# Patient Record
Sex: MALE | Race: WHITE | NOT HISPANIC OR LATINO | Employment: OTHER | URBAN - METROPOLITAN AREA
[De-identification: names, ages, dates, MRNs, and addresses within clinical notes are randomized per-mention and may not be internally consistent; named-entity substitution may affect disease eponyms.]

---

## 2017-02-10 ENCOUNTER — TELEPHONE (OUTPATIENT)
Dept: PHARMACY | Facility: PHYSICIAN GROUP | Age: 57
End: 2017-02-10

## 2017-02-10 DIAGNOSIS — E78.00 PURE HYPERCHOLESTEROLEMIA: ICD-10-CM

## 2017-02-10 DIAGNOSIS — I10 ESSENTIAL HYPERTENSION, BENIGN: ICD-10-CM

## 2017-02-10 NOTE — TELEPHONE ENCOUNTER
Pt left VM for MTM yesterday, however unclear what patient need was.     Attempted to reach patient to discuss, no answer.     Milvia Jeff, Pharm.D, [facility]  Medication Therapy Management Pharmacist  [phone]

## 2017-02-20 RX ORDER — SIMVASTATIN 20 MG
20 TABLET ORAL AT BEDTIME
Qty: 90 TABLET | Refills: 1 | Status: SHIPPED | OUTPATIENT
Start: 2017-02-20 | End: 2017-10-04

## 2017-02-20 RX ORDER — LISINOPRIL AND HYDROCHLOROTHIAZIDE 12.5; 2 MG/1; MG/1
1 TABLET ORAL DAILY
Qty: 90 TABLET | Refills: 1 | Status: SHIPPED | OUTPATIENT
Start: 2017-02-20 | End: 2017-10-04

## 2017-02-20 NOTE — TELEPHONE ENCOUNTER
The 10-year ASCVD risk score (Clevelandcelena GARCIA Jr., et al., 2013) is: 12%    Values used to calculate the score:      Age: 56 years      Sex: Male      Is Non- : No      Diabetic: No      Tobacco smoker: No      Systolic Blood Pressure: 162 mmHg      Is Prescribed Antihypertensives: Yes      HDL Cholesterol: 59 mg/dL      Total Cholesterol: 234 mg/dL  \  Pt was calling because he got a message from [facility] scheduling awhile back. Additionally, he was calling to get his simvastatin renewed because he was asked to increase his simvastatin by Dr. Nava back in November.     RX sent for refills on his lisinopril and simvastatin per CPA with Dr. Nava to sync up his medications.     Pt will be due in 3 months for fasting cholesterol recheck.     Milvia Jeff, Pharm.D, BCACP  Medication Therapy Management Pharmacist  [phone]

## 2017-02-21 NOTE — TELEPHONE ENCOUNTER
He can just do standing labs- they are in now so can set up lab only at his leisure.  Can you let him know or do you want me to?

## 2017-02-21 NOTE — TELEPHONE ENCOUNTER
LM for pt to set up lab only visit in 3 months- lab orders are in standing.    Milvia Jeff, Pharm.D, [facility]  Medication Therapy Management Pharmacist  [phone]

## 2017-04-06 ENCOUNTER — TELEPHONE (OUTPATIENT)
Dept: PHARMACY | Facility: PHYSICIAN GROUP | Age: 57
End: 2017-04-06

## 2017-04-06 NOTE — TELEPHONE ENCOUNTER
Left message for pt to call back and schedule follow up MTM visit. Pt needs clinic visit for BP recheck and has standing labs for cholesterol check to be done early May.     Milvia Jeff, Pharm.D, [facility]  Medication Therapy Management Pharmacist  [phone]

## 2017-04-12 DIAGNOSIS — J42 CHRONIC BRONCHITIS, UNSPECIFIED CHRONIC BRONCHITIS TYPE (H): ICD-10-CM

## 2017-04-12 NOTE — TELEPHONE ENCOUNTER
Patient will be coming in around May for recheck. Needing a refill of his Incruse- rx sent for 90 days to mail order per CPA with Dr. Nava.     Milvia Jeff, Pharm.D, [facility]  Medication Therapy Management Pharmacist  [phone]

## 2017-06-02 NOTE — TELEPHONE ENCOUNTER
Can you please call the patient to schedule fasting labs, PCP and MTM visit?     Labs are in standing.    Thank you,  Milvia Jeff, Pharm.D, [facility]  Medication Therapy Management Pharmacist  [phone]

## 2017-07-31 DIAGNOSIS — I10 ESSENTIAL HYPERTENSION, BENIGN: ICD-10-CM

## 2017-07-31 DIAGNOSIS — E78.00 PURE HYPERCHOLESTEROLEMIA: ICD-10-CM

## 2017-07-31 RX ORDER — LISINOPRIL AND HYDROCHLOROTHIAZIDE 12.5; 2 MG/1; MG/1
TABLET ORAL
Qty: 90 TABLET | OUTPATIENT
Start: 2017-07-31

## 2017-07-31 RX ORDER — SIMVASTATIN 20 MG
TABLET ORAL
Qty: 90 TABLET | OUTPATIENT
Start: 2017-07-31

## 2017-10-04 DIAGNOSIS — E78.00 PURE HYPERCHOLESTEROLEMIA: ICD-10-CM

## 2017-10-04 DIAGNOSIS — I10 ESSENTIAL HYPERTENSION, BENIGN: ICD-10-CM

## 2017-10-04 RX ORDER — SIMVASTATIN 20 MG
20 TABLET ORAL AT BEDTIME
Qty: 30 TABLET | Refills: 0 | COMMUNITY
Start: 2017-10-04 | End: 2017-10-30

## 2017-10-04 RX ORDER — LISINOPRIL AND HYDROCHLOROTHIAZIDE 12.5; 2 MG/1; MG/1
1 TABLET ORAL DAILY
Qty: 30 TABLET | Refills: 0 | COMMUNITY
Start: 2017-10-04 | End: 2017-10-30

## 2017-10-05 NOTE — TELEPHONE ENCOUNTER
Patient has appointment on 10/30/17 and needed a 30 day supply to get the patient through until his appointment, requested by Milvia Jeff.    Signed Prescriptions:                        Disp   Refills    simvastatin (ZOCOR) 20 MG tablet           30 tab*0        Sig: Take 1 tablet (20 mg) by mouth At Bedtime  Authorizing Provider: RODRIGO RUSSELL  Ordering User: HOLLEY ANDERSON    lisinopril-hydrochlorothiazide (PRINZIDE/Z*30 tab*0        Sig: Take 1 tablet by mouth daily  Authorizing Provider: RODRIGO RUSSELL  Ordering User: HOLLEY ANDERSON    30 day supply called to pharmacy    Holley Anderson CMA

## 2017-10-30 ENCOUNTER — OFFICE VISIT (OUTPATIENT)
Dept: FAMILY MEDICINE | Facility: CLINIC | Age: 57
End: 2017-10-30

## 2017-10-30 VITALS
HEIGHT: 69 IN | OXYGEN SATURATION: 98 % | TEMPERATURE: 97.9 F | BODY MASS INDEX: 32.32 KG/M2 | WEIGHT: 218.2 LBS | SYSTOLIC BLOOD PRESSURE: 144 MMHG | HEART RATE: 88 BPM | DIASTOLIC BLOOD PRESSURE: 80 MMHG

## 2017-10-30 DIAGNOSIS — I10 ESSENTIAL HYPERTENSION, BENIGN: ICD-10-CM

## 2017-10-30 DIAGNOSIS — I73.9 CLAUDICATION OF LEFT LOWER EXTREMITY (H): ICD-10-CM

## 2017-10-30 DIAGNOSIS — E78.00 PURE HYPERCHOLESTEROLEMIA: ICD-10-CM

## 2017-10-30 DIAGNOSIS — Z00.00 ROUTINE GENERAL MEDICAL EXAMINATION AT A HEALTH CARE FACILITY: Primary | ICD-10-CM

## 2017-10-30 DIAGNOSIS — R20.9 DISTURBANCE OF SKIN SENSATION: ICD-10-CM

## 2017-10-30 DIAGNOSIS — J42 CHRONIC BRONCHITIS, UNSPECIFIED CHRONIC BRONCHITIS TYPE (H): ICD-10-CM

## 2017-10-30 DIAGNOSIS — Z23 NEED FOR VACCINATION: ICD-10-CM

## 2017-10-30 PROCEDURE — 80061 LIPID PANEL: CPT | Mod: 90 | Performed by: FAMILY MEDICINE

## 2017-10-30 PROCEDURE — 99396 PREV VISIT EST AGE 40-64: CPT | Mod: 25 | Performed by: FAMILY MEDICINE

## 2017-10-30 PROCEDURE — 80053 COMPREHEN METABOLIC PANEL: CPT | Mod: 90 | Performed by: FAMILY MEDICINE

## 2017-10-30 PROCEDURE — 90471 IMMUNIZATION ADMIN: CPT | Performed by: FAMILY MEDICINE

## 2017-10-30 PROCEDURE — 36415 COLL VENOUS BLD VENIPUNCTURE: CPT | Performed by: FAMILY MEDICINE

## 2017-10-30 PROCEDURE — 90686 IIV4 VACC NO PRSV 0.5 ML IM: CPT | Performed by: FAMILY MEDICINE

## 2017-10-30 RX ORDER — SIMVASTATIN 20 MG
20 TABLET ORAL AT BEDTIME
Qty: 90 TABLET | Refills: 1 | Status: SHIPPED | OUTPATIENT
Start: 2017-10-30 | End: 2018-04-01

## 2017-10-30 RX ORDER — LISINOPRIL AND HYDROCHLOROTHIAZIDE 12.5; 2 MG/1; MG/1
1 TABLET ORAL DAILY
Qty: 90 TABLET | Refills: 1 | Status: SHIPPED | OUTPATIENT
Start: 2017-10-30 | End: 2018-01-08

## 2017-10-30 NOTE — MR AVS SNAPSHOT
"              After Visit Summary   10/30/2017    Rashaun Molina    MRN: [id number]           Patient Information     Date Of Birth          1960        Visit Information        Provider Department      10/30/2017 10:30 AM Yemi Nava MD [facility]        Today's Diagnoses     Routine general medical examination at a health care facility    -  1    Pure hypercholesterolemia        Essential hypertension, benign        Chronic bronchitis, unspecified chronic bronchitis type (H)        Need for vaccination        Disturbance of skin sensation        Claudication of left lower extremity (H)           Follow-ups after your visit        Follow-up notes from your care team     Return in about 6 months (around 4/30/2018).      Who to contact     If you have questions or need follow up information about today's clinic visit or your schedule please contact [facility] directly at [phone].  Normal or non-critical lab and imaging results will be communicated to you by [employer], letter or phone within 4 business days after the clinic has received the results. If you do not hear from us within 7 days, please contact the clinic through [employer] or phone. If you have a critical or abnormal lab result, we will notify you by phone as soon as possible.  Submit refill requests through [facility] or call your pharmacy and they will forward the refill request to us. Please allow 3 business days for your refill to be completed.          Additional Information About Your Visit        MyChart Information     [facility] lets you send messages to your doctor, view your test results, renew your prescriptions, schedule appointments and more. To sign up, go to [email or web address] . Click on \"Log in\" on the left side of the screen, which will take you to the Welcome page. Then click on \"Sign up Now\" on the right side of the page.     You will be asked to enter the access code " "listed below, as well as some personal information. Please follow the directions to create your username and password.     Your access code is: [email or web address]  Expires: 2018 11:34 AM     Your access code will  in 90 days. If you need help or a new code, please call your [place] clinic or [phone].        Care EveryWhere ID     This is your Care EveryWhere ID. This could be used by other organizations to access your [place] medical records  [id number]        Your Vitals Were     Pulse Temperature Height Pulse Oximetry BMI (Body Mass Index)       88 97.9  F (36.6  C) (Oral) 1.753 m (5' 9\") 98% 32.22 kg/m2        Blood Pressure from Last 3 Encounters:   10/30/17 144/80   16 162/85   16 166/84    Weight from Last 3 Encounters:   10/30/17 99 kg (218 lb 3.2 oz)   16 99.3 kg (219 lb)   16 96.6 kg (213 lb)              We Performed the Following     COMPREHENSIVE METABOLIC PANEL ([facility]) XCMP     EXTREMITY ARTERIES MULTI LEVEL, BILATERAL     HC FLU VAC PRESRV FREE QUAD SPLIT VIR 3+YRS IM     Lipid Profile ([facility])     VACCINE ADMINISTRATION, INITIAL     VENOUS COLLECTION          Where to get your medicines      These medications were sent to [facility] - [address], [facility] [address]     Phone:  [phone]     lisinopril-hydrochlorothiazide 20-12.5 MG per tablet    simvastatin 20 MG tablet    umeclidinium 62.5 MCG/INH oral inhaler          Primary Care Provider Office Phone # Fax #    Yemi Nava [phone] [address] [address]        Equal Access to Services     DEBRA PERDUE : Francesca Diaz, waaxda luqadaha, qaybta kaalmada marina ba. So [facility] [phone].    ATENCIÓN: Si habla español, tiene a mobley disposición servicios gratuitos de asistencia lingüística. Llame al [phone].    We comply " with applicable federal civil rights laws and [place] laws. We do not discriminate on the basis of race, color, national origin, age, disability, sex, sexual orientation, or gender identity.            Thank you!     Thank you for choosing [facility]  for your care. Our goal is always to provide you with excellent care. Hearing back from our patients is one way we can continue to improve our services. Please take a few minutes to complete the written survey that you may receive in the mail after your visit with us. Thank you!             Your Updated Medication List - Protect others around you: Learn how to safely use, store and throw away your medicines at www.disposemymeds.org.          This list is accurate as of: 10/30/17 11:34 AM.  Always use your most recent med list.                   Brand Name Dispense Instructions for use Diagnosis    albuterol 108 (90 BASE) MCG/ACT Inhaler    PROAIR HFA/PROVENTIL HFA/VENTOLIN HFA    1 Inhaler    Inhale 2 puffs into the lungs every 6 hours as needed for shortness of breath / dyspnea or wheezing    Simple chronic bronchitis (H)       ASPIRIN EC PO      Take 81 mg by mouth daily.        lisinopril-hydrochlorothiazide 20-12.5 MG per tablet    PRINZIDE/ZESTORETIC    90 tablet    Take 1 tablet by mouth daily    Essential hypertension, benign       simvastatin 20 MG tablet    ZOCOR    90 tablet    Take 1 tablet (20 mg) by mouth At Bedtime    Pure hypercholesterolemia       umeclidinium 62.5 MCG/INH oral inhaler    INCRUSE ELLIPTA    3 Inhaler    Inhale 1 puff into the lungs daily    Chronic bronchitis, unspecified chronic bronchitis type (H)

## 2017-10-30 NOTE — LETTER
October 31, 2017      Rashaun Molina  [address] [facility] [address]      Rashaun THALIA Molina     Your recent sodium level was quite low.  This may be due to your medication. Please stop your lisinopril/HCTZ and return to see me in a couple of weeks as we need to see if that might be the cause or do we need to search elsewhere. Please call me if questions about this.    The results of your recent Blood Sugar, Kidney Tests, Lipid profile and Liver Panel were within normal limits. The results are now released on My Chart. Please contact me if you have further questions or concerns.    Sincerely,    AMIE Nava M.D.   Resulted Orders   Lipid Profile ([place])   Result Value Ref Range    Cholesterol 179 <200 mg/dL    HDL Cholesterol 45 >40 mg/dL    Triglycerides 133 <150 mg/dL    LDL Cholesterol Calculated 109 (H) mg/dL (calc)      Comment:      Reference range: <100     Desirable range <100 mg/dL for patients with CHD or  diabetes and <70 mg/dL for diabetic patients with  known heart disease.     LDL-C is now calculated using the Fabian-Titus   calculation, which is a validated novel method providing   better accuracy than the Friedewald equation in the   estimation of LDL-C.   Fabian SS et al. MALINDA. 2013;310(19): 3820-2702   (http://education.Handpressions/faq/ILY487)      Cholesterol/HDL Ratio 4.0 <5.0 (calc)    Non HDL Cholesterol 134 (H) <130 mg/dL (calc)      Comment:      For patients with diabetes plus 1 major ASCVD risk   factor, treating to a non-HDL-C goal of <100 mg/dL   (LDL-C of <70 mg/dL) is considered a therapeutic   option.     COMPREHENSIVE METABOLIC PANEL ([facility]) XCMP   Result Value Ref Range    Glucose 99 65 - 99 mg/dL      Comment:                    Fasting reference interval         Urea Nitrogen 11 7 - 25 mg/dL    Creatinine 0.87 0.70 - 1.33 mg/dL      Comment:      For patients >49 years of age, the reference limit  for Creatinine is approximately 13% higher for  people  identified as -American.         GFR Estimate 95 > OR = 60 mL/min/1.73m2    EGFR African American 110 > OR = 60 mL/min/1.73m2    BUN/Creatinine Ratio NOT APPLICABLE 6 - 22 (calc)    Sodium 128 (L) 135 - 146 mmol/L    Potassium 4.4 3.5 - 5.3 mmol/L    Chloride 89 (L) 98 - 110 mmol/L    Carbon Dioxide 27 20 - 31 mmol/L    Calcium 9.5 8.6 - 10.3 mg/dL    Protein Total 7.9 6.1 - 8.1 g/dL    Albumin 4.1 3.6 - 5.1 g/dL    Globulin Calculated 3.8 (H) 1.9 - 3.7 g/dL (calc)    A/G Ratio 1.1 1.0 - 2.5 (calc)    Bilirubin Total 0.6 0.2 - 1.2 mg/dL    Alkaline Phosphatase 87 40 - 115 U/L    AST 27 10 - 35 U/L    ALT 31 9 - 46 U/L       If you have any questions or concerns, please call the clinic at the number listed above.       Sincerely,        Yemi Nava MD

## 2017-10-30 NOTE — NURSING NOTE
Rashaun is here for CPX and possible sinus inf and numbness in feet    Patient is here for a full physical exam.    Pre-Visit Screening :  Immunizations : up to date flu shot today  Colon Screening : is due and to be scheduled by patient for later completion  Mammogram: NA  Asthma Action Test/Plan : NA  PHQ9/GAD7 :  None  Fall Risk Assessment NA    Vitals:  Pulse - regular  My Chart - declines    CLASSIFICATION OF OVERWEIGHT AND OBESITY BY BMI                         Obesity Class           BMI(kg/m2)  Underweight                                    < 18.5  Normal                                         18.5-24.9  Overweight                                     25.0-29.9  OBESITY                     I                  30.0-34.9                              II                 35.0-39.9  EXTREME OBESITY             III                >40                             Patient's  BMI Body mass index is 32.22 kg/(m^2).  http://hin.nhlbi.nih.gov/menuplanner/menu.cgi  Questioned patient about current smoking habits.  Pt. quit smoking some time ago.    ETOH screening:  Questions:  1-How often do you have a drink containing alcohol?                             12 times per week(s) about a case of beer a week  2-How many drinks containing alcohol do you have on a typical day when you are         Drinking?                              3   3- How often do you have 5 or more drinks on one occasion?                              Never    Have you ever:  None of the patient's responses to the CAGE screening were positive / Negative CAGE score

## 2017-10-30 NOTE — PROGRESS NOTES
SUBJECTIVE:   CC: Rashaun Molina is an 57 year old male who presents for preventative health visit.     Healthy Habits:    Do you get at least three servings of calcium containing foods daily (dairy, green leafy vegetables, etc.)? yes    Amount of exercise or daily activities, outside of work: 0 day(s) per week    Problems taking medications regularly No    Medication side effects: No    Have you had an eye exam in the past two years? yes    Do you see a dentist twice per year? yes    Do you have sleep apnea, excessive snoring or daytime drowsiness?possible issues- wants to check next year          Pt relates 2 months numbness on and off in feet, pain can occur as well- seems worse after walking, better if stops    Today's PHQ-2 Score: PHQ-2 ( 1999 Pfizer) 10/30/2017 11/7/2016   Q1: Little interest or pleasure in doing things 0 0   Q2: Feeling down, depressed or hopeless 0 1   PHQ-2 Score 0 1         Abuse: Current or Past(Physical, Sexual or Emotional)- No  Do you feel safe in your environment - Yes  Social History   Substance Use Topics     Smoking status: Former Smoker     Packs/day: 1.50     Years: 40.00     Quit date: 4/19/2013     Smokeless tobacco: Never Used     Alcohol use 8.4 oz/week     14 Cans of beer per week      Comment: 2 beers daily     2-5 gomez lite per day    Last PSA:   Abbott PSA   Date Value Ref Range Status   11/07/2016 0.6 < OR = 4.0 ng/mL Final     Comment:        This test was performed using the Siemens  chemiluminescent method. Values obtained from  different assay methods cannot be used  interchangeably. PSA levels, regardless of  value, should not be interpreted as absolute  evidence of the presence or absence of disease.            Reviewed orders with patient. Reviewed health maintenance and updated orders accordingly - Yes  BP Readings from Last 3 Encounters:   10/30/17 144/80   11/17/16 162/85   11/07/16 166/84    Wt Readings from Last 3 Encounters:   10/30/17 99 kg (218 lb 3.2  oz)   16 99.3 kg (219 lb)   16 96.6 kg (213 lb)                  Patient Active Problem List   Diagnosis     Essential hypertension, benign     Pure hypercholesterolemia     History of colonic polyps     BCC (basal cell carcinoma)     ACP (advance care planning)     Health Care Home     Colovesical fistula     Vasovagal syncopes     Avascular necrosis of bone of right hip (H)     S/P total hip arthroplasty     Obesity due to excess calories, unspecified obesity severity     Chronic bronchitis, unspecified chronic bronchitis type (H)     Past Surgical History:   Procedure Laterality Date     ARTHROPLASTY HIP Right 10/7/2015    Procedure: ARTHROPLASTY HIP;  Surgeon: Neil Davis MD;  Location: [facility] OR     COLONOSCOPY      negative, michael     COMBINED CYSTOSCOPY, INSERT CATHETER URETER  2013    Procedure: COMBINED CYSTOSCOPY, INSERT CATHETER URETER;;  Surgeon: Kashif Parks MD;  Location:  OR     HC COLONOSCOPY THRU STOMA W BIOPSY/CAUTERY TUMOR/POLYP/LESION      michael, tubular adenoma, next colonoscopy      HC COLONOSCOPY THRU STOMA, DIAGNOSTIC  01    negative, ligate two internal hemmorhoids  Dr rodriguez-repeat      HC LARYNGOSCOPY DIRECT W VOCAL CORD INJECTION      vocal cord polyps     LAPAROSCOPIC ASSISTED COLECTOMY  2013    Procedure: LAPAROSCOPIC ASSISTED COLECTOMY;  LOW ANTERIOR RESECTION ;  Surgeon: Tramaine Zuniga MD;  Location:  OR       Social History   Substance Use Topics     Smoking status: Former Smoker     Packs/day: 1.50     Years: 40.00     Quit date: 2013     Smokeless tobacco: Never Used     Alcohol use 8.4 oz/week     14 Cans of beer per week      Comment: 2 beers daily     Family History   Problem Relation Age of Onset     Hypertension Mother      Hypertension Father       MI     Hypertension Brother      Hypertension Brother      Hypertension Sister      HEART DISEASE Father      MI  at age 55     Lipids Brother      Lipids  Brother          Current Outpatient Prescriptions   Medication Sig Dispense Refill     simvastatin (ZOCOR) 20 MG tablet Take 1 tablet (20 mg) by mouth At Bedtime 90 tablet 1     lisinopril-hydrochlorothiazide (PRINZIDE/ZESTORETIC) 20-12.5 MG per tablet Take 1 tablet by mouth daily 90 tablet 1     umeclidinium (INCRUSE ELLIPTA) 62.5 MCG/INH oral inhaler Inhale 1 puff into the lungs daily 3 Inhaler 1     albuterol (PROAIR HFA/PROVENTIL HFA/VENTOLIN HFA) 108 (90 BASE) MCG/ACT Inhaler Inhale 2 puffs into the lungs every 6 hours as needed for shortness of breath / dyspnea or wheezing 1 Inhaler 1     ASPIRIN EC PO Take 81 mg by mouth daily.       [DISCONTINUED] simvastatin (ZOCOR) 20 MG tablet Take 1 tablet (20 mg) by mouth At Bedtime 30 tablet 0     [DISCONTINUED] lisinopril-hydrochlorothiazide (PRINZIDE/ZESTORETIC) 20-12.5 MG per tablet Take 1 tablet by mouth daily 30 tablet 0     Allergies   Allergen Reactions     Spiriva Handihaler Blisters     Recent Labs   Lab Test  11/07/16   1007  07/06/16   0824  10/08/15   0558  09/25/15   1140  09/01/15   1028   07/23/14   1021   04/17/13   0015   LDL  130*  123   --    --   118   < >  94   < >   --    HDL  59  46   --    --   66   < >  56   < >   --    TRIG  224*  117   --    --   231*   < >  339*   < >   --    ALT  38  22   --   66*  80*   < >  40   < >   --    CR  0.97  1.10  1.07  0.81  0.96   < >  1.13   < >  0.99   GFRESTIMATED  86  74  71  99  88   < >  73   < >  Not Calculated   GFRESTBLACK   --    --   86   --    --    --    --    --   Not Calculated   POTASSIUM  4.9  4.3  3.7  4.7  4.8   < >  4.5   < >  3.7  Charge credited  Duplicate request CORRECTED ON 04/17 AT 0716: PREVIOUSLY REPORTED AS 3.7   TSH   --    --    --    --    --    --   1.69   --    --     < > = values in this interval not displayed.              Reviewed and updated as needed this visit by clinical staffTobacco  Allergies  Problems         Reviewed and updated as needed this visit by  Provider        Past Medical History:   Diagnosis Date     Arthritis      Chronic airway obstruction, not elsewhere classified 3/8/2004     Cough syncope 11/21/2012     Essential hypertension, benign      Fistula     colon/bladder     Fracture of right proximal fibula 7/30/2014     Orthostatic hypotension 11/24/2014     Other chronic pain     right hip pain     Personal history of colonic polyps 6/28/2005     Pure hypercholesterolemia      Tobacco use disorder 3/8/2004     Vasovagal syncopes 10/25/2014     Viral warts, unspecified     genital      Past Surgical History:   Procedure Laterality Date     ARTHROPLASTY HIP Right 10/7/2015    Procedure: ARTHROPLASTY HIP;  Surgeon: Neil Davis MD;  Location: [facility] OR     COLONOSCOPY  9/05    negative, michael     COMBINED CYSTOSCOPY, INSERT CATHETER URETER  4/11/2013    Procedure: COMBINED CYSTOSCOPY, INSERT CATHETER URETER;;  Surgeon: Kashif Parks MD;  Location: [facility] OR     HC COLONOSCOPY THRU STOMA W BIOPSY/CAUTERY TUMOR/POLYP/LESION  1998    michael, tubular adenoma, next colonoscopy 2001     HC COLONOSCOPY THRU STOMA, DIAGNOSTIC  04/16/01    negative, ligate two internal hemmorhoids  Dr rodriguez-repeat 2008     HC LARYNGOSCOPY DIRECT W VOCAL CORD INJECTION      vocal cord polyps     LAPAROSCOPIC ASSISTED COLECTOMY  4/11/2013    Procedure: LAPAROSCOPIC ASSISTED COLECTOMY;  LOW ANTERIOR RESECTION ;  Surgeon: Tramaine Zuniga MD;  Location: [facility] OR       ROS:  C: NEGATIVE for fever, chills, change in weight  I: NEGATIVE for worrisome rashes, moles or lesions  E: NEGATIVE for vision changes or irritation  ENT: NEGATIVE for ear, mouth and throat problems  R: NEGATIVE for significant cough or SOB  CV: NEGATIVE for chest pain, palpitations or peripheral edema  GI: NEGATIVE for nausea, abdominal pain, heartburn, or change in bowel habits   male: negative for dysuria, hematuria, decreased urinary stream, erectile dysfunction, urethral discharge  M: NEGATIVE for  "significant arthralgias or myalgia  E: NEGATIVE for temperature intolerance, skin/hair changes  H: NEGATIVE for bleeding problems  P: NEGATIVE for changes in mood or affect    OBJECTIVE:   /80 (BP Location: Right arm, Patient Position: Chair, Cuff Size: Adult Large)  Pulse 88  Temp 97.9  F (36.6  C) (Oral)  Ht 1.753 m (5' 9\")  Wt 99 kg (218 lb 3.2 oz)  SpO2 98%  BMI 32.22 kg/m2  EXAM:  GENERAL: healthy, alert and no distress  EYES: Eyes grossly normal to inspection, PERRL and conjunctivae and sclerae normal  HENT: ear canals and TM's normal, nose and mouth without ulcers or lesions  NECK: no adenopathy, no asymmetry, masses, or scars and thyroid normal to palpation  RESP: lungs clear to auscultation - no rales, rhonchi or wheezes  CV: regular rate and rhythm, normal S1 S2, no S3 or S4, no murmur, click or rub, no peripheral edema and peripheral pulses strong  ABDOMEN: soft, nontender, no hepatosplenomegaly, no masses and bowel sounds normal   (male): normal male genitalia without lesions or urethral discharge, no hernia  RECTAL (male): deferred  MS: no gross musculoskeletal defects noted, no edema  SKIN: no suspicious lesions or rashes  NEURO: Normal strength and tone, mentation intact and speech normal  PSYCH: mentation appears normal, affect normal/bright    ASSESSMENT/PLAN:   (Z00.00) Routine general medical examination at a health care facility  (primary encounter diagnosis)  Comment: discussed preventitive healthcare   Plan: Lipid Profile ([facility]), COMPREHENSIVE METABOLIC         PANEL ([facility]) XCMP, VENOUS COLLECTION        Continue to work on healthy diet and exercise, discussed healthy habits     (E78.00) Pure hypercholesterolemia  Comment: control uncertain  Plan: simvastatin (ZOCOR) 20 MG tablet, Lipid Profile        ([facility]), COMPREHENSIVE METABOLIC PANEL ([facility])         XCMP, VENOUS COLLECTION        continue current medications at current doses     (I10) Essential hypertension, " "benign  Comment: borderline today but improved  Plan: lisinopril-hydrochlorothiazide         (PRINZIDE/ZESTORETIC) 20-12.5 MG per tablet,         COMPREHENSIVE METABOLIC PANEL ([facility]) XCMP,         VENOUS COLLECTION        continue current medications at current doses, Check blood pressure readings outside of the clinic several times per week, write down values, and follow up if elevated within the next several weeks. Blood pressure can be checked at the firestation, drugstore,  or any valid site.      (J42) Chronic bronchitis, unspecified chronic bronchitis type (H)  Comment: has not been using incruse  Plan: umeclidinium (INCRUSE ELLIPTA) 62.5 MCG/INH         oral inhaler        Recommend he start meds    (Z23) Need for vaccination  Comment:   Plan: HC FLU VAC PRESRV FREE QUAD SPLIT VIR 3+YRS IM,        VACCINE ADMINISTRATION, INITIAL            (R20.9) Disturbance of skin sensation  Comment: ARI reveals moderate claudication-this may be cause of symptoms -recommend vascular eval-pt prefers to wait until after Jan 1 due cost concerns  Plan: EXTREMITY ARTERIES MULTI LEVEL, BILATERAL        See vascular in 2018, patient given instructions to go to emergency department immediately if worsening of symptoms and verbalizes this understanding       COUNSELING:  Reviewed preventive health counseling, as reflected in patient instructions       Regular exercise       Healthy diet/nutrition       Vision screening       Hearing screening       Immunizations    Vaccinated for: Influenza           Colon cancer screening       Prostate cancer screening             reports that he quit smoking about 4 years ago. He has a 60.00 pack-year smoking history. He has never used smokeless tobacco.      Estimated body mass index is 32.22 kg/(m^2) as calculated from the following:    Height as of this encounter: 1.753 m (5' 9\").    Weight as of this encounter: 99 kg (218 lb 3.2 oz).   Weight management plan: Discussed healthy diet and " exercise guidelines and patient will follow up in 6 months in clinic to re-evaluate.    Counseling Resources:  ATP IV Guidelines  Pooled Cohorts Equation Calculator  FRAX Risk Assessment  ICSI Preventive Guidelines  Dietary Guidelines for Americans, 2010  USDA's MyPlate  ASA Prophylaxis  Lung CA Screening    Yemi Nava MD  [facility]

## 2017-10-31 LAB
ALBUMIN SERPL-MCNC: 4.1 G/DL (ref 3.6–5.1)
ALBUMIN/GLOB SERPL: 1.1 (CALC) (ref 1–2.5)
ALP SERPL-CCNC: 87 U/L (ref 40–115)
ALT SERPL-CCNC: 31 U/L (ref 9–46)
AST SERPL-CCNC: 27 U/L (ref 10–35)
BILIRUB SERPL-MCNC: 0.6 MG/DL (ref 0.2–1.2)
BUN SERPL-MCNC: 11 MG/DL (ref 7–25)
BUN/CREATININE RATIO: ABNORMAL (CALC) (ref 6–22)
CALCIUM SERPL-MCNC: 9.5 MG/DL (ref 8.6–10.3)
CHLORIDE SERPLBLD-SCNC: 89 MMOL/L (ref 98–110)
CHOLEST SERPL-MCNC: 179 MG/DL
CHOLEST/HDLC SERPL: 4 (CALC)
CO2 SERPL-SCNC: 27 MMOL/L (ref 20–31)
CREAT SERPL-MCNC: 0.87 MG/DL (ref 0.7–1.33)
EGFR AFRICAN AMERICAN - QUEST: 110 ML/MIN/1.73M2
GFR SERPL CREATININE-BSD FRML MDRD: 95 ML/MIN/1.73M2
GLOBULIN, CALCULATED - QUEST: 3.8 G/DL (CALC) (ref 1.9–3.7)
GLUCOSE - QUEST: 99 MG/DL (ref 65–99)
HDLC SERPL-MCNC: 45 MG/DL
LDLC SERPL CALC-MCNC: 109 MG/DL (CALC)
NONHDLC SERPL-MCNC: 134 MG/DL (CALC)
POTASSIUM SERPL-SCNC: 4.4 MMOL/L (ref 3.5–5.3)
PROT SERPL-MCNC: 7.9 G/DL (ref 6.1–8.1)
SODIUM SERPL-SCNC: 128 MMOL/L (ref 135–146)
TRIGL SERPL-MCNC: 133 MG/DL

## 2017-11-13 ENCOUNTER — OFFICE VISIT (OUTPATIENT)
Dept: FAMILY MEDICINE | Facility: CLINIC | Age: 57
End: 2017-11-13

## 2017-11-13 VITALS
TEMPERATURE: 98.2 F | HEIGHT: 69 IN | BODY MASS INDEX: 32.47 KG/M2 | SYSTOLIC BLOOD PRESSURE: 164 MMHG | OXYGEN SATURATION: 96 % | WEIGHT: 219.2 LBS | HEART RATE: 81 BPM | DIASTOLIC BLOOD PRESSURE: 92 MMHG

## 2017-11-13 DIAGNOSIS — E87.1 LOW SODIUM LEVELS: Primary | ICD-10-CM

## 2017-11-13 DIAGNOSIS — I10 ESSENTIAL HYPERTENSION, BENIGN: ICD-10-CM

## 2017-11-13 PROCEDURE — 80048 BASIC METABOLIC PNL TOTAL CA: CPT | Mod: 90 | Performed by: FAMILY MEDICINE

## 2017-11-13 PROCEDURE — 36415 COLL VENOUS BLD VENIPUNCTURE: CPT | Performed by: FAMILY MEDICINE

## 2017-11-13 PROCEDURE — 99213 OFFICE O/P EST LOW 20 MIN: CPT | Performed by: FAMILY MEDICINE

## 2017-11-13 NOTE — NURSING NOTE
Rashaun VÁSQUEZ Khangadrian is here today for a recheck of his sodium levels.    Pre-Visit Screening :  Immunizations : up to date  Colon Screening : is up to date  Asthma Action Test/Plan : Up to date  PHQ9/GAD7 :  NA    Pulse - regular  My Chart - declines    CLASSIFICATION OF OVERWEIGHT AND OBESITY BY BMI                         Obesity Class           BMI(kg/m2)  Underweight                                    < 18.5  Normal                                         18.5-24.9  Overweight                                     25.0-29.9  OBESITY                     I                  30.0-34.9                              II                 35.0-39.9  EXTREME OBESITY             III                >40                             Patient's  BMI Body mass index is 32.37 kg/(m^2).  http://hin.nhlbi.nih.gov/menuplanner/menu.cgi  Questioned patient about current smoking habits.  Pt. quit smoking some time ago.    Suzie Petit, [facility]

## 2017-11-13 NOTE — MR AVS SNAPSHOT
"              After Visit Summary   11/13/2017    Rashaun Molina    MRN: [id number]           Patient Information     Date Of Birth          1960        Visit Information        Provider Department      11/13/2017 11:00 AM Yemi Nava MD [facility]        Today's Diagnoses     Low sodium levels    -  1    Essential hypertension, benign           Follow-ups after your visit        Who to contact     If you have questions or need follow up information about today's clinic visit or your schedule please contact [facility] directly at [phone].  Normal or non-critical lab and imaging results will be communicated to you by MyChart, letter or phone within 4 business days after the clinic has received the results. If you do not hear from us within 7 days, please contact the clinic through MyChart or phone. If you have a critical or abnormal lab result, we will notify you by phone as soon as possible.  Submit refill requests through [employer] or call your pharmacy and they will forward the refill request to us. Please allow 3 business days for your refill to be completed.          Additional Information About Your Visit        Care EveryWhere ID     This is your Care EveryWhere ID. This could be used by other organizations to access your [place] medical records  [id number]        Your Vitals Were     Pulse Temperature Height Pulse Oximetry BMI (Body Mass Index)       81 98.2  F (36.8  C) (Oral) 1.753 m (5' 9\") 96% 32.37 kg/m2        Blood Pressure from Last 3 Encounters:   11/13/17 (!) 164/92   10/30/17 144/80   11/17/16 162/85    Weight from Last 3 Encounters:   11/13/17 99.4 kg (219 lb 3.2 oz)   10/30/17 99 kg (218 lb 3.2 oz)   11/07/16 99.3 kg (219 lb)              We Performed the Following     BASIC METABOLIC PANEL ([facility])     VENOUS COLLECTION        Primary Care Provider Office Phone # Fax #    Yemi Nava [phone]    "    [address] [facility] [address]  [facility] [address]        Equal Access to Services     PORFIRIODEBRA IRON : Hadii aad ku hadmaryo Soomaali, waaxda luqadaha, qaybta kaalmada rasheednatalieabdifatah, marina salas marceloshay segoviacanabelardo araujo . So [facility] [phone].    ATENCIÓN: Si habla español, tiene a mobley disposición servicios gratuitos de asistencia lingüística. Llame al [phone].    We comply with applicable federal civil rights laws and [place] laws. We do not discriminate on the basis of race, color, national origin, age, disability, sex, sexual orientation, or gender identity.            Thank you!     Thank you for choosing [facility]  for your care. Our goal is always to provide you with excellent care. Hearing back from our patients is one way we can continue to improve our services. Please take a few minutes to complete the written survey that you may receive in the mail after your visit with us. Thank you!             Your Updated Medication List - Protect others around you: Learn how to safely use, store and throw away your medicines at www.disposemymeds.org.          This list is accurate as of: 11/13/17 11:20 AM.  Always use your most recent med list.                   Brand Name Dispense Instructions for use Diagnosis    albuterol 108 (90 BASE) MCG/ACT Inhaler    PROAIR HFA/PROVENTIL HFA/VENTOLIN HFA    1 Inhaler    Inhale 2 puffs into the lungs every 6 hours as needed for shortness of breath / dyspnea or wheezing    Simple chronic bronchitis (H)       ASPIRIN EC PO      Take 81 mg by mouth daily.        lisinopril-hydrochlorothiazide 20-12.5 MG per tablet    PRINZIDE/ZESTORETIC    90 tablet    Take 1 tablet by mouth daily    Essential hypertension, benign       simvastatin 20 MG tablet    ZOCOR    90 tablet    Take 1 tablet (20 mg) by mouth At Bedtime    Pure hypercholesterolemia       umeclidinium 62.5 MCG/INH oral inhaler    INCRUSE ELLIPTA    3 Inhaler    Inhale 1 puff into the lungs daily     Chronic bronchitis, unspecified chronic bronchitis type (H)

## 2017-11-13 NOTE — PROGRESS NOTES
"Subjective:   Rashaun Molina is a 57 year old male with hypertension-here for f/u after sodium noted to be 128-pt stopped Prinzide at my request 10 days ago- here for f/u.  Current Outpatient Prescriptions   Medication Sig Dispense Refill     simvastatin (ZOCOR) 20 MG tablet Take 1 tablet (20 mg) by mouth At Bedtime 90 tablet 1     lisinopril-hydrochlorothiazide (PRINZIDE/ZESTORETIC) 20-12.5 MG per tablet NOT TAKING       umeclidinium (INCRUSE ELLIPTA) 62.5 MCG/INH oral inhaler Inhale 1 puff into the lungs daily 3 Inhaler 1     albuterol (PROAIR HFA/PROVENTIL HFA/VENTOLIN HFA) 108 (90 BASE) MCG/ACT Inhaler Inhale 2 puffs into the lungs every 6 hours as needed for shortness of breath / dyspnea or wheezing 1 Inhaler 1     ASPIRIN EC PO Take 81 mg by mouth daily.        Hypertension ROS: no TIA's, no chest pain on exertion, no dyspnea on exertion, no swelling of ankles.   New concerns: off meds due to low sodium.     Objective:   BP (!) 164/92 (BP Location: Right arm, Patient Position: Chair, Cuff Size: Adult Large)  Pulse 81  Temp 98.2  F (36.8  C) (Oral)  Ht 1.753 m (5' 9\")  Wt 99.4 kg (219 lb 3.2 oz)  SpO2 96%  BMI 32.37 kg/m2   Appearance healthy, alert and cooperative.  General exam S1, S2 normal, no gallop, no murmur, chest clear, no JVD, no HSM, no edema.   Lab review: see above.     Assessment:    Hypertension poorly controlled off meds- intentionally stopped due to low sodium-suspect thiazide driven hyponatremia    Plan:   . Recheck BMP today, if sodium normalized will keep off thiazide diuretic and use lisinopril alone for now  "

## 2017-11-14 LAB
BUN SERPL-MCNC: 7 MG/DL (ref 7–25)
BUN/CREATININE RATIO: ABNORMAL (CALC) (ref 6–22)
CALCIUM SERPL-MCNC: 9 MG/DL (ref 8.6–10.3)
CHLORIDE SERPLBLD-SCNC: 97 MMOL/L (ref 98–110)
CO2 SERPL-SCNC: 27 MMOL/L (ref 20–31)
CREAT SERPL-MCNC: 0.86 MG/DL (ref 0.7–1.33)
EGFR AFRICAN AMERICAN - QUEST: 111 ML/MIN/1.73M2
GFR SERPL CREATININE-BSD FRML MDRD: 96 ML/MIN/1.73M2
GLUCOSE - QUEST: 92 MG/DL (ref 65–99)
POTASSIUM SERPL-SCNC: 4.2 MMOL/L (ref 3.5–5.3)
SODIUM SERPL-SCNC: 136 MMOL/L (ref 135–146)

## 2017-11-15 ENCOUNTER — TELEPHONE (OUTPATIENT)
Dept: FAMILY MEDICINE | Facility: CLINIC | Age: 57
End: 2017-11-15

## 2017-11-15 DIAGNOSIS — I10 ESSENTIAL HYPERTENSION, BENIGN: Primary | ICD-10-CM

## 2017-11-15 RX ORDER — LISINOPRIL 20 MG/1
20 TABLET ORAL DAILY
Qty: 30 TABLET | Refills: 1 | Status: SHIPPED | OUTPATIENT
Start: 2017-11-15 | End: 2018-01-15 | Stop reason: DRUGHIGH

## 2017-11-15 NOTE — TELEPHONE ENCOUNTER
D/w pt- normal electrolytes and renal function off lis/hctz-will no longer use hctz in this pt-called in lisinopril-he will check BP at home for next few weeks and let me know how it goes-likely will need another med but will see

## 2018-01-08 ENCOUNTER — TELEPHONE (OUTPATIENT)
Dept: OTHER | Facility: CLINIC | Age: 58
End: 2018-01-08

## 2018-01-08 ENCOUNTER — OFFICE VISIT (OUTPATIENT)
Dept: FAMILY MEDICINE | Facility: CLINIC | Age: 58
End: 2018-01-08

## 2018-01-08 VITALS
TEMPERATURE: 97.9 F | WEIGHT: 214.8 LBS | DIASTOLIC BLOOD PRESSURE: 80 MMHG | BODY MASS INDEX: 31.81 KG/M2 | SYSTOLIC BLOOD PRESSURE: 148 MMHG | HEIGHT: 69 IN | HEART RATE: 80 BPM | OXYGEN SATURATION: 96 %

## 2018-01-08 DIAGNOSIS — I73.9 CLAUDICATION OF LEFT LOWER EXTREMITY (H): ICD-10-CM

## 2018-01-08 DIAGNOSIS — I10 ESSENTIAL HYPERTENSION, BENIGN: Primary | ICD-10-CM

## 2018-01-08 DIAGNOSIS — Z71.89 ACP (ADVANCE CARE PLANNING): ICD-10-CM

## 2018-01-08 DIAGNOSIS — I73.9 PAD (PERIPHERAL ARTERY DISEASE) (H): Primary | ICD-10-CM

## 2018-01-08 PROCEDURE — 99213 OFFICE O/P EST LOW 20 MIN: CPT | Performed by: FAMILY MEDICINE

## 2018-01-08 RX ORDER — LISINOPRIL 40 MG/1
40 TABLET ORAL DAILY
Qty: 90 TABLET | Refills: 0 | Status: SHIPPED | OUTPATIENT
Start: 2018-01-08 | End: 2018-04-18

## 2018-01-08 RX ORDER — LISINOPRIL 20 MG/1
20 TABLET ORAL DAILY
Status: CANCELLED | OUTPATIENT
Start: 2018-01-08

## 2018-01-08 NOTE — MR AVS SNAPSHOT
After Visit Summary   1/8/2018    Rashaun Molina    MRN: [id number]           Patient Information     Date Of Birth          1960        Visit Information        Provider Department      1/8/2018 11:00 AM Yemi Nava MD [facility]        Today's Diagnoses     Essential hypertension, benign    -  1    ACP (advance care planning)        Claudication of left lower extremity (H)           Follow-ups after your visit        Additional Services     VASCULAR SURGERY REFERRAL       Your provider has referred you to: [facility]    Please be aware that coverage of these services is subject to the terms and limitations of your health insurance plan.  Call member services at your health plan with any benefit or coverage questions.      Please bring the following with you to your appointment:    (1) Any X-Rays, CTs or MRIs which have been performed.  Contact the facility where they were done to arrange for  prior to your scheduled appointment.    (2) List of current medications   (3) This referral request   (4) Any documents/labs given to you for this referral                  Follow-up notes from your care team     Return in about 3 months (around 4/8/2018).      Who to contact     If you have questions or need follow up information about today's clinic visit or your schedule please contact [facility] directly at [phone].  Normal or non-critical lab and imaging results will be communicated to you by MyChart, letter or phone within 4 business days after the clinic has received the results. If you do not hear from us within 7 days, please contact the clinic through MyChart or phone. If you have a critical or abnormal lab result, we will notify you by phone as soon as possible.  Submit refill requests through [employer] or call your pharmacy and they will forward the refill request to us. Please allow 3 business days for your  "refill to be completed.          Additional Information About Your Visit        Care EveryWhere ID     This is your Care EveryWhere ID. This could be used by other organizations to access your [place] medical records  [id number]        Your Vitals Were     Pulse Temperature Height Pulse Oximetry BMI (Body Mass Index)       80 97.9  F (36.6  C) (Oral) 1.753 m (5' 9\") 96% 31.72 kg/m2        Blood Pressure from Last 3 Encounters:   01/08/18 148/80   11/13/17 (!) 164/92   10/30/17 144/80    Weight from Last 3 Encounters:   01/08/18 97.4 kg (214 lb 12.8 oz)   11/13/17 99.4 kg (219 lb 3.2 oz)   10/30/17 99 kg (218 lb 3.2 oz)              We Performed the Following     VASCULAR SURGERY REFERRAL          Today's Medication Changes          These changes are accurate as of: 1/8/18 11:01 AM.  If you have any questions, ask your nurse or doctor.               These medicines have changed or have updated prescriptions.        Dose/Directions    * lisinopril 20 MG tablet   Commonly known as:  PRINIVIL/ZESTRIL   This may have changed:  Another medication with the same name was added. Make sure you understand how and when to take each.   Used for:  Essential hypertension, benign   Changed by:  Yemi Nava MD        Dose:  20 mg   Take 1 tablet (20 mg) by mouth daily   Quantity:  30 tablet   Refills:  1       * lisinopril 40 MG tablet   Commonly known as:  PRINIVIL/ZESTRIL   This may have changed:  You were already taking a medication with the same name, and this prescription was added. Make sure you understand how and when to take each.   Used for:  Essential hypertension, benign   Changed by:  Yemi Nava MD        Dose:  40 mg   Take 1 tablet (40 mg) by mouth daily   Quantity:  90 tablet   Refills:  0       * Notice:  This list has 2 medication(s) that are the same as other medications prescribed for you. Read the directions carefully, and ask your doctor or other care provider to review them with " you.         Where to get your medicines      These medications were sent to [facility] - [address]  [address] [facility] [address], [facility] [address]     Phone:  [phone]     lisinopril 40 MG tablet                Primary Care Provider Office Phone # Fax #    Yemi Ashok Nava [phone] [address] [facility] [address]  [facility] [address]        Equal Access to Services     [facility] Center[facility] : Hadii aad ku hadasho Soomaali, waaxda luqadaha, qaybta kaalmada adeegyada, waxay idiin hayaan adeeg kharash la'aan . So [facility] [phone].    ATENCIÓN: Si habla español, tiene a mobley disposición servicios gratuitos de asistencia lingüística. [facility] [phone].    We comply with applicable federal civil rights laws and [place] laws. We do not discriminate on the basis of race, color, national origin, age, disability, sex, sexual orientation, or gender identity.            Thank you!     Thank you for choosing [facility]  for your care. Our goal is always to provide you with excellent care. Hearing back from our patients is one way we can continue to improve our services. Please take a few minutes to complete the written survey that you may receive in the mail after your visit with us. Thank you!             Your Updated Medication List - Protect others around you: Learn how to safely use, store and throw away your medicines at www.disposemymeds.org.          This list is accurate as of: 1/8/18 11:01 AM.  Always use your most recent med list.                   Brand Name Dispense Instructions for use Diagnosis    albuterol 108 (90 BASE) MCG/ACT Inhaler    PROAIR HFA/PROVENTIL HFA/VENTOLIN HFA    1 Inhaler    Inhale 2 puffs into the lungs every 6 hours as needed for shortness of breath / dyspnea or wheezing    Simple chronic bronchitis (H)       ASPIRIN EC PO      Take 81 mg by mouth daily.        * lisinopril 20 MG tablet     PRINIVIL/ZESTRIL    30 tablet    Take 1 tablet (20 mg) by mouth daily    Essential hypertension, benign       * lisinopril 40 MG tablet    PRINIVIL/ZESTRIL    90 tablet    Take 1 tablet (40 mg) by mouth daily    Essential hypertension, benign       simvastatin 20 MG tablet    ZOCOR    90 tablet    Take 1 tablet (20 mg) by mouth At Bedtime    Pure hypercholesterolemia       umeclidinium 62.5 MCG/INH oral inhaler    INCRUSE ELLIPTA    3 Inhaler    Inhale 1 puff into the lungs daily    Chronic bronchitis, unspecified chronic bronchitis type (H)       * Notice:  This list has 2 medication(s) that are the same as other medications prescribed for you. Read the directions carefully, and ask your doctor or other care provider to review them with you.

## 2018-01-08 NOTE — NURSING NOTE
"Rashaun Molina is here today for a recheck of his high blood pressure.    Pre-visit Planning:    Immunizations -up to date  Colonoscopy -is due and to be scheduled by patient for later completion (Insurance won't allow it to be done until sometime after June)  Asthma test --is up to date  PHQ9 -NA  CHICHO 7 -NA      Vitals:    BP Cuff right  Arm with large Cuff  PULSE regular  214 lbs 12.8 oz and 5' 9\"  CLASSIFICATION OF OVERWEIGHT AND OBESITY BY BMI                        Obesity Class           BMI(kg/m2)  Underweight                                    < 18.5  Normal                                         18.5-24.9  Overweight                                     25.0-29.9  OBESITY                     I                  30.0-34.9                             II                 35.0-39.9  EXTREME OBESITY             III                >40      Patient's  BMI Body mass index is 31.72 kg/(m^2).  Http://hin.nhlbi.nih.gov/menuplanner/menu.cgi    My Chart: - declines     Tobacco Use: Questioned patient about current smoking habits.  Pt. quit smoking some time ago.    The patient has verbalized that it is ok to leave a detailed voice message on the patient's cell phone with results/recommendations from this visit.     Verified Phone Number:    Telephone Information:   Mobile [phone]       Suzie Petit [facility]      "

## 2018-01-08 NOTE — PROGRESS NOTES
"Subjective:   Rashaun Molina is a 57 year old male with hypertension-was taken off HCTZ 11/17 after electrolyte abnormalities-resolved off that.  Current Outpatient Prescriptions   Medication Sig Dispense Refill     lisinopril (PRINIVIL/ZESTRIL) 40 MG tablet Take 1 tablet (40 mg) by mouth daily 90 tablet 0     lisinopril (PRINIVIL/ZESTRIL) 20 MG tablet Take 1 tablet (20 mg) by mouth daily 30 tablet 1     simvastatin (ZOCOR) 20 MG tablet Take 1 tablet (20 mg) by mouth At Bedtime 90 tablet 1     umeclidinium (INCRUSE ELLIPTA) 62.5 MCG/INH oral inhaler Inhale 1 puff into the lungs daily 3 Inhaler 1     ASPIRIN EC PO Take 81 mg by mouth daily.       albuterol (PROAIR HFA/PROVENTIL HFA/VENTOLIN HFA) 108 (90 BASE) MCG/ACT Inhaler Inhale 2 puffs into the lungs every 6 hours as needed for shortness of breath / dyspnea or wheezing 1 Inhaler 1      Hypertension ROS: taking medications as instructed, no medication side effects noted, home BP monitoring in range of 140-160's systolic over 80's diastolic, no TIA's, no chest pain on exertion, no dyspnea on exertion, no swelling of ankles.   New concerns: none.     Objective:   /80 (BP Location: Right arm, Patient Position: Chair, Cuff Size: Adult Large)  Pulse 80  Temp 97.9  F (36.6  C) (Oral)  Ht 1.753 m (5' 9\")  Wt 97.4 kg (214 lb 12.8 oz)  SpO2 96%  BMI 31.72 kg/m2   Appearance healthy, alert and cooperative.  General exam BP noted to be mildly elevated today in office, S1, S2 normal, no gallop, no murmur, chest clear, no JVD, no HSM, no edema.   Lab review: labs are reviewed, up to date and normal.     Assessment:    Hypertension suboptimal control-will increase lisinopril to 40 mg,    Plan: Check blood pressure readings outside of the clinic several times per week, write down values, and follow up if elevated within the next several weeks. Blood pressure can be checked at the firestation, drugstore,  or any valid site.   orders and follow up as documented in " EpicCare, reviewed diet, exercise and weight control.     Recheck 3 mo

## 2018-01-08 NOTE — TELEPHONE ENCOUNTER
Pt referred to [facility] by  for claudication.    Pt reports numbness from ankles down into feet, states that he was a smoker for years.  Pt denies open sores and rest pain.  Pt admits to some calf cramping with walking.    Pt needs to be scheduled for ARI with exercise and consult with Vascular Surgery or IR.  Will route to scheduling to coordinate an appointment next available.    Zoila Delong RN BSN

## 2018-01-15 ENCOUNTER — OFFICE VISIT (OUTPATIENT)
Dept: OTHER | Facility: CLINIC | Age: 58
End: 2018-01-15
Attending: SURGERY
Payer: COMMERCIAL

## 2018-01-15 ENCOUNTER — HOSPITAL ENCOUNTER (OUTPATIENT)
Dept: ULTRASOUND IMAGING | Facility: CLINIC | Age: 58
Discharge: HOME | End: 2018-01-15
Attending: SURGERY | Admitting: SURGERY
Payer: COMMERCIAL

## 2018-01-15 VITALS
HEIGHT: 70 IN | DIASTOLIC BLOOD PRESSURE: 73 MMHG | SYSTOLIC BLOOD PRESSURE: 140 MMHG | BODY MASS INDEX: 30.78 KG/M2 | WEIGHT: 215 LBS | HEART RATE: 92 BPM

## 2018-01-15 DIAGNOSIS — I70.213 INTERMITTENT CLAUDICATION OF BOTH LOWER EXTREMITIES DUE TO ATHEROSCLEROSIS (H): Primary | ICD-10-CM

## 2018-01-15 DIAGNOSIS — I73.9 PAD (PERIPHERAL ARTERY DISEASE) (H): ICD-10-CM

## 2018-01-15 PROCEDURE — 93924 LWR XTR VASC STDY BILAT: CPT

## 2018-01-15 PROCEDURE — G0463 HOSPITAL OUTPT CLINIC VISIT: HCPCS

## 2018-01-15 PROCEDURE — 99244 OFF/OP CNSLTJ NEW/EST MOD 40: CPT | Mod: ZP | Performed by: SURGERY

## 2018-01-15 RX ORDER — CILOSTAZOL 100 MG/1
100 TABLET ORAL 2 TIMES DAILY
Qty: 180 TABLET | Refills: 3 | Status: SHIPPED | OUTPATIENT
Start: 2018-01-15 | End: 2018-04-05

## 2018-01-15 NOTE — MR AVS SNAPSHOT
"              After Visit Summary   1/15/2018    Rashaun Molina    MRN: [id number]           Patient Information     Date Of Birth          1960        Visit Information        Provider Department      1/15/2018 10:15 AM Soren Selby MD [facility]      Today's Diagnoses     Intermittent claudication of both lower extremities due to atherosclerosis (H)    -  1       Follow-ups after your visit        Your next 10 appointments already scheduled     Feb 26, 2018  8:30 AM CST   Return Visit with Soren Selby MD   [facility] ([facility])    [address] . [address]  [facility] [address] [phone]              Who to contact     If you have questions or need follow up information about today's clinic visit or your schedule please contact [facility] directly at [phone].  Normal or non-critical lab and imaging results will be communicated to you by MyChart, letter or phone within 4 business days after the clinic has received the results. If you do not hear from us within 7 days, please contact the clinic through MyChart or phone. If you have a critical or abnormal lab result, we will notify you by phone as soon as possible.  Submit refill requests through [employer] or call your pharmacy and they will forward the refill request to us. Please allow 3 business days for your refill to be completed.          Additional Information About Your Visit        Care EveryWhere ID     This is your Care EveryWhere ID. This could be used by other organizations to access your [place] medical records  [id number]        Your Vitals Were     Pulse Height BMI (Body Mass Index)             92 5' 10\" (1.778 m) 30.85 kg/m2          Blood Pressure from Last 3 Encounters:   01/15/18 140/73   01/08/18 148/80   11/13/17 (!) 164/92    Weight from Last 3 " Encounters:   01/15/18 215 lb (97.5 kg)   01/08/18 214 lb 12.8 oz (97.4 kg)   11/13/17 219 lb 3.2 oz (99.4 kg)              Today, you had the following     No orders found for display         Today's Medication Changes          These changes are accurate as of: 1/15/18 12:13 PM.  If you have any questions, ask your nurse or doctor.               Start taking these medicines.        Dose/Directions    cilostazol 100 MG tablet   Commonly known as:  PLETAL   Used for:  Intermittent claudication of both lower extremities due to atherosclerosis (H)   Started by:  Soren Selby MD        Dose:  100 mg   Take 1 tablet (100 mg) by mouth 2 times daily   Quantity:  180 tablet   Refills:  3         These medicines have changed or have updated prescriptions.        Dose/Directions    lisinopril 40 MG tablet   Commonly known as:  PRINIVIL/ZESTRIL   This may have changed:  Another medication with the same name was removed. Continue taking this medication, and follow the directions you see here.   Used for:  Essential hypertension, benign   Changed by:  Yemi Nava MD        Dose:  40 mg   Take 1 tablet (40 mg) by mouth daily   Quantity:  90 tablet   Refills:  0            Where to get your medicines      These medications were sent to [email or web address] - [address], [facility] [address]     Phone:  [phone]     cilostazol 100 MG tablet                Primary Care Provider Office Phone # Fax #    Yemi Nava [phone] [address]  [place] [address]        Equal Access to Services     [facility] AH: Hadii aad ku hadasho Soomaali, waaxda luqadaha, qaybta kaalmada adeegyada, marina salas haymaurilio araujo . So [facility] [phone].    ATENCIÓN: Si habla español, tiene a mobley disposición servicios gratuitos de asistencia lingüística. Llame al [phone].    We comply with applicable  federal civil rights laws and [place] laws. We do not discriminate on the basis of race, color, national origin, age, disability, sex, sexual orientation, or gender identity.            Thank you!     Thank you for choosing [facility] [place]  for your care. Our goal is always to provide you with excellent care. Hearing back from our patients is one way we can continue to improve our services. Please take a few minutes to complete the written survey that you may receive in the mail after your visit with us. Thank you!             Your Updated Medication List - Protect others around you: Learn how to safely use, store and throw away your medicines at www.disposemymeds.org.          This list is accurate as of: 1/15/18 12:13 PM.  Always use your most recent med list.                   Brand Name Dispense Instructions for use Diagnosis    albuterol 108 (90 BASE) MCG/ACT Inhaler    PROAIR HFA/PROVENTIL HFA/VENTOLIN HFA    1 Inhaler    Inhale 2 puffs into the lungs every 6 hours as needed for shortness of breath / dyspnea or wheezing    Simple chronic bronchitis (H)       ASPIRIN EC PO      Take 81 mg by mouth daily.        cilostazol 100 MG tablet    PLETAL    180 tablet    Take 1 tablet (100 mg) by mouth 2 times daily    Intermittent claudication of both lower extremities due to atherosclerosis (H)       lisinopril 40 MG tablet    PRINIVIL/ZESTRIL    90 tablet    Take 1 tablet (40 mg) by mouth daily    Essential hypertension, benign       simvastatin 20 MG tablet    ZOCOR    90 tablet    Take 1 tablet (20 mg) by mouth At Bedtime    Pure hypercholesterolemia       umeclidinium 62.5 MCG/INH oral inhaler    INCRUSE ELLIPTA    3 Inhaler    Inhale 1 puff into the lungs daily    Chronic bronchitis, unspecified chronic bronchitis type (H)

## 2018-01-15 NOTE — NURSING NOTE
"Chief Complaint   Patient presents with     Consult     ARI w/ exercise (9:00 [facility]; 10:15 K[facility]) History of bilateral lower extremity claudication; coordinate with consult       Initial /81 (BP Location: Right arm, Patient Position: Chair, Cuff Size: Adult Large)  Pulse 92  Ht 5' 10\" (1.778 m)  Wt 215 lb (97.5 kg)  BMI 30.85 kg/m2 Estimated body mass index is 30.85 kg/(m^2) as calculated from the following:    Height as of this encounter: 5' 10\" (1.778 m).    Weight as of this encounter: 215 lb (97.5 kg).  Medication Reconciliation: complete     Jeannie Martin MA   "

## 2018-01-15 NOTE — LETTER
[facility] [place] [address]  [address]  [place] [address]  Phone: [phone]  Fax: [phone]      DATE OF SERVICE:  01/15/2018.        REFERRING PHYSICIAN: Yemi Nava MD       PRESENTING COMPLAINT:  Lower extremity pain and foot numbness.       HISTORY OF PRESENT ILLNESS:  Mr. Molina is a 57-year-old gentleman who has been referred to us for lower extremity discomfort.  This has been going on for about 2 years, but has recently been getting worse.  It is lifestyle limiting.  He can walk about 3 minutes, after which his lower extremities start cramping and he feels constant numbness in his feet.  He does not have any open wounds and he does not have any ischemic rest pain.       PAST MEDICAL HISTORY:   1.  Hypercholesterolemia.   2.  Hypertension.   3.  Chronic obstructive pulmonary disease.   4.  History of avascular necrosis of his right hip.       PAST SURGICAL HISTORY:   1.  Right hip replacement.   2.  Partial colectomy for colovesical fistula.       SOCIAL HISTORY:  Quit smoking about 5 years ago.  He occasionally consumes alcohol.       REVIEW OF SYSTEMS:  As noted in History of Presenting Illness.       FAMILY HISTORY:  The patient's father is  and  of a myocardial infarction at age of 55.  His mother is alive and has hypertension.  There is hypertension and hyperlipidemia, among his first-degree relatives.       PHYSICAL EXAMINATION:   GENERAL:  The patient appears comfortable.  He is in no acute distress.   VITAL SIGNS:  Reviewed.   HEENT:  Head is atraumatic and normocephalic, mucosa are pink.   EYES:  Extraocular motions are intact.  Sclerae are anicteric.   MENTAL:  Alert and oriented.  Judgment and insight are good.   LYMPHATIC:  No supraclavicular or cervical adenopathy noted.   CARDIOVASCULAR:  Regular rate and rhythm.  S1 plus S2+0.   RESPIRATORY:  Clear to auscultation bilaterally, no accessory muscles are being used.   ABDOMEN:  Soft,  obese, nontender, no hepatosplenomegaly noted, vertical midline scar and left lower quadrant ostomy scars are noted.   VASCULAR:  No carotid bruits.  3+ bilateral radial pulses.  Left femoral pulses 1+.   EXTREMITIES:  Right femoral pulse is 2+, right posterior tibial and dorsalis pedis are biphasic. Left dorsalis pedis and posterior tibial are also biphasic.   IMAGING DATA:  The patient underwent exercise and he experienced bilateral calf tightness at 1.5 minutes and stopped at 3 minutes. Right ankle-brachial index 1 minute after exercise was 0.30 and on the left side it was 0.21.  Resting right ankle-brachial index is 0.73 and left was 0.20.         DIAGNOSIS:  Intermittent arterial claudication.         PLAN:  I explained the pathophysiology of disease to him in detail.  He is already on aspirin and a statin.  We will start him on cilostazol.  I will see him back in 6 weeks for evaluation.  I assured him that with claudication risk of limb loss is quite low.  He has verbalized understanding.  I will be seeing him back in 6 weeks in followup.           ERNESTO JONES MD

## 2018-01-16 NOTE — PROGRESS NOTES
DATE OF SERVICE:  01/15/2018.       REFERRING PHYSICIAN: Yemi Nava MD      PRESENTING COMPLAINT:  Lower extremity pain and foot numbness.      HISTORY OF PRESENT ILLNESS:  Mr. Molina is a 57-year-old gentleman who has been referred to us for lower extremity discomfort.  This has been going on for about 2 years, but has recently been getting worse.  It is lifestyle limiting.  He can walk about 3 minutes, after which his lower extremities start cramping and he feels constant numbness in his feet.  He does not have any open wounds and he does not have any ischemic rest pain.      PAST MEDICAL HISTORY:   1.  Hypercholesterolemia.   2.  Hypertension.   3.  Chronic obstructive pulmonary disease.   4.  History of avascular necrosis of his right hip.      PAST SURGICAL HISTORY:   1.  Right hip replacement.   2.  Partial colectomy for colovesical fistula.      SOCIAL HISTORY:  Quit smoking about 5 years ago.  He occasionally consumes alcohol.      REVIEW OF SYSTEMS:  As noted in History of Presenting Illness.      FAMILY HISTORY:  The patient's father is  and  of a myocardial infarction at age of 55.  His mother is alive and has hypertension.  There is hypertension and hyperlipidemia, among his first-degree relatives.      PHYSICAL EXAMINATION:   GENERAL:  The patient appears comfortable.  He is in no acute distress.   VITAL SIGNS:  Reviewed.   HEENT:  Head is atraumatic and normocephalic, mucosa are pink.   EYES:  Extraocular motions are intact.  Sclerae are anicteric.   MENTAL:  Alert and oriented.  Judgment and insight are good.   LYMPHATIC:  No supraclavicular or cervical adenopathy noted.   CARDIOVASCULAR:  Regular rate and rhythm.  S1 plus S2+0.   RESPIRATORY:  Clear to auscultation bilaterally, no accessory muscles are being used.   ABDOMEN:  Soft, obese, nontender, no hepatosplenomegaly noted, vertical midline scar and left lower quadrant ostomy scars are noted.   VASCULAR:  No carotid  bruits.  3+ bilateral radial pulses.  Left femoral pulses 1+.   EXTREMITIES:  Right femoral pulse is 2+, right posterior tibial and dorsalis pedis are biphasic. Left dorsalis pedis and posterior tibial are also biphasic.   IMAGING DATA:  The patient underwent exercise and he experienced bilateral calf tightness at 1.5 minutes and stopped at 3 minutes. Right ankle-brachial index 1 minute after exercise was 0.30 and on the left side it was 0.21.  Resting right ankle-brachial index is 0.73 and left was  0.20.         DIAGNOSIS:  Intermittent arterial claudication.         PLAN:  I explained the pathophysiology of disease to him in detail.  He is already on aspirin and a statin.  We will start him on cilostazol.  I will see him back in 6 weeks for evaluation.  I assured him that with claudication risk of limb loss is quite low.  He has verbalized understanding.  I will be seeing him back in 6 weeks in followup.         ERNESTO JONES MD             D: 01/15/2018 12:12   T: 2018 06:36   MT: CAROLYN      Name:     GERALDO VALDEZ   MRN:      [id number]        Account:      [id number]   :      1960           Service Date: 01/15/2018      Document: [id number]

## 2018-01-29 ENCOUNTER — TELEPHONE (OUTPATIENT)
Dept: OTHER | Facility: CLINIC | Age: 58
End: 2018-01-29

## 2018-01-29 DIAGNOSIS — I73.9 PAD (PERIPHERAL ARTERY DISEASE) (H): Primary | ICD-10-CM

## 2018-01-29 NOTE — TELEPHONE ENCOUNTER
Pt reports that last Friday, 1/26/18 it felt like his heart was going to jump out of his chest, so he stopped taking the cilostazol as he had read that cilostazol can cause rapid heart beat.  Pt has not had episode since stopping and is wondering if  would have other recommendation.    Will discuss with  and call pt back.    Zoila Delong, JASMINN, RN

## 2018-01-29 NOTE — TELEPHONE ENCOUNTER
Per , pt should not restart cilostazol and there is no medication to replace it.  Could place referral to supervised walking program if pt wishes.    Pt would like referral to [facility] rehab, referral placed.    Zoila Delong, JASMINN, RN

## 2018-02-01 ENCOUNTER — TRANSFERRED RECORDS (OUTPATIENT)
Dept: FAMILY MEDICINE | Facility: CLINIC | Age: 58
End: 2018-02-01

## 2018-02-19 ENCOUNTER — CARE COORDINATION (OUTPATIENT)
Dept: CARE COORDINATION | Facility: CLINIC | Age: 58
End: 2018-02-19

## 2018-02-19 NOTE — PROGRESS NOTES
Clinic Care Coordination Contact  OUTREACH    Referral Information:  Referral Source: Pro-Active Outreach  Reason for Contact: Introduction to self and care coordination         Universal Utilization:   ED Visits in last year: 0  Hospital visits in last year: 0  Last PCP appointment: 01/08/18  Missed Appointments: 0     Multiple Providers or Specialists: GI, cardiology    Clinical Concerns:  PAST MEDICAL HISTORY:   1.  Hypercholesterolemia.   2.  Hypertension.   3.  Chronic obstructive pulmonary disease.   4.  History of avascular necrosis of his right hip.       PAST SURGICAL HISTORY:   1.  Right hip replacement.   2.  Partial colectomy for colovesical fistula.     New DIAGNOSIS:  Intermittent arterial claudication. - follow up with vascular provider next week   Current Medical Concerns:  Clinical Pathway: Clinic Care Coordination COPD Assessment         Patient denies COPD diagnosis - states that he will be following up with cardiology and does not feel COPD is accurate   Are you having any increased shortness of breath? No  When you cough, are you coughing up anything? No          Psychosocial:  Current living arrangement:: I live in a private home  Financial/Insurance: [employer]      Resources and Interventions:      Advanced Care Plans/Directives on file:: No     Patient/Caregiver understanding: Patient verbalized understanding, engaged in AIDET communication behavior during encounter.  Frequency of Care Coordination: 3 weeks   Upcoming appointment: 02/26/18     Plan:   Patient will follow up with vascular provider and will follow up with cardiology if recommended - will discuss with provider at visit and follow recommendations for care   Colonoscopy scheduled for 4-1-18  RN CC will outreach in 1-2 weeks post visit to assess care coordination needs, will be available sooner if needed. Contact information given.   Eusebia Ma RN  Clinic Care Coordinator  Mobile: [phone]  [email or web address]

## 2018-04-01 DIAGNOSIS — E78.00 PURE HYPERCHOLESTEROLEMIA: ICD-10-CM

## 2018-04-02 RX ORDER — SIMVASTATIN 20 MG
TABLET ORAL
Qty: 90 TABLET | Refills: 0 | Status: SHIPPED | OUTPATIENT
Start: 2018-04-02 | End: 2018-04-30

## 2018-04-02 NOTE — TELEPHONE ENCOUNTER
Pending Prescriptions:                       Disp   Refills    simvastatin (ZOCOR) 20 MG tablet [Pharmac*90 tab*             Sig: TAKE 1 TABLET BY MOUTH AT  BEDTIME    Please review:  [facility]    Pt last refill was 10- due in April 2018 fasting  Pt last ov was 1-8-2018 per notes rtc in 3 months  Do you want to refill for 90 days?  Please fax deny or send to FD for a FASTING OV  [place]  [phone] (home) [phone] (work)

## 2018-04-05 ENCOUNTER — OFFICE VISIT (OUTPATIENT)
Dept: SURGERY | Facility: CLINIC | Age: 58
End: 2018-04-05
Payer: COMMERCIAL

## 2018-04-05 VITALS
HEART RATE: 87 BPM | DIASTOLIC BLOOD PRESSURE: 72 MMHG | BODY MASS INDEX: 30.78 KG/M2 | OXYGEN SATURATION: 93 % | HEIGHT: 70 IN | WEIGHT: 215 LBS | RESPIRATION RATE: 16 BRPM | SYSTOLIC BLOOD PRESSURE: 138 MMHG

## 2018-04-05 DIAGNOSIS — I73.9 PERIPHERAL VASCULAR DISEASE WITH CLAUDICATION (H): Primary | ICD-10-CM

## 2018-04-05 PROCEDURE — 99203 OFFICE O/P NEW LOW 30 MIN: CPT | Performed by: SURGERY

## 2018-04-05 ASSESSMENT — ENCOUNTER SYMPTOMS: CLAUDICATION: 1

## 2018-04-05 NOTE — PROGRESS NOTES
HPI      ROS (Review of Systems):     Respiratory: Positive for dyspnea.    Cardiovascular: Positive for leg pain, hypertension and hyperlipidemia.          Physical Exam

## 2018-04-05 NOTE — NURSING NOTE
"Chief Complaint   Patient presents with     Consult     PAD       Initial /72 (BP Location: Right arm, Cuff Size: Adult Large)  Pulse 87  Resp 16  Ht 5' 10\" (1.778 m)  Wt 215 lb (97.5 kg)  SpO2 93%  BMI 30.85 kg/m2 Estimated body mass index is 30.85 kg/(m^2) as calculated from the following:    Height as of this encounter: 5' 10\" (1.778 m).    Weight as of this encounter: 215 lb (97.5 kg).  Medication Reconciliation: complete     Flower Caban  CMA      "

## 2018-04-05 NOTE — MR AVS SNAPSHOT
"              After Visit Summary   4/5/2018    Rashaun Molina    MRN: [id number]           Patient Information     Date Of Birth          1960        Visit Information        Provider Department      4/5/2018 3:30 PM Rd Lee MD [facility]      Today's Diagnoses     Peripheral vascular disease with claudication (H)    -  1       Follow-ups after your visit        Follow-up notes from your care team     Return in about 1 week (around 4/12/2018) for CTA of the abdomen, pelvis, and bilateral iliofemoral runoff.  Bilateral lower extremity vein mappin.      Your next 10 appointments already scheduled     Apr 09, 2018   Procedure with Tramaine Zuniga MD   [facility] ([facility])    [address]  [place] [address] [phone]           [facility] is located at [address] [place]              Who to contact     If you have questions or need follow up information about today's clinic visit or your schedule please contact [facility] directly at [phone].  Normal or non-critical lab and imaging results will be communicated to you by Mobi-Motohart, letter or phone within 4 business days after the clinic has received the results. If you do not hear from us within 7 days, please contact the clinic through Mobi-Motohart or phone. If you have a critical or abnormal lab result, we will notify you by phone as soon as possible.  Submit refill requests through ReachForce or call your pharmacy and they will forward the refill request to us. Please allow 3 business days for your refill to be completed.          Additional Information About Your Visit        Mobi-Motohart Information     ReachForce lets you send messages to your doctor, view your test results, renew your prescriptions, schedule appointments and more. To sign up, go to [email or web address] . Click on \"Log in\" on the left " "side of the screen, which will take you to the Welcome page. Then click on \"Sign up Now\" on the right side of the page.     You will be asked to enter the access code listed below, as well as some personal information. Please follow the directions to create your username and password.     Your access code is: [email or web address]  Expires: 2018  3:44 PM     Your access code will  in 90 days. If you need help or a new code, please call your [facility] clinic or [phone].        Care EveryWhere ID     This is your Care EveryWhere ID. This could be used by other organizations to access your [facility] medical records  [id number]        Your Vitals Were     Pulse Respirations Height Pulse Oximetry BMI (Body Mass Index)       87 16 5' 10\" (1.778 m) 93% 30.85 kg/m2        Blood Pressure from Last 3 Encounters:   18 138/72   01/15/18 140/73   18 148/80    Weight from Last 3 Encounters:   18 215 lb (97.5 kg)   01/15/18 215 lb (97.5 kg)   18 214 lb 12.8 oz (97.4 kg)              Today, you had the following     No orders found for display       Primary Care Provider Office Phone # Fax #    Yemi Nava [phone]       [address] [address]        Equal Access to Services     [facility] AH: Hadii belen zapatao Socanelo, waaxda luqadaha, qaybta kaalmada adeegyada, marina hampton. So [facility] [phone].    ATENCIÓN: Si habla español, tiene a mobley disposición servicios gratuitos de asistencia lingüística. Llame al [phone].    We comply with applicable federal civil rights laws and [place] laws. We do not discriminate on the basis of race, color, national origin, age, disability, sex, sexual orientation, or gender identity.            Thank you!     Thank you for choosing [facility]  for your care. Our goal is always to provide you with excellent care. Hearing back from our patients is one way " we can continue to improve our services. Please take a few minutes to complete the written survey that you may receive in the mail after your visit with us. Thank you!             Your Updated Medication List - Protect others around you: Learn how to safely use, store and throw away your medicines at www.disposemymeds.org.          This list is accurate as of 4/5/18 11:59 PM.  Always use your most recent med list.                   Brand Name Dispense Instructions for use Diagnosis    albuterol 108 (90 BASE) MCG/ACT Inhaler    PROAIR HFA/PROVENTIL HFA/VENTOLIN HFA    1 Inhaler    Inhale 2 puffs into the lungs every 6 hours as needed for shortness of breath / dyspnea or wheezing    Simple chronic bronchitis (H)       ASPIRIN EC PO      Take 81 mg by mouth daily.        lisinopril 40 MG tablet    PRINIVIL/ZESTRIL    90 tablet    Take 1 tablet (40 mg) by mouth daily    Essential hypertension, benign       simvastatin 20 MG tablet    ZOCOR    90 tablet    TAKE 1 TABLET BY MOUTH AT  BEDTIME    Pure hypercholesterolemia       umeclidinium 62.5 MCG/INH oral inhaler    INCRUSE ELLIPTA    3 Inhaler    Inhale 1 puff into the lungs daily    Chronic bronchitis, unspecified chronic bronchitis type (H)

## 2018-04-06 DIAGNOSIS — I73.9 PAD (PERIPHERAL ARTERY DISEASE) (H): Primary | ICD-10-CM

## 2018-04-06 ASSESSMENT — ENCOUNTER SYMPTOMS
GASTROINTESTINAL NEGATIVE: 1
RESPIRATORY NEGATIVE: 1
PSYCHIATRIC NEGATIVE: 1
EYES NEGATIVE: 1
CONSTITUTIONAL NEGATIVE: 1
TINGLING: 1
CLAUDICATION: 1

## 2018-04-06 NOTE — PROGRESS NOTES
HPI :  Rashaun Molina is a 58-year-old gentleman with hypertension, hyperlipidemia, and prior tobacco abuse who presents at this time for a second opinion regarding his bilateral lower extremity claudication symptoms.  He reports numbness in both feet and bilateral calf cramping after ambulating a few blocks.  Both legs are equally affected.  His symptoms have been present for 3 or 4 years.  He has no prior history of vascular intervention.  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  His symptoms do interfere with his job and they are lifestyle limiting.  He quit smoking about 5 years ago.    His past surgical history is most notable for a partial colectomy in 2013 for a colovesical fistula related to diverticulitis.  He is status post right hip replacement in 2015.  At a prior evaluation 6 weeks ago he was started on Pletal.  He was intolerant of this medication as it causes racing of his heart.    Review of Systems   Constitutional: Negative.    HENT: Negative.    Eyes: Negative.    Respiratory: Negative.    Cardiovascular: Positive for claudication.   Gastrointestinal: Negative.    Genitourinary: Negative.    Musculoskeletal: Positive for joint pain.   Skin: Negative.    Neurological: Positive for tingling.   Endo/Heme/Allergies: Negative.    Psychiatric/Behavioral: Negative.          Physical Exam   Nursing note and vitals reviewed.  Constitutional:   Obese male in no acute distress.   Eyes: EOM are normal. Pupils are equal, round, and reactive to light. No scleral icterus.   Neck: Normal range of motion. No JVD present.   Cardiovascular:   Pulses:       Radial pulses are 2+ on the right side, and 2+ on the left side.        Femoral pulses are 2+ on the right side, and 1+ on the left side.       Popliteal pulses are 1+ on the right side, and 0 on the left side.        Dorsalis pedis pulses are 2+ on the right side, and 1+ on the left side.   Normal-appearing lower extremity veins   Abdominal: There is no  tenderness.   Lower midline surgical scar.  No abdominal wall hernias.   Musculoskeletal: Normal range of motion. He exhibits no edema.   Neurological: He is alert and oriented to person, place, and time.   Skin: Skin is warm and dry.   Psychiatric/Behavioral: mood and affect normal, normal behavior, normal thought content and normal judgment     Imaging:    ULTRASOUND ARI DOPPLER WITH EXERCISE BILATERAL January 15, 2018 9:36  AM      HISTORY: Bilateral lower extremity claudication. PAD (peripheral  artery disease) (H).     COMPARISON: None.     FINDINGS:  Right ARI: 0.73.  Left ARI: 0.60.  Right waveforms are triphasic. The left femoral waveform is triphasic.  Remainder of the waveforms on the left are biphasic.     Exercise: Patient exercised on a treadmill for 3 minutes at 1.5 miles  per hour at a 10% incline. Patient complained of bilateral calf  tightness at 1 minute and 30 seconds. Patient stops at 3 minutes due  to bilateral calf pain.     Right exercise ARI: 0.3.  Left exercise ARI: 0.21.         IMPRESSION: Bilateral ABIs show moderate arterial insufficiency which  becomes severe with exercise.     SOWMYA BELTRAN, DO    ASSESSMENT:  Moderate bilateral lower extremity arterial insufficiency at rest which becomes severe with exercise.  Interestingly, he does have palpable dorsalis pedis pulses bilaterally.  Given the symmetric nature of the post exercise decrease I strongly suspect a significant component of aortoiliac disease.    PLAN:  I had a nice discussion with Rashaun reviewing all the above.  I will begin with a CTA of the abdomen, pelvis, and bilateral iliofemoral runoff to better plan for future angiography / possible aortoiliac stenting.  I will also map the bilateral lower extremity veins.  Ultimate treatment recommendations will be pending the outcome of these studies.    Total face-to-face time was 30 minutes, greater than 50% spent providing counseling and education.    Sanket Lee M.D.

## 2018-04-09 ENCOUNTER — HOSPITAL ENCOUNTER (OUTPATIENT)
Facility: CLINIC | Age: 58
Discharge: HOME | End: 2018-04-09
Attending: COLON & RECTAL SURGERY | Admitting: COLON & RECTAL SURGERY
Payer: COMMERCIAL

## 2018-04-09 ENCOUNTER — SURGERY (OUTPATIENT)
Age: 58
End: 2018-04-09

## 2018-04-09 ENCOUNTER — TELEPHONE (OUTPATIENT)
Dept: OTHER | Facility: CLINIC | Age: 58
End: 2018-04-09

## 2018-04-09 VITALS
BODY MASS INDEX: 30.78 KG/M2 | DIASTOLIC BLOOD PRESSURE: 87 MMHG | SYSTOLIC BLOOD PRESSURE: 147 MMHG | WEIGHT: 215 LBS | OXYGEN SATURATION: 93 % | HEIGHT: 70 IN | RESPIRATION RATE: 12 BRPM

## 2018-04-09 LAB — COLONOSCOPY: NORMAL

## 2018-04-09 PROCEDURE — 45385 COLONOSCOPY W/LESION REMOVAL: CPT | Mod: PT | Performed by: COLON & RECTAL SURGERY

## 2018-04-09 PROCEDURE — 88305 TISSUE EXAM BY PATHOLOGIST: CPT | Performed by: COLON & RECTAL SURGERY

## 2018-04-09 PROCEDURE — 88305 TISSUE EXAM BY PATHOLOGIST: CPT | Mod: 26 | Performed by: COLON & RECTAL SURGERY

## 2018-04-09 PROCEDURE — G0500 MOD SEDAT ENDO SERVICE >5YRS: HCPCS | Performed by: COLON & RECTAL SURGERY

## 2018-04-09 PROCEDURE — 45380 COLONOSCOPY AND BIOPSY: CPT | Performed by: COLON & RECTAL SURGERY

## 2018-04-09 PROCEDURE — 25000128 H RX IP 250 OP 636: Performed by: COLON & RECTAL SURGERY

## 2018-04-09 RX ORDER — ONDANSETRON 2 MG/ML
4 INJECTION INTRAMUSCULAR; INTRAVENOUS EVERY 6 HOURS PRN
Status: DISCONTINUED | OUTPATIENT
Start: 2018-04-09 | End: 2018-04-09 | Stop reason: HOSPADM

## 2018-04-09 RX ORDER — FENTANYL CITRATE 50 UG/ML
INJECTION, SOLUTION INTRAMUSCULAR; INTRAVENOUS PRN
Status: DISCONTINUED | OUTPATIENT
Start: 2018-04-09 | End: 2018-04-09 | Stop reason: HOSPADM

## 2018-04-09 RX ORDER — PROCHLORPERAZINE MALEATE 10 MG
10 TABLET ORAL EVERY 6 HOURS PRN
Status: DISCONTINUED | OUTPATIENT
Start: 2018-04-09 | End: 2018-04-09 | Stop reason: HOSPADM

## 2018-04-09 RX ORDER — NALOXONE HYDROCHLORIDE 0.4 MG/ML
.1-.4 INJECTION, SOLUTION INTRAMUSCULAR; INTRAVENOUS; SUBCUTANEOUS
Status: DISCONTINUED | OUTPATIENT
Start: 2018-04-09 | End: 2018-04-09 | Stop reason: HOSPADM

## 2018-04-09 RX ORDER — FLUMAZENIL 0.1 MG/ML
0.2 INJECTION, SOLUTION INTRAVENOUS
Status: DISCONTINUED | OUTPATIENT
Start: 2018-04-09 | End: 2018-04-09 | Stop reason: HOSPADM

## 2018-04-09 RX ORDER — ONDANSETRON 4 MG/1
4 TABLET, ORALLY DISINTEGRATING ORAL EVERY 6 HOURS PRN
Status: DISCONTINUED | OUTPATIENT
Start: 2018-04-09 | End: 2018-04-09 | Stop reason: HOSPADM

## 2018-04-09 RX ORDER — DIPHENHYDRAMINE HYDROCHLORIDE 50 MG/ML
25 INJECTION INTRAMUSCULAR; INTRAVENOUS EVERY 4 HOURS PRN
Status: DISCONTINUED | OUTPATIENT
Start: 2018-04-09 | End: 2018-04-09 | Stop reason: HOSPADM

## 2018-04-09 RX ORDER — DIPHENHYDRAMINE HCL 25 MG
25 CAPSULE ORAL EVERY 4 HOURS PRN
Status: DISCONTINUED | OUTPATIENT
Start: 2018-04-09 | End: 2018-04-09 | Stop reason: HOSPADM

## 2018-04-09 RX ORDER — ONDANSETRON 2 MG/ML
4 INJECTION INTRAMUSCULAR; INTRAVENOUS
Status: DISCONTINUED | OUTPATIENT
Start: 2018-04-09 | End: 2018-04-09 | Stop reason: HOSPADM

## 2018-04-09 RX ORDER — LIDOCAINE 40 MG/G
CREAM TOPICAL
Status: DISCONTINUED | OUTPATIENT
Start: 2018-04-09 | End: 2018-04-09 | Stop reason: HOSPADM

## 2018-04-09 RX ADMIN — MIDAZOLAM 2 MG: 1 INJECTION INTRAMUSCULAR; INTRAVENOUS at 10:45

## 2018-04-09 RX ADMIN — FENTANYL CITRATE 100 MCG: 50 INJECTION, SOLUTION INTRAMUSCULAR; INTRAVENOUS at 10:47

## 2018-04-09 NOTE — TELEPHONE ENCOUNTER
Spoke with Rashaun about scheduling vein mapping, CTA and office visit Dr. Lee requested.  Rashaun is in his car and would like to call us back to schedule. Margo Negrete,

## 2018-04-09 NOTE — H&P
Pre-Endoscopy History and Physical     Rsahaun Molina MRN# [id number]   YOB: 1960 Age: 58 year old     Date of Procedure: 4/9/2018  Primary care provider: Yemi Nava  Type of Endoscopy: colonoscopy  Reason for Procedure: screening  Type of Anesthesia Anticipated: Moderate Sedation    HPI:    Rashaun is a 58 year old male who will be undergoing the above procedure.      A history and physical has been performed. The patient's medications and allergies have been reviewed. The risks and benefits of the procedure including the risk of bleeding, perforation, and missed lesions as well as the sedation options and risks were discussed with the patient.  All questions were answered and informed consent was obtained.      Allergies   Allergen Reactions     Spiriva Handihaler Blisters     Hctz [Hydrochlorothiazide] Other (See Comments)     Low sodium        Current Facility-Administered Medications   Medication     lidocaine 1 % 1 mL     lidocaine (LMX4) cream     sodium chloride (PF) 0.9% PF flush 3 mL     sodium chloride (PF) 0.9% PF flush 3 mL     ondansetron (ZOFRAN) injection 4 mg       Prescriptions Prior to Admission   Medication Sig Dispense Refill Last Dose     simvastatin (ZOCOR) 20 MG tablet TAKE 1 TABLET BY MOUTH AT  BEDTIME 90 tablet 0 4/8/2018     lisinopril (PRINIVIL/ZESTRIL) 40 MG tablet Take 1 tablet (40 mg) by mouth daily 90 tablet 0 4/8/2018     ASPIRIN EC PO Take 81 mg by mouth daily.   Past Week     umeclidinium (INCRUSE ELLIPTA) 62.5 MCG/INH oral inhaler Inhale 1 puff into the lungs daily (Patient not taking: Reported on 4/5/2018) 3 Inhaler 1 Unknown     albuterol (PROAIR HFA/PROVENTIL HFA/VENTOLIN HFA) 108 (90 BASE) MCG/ACT Inhaler Inhale 2 puffs into the lungs every 6 hours as needed for shortness of breath / dyspnea or wheezing 1 Inhaler 1 Unknown       Patient Active Problem List   Diagnosis     Essential hypertension, benign     Pure hypercholesterolemia     History of colonic  polyps     BCC (basal cell carcinoma)     ACP (advance care planning)     Health Care Home     Colovesical fistula     Vasovagal syncopes     Avascular necrosis of bone of right hip (H)     S/P total hip arthroplasty     Obesity due to excess calories, unspecified obesity severity     Chronic bronchitis, unspecified chronic bronchitis type (H)     Claudication of left lower extremity (H)        Past Medical History:   Diagnosis Date     Arthritis      Chronic airway obstruction, not elsewhere classified 3/8/2004    pt says that he does not have COPD     Cough syncope 11/21/2012     Essential hypertension, benign      Fistula     colon/bladder     Fracture of right proximal fibula 7/30/2014     Orthostatic hypotension 11/24/2014     Other chronic pain     right hip pain     Personal history of colonic polyps 6/28/2005     Pure hypercholesterolemia      Tobacco use disorder 3/8/2004     Vasovagal syncopes 10/25/2014     Viral warts, unspecified     genital        Past Surgical History:   Procedure Laterality Date     ARTHROPLASTY HIP Right 10/7/2015    Procedure: ARTHROPLASTY HIP;  Surgeon: Neil Davis MD;  Location:  OR     COLONOSCOPY  9/05, 4/10/13    Per Hospital encounter dated 4/18/13     COMBINED CYSTOSCOPY, INSERT CATHETER URETER  4/11/2013    Procedure: COMBINED CYSTOSCOPY, INSERT CATHETER URETER;;  Surgeon: Kashif Parks MD;  Location:  OR      COLONOSCOPY THRU STOMA W BIOPSY/CAUTERY TUMOR/POLYP/LESION  1998    michael, tubular adenoma, next colonoscopy 2001      COLONOSCOPY THRU STOMA, DIAGNOSTIC  04/16/01    negative, ligate two internal hemmorhoids  Dr rodriguez-repeat 2008      LARYNGOSCOPY DIRECT W VOCAL CORD INJECTION      vocal cord polyps     LAPAROSCOPIC ASSISTED COLECTOMY  4/11/2013    Procedure: LAPAROSCOPIC ASSISTED COLECTOMY;  LOW ANTERIOR RESECTION ;  Surgeon: Tramaine Zuniga MD;  Location:  OR       Social History   Substance Use Topics     Smoking status: Former Smoker  "    Packs/day: 1.50     Years: 40.00     Quit date: 2013     Smokeless tobacco: Never Used     Alcohol use 8.4 oz/week     14 Cans of beer per week      Comment: 2 beers daily       Family History   Problem Relation Age of Onset     Hypertension Mother      Hypertension Father       MI     HEART DISEASE Father      MI  at age 55     Coronary Artery Disease Father      Hypertension Brother      Hyperlipidemia Brother      Hypertension Brother      Hypertension Sister      Lipids Brother      Lipids Brother          PHYSICAL EXAM:   BP (!) 189/99  Resp 18  Ht 1.778 m (5' 10\")  Wt 97.5 kg (215 lb)  SpO2 98%  BMI 30.85 kg/m2 Estimated body mass index is 30.85 kg/(m^2) as calculated from the following:    Height as of this encounter: 1.778 m (5' 10\").    Weight as of this encounter: 97.5 kg (215 lb).   Mental status - alert and oriented  RESP: lungs clear  CV: RRR  AIRWAY EXAM: Mallampatti Class II (visualization of the soft palate, fauces, and uvula)    IMPRESSION   ASA Class 2 - Mild systemic disease      Signed Electronically by: Tramaine Zuniga  2018    Colorectal Surgery  [phone] (office)  [phone] (pager)  [email or web address]          "

## 2018-04-10 ENCOUNTER — PATIENT OUTREACH (OUTPATIENT)
Dept: CARE COORDINATION | Facility: CLINIC | Age: 58
End: 2018-04-10

## 2018-04-10 LAB — COPATH REPORT: NORMAL

## 2018-04-10 NOTE — PROGRESS NOTES
Clinic Care Coordination Contact    OUTREACH    Referral Information: Post admission follow up     Chief Complaint   Patient presents with     Clinic Care Coordination - Post Hospital     Colorectal procedure         Universal Utilization:   Utilization    Last refreshed: 4/9/2018 10:16 PM:  No Show Count (past year) 0       Last refreshed: 4/9/2018 10:16 PM:  ED visits 0       Last refreshed: 4/9/2018 10:16 PM:  Hospital admissions 0          Current as of: 4/9/2018 10:16 PM             Clinical Concerns:  Current Medical Concerns:   RN CC outreach for status update post colonoscopy with polyp removal.     Respiratory: Positive for dyspnea.    Cardiovascular: Positive for leg pain, hypertension and hyperlipidemia.     Patient notes that he will have lower extremity venous mapping done next week and follow up with Dr Lee at [facility] to review findings.       Health Maintenance Reviewed: Up to date   Patient notes that he is aware he is due for a check with PCP to ROS and medication refill and will schedule visit within 1-2 weeks and will update provider at visit.     No previsit concerns at this time      Patient/Caregiver understanding: Patient verbalized understanding, engaged in AIDET communication behavior during encounter.       Future Appointments              In 6 days RSCCUS1 [facility], [facility]    In 6 days RSCCCT1 [facility], [facility]    In 1 week Rd Lee MD [facility]           Plan:   Patient will attend schedule testing and visits with specialty providers  Patient will schedule and attend PCP visit within one month  RN CC will outreach in 1-2 weeks post visit for status update and assess care coordination needs, will be available sooner if needed. Contact information given.     Eusebia Ma RN  Clinic Care Coordinator  Mobile: [phone]  [email or web address]

## 2018-04-16 ENCOUNTER — HOSPITAL ENCOUNTER (OUTPATIENT)
Dept: ULTRASOUND IMAGING | Facility: CLINIC | Age: 58
End: 2018-04-16
Attending: SURGERY
Payer: COMMERCIAL

## 2018-04-16 ENCOUNTER — HOSPITAL ENCOUNTER (OUTPATIENT)
Dept: CT IMAGING | Facility: CLINIC | Age: 58
Discharge: HOME | End: 2018-04-16
Attending: SURGERY | Admitting: SURGERY
Payer: COMMERCIAL

## 2018-04-16 DIAGNOSIS — I73.9 PAD (PERIPHERAL ARTERY DISEASE) (H): ICD-10-CM

## 2018-04-16 PROCEDURE — 75635 CT ANGIO ABDOMINAL ARTERIES: CPT

## 2018-04-16 PROCEDURE — 25000128 H RX IP 250 OP 636: Performed by: RADIOLOGY

## 2018-04-16 PROCEDURE — 93970 EXTREMITY STUDY: CPT

## 2018-04-16 RX ORDER — IOPAMIDOL 755 MG/ML
500 INJECTION, SOLUTION INTRAVASCULAR ONCE
Status: COMPLETED | OUTPATIENT
Start: 2018-04-16 | End: 2018-04-16

## 2018-04-16 RX ADMIN — SODIUM CHLORIDE 80 ML: 9 INJECTION, SOLUTION INTRAVENOUS at 13:53

## 2018-04-16 RX ADMIN — IOPAMIDOL 100 ML: 755 INJECTION, SOLUTION INTRAVENOUS at 13:53

## 2018-04-17 ENCOUNTER — OFFICE VISIT (OUTPATIENT)
Dept: OTHER | Facility: CLINIC | Age: 58
End: 2018-04-17
Attending: SURGERY
Payer: COMMERCIAL

## 2018-04-17 VITALS — SYSTOLIC BLOOD PRESSURE: 163 MMHG | DIASTOLIC BLOOD PRESSURE: 84 MMHG | HEART RATE: 90 BPM

## 2018-04-17 DIAGNOSIS — I73.9 PERIPHERAL VASCULAR DISEASE WITH CLAUDICATION (H): Primary | ICD-10-CM

## 2018-04-17 PROCEDURE — 99213 OFFICE O/P EST LOW 20 MIN: CPT | Mod: ZP | Performed by: SURGERY

## 2018-04-17 PROCEDURE — G0463 HOSPITAL OUTPT CLINIC VISIT: HCPCS

## 2018-04-17 NOTE — NURSING NOTE
"Chief Complaint   Patient presents with     RECHECK     History of PAD with claudication, evaluate for bypass options; follow up to 4/5/18 appointment with Dr. Lee.       Initial /84 (BP Location: Left arm, Patient Position: Chair, Cuff Size: Adult Large)  Pulse 90 Estimated body mass index is 30.85 kg/(m^2) as calculated from the following:    Height as of 4/9/18: 5' 10\" (1.778 m).    Weight as of 4/9/18: 215 lb (97.5 kg).  Medication Reconciliation: bibi Arthur CMA on 4/17/2018 at 2:10 PM    "

## 2018-04-17 NOTE — PROGRESS NOTES
Rashaun Molina returns today to discuss the CTA of his abdomen, pelvis, and bilateral iliofemoral runoff.  Please see my prior consult from 4/5/18.  There has been no change in his history or his physical exam.    Imaging:  Study Result   CTA ANGIOGRAM ABDOMINAL AORTA BILATERAL ILIOFEMORAL RUNOFF   4/16/2018  2:04 PM      HISTORY: Peripheral artery disease) (H) with claudication.     TECHNIQUE: CTA abdomen and pelvis with runoff to the toes with 100mL  Isovue-370 IV. Radiation dose for this scan was reduced using  automated exposure control, adjustment of the mA and/or kV according  to patient size, or iterative reconstruction technique.      COMPARISON: ARI 1/15/2018, CT abdomen and pelvis 3/29/2013.      FINDINGS:   Lung bases: Lung bases are clear. No pleural effusion or pericardial  effusion.     Abdomen: Evaluation of the solid organ parenchyma is limited secondary  to contrast bolus timing. Left renal cysts are again noted measuring  up to 2 cm, previously 1.0 cm. The spleen, adrenal glands, right  kidney, liver and gallbladder show no focal normality. No intrahepatic  or extrahepatic biliary dilatation. No intraperitoneal free air or  free fluid.     Small and large bowel are normal in caliber without evidence of  obstruction. Surgical changes are seen at the sigmoid colon. Mild  sigmoid diverticulosis without evidence of diverticulitis. Bladder is  normal. No abdominal or pelvic lymphadenopathy.     Bones: Postoperative changes of right hip arthroplasty. No suspicious  bony lesions.     Abdominal vasculature: The abdominal aorta is patent without abdominal  aortic aneurysm. Celiac axis, superior mesenteric artery, single right  renal artery, two left renal arteries and inferior mesenteric arteries  are all patent.     Pelvic inflow: Atherosclerotic disease is seen throughout the iliac  vessels. Mild atherosclerotic disease in the left common iliac artery.  Right common iliac, bilateral external iliac and right  internal iliac  artery is patent. Stenosis at the origin of the left internal iliac  artery.     Left lower extremity: Atherosclerotic disease is seen throughout the  common femoral artery causing mild stenosis. Multifocal moderate to  severe areas of stenosis seen throughout the superficial femoral  artery. Popliteal artery is patent. Peroneal artery is patent.  Multifocal stenosis throughout the anterior tibial artery. Posterior  tibial artery is patent.     Right lower extremity: Bulky atherosclerotic disease is seen  throughout the right common femoral artery causing moderate stenosis.  Profunda femoral is patent. Multifocal moderate stenosis throughout  the superficial femoral artery with focal area of severe stenosis in  the distal superficial femoral artery (series 5, image 23). Popliteal  artery is patent. Three-vessel runoff.         IMPRESSION:  1. Patent abdominal aorta and visceral vessels without evidence of  atherosclerotic disease.  2. Mild stenosis of the left common iliac and severe stenosis of the  left internal iliac artery. The right common iliac, internal iliac and  bilateral external iliac arteries are patent.  3. Left lower extremity: Mild stenosis of the left common femoral  artery. Multifocal moderate to severe stenosis throughout the  superficial femoral artery. Posterior tibial and peroneal arteries are  patent. Multifocal stenosis throughout the anterior tibial artery.  4. Right lower extremity: Bulky atherosclerotic disease throughout the  right common femoral artery causing moderate stenosis. Multifocal  moderate stenosis throughout the superficial femoral artery. Focal  severe stenosis of the distal right superficial femoral artery.  Three-vessel runoff.  5. Diverticulosis without evidence of diverticulitis.  6. Surgical changes seen at the sigmoid colon.            ASSESSMENT:  1.  Bilateral lower extremity peripheral arterial disease with claudication primarily secondary to  infrainguinal disease.    RECOMMENDATION:  I thoroughly reviewed all the above with Rashaun.  He understands that his symptoms are lifestyle limiting but not immediately limb threatening.  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  We discussed how the extent of his infrainguinal disease may be best handled by open revascularization.  He may still be a candidate for limited bilateral superficial femoral artery intervention although this would not be as durable as open bypass.  He plans to consider his options.  He may contact us in the future to schedule lower extremity angiography with possible intervention.  Otherwise I would plan to see him in one year for repeat ARI with exercise.    Total face-to-face time was 15 minutes, greater than 50% spent providing counseling and education.    Sanket Lee M.D.

## 2018-04-17 NOTE — MR AVS SNAPSHOT
"              After Visit Summary   4/17/2018    Rashaun Molina    MRN: [id number]           Patient Information     Date Of Birth          1960        Visit Information        Provider Department      4/17/2018 2:30 PM Rd Lee MD [facility] at  Vascular Center      Today's Diagnoses     Peripheral vascular disease with claudication (H)    -  1      Care Instructions    -Patient to follow up with Primary Care provider regarding elevated blood pressure.            Follow-ups after your visit        Follow-up notes from your care team     Return in about 1 week (around 4/24/2018) for Patient may call to consider lower extremity angiography.      Who to contact     If you have questions or need follow up information about today's clinic visit or your schedule please contact [facility] directly at [phone].  Normal or non-critical lab and imaging results will be communicated to you by Infochimpshart, letter or phone within 4 business days after the clinic has received the results. If you do not hear from us within 7 days, please contact the clinic through MyChart or phone. If you have a critical or abnormal lab result, we will notify you by phone as soon as possible.  Submit refill requests through Arkadium or call your pharmacy and they will forward the refill request to us. Please allow 3 business days for your refill to be completed.          Additional Information About Your Visit        MyChart Information     Ark[employer] lets you send messages to your doctor, view your test results, renew your prescriptions, schedule appointments and more. To sign up, go to [email or web address] . Click on \"Log in\" on the left side of the screen, which will take you to the Welcome page. Then click on \"Sign up Now\" on the right side of the page.     You will be asked to enter the access code listed below, as well as some personal information. Please " follow the directions to create your username and password.     Your access code is: [email or web address]  Expires: 2018  3:44 PM     Your access code will  in 90 days. If you need help or a new code, please call your [facility] clinic or [phone].        Care EveryWhere ID     This is your Care EveryWhere ID. This could be used by other organizations to access your [facility] medical records  [id number]        Your Vitals Were     Pulse                   90            Blood Pressure from Last 3 Encounters:   18 163/84   18 147/87   18 138/72    Weight from Last 3 Encounters:   18 215 lb (97.5 kg)   18 215 lb (97.5 kg)   01/15/18 215 lb (97.5 kg)              Today, you had the following     No orders found for display         Today's Medication Changes          These changes are accurate as of 18  6:00 PM.  If you have any questions, ask your nurse or doctor.               Stop taking these medicines if you haven't already. Please contact your care team if you have questions.     albuterol 108 (90 Base) MCG/ACT Inhaler   Commonly known as:  PROAIR HFA/PROVENTIL HFA/VENTOLIN HFA   Stopped by:  Rd Lee MD           umeclidinium 62.5 MCG/INH oral inhaler   Commonly known as:  INCRUSE ELLIPTA   Stopped by:  Rd Lee MD                    Primary Care Provider Office Phone # Fax #    Yemi Nava [phone] [address] [address]        Equal Access to Services     [facility]: Hadii aad ku hadasho Soomaali, waaxda luqadaha, qaybta kaalmada mejia, marina hampton. So [facility] [phone].    ATENCIÓN: Si habla español, tiene a mobley disposición servicios gratuitos de asistencia lingüística. Llame al [phone].    We comply with applicable federal civil rights laws and [place] laws. We do not discriminate on the basis of race, color, national origin, age, disability,  sex, sexual orientation, or gender identity.            Thank you!     Thank you for choosing [facility] [place]  for your care. Our goal is always to provide you with excellent care. Hearing back from our patients is one way we can continue to improve our services. Please take a few minutes to complete the written survey that you may receive in the mail after your visit with us. Thank you!             Your Updated Medication List - Protect others around you: Learn how to safely use, store and throw away your medicines at www.disposemymeds.org.          This list is accurate as of 4/17/18  6:00 PM.  Always use your most recent med list.                   Brand Name Dispense Instructions for use Diagnosis    ASPIRIN EC PO      Take 81 mg by mouth daily.        lisinopril 40 MG tablet    PRINIVIL/ZESTRIL    90 tablet    Take 1 tablet (40 mg) by mouth daily    Essential hypertension, benign       simvastatin 20 MG tablet    ZOCOR    90 tablet    TAKE 1 TABLET BY MOUTH AT  BEDTIME    Pure hypercholesterolemia

## 2018-04-18 ENCOUNTER — TELEPHONE (OUTPATIENT)
Dept: OTHER | Facility: CLINIC | Age: 58
End: 2018-04-18

## 2018-04-18 DIAGNOSIS — I10 ESSENTIAL HYPERTENSION, BENIGN: ICD-10-CM

## 2018-04-18 RX ORDER — LISINOPRIL 40 MG/1
40 TABLET ORAL DAILY
Qty: 30 TABLET | Refills: 0 | COMMUNITY
Start: 2018-04-18 | End: 2018-04-30

## 2018-04-18 NOTE — TELEPHONE ENCOUNTER
Patient called in and left a message asking for a 30 day supply of his lisinopril. He made an appointment to come in but will need the 30 day supply to cover him until he comes in. One month supply was called into [facility] pharmacy in [place].

## 2018-04-18 NOTE — TELEPHONE ENCOUNTER
"Pt LOV 4-17-18 with Dr. Lee who notes:  \"We discussed how the extent of his infrainguinal disease may be best handled by open revascularization.  He may still be a candidate for limited bilateral superficial femoral artery intervention although this would not be as durable as open bypass.  He plans to consider his options.  He may contact us in the future to schedule lower extremity angiography with possible intervention.  Otherwise I would plan to see him in one year for repeat ARI with exercise.\"    Pt reports he has questions what f/u entails. I began to educate on angiogram procedure, pt notes he just had that and would like to have the surgery.     Pt called noting he would like to go ahead with surgery, he would like to talk to Dr. Lee to discuss further first.     I discussed with Dr. Lee who will put the orders in.      Roz Pugh, JASMINN, RN  "

## 2018-04-19 NOTE — TELEPHONE ENCOUNTER
Rashaun called to schedule the procedure today, he wants the angiogram, not the bypass.  Routing to [facility] RN for FYI that we need angio orders from Dr Lee. Milka Westbrook -  at [facility]

## 2018-04-23 ENCOUNTER — TELEPHONE (OUTPATIENT)
Dept: OTHER | Facility: CLINIC | Age: 58
End: 2018-04-23

## 2018-04-23 NOTE — TELEPHONE ENCOUNTER
Type of surgery: BILATERAL LOWER EXTREMITY ANGIOGRAM WITH POSSIBLE INTERVENTION  Location of surgery: [facility]  Date and time of surgery: 5/14/18 8:00 AM  Surgeon: DR ARELY KELLEY  Pre-Op Appt Date: UNKNOWN  Post-Op Appt Date: UNKNOWN   Packet sent out: 4/23/18  Pre-cert/Authorization completed: SENT FOR P.A. SUBMISSION  Date: [id number] Milka Westbrook -  at [facility]

## 2018-04-30 ENCOUNTER — OFFICE VISIT (OUTPATIENT)
Dept: FAMILY MEDICINE | Facility: CLINIC | Age: 58
End: 2018-04-30

## 2018-04-30 VITALS
BODY MASS INDEX: 30.24 KG/M2 | WEIGHT: 211.2 LBS | DIASTOLIC BLOOD PRESSURE: 80 MMHG | SYSTOLIC BLOOD PRESSURE: 134 MMHG | HEIGHT: 70 IN | HEART RATE: 85 BPM | TEMPERATURE: 98 F | OXYGEN SATURATION: 96 %

## 2018-04-30 DIAGNOSIS — E78.00 PURE HYPERCHOLESTEROLEMIA: ICD-10-CM

## 2018-04-30 DIAGNOSIS — I10 ESSENTIAL HYPERTENSION, BENIGN: Primary | ICD-10-CM

## 2018-04-30 DIAGNOSIS — I73.9 PVD (PERIPHERAL VASCULAR DISEASE) (H): ICD-10-CM

## 2018-04-30 LAB
% GRANULOCYTES: 62.4 %
HCT VFR BLD AUTO: 43.2 % (ref 40–53)
HEMOGLOBIN: 14 G/DL (ref 13.3–17.7)
LYMPHOCYTES NFR BLD AUTO: 24.9 %
MCH RBC QN AUTO: 32.7 PG (ref 26–33)
MCHC RBC AUTO-ENTMCNC: 32.4 G/DL (ref 31–36)
MCV RBC AUTO: 101 FL (ref 78–100)
MONOCYTES NFR BLD AUTO: 12.7 %
PLATELET COUNT - QUEST: 202 10^9/L (ref 150–375)
RBC # BLD AUTO: 4.28 10*12/L (ref 4.4–5.9)
WBC # BLD AUTO: 5.4 10*9/L (ref 4–11)

## 2018-04-30 PROCEDURE — 36415 COLL VENOUS BLD VENIPUNCTURE: CPT | Performed by: FAMILY MEDICINE

## 2018-04-30 PROCEDURE — 80061 LIPID PANEL: CPT | Mod: 90 | Performed by: FAMILY MEDICINE

## 2018-04-30 PROCEDURE — 99214 OFFICE O/P EST MOD 30 MIN: CPT | Performed by: FAMILY MEDICINE

## 2018-04-30 PROCEDURE — 85025 COMPLETE CBC W/AUTO DIFF WBC: CPT | Performed by: FAMILY MEDICINE

## 2018-04-30 PROCEDURE — 80053 COMPREHEN METABOLIC PANEL: CPT | Mod: 90 | Performed by: FAMILY MEDICINE

## 2018-04-30 RX ORDER — SIMVASTATIN 20 MG
20 TABLET ORAL AT BEDTIME
Qty: 90 TABLET | Refills: 1 | Status: ON HOLD | OUTPATIENT
Start: 2018-04-30 | End: 2018-05-14

## 2018-04-30 RX ORDER — LISINOPRIL 40 MG/1
40 TABLET ORAL DAILY
Qty: 90 TABLET | Refills: 1 | Status: SHIPPED | OUTPATIENT
Start: 2018-04-30 | End: 2018-10-16

## 2018-04-30 NOTE — NURSING NOTE
Rashaun is here for a fasting med check    Pre-Visit Screening :  Immunizations : up to date  Colon Screening : is up to date  Asthma Action Test/Plan : na  PHQ9/GAD7 :  Na    Pulse - regular  My Chart - declines    CLASSIFICATION OF OVERWEIGHT AND OBESITY BY BMI                         Obesity Class           BMI(kg/m2)  Underweight                                    < 18.5  Normal                                         18.5-24.9  Overweight                                     25.0-29.9  OBESITY                     I                  30.0-34.9                              II                 35.0-39.9  EXTREME OBESITY             III                >40                             Patient's  BMI Body mass index is 22.15 kg/(m^2).  http://hin.nhlbi.nih.gov/menuplanner/menu.cgi  Questioned patient about current smoking habits.  Pt. has never smoked.  The patient has verbalized that it is ok to leave a detailed voice message on the patient's home voicemail with results/recommendations from this visit.       Verified [phone] phone number:

## 2018-04-30 NOTE — LETTER
May 1, 2018      Rashaun Molina  [address] [facility] [address]        Rashaun THALIA Molina     Your triglyceride cholesterol number was elevated which is often a sign that your metabolism is not working as efficiently as it could.  Lowering carbohydrates and sugars in your diet and exercising regularly are the best steps to help combat this.    Your sodium level was a little low so we will want to just keep an eye on that at future visits.      The results of your recent CBC panel, Blood Sugar, Kidney Tests and Liver Panel were within normal limits. The results are now released on My Chart. Please contact me if you have further questions or concerns.    Sincerely,    AMIE Nava M.D.     Resulted Orders   Lipid Profile (QUEST)   Result Value Ref Range    Cholesterol 204 (H) <200 mg/dL    HDL Cholesterol 55 >40 mg/dL    Triglycerides 278 (H) <150 mg/dL    LDL Cholesterol Calculated 109 (H) mg/dL (calc)      Comment:      Reference range: <100     Desirable range <100 mg/dL for primary prevention;    <70 mg/dL for patients with CHD or diabetic patients   with > or = 2 CHD risk factors.     LDL-C is now calculated using the Fabian-Titus   calculation, which is a validated novel method providing   better accuracy than the Friedewald equation in the   estimation of LDL-C.   Fabian SS et al. MALINDA. 2013;310(19): 9430-0440   (http://education.[employer].[place]/faq/TLF519)      Cholesterol/HDL Ratio 3.7 <5.0 (calc)    Non HDL Cholesterol 149 (H) <130 mg/dL (calc)      Comment:      For patients with diabetes plus 1 major ASCVD risk   factor, treating to a non-HDL-C goal of <100 mg/dL   (LDL-C of <70 mg/dL) is considered a therapeutic   option.     COMPREHENSIVE METABOLIC PANEL (QUEST) XCMP   Result Value Ref Range    Glucose 84 65 - 99 mg/dL      Comment:                    Fasting reference interval         Urea Nitrogen 10 7 - 25 mg/dL    Creatinine 0.80 0.70 - 1.33 mg/dL      Comment:      For patients >49  years of age, the reference limit  for Creatinine is approximately 13% higher for people  identified as -American.         GFR Estimate 98 > OR = 60 mL/min/1.73m2    EGFR African American 113 > OR = 60 mL/min/1.73m2    BUN/Creatinine Ratio NOT APPLICABLE 6 - 22 (calc)    Sodium 132 (L) 135 - 146 mmol/L    Potassium 5.1 3.5 - 5.3 mmol/L    Chloride 93 (L) 98 - 110 mmol/L    Carbon Dioxide 28 20 - 31 mmol/L    Calcium 9.1 8.6 - 10.3 mg/dL    Protein Total 8.2 (H) 6.1 - 8.1 g/dL    Albumin 4.2 3.6 - 5.1 g/dL    Globulin Calculated 4.0 (H) 1.9 - 3.7 g/dL (calc)    A/G Ratio 1.1 1.0 - 2.5 (calc)    Bilirubin Total 0.4 0.2 - 1.2 mg/dL    Alkaline Phosphatase 90 40 - 115 U/L    AST 30 10 - 35 U/L    ALT 34 9 - 46 U/L   CL AFF HEMOGRAM/PLATE/DIFF (BFP)   Result Value Ref Range    WBC 5.4 4.0 - 11 10*9/L    RBC Count 4.28 (A) 4.4 - 5.9 10*12/L    Hemoglobin 14.0 13.3 - 17.7 g/dL    Hematocrit 43.2 40.0 - 53.0 %    .0 (A) 78 - 100 fL    MCH 32.7 26 - 33 pg    MCHC 32.4 31 - 36 g/dL    Platelet Count 202 150 - 375 10^9/L    % Granulocytes 62.4 %    % Lymphocytes 24.9 %    % Monocytes 12.7 %       If you have any questions or concerns, please call the clinic at the number listed above.       Sincerely,        Yemi Nava MD

## 2018-04-30 NOTE — PROGRESS NOTES
SUBJECTIVE:                                                    Rashaun Molina is a 58 year old male who presents to clinic today for the following health issues:      Hyperlipidemia Follow-Up      Rate your low fat/cholesterol diet?: good    Taking statin?  Yes, no muscle aches from statin    Other lipid medications/supplements?:  none    Hypertension Follow-up      Outpatient blood pressures are not being checked.    Low Salt Diet: not monitoring salt    Vascular Disease Follow-up:  Peripheral Vascular Disease (PVD)      Chest pain or pressure, left side neck or arm pain: No    Shortness of breath/increased sweats/nausea with exertion: No    Pain in calves walking 1-2 blocks: Yes     Worsened or new symptoms since last visit: No    Nitroglycerin use: no    Daily aspirin use: Yes      Amount of exercise or physical activity: 2-3 days/week for an average of 15-30 minutes    Problems taking medications regularly: No    Medication side effects: none    Diet: regular (no restrictions)            Problem list and histories reviewed & adjusted, as indicated.  Additional history: as documented    Patient Active Problem List   Diagnosis     Essential hypertension, benign     Pure hypercholesterolemia     History of colonic polyps     BCC (basal cell carcinoma)     ACP (advance care planning)     Health Care Home     Colovesical fistula     S/P total hip arthroplasty     Obesity due to excess calories, unspecified obesity severity     Chronic bronchitis, unspecified chronic bronchitis type (H)     PVD (peripheral vascular disease) (H)     Past Surgical History:   Procedure Laterality Date     ARTHROPLASTY HIP Right 10/7/2015    Procedure: ARTHROPLASTY HIP;  Surgeon: Neil Davis MD;  Location: [facility] OR     COLONOSCOPY  9/05, 4/10/13    Per Hospital encounter dated 4/18/13     COLONOSCOPY N/A 4/9/2018    Procedure: COMBINED COLONOSCOPY, SINGLE OR MULTIPLE BIOPSY/POLYPECTOMY BY BIOPSY;;  Surgeon: Tramaine Zuniga MD;   Location: [facility] GI     COMBINED CYSTOSCOPY, INSERT CATHETER URETER  2013    Procedure: COMBINED CYSTOSCOPY, INSERT CATHETER URETER;;  Surgeon: Kashif Parks MD;  Location: [facility] OR     HC COLONOSCOPY THRU STOMA W BIOPSY/CAUTERY TUMOR/POLYP/LESION      michael, tubular adenoma, next colonoscopy      HC COLONOSCOPY THRU STOMA, DIAGNOSTIC  01    negative, ligate two internal hemmorhoids  Dr rodriguez-repeat      HC LARYNGOSCOPY DIRECT W VOCAL CORD INJECTION      vocal cord polyps     LAPAROSCOPIC ASSISTED COLECTOMY  2013    Procedure: LAPAROSCOPIC ASSISTED COLECTOMY;  LOW ANTERIOR RESECTION ;  Surgeon: Tramaine Zuniga MD;  Location:  OR       Social History   Substance Use Topics     Smoking status: Former Smoker     Packs/day: 1.50     Years: 40.00     Quit date: 2013     Smokeless tobacco: Never Used     Alcohol use 8.4 oz/week     14 Cans of beer per week      Comment: 2 beers daily     Family History   Problem Relation Age of Onset     Hypertension Mother      Hypertension Father       MI     HEART DISEASE Father      MI  at age 55     Coronary Artery Disease Father      Hypertension Brother      Hyperlipidemia Brother      Hypertension Brother      Hypertension Sister      Lipids Brother      Lipids Brother          Current Outpatient Prescriptions   Medication Sig Dispense Refill     ASPIRIN EC PO Take 81 mg by mouth daily.       lisinopril (PRINIVIL/ZESTRIL) 40 MG tablet Take 1 tablet (40 mg) by mouth daily 90 tablet 1     simvastatin (ZOCOR) 20 MG tablet Take 1 tablet (20 mg) by mouth At Bedtime 90 tablet 1     [DISCONTINUED] lisinopril (PRINIVIL/ZESTRIL) 40 MG tablet Take 1 tablet (40 mg) by mouth daily 30 tablet 0     [DISCONTINUED] simvastatin (ZOCOR) 20 MG tablet TAKE 1 TABLET BY MOUTH AT  BEDTIME 90 tablet 0     Allergies   Allergen Reactions     Spiriva Handihaler Blisters     Hctz [Hydrochlorothiazide] Other (See Comments)     Low sodium     Recent Labs   Lab Test  " 11/13/17   1152  10/30/17   1124  11/07/16   1007  07/06/16   0824  10/08/15   0558   07/23/14   1021   04/17/13   0015   LDL   --   109*  130*  123   --    < >  94   < >   --    HDL   --   45  59  46   --    < >  56   < >   --    TRIG   --   133  224*  117   --    < >  339*   < >   --    ALT   --   31  38  22   --    < >  40   < >   --    CR  0.86  0.87  0.97  1.10  1.07   < >  1.13   < >  0.99   GFRESTIMATED  96  95  86  74  71   < >  73   < >  Not Calculated   GFRESTBLACK   --    --    --    --   86   --    --    --   Not Calculated   POTASSIUM  4.2  4.4  4.9  4.3  3.7   < >  4.5   < >  3.7  Charge credited  Duplicate request CORRECTED ON 04/17 AT 0716: PREVIOUSLY REPORTED AS 3.7   TSH   --    --    --    --    --    --   1.69   --    --     < > = values in this interval not displayed.      BP Readings from Last 3 Encounters:   04/30/18 134/80   04/17/18 163/84   04/09/18 147/87    Wt Readings from Last 3 Encounters:   04/30/18 95.8 kg (211 lb 3.2 oz)   04/09/18 97.5 kg (215 lb)   04/05/18 97.5 kg (215 lb)                    ROS:  Constitutional, HEENT, cardiovascular, pulmonary, gi and gu systems are negative, except as otherwise noted.    OBJECTIVE:     /80 (BP Location: Right arm, Patient Position: Chair, Cuff Size: Adult Large)  Pulse 85  Temp 98  F (36.7  C) (Oral)  Ht 1.778 m (5' 10\")  Wt 95.8 kg (211 lb 3.2 oz)  SpO2 96%  BMI 30.3 kg/m2  Body mass index is 30.3 kg/(m^2).   GENERAL: healthy, alert and no distress  EYES: Eyes grossly normal to inspection, PERRL and conjunctivae and sclerae normal  HENT: ear canals and TM's normal, nose and mouth without ulcers or lesions  NECK: no adenopathy, no asymmetry, masses, or scars and thyroid normal to palpation  RESP: lungs clear to auscultation - no rales, rhonchi or wheezes  CV: regular rate and rhythm, normal S1 S2, no S3 or S4, no murmur, click or rub, no peripheral edema , pulses OK at rest  ABDOMEN: soft, nontender, no hepatosplenomegaly, no " "masses and bowel sounds normal  MS: no gross musculoskeletal defects noted, no edema  SKIN: no suspicious lesions or rashes  NEURO: Normal strength and tone, mentation intact and speech normal  PSYCH: mentation appears normal, affect normal/bright        ASSESSMENT:       PLAN:   (I10) Essential hypertension, benign  (primary encounter diagnosis)  Comment: well controlled  Plan: lisinopril (PRINIVIL/ZESTRIL) 40 MG tablet,         COMPREHENSIVE METABOLIC PANEL (QUEST) XCMP,         VENOUS COLLECTION, CL AFF HEMOGRAM/PLATE/DIFF         (BFP)        continue current medications at current doses     (E78.00) Pure hypercholesterolemia  Comment: control uncertain  Plan: simvastatin (ZOCOR) 20 MG tablet, Lipid Profile        (QUEST), COMPREHENSIVE METABOLIC PANEL (QUEST)         XCMP, VENOUS COLLECTION, CL AFF         HEMOGRAM/PLATE/DIFF (BFP)        continue current medications at current doses pending labs    (I73.9) PVD (peripheral vascular disease) (H)  Comment: pt scheduled for angiogram with stenting in a few weeks, symptoms bad when ambulating  Plan: COMPREHENSIVE METABOLIC PANEL (QUEST) XCMP,         VENOUS COLLECTION, CL AFF HEMOGRAM/PLATE/DIFF         (BFP)        Continue ASA, procedure as planned      BMI:   Estimated body mass index is 30.3 kg/(m^2) as calculated from the following:    Height as of this encounter: 1.778 m (5' 10\").    Weight as of this encounter: 95.8 kg (211 lb 3.2 oz).   Weight management plan: Discussed healthy diet and exercise guidelines and patient will follow up in 6 months in clinic to re-evaluate.      FUTURE APPOINTMENTS:       - Follow-up visit in 6 mo  Work on weight loss  Regular exercise    Yemi Nava MD  [facility]      "

## 2018-04-30 NOTE — MR AVS SNAPSHOT
After Visit Summary   4/30/2018    Rashaun Molina    MRN: [id number]           Patient Information     Date Of Birth          1960        Visit Information        Provider Department      4/30/2018 9:15 AM Yemi Nava MD [facility]        Today's Diagnoses     Essential hypertension, benign    -  1    Pure hypercholesterolemia        PVD (peripheral vascular disease) (H)           Follow-ups after your visit        Follow-up notes from your care team     Return in about 6 months (around 10/30/2018).      Your next 10 appointments already scheduled     May 09, 2018  3:00 PM CDT   Pre-Op physical with Yemi Nava MD   [facility] ([facility])    [address]  [address]  [facility] [address]   [phone]            May 14, 2018  8:00 AM CDT   (Arrive by 6:30 AM)   IR LOWER EXTREMITY ANGIOGRAM BILATERAL with [id number]   [facility] Interventional Radiology ([facility])    [address] [place] [address]   [phone]           1. Your doctor will need to do a history and physical within 30 days before this procedure. 2. Your doctor will determine whether you need a 12 lead EKG, as well as which medications should not be taken the morning of the exam. 3. Laboratory tests are to be obtained by your doctor prior to the exam (creatinine, Hgb/Hct, platelet count, and INR) 4. If you have allergies to x-ray contrast or iodine, contact your doctor or a Radiology nurse prior to the exam day for instructions. 5. Someone will need to drive you to and from the hospital. 6. Bring a list of all drugs you are taking; include supplements and over-the-counter medications. 7. Wear comfortable clothes and leave your valuables at home. 8. If you are or may be pregnant, contact your doctor or a Radiology nurse prior to the day of the exam. 9.  If you have diabetes, check with your  doctor or a Radiology nurse to see if your insulin needs to be adjusted for the exam. 10. If you are taking Coumadin (to thin you blood) please contact your doctor or a Radiology nurse at least 5 days before the exam for special instructions. 11. You should not have received barium (x-ray contrast) within 48 hours of this exam. 12. The day before your exam you may eat your regular diet and are encouraged to drink at least 2 quarts of clear liquids. Drink no alcoholic beverages for 24 hours prior to the exam. 13. If you have a colostomy you will need to irrigate it with tap water at 8PM the evening before and again at 6AM the morning of the exam. 14. Do not smoke for 24 hours prior to the procedure. 15. Do not eat any solid food or milk products for 6 hours prior to the exam. You may drink clear liquids until 2 hours prior to the exam. Clear liquids include the following: water, Jell-O, clear broth, apple juice or any non-carbonated drink that you can see through (no pop!) 16. The morning of the exam you may brush your teeth and take medications as directed with a sip of water. 17. Tell the Radiology nurse if you have any allergies. 18. You will be asked to empty your bladder before the exam begins. 19. Following the exam you will need to remain on complete bedrest for 4-6 hours. The nurse will monitor your vital signs, puncture site, and the pulses and temperature of the arm or leg that was punctured. 20. When discharged, you cannot drive until morning, and an adult must be with you until then. You should stay in the [facility] area overnight.            May 14, 2018  8:00 AM CDT   [facility] IR [address] with [address] Lee MD   [facility] ([facility])    [facility] ([facility])   [phone]              Who to contact     If you have questions or need follow up information about today's clinic visit or your schedule  "please contact [facility] directly at [phone].  Normal or non-critical lab and imaging results will be communicated to you by MyChart, letter or phone within 4 business days after the clinic has received the results. If you do not hear from us within 7 days, please contact the clinic through MyChart or phone. If you have a critical or abnormal lab result, we will notify you by phone as soon as possible.  Submit refill requests through [employer] or call your pharmacy and they will forward the refill request to us. Please allow 3 business days for your refill to be completed.          Additional Information About Your Visit        [employer] Information     [employer] lets you send messages to your doctor, view your test results, renew your prescriptions, schedule appointments and more. To sign up, go to [email or web address] . Click on \"Log in\" on the left side of the screen, which will take you to the Welcome page. Then click on \"Sign up Now\" on the right side of the page.     You will be asked to enter the access code listed below, as well as some personal information. Please follow the directions to create your username and password.     Your access code is: [email or web address]  Expires: 2018  3:44 PM     Your access code will  in 90 days. If you need help or a new code, please call your [place] clinic or [phone].        Care EveryWhere ID     This is your Care EveryWhere ID. This could be used by other organizations to access your [place] medical records  [id number]        Your Vitals Were     Pulse Temperature Height Pulse Oximetry BMI (Body Mass Index)       85 98  F (36.7  C) (Oral) 1.778 m (5' 10\") 96% 30.3 kg/m2        Blood Pressure from Last 3 Encounters:   18 134/80   18 163/84   18 147/87    Weight from Last 3 Encounters:   18 95.8 kg (211 lb 3.2 oz)   18 97.5 kg (215 lb)   18 97.5 kg (215 lb)              We Performed the Following  "    CL AFF HEMOGRAM/PLATE/DIFF ([facility])     COMPREHENSIVE METABOLIC PANEL ([facility]) XCMP     Lipid Profile ([facility])     VENOUS COLLECTION          Today's Medication Changes          These changes are accurate as of 4/30/18  9:30 AM.  If you have any questions, ask your nurse or doctor.               These medicines have changed or have updated prescriptions.        Dose/Directions    simvastatin 20 MG tablet   Commonly known as:  ZOCOR   This may have changed:  See the new instructions.   Used for:  Pure hypercholesterolemia        Dose:  20 mg   Take 1 tablet (20 mg) by mouth At Bedtime   Quantity:  90 tablet   Refills:  1            Where to get your medicines      These medications were sent to [facility] - [address], [facility] [address]     Phone:  [phone]     lisinopril 40 MG tablet    simvastatin 20 MG tablet                Primary Care Provider Office Phone # Fax #    Yemi Nava [phone] [address] [address]        Equal Access to Services     DEBRA PERDUE AH: Hadii aad ku hadasho Soomaali, waaxda luqadaha, qaybta kaalmada adeegyada, waxay idiin hayaan adeeg juliettearaabelardo araujo . So [facility] [phone].    ATENCIÓN: Si habla español, tiene a mobley disposición servicios gratuitos de asistencia lingüística. [facility] [phone].    We comply with applicable federal civil rights laws and [place] laws. We do not discriminate on the basis of race, color, national origin, age, disability, sex, sexual orientation, or gender identity.            Thank you!     Thank you for choosing [facility]  for your care. Our goal is always to provide you with excellent care. Hearing back from our patients is one way we can continue to improve our services. Please take a few minutes to complete the written survey that you may receive in the mail after your visit with us. Thank  you!             Your Updated Medication List - Protect others around you: Learn how to safely use, store and throw away your medicines at www.disposemymeds.org.          This list is accurate as of 4/30/18  9:30 AM.  Always use your most recent med list.                   Brand Name Dispense Instructions for use Diagnosis    ASPIRIN EC PO      Take 81 mg by mouth daily.        lisinopril 40 MG tablet    PRINIVIL/ZESTRIL    90 tablet    Take 1 tablet (40 mg) by mouth daily    Essential hypertension, benign       simvastatin 20 MG tablet    ZOCOR    90 tablet    Take 1 tablet (20 mg) by mouth At Bedtime    Pure hypercholesterolemia

## 2018-05-01 LAB
ALBUMIN SERPL-MCNC: 4.2 G/DL (ref 3.6–5.1)
ALBUMIN/GLOB SERPL: 1.1 (CALC) (ref 1–2.5)
ALP SERPL-CCNC: 90 U/L (ref 40–115)
ALT SERPL-CCNC: 34 U/L (ref 9–46)
AST SERPL-CCNC: 30 U/L (ref 10–35)
BILIRUB SERPL-MCNC: 0.4 MG/DL (ref 0.2–1.2)
BUN SERPL-MCNC: 10 MG/DL (ref 7–25)
BUN/CREATININE RATIO: ABNORMAL (CALC) (ref 6–22)
CALCIUM SERPL-MCNC: 9.1 MG/DL (ref 8.6–10.3)
CHLORIDE SERPLBLD-SCNC: 93 MMOL/L (ref 98–110)
CHOLEST SERPL-MCNC: 204 MG/DL
CHOLEST/HDLC SERPL: 3.7 (CALC)
CO2 SERPL-SCNC: 28 MMOL/L (ref 20–31)
CREAT SERPL-MCNC: 0.8 MG/DL (ref 0.7–1.33)
EGFR AFRICAN AMERICAN - QUEST: 113 ML/MIN/1.73M2
GFR SERPL CREATININE-BSD FRML MDRD: 98 ML/MIN/1.73M2
GLOBULIN, CALCULATED - QUEST: 4 G/DL (CALC) (ref 1.9–3.7)
GLUCOSE - QUEST: 84 MG/DL (ref 65–99)
HDLC SERPL-MCNC: 55 MG/DL
LDLC SERPL CALC-MCNC: 109 MG/DL (CALC)
NONHDLC SERPL-MCNC: 149 MG/DL (CALC)
POTASSIUM SERPL-SCNC: 5.1 MMOL/L (ref 3.5–5.3)
PROT SERPL-MCNC: 8.2 G/DL (ref 6.1–8.1)
SODIUM SERPL-SCNC: 132 MMOL/L (ref 135–146)
TRIGL SERPL-MCNC: 278 MG/DL

## 2018-05-09 ENCOUNTER — OFFICE VISIT (OUTPATIENT)
Dept: FAMILY MEDICINE | Facility: CLINIC | Age: 58
End: 2018-05-09

## 2018-05-09 VITALS
HEIGHT: 69 IN | BODY MASS INDEX: 31.25 KG/M2 | SYSTOLIC BLOOD PRESSURE: 138 MMHG | DIASTOLIC BLOOD PRESSURE: 70 MMHG | WEIGHT: 211 LBS | HEART RATE: 80 BPM | OXYGEN SATURATION: 93 % | TEMPERATURE: 98.9 F

## 2018-05-09 DIAGNOSIS — Z01.818 PRE-OP EXAM: ICD-10-CM

## 2018-05-09 DIAGNOSIS — I73.9 PAD (PERIPHERAL ARTERY DISEASE) (H): Primary | ICD-10-CM

## 2018-05-09 PROCEDURE — 93000 ELECTROCARDIOGRAM COMPLETE: CPT | Performed by: FAMILY MEDICINE

## 2018-05-09 PROCEDURE — 99214 OFFICE O/P EST MOD 30 MIN: CPT | Performed by: FAMILY MEDICINE

## 2018-05-09 NOTE — MR AVS SNAPSHOT
After Visit Summary   5/9/2018    Rashaun Molina    MRN: [id number]           Patient Information     Date Of Birth          1960        Visit Information        Provider Department      5/9/2018 3:00 PM Yemi Nava MD [facility]        Today's Diagnoses     PAD (peripheral artery disease) (H)    -  1    Pre-op exam           Follow-ups after your visit        Your next 10 appointments already scheduled     May 14, 2018  8:00 AM CDT   (Arrive by 6:30 AM)   IR LOWER EXTREMITY ANGIOGRAM BILATERAL with [id number]   [facility] Interventional Radiology ([facility])    [address] [facility] [address] [phone]           1. Your doctor will need to do a history and physical within 30 days before this procedure. 2. Your doctor will determine whether you need a 12 lead EKG, as well as which medications should not be taken the morning of the exam. 3. Laboratory tests are to be obtained by your doctor prior to the exam (creatinine, Hgb/Hct, platelet count, and INR) 4. If you have allergies to x-ray contrast or iodine, contact your doctor or a Radiology nurse prior to the exam day for instructions. 5. Someone will need to drive you to and from the hospital. 6. Bring a list of all drugs you are taking; include supplements and over-the-counter medications. 7. Wear comfortable clothes and leave your valuables at home. 8. If you are or may be pregnant, contact your doctor or a Radiology nurse prior to the day of the exam. 9.  If you have diabetes, check with your doctor or a Radiology nurse to see if your insulin needs to be adjusted for the exam. 10. If you are taking Coumadin (to thin you blood) please contact your doctor or a Radiology nurse at least 5 days before the exam for special instructions. 11. You should not have received barium (x-ray contrast) within 48 hours of this exam. 12. The day before your exam you may eat your  regular diet and are encouraged to drink at least 2 quarts of clear liquids. Drink no alcoholic beverages for 24 hours prior to the exam. 13. If you have a colostomy you will need to irrigate it with tap water at 8PM the evening before and again at 6AM the morning of the exam. 14. Do not smoke for 24 hours prior to the procedure. 15. Do not eat any solid food or milk products for 6 hours prior to the exam. You may drink clear liquids until 2 hours prior to the exam. Clear liquids include the following: water, Jell-O, clear broth, apple juice or any non-carbonated drink that you can see through (no pop!) 16. The morning of the exam you may brush your teeth and take medications as directed with a sip of water. 17. Tell the Radiology nurse if you have any allergies. 18. You will be asked to empty your bladder before the exam begins. 19. Following the exam you will need to remain on complete bedrest for 4-6 hours. The nurse will monitor your vital signs, puncture site, and the pulses and temperature of the arm or leg that was punctured. 20. When discharged, you cannot drive until morning, and an adult must be with you until then. You should stay in the [facility] area overnight.            May 14, 2018  8:00 AM CDT   [facility] [place] IR Suite with [address] Lee MD   [facility] ([facility])    [facility] ([facility])   [phone]              Who to contact     If you have questions or need follow up information about today's clinic visit or your schedule please contact [facility] directly at [phone].  Normal or non-critical lab and imaging results will be communicated to you by MyChart, letter or phone within 4 business days after the clinic has received the results. If you do not hear from us within 7 days, please contact the clinic through MyChart or phone. If you have a critical or abnormal  "lab result, we will notify you by phone as soon as possible.  Submit refill requests through [employer] or call your pharmacy and they will forward the refill request to us. Please allow 3 business days for your refill to be completed.          Additional Information About Your Visit        MyChart Information     [employer] lets you send messages to your doctor, view your test results, renew your prescriptions, schedule appointments and more. To sign up, go to [email or web address] . Click on \"Log in\" on the left side of the screen, which will take you to the Welcome page. Then click on \"Sign up Now\" on the right side of the page.     You will be asked to enter the access code listed below, as well as some personal information. Please follow the directions to create your username and password.     Your access code is: [email or web address]  Expires: 2018  3:44 PM     Your access code will  in 90 days. If you need help or a new code, please call your [place] clinic or [phone].        Care EveryWhere ID     This is your Care EveryWhere ID. This could be used by other organizations to access your [place] medical records  [id number]        Your Vitals Were     Pulse Temperature Height Pulse Oximetry BMI (Body Mass Index)       80 98.9  F (37.2  C) (Oral) 1.74 m (5' 8.5\") 93% 31.62 kg/m2        Blood Pressure from Last 3 Encounters:   18 138/70   18 134/80   18 163/84    Weight from Last 3 Encounters:   18 95.7 kg (211 lb)   18 95.8 kg (211 lb 3.2 oz)   18 97.5 kg (215 lb)              We Performed the Following     EKG 12-lead complete w/read - Clinics        Primary Care Provider Office Phone # Fax #    Yemi Nava [phone] [address] [address]        Equal Access to Services     [facility] AH: Hadii belen Diaz, kendall pemberton, qaybta marina diaz. " So [facility] [phone].    ATENCIÓN: Si habla tasha, tiene a mobley disposición servicios gratuitos de asistencia lingüística. Virginie al [phone].    We comply with applicable federal civil rights laws and [place] laws. We do not discriminate on the basis of race, color, national origin, age, disability, sex, sexual orientation, or gender identity.            Thank you!     Thank you for choosing [facility]  for your care. Our goal is always to provide you with excellent care. Hearing back from our patients is one way we can continue to improve our services. Please take a few minutes to complete the written survey that you may receive in the mail after your visit with us. Thank you!             Your Updated Medication List - Protect others around you: Learn how to safely use, store and throw away your medicines at www.disposemymeds.org.          This list is accurate as of 5/9/18  3:21 PM.  Always use your most recent med list.                   Brand Name Dispense Instructions for use Diagnosis    ASPIRIN EC PO      Take 81 mg by mouth daily.        lisinopril 40 MG tablet    PRINIVIL/ZESTRIL    90 tablet    Take 1 tablet (40 mg) by mouth daily    Essential hypertension, benign       simvastatin 20 MG tablet    ZOCOR    90 tablet    Take 1 tablet (20 mg) by mouth At Bedtime    Pure hypercholesterolemia

## 2018-05-09 NOTE — PROGRESS NOTES
[facility]  [address]  [address]  [facility] [address]  [phone]  Dept: [phone]    PRE-OP EVALUATION:  Today's date: 2018    Rashaun Molina (: 1960) presents for pre-operative evaluation assessment as requested by Dr. Lee.  He requires evaluation and anesthesia risk assessment prior to undergoing surgery/procedure for treatment of Bilateral leg stents .    Proposed Surgery/ Procedure: Bilateral leg stents  Date of Surgery/ Procedure: 18  Time of Surgery/ Procedure:   Hospital/Surgical Facility: [facility]  Primary Physician: Yemi Nava  Type of Anesthesia Anticipated: to be determined    Patient has a Health Care Directive or Living Will:  NO    1. NO - Do you have a history of heart attack, stroke, stent, bypass or surgery on an artery in the head, neck, heart or legs?  2. NO - Do you ever have any pain or discomfort in your chest?  3. NO - Do you have a history of  Heart Failure?  4. NO - Are you troubled by shortness of breath when: walking on the level, up a slight hill or at night?  5. NO - Do you currently have a cold, bronchitis or other respiratory infection?  6. NO - Do you have a cough, shortness of breath or wheezing?  7. YES - DO YOU SOMETIMES GET PAINS IN THE CALVES OF YOUR LEGS WHEN YOU WALK? PAD  8. NO - Do you or anyone in your family have previous history of blood clots?  9. NO - Do you or does anyone in your family have a serious bleeding problem such as prolonged bleeding following surgeries or cuts?  10. NO - Have you ever had problems with anemia or been told to take iron pills?  11. NO - Have you had any abnormal blood loss such as black, tarry or bloody stools, or abnormal vaginal bleeding?  12. NO - Have you ever had a blood transfusion?  13. NO - Have you or any of your relatives ever had problems with anesthesia?  14. NO - Do you have sleep apnea, excessive snoring or daytime drowsiness?  15. NO - Do  you have any prosthetic heart valves?  16. YES - Do you have prosthetic joints?right hip  17. NO - Is there any chance that you may be pregnant?      HPI:     HPI related to upcoming procedure: stenosis bilateral legs      See problem list for active medical problems.  Problems all longstanding and stable, except as noted/documented.  See ROS for pertinent symptoms related to these conditions.                                                                                                                                                          .    MEDICAL HISTORY:     Patient Active Problem List    Diagnosis Date Noted     PVD (peripheral vascular disease) (H) 04/30/2018     Priority: Medium     Chronic bronchitis, unspecified chronic bronchitis type (H) 10/30/2017     Priority: Medium     Obesity due to excess calories, unspecified obesity severity 07/06/2016     Priority: Medium     S/P total hip arthroplasty 10/07/2015     Priority: Medium     Colovesical fistula 04/08/2013     Priority: Medium     ACP (advance care planning) 07/27/2011     Priority: Medium     Advance Care Planning 7/6/2016: ACP Review of Chart / Resources Provided:  Reviewed chart for advance care plan.  Rashaun Molina has no plan or code status on file. Discussed available resources and provided with information. Confirmed code status reflects current choices pending further ACP discussions.  Confirmed/documented legally designated decision makers.  Added by Masha Burks      Advance Care Planning 1/8/2018: ACP Review of Chart / Resources Provided:  Reviewed chart for advance care plan.  Rashaun Molina has been provided information and resources to begin or update their advance care plan.  Added by Suzie Petit                     BCC (basal cell carcinoma) 04/13/2011     Priority: Medium     Mohs 2011       History of colonic polyps 06/28/2005     Priority: Medium     Problem list name updated by automated process. Provider to review        Pure hypercholesterolemia      Priority: Medium     Essential hypertension, benign      Priority: Medium     Health Care Home 03/18/2013     Priority: Low     State Tier Level:  Tier 2  Status:  na  Care Coordinator:      See Letters for [facility] Care Plan            Past Medical History:   Diagnosis Date     Arthritis      Chronic airway obstruction, not elsewhere classified 3/8/2004    pt says that he does not have COPD     Cough syncope 11/21/2012     Essential hypertension, benign      Fistula     colon/bladder     Fracture of right proximal fibula 7/30/2014     Orthostatic hypotension 11/24/2014     Other chronic pain     right hip pain     Personal history of colonic polyps 6/28/2005     Pure hypercholesterolemia      Tobacco use disorder 3/8/2004     Vasovagal syncopes 10/25/2014     Viral warts, unspecified     genital     Past Surgical History:   Procedure Laterality Date     ARTHROPLASTY HIP Right 10/7/2015    Procedure: ARTHROPLASTY HIP;  Surgeon: Neil Davis MD;  Location:  OR     COLONOSCOPY  9/05, 4/10/13    Per Hospital encounter dated 4/18/13     COLONOSCOPY N/A 4/9/2018    Procedure: COMBINED COLONOSCOPY, SINGLE OR MULTIPLE BIOPSY/POLYPECTOMY BY BIOPSY;;  Surgeon: Tramaine Zuniga MD;  Location:  GI     COMBINED CYSTOSCOPY, INSERT CATHETER URETER  4/11/2013    Procedure: COMBINED CYSTOSCOPY, INSERT CATHETER URETER;;  Surgeon: Kashif Parks MD;  Location:  OR      COLONOSCOPY THRU STOMA W BIOPSY/CAUTERY TUMOR/POLYP/LESION  1998    michael, tubular adenoma, next colonoscopy 2001      COLONOSCOPY THRU STOMA, DIAGNOSTIC  04/16/01    negative, ligate two internal hemmorhoids  Dr rodriguez-repeat 2008      LARYNGOSCOPY DIRECT W VOCAL CORD INJECTION      vocal cord polyps     LAPAROSCOPIC ASSISTED COLECTOMY  4/11/2013    Procedure: LAPAROSCOPIC ASSISTED COLECTOMY;  LOW ANTERIOR RESECTION ;  Surgeon: Tramaine Zuniga MD;  Location:  OR     Current Outpatient Prescriptions   Medication  Sig Dispense Refill     ASPIRIN EC PO Take 81 mg by mouth daily.       lisinopril (PRINIVIL/ZESTRIL) 40 MG tablet Take 1 tablet (40 mg) by mouth daily 90 tablet 1     simvastatin (ZOCOR) 20 MG tablet Take 1 tablet (20 mg) by mouth At Bedtime 90 tablet 1     OTC products: Aspirin    Allergies   Allergen Reactions     Spiriva Handihaler Blisters     Hctz [Hydrochlorothiazide] Other (See Comments)     Low sodium      Latex Allergy: NO    Social History   Substance Use Topics     Smoking status: Former Smoker     Packs/day: 1.50     Years: 40.00     Quit date: 4/19/2013     Smokeless tobacco: Never Used     Alcohol use 8.4 oz/week     14 Cans of beer per week      Comment: 2 beers daily     History   Drug Use No       REVIEW OF SYSTEMS:   CONSTITUTIONAL: NEGATIVE for fever, chills, change in weight  ENT/MOUTH: NEGATIVE for ear, mouth and throat problems  RESP: NEGATIVE for significant cough or SOB  CV: NEGATIVE for chest pain, palpitations or peripheral edema  PSYCHIATRIC: NEGATIVE for changes in mood or affect    EXAM:   There were no vitals taken for this visit.  GENERAL APPEARANCE: healthy, alert and no distress  HENT: ear canals and TM's normal and nose and mouth without ulcers or lesions  RESP: lungs clear to auscultation - no rales, rhonchi or wheezes  CV: regular rate and rhythm, normal S1 S2, no S3 or S4 and no murmur, click or rub   ABDOMEN: soft, nontender, no HSM or masses and bowel sounds normal  NEURO: Normal strength and tone, sensory exam grossly normal, mentation intact and speech normal    DIAGNOSTICS:   EKG: appears normal, NSR, normal axis, normal intervals, no acute ST/T changes c/w ischemia, no LVH by voltage criteria, unchanged from previous tracings    Recent Labs   Lab Test  04/30/18   1005  04/30/18   0935  11/13/17   1152   07/06/16   0808   04/11/13   1210   HGB   --   14.0   --    --   14.4   < >  15.0   PLT   --   202   --    --   254   < >  338   INR   --    --    --    --    --    --    1.06   NA  132*   --   136   < >   --    < >  132*   POTASSIUM  5.1   --   4.2   < >   --    < >  3.7   CR  0.80   --   0.86   < >   --    < >   --     < > = values in this interval not displayed.        IMPRESSION:   Reason for surgery/procedure: PAD    The proposed surgical procedure is considered INTERMEDIATE risk.    REVISED CARDIAC RISK INDEX  The patient has the following serious cardiovascular risks for perioperative complications such as (MI, PE, VFib and 3  AV Block):  No serious cardiac risks  INTERPRETATION: 0 risks: Class I (very low risk - 0.4% complication rate)    The patient has the following additional risks for perioperative complications:  No identified additional risks      ICD-10-CM    1. PAD (peripheral artery disease) (H) I73.9 EKG 12-lead complete w/read - Clinics   2. Pre-op exam Z01.818 EKG 12-lead complete w/read - Clinics       RECOMMENDATIONS:       Cardiovascular Risk  Performs 4 METs exercise without symptoms (Climb a flight of stairs) .       --Patient is to take all scheduled medications on the day of surgery EXCEPT for modifications listed below.    Anticoagulant or Antiplatelet Medication Use  ASPIRIN: Bleeding risk is low for this procedure and aspirin 81 mg daily should be continued in the perioperative period        APPROVAL GIVEN to proceed with proposed procedure, without further diagnostic evaluation       Signed Electronically by: Yemi Nava MD    Copy of this evaluation report is provided to requesting physician.    [facility] Preop Guidelines    Revised Cardiac Risk Index

## 2018-05-14 ENCOUNTER — HOSPITAL ENCOUNTER (OUTPATIENT)
Facility: CLINIC | Age: 58
Discharge: HOME | End: 2018-05-14
Attending: SURGERY | Admitting: SURGERY
Payer: COMMERCIAL

## 2018-05-14 ENCOUNTER — OFFICE VISIT (OUTPATIENT)
Dept: SURGERY | Facility: PHYSICIAN GROUP | Age: 58
End: 2018-05-14
Payer: COMMERCIAL

## 2018-05-14 ENCOUNTER — APPOINTMENT (OUTPATIENT)
Dept: INTERVENTIONAL RADIOLOGY/VASCULAR | Facility: CLINIC | Age: 58
End: 2018-05-14
Attending: SURGERY
Payer: COMMERCIAL

## 2018-05-14 VITALS
BODY MASS INDEX: 31.24 KG/M2 | OXYGEN SATURATION: 94 % | SYSTOLIC BLOOD PRESSURE: 168 MMHG | TEMPERATURE: 97.6 F | RESPIRATION RATE: 16 BRPM | DIASTOLIC BLOOD PRESSURE: 91 MMHG | HEIGHT: 69 IN | HEART RATE: 79 BPM | WEIGHT: 210.9 LBS

## 2018-05-14 DIAGNOSIS — I70.213 ATHEROSCLEROSIS OF NATIVE ARTERY OF BOTH LOWER EXTREMITIES WITH INTERMITTENT CLAUDICATION (H): ICD-10-CM

## 2018-05-14 DIAGNOSIS — E78.5 HYPERLIPIDEMIA LDL GOAL <70: Primary | ICD-10-CM

## 2018-05-14 LAB
APTT PPP: 31 SEC (ref 22–37)
HBA1C MFR BLD: 5.6 % (ref 0–5.6)
INR PPP: 1.07 (ref 0.86–1.14)

## 2018-05-14 PROCEDURE — 75774 ARTERY X-RAY EACH VESSEL: CPT

## 2018-05-14 PROCEDURE — 27210894 ZZH CATH CR6

## 2018-05-14 PROCEDURE — 99152 MOD SED SAME PHYS/QHP 5/>YRS: CPT | Performed by: SURGERY

## 2018-05-14 PROCEDURE — 27210742 ZZH CATH CR1

## 2018-05-14 PROCEDURE — 25000128 H RX IP 250 OP 636: Performed by: SURGERY

## 2018-05-14 PROCEDURE — 83036 HEMOGLOBIN GLYCOSYLATED A1C: CPT | Performed by: SURGERY

## 2018-05-14 PROCEDURE — 36246 INS CATH ABD/L-EXT ART 2ND: CPT | Performed by: SURGERY

## 2018-05-14 PROCEDURE — 99244 OFF/OP CNSLTJ NEW/EST MOD 40: CPT | Performed by: INTERNAL MEDICINE

## 2018-05-14 PROCEDURE — 75716 ARTERY X-RAYS ARMS/LEGS: CPT | Mod: 26 | Performed by: SURGERY

## 2018-05-14 PROCEDURE — 85610 PROTHROMBIN TIME: CPT | Performed by: SURGERY

## 2018-05-14 PROCEDURE — C1769 GUIDE WIRE: HCPCS

## 2018-05-14 PROCEDURE — 25000128 H RX IP 250 OP 636

## 2018-05-14 PROCEDURE — 27210906 ZZH KIT CR8

## 2018-05-14 PROCEDURE — 25000125 ZZHC RX 250: Performed by: SURGERY

## 2018-05-14 PROCEDURE — 36415 COLL VENOUS BLD VENIPUNCTURE: CPT | Performed by: SURGERY

## 2018-05-14 PROCEDURE — 85730 THROMBOPLASTIN TIME PARTIAL: CPT | Performed by: SURGERY

## 2018-05-14 PROCEDURE — 99153 MOD SED SAME PHYS/QHP EA: CPT | Performed by: SURGERY

## 2018-05-14 PROCEDURE — 40000853 ZZH STATISTIC ANGIOGRAM, STENT, VERTEBRO PLASTY

## 2018-05-14 PROCEDURE — 27210886 ZZH ACCESSORY CR5

## 2018-05-14 RX ORDER — LIDOCAINE 40 MG/G
CREAM TOPICAL
Status: DISCONTINUED | OUTPATIENT
Start: 2018-05-14 | End: 2018-05-14 | Stop reason: HOSPADM

## 2018-05-14 RX ORDER — HEPARIN SODIUM 1000 [USP'U]/ML
INJECTION, SOLUTION INTRAVENOUS; SUBCUTANEOUS
Status: DISCONTINUED
Start: 2018-05-14 | End: 2018-05-14 | Stop reason: WASHOUT

## 2018-05-14 RX ORDER — LIDOCAINE HYDROCHLORIDE 10 MG/ML
1-30 INJECTION, SOLUTION EPIDURAL; INFILTRATION; INTRACAUDAL; PERINEURAL
Status: COMPLETED | OUTPATIENT
Start: 2018-05-14 | End: 2018-05-14

## 2018-05-14 RX ORDER — NALOXONE HYDROCHLORIDE 0.4 MG/ML
.1-.4 INJECTION, SOLUTION INTRAMUSCULAR; INTRAVENOUS; SUBCUTANEOUS
Status: DISCONTINUED | OUTPATIENT
Start: 2018-05-14 | End: 2018-05-14 | Stop reason: HOSPADM

## 2018-05-14 RX ORDER — IODIXANOL 320 MG/ML
50 INJECTION, SOLUTION INTRAVASCULAR ONCE
Status: COMPLETED | OUTPATIENT
Start: 2018-05-14 | End: 2018-05-14

## 2018-05-14 RX ORDER — ASPIRIN 81 MG/1
81 TABLET ORAL DAILY
Status: DISCONTINUED | OUTPATIENT
Start: 2018-05-15 | End: 2018-05-14 | Stop reason: HOSPADM

## 2018-05-14 RX ORDER — FLUMAZENIL 0.1 MG/ML
0.2 INJECTION, SOLUTION INTRAVENOUS
Status: DISCONTINUED | OUTPATIENT
Start: 2018-05-14 | End: 2018-05-14 | Stop reason: HOSPADM

## 2018-05-14 RX ORDER — FENTANYL CITRATE 50 UG/ML
25-50 INJECTION, SOLUTION INTRAMUSCULAR; INTRAVENOUS EVERY 5 MIN PRN
Status: DISCONTINUED | OUTPATIENT
Start: 2018-05-14 | End: 2018-05-14 | Stop reason: HOSPADM

## 2018-05-14 RX ORDER — ROSUVASTATIN CALCIUM 40 MG/1
40 TABLET, COATED ORAL AT BEDTIME
Qty: 90 TABLET | Refills: 3 | Status: SHIPPED | OUTPATIENT
Start: 2018-05-14 | End: 2019-06-03

## 2018-05-14 RX ORDER — SODIUM CHLORIDE 9 MG/ML
INJECTION, SOLUTION INTRAVENOUS CONTINUOUS
Status: DISCONTINUED | OUTPATIENT
Start: 2018-05-14 | End: 2018-05-14 | Stop reason: HOSPADM

## 2018-05-14 RX ORDER — HEPARIN SODIUM 1000 [USP'U]/ML
500-6000 INJECTION, SOLUTION INTRAVENOUS; SUBCUTANEOUS
Status: DISCONTINUED | OUTPATIENT
Start: 2018-05-14 | End: 2018-05-14 | Stop reason: HOSPADM

## 2018-05-14 RX ORDER — LIDOCAINE HYDROCHLORIDE 10 MG/ML
INJECTION, SOLUTION INFILTRATION; PERINEURAL
Status: DISCONTINUED
Start: 2018-05-14 | End: 2018-05-14 | Stop reason: HOSPADM

## 2018-05-14 RX ORDER — FENTANYL CITRATE 50 UG/ML
INJECTION, SOLUTION INTRAMUSCULAR; INTRAVENOUS
Status: DISCONTINUED
Start: 2018-05-14 | End: 2018-05-14 | Stop reason: HOSPADM

## 2018-05-14 RX ORDER — ACETAMINOPHEN 500 MG
500 TABLET ORAL EVERY 6 HOURS PRN
Status: DISCONTINUED | OUTPATIENT
Start: 2018-05-14 | End: 2018-05-14 | Stop reason: HOSPADM

## 2018-05-14 RX ADMIN — FENTANYL CITRATE 25 MCG: 50 INJECTION, SOLUTION INTRAMUSCULAR; INTRAVENOUS at 08:37

## 2018-05-14 RX ADMIN — MIDAZOLAM HYDROCHLORIDE 0.5 MG: 1 INJECTION, SOLUTION INTRAMUSCULAR; INTRAVENOUS at 08:25

## 2018-05-14 RX ADMIN — MIDAZOLAM HYDROCHLORIDE 0.5 MG: 1 INJECTION, SOLUTION INTRAMUSCULAR; INTRAVENOUS at 08:37

## 2018-05-14 RX ADMIN — SODIUM CHLORIDE: 9 INJECTION, SOLUTION INTRAVENOUS at 07:22

## 2018-05-14 RX ADMIN — FENTANYL CITRATE 50 MCG: 50 INJECTION, SOLUTION INTRAMUSCULAR; INTRAVENOUS at 08:19

## 2018-05-14 RX ADMIN — CEFAZOLIN SODIUM 2 G: 2 SOLUTION INTRAVENOUS at 08:37

## 2018-05-14 RX ADMIN — MIDAZOLAM HYDROCHLORIDE 1 MG: 1 INJECTION, SOLUTION INTRAMUSCULAR; INTRAVENOUS at 08:18

## 2018-05-14 RX ADMIN — LIDOCAINE HYDROCHLORIDE 10 ML: 10 INJECTION, SOLUTION INFILTRATION; PERINEURAL at 08:47

## 2018-05-14 RX ADMIN — IODIXANOL 40 ML: 320 INJECTION, SOLUTION INTRAVASCULAR at 09:20

## 2018-05-14 RX ADMIN — FENTANYL CITRATE 25 MCG: 50 INJECTION, SOLUTION INTRAMUSCULAR; INTRAVENOUS at 08:25

## 2018-05-14 RX ADMIN — HEPARIN SODIUM 10000 UNITS: 10000 INJECTION, SOLUTION INTRAVENOUS; SUBCUTANEOUS at 08:11

## 2018-05-14 NOTE — PROCEDURES
Pre op:  Claudication  Post op:  Same  Proc:  Pelvic Agram with bilat LE runoff  Jesus  No complications  See dictation

## 2018-05-14 NOTE — IP AVS SNAPSHOT
MRN:[id number]                      After Visit Summary   5/14/2018    Rashaun Molina    MRN: [id number]           Visit Information        Department      5/14/2018  6:23 AM [facility]          Review of your medicines      START taking        Dose / Directions    rosuvastatin 40 MG tablet   Commonly known as:  CRESTOR   Used for:  Hyperlipidemia LDL goal <70        Dose:  40 mg   Take 1 tablet (40 mg) by mouth At Bedtime   Quantity:  90 tablet   Refills:  3         CONTINUE these medicines which have NOT CHANGED        Dose / Directions    ASPIRIN EC PO        Dose:  81 mg   Take 81 mg by mouth daily.   Refills:  0       lisinopril 40 MG tablet   Commonly known as:  PRINIVIL/ZESTRIL   Used for:  Essential hypertension, benign        Dose:  40 mg   Take 1 tablet (40 mg) by mouth daily   Quantity:  90 tablet   Refills:  1         STOP taking     simvastatin 20 MG tablet   Commonly known as:  ZOCOR                Where to get your medicines      These medications were sent to [facility] - [address], [place] [address]     Phone:  [phone]     rosuvastatin 40 MG tablet               Prescriptions were sent or printed at these locations (1 Prescription)                   [facility] - [address], [address], [place] [address]    Telephone:  [phone]   Fax:  [phone]   Hours:                  E-Prescribed (1 of 1)         rosuvastatin (CRESTOR) 40 MG tablet                 Protect others around you: Learn how to safely use, store and throw away your medicines at www.disposemymeds.org.         Follow-ups after your visit         Care Instructions        After Care Instructions     Discharge Instructions       Stop Simvastatin and instead start Crestor 40 mg every evening for your cholesterol. You should then have a lipid panel repeated in 3  "months through Dr. Nava.            Discharge Instructions       Call [phone] to see  at the [facility] in September.                  Further instructions from your care team       Given \"Understanding Artery Disease\" book.  Peripheral Angiogram Discharge Instructions - Femoral   After you go home:    Have an adult stay with you until tomorrow.    Drink extra fluids for 2 days.    You may resume your normal diet.    No smoking       For 24 hours - due to the sedation you received:    Relax and take it easy.    Do NOT make any important or legal decisions.    Do NOT drive or operate machines at home or at work.    Do NOT drink alcohol.    Care of Groin Puncture Site:    For the first 24 hrs - check the puncture site every 1-2 hours while awake.    For 2 days, when you cough, sneeze, laugh or move your bowels, hold your hand over the puncture site and press firmly.    Remove the bandaid after 24 hours. If there is minor oozing, apply another bandaid and remove it after 12 hours.    It is normal to have a small bruise or pea size lump at the site.    You may shower tomorrow.  Do NOT take a bath, or use a hot tub or pool for at least 3 days. Do NOT scrub the site. Do not use lotion or powder near the puncture site.     Activity:          For 2 days:    No stooping or squatting    Do NOT do any heavy activity such as exercise, lifting, or straining.     No housework, yard work or any activity that make you sweat    Do NOT lift more than 10 pounds    Bleeding:    If you start bleeding from the site in your groin, lie down flat and press firmly on/above the site for 10 minutes.     Once bleeding stops, lay flat for 2 hours.     Call the [facility] as soon as you can.     Call 911 right away if you have heavy bleeding or bleeding that does not stop.    Medicines:    If you are taking antiplatelet medications such as Plavix, do not stop taking it until you talk to your provider.   " "    Take your medications, including blood thinners, unless your provider tells you not to.  If you take Coumadin (Warfarin), have your INR checked by your provider in  3-5 days. Call your clinic to schedule this.    If you have stopped any medicines, check with your provider about when to restart them.      Follow Up Appointments:    Follow up with [facility] as directed.    Call the clinic if:    You have increased pain or a large or growing hard lump around the site.    The site is red, swollen, hot or tender.    Blood or fluid is draining from the site.    You have chills or a fever greater than 101 F (38 C).    Your leg feels numb, cool or changes color.    You have hives, a rash or unusual itching.    New pain in the back or belly that you cannot control with Tylenol.    Any questions or concerns.  If you have questions or your original symptoms do not improve, call:     [facility] @ [phone]           Additional Information About Your Visit        [employer] Information     [employer] lets you send messages to your doctor, view your test results, renew your prescriptions, schedule appointments and more. To sign up, go to [email or web address] . Click on \"Log in\" on the left side of the screen, which will take you to the Welcome page. Then click on \"Sign up Now\" on the right side of the page.     You will be asked to enter the access code listed below, as well as some personal information. Please follow the directions to create your username and password.     Your access code is: [email or web address]  Expires: 2018  3:44 PM     Your access code will  in 90 days. If you need help or a new code, please call your [facility] clinic or [phone].        Care EveryWhere ID     This is your Care EveryWhere ID. This could be used by other organizations to access your [facility] medical records  [id number]        Your Vitals Were     Blood Pressure Pulse Temperature Respirations Height Weight " "   154/85 79 97.6  F (36.4  C) (Oral) 16 1.753 m (5' 9\") 95.7 kg (210 lb 14.4 oz)    Pulse Oximetry BMI (Body Mass Index)                94% 31.14 kg/m2           Primary Care Provider Office Phone # Fax #    Yemi Ashok Nava [phone]      Equal Access to Services     DEBRA PERDUE : Hadii aad ku hadasho Soomaali, waaxda luqadaha, qaybta kaalmada adeegyada, waxay idiin hayaan adeeg kharash la'aan ah. So [facility] [phone].    ATENCIÓN: Si habchastity cordova, tiene a mobley disposición servicios gratuitos de asistencia lingüística. Llame al [phone].    We comply with applicable federal civil rights laws and [place] laws. We do not discriminate on the basis of race, color, national origin, age, disability, sex, sexual orientation, or gender identity.            Thank you!     Thank you for choosing [facility] for your care. Our goal is always to provide you with excellent care. Hearing back from our patients is one way we can continue to improve our services. Please take a few minutes to complete the written survey that you may receive in the mail after you visit with us. Thank you!             Medication List: This is a list of all your medications and when to take them. Check marks below indicate your daily home schedule. Keep this list as a reference.      Medications           Morning Afternoon Evening Bedtime As Needed    ASPIRIN EC PO   Take 81 mg by mouth daily.                                lisinopril 40 MG tablet   Commonly known as:  PRINIVIL/ZESTRIL   Take 1 tablet (40 mg) by mouth daily                                rosuvastatin 40 MG tablet   Commonly known as:  CRESTOR   Take 1 tablet (40 mg) by mouth At Bedtime                                  "

## 2018-05-14 NOTE — IP AVS SNAPSHOT
[place] [address]    [place] [address]    Phone:  [phone]                                       After Visit Summary   5/14/2018    Rashaun Molina    MRN: [id number]           After Visit Summary Signature Page     I have received my discharge instructions, and my questions have been answered. I have discussed any challenges I see with this plan with the nurse or doctor.    ..........................................................................................................................................  Patient/Patient Representative Signature      ..........................................................................................................................................  Patient Representative Print Name and Relationship to Patient    ..................................................               ................................................  Date                                            Time    ..........................................................................................................................................  Reviewed by Signature/Title    ...................................................              ..............................................  Date                                                            Time

## 2018-05-14 NOTE — PROGRESS NOTES
Heart tones normal.  Explained pre angiogram and answered questions.  Resting in bed with call light in reach.

## 2018-05-14 NOTE — PROGRESS NOTES
On bedrest til 1340.  Up to BR and did well.  See flow sheet.  [facility] Discharge Summary    Discharge Criteria:   Discharge Criteria met per MD orders: Yes.   Vital signs stable.     Pt demonstrates ability to ambulate safely: Yes.  (See discharge questionnaire for additional information)    Discharge instructions & education:   Discharge instructions reviewed with patient. Patient verbalizes  understanding.     Medications:   Patient will be discharging on new medications- No. Patient verbalizes reason for use, start date, and side effects Yes.    Items returned to patient:   Home and hospital acquired medications returned to patient NA   Listed belongings gathered and returned to patient: Yes    Patient to discharged to home via w/c with brother.    Traci Drew

## 2018-05-14 NOTE — PROGRESS NOTES
Interventional Radiology Intra-procedural Nursing Note    Patient Name: Rashaun Molina  Medical Record Number: [id number]  Today's Date: May 14, 2018    Start Time: 0820  End of procedure time: 0915  Procedure: Bilateral Lower Extremity Angiogram  Report given to: [facility]  Time pt departs:  0940      Other Notes: Patient in IR2 for bilateral lower extremity angiogram with Dr. Lee. VSS on arrival. 5F sheath in R femoral artery at 0840. No stenting or intervention done. Sheath out at 0918. Site WNL, dressing C/D/I. 125 mcg fentanyl and 2.5 mg midazolam given for sedation    Nessa High RN

## 2018-05-14 NOTE — DISCHARGE INSTRUCTIONS
"Given \"Understanding Artery Disease\" book.  Peripheral Angiogram Discharge Instructions - Femoral   After you go home:    Have an adult stay with you until tomorrow.    Drink extra fluids for 2 days.    You may resume your normal diet.    No smoking       For 24 hours - due to the sedation you received:    Relax and take it easy.    Do NOT make any important or legal decisions.    Do NOT drive or operate machines at home or at work.    Do NOT drink alcohol.    Care of Groin Puncture Site:    For the first 24 hrs - check the puncture site every 1-2 hours while awake.    For 2 days, when you cough, sneeze, laugh or move your bowels, hold your hand over the puncture site and press firmly.    Remove the bandaid after 24 hours. If there is minor oozing, apply another bandaid and remove it after 12 hours.    It is normal to have a small bruise or pea size lump at the site.    You may shower tomorrow.  Do NOT take a bath, or use a hot tub or pool for at least 3 days. Do NOT scrub the site. Do not use lotion or powder near the puncture site.     Activity:          For 2 days:    No stooping or squatting    Do NOT do any heavy activity such as exercise, lifting, or straining.     No housework, yard work or any activity that make you sweat    Do NOT lift more than 10 pounds    Bleeding:    If you start bleeding from the site in your groin, lie down flat and press firmly on/above the site for 10 minutes.     Once bleeding stops, lay flat for 2 hours.     Call the [facility] as soon as you can.     Call 911 right away if you have heavy bleeding or bleeding that does not stop.    Medicines:    If you are taking antiplatelet medications such as Plavix, do not stop taking it until you talk to your provider.       Take your medications, including blood thinners, unless your provider tells you not to.  If you take Coumadin (Warfarin), have your INR checked by your provider in  3-5 days. Call your clinic to schedule " this.    If you have stopped any medicines, check with your provider about when to restart them.      Follow Up Appointments:    Follow up with [facility] as directed.    Call the clinic if:    You have increased pain or a large or growing hard lump around the site.    The site is red, swollen, hot or tender.    Blood or fluid is draining from the site.    You have chills or a fever greater than 101 F (38 C).    Your leg feels numb, cool or changes color.    You have hives, a rash or unusual itching.    New pain in the back or belly that you cannot control with Tylenol.    Any questions or concerns.  If you have questions or your original symptoms do not improve, call:     [facility] @ [phone]

## 2018-05-14 NOTE — CONSULTS
[facility]    Vascular Medicine Consultation     Date of Admission:  5/14/2018  Date of Consult (When I saw the patient): 05/14/18    Assessment & Plan   1. Peripheral arterial disease with progressive lifestyle-limiting bilateral lower extremity claudication and noted severe stenosis of the left internal iliac artery, moderate to severe multifocal stenosis throughout left SFA and anterior tibial artery, and severe stenosis of the distal right SFA with 3 vessel run-off on the right on CTA 4/16/18    Post-angiogram cares per Dr. Lee. He will ultimately require surgical intervention. He has been on aspirin 81 mg daily as an outpatient, which should be continued.     2. Hyperlipidemia    His lipid panel this admission was significant for an LDL of 109, HDL 55, triglycerides 278, and total cholesterol 204 while on Simvastatin 20 mg daily. Given his severe and progressive peripheral arterial disease, it is recommended that he treat his lipids slightly more aggressively to an LDL of less than 70 with a moderate to high dose of a maximally efficacious statin. Therefore, his Simvastatin will be changed to Crestor 40 mg daily. He should then have a lipid panel repeated in 3 months through his primary care provider, Dr. Nava. He should see Dr. Escalante in four months to go over his interval clinical course.      Reason for Consult   Reason for consult: Asked by Dr. Lee to evaluate and help manage vascular risk factors in this 58 year old male former smoker with hyperlipidemia and hypertension who underwent a diagnostic angiogram today for lifestyle-limiting bilateral lower extremity claudication.     Primary Care Physician   Yemi Nava      History of Present Illness   Rashaun Molina is a 58 year old male former smoker with a history of hyperlipidemia and hypertension who has been troubled by progressive bilateral lower extremity claudication. He denies any rest pain or non-healing wounds. He was  seen by Dr. Selby of Vascular Surgery in 1/2018 and initiated on Pletal at that time. ABIs at that time were 0.73 on the right, dropping to 0.3 after exercise and 0.60 on the left, dropping to 0.21 after exercise.  However, his symptoms progressed and he was seen recently by Dr. Lee of Vascular Surgery. A CTA was obtained, showing severe stenosis of the left internal iliac artery, moderate to severe multifocal stenosis throughout left SFA and anterior tibial artery. Also noted was severe stenosis of the distal right SFA with 3 vessel run-off on the right. An angiogram was recommended, for which he presented today.     Past Medical History   Past Medical History:   Diagnosis Date     Arthritis      Chronic airway obstruction, not elsewhere classified 3/8/2004    pt says that he does not have COPD     Cough syncope 11/21/2012     Essential hypertension, benign      Fistula     colon/bladder     Fracture of right proximal fibula 7/30/2014     Orthostatic hypotension 11/24/2014     Other chronic pain     right hip pain     Personal history of colonic polyps 6/28/2005     Pure hypercholesterolemia      Tobacco use disorder 3/8/2004     Vasovagal syncopes 10/25/2014     Viral warts, unspecified     genital       Past Surgical History   Past Surgical History:   Procedure Laterality Date     ARTHROPLASTY HIP Right 10/7/2015    Procedure: ARTHROPLASTY HIP;  Surgeon: Neil Davis MD;  Location: [facility] OR     COLONOSCOPY  9/05, 4/10/13    Per Hospital encounter dated 4/18/13     COLONOSCOPY N/A 4/9/2018    Procedure: COMBINED COLONOSCOPY, SINGLE OR MULTIPLE BIOPSY/POLYPECTOMY BY BIOPSY;;  Surgeon: Tramaine Zuniga MD;  Location:  GI     COMBINED CYSTOSCOPY, INSERT CATHETER URETER  4/11/2013    Procedure: COMBINED CYSTOSCOPY, INSERT CATHETER URETER;;  Surgeon: Kashif Parks MD;  Location:  OR      COLONOSCOPY THRU STOMA W BIOPSY/CAUTERY TUMOR/POLYP/LESION  1998    nemer, tubular adenoma, next colonoscopy       [facility] COLONOSCOPY THRU STOMA, DIAGNOSTIC  01    negative, ligate two internal hemmorhoids  Dr rodriguez-repeat      [facility] LARYNGOSCOPY DIRECT W VOCAL CORD INJECTION      vocal cord polyps     LAPAROSCOPIC ASSISTED COLECTOMY  2013    Procedure: LAPAROSCOPIC ASSISTED COLECTOMY;  LOW ANTERIOR RESECTION ;  Surgeon: Tramaine Zuniga MD;  Location:  OR       Prior to Admission Medications   Prior to Admission Medications   Prescriptions Last Dose Informant Patient Reported? Taking?   ASPIRIN EC PO 2018 at Unknown time Self Yes Yes   Sig: Take 81 mg by mouth daily.   lisinopril (PRINIVIL/ZESTRIL) 40 MG tablet 2018 at Unknown time  No Yes   Sig: Take 1 tablet (40 mg) by mouth daily   simvastatin (ZOCOR) 20 MG tablet 2018 at Unknown time  No Yes   Sig: Take 1 tablet (20 mg) by mouth At Bedtime      Facility-Administered Medications: None     Allergies   Allergies   Allergen Reactions     Spiriva Handihaler Blisters     Hctz [Hydrochlorothiazide] Other (See Comments)     Low sodium       Social History   Rashaun Molina  reports that he quit smoking about 5 years ago. He has a 60.00 pack-year smoking history. He has never used smokeless tobacco. He reports that he drinks about 8.4 oz of alcohol per week  He reports that he does not use illicit drugs. 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     Family History   Family History   Problem Relation Age of Onset     Hypertension Mother      Hypertension Father       MI     HEART DISEASE Father      MI  at age 55     Coronary Artery Disease Father      Hypertension Brother      Hyperlipidemia Brother      Hypertension Brother      Hypertension Sister      Lipids Brother      Lipids Brother        Review of Systems   The 10 point Review of Systems is negative other than noted in the HPI or here.     Physical Exam   Temp: 97.6  F (36.4  C) Temp src: Oral BP: 160/80 Pulse: 79 Heart Rate: 78 Resp: 16 SpO2: 92 % O2 Device: None (Room air) Oxygen Delivery:  2 LPM  Vital Signs with Ranges  Temp:  [97.6  F (36.4  C)] 97.6  F (36.4  C)  Pulse:  [79] 79  Heart Rate:  [71-82] 78  Resp:  [12-25] 16  BP: (148-187)/(80-96) 160/80  SpO2:  [91 %-98 %] 92 %  210 lbs 14.4 oz    Constitutional: awake, alert, cooperative, no apparent distress, and appears stated age  Eyes: Lids and lashes normal, pupils equal, round and reactive to light, extra ocular muscles intact, sclera clear, conjunctiva normal  ENT: normocepalic, without obvious abnormality, oropharynx pink and moist  Hematologic / Lymphatic: no lymphadenopathy  Respiratory: No increased work of breathing, good air exchange, clear to auscultation bilaterally, no crackles or wheezing  Cardiovascular: regular rate and rhythm, normal S1 and S2 and no murmur noted  GI: Normal bowel sounds, soft, non-distended, non-tender  Skin: no redness, warmth, or swelling, no rashes and groin site c/d/i.   Musculoskeletal: There is no redness, warmth, or swelling of the joints.  Full range of motion noted.  Motor strength is 5 out of 5 all extremities bilaterally.  Tone is normal.  Neurologic: Awake, alert, oriented to name, place and time.  Cranial nerves II-XII are grossly intact.  Motor is 5 out of 5 bilaterally.    Neuropsychiatric:  Normal affect, memory, insight.  Pulses: Palpable DP pulses bilaterally. No carotid bruits appreciated.     Data   Most Recent 3 CBC's:  Recent Labs   Lab Test  04/30/18   0935  07/06/16   0808  10/09/15   0700   09/01/15   1026   WBC  5.4  10.4   --    --   8.4   HGB  14.0  14.4  9.4*   < >  14.6   MCV  101.0*  103.0*   --    --   101.5*   PLT  202  254   --    --   200    < > = values in this interval not displayed.     Most Recent 3 BMP's:  Recent Labs   Lab Test  04/30/18   1005  11/13/17   1152  10/30/17   1124   10/08/15   0558   04/17/13   0015   04/16/13   0739   NA  132*  136  128*   < >  126*   < >  142   --   146*   POTASSIUM  5.1  4.2  4.4   < >  3.7   < >  3.7  Charge credited  Duplicate request  CORRECTED ON 04/17 AT 0716: PREVIOUSLY REPORTED AS 3.7   < >  3.4   CHLORIDE  93*  97*  89*   < >  89*   < >  100   --   90*   CO2  28  27  27   < >  29   < >  31   --   41*   BUN  10  NOT APPLICABLE  7  NOT APPLICABLE  11  NOT APPLICABLE   < >  13   < >  14   --   15   CR  0.80  0.86  0.87   < >  1.07   < >  0.99   --   1.15   ANIONGAP   --    --    --    --   8   --   11   --   14   FRANNY  9.1  9.0  9.5   < >  8.5   < >  8.9   --   9.5   GLC  84  92  99   < >  118*   < >  91   --   134*    < > = values in this interval not displayed.     Most Recent 3 INR's:  Recent Labs   Lab Test  05/14/18 0713 04/11/13   1210   INR  1.07  1.06     Most Recent Cholesterol Panel:  Recent Labs   Lab Test  04/30/18   1005   CHOL  204*   LDL  109*   HDL  55   TRIG  278*     Most Recent Hemoglobin A1c:  Recent Labs   Lab Test  05/14/18   0713   A1C  5.6

## 2018-05-15 ENCOUNTER — PATIENT OUTREACH (OUTPATIENT)
Dept: CARE COORDINATION | Facility: CLINIC | Age: 58
End: 2018-05-15

## 2018-05-15 NOTE — PROGRESS NOTES
Clinic Care Coordination Contact    Clinic Care Coordination Contact  OUTREACH    Referral Information:  Referral Source: IP Report    Primary Diagnosis: Cardiovascular - other    Chief Complaint   Patient presents with     Clinic Care Coordination - Post Hospital        [place] Utilization:   Clinic Utilization  Difficulty keeping appointments:: No  Utilization    Last refreshed: 5/15/2018 12:19 PM:  No Show Count (past year) 0       Last refreshed: 5/15/2018 12:19 PM:  ED visits 0       Last refreshed: 5/15/2018 12:19 PM:  Hospital admissions 0          Current as of: 5/15/2018 12:19 PM             Clinical Concerns:  Current Medical Concerns:    Hyperlipidemia :  Notes from visit with Dr Lee:      His lipid panel this admission was significant for an LDL of 109, HDL 55, triglycerides 278, and total cholesterol 204 while on Simvastatin 20 mg daily. Simvastatin will be changed to Crestor 40 mg daily. Order:  lipid panel repeated in 3 months through his primary care provider    RN CC reviewed recommendations and discussed lifestyle changes with patient who was agreeable and notes that he is interested in making changes.      Medication Management:  Patient has understanding of regimen and is adherent:  Yes          Goals:   Goals        General    Healthy Eating (pt-stated)     Notes - Note edited  5/15/2018  1:18 PM by Eusebia Ma, RN    Goal Statement: I will decrease my cholesterol values by eating healthier      Measure of Success: Decrease to <70  Supportive Steps to Achieve: Patient is implementing positive lifestyle changes including increase fresh veggies, decrease fatty foods  Barriers: previous habits and lifestyle choices  Strengths: Patient is motivated   Date to Achieve By: review monthly, goal date 90 day follow up visit with PCP           Motivational Interviewing  Target Behavior: diet/weight loss and disease management/lifestyle changes decrease cholesterol    Stage of Change: ACTION  (Actively working towards change)    MI Intervention: Expressed Empathy/Understanding, Supported Autonomy, Collaboration, Evocation and Open-ended questions     Change Talk Expressed by the Patient: Reasons to change Need to change    Provider Response to Change Talk: E - Evoked more info from patient about behavior change and A - Affirmed patient's thoughts, decisions, or attempts at behavior change      Patient/Caregiver understanding: Patient verbalized understanding, engaged in AIDET communication behavior during encounter.      Outreach Frequency: monthly      Plan: Patient will make changes to diet daily with health options.  Patient will attend scheduled  evaluation and visit with specialist and follow up with PCP as recommended.     RN CC will outreach within one month for status update, will be available sooner if needed. Contact information given.     Eusebia Ma RN  Clinic Care Coordinator  Mobile: [phone]  [email or web address]

## 2018-05-15 NOTE — LETTER
[facility]  Complex Care Plan  About Me  Patient Name:  Rashaun Molina    YOB: 1960  Age:     58 year old   Yumiko MRN:   [id number] Telephone Information:    Home Phone [phone]   Mobile [phone]       Address:    [address] [facility] [address] Email address:  [email or web address]      Emergency Contact(s)  Name Relationship Lgl Grd Work Phone Home Phone Mobile Phone   1. SUSAN MOLINA Brother   [phone]    2. NSC                Primary language:  English     needed? No   [place] [employer]:  [phone] op. 1  Other communication barriers:    Preferred Method of Communication:  Mail  Current living arrangement:    Mobility Status/ Medical Equipment:      Health Maintenance  Health Maintenance Reviewed: Up to date    My Access Plan  Medical Emergency 911   Primary Clinic Line   - [phone]   24 Hour Appointment Line [phone] or  [phone] ([phone]) (toll-free)   24 Hour Nurse Line [phone] (toll-free)   Preferred Urgent Care     Preferred Hospital     Preferred Pharmacy [id number] [place], [place] - [address]     Behavioral Health Crisis Line The National Suicide Prevention Lifeline at 1-654.582.6789 or 911     My Care Team Members    Patient Care Team       Relationship Specialty Notifications Start End    Yemi Nava MD PCP - General Family Practice  12/5/16     Phone: [phone] Fax: [phone] [address] [address]    Eusebia Ma, RN Lead Care Coordinator  Admissions 2/19/18     Phone: [phone] Fax: [phone]                My Care Plans  Self Management and Treatment Plan  Goals and (Comments)  Goals        General    Healthy Eating (pt-stated)     Notes - Note edited  5/15/2018  1:18 PM by Eusebia Ma, RN    Goal Statement: I will decrease my cholesterol values by eating healthier      Measure of Success: Decrease to <70  Supportive  Steps to Achieve: Patient is implementing positive lifestyle changes including increase fresh veggies, decrease fatty foods  Barriers: previous habits and lifestyle choices  Strengths: Patient is motivated   Date to Achieve By: review monthly, goal date 90 day follow up visit with PCP              Action Plans on File:                       Advance Care Plans/Directives Type:        My Medical and Care Information  Problem List   Patient Active Problem List   Diagnosis     Essential hypertension, benign     Pure hypercholesterolemia     History of colonic polyps     BCC (basal cell carcinoma)     ACP (advance care planning)     Health Care Home     Colovesical fistula     S/P total hip arthroplasty     Obesity due to excess calories, unspecified obesity severity     Chronic bronchitis, unspecified chronic bronchitis type (H)     PVD (peripheral vascular disease) (H)      Current Medications and Allergies:  See printed Medication Report.    Care Coordination Start Date: 5/15/2018   Frequency of Care Coordination: monthly   Form Last Updated: 05/15/2018

## 2018-05-15 NOTE — LETTER
[facility]  [facility] [address]  [place]  [address]      May 15, 2018    Rashaun Molina  [address] [facility] [address]      Dear Rashaun,    I am a clinic care coordinator who works with Yemi Nava MD. I wanted to thank you for spending the time to talk with me.  I wanted to introduce myself and provide you with my contact information so that you can call me with questions or concerns about your health care. Below is a description of clinic care coordination and how I can further assist you.     The clinic care coordinator is a registered nurse and/or  who understand the health care system. The goal of clinic care coordination is to help you manage your health and improve access to the [facility] system in the most efficient manner. The registered nurse can assist you in meeting your health care goals by providing education, coordinating services, and strengthening the communication among your providers. The  can assist you with financial, behavioral, psychosocial, chemical dependency, counseling, and/or psychiatric resources.    Please feel free to contact me at [phone], with any questions or concerns. We at [facility] are focused on providing you with the highest-quality healthcare experience possible and that all starts with you.     Sincerely,     Eusebia Ma    Enclosed: I have enclosed a copy of the Complex Care Plan. This has helpful information and goals that we have talked about. Please keep this in an easy to access place to use as needed. and I have enclosed helpful educational material. Please review and call me with any questions.

## 2018-05-15 NOTE — PATIENT INSTRUCTIONS
Lifestyle Changes to Control Cholesterol  You can control your cholesterol through diet, exercise, weight management, quitting smoking, stress management, and taking your medicines right. These things can also lower your risk for cardiovascular disease.    Eating healthy  Your healthcare provider will give you information on diet changes you may need to make. Your provider may recommend that you see a registered dietitian for help with diet changes. Changes may include:    Cutting back on the amount of fat and cholesterol in your meals    Eating less salt (sodium). This is especially important if you have high blood pressure.    Eating more fresh vegetables and fruits    Eating lean proteins such as fish, poultry, beans, and peas    Eating less red meat and processed meats    Using low-fat dairy products    Using vegetable and nut oils in limited amounts    Limiting how many sweets and processed foods like chips, cookies, and baked goods that you eat     Limiting how many sugar-sweetened beverages you drink    Limiting how often you eat out  Getting exercise  Regular exercise is a good way to help your body control cholesterol. Regular exercise can help in many ways. It can:    Raise your good cholesterol    Help lower your bad cholesterol    Let blood flow better through your body    Give more oxygen to your muscles and tissues    Help you manage your weight    Help your heart pump better    Lower your blood pressure  Your healthcare provider may recommend that you get more physical activity if you haven't been active. Your provider may recommend that you get moderate to vigorous physical activity for at least 40 minutes each day. You should do this for at least 3 to 4 days each week. A few examples of moderate to vigorous activity are:    Walking at a brisk pace. This is about 3 to 4 miles per hour.    Jogging or running    Swimming or water aerobics    Hiking    Dancing    Martial arts    Tennis    Riding a  bicycle or stationary bike    Dancing  Managing your weight  If you are overweight or obese, your healthcare provider will work with you to help you lose weight and lower your BMI (body mass index). Making diet changes and getting more physical activity can help. Changing your diet will help you lose weight more easily than adding exercise.  Quitting smoking  Smoking and other tobacco use can raise cholesterol and make it harder to control. Quitting is tough. But millions of people have given up tobacco for good. You can quit, too! Think about some of the reasons below to quit smoking. Do any of them make you think twice about your smoking habit?  Stop smoking because it:    Keeps your cholesterol high, even if you make all the other changes you re supposed to    Damages your body. It especially harms your heart, lungs, skin, and blood vessels.    Makes you more likely to have a heart attack (acute myocardial infarction), stroke, or cancer    Stains your teeth    Makes your skin, clothes, and breath smell bad    Costs a lot of money  Controlling stress   Learn ways to control stress. This will help you deal with stress in your home and work life. Controlling stress can greatly lower your risk of getting cardiovascular disease.  Making the most of medicines  Healthy eating and exercise are a good start to keeping your cholesterol down. But you may need some extra help from medicine. If your doctor prescribes medicine, be sure to take it exactly as directed. Remember:    Tell your healthcare provider about all other medicines you take. This includes vitamins and herbs.    Tell your healthcare provider if you have any side effects after starting to take a medicine. Examples of side effects to watch for include muscle aches, weakness, blurred vision, rust-colored urine, yellowing of eyes or skin (jaundice), and headache.    Don t skip a dose or stop taking your medicine because you feel better or because  your cholesterol numbers go down. Never stop taking your medicine unless your healthcare provider has told you it s OK.    Ask your healthcare provider if you have any questions about your medicines.  High risk groups  Some people may need to take medicines called statins to control their cholesterol. This is in addition to eating a healthy diet and getting regular exercise.  Statins can help you stay healthy. They can also help prevent a heart attack or stroke. You may need to take a statin if you are in one of these groups:    Adults who have had a heart attack or stroke. Or adults who have had peripheral vascular disease, a ministroke (transient ischemic attack), or stable or unstable angina. This group also includes people who have had a procedure to restore blood flow through a blocked artery. These procedures include percutaneous coronary intervention, angioplasty, stent, and open-heart bypass surgery.    Adults who have diabetes. Or adults who are at higher risk of having a heart attack or stroke and have an LDL cholesterol level of 70 to 189 mg/dL    Adults who are 21 years old or older and have an LDL cholesterol level of 190 mg/dL or higher.  If you are in a high-risk group, talk with your healthcare provider about your treatment goals. Make sure you understand why these goals are important, based on your own health history and your family history of heart disease or high cholesterol.  Make a plan to have regular cholesterol checks. You may need to fast before getting this test. Also ask your provider about any side effects your medicines may cause. Let your provider know about any side effects you have. You may need to take more than one medicine to reach the cholesterol goals that you and your provider decide on.  Date Last Reviewed: 10/1/2016    7331-8965 [employer]. [address] [place] [address], [place] [address]. All rights reserved. This information is not intended as a substitute for  professional medical care. Always follow your healthcare professional's instructions.        Low-Salt Choices  Eating salt (sodium) can make your body retain too much water. Excess water makes your heart work harder. Canned, packaged, and frozen foods are easy to prepare. But they are often high in sodium. Here are some ideas for low-salt foods you can easily make yourself.    For breakfast    Fruit or 100% fruit juice    Whole-wheat bread or an English muffin. Look for sodium content on Nutrition Facts labels.    Low-fat milk or yogurt    Unsalted eggs    Shredded wheat    Corn tortillas    Unsalted steamed rice    Regular (not instant) hot cereal, made without salt  Stay away from:    Sausage, davidson, and ham    Flour tortillas    Packaged muffins, pancakes, and biscuits    Instant hot cereals    Cottage cheese  For lunch and dinner    Fresh fish, chicken, turkey, or meat baked, broiled, or roasted without salt    Dry beans, cooked without salt    Tofu, stir-fried without salt    Unsalted fresh fruit and vegetables, or frozen or canned fruit and vegetables with no added salt  Stay away from:    Lunch or deli meat that is cured or smoked    Cheese    Tomato juice and ketchup    Canned vegetables, soups, and fish not labeled as no-salt-added or reduced sodium    Packaged gravies and sauces    Olives, pickles, and relish    Bottled salad dressings  For snacks and desserts    Yogurt    Unsalted, air-popped popcorn    Unsalted nuts or seeds  Stay away from:    Pies and cakes    Packaged dessert mixes    Pizza    Canned and packaged puddings    Pretzels, chips, crackers, and nuts unless the label says unsalted  Date Last Reviewed: 6/1/2017 2000-2017 [employer]. [address] [place] [address], [place] [address]. All rights reserved. This information is not intended as a substitute for professional medical care. Always follow your healthcare professional's instructions.        Understanding Dietary Fat     Olive  oil is a common source of unsaturated fat.     There are different kinds of fats in the foods you eat. Fats can be saturated or unsaturated. Planning meals that are low in saturated fat helps reduce the level of cholesterol in your blood. Too much cholesterol in your blood can lead to blocked arteries. To prevent heart problems, keep your cholesterol levels in healthy ranges. A healthy goal is to have less than 30% of your daily calories come from fat. Instead of fats, eat more fruits, grains, and vegetables. When you do use fat, choose unsaturated fats.  Limit saturated fats  Saturated fats are fats that come from animals and certain plants (such as coconut and palm). Eating saturated fat can raise your blood cholesterol level and increase your artery problems. Your goal is to eat less saturated fat. Below are some examples of foods that contain lots of saturated fat:    Fatty cuts of meat (lamb, ham, beef)    Cookies and cakes    Cream, ice cream, sour cream, cheese, butter    Desserts with butter and cream    Sauces with butter and cream    Salad dressings with saturated fats    Foods that contain palm or coconut oil  Limit trans fat  Like saturated fat, trans fat is linked to heart disease. Trans fat is found in unsaturated fats that have been modified to be solid at room temperature. Margarine, which is often made from vegetable oil, is a good example. Trans fat is often found in cookies, pastry, and other products. Check food labels for trans fat. Also look on the ingredients list for hydrogenated or partially hydrogenated oils.  Choose unsaturated fats  Unsaturated fats are usually liquid at room temperature. They are better choices than saturated fats. In fact, in moderate amounts unsaturated fat can be good for your heart. There are two types of unsaturated fats:    Polyunsaturated fats are found in corn oil, safflower oil, sunflower oil, and other vegetable oils.    Monounsaturated fats are found in  avocados, olive oil, canola oil, and peanut oil. Some margarines and spreads are now made with these oils, too. Of all fats, monounsaturated fats are the least harmful to your heart.  Date Last Reviewed: 6/1/2017 2000-2017 [employer]. [address] [place] [address], [place] [address]. All rights reserved. This information is not intended as a substitute for professional medical care. Always follow your healthcare professional's instructions.

## 2018-05-24 ENCOUNTER — OFFICE VISIT (OUTPATIENT)
Dept: SURGERY | Facility: CLINIC | Age: 58
End: 2018-05-24
Payer: COMMERCIAL

## 2018-05-24 VITALS
WEIGHT: 210 LBS | DIASTOLIC BLOOD PRESSURE: 88 MMHG | HEIGHT: 69 IN | SYSTOLIC BLOOD PRESSURE: 150 MMHG | RESPIRATION RATE: 16 BRPM | OXYGEN SATURATION: 95 % | BODY MASS INDEX: 31.1 KG/M2 | HEART RATE: 91 BPM

## 2018-05-24 DIAGNOSIS — I73.9 PERIPHERAL VASCULAR DISEASE WITH CLAUDICATION (H): Primary | ICD-10-CM

## 2018-05-24 PROCEDURE — 99213 OFFICE O/P EST LOW 20 MIN: CPT | Performed by: SURGERY

## 2018-05-24 NOTE — MR AVS SNAPSHOT
"              After Visit Summary   5/24/2018    Rashaun Molina    MRN: [id number]           Patient Information     Date Of Birth          1960        Visit Information        Provider Department      5/24/2018 3:15 PM Rd Lee MD [facility]      Today's Diagnoses     Peripheral vascular disease with claudication (H)    -  1       Follow-ups after your visit        Follow-up notes from your care team     Return in about 1 week (around 5/31/2018) for Possibly schedule right leg arteriogram with percutaneous intervention.      Who to contact     If you have questions or need follow up information about today's clinic visit or your schedule please contact [facility] directly at [phone].  Normal or non-critical lab and imaging results will be communicated to you by [employer], letter or phone within 4 business days after the clinic has received the results. If you do not hear from us within 7 days, please contact the clinic through [employer] or phone. If you have a critical or abnormal lab result, we will notify you by phone as soon as possible.  Submit refill requests through [employer] or call your pharmacy and they will forward the refill request to us. Please allow 3 business days for your refill to be completed.          Additional Information About Your Visit        [employer] Information     [employer] lets you send messages to your doctor, view your test results, renew your prescriptions, schedule appointments and more. To sign up, go to [email or web address] . Click on \"Log in\" on the left side of the screen, which will take you to the Welcome page. Then click on \"Sign up Now\" on the right side of the page.     You will be asked to enter the access code listed below, as well as some personal information. Please follow the directions to create your username and password.     Your access code is: [email or web address]  Expires: " "2018  3:44 PM     Your access code will  in 90 days. If you need help or a new code, please call your [facility] or [phone].        Care EveryWhere ID     This is your Care EveryWhere ID. This could be used by other organizations to access your [facility] medical records  [id number]        Your Vitals Were     Pulse Respirations Height Pulse Oximetry BMI (Body Mass Index)       91 16 5' 9\" (1.753 m) 95% 31.01 kg/m2        Blood Pressure from Last 3 Encounters:   18 150/88   18 (!) 168/91   18 138/70    Weight from Last 3 Encounters:   18 210 lb (95.3 kg)   18 210 lb 14.4 oz (95.7 kg)   18 211 lb (95.7 kg)              Today, you had the following     No orders found for display       Primary Care Provider Office Phone # Fax #    Yemi Nava [phone]       [address] [facility] [address]  [facility] [address]        Goals        General    Healthy Eating (pt-stated)     Notes - Note edited  5/15/2018  1:18 PM by Eusebia Ma, RN    Goal Statement: I will decrease my cholesterol values by eating healthier      Measure of Success: Decrease to <70  Supportive Steps to Achieve: Patient is implementing positive lifestyle changes including increase fresh veggies, decrease fatty foods  Barriers: previous habits and lifestyle choices  Strengths: Patient is motivated   Date to Achieve By: review monthly, goal date 90 day follow up visit with PCP         Equal Access to Services     SAURABH PERDUE AH: Hadradha zapatao Socanelo, waaxda luqadaha, qaybta kaalmada marina ba [facility]. So [facility] [phone].    ATENCIÓN: Si habla español, tiene a mobley disposición servicios gratuitos de asistencia lingüística. Llame al [phone].    We comply with applicable federal civil rights laws and [place] laws. We do not discriminate on the basis of race, color, national origin, age, disability, sex, sexual orientation, " or gender identity.            Thank you!     Thank you for choosing [facility]  for your care. Our goal is always to provide you with excellent care. Hearing back from our patients is one way we can continue to improve our services. Please take a few minutes to complete the written survey that you may receive in the mail after your visit with us. Thank you!             Your Updated Medication List - Protect others around you: Learn how to safely use, store and throw away your medicines at www.disposemymeds.org.          This list is accurate as of 5/24/18  3:46 PM.  Always use your most recent med list.                   Brand Name Dispense Instructions for use Diagnosis    ASPIRIN EC PO      Take 81 mg by mouth daily.        lisinopril 40 MG tablet    PRINIVIL/ZESTRIL    90 tablet    Take 1 tablet (40 mg) by mouth daily    Essential hypertension, benign       rosuvastatin 40 MG tablet    CRESTOR    90 tablet    Take 1 tablet (40 mg) by mouth At Bedtime    Hyperlipidemia LDL goal <70

## 2018-05-24 NOTE — LETTER
[facility] [address]  [place] [address]  Phone: [phone]  Fax: [phone]      May 24, 2018    Rashaun Molina, : 1960      Rashaun Molina is a 58-year-old gentleman with hypertension, hyperlipidemia, and prior tobacco abuse who presented to me recently with bilateral lower extremity short distance claudication. He underwent an abdominal aortogram with bilateral lower extremity runoff on 18.  He presents today to discuss those results.  Please see my prior consult from 18.     I did not perform a repeat examination today.     I reviewed the images of the above-noted angiogram with Rashaun.  He had no significant inflow disease.  In the left leg there was diffuse severe disease involving the mid and distal left superficial femoral artery.  He had a good quality left popliteal artery with three-vessel runoff to the foot.  In the right leg he had moderate disease of the distal right SFA with a good quality popliteal and three-vessel runoff to the foot.     I explained to Rashaun that I felt the length of the left SFA disease with the eccentric calcification made it poorly suited to percutaneous intervention.  He has a very good quality left greater saphenous vein.  I felt his left leg would be better treated by a femoropopliteal bypass.  In the right leg his disease is more focal.  This would be amenable to percutaneous intervention.  It would make more sense to intervene on the right leg first prior to considering a left leg bypass.  Rashaun verbalizes full understanding to the above.  He remains employed as a  for [employer].  He plans to consider his options and his work schedule.  He may contact us at some time in the near future to schedule a repeat right leg angiogram with probable percutaneous intervention.       Sincerely,     Sanket Lee M.D.           [facility] [address]  [facility]  [address]  Phone: [phone]  Fax: [phone]

## 2018-06-26 ENCOUNTER — TELEPHONE (OUTPATIENT)
Dept: OTHER | Facility: CLINIC | Age: 58
End: 2018-06-26

## 2018-06-26 NOTE — TELEPHONE ENCOUNTER
Type of surgery: RIGHT LEG ANGIOGRAM WITH PROBABLE INTERVENTION  Location of surgery: [facility]  Date and time of surgery: 8/6/18 AT 8AM  Surgeon: DR ARELY KELLEY  Pre-Op Appt Date: UNKNOWN  Post-Op Appt Date: UNKNOWN   Packet sent out: MAILED TO PT 6/26/18  Pre-cert/Authorization completed:  SENT FOR PA SUBMISSION  Date: 6/26/18 Milka Westbrook -  at [facility]

## 2018-06-28 ENCOUNTER — PATIENT OUTREACH (OUTPATIENT)
Dept: CARE COORDINATION | Facility: CLINIC | Age: 58
End: 2018-06-28

## 2018-06-28 NOTE — PROGRESS NOTES
Clinic Care Coordination Contact    Clinic Care Coordination Contact  OUTREACH    Referral Information:  Referral Source: IP Report         Chief Complaint   Patient presents with     Clinic Care Coordination - Follow-up     RN CC outreach for status update. Patient state that he attended visit with surgical provider.   Patient is scheduled for Rt leg angiogram and will review interventions to determine option for treatment.      Patient is scheduled with PCP for pre-op with PCP.      Goals:   Goals        General    Healthy Eating (pt-stated)     Notes - Note edited  5/15/2018  1:18 PM by Eusebia Ma RN    Goal Statement: I will decrease my cholesterol values by eating healthier      Measure of Success: Decrease to <70  Supportive Steps to Achieve: Patient is implementing positive lifestyle changes including increase fresh veggies, decrease fatty foods  Barriers: previous habits and lifestyle choices  Strengths: Patient is motivated   Date to Achieve By: review monthly, goal date 90 day follow up visit with PCP               Patient/Caregiver understanding: Patient verbalized understanding, engaged in AIDET communication behavior during encounter.         Future Appointments              In 1 month Traci Moseley MD [facility]    In 1 month Rd Lee MD [facility] Sched[facility], [facility]    In 1 month [facility] [place] [address] [facility]          Plan:   Patient will attend scheduled  evaluation and visit with specialist and follow up with PCP as recommended.   RN CC will outreach within 1-2 weeks post procedure to assess goals and care coordination needs.     Eusebia Ma RN  Clinic Care Coordinator  Mobile: [phone]  [email or web address]

## 2018-07-30 ENCOUNTER — OFFICE VISIT (OUTPATIENT)
Dept: FAMILY MEDICINE | Facility: CLINIC | Age: 58
End: 2018-07-30

## 2018-07-30 VITALS
HEIGHT: 69 IN | BODY MASS INDEX: 31.58 KG/M2 | TEMPERATURE: 98.4 F | OXYGEN SATURATION: 93 % | DIASTOLIC BLOOD PRESSURE: 78 MMHG | SYSTOLIC BLOOD PRESSURE: 150 MMHG | WEIGHT: 213.2 LBS | HEART RATE: 84 BPM | RESPIRATION RATE: 16 BRPM

## 2018-07-30 DIAGNOSIS — Z01.818 PRE-OP EXAM: ICD-10-CM

## 2018-07-30 DIAGNOSIS — I70.223: Primary | ICD-10-CM

## 2018-07-30 LAB — HEMOGLOBIN: 14.3 G/DL (ref 13.3–17.7)

## 2018-07-30 PROCEDURE — 36415 COLL VENOUS BLD VENIPUNCTURE: CPT | Performed by: FAMILY MEDICINE

## 2018-07-30 PROCEDURE — 99214 OFFICE O/P EST MOD 30 MIN: CPT | Performed by: FAMILY MEDICINE

## 2018-07-30 PROCEDURE — 85018 HEMOGLOBIN: CPT | Performed by: FAMILY MEDICINE

## 2018-07-30 NOTE — MR AVS SNAPSHOT
After Visit Summary   7/30/2018    Rashaun Molina    MRN: [id number]           Patient Information     Date Of Birth          1960        Visit Information        Provider Department      7/30/2018 8:00 AM Traci Moseley MD [facility]        Today's Diagnoses     Atherosclerosis of native artery of both legs with rest pain (H)    -  1    Pre-op exam          Care Instructions    Take copy to surgery site          Follow-ups after your visit        Follow-up notes from your care team     Return if symptoms worsen or fail to improve.      Your next 10 appointments already scheduled     Aug 06, 2018  8:00 AM CDT   (Arrive by 6:30 AM)   IR LOWER EXTREMITY ANGIOGRAM RIGHT with SHIR2   [facility] Interventional Radiology ([facility])    [address] [place] [id number] [phone]           1. Your doctor will need to do a history and physical within 30 days before this procedure. 2. Your doctor will determine whether you need a 12 lead EKG, as well as which medications should not be taken the morning of the exam. 3. Laboratory tests are to be obtained by your doctor prior to the exam (creatinine, Hgb/Hct, platelet count, and INR) 4. If you have allergies to x-ray contrast or iodine, contact your doctor or a Radiology nurse prior to the exam day for instructions. 5. Someone will need to drive you to and from the hospital. 6. Bring a list of all drugs you are taking; include supplements and over-the-counter medications. 7. Wear comfortable clothes and leave your valuables at home. 8. If you are or may be pregnant, contact your doctor or a Radiology nurse prior to the day of the exam. 9.  If you have diabetes, check with your doctor or a Radiology nurse to see if your insulin needs to be adjusted for the exam. 10. If you are taking Coumadin (to thin you blood) please contact your doctor or a Radiology nurse at least 5 days before the exam for  special instructions. 11. You should not have received barium (x-ray contrast) within 48 hours of this exam. 12. The day before your exam you may eat your regular diet and are encouraged to drink at least 2 quarts of clear liquids. Drink no alcoholic beverages for 24 hours prior to the exam. 13. If you have a colostomy you will need to irrigate it with tap water at 8PM the evening before and again at 6AM the morning of the exam. 14. Do not smoke for 24 hours prior to the procedure. 15. Do not eat any solid food or milk products for 6 hours prior to the exam. You may drink clear liquids until 2 hours prior to the exam. Clear liquids include the following: water, Jell-O, clear broth, apple juice or any non-carbonated drink that you can see through (no pop!) 16. The morning of the exam you may brush your teeth and take medications as directed with a sip of water. 17. Tell the Radiology nurse if you have any allergies. 18. You will be asked to empty your bladder before the exam begins. 19. Following the exam you will need to remain on complete bedrest for 4-6 hours. The nurse will monitor your vital signs, puncture site, and the pulses and temperature of the arm or leg that was punctured. 20. When discharged, you cannot drive until morning, and an adult must be with you until then. You should stay in the [facility] area overnight.            Aug 06, 2018  8:00 AM CDT   [facility] IR Suite with Rd Lee MD   [facility] Surgery Scheduling ([facility])    [facility] ([facility])   [phone]              Who to contact     If you have questions or need follow up information about today's clinic visit or your schedule please contact [facility] directly at [phone].  Normal or non-critical lab and imaging results will be communicated to you by MyChart, letter or phone within 4 business days after the clinic  "has received the results. If you do not hear from us within 7 days, please contact the clinic through [employer] or phone. If you have a critical or abnormal lab result, we will notify you by phone as soon as possible.  Submit refill requests through [employer] or call your pharmacy and they will forward the refill request to us. Please allow 3 business days for your refill to be completed.          Additional Information About Your Visit        Care EveryWhere ID     This is your Care EveryWhere ID. This could be used by other organizations to access your [facility] medical records  [id number]        Your Vitals Were     Pulse Temperature Respirations Height Pulse Oximetry BMI (Body Mass Index)    84 98.4  F (36.9  C) (Oral) 16 1.74 m (5' 8.5\") 93% 31.95 kg/m2       Blood Pressure from Last 3 Encounters:   07/30/18 150/78   05/24/18 150/88   05/14/18 (!) 168/91    Weight from Last 3 Encounters:   07/30/18 96.7 kg (213 lb 3.2 oz)   05/24/18 95.3 kg (210 lb)   05/14/18 95.7 kg (210 lb 14.4 oz)              We Performed the Following     CL AFF HEMOGLOBIN (BFP)     VENOUS COLLECTION        Primary Care Provider Office Phone # Fax #    Yemi Nava [phone] [address] [address]        Goals        General    Healthy Eating (pt-stated)     Notes - Note edited  5/15/2018  1:18 PM by Eusebia Ma, RN    Goal Statement: I will decrease my cholesterol values by eating healthier      Measure of Success: Decrease to <70  Supportive Steps to Achieve: Patient is implementing positive lifestyle changes including increase fresh veggies, decrease fatty foods  Barriers: previous habits and lifestyle choices  Strengths: Patient is motivated   Date to Achieve By: review monthly, goal date 90 day follow up visit with PCP         Equal Access to Services     SAURABH PERDUE AH: Francesca Diaz, waaxda luqadaha, qaybta kaalmada adeольга, marina greco " sharmila araujo ah. So [facility] [place] [phone].    ATENCIÓN: Si habla tasha, tiene a mobley disposición servicios gratuitos de asistencia lingüística. Virginie al [phone].    We comply with applicable federal civil rights laws and [place] laws. We do not discriminate on the basis of race, color, national origin, age, disability, sex, sexual orientation, or gender identity.            Thank you!     Thank you for choosing [facility]  for your care. Our goal is always to provide you with excellent care. Hearing back from our patients is one way we can continue to improve our services. Please take a few minutes to complete the written survey that you may receive in the mail after your visit with us. Thank you!             Your Updated Medication List - Protect others around you: Learn how to safely use, store and throw away your medicines at www.disposemymeds.org.          This list is accurate as of 7/30/18  8:30 AM.  Always use your most recent med list.                   Brand Name Dispense Instructions for use Diagnosis    ASPIRIN EC PO      Take 81 mg by mouth daily.        lisinopril 40 MG tablet    PRINIVIL/ZESTRIL    90 tablet    Take 1 tablet (40 mg) by mouth daily    Essential hypertension, benign       rosuvastatin 40 MG tablet    CRESTOR    90 tablet    Take 1 tablet (40 mg) by mouth At Bedtime    Hyperlipidemia LDL goal <70

## 2018-07-30 NOTE — PROGRESS NOTES
[facility]  [address]  [address]  [facility] [address]  [phone]  Dept: [phone]    PRE-OP EVALUATION:  Today's date: 2018    Rashaun Molina (: 1960) presents for pre-operative evaluation assessment as requested by Dr. Lee.  He requires evaluation and anesthesia risk assessment prior to undergoing surgery/procedure for treatment of R angioplasty/angiogram .    Proposed Surgery/ Procedure: R thigh angioplasty/angiogram  Date of Surgery/ Procedure: 18  Time of Surgery/ Procedure: 8am  Hospital/Surgical Facility: [facility]  Primary Physician: Yemi Nava  Type of Anesthesia Anticipated: to be determined    Patient has a Health Care Directive or Living Will:  NO    1. NO - Do you have a history of heart attack, stroke, stent, bypass or surgery on an artery in the head, neck, heart or legs?  2. NO - Do you ever have any pain or discomfort in your chest?  3. NO - Do you have a history of  Heart Failure?  4. NO - Are you troubled by shortness of breath when: walking on the level, up a slight hill or at night?  5. NO - Do you currently have a cold, bronchitis or other respiratory infection?  6. NO - Do you have a cough, shortness of breath or wheezing?  7. NO - Do you sometimes get pains in the calves of your legs when you walk?  8. NO - Do you or anyone in your family have previous history of blood clots?  9. NO - Do you or does anyone in your family have a serious bleeding problem such as prolonged bleeding following surgeries or cuts?  10. NO - Have you ever had problems with anemia or been told to take iron pills?  11. NO - Have you had any abnormal blood loss such as black, tarry or bloody stools, or abnormal vaginal bleeding?  12. NO - Have you ever had a blood transfusion?  13. NO - Have you or any of your relatives ever had problems with anesthesia?  14. NO - Do you have sleep apnea, excessive snoring or daytime drowsiness?  15.  NO - Do you have any prosthetic heart valves?  16. YES - DO YOU HAVE PROSTHETIC JOINTS? Right hip replacement  17. NO - Is there any chance that you may be pregnant?      HPI:     HPI related to upcoming procedure: right leg angioplasty      See problem list for active medical problems.  Problems all longstanding and stable, except as noted/documented.  See ROS for pertinent symptoms related to these conditions.                                                                                                                                                          .    MEDICAL HISTORY:     Patient Active Problem List    Diagnosis Date Noted     PVD (peripheral vascular disease) (H) 04/30/2018     Priority: Medium     Chronic bronchitis, unspecified chronic bronchitis type (H) 10/30/2017     Priority: Medium     Obesity due to excess calories, unspecified obesity severity 07/06/2016     Priority: Medium     S/P total hip arthroplasty 10/07/2015     Priority: Medium     Colovesical fistula 04/08/2013     Priority: Medium     ACP (advance care planning) 07/27/2011     Priority: Medium     Advance Care Planning 7/6/2016: ACP Review of Chart / Resources Provided:  Reviewed chart for advance care plan.  Rashaun Molina has no plan or code status on file. Discussed available resources and provided with information. Confirmed code status reflects current choices pending further ACP discussions.  Confirmed/documented legally designated decision makers.  Added by Masha Burks      Advance Care Planning 1/8/2018: ACP Review of Chart / Resources Provided:  Reviewed chart for advance care plan.  Rashaun Molina has been provided information and resources to begin or update their advance care plan.  Added by Suzie Petit                     BCC (basal cell carcinoma) 04/13/2011     Priority: Medium     Mohs 2011       History of colonic polyps 06/28/2005     Priority: Medium     Problem list name updated by automated process.  Provider to review       Pure hypercholesterolemia      Priority: Medium     Essential hypertension, benign      Priority: Medium     Health Care Home 03/18/2013     Priority: Low     State Tier Level:  Tier 2  Status:  na  Care Coordinator:      See Letters for [facility] Care Plan            Past Medical History:   Diagnosis Date     Arthritis      Chronic airway obstruction, not elsewhere classified 3/8/2004    pt says that he does not have COPD     Cough syncope 11/21/2012     Essential hypertension, benign      Fistula     colon/bladder     Fracture of right proximal fibula 7/30/2014     Orthostatic hypotension 11/24/2014     Other chronic pain     right hip pain     Personal history of colonic polyps 6/28/2005     Pure hypercholesterolemia      Tobacco use disorder 3/8/2004     Vasovagal syncopes 10/25/2014     Viral warts, unspecified     genital     Past Surgical History:   Procedure Laterality Date     ARTHROPLASTY HIP Right 10/7/2015    Procedure: ARTHROPLASTY HIP;  Surgeon: Neil Davis MD;  Location:  OR     COLONOSCOPY  9/05, 4/10/13    Per Hospital encounter dated 4/18/13     COLONOSCOPY N/A 4/9/2018    Procedure: COMBINED COLONOSCOPY, SINGLE OR MULTIPLE BIOPSY/POLYPECTOMY BY BIOPSY;;  Surgeon: Tramaine Zuniga MD;  Location: [facility]     COMBINED CYSTOSCOPY, INSERT CATHETER URETER  4/11/2013    Procedure: COMBINED CYSTOSCOPY, INSERT CATHETER URETER;;  Surgeon: Kashif Parks MD;  Location:  OR      COLONOSCOPY THRU STOMA W BIOPSY/CAUTERY TUMOR/POLYP/LESION  1998    michael, tubular adenoma, next colonoscopy 2001      COLONOSCOPY THRU STOMA, DIAGNOSTIC  04/16/01    negative, ligate two internal hemmorhoids  Dr rodriguez-repeat 2008      LARYNGOSCOPY DIRECT W VOCAL CORD INJECTION      vocal cord polyps     LAPAROSCOPIC ASSISTED COLECTOMY  4/11/2013    Procedure: LAPAROSCOPIC ASSISTED COLECTOMY;  LOW ANTERIOR RESECTION ;  Surgeon: Tramaine Zuniga MD;  Location:  OR     Current Outpatient  "Prescriptions   Medication Sig Dispense Refill     ASPIRIN EC PO Take 81 mg by mouth daily.       lisinopril (PRINIVIL/ZESTRIL) 40 MG tablet Take 1 tablet (40 mg) by mouth daily 90 tablet 1     rosuvastatin (CRESTOR) 40 MG tablet Take 1 tablet (40 mg) by mouth At Bedtime 90 tablet 3     OTC products: None, except as noted above    Allergies   Allergen Reactions     Spiriva Handihaler Blisters     Hctz [Hydrochlorothiazide] Other (See Comments)     Low sodium      Latex Allergy: NO    Social History   Substance Use Topics     Smoking status: Former Smoker     Packs/day: 1.50     Years: 40.00     Quit date: 4/19/2013     Smokeless tobacco: Never Used     Alcohol use 8.4 oz/week     14 Cans of beer per week      Comment: 2 beers daily     History   Drug Use No     Comment: in 1970's used marijauna and cocaine       REVIEW OF SYSTEMS:   CONSTITUTIONAL: NEGATIVE for fever, chills, change in weight  ENT/MOUTH: NEGATIVE for ear, mouth and throat problems  RESP: NEGATIVE for significant cough or SOB  CV: NEGATIVE for chest pain, palpitations or peripheral edema    EXAM:   /78 (BP Location: Right arm, Patient Position: Sitting, Cuff Size: Adult Large)  Pulse 84  Temp 98.4  F (36.9  C) (Oral)  Ht 1.74 m (5' 8.5\")  Wt 96.7 kg (213 lb 3.2 oz)  SpO2 93%  BMI 31.95 kg/m2  GENERAL APPEARANCE: healthy, alert and no distress  HENT: ear canals and TM's normal and nose and mouth without ulcers or lesions  RESP: lungs clear to auscultation - no rales, rhonchi or wheezes  CV: regular rate and rhythm, normal S1 S2, no S3 or S4 and no murmur, click or rub   ABDOMEN: soft, nontender, no HSM or masses and bowel sounds normal  NEURO: Normal strength and tone, sensory exam grossly normal, mentation intact and speech normal    DIAGNOSTICS:   HGB:14.3 gm/dl    Recent Labs   Lab Test  05/14/18   0713  04/30/18   1005  04/30/18   0935  11/13/17   1152   07/06/16   0808   04/11/13   1210   HGB   --    --   14.0   --    --   14.4   < >  " 15.0   PLT   --    --   202   --    --   254   < >  338   INR  1.07   --    --    --    --    --    --   1.06   NA   --   132*   --   136   < >   --    < >  132*   POTASSIUM   --   5.1   --   4.2   < >   --    < >  3.7   CR   --   0.80   --   0.86   < >   --    < >   --    A1C  5.6   --    --    --    --    --    --    --     < > = values in this interval not displayed.        IMPRESSION:   Diagnosis/reason for consult: (I70.223) Atherosclerosis of native artery of both legs with rest pain (H)  (primary encounter diagnosis)    (Z01.818) Pre-op exam        The proposed surgical procedure is considered INTERMEDIATE risk.    REVISED CARDIAC RISK INDEX  The patient has the following serious cardiovascular risks for perioperative complications such as (MI, PE, VFib and 3  AV Block):  No serious cardiac risks  INTERPRETATION: 1 risks: Class II (low risk - 0.9% complication rate)    The patient has the following additional risks for perioperative complications:  No identified additional risks        RECOMMENDATIONS:     --Consult hospital rounder / IM to assist post-op medical management    --Patient is to take all scheduled medications on the day of surgery EXCEPT for modifications listed below.    APPROVAL GIVEN to proceed with proposed procedure, without further diagnostic evaluation       Signed Electronically by: Traci Moseley MD    Copy of this evaluation report is provided to requesting physician.

## 2018-08-06 ENCOUNTER — HOSPITAL ENCOUNTER (OUTPATIENT)
Facility: CLINIC | Age: 58
Discharge: HOME | End: 2018-08-06
Attending: SURGERY | Admitting: SURGERY
Payer: COMMERCIAL

## 2018-08-06 ENCOUNTER — OFFICE VISIT (OUTPATIENT)
Dept: SURGERY | Facility: PHYSICIAN GROUP | Age: 58
End: 2018-08-06
Payer: COMMERCIAL

## 2018-08-06 ENCOUNTER — APPOINTMENT (OUTPATIENT)
Dept: INTERVENTIONAL RADIOLOGY/VASCULAR | Facility: CLINIC | Age: 58
End: 2018-08-06
Attending: SURGERY
Payer: COMMERCIAL

## 2018-08-06 VITALS
WEIGHT: 214 LBS | HEART RATE: 84 BPM | DIASTOLIC BLOOD PRESSURE: 84 MMHG | RESPIRATION RATE: 18 BRPM | BODY MASS INDEX: 31.7 KG/M2 | SYSTOLIC BLOOD PRESSURE: 157 MMHG | TEMPERATURE: 98.5 F | OXYGEN SATURATION: 95 % | HEIGHT: 69 IN

## 2018-08-06 DIAGNOSIS — I70.211 ATHEROSCLEROSIS OF NATIVE ARTERY OF RIGHT LOWER EXTREMITY WITH INTERMITTENT CLAUDICATION (H): ICD-10-CM

## 2018-08-06 LAB
APTT PPP: 31 SEC (ref 22–37)
CHOLEST SERPL-MCNC: 152 MG/DL
CREAT SERPL-MCNC: 0.69 MG/DL (ref 0.66–1.25)
ERYTHROCYTE [DISTWIDTH] IN BLOOD BY AUTOMATED COUNT: 13.5 % (ref 10–15)
GFR SERPL CREATININE-BSD FRML MDRD: >90 ML/MIN/1.7M2
HBA1C MFR BLD: 5.4 % (ref 0–5.6)
HCT VFR BLD AUTO: 39 % (ref 40–53)
HDLC SERPL-MCNC: 63 MG/DL
HGB BLD-MCNC: 13.2 G/DL (ref 13.3–17.7)
INR PPP: 1.04 (ref 0.86–1.14)
KCT BLD-ACNC: 196 SEC (ref 75–150)
LDLC SERPL CALC-MCNC: 62 MG/DL
MCH RBC QN AUTO: 32.6 PG (ref 26.5–33)
MCHC RBC AUTO-ENTMCNC: 33.8 G/DL (ref 31.5–36.5)
MCV RBC AUTO: 96 FL (ref 78–100)
NONHDLC SERPL-MCNC: 89 MG/DL
PLATELET # BLD AUTO: 187 10E9/L (ref 150–450)
RBC # BLD AUTO: 4.05 10E12/L (ref 4.4–5.9)
TRIGL SERPL-MCNC: 136 MG/DL
WBC # BLD AUTO: 6 10E9/L (ref 4–11)

## 2018-08-06 PROCEDURE — 27210906 ZZH KIT CR8

## 2018-08-06 PROCEDURE — 25000128 H RX IP 250 OP 636

## 2018-08-06 PROCEDURE — 25000125 ZZHC RX 250

## 2018-08-06 PROCEDURE — 27210894 ZZH CATH CR6

## 2018-08-06 PROCEDURE — 40000853 ZZH STATISTIC ANGIOGRAM, STENT, VERTEBRO PLASTY

## 2018-08-06 PROCEDURE — C1769 GUIDE WIRE: HCPCS

## 2018-08-06 PROCEDURE — 36415 COLL VENOUS BLD VENIPUNCTURE: CPT | Performed by: SURGERY

## 2018-08-06 PROCEDURE — 85027 COMPLETE CBC AUTOMATED: CPT | Performed by: SURGERY

## 2018-08-06 PROCEDURE — 76937 US GUIDE VASCULAR ACCESS: CPT | Mod: 26 | Performed by: SURGERY

## 2018-08-06 PROCEDURE — 25000128 H RX IP 250 OP 636: Performed by: SURGERY

## 2018-08-06 PROCEDURE — 99152 MOD SED SAME PHYS/QHP 5/>YRS: CPT | Performed by: SURGERY

## 2018-08-06 PROCEDURE — 37224 ZZHC REVASCULARIZE FEM/POP ARTERY, ANGIOPLASTY: CPT | Mod: RT | Performed by: SURGERY

## 2018-08-06 PROCEDURE — 85610 PROTHROMBIN TIME: CPT | Performed by: SURGERY

## 2018-08-06 PROCEDURE — 27210808 ZZH SHEATH CR7

## 2018-08-06 PROCEDURE — 85347 COAGULATION TIME ACTIVATED: CPT

## 2018-08-06 PROCEDURE — 27210886 ZZH ACCESSORY CR5

## 2018-08-06 PROCEDURE — 83036 HEMOGLOBIN GLYCOSYLATED A1C: CPT | Performed by: SURGERY

## 2018-08-06 PROCEDURE — 25000132 ZZH RX MED GY IP 250 OP 250 PS 637: Performed by: SURGERY

## 2018-08-06 PROCEDURE — 75710 ARTERY X-RAYS ARM/LEG: CPT | Mod: 26 | Performed by: SURGERY

## 2018-08-06 PROCEDURE — 85730 THROMBOPLASTIN TIME PARTIAL: CPT | Performed by: SURGERY

## 2018-08-06 PROCEDURE — 75710 ARTERY X-RAYS ARM/LEG: CPT | Mod: RT

## 2018-08-06 PROCEDURE — 99153 MOD SED SAME PHYS/QHP EA: CPT | Performed by: SURGERY

## 2018-08-06 PROCEDURE — 27210804 ZZH SHEATH CR3

## 2018-08-06 PROCEDURE — 80061 LIPID PANEL: CPT | Performed by: SURGERY

## 2018-08-06 PROCEDURE — 27210742 ZZH CATH CR1

## 2018-08-06 PROCEDURE — 82565 ASSAY OF CREATININE: CPT | Performed by: SURGERY

## 2018-08-06 PROCEDURE — 27210845 ZZH DEVICE INFLATION CR5

## 2018-08-06 RX ORDER — LIDOCAINE 40 MG/G
CREAM TOPICAL
Status: DISCONTINUED | OUTPATIENT
Start: 2018-08-06 | End: 2018-08-06 | Stop reason: HOSPADM

## 2018-08-06 RX ORDER — ACETAMINOPHEN 500 MG
500 TABLET ORAL EVERY 6 HOURS PRN
Status: DISCONTINUED | OUTPATIENT
Start: 2018-08-06 | End: 2018-08-06 | Stop reason: HOSPADM

## 2018-08-06 RX ORDER — FLUMAZENIL 0.1 MG/ML
0.2 INJECTION, SOLUTION INTRAVENOUS
Status: DISCONTINUED | OUTPATIENT
Start: 2018-08-06 | End: 2018-08-06 | Stop reason: HOSPADM

## 2018-08-06 RX ORDER — HEPARIN SODIUM 1000 [USP'U]/ML
INJECTION, SOLUTION INTRAVENOUS; SUBCUTANEOUS
Status: COMPLETED
Start: 2018-08-06 | End: 2018-08-06

## 2018-08-06 RX ORDER — LIDOCAINE HYDROCHLORIDE 10 MG/ML
INJECTION, SOLUTION INFILTRATION; PERINEURAL
Status: DISCONTINUED
Start: 2018-08-06 | End: 2018-08-06 | Stop reason: HOSPADM

## 2018-08-06 RX ORDER — NALOXONE HYDROCHLORIDE 0.4 MG/ML
.1-.4 INJECTION, SOLUTION INTRAMUSCULAR; INTRAVENOUS; SUBCUTANEOUS
Status: DISCONTINUED | OUTPATIENT
Start: 2018-08-06 | End: 2018-08-06 | Stop reason: HOSPADM

## 2018-08-06 RX ORDER — FENTANYL CITRATE 50 UG/ML
INJECTION, SOLUTION INTRAMUSCULAR; INTRAVENOUS
Status: COMPLETED
Start: 2018-08-06 | End: 2018-08-06

## 2018-08-06 RX ORDER — IODIXANOL 320 MG/ML
50 INJECTION, SOLUTION INTRAVASCULAR ONCE
Status: COMPLETED | OUTPATIENT
Start: 2018-08-06 | End: 2018-08-06

## 2018-08-06 RX ORDER — HEPARIN SODIUM 1000 [USP'U]/ML
5000 INJECTION, SOLUTION INTRAVENOUS; SUBCUTANEOUS ONCE
Status: COMPLETED | OUTPATIENT
Start: 2018-08-06 | End: 2018-08-06

## 2018-08-06 RX ORDER — SODIUM CHLORIDE 9 MG/ML
INJECTION, SOLUTION INTRAVENOUS CONTINUOUS
Status: DISCONTINUED | OUTPATIENT
Start: 2018-08-06 | End: 2018-08-06 | Stop reason: HOSPADM

## 2018-08-06 RX ORDER — LIDOCAINE HYDROCHLORIDE 10 MG/ML
1-30 INJECTION, SOLUTION EPIDURAL; INFILTRATION; INTRACAUDAL; PERINEURAL
Status: COMPLETED | OUTPATIENT
Start: 2018-08-06 | End: 2018-08-06

## 2018-08-06 RX ORDER — FENTANYL CITRATE 50 UG/ML
INJECTION, SOLUTION INTRAMUSCULAR; INTRAVENOUS
Status: DISCONTINUED
Start: 2018-08-06 | End: 2018-08-06 | Stop reason: HOSPADM

## 2018-08-06 RX ORDER — FENTANYL CITRATE 50 UG/ML
25-50 INJECTION, SOLUTION INTRAMUSCULAR; INTRAVENOUS EVERY 5 MIN PRN
Status: DISCONTINUED | OUTPATIENT
Start: 2018-08-06 | End: 2018-08-06 | Stop reason: HOSPADM

## 2018-08-06 RX ORDER — CLOPIDOGREL BISULFATE 75 MG/1
75 TABLET ORAL DAILY
Status: DISCONTINUED | OUTPATIENT
Start: 2018-08-06 | End: 2018-08-06 | Stop reason: HOSPADM

## 2018-08-06 RX ORDER — CLOPIDOGREL BISULFATE 75 MG/1
75 TABLET ORAL DAILY
Qty: 90 TABLET | Refills: 1 | Status: ON HOLD | OUTPATIENT
Start: 2018-08-06 | End: 2019-01-02

## 2018-08-06 RX ADMIN — MIDAZOLAM HYDROCHLORIDE 1 MG: 1 INJECTION, SOLUTION INTRAMUSCULAR; INTRAVENOUS at 08:19

## 2018-08-06 RX ADMIN — LIDOCAINE HYDROCHLORIDE 17 ML: 10 INJECTION, SOLUTION INFILTRATION; PERINEURAL at 08:33

## 2018-08-06 RX ADMIN — LIDOCAINE HYDROCHLORIDE 17 ML: 10 INJECTION, SOLUTION EPIDURAL; INFILTRATION; INTRACAUDAL; PERINEURAL at 08:33

## 2018-08-06 RX ADMIN — IODIXANOL 36 ML: 320 INJECTION, SOLUTION INTRAVASCULAR at 09:44

## 2018-08-06 RX ADMIN — MIDAZOLAM HYDROCHLORIDE 0.5 MG: 1 INJECTION, SOLUTION INTRAMUSCULAR; INTRAVENOUS at 09:28

## 2018-08-06 RX ADMIN — CEFAZOLIN SODIUM 2 G: 2 SOLUTION INTRAVENOUS at 08:13

## 2018-08-06 RX ADMIN — MIDAZOLAM HYDROCHLORIDE 0.5 MG: 1 INJECTION, SOLUTION INTRAMUSCULAR; INTRAVENOUS at 08:41

## 2018-08-06 RX ADMIN — HEPARIN SODIUM 5000 UNITS: 1000 INJECTION, SOLUTION INTRAVENOUS; SUBCUTANEOUS at 09:03

## 2018-08-06 RX ADMIN — MIDAZOLAM HYDROCHLORIDE 0.5 MG: 1 INJECTION, SOLUTION INTRAMUSCULAR; INTRAVENOUS at 09:05

## 2018-08-06 RX ADMIN — FENTANYL CITRATE 25 MCG: 50 INJECTION INTRAMUSCULAR; INTRAVENOUS at 09:28

## 2018-08-06 RX ADMIN — SODIUM CHLORIDE: 9 INJECTION, SOLUTION INTRAVENOUS at 07:02

## 2018-08-06 RX ADMIN — FENTANYL CITRATE 50 MCG: 50 INJECTION INTRAMUSCULAR; INTRAVENOUS at 08:20

## 2018-08-06 RX ADMIN — FENTANYL CITRATE 25 MCG: 50 INJECTION INTRAMUSCULAR; INTRAVENOUS at 08:42

## 2018-08-06 RX ADMIN — FENTANYL CITRATE 25 MCG: 50 INJECTION INTRAMUSCULAR; INTRAVENOUS at 09:05

## 2018-08-06 RX ADMIN — CLOPIDOGREL BISULFATE 75 MG: 75 TABLET, FILM COATED ORAL at 12:21

## 2018-08-06 RX ADMIN — HEPARIN SODIUM 10000 UNITS: 10000 INJECTION, SOLUTION INTRAVENOUS; SUBCUTANEOUS at 08:26

## 2018-08-06 NOTE — IP AVS SNAPSHOT
[facility] [place] [address]    [place] [address]    Phone:  [phone]                                       After Visit Summary   8/6/2018    Rashaun Molina    MRN: [id number]           After Visit Summary Signature Page     I have received my discharge instructions, and my questions have been answered. I have discussed any challenges I see with this plan with the nurse or doctor.    ..........................................................................................................................................  Patient/Patient Representative Signature      ..........................................................................................................................................  Patient Representative Print Name and Relationship to Patient    ..................................................               ................................................  Date                                            Time    ..........................................................................................................................................  Reviewed by Signature/Title    ...................................................              ..............................................  Date                                                            Time

## 2018-08-06 NOTE — PROGRESS NOTES
1020: Pt returned from IR. Gauze drsg CDI to Left* groin puncture site. No oozing or hematoma noted. Area soft & flat. Pt denies pain. Pt instructed on activity restrictions while on bedrest. Verbal understanding received from pt.    1130: Pt taking diet & flds well. No complaints.    1210: Discharge teaching & instructions given to pt- verbal understanding received. All questions & concerns addressed.

## 2018-08-06 NOTE — IR NOTE
Interventional Radiology Intra-procedural Nursing Note    Patient Name: Rashaun Molina  Medical Record Number: [id number]  Today's Date: August 6, 2018    Start Time: 0823  End of procedure time: 0940  Procedure: right lower extremity angiogram and angioplasty  Report given to: care suites rn  Time pt departs:  1020    Other Notes: pt tolerated procedure well. Left femoral sheath removed at 0953. Manual pressure applied x20 minutes. Site c/d/i with gauze and tegaderm. +csm to right leg    Jackie Garcia RN

## 2018-08-06 NOTE — PROGRESS NOTES
Care Suites Discharge Summary    Discharge Criteria:   Discharge Criteria met per MD orders: Yes.   Vital signs stable.     Pt demonstrates ability to ambulate safely: Yes.  (See discharge questionnaire for additional information)    Discharge instructions & education:   Discharge instructions reviewed with patient. Patient verbalizes understanding.   Additional patient education provided:  Lower extremity angiogram    Medications:   Patient will be discharging on new medications- Yes, plavix. Patient verbalizes reason for use, start date, and side effects Yes.    Items returned to patient:   Home and hospital acquired medications returned to patient Yes   Listed belongings gathered and returned to patient: Yes    Patient discharged to home with Davide Kebede

## 2018-08-06 NOTE — PROCEDURES
Pre op:  Right leg claudication  Post op:  Same  Proc:  Selective right leg Agram; PTA of right SFA with 6mm DCB  Jesus / Roopa  EBL 15 ml  No complications  See dictation.

## 2018-08-06 NOTE — DISCHARGE INSTRUCTIONS
Peripheral Angiogram Discharge Instructions - Femoral     After you go home:      Have an adult stay with you until tomorrow.    Drink extra fluids for 2 days.    You may resume your normal diet.    No smoking       For 24 hours - due to the sedation you received:    Relax and take it easy.    Do NOT make any important or legal decisions.    Do NOT drive or operate machines at home or at work.    Do NOT drink alcohol.    Care of Groin Puncture Site:      For the first 24 hrs - check the puncture site every 1-2 hours while awake.    For 2 days, when you cough, sneeze, laugh or move your bowels, hold your hand over the puncture site and press firmly.    Remove the bandaid after 24 hours. If there is minor oozing, apply another bandaid and remove it after 12 hours.    It is normal to have a small bruise or pea size lump at the site.    You may shower tomorrow.  Do NOT take a bath, or use a hot tub or pool for at least 3 days. Do NOT scrub the site. Do not use lotion or powder near the puncture site.     Activity:            For 2 days:    No stooping or squatting    Do NOT do any heavy activity such as exercise, lifting, or straining.     No housework, yard work or any activity that make you sweat    Do NOT lift more than 10 pounds    Bleeding:      If you start bleeding from the site in your groin, lie down flat and press firmly on/above the site for 10 minutes.     Once bleeding stops, lay flat for 2 hours.     [facility] as soon as you can.       Call 911 right away if you have heavy bleeding or bleeding that does not stop.      Medicines:      If you are on Metformin (Glucophage) and your GFR (kidney function level) is >30, you may continue taking your Metformin.    If you are on Metformin (Glucophage) and your GFR (kidney function level) is <30, do not restart the Metformin for 48 hours after your procedure. Check with your primary care giver before restarting the Metformin to see if you need  to have blood drawn to recheck your kidney function (GFR).    If you are taking an antiplatelet medication such as Plavix, do not stop taking it until you talk to your provider.       Take your medications, including blood thinners, unless your provider tells you not to.  If you take Coumadin (Warfarin), have your INR checked by your provider in  3-5 days. Call your clinic to schedule this.    If you have stopped any medicines, check with your provider about when to restart them.        Follow Up Appointments:      Follow [facility] with [facility] as directed.    Call the clinic if:      You have increased pain or a large or growing hard lump around the site.    The site is red, swollen, hot or tender.    Blood or fluid is draining from the site.    You have chills or a fever greater than 101 F (38 C).    Your leg feels numb, cool or changes color.    You have hives, a rash or unusual itching.    New pain in the back or belly that you cannot control with Tylenol.    Any questions or concerns.    Other Instructions:      If you received a stent - carry your stent card with you at all times.      If you have questions or your original symptoms do not improve, call:         [facility] @ [phone]

## 2018-08-06 NOTE — IP AVS SNAPSHOT
MRN:[id number]                      After Visit Summary   8/6/2018    Rashaun Molina    MRN: [id number]           Visit Information        Department      8/6/2018  6:26 AM [facility]          Review of your medicines      UNREVIEWED medicines. Ask your doctor about these medicines        Dose / Directions    rosuvastatin 40 MG tablet   Commonly known as:  CRESTOR   Used for:  Hyperlipidemia LDL goal <70        Dose:  40 mg   Take 1 tablet (40 mg) by mouth At Bedtime   Quantity:  90 tablet   Refills:  3         START taking        Dose / Directions    clopidogrel 75 MG tablet   Commonly known as:  PLAVIX   Used for:  Atherosclerosis of native artery of right lower extremity with intermittent claudication (H)        Dose:  75 mg   Take 1 tablet (75 mg) by mouth daily Start taking medication the day after the procedure   Quantity:  90 tablet   Refills:  1         CONTINUE these medicines which have NOT CHANGED        Dose / Directions    ASPIRIN EC PO        Dose:  81 mg   Take 81 mg by mouth daily.   Refills:  0       lisinopril 40 MG tablet   Commonly known as:  PRINIVIL/ZESTRIL   Used for:  Essential hypertension, benign        Dose:  40 mg   Take 1 tablet (40 mg) by mouth daily   Quantity:  90 tablet   Refills:  1            Where to get your medicines      Some of these will need a paper prescription and others can be bought over the counter. Ask your nurse if you have questions.     Bring a paper prescription for each of these medications     clopidogrel 75 MG tablet               Prescriptions were sent or printed at these locations (1 Prescription)                   Other Prescriptions                Printed at Department/Unit printer (1 of 1)         clopidogrel (PLAVIX) 75 MG tablet                 Protect others around you: Learn how to safely use, store and throw away your medicines at www.disposemymeds.org.         Follow-ups after your visit         Care Instructions         Further instructions from your care team       Peripheral Angiogram Discharge Instructions - Femoral     After you go home:      Have an adult stay with you until tomorrow.    Drink extra fluids for 2 days.    You may resume your normal diet.    No smoking       For 24 hours - due to the sedation you received:    Relax and take it easy.    Do NOT make any important or legal decisions.    Do NOT drive or operate machines at home or at work.    Do NOT drink alcohol.    Care of Groin Puncture Site:      For the first 24 hrs - check the puncture site every 1-2 hours while awake.    For 2 days, when you cough, sneeze, laugh or move your bowels, hold your hand over the puncture site and press firmly.    Remove the bandaid after 24 hours. If there is minor oozing, apply another bandaid and remove it after 12 hours.    It is normal to have a small bruise or pea size lump at the site.    You may shower tomorrow.  Do NOT take a bath, or use a hot tub or pool for at least 3 days. Do NOT scrub the site. Do not use lotion or powder near the puncture site.     Activity:            For 2 days:    No stooping or squatting    Do NOT do any heavy activity such as exercise, lifting, or straining.     No housework, yard work or any activity that make you sweat    Do NOT lift more than 10 pounds    Bleeding:      If you start bleeding from the site in your groin, lie down flat and press firmly on/above the site for 10 minutes.     Once bleeding stops, lay flat for 2 hours.     Call the [facility] as soon as you can.       Call 911 right away if you have heavy bleeding or bleeding that does not stop.      Medicines:      If you are on Metformin (Glucophage) and your GFR (kidney function level) is >30, you may continue taking your Metformin.    If you are on Metformin (Glucophage) and your GFR (kidney function level) is <30, do not restart the Metformin for 48 hours after your procedure. Check with your primary care giver  "before restarting the Metformin to see if you need to have blood drawn to recheck your kidney function (GFR).    If you are taking an antiplatelet medication such as Plavix, do not stop taking it until you talk to your provider.       Take your medications, including blood thinners, unless your provider tells you not to.  If you take Coumadin (Warfarin), have your INR checked by your provider in  3-5 days. Call your clinic to schedule this.    If you have stopped any medicines, check with your provider about when to restart them.        Follow Up Appointments:      Follow up with [facility] as directed.    Call the clinic if:      You have increased pain or a large or growing hard lump around the site.    The site is red, swollen, hot or tender.    Blood or fluid is draining from the site.    You have chills or a fever greater than 101 F (38 C).    Your leg feels numb, cool or changes color.    You have hives, a rash or unusual itching.    New pain in the back or belly that you cannot control with Tylenol.    Any questions or concerns.    Other Instructions:      If you received a stent - carry your stent card with you at all times.      If you have questions or your original symptoms do not improve, call:         [facility] @ [phone]         Additional Information About Your Visit        Care EveryWhere ID     This is your Care EveryWhere ID. This could be used by other organizations to access your [facility] medical records  [id number]        Your Vitals Were     Blood Pressure Pulse Temperature Respirations Height Weight    147/81 84 98.5  F (36.9  C) (Oral) 16 1.753 m (5' 9\") 97.1 kg (214 lb)    Pulse Oximetry BMI (Body Mass Index)                94% 31.6 kg/m2           Primary Care Provider Office Phone # Fax #    Yemi Nava [phone]      Equal Access to Services     SAURABH PERDUE AH: Francesca Diaz, kendall pemberton, anna maldonado " marina babrooklyn haywood'aan ah. So [facility] [phone].    ATENCIÓN: Si habla tasha, tiene a mobley disposición servicios gratuitos de asistencia lingüística. Virginie al [phone].    We comply with applicable federal civil rights laws and [place] laws. We do not discriminate on the basis of race, color, national origin, age, disability, sex, sexual orientation, or gender identity.            Thank you!     Thank you for choosing [facility] for your care. Our goal is always to provide you with excellent care. Hearing back from our patients is one way we can continue to improve our services. Please take a few minutes to complete the written survey that you may receive in the mail after you visit with us. Thank you!             Medication List: This is a list of all your medications and when to take them. Check marks below indicate your daily home schedule. Keep this list as a reference.      Medications           Morning Afternoon Evening Bedtime As Needed    ASPIRIN EC PO   Take 81 mg by mouth daily.                                clopidogrel 75 MG tablet   Commonly known as:  PLAVIX   Take 1 tablet (75 mg) by mouth daily Start taking medication the day after the procedure                                lisinopril 40 MG tablet   Commonly known as:  PRINIVIL/ZESTRIL   Take 1 tablet (40 mg) by mouth daily                                rosuvastatin 40 MG tablet   Commonly known as:  CRESTOR   Take 1 tablet (40 mg) by mouth At Bedtime

## 2018-08-06 NOTE — CONSULTS
Vascular Medicine Chart Check    Patient presented for a right lower extremity angiogram for right leg claudication and his right SFA was angioplastied. He dose not smoke, is not diabetic, and his lipids are well controlled on Rosuvastatin. No formal Vascular Medicine consult will be undertaken today. Please refer to prior full Vascular Medicine consult on 5/14/18 by Dr. Escalante for additional information. Call if we can be of any further assistance this admission or in the future ([phone]). Thanks.     Zoe Enrique PA-C

## 2018-08-16 ENCOUNTER — TELEPHONE (OUTPATIENT)
Dept: OTHER | Facility: CLINIC | Age: 58
End: 2018-08-16

## 2018-08-16 DIAGNOSIS — I73.9 PAD (PERIPHERAL ARTERY DISEASE) (H): Primary | ICD-10-CM

## 2018-08-16 NOTE — TELEPHONE ENCOUNTER
Pt is s/p angioplasty of mid and distal right SFA on 8/6/18 with Dr. Lee.  Pt needs to be scheduled for 2 week OV with right lower extremity arterial US and ARI with exercise.  Orders entered.  Will route to scheduling to coordinate imaging and OV with Dr. Lee, due at next available.  Arianna Julio, JASMINN, RN

## 2018-08-16 NOTE — TELEPHONE ENCOUNTER
Spoke with patient he did not have anything to write with so he will call us back on Monday.    Jeannie Martin MA

## 2018-08-22 ENCOUNTER — DOCUMENTATION ONLY (OUTPATIENT)
Dept: OTHER | Facility: CLINIC | Age: 58
End: 2018-08-22

## 2018-08-22 NOTE — PROGRESS NOTES
Received Medical records [facility] from [facility].  [facility] Margo Negrete,

## 2018-09-04 NOTE — TELEPHONE ENCOUNTER
Spoke with patient he did not have anything to write it down. He will call us back tomorrow.     Jeannie Martin MA

## 2018-09-07 ENCOUNTER — PATIENT OUTREACH (OUTPATIENT)
Dept: CARE COORDINATION | Facility: CLINIC | Age: 58
End: 2018-09-07

## 2018-09-07 NOTE — PROGRESS NOTES
Clinic Care Coordination Contact    Situation: Patient chart reviewed by care coordinator.    Background: RN CC chart review note patient recent R thigh angioplasty/angiogram. Followed by vascular care coordination for lead on plan of care.     No further outreaches will be made at this time unless a new referral is made or a change in the pt's status occurs. Patient was provided with this writer's contact information and encouraged to call with any questions or concerns.      Eusebia Ma RN  Clinic Care Coordinator  Mobile: [phone]  [email or web address]

## 2018-10-16 DIAGNOSIS — I10 ESSENTIAL HYPERTENSION, BENIGN: ICD-10-CM

## 2018-10-16 RX ORDER — LISINOPRIL 40 MG/1
TABLET ORAL
Qty: 30 TABLET | Refills: 0 | COMMUNITY
Start: 2018-10-16 | End: 2018-10-22

## 2018-10-16 NOTE — TELEPHONE ENCOUNTER
Received incoming refill request for   Pending Prescriptions:                       Disp   Refills    lisinopril (PRINIVIL/ZESTRIL) 40 MG table*90 tab*             Sig: TAKE 1 TABLET BY MOUTH  DAILY    Patient was last seen in April of 2018 and was told to return in 6 months for follow up. A 30 day supply was called into the [facility] mail delivery. The pharmacist stated that he would inform the patient he is due for a visit when sending the 30 day supply to him.

## 2018-10-22 ENCOUNTER — OFFICE VISIT (OUTPATIENT)
Dept: FAMILY MEDICINE | Facility: CLINIC | Age: 58
End: 2018-10-22

## 2018-10-22 VITALS
HEIGHT: 69 IN | DIASTOLIC BLOOD PRESSURE: 80 MMHG | TEMPERATURE: 98.2 F | SYSTOLIC BLOOD PRESSURE: 136 MMHG | WEIGHT: 216.6 LBS | HEART RATE: 78 BPM | BODY MASS INDEX: 32.08 KG/M2 | OXYGEN SATURATION: 94 %

## 2018-10-22 DIAGNOSIS — Z12.5 SCREENING FOR MALIGNANT NEOPLASM OF PROSTATE: ICD-10-CM

## 2018-10-22 DIAGNOSIS — Z00.00 ROUTINE GENERAL MEDICAL EXAMINATION AT A HEALTH CARE FACILITY: Primary | ICD-10-CM

## 2018-10-22 DIAGNOSIS — E78.00 PURE HYPERCHOLESTEROLEMIA: ICD-10-CM

## 2018-10-22 DIAGNOSIS — J42 CHRONIC BRONCHITIS, UNSPECIFIED CHRONIC BRONCHITIS TYPE (H): ICD-10-CM

## 2018-10-22 DIAGNOSIS — Z23 NEED FOR VACCINATION: ICD-10-CM

## 2018-10-22 DIAGNOSIS — I10 ESSENTIAL HYPERTENSION, BENIGN: ICD-10-CM

## 2018-10-22 DIAGNOSIS — R21 RASH AND NONSPECIFIC SKIN ERUPTION: ICD-10-CM

## 2018-10-22 DIAGNOSIS — I73.9 PVD (PERIPHERAL VASCULAR DISEASE) (H): ICD-10-CM

## 2018-10-22 PROCEDURE — 80053 COMPREHEN METABOLIC PANEL: CPT | Mod: 90 | Performed by: FAMILY MEDICINE

## 2018-10-22 PROCEDURE — 84153 ASSAY OF PSA TOTAL: CPT | Mod: 90 | Performed by: FAMILY MEDICINE

## 2018-10-22 PROCEDURE — 90471 IMMUNIZATION ADMIN: CPT | Performed by: FAMILY MEDICINE

## 2018-10-22 PROCEDURE — 90686 IIV4 VACC NO PRSV 0.5 ML IM: CPT | Performed by: FAMILY MEDICINE

## 2018-10-22 PROCEDURE — 99396 PREV VISIT EST AGE 40-64: CPT | Mod: 25 | Performed by: FAMILY MEDICINE

## 2018-10-22 PROCEDURE — 36415 COLL VENOUS BLD VENIPUNCTURE: CPT | Performed by: FAMILY MEDICINE

## 2018-10-22 RX ORDER — LISINOPRIL 40 MG/1
40 TABLET ORAL DAILY
Qty: 90 TABLET | Refills: 1 | Status: SHIPPED | OUTPATIENT
Start: 2018-10-22 | End: 2019-06-03

## 2018-10-22 NOTE — NURSING NOTE
Rashaun is here for CPX fasting    Patient is here for a full physical exam.    Pre-Visit Screening :  Immunizations : flu shot today  Colon Screening : is up to date  Mammogram: NA  Asthma Action Test/Plan : NA  PHQ9 :  None  GAD7 :  None  Patient's  BMI Body mass index is 32.22 kg/(m^2).  Questioned patient about current smoking habits.  Pt. quit smoking some time ago.  OK to leave a detailed voice message regarding today's visit Yes, phone # [phone]      ETOH screening:  Questions:  1-How often do you have a drink containing alcohol?                             7 times per week(s)  2-How many drinks containing alcohol do you have on a typical day when you are         Drinking?                              2   3- How often do you have 5 or more drinks on one occasion?                               Some days

## 2018-10-22 NOTE — LETTER
October 24, 2018      Rashaun Molina  [address] [facility] [address]      Rashaun Molina     The results of your recent Blood Sugar, Kidney Tests and PSA were within normal limits. The liver tests are slightly elevated but nothing worrisome-likely just due to your statin medication.  This is expected and safe.  We will keep an eye on this.  The results are now released on My Chart. Please contact me if you have further questions or concerns.        Resulted Orders   COMPREHENSIVE METABOLIC PANEL (QUEST) XCMP   Result Value Ref Range    Glucose 90 65 - 99 mg/dL      Comment:                    Fasting reference interval         Urea Nitrogen 12 7 - 25 mg/dL    Creatinine 0.86 0.70 - 1.33 mg/dL      Comment:      For patients >49 years of age, the reference limit  for Creatinine is approximately 13% higher for people  identified as -American.         GFR Estimate 95 > OR = 60 mL/min/1.73m2    EGFR African American 110 > OR = 60 mL/min/1.73m2    BUN/Creatinine Ratio NOT APPLICABLE 6 - 22 (calc)    Sodium 135 135 - 146 mmol/L    Potassium 4.7 3.5 - 5.3 mmol/L    Chloride 96 (L) 98 - 110 mmol/L    Carbon Dioxide 27 20 - 32 mmol/L    Calcium 9.0 8.6 - 10.3 mg/dL    Protein Total 8.3 (H) 6.1 - 8.1 g/dL    Albumin 4.0 3.6 - 5.1 g/dL    Globulin Calculated 4.3 (H) 1.9 - 3.7 g/dL (calc)    A/G Ratio 0.9 (L) 1.0 - 2.5 (calc)    Bilirubin Total 0.4 0.2 - 1.2 mg/dL    Alkaline Phosphatase 77 40 - 115 U/L    AST 53 (H) 10 - 35 U/L    ALT 51 (H) 9 - 46 U/L   HCL PSA, SCREENING (QUEST)   Result Value Ref Range    Abbott PSA 1.2 < OR = 4.0 ng/mL      Comment:      The total PSA value from this assay system is   standardized against the WHO standard. The test   result will be approximately 20% lower when compared   to the equimolar-standardized total PSA (Selene   [place]). Comparison of serial PSA results should be   interpreted with this fact in mind.     This test was performed using the Siemens   chemiluminescent  method. Values obtained from   different assay methods cannot be used  interchangeably. PSA levels, regardless of  value, should not be interpreted as absolute  evidence of the presence or absence of disease.         If you have any questions or concerns, please call the clinic at the number listed above.       Sincerely,        Yemi Nava MD

## 2018-10-22 NOTE — MR AVS SNAPSHOT
"              After Visit Summary   10/22/2018    Rashaun Molina    MRN: [id number]           Patient Information     Date Of Birth          1960        Visit Information        Provider Department      10/22/2018 8:30 AM Yemi Nava MD [facility]        Today's Diagnoses     Routine general medical examination at a health care facility    -  1    Essential hypertension, benign        Pure hypercholesterolemia        PVD (peripheral vascular disease) (H)        Chronic bronchitis, unspecified chronic bronchitis type (H)        Screening for malignant neoplasm of prostate        Need for vaccination        Rash and nonspecific skin eruption           Follow-ups after your visit        Follow-up notes from your care team     Return in about 6 months (around 4/22/2019).      Who to contact     If you have questions or need follow up information about today's clinic visit or your schedule please contact [facility] directly at [phone].  Normal or non-critical lab and imaging results will be communicated to you by MyChart, letter or phone within 4 business days after the clinic has received the results. If you do not hear from us within 7 days, please contact the clinic through MyChart or phone. If you have a critical or abnormal lab result, we will notify you by phone as soon as possible.  Submit refill requests through [employer] or call your pharmacy and they will forward the refill request to us. Please allow 3 business days for your refill to be completed.          Additional Information About Your Visit        Care EveryWhere ID     This is your Care EveryWhere ID. This could be used by other organizations to access your [facility] medical records  [id number]        Your Vitals Were     Pulse Temperature Height Pulse Oximetry BMI (Body Mass Index)       78 98.2  F (36.8  C) (Oral) 1.746 m (5' 8.75\") 94% 32.22 kg/m2        Blood Pressure from Last 3 " Encounters:   10/22/18 136/80   08/06/18 157/84   07/30/18 150/78    Weight from Last 3 Encounters:   10/22/18 98.2 kg (216 lb 9.6 oz)   08/06/18 97.1 kg (214 lb)   07/30/18 96.7 kg (213 lb 3.2 oz)              We Performed the Following     COMPREHENSIVE METABOLIC PANEL (QUEST) XCMP     HC FLU VAC PRESRV FREE QUAD SPLIT VIR 3+YRS IM     HCL PSA, SCREENING (QUEST)     VACCINE ADMINISTRATION, INITIAL     VENOUS COLLECTION          Today's Medication Changes          These changes are accurate as of 10/22/18 11:00 AM.  If you have any questions, ask your nurse or doctor.               These medicines have changed or have updated prescriptions.        Dose/Directions    lisinopril 40 MG tablet   Commonly known as:  PRINIVIL/ZESTRIL   This may have changed:  See the new instructions.   Used for:  Essential hypertension, benign   Changed by:  Yemi Nava MD        Dose:  40 mg   Take 1 tablet (40 mg) by mouth daily   Quantity:  90 tablet   Refills:  1            Where to get your medicines      These medications were sent to [facility] - [address], [facility] [address]     Phone:  [phone]     lisinopril 40 MG tablet                Primary Care Provider Office Phone # Fax #    Yemi Nava [phone] [address] [place] [address]        Equal Access to Services     [facility] AH: Hadii aad ku hadasho Soomaali, waaxda luqadaha, qaybta kaalmada adeegyada, marina araujo . So [facility] [phone].    ATENCIÓN: Si habla español, tiene a mobley disposición servicios gratuitos de asistencia lingüística. Llame al [phone].    We comply with applicable federal civil rights laws and [place] laws. We do not discriminate on the basis of race, color, national origin, age, disability, sex, sexual orientation, or gender identity.            Thank you!     Thank you for  choosing [facility]  for your care. Our goal is always to provide you with excellent care. Hearing back from our patients is one way we can continue to improve our services. Please take a few minutes to complete the written survey that you may receive in the mail after your visit with us. Thank you!             Your Updated Medication List - Protect others around you: Learn how to safely use, store and throw away your medicines at www.disposemymeds.org.          This list is accurate as of 10/22/18 11:00 AM.  Always use your most recent med list.                   Brand Name Dispense Instructions for use Diagnosis    ASPIRIN EC PO      Take 81 mg by mouth daily.        clopidogrel 75 MG tablet    PLAVIX    90 tablet    Take 1 tablet (75 mg) by mouth daily Start taking medication the day after the procedure    Atherosclerosis of native artery of right lower extremity with intermittent claudication (H)       lisinopril 40 MG tablet    PRINIVIL/ZESTRIL    90 tablet    Take 1 tablet (40 mg) by mouth daily    Essential hypertension, benign       rosuvastatin 40 MG tablet    CRESTOR    90 tablet    Take 1 tablet (40 mg) by mouth At Bedtime    Hyperlipidemia LDL goal <70

## 2018-10-22 NOTE — PROGRESS NOTES
SUBJECTIVE:   CC: Rashaun Molina is an 58 year old male who presents for preventative health visit.     Healthy Habits:    Do you get at least three servings of calcium containing foods daily (dairy, green leafy vegetables, etc.)? yes    Amount of exercise or daily activities, outside of work: 1 day(s) per week    Problems taking medications regularly No    Medication side effects: No    Have you had an eye exam in the past two years? yes    Do you see a dentist twice per year? yes    Do you have sleep apnea, excessive snoring or daytime drowsiness?no       Pt will be seeing cardiology regarding vascular disease as may be in areas other than legs    Today's PHQ-2 Score:   PHQ-2 ( 1999 [employer]) 10/22/2018 10/30/2017   Q1: Little interest or pleasure in doing things 0 0   Q2: Feeling down, depressed or hopeless 0 0   PHQ-2 Score 0 0       Abuse: Current or Past(Physical, Sexual or Emotional)- No  Do you feel safe in your environment - Yes    Social History   Substance Use Topics     Smoking status: Former Smoker     Packs/day: 1.50     Years: 40.00     Quit date: 4/19/2013     Smokeless tobacco: Never Used     Alcohol use 8.4 oz/week     14 Cans of beer per week      Comment: 2 beers daily      If you drink alcohol do you typically have >3 drinks per day or >7 drinks per week? No                      Last PSA:   [facility]ott PSA   Date Value Ref Range Status   11/07/2016 0.6 < OR = 4.0 ng/mL Final     Comment:        This test was performed using the Siemens  chemiluminescent method. Values obtained from  different assay methods cannot be used  interchangeably. PSA levels, regardless of  value, should not be interpreted as absolute  evidence of the presence or absence of disease.            Reviewed orders with patient. Reviewed health maintenance and updated orders accordingly - Yes  BP Readings from Last 3 Encounters:   10/22/18 136/80   08/06/18 157/84   07/30/18 150/78    Wt Readings from Last 3 Encounters:   10/22/18 98.2  kg (216 lb 9.6 oz)   18 97.1 kg (214 lb)   18 96.7 kg (213 lb 3.2 oz)                  Patient Active Problem List   Diagnosis     Essential hypertension, benign     Pure hypercholesterolemia     History of colonic polyps     BCC (basal cell carcinoma)     Health Care Home     Colovesical fistula     S/P total hip arthroplasty     Obesity due to excess calories, unspecified obesity severity     Chronic bronchitis, unspecified chronic bronchitis type (H)     PVD (peripheral vascular disease) (H)     Past Surgical History:   Procedure Laterality Date     ARTHROPLASTY HIP Right 10/7/2015    Procedure: ARTHROPLASTY HIP;  Surgeon: Neil Davis MD;  Location: [facility] OR     COLONOSCOPY  , 4/10/13    Per Hospital encounter dated 13     COLONOSCOPY N/A 2018    Procedure: COMBINED COLONOSCOPY, SINGLE OR MULTIPLE BIOPSY/POLYPECTOMY BY BIOPSY;;  Surgeon: Tramaine Zuniga MD;  Location:  GI     COMBINED CYSTOSCOPY, INSERT CATHETER URETER  2013    Procedure: COMBINED CYSTOSCOPY, INSERT CATHETER URETER;;  Surgeon: Kashif Parks MD;  Location:  OR     HC COLONOSCOPY THRU STOMA W BIOPSY/CAUTERY TUMOR/POLYP/LESION      michael, tubular adenoma, next colonoscopy      HC COLONOSCOPY THRU STOMA, DIAGNOSTIC  01    negative, ligate two internal hemmorhoids  Dr rodriguez-repeat      HC LARYNGOSCOPY DIRECT W VOCAL CORD INJECTION      vocal cord polyps     LAPAROSCOPIC ASSISTED COLECTOMY  2013    Procedure: LAPAROSCOPIC ASSISTED COLECTOMY;  LOW ANTERIOR RESECTION ;  Surgeon: Tramaine Zuniga MD;  Location:  OR       Social History   Substance Use Topics     Smoking status: Former Smoker     Packs/day: 1.50     Years: 40.00     Quit date: 2013     Smokeless tobacco: Never Used     Alcohol use 8.4 oz/week     14 Cans of beer per week      Comment: 2 beers daily     Family History   Problem Relation Age of Onset     Hypertension Mother      Hypertension Father       MI      HEART DISEASE Father      MI  at age 55     Coronary Artery Disease Father      Hypertension Brother      Hyperlipidemia Brother      Hypertension Brother      Hypertension Sister      Lipids Brother      Lipids Brother          Current Outpatient Prescriptions   Medication Sig Dispense Refill     ASPIRIN EC PO Take 81 mg by mouth daily.       clopidogrel (PLAVIX) 75 MG tablet Take 1 tablet (75 mg) by mouth daily Start taking medication the day after the procedure 90 tablet 1     lisinopril (PRINIVIL/ZESTRIL) 40 MG tablet Take 1 tablet (40 mg) by mouth daily 90 tablet 1     rosuvastatin (CRESTOR) 40 MG tablet Take 1 tablet (40 mg) by mouth At Bedtime 90 tablet 3     [DISCONTINUED] lisinopril (PRINIVIL/ZESTRIL) 40 MG tablet TAKE 1 TABLET BY MOUTH  DAILY 30 tablet 0     Allergies   Allergen Reactions     Spiriva Handihaler Blisters     Hctz [Hydrochlorothiazide] Other (See Comments)     Low sodium     Recent Labs   Lab Test  18   0718  18   0713  18   1005  17   1152  10/30/17   1124  16   1007   10/08/15   0558   14   1021   A1C  5.4  5.6   --    --    --    --    --    --    --    --    LDL  62   --   109*   --   109*  130*   < >   --    < >  94   HDL  63   --   55   --   45  59   < >   --    < >  56   TRIG  136   --   278*   --   133  224*   < >   --    < >  339*   ALT   --    --   34   --   31  38   < >   --    < >  40   CR  0.69   --   0.80  0.86  0.87  0.97   < >  1.07   < >  1.13   GFRESTIMATED  >90   --   98  96  95  86   < >  71   < >  73   GFRESTBLACK  >90   --    --    --    --    --    --   86   --    --    POTASSIUM   --    --   5.1  4.2  4.4  4.9   < >  3.7   < >  4.5   TSH   --    --    --    --    --    --    --    --    --   1.69    < > = values in this interval not displayed.        Reviewed and updated as needed this visit by clinical staff  Tobacco  Allergies  Problems         Reviewed and updated as needed this visit by Provider        Past Medical  History:   Diagnosis Date     Arthritis      Chronic airway obstruction, not elsewhere classified 3/8/2004    pt says that he does not have COPD     Cough syncope 11/21/2012     Essential hypertension, benign      Fistula     colon/bladder     Fracture of right proximal fibula 7/30/2014     Orthostatic hypotension 11/24/2014     Other chronic pain     right hip pain     Personal history of colonic polyps 6/28/2005     Pure hypercholesterolemia      Tobacco use disorder 3/8/2004     Vasovagal syncopes 10/25/2014     Viral warts, unspecified     genital      Past Surgical History:   Procedure Laterality Date     ARTHROPLASTY HIP Right 10/7/2015    Procedure: ARTHROPLASTY HIP;  Surgeon: Neil Davis MD;  Location: [facility] OR     COLONOSCOPY  9/05, 4/10/13    Per Hospital encounter dated 4/18/13     COLONOSCOPY N/A 4/9/2018    Procedure: COMBINED COLONOSCOPY, SINGLE OR MULTIPLE BIOPSY/POLYPECTOMY BY BIOPSY;;  Surgeon: Tramaine Zuniga MD;  Location:  GI     COMBINED CYSTOSCOPY, INSERT CATHETER URETER  4/11/2013    Procedure: COMBINED CYSTOSCOPY, INSERT CATHETER URETER;;  Surgeon: Kashif Parks MD;  Location:  OR     HC COLONOSCOPY THRU STOMA W BIOPSY/CAUTERY TUMOR/POLYP/LESION  1998    michael, tubular adenoma, next colonoscopy 2001     HC COLONOSCOPY THRU STOMA, DIAGNOSTIC  04/16/01    negative, ligate two internal hemmorhoids  Dr rodriguez-repeat 2008     HC LARYNGOSCOPY DIRECT W VOCAL CORD INJECTION      vocal cord polyps     LAPAROSCOPIC ASSISTED COLECTOMY  4/11/2013    Procedure: LAPAROSCOPIC ASSISTED COLECTOMY;  LOW ANTERIOR RESECTION ;  Surgeon: Tramaine Zuniga MD;  Location: [facility] OR       ROS:  CONSTITUTIONAL: NEGATIVE for fever, chills, change in weight  INTEGUMENTARY/SKIN: NEGATIVE for worrisome rashes, moles or lesions  EYES: NEGATIVE for vision changes or irritation  ENT: NEGATIVE for ear, mouth and throat problems  RESP: NEGATIVE for significant cough or SOB  CV: NEGATIVE for chest pain,  "palpitations or peripheral edema  GI: NEGATIVE for nausea, abdominal pain, heartburn, or change in bowel habits   male: negative for dysuria, hematuria, decreased urinary stream, erectile dysfunction, urethral discharge  MUSCULOSKELETAL: NEGATIVE for significant arthralgias or myalgia  NEURO: NEGATIVE for weakness, dizziness or paresthesias  ENDOCRINE: NEGATIVE for temperature intolerance, skin/hair changes  HEME/ALLERGY/IMMUNE: NEGATIVE for bleeding problems  PSYCHIATRIC: NEGATIVE for changes in mood or affect    OBJECTIVE:   /80 (BP Location: Right arm, Patient Position: Sitting, Cuff Size: Adult Large)  Pulse 78  Temp 98.2  F (36.8  C) (Oral)  Ht 1.746 m (5' 8.75\")  Wt 98.2 kg (216 lb 9.6 oz)  SpO2 94%  BMI 32.22 kg/m2  EXAM:  GENERAL: healthy, alert and no distress  EYES: Eyes grossly normal to inspection, PERRL and conjunctivae and sclerae normal  HENT: ear canals and TM's normal, nose and mouth without ulcers or lesions  NECK: no adenopathy, no asymmetry, masses, or scars and thyroid normal to palpation  RESP: lungs clear to auscultation - no rales, rhonchi or wheezes  CV: regular rate and rhythm, normal S1 S2, no S3 or S4, no murmur, click or rub, no peripheral edema and peripheral pulses strong  ABDOMEN: soft, nontender, no hepatosplenomegaly, no masses and bowel sounds normal   (male): normal male genitalia without lesions or urethral discharge, no hernia  RECTAL (male): deferred  MS: no gross musculoskeletal defects noted, no edema  SKIN: patch of erythema on oval shape right axilla-suspect dermatitis from deodorant  NEURO: Normal strength and tone, mentation intact and speech normal  PSYCH: mentation appears normal, affect normal/bright  LYMPH: no cervical, supraclavicular, axillary, or inguinal adenopathy        ASSESSMENT/PLAN:     (Z00.00) Routine general medical examination at a health care facility  (primary encounter diagnosis)  Comment: discussed preventitive healthcare   Plan: " "Continue to work on healthy diet and exercise, discussed healthy habits     (I10) Essential hypertension, benign  Comment: well controlled  Plan: lisinopril (PRINIVIL/ZESTRIL) 40 MG tablet,         COMPREHENSIVE METABOLIC PANEL ([facility]) XCMP,         VENOUS COLLECTION        continue current medications at current doses     (E78.00) Pure hypercholesterolemia  Comment: controlled based on August labs  Plan: COMPREHENSIVE METABOLIC PANEL ([facility]) XCMP,         VENOUS COLLECTION        continue current medications at current doses     (I73.9) PVD (peripheral vascular disease) (H)  Comment: still some pain in feet despite angiplasty-may be more neuropathy-pt planning to see cardiology  Plan:     (J42) Chronic bronchitis, unspecified chronic bronchitis type (H)  Comment: stable but I feel his breathing could be better-he is not taking INcruse as he feels the benefit did not outweight cost-has only had issues when he carries a load up steps-does occasionally use rescue inhaler  Plan: consider recheck with MTM if symptoms worsen    (Z12.5) Screening for malignant neoplasm of prostate  Comment:   Plan: HCL PSA, SCREENING ([facility])            (Z23) Need for vaccination  Comment:   Plan: HC FLU VAC PRESRV FREE QUAD SPLIT VIR 3+YRS IM,        VACCINE ADMINISTRATION, INITIAL            (R21) Rash and nonspecific skin eruption  Comment: likely deodorant contact dermatitis  Plan: trial hydrocortisone, if not better call for stronger steroid     COUNSELING:  Reviewed preventive health counseling, as reflected in patient instructions       Regular exercise       Healthy diet/nutrition       Vision screening       Immunizations    Vaccinated for: Influenza        Consider Shingrix             Colon cancer screening       Prostate cancer screening    BP Readings from Last 1 Encounters:   10/22/18 136/80     Estimated body mass index is 32.22 kg/(m^2) as calculated from the following:    Height as of this encounter: 1.746 m (5' 8.75\").    " Weight as of this encounter: 98.2 kg (216 lb 9.6 oz).      Weight management plan: Discussed healthy diet and exercise guidelines and patient will follow up in 6 months in clinic to re-evaluate.     reports that he quit smoking about 5 years ago. He has a 60.00 pack-year smoking history. He has never used smokeless tobacco.      Counseling Resources:  ATP IV Guidelines  Pooled Cohorts Equation Calculator  FRAX Risk Assessment  ICSI Preventive Guidelines  Dietary Guidelines for Americans, 2010  USDA's MyPlate  ASA Prophylaxis  Lung CA Screening    Yemi Nava MD  [facility]

## 2018-10-23 LAB
ABBOTT PSA - QUEST: 1.2 NG/ML
ALBUMIN SERPL-MCNC: 4 G/DL (ref 3.6–5.1)
ALBUMIN/GLOB SERPL: 0.9 (CALC) (ref 1–2.5)
ALP SERPL-CCNC: 77 U/L (ref 40–115)
ALT SERPL-CCNC: 51 U/L (ref 9–46)
AST SERPL-CCNC: 53 U/L (ref 10–35)
BILIRUB SERPL-MCNC: 0.4 MG/DL (ref 0.2–1.2)
BUN SERPL-MCNC: 12 MG/DL (ref 7–25)
BUN/CREATININE RATIO: ABNORMAL (CALC) (ref 6–22)
CALCIUM SERPL-MCNC: 9 MG/DL (ref 8.6–10.3)
CHLORIDE SERPLBLD-SCNC: 96 MMOL/L (ref 98–110)
CO2 SERPL-SCNC: 27 MMOL/L (ref 20–32)
CREAT SERPL-MCNC: 0.86 MG/DL (ref 0.7–1.33)
EGFR AFRICAN AMERICAN - QUEST: 110 ML/MIN/1.73M2
GFR SERPL CREATININE-BSD FRML MDRD: 95 ML/MIN/1.73M2
GLOBULIN, CALCULATED - QUEST: 4.3 G/DL (CALC) (ref 1.9–3.7)
GLUCOSE - QUEST: 90 MG/DL (ref 65–99)
POTASSIUM SERPL-SCNC: 4.7 MMOL/L (ref 3.5–5.3)
PROT SERPL-MCNC: 8.3 G/DL (ref 6.1–8.1)
SODIUM SERPL-SCNC: 135 MMOL/L (ref 135–146)

## 2018-12-27 ENCOUNTER — HOSPITAL ENCOUNTER (EMERGENCY)
Facility: CLINIC | Age: 58
Discharge: HOME | End: 2018-12-27
Attending: EMERGENCY MEDICINE | Admitting: EMERGENCY MEDICINE
Payer: COMMERCIAL

## 2018-12-27 ENCOUNTER — APPOINTMENT (OUTPATIENT)
Dept: GENERAL RADIOLOGY | Facility: CLINIC | Age: 58
End: 2018-12-27
Attending: EMERGENCY MEDICINE
Payer: COMMERCIAL

## 2018-12-27 ENCOUNTER — HOSPITAL ENCOUNTER (EMERGENCY)
Dept: CARDIOLOGY | Facility: CLINIC | Age: 58
End: 2018-12-27
Attending: EMERGENCY MEDICINE
Payer: COMMERCIAL

## 2018-12-27 VITALS
HEART RATE: 91 BPM | TEMPERATURE: 98.1 F | WEIGHT: 210 LBS | DIASTOLIC BLOOD PRESSURE: 94 MMHG | HEIGHT: 69 IN | OXYGEN SATURATION: 95 % | RESPIRATION RATE: 21 BRPM | SYSTOLIC BLOOD PRESSURE: 156 MMHG | BODY MASS INDEX: 31.1 KG/M2

## 2018-12-27 DIAGNOSIS — R00.2 PALPITATIONS: ICD-10-CM

## 2018-12-27 DIAGNOSIS — I73.9 PVD (PERIPHERAL VASCULAR DISEASE) (H): ICD-10-CM

## 2018-12-27 LAB
ALBUMIN SERPL-MCNC: 3.4 G/DL (ref 3.4–5)
ALP SERPL-CCNC: 79 U/L (ref 40–150)
ALT SERPL W P-5'-P-CCNC: 57 U/L (ref 0–70)
ANION GAP SERPL CALCULATED.3IONS-SCNC: 10 MMOL/L (ref 3–14)
AST SERPL W P-5'-P-CCNC: 50 U/L (ref 0–45)
BASOPHILS # BLD AUTO: 0 10E9/L (ref 0–0.2)
BASOPHILS NFR BLD AUTO: 0.4 %
BILIRUB SERPL-MCNC: 0.4 MG/DL (ref 0.2–1.3)
BUN SERPL-MCNC: 9 MG/DL (ref 7–30)
CALCIUM SERPL-MCNC: 8.6 MG/DL (ref 8.5–10.1)
CHLORIDE SERPL-SCNC: 97 MMOL/L (ref 94–109)
CO2 SERPL-SCNC: 27 MMOL/L (ref 20–32)
CREAT SERPL-MCNC: 0.68 MG/DL (ref 0.66–1.25)
DIFFERENTIAL METHOD BLD: ABNORMAL
EOSINOPHIL # BLD AUTO: 0.2 10E9/L (ref 0–0.7)
EOSINOPHIL NFR BLD AUTO: 2.9 %
ERYTHROCYTE [DISTWIDTH] IN BLOOD BY AUTOMATED COUNT: 13.8 % (ref 10–15)
GFR SERPL CREATININE-BSD FRML MDRD: >90 ML/MIN/{1.73_M2}
GLUCOSE SERPL-MCNC: 94 MG/DL (ref 70–99)
HCT VFR BLD AUTO: 41.5 % (ref 40–53)
HGB BLD-MCNC: 14.2 G/DL (ref 13.3–17.7)
IMM GRANULOCYTES # BLD: 0 10E9/L (ref 0–0.4)
IMM GRANULOCYTES NFR BLD: 0.3 %
INTERPRETATION ECG - MUSE: NORMAL
LYMPHOCYTES # BLD AUTO: 1.1 10E9/L (ref 0.8–5.3)
LYMPHOCYTES NFR BLD AUTO: 15.9 %
MCH RBC QN AUTO: 33.7 PG (ref 26.5–33)
MCHC RBC AUTO-ENTMCNC: 34.2 G/DL (ref 31.5–36.5)
MCV RBC AUTO: 99 FL (ref 78–100)
MONOCYTES # BLD AUTO: 0.6 10E9/L (ref 0–1.3)
MONOCYTES NFR BLD AUTO: 8.4 %
NEUTROPHILS # BLD AUTO: 5.1 10E9/L (ref 1.6–8.3)
NEUTROPHILS NFR BLD AUTO: 72.1 %
NRBC # BLD AUTO: 0 10*3/UL
NRBC BLD AUTO-RTO: 0 /100
PLATELET # BLD AUTO: 185 10E9/L (ref 150–450)
POTASSIUM SERPL-SCNC: 4.4 MMOL/L (ref 3.4–5.3)
PROT SERPL-MCNC: 8.5 G/DL (ref 6.8–8.8)
RBC # BLD AUTO: 4.21 10E12/L (ref 4.4–5.9)
SODIUM SERPL-SCNC: 134 MMOL/L (ref 133–144)
TROPONIN I SERPL-MCNC: <0.015 UG/L (ref 0–0.04)
TSH SERPL DL<=0.005 MIU/L-ACNC: 1.63 MU/L (ref 0.4–4)
WBC # BLD AUTO: 7 10E9/L (ref 4–11)

## 2018-12-27 PROCEDURE — 71046 X-RAY EXAM CHEST 2 VIEWS: CPT

## 2018-12-27 PROCEDURE — 84443 ASSAY THYROID STIM HORMONE: CPT | Performed by: EMERGENCY MEDICINE

## 2018-12-27 PROCEDURE — 93005 ELECTROCARDIOGRAM TRACING: CPT

## 2018-12-27 PROCEDURE — 93270 REMOTE 30 DAY ECG REV/REPORT: CPT

## 2018-12-27 PROCEDURE — 99285 EMERGENCY DEPT VISIT HI MDM: CPT | Mod: 25

## 2018-12-27 PROCEDURE — 80053 COMPREHEN METABOLIC PANEL: CPT | Performed by: EMERGENCY MEDICINE

## 2018-12-27 PROCEDURE — 85025 COMPLETE CBC W/AUTO DIFF WBC: CPT | Performed by: EMERGENCY MEDICINE

## 2018-12-27 PROCEDURE — 84484 ASSAY OF TROPONIN QUANT: CPT | Performed by: EMERGENCY MEDICINE

## 2018-12-27 PROCEDURE — 93272 ECG/REVIEW INTERPRET ONLY: CPT | Performed by: INTERNAL MEDICINE

## 2018-12-27 ASSESSMENT — MIFFLIN-ST. JEOR: SCORE: 1757.93

## 2018-12-27 ASSESSMENT — ENCOUNTER SYMPTOMS
SHORTNESS OF BREATH: 1
COUGH: 1
PALPITATIONS: 1

## 2018-12-27 NOTE — ED AVS SNAPSHOT
Emergency Department  [address] [place] [address]  Phone:  [phone]  Fax:  [phone]                                    Rashaun Molina   MRN: [id number]    Department:   Emergency Department   Date of Visit:  12/27/2018           After Visit Summary Signature Page    I have received my discharge instructions, and my questions have been answered. I have discussed any challenges I see with this plan with the nurse or doctor.    ..........................................................................................................................................  Patient/Patient Representative Signature      ..........................................................................................................................................  Patient Representative Print Name and Relationship to Patient    ..................................................               ................................................  Date                                   Time    ..........................................................................................................................................  Reviewed by Signature/Title    ...................................................              ..............................................  Date                                               Time          22EPIC Rev 08/18

## 2018-12-27 NOTE — LETTER
February 5, 20 19      Rashaun THALIA Jesse  [address] [facility] [address]        Dear ,    We are writing to inform you of your test results.    Your event monitor looked essentially normal.      If you have any questions or concerns, please call the clinic at the number listed above.

## 2018-12-27 NOTE — ED PROVIDER NOTES
History     Chief Complaint:  Palpitations    The history is provided by the patient.      Rashanu Molina is a 58 year old male with a history of PVD, hypercholesteremia, HTN, chronic pain, and obesity who presents to the emergency department for evaluation of palpitations. Starting this morning while driving to work around 0530, the patient began experiencing palpitations and felt his heart was beating out of his chest. He did not have pain or pressure with this but had discomfort. This lasted for about a half an hour. 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 He was worried about these palpitations because his father  from heart disease in his 50s and his brother has a defilibrator placed. This prompted him to seek evaluation here in the emergency department for evaluation.    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 He has a chronic slight cough. He has a history of smoking and drinks daily, about 4 beers. To note, he has had a former angioplasty in his right leg and notes he still needs one in his left leg. No increase in leg swelling.     Cardiac/PE/DVT Risk Factors:  The patient has a history of hypertension, hyperlipidemia, and formerly smoking. He reports a family history of MI, heart disease, hyperlipidemia, and HTN. The patient denies any personal or familial history of PE, DVT, or clotting disorder. The patient denies recent travel, surgery, or other immobilizations.         Allergies:  Spiriva  Hydrochlorothiazide     Medications:    Lisinopril  Aspirin  Plavix  Crestor    Past Medical History:    PVD  Chronic bronchitis  Chronic cough  Obesity  Colovesical fistula  Colonic polyps  HTN  Hypercholesteremia  Chronic pain  Vasovagal  "syncope    Past Surgical History:    Right hip replacement  Colonoscopy  Colectomy  Laryngoscopy direct with vocal cord injection    Family History:    HTN  MI: Father  Hear disease: Father  Hyperlipidemia    Social History:  Former smoker: quit date 4/19/2013  Positive for alcohol use: 4 beers per day  Negative for drug use.  Occupation: [employer]  Marital Status:  Single      Review of Systems   Respiratory: Positive for cough and shortness of breath.    Cardiovascular: Positive for palpitations. Negative for chest pain and leg swelling.   All other systems reviewed and are negative.    Physical Exam     Patient Vitals for the past 24 hrs:   BP Temp Temp src Pulse Heart Rate Resp SpO2 Height Weight   12/27/18 0925 -- -- -- -- 92 14 95 % -- --   12/27/18 0900 (!) 153/97 -- -- 80 81 17 95 % -- --   12/27/18 0830 (!) 145/95 -- -- 85 84 17 94 % -- --   12/27/18 0807 -- -- -- -- 90 17 95 % -- --   12/27/18 0724 -- -- -- -- 96 -- 96 % -- --   12/27/18 0708 -- -- -- -- 96 21 97 % -- --   12/27/18 0705 (!) 164/96 98.1  F (36.7  C) Oral 89 89 13 96 % 1.753 m (5' 9\") 95.3 kg (210 lb)       Physical Exam  Constitutional: White male sitting. No respiratory distress  HENT: No signs of trauma.   Eyes: EOM are normal. Pupils are equal, round, and reactive to light.   Neck: Normal range of motion. No JVD present. No cervical adenopathy.  Cardiovascular: Regular rhythm.  Exam reveals no gallop and no friction rub.    No murmur heard.  Pulmonary/Chest: No wheezes or rales. Expiratory rhonchi.    Abdominal: Soft. No tenderness. No rebound or guarding. Midline incision. 1+ femoral pulses bilaterally.  Musculoskeletal: No edema. No tenderness.   Lymphadenopathy: No lymphadenopathy.   Neurological: Alert and oriented to person, place, and time. Normal strength. Coordination normal.   Skin: Skin is warm and dry. No rash noted. No erythema.   Emergency Department Course   ECG:  Indication: Palpitations  Time: 0710  Vent. Rate 89 " bpm. NV interval 156. QRS duration 86. QT/QTc 350/425. P-R-T axis 69 61 63. Normal sinus rhythm. Normal ECG. Read time: 0720.    Imaging:  Radiographic findings were communicated with the patient who voiced understanding of the findings.    XR Chest 2 views:   No evidence of active cardiopulmonary disease. As per radiology.     Laboratory:  0812 Troponin: <0.015  TSH: 1.63  CBC: WBC: 7.0, HGB: 14.2, PLT: 185  CMP: AST 50 o/w WNL (Creatinine: 0.68)    Emergency Department Course:  0727 Nursing notes and vitals reviewed.  I performed an exam of the patient as documented above.     Blood drawn. This was sent to the lab for further testing, results above.    EKG obtained in the ED, see results above.     The patient was sent for a chest xray while in the emergency department, findings above.     0910 I rechecked the patient and discussed the results of his workup thus far.     Findings and plan explained to the Patient. Patient discharged home with instructions regarding supportive care, medications, and reasons to return. The importance of close follow-up was reviewed.     I personally reviewed the laboratory results with the Patient and answered all related questions prior to discharge.   Impression & Plan    Medical Decision Making:  Rashaun Molina is a 58 year old male who presents to the ED after noticing palpitations this morning while on the way to work. He felt his heart beating out of his chest. When he repeats this, it is somewhat irregular and rapid when he taps his hand. However, on monitor with EMS and here, he was in a sinus rhythm. Patient does have risk factors including smoking, hypertension, high cholesterol, and strong family history. He notes also he is more short of breath with simple activities of exertion lately and his has known peripheral vascular disease in his legs. On exam, he has some rhonchi. He has tried an inhaler before and states this does not help. His chest xray and blood work are  unremarkable. I have scheduled him for a stress echo test with dopamine and he may not be able to finish the exercise portion due to his peripheral vascular disease and also for event monitoring. Patient should make a follow up with his PMD after the stress test, or if he has recurrent symptoms, recheck in the ED.    Diagnosis:    ICD-10-CM    1. Palpitations R00.2    2.      Dyspnea of exertion    Disposition:  discharged to home    Scribe Disclosure:  I, Jazz Ordoñez, am serving as a scribe on 12/27/2018 at 7:41 AM to personally document services performed by Ti Adam MD based on my observations and the provider's statements to me.     Jazz Ordoñez  12/27/2018    EMERGENCY DEPARTMENT       Ti Adam MD  12/27/18 0273

## 2018-12-27 NOTE — ED NOTES
Bed: ED07  Expected date: 12/27/18  Expected time: 6:58 AM  Means of arrival: Ambulance  Comments:  Luz [address] 58M palpitations

## 2018-12-31 ENCOUNTER — OFFICE VISIT (OUTPATIENT)
Dept: FAMILY MEDICINE | Facility: CLINIC | Age: 58
End: 2018-12-31

## 2018-12-31 VITALS
OXYGEN SATURATION: 93 % | TEMPERATURE: 98.4 F | HEART RATE: 85 BPM | SYSTOLIC BLOOD PRESSURE: 140 MMHG | WEIGHT: 219.6 LBS | BODY MASS INDEX: 32.53 KG/M2 | DIASTOLIC BLOOD PRESSURE: 72 MMHG | HEIGHT: 69 IN

## 2018-12-31 DIAGNOSIS — R00.2 PALPITATIONS: Primary | ICD-10-CM

## 2018-12-31 DIAGNOSIS — I10 ESSENTIAL HYPERTENSION, BENIGN: ICD-10-CM

## 2018-12-31 DIAGNOSIS — E78.00 PURE HYPERCHOLESTEROLEMIA: ICD-10-CM

## 2018-12-31 DIAGNOSIS — R06.09 DOE (DYSPNEA ON EXERTION): ICD-10-CM

## 2018-12-31 DIAGNOSIS — I73.9 PVD (PERIPHERAL VASCULAR DISEASE) (H): ICD-10-CM

## 2018-12-31 PROCEDURE — 99214 OFFICE O/P EST MOD 30 MIN: CPT | Performed by: FAMILY MEDICINE

## 2018-12-31 ASSESSMENT — MIFFLIN-ST. JEOR: SCORE: 1801.48

## 2018-12-31 NOTE — Clinical Note
PT was in ED 12/27-told he would be scheduled for nuclear stress echo at [facility]-has not heard and needs this week as he was taken out of workCan you see what status is?thanks

## 2018-12-31 NOTE — PROGRESS NOTES
SUBJECTIVE:   Rashaun Molina is a 58 year old male who presents to clinic today for the following health issues:      ED/UC Followup:    Facility:  [facility]  Date of visit: 12/27/2018  Reason for visit: heart palpitations, MALDONADO  Current Status: still not feeling well-no worsening but feels fatigue and some dyspnea on exertion   Pt has multiple cardiac risk factors but had normal troponins    He was told he would hear about scheduling nuclear stress but has not-is out of work until next week per ED pending this test             Problem list and histories reviewed & adjusted, as indicated.  Additional history: as documented    Patient Active Problem List   Diagnosis     Essential hypertension, benign     Pure hypercholesterolemia     History of colonic polyps     BCC (basal cell carcinoma)     Health Care Home     Colovesical fistula     S/P total hip arthroplasty     Obesity due to excess calories, unspecified obesity severity     Chronic bronchitis, unspecified chronic bronchitis type (H)     PVD (peripheral vascular disease) (H)     Palpitations     Past Surgical History:   Procedure Laterality Date     ARTHROPLASTY HIP Right 10/7/2015    Procedure: ARTHROPLASTY HIP;  Surgeon: Neil Davis MD;  Location: [facility] OR     COLONOSCOPY  9/05, 4/10/13    Per Hospital encounter dated 4/18/13     COLONOSCOPY N/A 4/9/2018    Procedure: COMBINED COLONOSCOPY, SINGLE OR MULTIPLE BIOPSY/POLYPECTOMY BY BIOPSY;;  Surgeon: Tramaine Zuniga MD;  Location:  GI     COMBINED CYSTOSCOPY, INSERT CATHETER URETER  4/11/2013    Procedure: COMBINED CYSTOSCOPY, INSERT CATHETER URETER;;  Surgeon: Kashif Parks MD;  Location:  OR     HC COLONOSCOPY THRU STOMA W BIOPSY/CAUTERY TUMOR/POLYP/LESION  1998    michael, tubular adenoma, next colonoscopy 2001      COLONOSCOPY THRU STOMA, DIAGNOSTIC  04/16/01    negative, ligate two internal hemmorhoids  Dr rodriguez-repeat 2008      LARYNGOSCOPY DIRECT W VOCAL CORD INJECTION       vocal cord polyps     LAPAROSCOPIC ASSISTED COLECTOMY  2013    Procedure: LAPAROSCOPIC ASSISTED COLECTOMY;  LOW ANTERIOR RESECTION ;  Surgeon: Tramaine Zuniga MD;  Location:  OR       Social History     Tobacco Use     Smoking status: Former Smoker     Packs/day: 1.50     Years: 40.00     Pack years: 60.00     Last attempt to quit: 2013     Years since quittin.7     Smokeless tobacco: Never Used   Substance Use Topics     Alcohol use: Yes     Alcohol/week: 8.4 oz     Types: 14 Cans of beer per week     Comment: 2 beers daily     Family History   Problem Relation Age of Onset     Hypertension Mother      Hypertension Father          MI     Heart Disease Father         MI  at age 55     Coronary Artery Disease Father      Hypertension Brother      Hyperlipidemia Brother      Hypertension Brother      Hypertension Sister      Lipids Brother      Lipids Brother          Current Outpatient Medications   Medication Sig Dispense Refill     ASPIRIN EC PO Take 81 mg by mouth daily.       clopidogrel (PLAVIX) 75 MG tablet Take 1 tablet (75 mg) by mouth daily Start taking medication the day after the procedure 90 tablet 1     lisinopril (PRINIVIL/ZESTRIL) 40 MG tablet Take 1 tablet (40 mg) by mouth daily 90 tablet 1     rosuvastatin (CRESTOR) 40 MG tablet Take 1 tablet (40 mg) by mouth At Bedtime 90 tablet 3     Allergies   Allergen Reactions     Spiriva Handihaler Blisters     Hctz [Hydrochlorothiazide] Other (See Comments)     Low sodium     Recent Labs   Lab Test 18  0812 10/22/18  0847 18  0718 18  0713 18  1005  10/30/17  1124  14  1021   A1C  --   --  5.4 5.6  --   --   --   --   --    LDL  --   --  62  --  109*  --  109*   < > 94   HDL  --   --  63  --  55  --  45   < > 56   TRIG  --   --  136  --  278*  --  133   < > 339*   ALT 57 51*  --   --  34  --  31   < > 40   CR 0.68 0.86 0.69  --  0.80   < > 0.87   < > 1.13   GFRESTIMATED >90 95 >90  --  98   < > 95   < >  "73   GFRESTBLACK >90  --  >90  --   --   --   --    < >  --    POTASSIUM 4.4 4.7  --   --  5.1   < > 4.4   < > 4.5   TSH 1.63  --   --   --   --   --   --   --  1.69    < > = values in this interval not displayed.      BP Readings from Last 3 Encounters:   12/31/18 140/72   12/27/18 (!) 156/94   10/22/18 136/80    Wt Readings from Last 3 Encounters:   12/31/18 99.6 kg (219 lb 9.6 oz)   12/27/18 95.3 kg (210 lb)   10/22/18 98.2 kg (216 lb 9.6 oz)                    Reviewed and updated as needed this visit by clinical staff  Allergies  Meds  Problems       Reviewed and updated as needed this visit by Provider         ROS:  Constitutional, HEENT, cardiovascular, pulmonary, gi and gu systems are negative, except as otherwise noted.    OBJECTIVE:     /72 (BP Location: Left arm, Patient Position: Sitting, Cuff Size: Adult Large)   Pulse 85   Temp 98.4  F (36.9  C) (Oral)   Ht 1.753 m (5' 9\")   Wt 99.6 kg (219 lb 9.6 oz)   SpO2 93%   BMI 32.43 kg/m    Body mass index is 32.43 kg/m .   GENERAL: healthy, alert and no distress  EYES: Eyes grossly normal to inspection, PERRL and conjunctivae and sclerae normal  HENT: ear canals and TM's normal, nose and mouth without ulcers or lesions  NECK: no adenopathy, no asymmetry, masses, or scars and thyroid normal to palpation  RESP: lungs clear to auscultation - no rales, rhonchi or wheezes  CV: regular rate and rhythm, normal S1 S2, no S3 or S4, no murmur, click or rub, no peripheral edema and peripheral pulses strong  ABDOMEN: soft, nontender, no hepatosplenomegaly, no masses and bowel sounds normal  MS: no gross musculoskeletal defects noted, no edema  PSYCH: mentation appears normal, affect normal/bright    Diagnostic Test Results:  none     ASSESSMENT:       PLAN:   (R00.2) Palpitations  (primary encounter diagnosis)  Comment: occasional symptoms now but w/u in ED negative-no worsening symptoms   Plan: stress echo    (R06.09) MALDONADO (dyspnea on exertion)  Comment: " "stable but still symptoms -needs stress echo nuclear  Plan: nuclear stress-need to see if scheduled    (I73.9) PVD (peripheral vascular disease) (H)  Comment:   Plan:     (I10) Essential hypertension, benign  Comment: borderline today  Plan: continue current medications at current doses     (E78.00) Pure hypercholesterolemia  Comment: stable  Plan: continue current medications at current doses     BMI:   Estimated body mass index is 32.43 kg/m  as calculated from the following:    Height as of this encounter: 1.753 m (5' 9\").    Weight as of this encounter: 99.6 kg (219 lb 9.6 oz).   Weight management plan: Discussed healthy diet and exercise guidelines      FUTURE APPOINTMENTS:       - nuclear stress  Work on weight loss  Regular exercise    Yemi Nava MD  [facility]    "

## 2019-01-02 ENCOUNTER — HOSPITAL ENCOUNTER (OUTPATIENT)
Facility: CLINIC | Age: 59
Discharge: HOME | End: 2019-01-02
Attending: INTERNAL MEDICINE | Admitting: INTERNAL MEDICINE
Payer: COMMERCIAL

## 2019-01-02 ENCOUNTER — HOSPITAL ENCOUNTER (OUTPATIENT)
Dept: CARDIOLOGY | Facility: CLINIC | Age: 59
End: 2019-01-02
Attending: EMERGENCY MEDICINE | Admitting: INTERNAL MEDICINE
Payer: COMMERCIAL

## 2019-01-02 VITALS
WEIGHT: 219.6 LBS | HEART RATE: 101 BPM | BODY MASS INDEX: 32.43 KG/M2 | SYSTOLIC BLOOD PRESSURE: 149 MMHG | DIASTOLIC BLOOD PRESSURE: 64 MMHG | OXYGEN SATURATION: 90 %

## 2019-01-02 DIAGNOSIS — R00.2 PALPITATIONS: ICD-10-CM

## 2019-01-02 DIAGNOSIS — I73.9 PVD (PERIPHERAL VASCULAR DISEASE) (H): ICD-10-CM

## 2019-01-02 PROCEDURE — 25000128 H RX IP 250 OP 636: Performed by: INTERNAL MEDICINE

## 2019-01-02 PROCEDURE — 93321 DOPPLER ECHO F-UP/LMTD STD: CPT | Mod: 26 | Performed by: INTERNAL MEDICINE

## 2019-01-02 PROCEDURE — 93016 CV STRESS TEST SUPVJ ONLY: CPT | Performed by: INTERNAL MEDICINE

## 2019-01-02 PROCEDURE — 93325 DOPPLER ECHO COLOR FLOW MAPG: CPT | Mod: 26 | Performed by: INTERNAL MEDICINE

## 2019-01-02 PROCEDURE — 40000855 ZZH STATISTIC ECHO STRESS OR NM NPI

## 2019-01-02 PROCEDURE — 25500064 ZZH RX 255 OP 636: Performed by: EMERGENCY MEDICINE

## 2019-01-02 PROCEDURE — 93350 STRESS TTE ONLY: CPT | Mod: 26 | Performed by: INTERNAL MEDICINE

## 2019-01-02 PROCEDURE — 40000264 ECHO STRESS ECHOCARDIOGRAM

## 2019-01-02 PROCEDURE — 93018 CV STRESS TEST I&R ONLY: CPT | Performed by: INTERNAL MEDICINE

## 2019-01-02 RX ORDER — DOBUTAMINE HYDROCHLORIDE 200 MG/100ML
10-50 INJECTION INTRAVENOUS CONTINUOUS
Status: DISCONTINUED | OUTPATIENT
Start: 2019-01-02 | End: 2019-01-02 | Stop reason: HOSPADM

## 2019-01-02 RX ORDER — SODIUM CHLORIDE 9 MG/ML
INJECTION, SOLUTION INTRAVENOUS CONTINUOUS
Status: DISCONTINUED | OUTPATIENT
Start: 2019-01-02 | End: 2019-01-02 | Stop reason: HOSPADM

## 2019-01-02 RX ORDER — METOPROLOL TARTRATE 1 MG/ML
1-30 INJECTION, SOLUTION INTRAVENOUS
Status: DISCONTINUED | OUTPATIENT
Start: 2019-01-02 | End: 2019-01-02 | Stop reason: HOSPADM

## 2019-01-02 RX ORDER — ATROPINE SULFATE 0.1 MG/ML
.2-2 INJECTION INTRAVENOUS
Status: DISCONTINUED | OUTPATIENT
Start: 2019-01-02 | End: 2019-01-02 | Stop reason: HOSPADM

## 2019-01-02 RX ADMIN — HUMAN ALBUMIN MICROSPHERES AND PERFLUTREN 8 ML: 10; .22 INJECTION, SOLUTION INTRAVENOUS at 14:30

## 2019-01-02 RX ADMIN — SODIUM CHLORIDE: 9 INJECTION, SOLUTION INTRAVENOUS at 13:49

## 2019-01-02 RX ADMIN — DOBUTAMINE HYDROCHLORIDE 10 MCG/KG/MIN: 200 INJECTION INTRAVENOUS at 13:46

## 2019-01-02 NOTE — PROGRESS NOTES
Tolerated dobutamine stress test well.  Target heart reached with 30mcg Dobutamine.  Vitals signs stable.  Patient denies pain.

## 2019-01-04 ENCOUNTER — TELEPHONE (OUTPATIENT)
Dept: FAMILY MEDICINE | Facility: CLINIC | Age: 59
End: 2019-01-04

## 2019-01-04 NOTE — TELEPHONE ENCOUNTER
Rashaun has not gone back to work yet.  He is seeing you on Monday and if you give the ok, he would like to go back to work on Tuesday.  Forms should wait until his appt Monday.    Patient's phone #[phone]

## 2019-01-07 ENCOUNTER — OFFICE VISIT (OUTPATIENT)
Dept: FAMILY MEDICINE | Facility: CLINIC | Age: 59
End: 2019-01-07

## 2019-01-07 VITALS
HEIGHT: 69 IN | DIASTOLIC BLOOD PRESSURE: 80 MMHG | HEART RATE: 82 BPM | BODY MASS INDEX: 32.32 KG/M2 | WEIGHT: 218.2 LBS | SYSTOLIC BLOOD PRESSURE: 150 MMHG | TEMPERATURE: 98.3 F | OXYGEN SATURATION: 96 %

## 2019-01-07 DIAGNOSIS — E78.00 PURE HYPERCHOLESTEROLEMIA: ICD-10-CM

## 2019-01-07 DIAGNOSIS — J42 CHRONIC BRONCHITIS, UNSPECIFIED CHRONIC BRONCHITIS TYPE (H): ICD-10-CM

## 2019-01-07 DIAGNOSIS — I10 ESSENTIAL HYPERTENSION, BENIGN: ICD-10-CM

## 2019-01-07 DIAGNOSIS — R00.2 PALPITATIONS: Primary | ICD-10-CM

## 2019-01-07 PROCEDURE — 99214 OFFICE O/P EST MOD 30 MIN: CPT | Performed by: FAMILY MEDICINE

## 2019-01-07 RX ORDER — METOPROLOL SUCCINATE 25 MG/1
25 TABLET, EXTENDED RELEASE ORAL DAILY
Qty: 30 TABLET | Refills: 1 | Status: SHIPPED | OUTPATIENT
Start: 2019-01-07 | End: 2019-04-29

## 2019-01-07 ASSESSMENT — MIFFLIN-ST. JEOR: SCORE: 1791.16

## 2019-01-07 NOTE — NURSING NOTE
Rashaun is here for stress test consult and elevated BP and STD    Pre-visit Screening:  Immunizations:  up to date  Colonoscopy:  is up to date  Mammogram: NA  Asthma Action Test/Plan:  NA  PHQ9:  NA  GAD7:  NA  Questioned patient about current smoking habits Pt. quit smoking some time ago.  Ok to leave detailed message on voice mail for today's visit only Yes, phone # [phone]

## 2019-01-07 NOTE — PROGRESS NOTES
SUBJECTIVE:                                                    Rashaun Molina is a 58 year old male who presents to clinic today for the following health issues:    Palpitations- had normal dobutamine stress, ziopatch still on for a few more weeks-still some shortness of breath but has restarted Incruse for COPD-needs work forms completed    He will be seeing cardiology again to discuss ziopatch in a month      Hyperlipidemia Follow-Up      Rate your low fat/cholesterol diet?: good    Taking statin?  Yes, no muscle aches from statin    Other lipid medications/supplements?:  none    Hypertension Follow-up      Outpatient blood pressures are not being checked.    Low Salt Diet: no added salt    COPD Follow-Up    Symptoms are currently: stable    Current fatigue or dyspnea with ambulation: none    Shortness of breath: stable    Increased or change in Cough/Sputum: No    Fever(s): No        Any ER/UC or hospital admissions since your last visit? No     History   Smoking Status     Former Smoker     Packs/day: 1.50     Years: 40.00     Quit date: 4/19/2013   Smokeless Tobacco     Never Used     No results found for: FEV1, [id number]    Amount of exercise or physical activity: 1 day/week for an average of less than 15 minutes    Problems taking medications regularly: No    Medication side effects: none    Diet: regular (no restrictions)            Problem list and histories reviewed & adjusted, as indicated.  Additional history: as documented    Patient Active Problem List   Diagnosis     Essential hypertension, benign     Pure hypercholesterolemia     History of colonic polyps     BCC (basal cell carcinoma)     Health Care Home     Colovesical fistula     S/P total hip arthroplasty     Obesity due to excess calories, unspecified obesity severity     Chronic bronchitis, unspecified chronic bronchitis type (H)     PVD (peripheral vascular disease) (H)     Palpitations     Past Surgical History:   Procedure Laterality Date      ARTHROPLASTY HIP Right 10/7/2015    Procedure: ARTHROPLASTY HIP;  Surgeon: Neil Davis MD;  Location: [facility] OR     COLONOSCOPY  , 4/10/13    Per Hospital encounter dated 13     COLONOSCOPY N/A 2018    Procedure: COMBINED COLONOSCOPY, SINGLE OR MULTIPLE BIOPSY/POLYPECTOMY BY BIOPSY;;  Surgeon: Tramaine Zuniga MD;  Location:  GI     COMBINED CYSTOSCOPY, INSERT CATHETER URETER  2013    Procedure: COMBINED CYSTOSCOPY, INSERT CATHETER URETER;;  Surgeon: Kashif Parks MD;  Location: [facility] OR     HC COLONOSCOPY THRU STOMA W BIOPSY/CAUTERY TUMOR/POLYP/LESION      michael, tubular adenoma, next colonoscopy      HC COLONOSCOPY THRU STOMA, DIAGNOSTIC  01    negative, ligate two internal hemmorhoids  Dr rodriguez-repeat      HC LARYNGOSCOPY DIRECT W VOCAL CORD INJECTION      vocal cord polyps     LAPAROSCOPIC ASSISTED COLECTOMY  2013    Procedure: LAPAROSCOPIC ASSISTED COLECTOMY;  LOW ANTERIOR RESECTION ;  Surgeon: Tramaine Zuniga MD;  Location:  OR       Social History     Tobacco Use     Smoking status: Former Smoker     Packs/day: 1.50     Years: 40.00     Pack years: 60.00     Last attempt to quit: 2013     Years since quittin.7     Smokeless tobacco: Never Used   Substance Use Topics     Alcohol use: Yes     Alcohol/week: 8.4 oz     Types: 14 Cans of beer per week     Comment: 2 beers daily     Family History   Problem Relation Age of Onset     Hypertension Mother      Hypertension Father          MI     Heart Disease Father         MI  at age 55     Coronary Artery Disease Father      Hypertension Brother      Hyperlipidemia Brother      Hypertension Brother      Hypertension Sister      Lipids Brother      Lipids Brother          Current Outpatient Medications   Medication Sig Dispense Refill     ASPIRIN EC PO Take 81 mg by mouth daily.       lisinopril (PRINIVIL/ZESTRIL) 40 MG tablet Take 1 tablet (40 mg) by mouth daily 90 tablet 1     metoprolol  "succinate ER (TOPROL-XL) 25 MG 24 hr tablet Take 1 tablet (25 mg) by mouth daily 30 tablet 1     rosuvastatin (CRESTOR) 40 MG tablet Take 1 tablet (40 mg) by mouth At Bedtime 90 tablet 3     umeclidinium (INCRUSE ELLIPTA) 62.5 MCG/INH inhaler Inhale 1 puff into the lungs daily       Allergies   Allergen Reactions     Spiriva Handihaler Blisters     Hctz [Hydrochlorothiazide] Other (See Comments)     Low sodium     Recent Labs   Lab Test 12/27/18  0812 10/22/18  0847 08/06/18  0718 05/14/18  0713 04/30/18  1005  10/30/17  1124  07/23/14  1021   A1C  --   --  5.4 5.6  --   --   --   --   --    LDL  --   --  62  --  109*  --  109*   < > 94   HDL  --   --  63  --  55  --  45   < > 56   TRIG  --   --  136  --  278*  --  133   < > 339*   ALT 57 51*  --   --  34  --  31   < > 40   CR 0.68 0.86 0.69  --  0.80   < > 0.87   < > 1.13   GFRESTIMATED >90 95 >90  --  98   < > 95   < > 73   GFRESTBLACK >90  --  >90  --   --   --   --    < >  --    POTASSIUM 4.4 4.7  --   --  5.1   < > 4.4   < > 4.5   TSH 1.63  --   --   --   --   --   --   --  1.69    < > = values in this interval not displayed.      BP Readings from Last 3 Encounters:   01/07/19 150/80   01/02/19 149/64   12/31/18 140/72    Wt Readings from Last 3 Encounters:   01/07/19 99 kg (218 lb 3.2 oz)   01/02/19 99.6 kg (219 lb 9.6 oz)   12/31/18 99.6 kg (219 lb 9.6 oz)                    ROS:  Constitutional, HEENT, cardiovascular, pulmonary, gi and gu systems are negative, except as otherwise noted.    OBJECTIVE:     /80 (BP Location: Right arm, Patient Position: Sitting, Cuff Size: Adult Large)   Pulse 82   Temp 98.3  F (36.8  C) (Oral)   Ht 1.746 m (5' 8.75\")   Wt 99 kg (218 lb 3.2 oz)   SpO2 96%   BMI 32.46 kg/m    Body mass index is 32.46 kg/m .   GENERAL: healthy, alert and no distress  EYES: Eyes grossly normal to inspection, PERRL and conjunctivae and sclerae normal  HENT: ear canals and TM's normal, nose and mouth without ulcers or lesions  NECK: no " adenopathy, no asymmetry, masses, or scars and thyroid normal to palpation  RESP: lungs clear to auscultation - no rales, rhonchi or wheezes  CV: regular rate and rhythm, normal S1 S2, no S3 or S4, no murmur, click or rub, no peripheral edema and peripheral pulses strong  ABDOMEN: soft, nontender, no hepatosplenomegaly, no masses and bowel sounds normal  MS: no gross musculoskeletal defects noted, no edema  SKIN: no suspicious lesions or rashes  NEURO: Normal strength and tone, mentation intact and speech normal  PSYCH: mentation appears normal, affect normal/bright    Diagnostic Test Results:  none     ASSESSMENT:       PLAN:   (R00.2) Palpitations  (primary encounter diagnosis)  Comment: pt still with symptoms -does not feel he is ready for work until ziopatch done-both because of symptoms and also patch itself as this would fall off with his type of work  Plan: metoprolol succinate ER (TOPROL-XL) 25 MG 24 hr        tablet        Forms completed for RTW 1/22/19 without restrictions    (J42) Chronic bronchitis, unspecified chronic bronchitis type (H)  Comment: pt shortness of breath may well be COPD related-he just restarted INcruse which I support  Plan: continue current medications at current doses     (I10) Essential hypertension, benign  Comment: suboptimal control-did not tolerate HCYZ in past due to electrolyte issues  Plan: metoprolol succinate ER (TOPROL-XL) 25 MG 24 hr        tablet        Will add low dose toprol to lisinopril-suspect this will help BP as well as symptomatic palpitations    I reviewed the risks, benefits, and possible side effects of the medication.  The patient had an opportunity to ask any questions regarding the treatment plan. The patient was encouraged to call my office if any problems.  Check blood pressure readings outside of the clinic several times per week, write down values, and follow up if elevated within the next several weeks. Blood pressure can be checked at the  "firestation, drugstore,  or any valid site.     (E78.00) Pure hypercholesterolemia  Comment: stable  Plan: continue current medications at current doses     BMI:   Estimated body mass index is 32.46 kg/m  as calculated from the following:    Height as of this encounter: 1.746 m (5' 8.75\").    Weight as of this encounter: 99 kg (218 lb 3.2 oz).   Weight management plan: Discussed healthy diet and exercise guidelines      FUTURE APPOINTMENTS:       - Follow-up visit in -[address] [place] [address] for BP check, cardiology 2/19 as planned  Work on weight loss  Regular exercise    Yemi Nava MD  [facility]      "

## 2019-02-13 ENCOUNTER — OFFICE VISIT (OUTPATIENT)
Dept: FAMILY MEDICINE | Facility: CLINIC | Age: 59
End: 2019-02-13

## 2019-02-13 VITALS
HEART RATE: 77 BPM | BODY MASS INDEX: 32.49 KG/M2 | OXYGEN SATURATION: 98 % | TEMPERATURE: 98 F | SYSTOLIC BLOOD PRESSURE: 150 MMHG | WEIGHT: 218.4 LBS | DIASTOLIC BLOOD PRESSURE: 84 MMHG

## 2019-02-13 DIAGNOSIS — I10 ESSENTIAL HYPERTENSION, BENIGN: Primary | ICD-10-CM

## 2019-02-13 PROCEDURE — 99213 OFFICE O/P EST LOW 20 MIN: CPT | Performed by: FAMILY MEDICINE

## 2019-02-13 RX ORDER — METOPROLOL SUCCINATE 50 MG/1
50 TABLET, EXTENDED RELEASE ORAL DAILY
Qty: 30 TABLET | Refills: 2 | Status: SHIPPED | OUTPATIENT
Start: 2019-02-13 | End: 2019-06-03

## 2019-02-13 NOTE — NURSING NOTE
Rashaun is here for BP recheck    Pre-visit Screening:  Immunizations:  up to date  Colonoscopy:  is up to date  Mammogram: is up to date  Asthma Action Test/Plan:  NA  PHQ9:  NA  GAD7:  NA  Questioned patient about current smoking habits Pt. quit smoking some time ago.  Ok to leave detailed message on voice mail for today's visit only Yes, phone # [phone]

## 2019-02-13 NOTE — PROGRESS NOTES
SUBJECTIVE:                                                    Rashaun Molina is a 58 year old male who presents to clinic today for the following health issues:      Hypertension Follow-up-toprol added last visit, did not tolerate HCTZ      Outpatient blood pressures are being checked at home.  Results are 140-160/8-.pilses 70s.    Low Salt Diet: no added salt      Amount of exercise or physical activity: work    Problems taking medications regularly: No    Medication side effects: none    Diet: regular (no restrictions)        Cardiology eval again soon    Problem list and histories reviewed & adjusted, as indicated.  Additional history: as documented    Patient Active Problem List   Diagnosis     Essential hypertension, benign     Pure hypercholesterolemia     History of colonic polyps     BCC (basal cell carcinoma)     Health Care Home     Colovesical fistula     S/P total hip arthroplasty     Obesity due to excess calories, unspecified obesity severity     Chronic bronchitis, unspecified chronic bronchitis type (H)     PVD (peripheral vascular disease) (H)     Palpitations     Past Surgical History:   Procedure Laterality Date     ARTHROPLASTY HIP Right 10/7/2015    Procedure: ARTHROPLASTY HIP;  Surgeon: Neil Davis MD;  Location: [facility] OR     COLONOSCOPY  9/05, 4/10/13    Per Hospital encounter dated 4/18/13     COLONOSCOPY N/A 4/9/2018    Procedure: COMBINED COLONOSCOPY, SINGLE OR MULTIPLE BIOPSY/POLYPECTOMY BY BIOPSY;;  Surgeon: Tramaine Zuniga MD;  Location:  GI     COMBINED CYSTOSCOPY, INSERT CATHETER URETER  4/11/2013    Procedure: COMBINED CYSTOSCOPY, INSERT CATHETER URETER;;  Surgeon: Kashif Parks MD;  Location:  OR      COLONOSCOPY THRU STOMA W BIOPSY/CAUTERY TUMOR/POLYP/LESION  1998    michael, tubular adenoma, next colonoscopy 2001      COLONOSCOPY THRU STOMA, DIAGNOSTIC  04/16/01    negative, ligate two internal hemmorhoids  Dr rodriguez-repeat 2008      LARYNGOSCOPY DIRECT W  VOCAL CORD INJECTION      vocal cord polyps     LAPAROSCOPIC ASSISTED COLECTOMY  2013    Procedure: LAPAROSCOPIC ASSISTED COLECTOMY;  LOW ANTERIOR RESECTION ;  Surgeon: Tramaine Zuniga MD;  Location:  OR       Social History     Tobacco Use     Smoking status: Former Smoker     Packs/day: 1.50     Years: 40.00     Pack years: 60.00     Last attempt to quit: 2013     Years since quittin.8     Smokeless tobacco: Never Used   Substance Use Topics     Alcohol use: Yes     Alcohol/week: 8.4 oz     Types: 14 Cans of beer per week     Comment: 2 beers daily     Family History   Problem Relation Age of Onset     Hypertension Mother      Hypertension Father          MI     Heart Disease Father         MI  at age 55     Coronary Artery Disease Father      Hypertension Brother      Hyperlipidemia Brother      Hypertension Brother      Hypertension Sister      Lipids Brother      Lipids Brother          Current Outpatient Medications   Medication Sig Dispense Refill     ASPIRIN EC PO Take 81 mg by mouth daily.       lisinopril (PRINIVIL/ZESTRIL) 40 MG tablet Take 1 tablet (40 mg) by mouth daily 90 tablet 1     metoprolol succinate ER (TOPROL-XL) 25 MG 24 hr tablet Take 1 tablet (25 mg) by mouth daily 30 tablet 1     metoprolol succinate ER (TOPROL-XL) 50 MG 24 hr tablet Take 1 tablet (50 mg) by mouth daily 30 tablet 2     rosuvastatin (CRESTOR) 40 MG tablet Take 1 tablet (40 mg) by mouth At Bedtime 90 tablet 3     umeclidinium (INCRUSE ELLIPTA) 62.5 MCG/INH inhaler Inhale 1 puff into the lungs daily       Allergies   Allergen Reactions     Spiriva Handihaler Blisters     Hctz [Hydrochlorothiazide] Other (See Comments)     Low sodium     Recent Labs   Lab Test 18  0812 10/22/18  0847 18  0718 18  0713 18  1005  10/30/17  1124  14  1021   A1C  --   --  5.4 5.6  --   --   --   --   --    LDL  --   --  62  --  109*  --  109*   < > 94   HDL  --   --  63  --  55  --  45   < > 56    TRIG  --   --  136  --  278*  --  133   < > 339*   ALT 57 51*  --   --  34  --  31   < > 40   CR 0.68 0.86 0.69  --  0.80   < > 0.87   < > 1.13   GFRESTIMATED >90 95 >90  --  98   < > 95   < > 73   GFRESTBLACK >90  --  >90  --   --   --   --    < >  --    POTASSIUM 4.4 4.7  --   --  5.1   < > 4.4   < > 4.5   TSH 1.63  --   --   --   --   --   --   --  1.69    < > = values in this interval not displayed.      BP Readings from Last 3 Encounters:   02/13/19 150/84   01/07/19 150/80   01/02/19 149/64    Wt Readings from Last 3 Encounters:   02/13/19 99.1 kg (218 lb 6.4 oz)   01/07/19 99 kg (218 lb 3.2 oz)   01/02/19 99.6 kg (219 lb 9.6 oz)                    ROS:  Constitutional, HEENT, cardiovascular, pulmonary, gi and gu systems are negative, except as otherwise noted.    OBJECTIVE:     /84 (BP Location: Left arm, Patient Position: Sitting, Cuff Size: Adult Large)   Pulse 77   Temp 98  F (36.7  C) (Oral)   Wt 99.1 kg (218 lb 6.4 oz)   SpO2 98%   BMI 32.49 kg/m    Body mass index is 32.49 kg/m .   GENERAL: healthy, alert and no distress  NECK: no adenopathy, no asymmetry, masses, or scars and thyroid normal to palpation  RESP: lungs clear to auscultation - no rales, rhonchi or wheezes  CV: regular rate and rhythm, normal S1 S2, no S3 or S4, no murmur, click or rub, no peripheral edema and peripheral pulses strong  ABDOMEN: soft, nontender, no hepatosplenomegaly, no masses and bowel sounds normal  MS: no gross musculoskeletal defects noted, no edema    Diagnostic Test Results:  none     ASSESSMENT:       PLAN:   (I10) Essential hypertension, benign  (primary encounter diagnosis)  Comment: suboptimal control-tolerating toprol without lightheadedness  Plan: metoprolol succinate ER (TOPROL-XL) 50 MG 24 hr        tablet        Increase toprol to 50 mg , continue current medications at current doses     Check blood pressure readings outside of the clinic several times per week, write down values, and follow up  if elevated within the next several weeks. Blood pressure can be checked at the firestation, drugstore,  or any valid site.               Yemi Nava MD  [facility]

## 2019-04-29 ENCOUNTER — OFFICE VISIT (OUTPATIENT)
Dept: CARDIOLOGY | Facility: CLINIC | Age: 59
End: 2019-04-29
Payer: COMMERCIAL

## 2019-04-29 VITALS
HEIGHT: 69 IN | SYSTOLIC BLOOD PRESSURE: 132 MMHG | HEART RATE: 64 BPM | BODY MASS INDEX: 32.61 KG/M2 | WEIGHT: 220.2 LBS | DIASTOLIC BLOOD PRESSURE: 78 MMHG

## 2019-04-29 DIAGNOSIS — R00.2 PALPITATIONS: ICD-10-CM

## 2019-04-29 DIAGNOSIS — I73.9 PVD (PERIPHERAL VASCULAR DISEASE) (H): Primary | ICD-10-CM

## 2019-04-29 DIAGNOSIS — I10 ESSENTIAL HYPERTENSION, BENIGN: ICD-10-CM

## 2019-04-29 PROCEDURE — 93000 ELECTROCARDIOGRAM COMPLETE: CPT | Performed by: INTERNAL MEDICINE

## 2019-04-29 PROCEDURE — 99203 OFFICE O/P NEW LOW 30 MIN: CPT | Performed by: INTERNAL MEDICINE

## 2019-04-29 ASSESSMENT — MIFFLIN-ST. JEOR: SCORE: 1795.23

## 2019-04-29 NOTE — LETTER
4/29/2019      Yemi Nava MD  [address] [facility] [address]  [facility] [address]      RE: Rashaun Molina       Dear Colleague,    I had the pleasure of seeing Rashaun Molina in the [facility].    Service Date: 04/29/2019      HISTORY OF PRESENT ILLNESS:  It is a pleasure for me to see Mr. Molina.  He is here for cardiac evaluation.  He has concerns about his cardiac health.  His family history is positive.  Father had myocardial infarction at the age of 54.  Brother has a defibrillator as his ejection fraction was 25%.      Mr. Molina has no exertional chest discomfort, but he has been out of breath for the past few years.  He works as a  for [employer].  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  He has no PND, orthopnea or ankle swelling.  He started smoking at the age of 12.  He smoked 2 packs of cigarettes a day.  He gave up about 6 years ago.  He has used inhalers in the past.  There is no history of heavy alcohol use.      Physical examination is notable for soft expiratory wheezing audible all areas.  He had an event monitor done at the end of last year which did not show any significant arrhythmias.  He had a dobutamine echocardiogram done in 01/2019; there were no EKG changes and the test showed no evidence of inducible ischemia.  Baseline EKG is normal.      This is a gentleman with exertional dyspnea and expiratory few wheezing.  Stress testing showed no inducible ischemia, no significant arrhythmias were detected.  I think it is highly likely that his shortness of breath on exertion is due to COPD.  He is not using inhalers.  I recommended he follow up with either his primary physician or pulmonary physician for further pulmonary evaluation and treatment.      I am certainly happy to hear that he has given up smoking for the past 6 years.  His blood pressure appears to be under decent control.  He also has dyslipidemia and he is on  40 mg of rosuvastatin.  I should mention that this is a gentleman with known peripheral arterial disease.  This is expertly looked after by Dr. Lee.  I would recommend an LDL level of 70 or below, if feasible.      I do not think he needs further cardiac workup at this time.  We will be happy to see him again in the future if requested.         STALIN DIANA MD, [facility]             D: 2019   T: 2019   MT:       Name:     GERALDO VALDEZ   MRN:      [id number]        Account:      [id number]   :      1960           Service Date: 2019      Document: [id number]         Outpatient Encounter Medications as of 2019   Medication Sig Dispense Refill     ASPIRIN EC PO Take 81 mg by mouth daily.       lisinopril (PRINIVIL/ZESTRIL) 40 MG tablet Take 1 tablet (40 mg) by mouth daily 90 tablet 1     metoprolol succinate ER (TOPROL-XL) 50 MG 24 hr tablet Take 1 tablet (50 mg) by mouth daily 30 tablet 2     rosuvastatin (CRESTOR) 40 MG tablet Take 1 tablet (40 mg) by mouth At Bedtime 90 tablet 3     umeclidinium (INCRUSE ELLIPTA) 62.5 MCG/INH inhaler Inhale 1 puff into the lungs daily       [DISCONTINUED] metoprolol succinate ER (TOPROL-XL) 25 MG 24 hr tablet Take 1 tablet (25 mg) by mouth daily 30 tablet 1     No facility-administered encounter medications on file as of 2019.        Again, thank you for allowing me to participate in the care of your patient.      Sincerely,    DR STALIN DIANA MD     [facility]

## 2019-04-29 NOTE — PROGRESS NOTES
Service Date: 04/29/2019      HISTORY OF PRESENT ILLNESS:  It is a pleasure for me to see Mr. Molina.  He is here for cardiac evaluation.  He has concerns about his cardiac health.  His family history is positive.  Father had myocardial infarction at the age of 54.  Brother has a defibrillator as his ejection fraction was 25%.      Mr. Molina has no exertional chest discomfort, but he has been out of breath for the past few years.  He works as a  for [employer].  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  He has no PND, orthopnea or ankle swelling.  He started smoking at the age of 12.  He smoked 2 packs of cigarettes a day.  He gave up about 6 years ago.  He has used inhalers in the past.  There is no history of heavy alcohol use.      Physical examination is notable for soft expiratory wheezing audible all areas.  He had an event monitor done at the end of last year which did not show any significant arrhythmias.  He had a dobutamine echocardiogram done in 01/2019; there were no EKG changes and the test showed no evidence of inducible ischemia.  Baseline EKG is normal.      This is a gentleman with exertional dyspnea and expiratory few wheezing.  Stress testing showed no inducible ischemia, no significant arrhythmias were detected.  I think it is highly likely that his shortness of breath on exertion is due to COPD.  He is not using inhalers.  I recommended he follow up with either his primary physician or pulmonary physician for further pulmonary evaluation and treatment.      I am certainly happy to hear that he has given up smoking for the past 6 years.  His blood pressure appears to be under decent control.  He also has dyslipidemia and he is on 40 mg of rosuvastatin.  I should mention that this is a gentleman with known peripheral arterial disease.  This is expertly looked after by Dr. Lee.  I would recommend an LDL level of 70 or below, if feasible.      I do not think he  needs further cardiac workup at this time.  We will be happy to see him again in the future if requested.         STALIN DIANA MD, FACC             D: 2019   T: 2019   MT: [id number]      Name:     GERALDO VALDEZ   MRN:      [id number]        Account:      [id number]   :      1960           Service Date: 2019      Document: [id number]

## 2019-04-29 NOTE — LETTER
4/29/2019    Yemi Nava MD  [address]  [facility] [address]    RE: Rashaun Molina       Dear Colleague,    I had the pleasure of seeing Rashaun Molina in the [facility].    HPI and Plan:       Orders Placed This Encounter   Procedures     EKG 12-lead complete w/read - Clinics (performed today)       No orders of the defined types were placed in this encounter.      Encounter Diagnoses   Name Primary?     PVD (peripheral vascular disease) (H) Yes     Palpitations      Essential hypertension, benign        CURRENT MEDICATIONS:  Current Outpatient Medications   Medication Sig Dispense Refill     ASPIRIN EC PO Take 81 mg by mouth daily.       lisinopril (PRINIVIL/ZESTRIL) 40 MG tablet Take 1 tablet (40 mg) by mouth daily 90 tablet 1     metoprolol succinate ER (TOPROL-XL) 50 MG 24 hr tablet Take 1 tablet (50 mg) by mouth daily 30 tablet 2     rosuvastatin (CRESTOR) 40 MG tablet Take 1 tablet (40 mg) by mouth At Bedtime 90 tablet 3     umeclidinium (INCRUSE ELLIPTA) 62.5 MCG/INH inhaler Inhale 1 puff into the lungs daily         ALLERGIES     Allergies   Allergen Reactions     Spiriva Handihaler Blisters     Hctz [Hydrochlorothiazide] Other (See Comments)     Low sodium       PAST MEDICAL HISTORY:  Past Medical History:   Diagnosis Date     Arthritis      Chronic airway obstruction, not elsewhere classified 3/8/2004    pt says that he does not have COPD     Cough syncope 11/21/2012     Essential hypertension, benign      Fistula     colon/bladder     Fracture of right proximal fibula 7/30/2014     Orthostatic hypotension 11/24/2014     Other chronic pain     right hip pain     PAD (peripheral artery disease) (H) 08/2018    PTA right SFA Dr. Lee     Palpitations 12/2018 01/2019 Event monitor- SR/ST     Personal history of colonic polyps 6/28/2005     Pure hypercholesterolemia      Tobacco use disorder 3/8/2004     Vasovagal syncopes 10/25/2014     Viral warts,  unspecified     genital       PAST SURGICAL HISTORY:  Past Surgical History:   Procedure Laterality Date     ARTHROPLASTY HIP Right 10/7/2015    Procedure: ARTHROPLASTY HIP;  Surgeon: Neil Davis MD;  Location: [facility] OR     COLONOSCOPY  , 4/10/13    Per Hospital encounter dated 13     COLONOSCOPY N/A 2018    Procedure: COMBINED COLONOSCOPY, SINGLE OR MULTIPLE BIOPSY/POLYPECTOMY BY BIOPSY;;  Surgeon: Tramaine Zuniga MD;  Location:  GI     COMBINED CYSTOSCOPY, INSERT CATHETER URETER  2013    Procedure: COMBINED CYSTOSCOPY, INSERT CATHETER URETER;;  Surgeon: Kashif Parks MD;  Location:  OR     HC COLONOSCOPY THRU STOMA W BIOPSY/CAUTERY TUMOR/POLYP/LESION      michael, tubular adenoma, next colonoscopy      HC COLONOSCOPY THRU STOMA, DIAGNOSTIC  01    negative, ligate two internal hemmorhoids  Dr rodriguez-repeat      HC LARYNGOSCOPY DIRECT W VOCAL CORD INJECTION      vocal cord polyps     LAPAROSCOPIC ASSISTED COLECTOMY  2013    Procedure: LAPAROSCOPIC ASSISTED COLECTOMY;  LOW ANTERIOR RESECTION ;  Surgeon: Tramaine Zuniga MD;  Location:  OR       FAMILY HISTORY:  Family History   Problem Relation Age of Onset     Hypertension Mother      Hypertension Father          MI     Heart Disease Father         MI  at age 55     Coronary Artery Disease Father      Hyperlipidemia Brother      Hypertension Brother      Heart Surgery Brother         defibrillator     Hypertension Sister        SOCIAL HISTORY:  Social History     Socioeconomic History     Marital status: Single     Spouse name: None     Number of children: 0     Years of education: None     Highest education level: None   Occupational History     Employer: [employer]   Social Needs     Financial resource strain: None     Food insecurity:     Worry: None     Inability: None     Transportation needs:     Medical: None     Non-medical: None   Tobacco Use     Smoking status:  Former Smoker     Packs/day: 1.50     Years: 40.00     Pack years: 60.00     Last attempt to quit: 2013     Years since quittin.0     Smokeless tobacco: Never Used   Substance and Sexual Activity     Alcohol use: Yes     Alcohol/week: 8.4 oz     Types: 14 Cans of beer per week     Comment: 2 beers daily     Drug use: No     Comment: in 1970's used marijauna and cocaine     Sexual activity: Yes     Partners: Female     Birth control/protection: Surgical   Lifestyle     Physical activity:     Days per week: None     Minutes per session: None     Stress: None   Relationships     Social connections:     Talks on phone: None     Gets together: None     Attends Buddhist service: None     Active member of club or organization: None     Attends meetings of clubs or organizations: None     Relationship status: None     Intimate partner violence:     Fear of current or ex partner: None     Emotionally abused: None     Physically abused: None     Forced sexual activity: None   Other Topics Concern      Service No     Blood Transfusions No     Caffeine Concern No     Occupational Exposure No     Hobby Hazards No     Sleep Concern No     Stress Concern No     Weight Concern No     Special Diet No     Back Care No     Exercise No     Bike Helmet No     Seat Belt Yes     Self-Exams No     Parent/sibling w/ CABG, MI or angioplasty before 65F 55M? Not Asked   Social History Narrative     None       Review of Systems:  Skin:  Positive for bruising   Eyes:  Positive for glasses  ENT:  Positive for hearing loss  Respiratory:  Positive for dyspnea on exertion;wheezing  Cardiovascular:    Positive for;palpitations;lightheadedness;edema  Gastroenterology: Negative    Genitourinary:  Negative    Musculoskeletal:  Positive for joint pain;nocturnal cramping  Neurologic:  Positive for numbness or tingling of feet  Psychiatric:  Positive for sleep disturbances;anxiety  Heme/Lymph/Imm:  Positive for allergies  Endocrine:   "Negative      Physical Exam:  Vitals: /78 (BP Location: Right arm, Patient Position: Chair, Cuff Size: Adult Large)   Pulse 64   Ht 1.746 m (5' 8.75\")   Wt 99.9 kg (220 lb 3.2 oz)   BMI 32.75 kg/m       Constitutional:  cooperative;well developed        Skin:  warm and dry to the touch, no apparent skin lesions or masses noted          Head:  normocephalic, no masses or lesions        Eyes:  pupils equal and round, conjunctivae and lids unremarkable, sclera white, no xanthalasma, EOMS intact, no nystagmus        Lymph:No Cervical lymphadenopathy present     ENT:  no pallor or cyanosis, dentition good        Neck:  carotid pulses are full and equal bilaterally, JVP normal, no carotid bruit        Respiratory:  normal symmetry;no use of accessory muscles expiratory wheezes       Cardiac: regular rhythm, normal S1/S2, no S3 or S4, apical impulse not displaced, no murmurs, gallops or rubs                pulses full and equal, no bruits auscultated                                        GI:  abdomen soft, non-tender, BS normoactive, no mass, no HSM, no bruits        Extremities and Muscular Skeletal:  no deformities, clubbing, cyanosis, erythema observed              Neurological:  no gross motor deficits        Psych:  Alert and Oriented x 3        Recent Lab Results:  LIPID RESULTS:  Lab Results   Component Value Date    CHOL 152 08/06/2018    HDL 63 08/06/2018    LDL 62 08/06/2018    TRIG 136 08/06/2018    CHOLHDLRATIO 3.7 04/30/2018       LIVER ENZYME RESULTS:  Lab Results   Component Value Date    AST 50 (H) 12/27/2018    ALT 57 12/27/2018       CBC RESULTS:  Lab Results   Component Value Date    WBC 7.0 12/27/2018    RBC 4.21 (L) 12/27/2018    HGB 14.2 12/27/2018    HCT 41.5 12/27/2018    MCV 99 12/27/2018    MCH 33.7 (H) 12/27/2018    MCHC 34.2 12/27/2018    RDW 13.8 12/27/2018     12/27/2018       BMP RESULTS:  Lab Results   Component Value Date     12/27/2018    POTASSIUM 4.4 12/27/2018    " CHLORIDE 97 12/27/2018    CO2 27 12/27/2018    ANIONGAP 10 12/27/2018    GLC 94 12/27/2018    BUN 9 12/27/2018    CR 0.68 12/27/2018    GFRESTIMATED >90 12/27/2018    GFRESTBLACK >90 12/27/2018    FRANNY 8.6 12/27/2018        A1C RESULTS:  Lab Results   Component Value Date    A1C 5.4 08/06/2018       INR RESULTS:  Lab Results   Component Value Date    INR 1.04 08/06/2018    INR 1.07 05/14/2018           CC  Yemi Nava MD  [address]  [place] [address]                  Thank you for allowing me to participate in the care of your patient.      Sincerely,     DR STALIN DIANA MD     [facility]    cc:   Yemi Nava MD  [address] [place] [address]

## 2019-04-30 DIAGNOSIS — I10 ESSENTIAL HYPERTENSION, BENIGN: ICD-10-CM

## 2019-04-30 RX ORDER — LISINOPRIL 40 MG/1
TABLET ORAL
Qty: 30 TABLET | Refills: 0 | COMMUNITY
Start: 2019-04-30

## 2019-05-08 ENCOUNTER — OFFICE VISIT (OUTPATIENT)
Dept: FAMILY MEDICINE | Facility: CLINIC | Age: 59
End: 2019-05-08

## 2019-05-08 VITALS
HEART RATE: 68 BPM | BODY MASS INDEX: 33.41 KG/M2 | SYSTOLIC BLOOD PRESSURE: 142 MMHG | WEIGHT: 224.6 LBS | OXYGEN SATURATION: 93 % | TEMPERATURE: 98.2 F | DIASTOLIC BLOOD PRESSURE: 78 MMHG

## 2019-05-08 DIAGNOSIS — L02.214 ABSCESS OF GROIN, RIGHT: Primary | ICD-10-CM

## 2019-05-08 PROCEDURE — 99213 OFFICE O/P EST LOW 20 MIN: CPT | Performed by: FAMILY MEDICINE

## 2019-05-08 RX ORDER — CEPHALEXIN 500 MG/1
500 CAPSULE ORAL 4 TIMES DAILY
Qty: 40 CAPSULE | Refills: 0 | Status: SHIPPED | OUTPATIENT
Start: 2019-05-08 | End: 2019-06-03

## 2019-05-08 NOTE — PROGRESS NOTES
SUBJECTIVE: Rashaun Molina is a 59 year old male who presents for right groin sore -has had for 2 months-not healing completely-describes some bleeding on shorts on and off but has not seen pus.  He has not seen large swelling or pain. No fevers     Pt has had rectal abscess in past per his report    Patient Active Problem List   Diagnosis     Essential hypertension, benign     Pure hypercholesterolemia     History of colonic polyps     BCC (basal cell carcinoma)     Health Care Home     Colovesical fistula     S/P total hip arthroplasty     Obesity due to excess calories, unspecified obesity severity     Chronic bronchitis, unspecified chronic bronchitis type (H)     PVD (peripheral vascular disease) (H)     Palpitations     Past Medical History:   Diagnosis Date     Arthritis      Chronic airway obstruction, not elsewhere classified 3/8/2004    pt says that he does not have COPD     Cough syncope 2012     Essential hypertension, benign      Fistula     colon/bladder     Fracture of right proximal fibula 2014     Orthostatic hypotension 2014     Other chronic pain     right hip pain     PAD (peripheral artery disease) (H) 2018    PTA right SFA Dr. Lee     Palpitations 2018 Event monitor- SR/ST     Personal history of colonic polyps 2005     Pure hypercholesterolemia      Tobacco use disorder 3/8/2004     Vasovagal syncopes 10/25/2014     Viral warts, unspecified     genital     Family History   Problem Relation Age of Onset     Hypertension Mother      Hypertension Father          MI     Heart Disease Father         MI  at age 55     Coronary Artery Disease Father      Hyperlipidemia Brother      Hypertension Brother      Heart Surgery Brother         defibrillator     Hypertension Sister      Social History     Socioeconomic History     Marital status: Single     Spouse name: Not on file     Number of children: 0     Years of education: Not on file     Highest  education level: Not on file   Occupational History     Employer: [employer]   Social Needs     Financial resource strain: Not on file     Food insecurity:     Worry: Not on file     Inability: Not on file     Transportation needs:     Medical: Not on file     Non-medical: Not on file   Tobacco Use     Smoking status: Former Smoker     Packs/day: 1.50     Years: 40.00     Pack years: 60.00     Last attempt to quit: 2013     Years since quittin.0     Smokeless tobacco: Never Used   Substance and Sexual Activity     Alcohol use: Yes     Alcohol/week: 8.4 oz     Types: 14 Cans of beer per week     Comment: 2 beers daily     Drug use: No     Comment: in 's used marijauna and cocaine     Sexual activity: Yes     Partners: Female     Birth control/protection: Surgical   Lifestyle     Physical activity:     Days per week: Not on file     Minutes per session: Not on file     Stress: Not on file   Relationships     Social connections:     Talks on phone: Not on file     Gets together: Not on file     Attends Temple service: Not on file     Active member of club or organization: Not on file     Attends meetings of clubs or organizations: Not on file     Relationship status: Not on file     Intimate partner violence:     Fear of current or ex partner: Not on file     Emotionally abused: Not on file     Physically abused: Not on file     Forced sexual activity: Not on file   Other Topics Concern      Service No     Blood Transfusions No     Caffeine Concern No     Occupational Exposure No     Hobby Hazards No     Sleep Concern No     Stress Concern No     Weight Concern No     Special Diet No     Back Care No     Exercise No     Bike Helmet No     Seat Belt Yes     Self-Exams No     Parent/sibling w/ CABG, MI or angioplasty before 65F 55M? Not Asked   Social History Narrative     Not on file     Past Surgical History:   Procedure Laterality Date     ARTHROPLASTY HIP Right  10/7/2015    Procedure: ARTHROPLASTY HIP;  Surgeon: Neil Davis MD;  Location: [facility] OR     COLONOSCOPY  9/05, 4/10/13    Per Hospital encounter dated 4/18/13     COLONOSCOPY N/A 4/9/2018    Procedure: COMBINED COLONOSCOPY, SINGLE OR MULTIPLE BIOPSY/POLYPECTOMY BY BIOPSY;;  Surgeon: Tramaine Zuniga MD;  Location:  GI     COMBINED CYSTOSCOPY, INSERT CATHETER URETER  4/11/2013    Procedure: COMBINED CYSTOSCOPY, INSERT CATHETER URETER;;  Surgeon: Kashif Parks MD;  Location:  OR     HC COLONOSCOPY THRU STOMA W BIOPSY/CAUTERY TUMOR/POLYP/LESION  1998    michael, tubular adenoma, next colonoscopy 2001     HC COLONOSCOPY THRU STOMA, DIAGNOSTIC  04/16/01    negative, ligate two internal hemmorhoids  Dr rodriguez-repeat 2008     HC LARYNGOSCOPY DIRECT W VOCAL CORD INJECTION      vocal cord polyps     LAPAROSCOPIC ASSISTED COLECTOMY  4/11/2013    Procedure: LAPAROSCOPIC ASSISTED COLECTOMY;  LOW ANTERIOR RESECTION ;  Surgeon: Tramaine Zuniga MD;  Location:  OR       Current Outpatient Medications on File Prior to Visit:  ASPIRIN EC PO Take 81 mg by mouth daily.   lisinopril (PRINIVIL/ZESTRIL) 40 MG tablet Take 1 tablet (40 mg) by mouth daily   metoprolol succinate ER (TOPROL-XL) 50 MG 24 hr tablet Take 1 tablet (50 mg) by mouth daily   rosuvastatin (CRESTOR) 40 MG tablet Take 1 tablet (40 mg) by mouth At Bedtime   umeclidinium (INCRUSE ELLIPTA) 62.5 MCG/INH inhaler Inhale 1 puff into the lungs daily     No current facility-administered medications on file prior to visit.      Allergies: Spiriva handihaler and Hctz [hydrochlorothiazide]    Immunization History   Administered Date(s) Administered     Influenza Vaccine IM 3yrs+ 4 Valent IIV4 12/26/2013, 11/07/2016, 10/30/2017, 10/22/2018     Pneumococcal 23 valent 07/27/2011     TD (ADULT, 7+) 07/10/2000     TDAP Vaccine (Boostrix) 03/24/2010        OBJECTIVE: /78 (BP Location: Left arm, Patient Position: Sitting, Cuff Size: Adult Large)   Pulse 68   Temp  98.2  F (36.8  C) (Oral)   Wt 101.9 kg (224 lb 9.6 oz)   SpO2 93%   BMI 33.41 kg/m     Skin: right inguinal fold reveals a 2x2 cm boil/abscess which is draining serosanguinous fluid, no purulent matter seen, minimally tender, no fluctuance or firm nodularity deep, no surrounding erythema    ASSESSMENT: /PLAN:   (L02.214) Abscess of groin, right  (primary encounter diagnosis)  Comment: likely partially healed abscess-discussed steps to assist healing  Plan: cephALEXin (KEFLEX) 500 MG capsule        Abx, soaks in tub once or more per day, call if not resolving

## 2019-05-08 NOTE — NURSING NOTE
Rashaun is here for  Sore on his right hip not healing    Pre-visit Screening:  Immunizations:  up to date  Colonoscopy:  is up to date  Mammogram: NA  Asthma Action Test/Plan:  NA  PHQ9:  NA  GAD7:  NA  Questioned patient about current smoking habits Pt. quit smoking some time ago.  Ok to leave detailed message on voice mail for today's visit only Yes, phone # [phone]

## 2019-06-03 ENCOUNTER — TELEPHONE (OUTPATIENT)
Dept: FAMILY MEDICINE | Facility: CLINIC | Age: 59
End: 2019-06-03

## 2019-06-03 DIAGNOSIS — E78.5 HYPERLIPIDEMIA LDL GOAL <70: ICD-10-CM

## 2019-06-03 DIAGNOSIS — I10 ESSENTIAL HYPERTENSION, BENIGN: ICD-10-CM

## 2019-06-03 RX ORDER — ROSUVASTATIN CALCIUM 40 MG/1
40 TABLET, COATED ORAL AT BEDTIME
Qty: 30 TABLET | Refills: 0 | COMMUNITY
Start: 2019-06-03 | End: 2019-06-10

## 2019-06-03 RX ORDER — METOPROLOL SUCCINATE 50 MG/1
50 TABLET, EXTENDED RELEASE ORAL DAILY
Qty: 30 TABLET | Refills: 0 | COMMUNITY
Start: 2019-06-03 | End: 2019-06-10

## 2019-06-03 RX ORDER — LISINOPRIL 40 MG/1
40 TABLET ORAL DAILY
Qty: 30 TABLET | Refills: 0 | COMMUNITY
Start: 2019-06-03 | End: 2019-06-10

## 2019-06-03 NOTE — TELEPHONE ENCOUNTER
Rashaun called wanting a one month supply of the following meds. He has an appt on 6/10/19. I am calling in a one month supply for him to [facility] pharmacy.        Signed Prescriptions:                        Disp   Refills    lisinopril (PRINIVIL/ZESTRIL) 40 MG tablet 30 tab*0        Sig: Take 1 tablet (40 mg) by mouth daily  Authorizing Provider: RODRIGO RUSSELL  Ordering User: BERNICE GARNER    metoprolol succinate ER (TOPROL-XL) 50 MG *30 tab*0        Sig: Take 1 tablet (50 mg) by mouth daily  Authorizing Provider: RODRIGO RUSSELL  Ordering User: BERNICE GARNER    rosuvastatin (CRESTOR) 40 MG tablet        30 tab*0        Sig: Take 1 tablet (40 mg) by mouth At Bedtime  Authorizing Provider: RODRIGO RUSSELL  Ordering User: BERNICE GARNER

## 2019-06-10 ENCOUNTER — OFFICE VISIT (OUTPATIENT)
Dept: FAMILY MEDICINE | Facility: CLINIC | Age: 59
End: 2019-06-10

## 2019-06-10 VITALS
HEIGHT: 69 IN | SYSTOLIC BLOOD PRESSURE: 138 MMHG | DIASTOLIC BLOOD PRESSURE: 82 MMHG | WEIGHT: 220.4 LBS | TEMPERATURE: 98.2 F | BODY MASS INDEX: 32.64 KG/M2 | OXYGEN SATURATION: 96 % | HEART RATE: 77 BPM

## 2019-06-10 DIAGNOSIS — I10 ESSENTIAL HYPERTENSION, BENIGN: ICD-10-CM

## 2019-06-10 DIAGNOSIS — L02.214 ABSCESS OF GROIN, RIGHT: Primary | ICD-10-CM

## 2019-06-10 DIAGNOSIS — E78.5 HYPERLIPIDEMIA LDL GOAL <70: ICD-10-CM

## 2019-06-10 DIAGNOSIS — I73.9 PVD (PERIPHERAL VASCULAR DISEASE) (H): ICD-10-CM

## 2019-06-10 LAB
ALBUMIN SERPL-MCNC: 4.2 G/DL (ref 3.6–5.1)
ALP SERPL-CCNC: 78 U/L (ref 40–115)
ALT 1742-6: 24 U/L (ref 9–46)
AST 1920-8: 21 U/L (ref 10–35)
BILIRUB SERPL-MCNC: 0.5 MG/DL (ref 0.2–1.2)
BUN SERPL-MCNC: 10 MG/DL (ref 7–25)
CALCIUM SERPL-MCNC: 10 MG/DL (ref 8.6–10.3)
CHLORIDE SERPLBLD-SCNC: 97.6 MMOL/L (ref 98–110)
CHOLEST SERPL-MCNC: 155 MG/DL (ref 0–199)
CHOLEST/HDLC SERPL: 3 {RATIO} (ref 0–5)
CO2 SERPL-SCNC: 30 MMOL/L (ref 20–32)
CREAT SERPL-MCNC: 1.04 MG/DL (ref 0.7–1.18)
GLUCOSE SERPL-MCNC: 100 MG/DL (ref 60–99)
HDLC SERPL-MCNC: 50 MG/DL (ref 40–150)
LDLC SERPL CALC-MCNC: 80 MG/DL (ref 0–99)
POTASSIUM SERPL-SCNC: 4.77 MMOL/L (ref 3.5–5.3)
PROT SERPL-MCNC: 7.9 G/DL (ref 6.1–8.1)
SODIUM SERPL-SCNC: 135 MMOL/L (ref 135–146)
TRIGL SERPL-MCNC: 125 MG/DL (ref 0–149)

## 2019-06-10 PROCEDURE — 99214 OFFICE O/P EST MOD 30 MIN: CPT | Performed by: FAMILY MEDICINE

## 2019-06-10 PROCEDURE — 80053 COMPREHEN METABOLIC PANEL: CPT | Performed by: FAMILY MEDICINE

## 2019-06-10 PROCEDURE — 36415 COLL VENOUS BLD VENIPUNCTURE: CPT | Performed by: FAMILY MEDICINE

## 2019-06-10 PROCEDURE — 80061 LIPID PANEL: CPT | Performed by: FAMILY MEDICINE

## 2019-06-10 RX ORDER — LISINOPRIL 40 MG/1
40 TABLET ORAL DAILY
Qty: 90 TABLET | Refills: 1 | Status: SHIPPED | OUTPATIENT
Start: 2019-06-10 | End: 2019-11-11

## 2019-06-10 RX ORDER — ROSUVASTATIN CALCIUM 40 MG/1
40 TABLET, COATED ORAL AT BEDTIME
Qty: 90 TABLET | Refills: 1 | Status: SHIPPED | OUTPATIENT
Start: 2019-06-10 | End: 2019-11-11

## 2019-06-10 RX ORDER — METOPROLOL SUCCINATE 50 MG/1
50 TABLET, EXTENDED RELEASE ORAL DAILY
Qty: 90 TABLET | Refills: 1 | Status: SHIPPED | OUTPATIENT
Start: 2019-06-10 | End: 2019-11-11

## 2019-06-10 ASSESSMENT — MIFFLIN-ST. JEOR: SCORE: 1796.14

## 2019-06-10 NOTE — LETTER
June 12, 2019      Rashaun Molina  [address] [facility] [id number]        Rashaun THALIA Molina     The results of your recent Blood Sugar, Kidney Tests, Lipid profile and Liver Panel were within normal limits. The results are now released on My Chart. Please contact me if you have further questions or concerns.        Resulted Orders   Lipid Panel (BFP)   Result Value Ref Range    Cholesterol 155 0 - 199 mg/dL    Triglycerides 125 0 - 149 mg/dL    HDL Cholesterol 50 40 - 150 mg/dL    LDL Cholesterol Direct 80 0 - 99 mg/dL    Cholesterol/HDL Ratio 3 0 - 5   Comprehensive Metobolic Panel (BFP)   Result Value Ref Range    Carbon Dioxide 30.0 20 - 32 mmol/L    Creatinine 1.04 0.70 - 1.18 mg/dL    Glucose 100 (A) 60 - 99 mg/dL    Sodium 135.0 135 - 146 mmol/L    Potassium 4.77 3.5 - 5.3 mmol/L    Chloride 97.6 (A) 98 - 110 mmol/L    Protein Total 7.9 6.1 - 8.1 g/dL    Albumin 4.2 3.6 - 5.1 g/dL    Alkaline Phosphatase 78 40 - 115 U/L    ALT 24 9 - 46 U/L    AST 21 10 - 35 U/L    Bilirubin Total 0.5 0.2 - 1.2 mg/dL    Urea Nitrogen 10 7 - 25 mg/dL    Calcium 10.0 8.6 - 10.3 mg/dL       If you have any questions or concerns, please call the clinic at the number listed above.       Sincerely,        Yemi Nava MD

## 2019-06-10 NOTE — PROGRESS NOTES
Subjective     Rashaun Molina is a 59 year old male who presents to clinic today for the following health issues:    HPI   Hyperlipidemia Follow-Up      Are you having any of the following symptoms? (Select all that apply)  No complaints of shortness of breath, chest pain or pressure.  No increased sweating or nausea with activity.  No left-sided neck or arm pain.  No complaints of pain in calves when walking 1-2 blocks.-still some exertional dyspnea-saw cardiology and ? If COPD    Are you regularly taking any medication or supplement to lower your cholesterol?   Yes- crestor    Are you having muscle aches or other side effects that you think could be caused by your cholesterol lowering medication?  No      Hypertension Follow-up      Do you check your blood pressure regularly outside of the clinic? Yes     Are you following a low salt diet? No    Are your blood pressures ever more than 140 on the top number (systolic) OR more   than 90 on the bottom number (diastolic), for example 140/90? No    Amount of exercise or physical activity: work related    Problems taking medications regularly: No    Medication side effects: none    Diet: regular (no restrictions)      Right groin abscess still present despite abx a month or so ago, seems to fluctuate, no fevers, occasional drainage    Patient Active Problem List   Diagnosis     Essential hypertension, benign     Pure hypercholesterolemia     History of colonic polyps     BCC (basal cell carcinoma)     Health Care Home     Colovesical fistula     S/P total hip arthroplasty     Obesity due to excess calories, unspecified obesity severity     Chronic bronchitis, unspecified chronic bronchitis type (H)     PVD (peripheral vascular disease) (H)     Palpitations     Past Surgical History:   Procedure Laterality Date     ARTHROPLASTY HIP Right 10/7/2015    Procedure: ARTHROPLASTY HIP;  Surgeon: Neil Davis MD;  Location: [facility] OR     COLONOSCOPY  9/05, 4/10/13    Per  Hospital encounter dated 13     COLONOSCOPY N/A 2018    Procedure: COMBINED COLONOSCOPY, SINGLE OR MULTIPLE BIOPSY/POLYPECTOMY BY BIOPSY;;  Surgeon: Tramaine Zuniga MD;  Location:  GI     COMBINED CYSTOSCOPY, INSERT CATHETER URETER  2013    Procedure: COMBINED CYSTOSCOPY, INSERT CATHETER URETER;;  Surgeon: Kashif Parks MD;  Location:  OR     HC COLONOSCOPY THRU STOMA W BIOPSY/CAUTERY TUMOR/POLYP/LESION      michael, tubular adenoma, next colonoscopy      HC COLONOSCOPY THRU STOMA, DIAGNOSTIC  01    negative, ligate two internal hemmorhoids  Dr rodriguez-repeat      HC LARYNGOSCOPY DIRECT W VOCAL CORD INJECTION      vocal cord polyps     LAPAROSCOPIC ASSISTED COLECTOMY  2013    Procedure: LAPAROSCOPIC ASSISTED COLECTOMY;  LOW ANTERIOR RESECTION ;  Surgeon: Tramaine Zuniga MD;  Location:  OR       Social History     Tobacco Use     Smoking status: Former Smoker     Packs/day: 1.50     Years: 40.00     Pack years: 60.00     Last attempt to quit: 2013     Years since quittin.1     Smokeless tobacco: Never Used   Substance Use Topics     Alcohol use: Yes     Alcohol/week: 8.4 oz     Types: 14 Cans of beer per week     Comment: 2 beers daily     Family History   Problem Relation Age of Onset     Hypertension Mother      Hypertension Father          MI     Heart Disease Father         MI  at age 55     Coronary Artery Disease Father      Hyperlipidemia Brother      Hypertension Brother      Heart Surgery Brother         defibrillator     Hypertension Sister          Current Outpatient Medications   Medication Sig Dispense Refill     ASPIRIN EC PO Take 81 mg by mouth daily.       lisinopril (PRINIVIL/ZESTRIL) 40 MG tablet Take 1 tablet (40 mg) by mouth daily 30 tablet 0     metoprolol succinate ER (TOPROL-XL) 50 MG 24 hr tablet Take 1 tablet (50 mg) by mouth daily 30 tablet 0     rosuvastatin (CRESTOR) 40 MG tablet Take 1 tablet (40 mg) by mouth At Bedtime 30  tablet 0     umeclidinium (INCRUSE ELLIPTA) 62.5 MCG/INH inhaler Inhale 1 puff into the lungs daily       Allergies   Allergen Reactions     Spiriva Handihaler Blisters     Hctz [Hydrochlorothiazide] Other (See Comments)     Low sodium     Recent Labs   Lab Test 12/27/18  0812 10/22/18  0847 08/06/18  0718 05/14/18  0713 04/30/18  1005  10/30/17  1124  07/23/14  1021   A1C  --   --  5.4 5.6  --   --   --   --   --    LDL  --   --  62  --  109*  --  109*   < > 94   HDL  --   --  63  --  55  --  45   < > 56   TRIG  --   --  136  --  278*  --  133   < > 339*   ALT 57 51*  --   --  34  --  31   < > 40   CR 0.68 0.86 0.69  --  0.80   < > 0.87   < > 1.13   GFRESTIMATED >90 95 >90  --  98   < > 95   < > 73   GFRESTBLACK >90  --  >90  --   --   --   --    < >  --    POTASSIUM 4.4 4.7  --   --  5.1   < > 4.4   < > 4.5   TSH 1.63  --   --   --   --   --   --   --  1.69    < > = values in this interval not displayed.      BP Readings from Last 3 Encounters:   06/10/19 138/82   05/08/19 142/78   04/29/19 132/78    Wt Readings from Last 3 Encounters:   06/10/19 100 kg (220 lb 6.4 oz)   05/08/19 101.9 kg (224 lb 9.6 oz)   04/29/19 99.9 kg (220 lb 3.2 oz)                    Peripheral Vascular Disease Follow-up      Are you having any of the following symptoms? (Select all that apply) No complaints of shortness of breath, chest pain or pressure.  No increased sweating or nausea with activity.  No left-sided neck or arm pain.  No complaints of pain in calves when walking 1-2 blocks.    How often do you take nitroglycerin? Never    Do you take an aspirin every day? Yes    Reviewed and updated as needed this visit by Provider         Review of Systems   ROS COMP: Constitutional, HEENT, cardiovascular, pulmonary, gi and gu systems are negative, except as otherwise noted.      Objective    /82 (BP Location: Left arm, Patient Position: Sitting, Cuff Size: Adult Large)   Pulse 77   Temp 98.2  F (36.8  C) (Oral)   Ht 1.746 m (5'  "8.75\")   Wt 100 kg (220 lb 6.4 oz)   SpO2 96%   BMI 32.78 kg/m    Body mass index is 32.78 kg/m .  Physical Exam   GENERAL: healthy, alert and no distress  EYES: Eyes grossly normal to inspection, PERRL and conjunctivae and sclerae normal  HENT: ear canals and TM's normal, nose and mouth without ulcers or lesions  NECK: no adenopathy, no asymmetry, masses, or scars and thyroid normal to palpation  RESP: lungs clear to auscultation - no rales, rhonchi or wheezes  CV: regular rate and rhythm, normal S1 S2, no S3 or S4, no murmur, click or rub, no peripheral edema and peripheral pulses strong  ABDOMEN: soft, nontender, no hepatosplenomegaly, no masses and bowel sounds normal   (male): normal male genitalia without lesions or urethral discharge, no hernia  MS: no gross musculoskeletal defects noted, no edema  SKIN: right groin with evidence of draining boil/abscess, nontendr today, nonfluctuant, no surrounding erythema  PSYCH: mentation appears normal, affect normal/bright            Assessment & Plan   Assessment  Anosmia-offered ent eval if bothers him      Plan  (L02.214) Abscess of groin, right  (primary encounter diagnosis)  Comment: persistent despite abx and soaks, may need deeper excision and clean out  Plan: GENERAL SURG ADULT REFERRAL            (I10) Essential hypertension, benign  Comment: well controlled  Plan: lisinopril (PRINIVIL/ZESTRIL) 40 MG tablet,         metoprolol succinate ER (TOPROL-XL) 50 MG 24 hr        tablet, Comprehensive Metobolic Panel (BFP),         VENOUS COLLECTION        continue current medications at current doses     (E78.5) Hyperlipidemia LDL goal <70  Comment: control uncertain  Plan: rosuvastatin (CRESTOR) 40 MG tablet, Lipid         Panel (BFP), Comprehensive Metobolic Panel         (BFP), VENOUS COLLECTION        continue current medications at current doses     (I73.9) PVD (peripheral vascular disease) (H)  Comment: stable, not smoking for 6 years now  Plan: continue " "current medications at current doses     BMI:   Estimated body mass index is 32.78 kg/m  as calculated from the following:    Height as of this encounter: 1.746 m (5' 8.75\").    Weight as of this encounter: 100 kg (220 lb 6.4 oz).   Weight management plan: Discussed healthy diet and exercise guidelines        FUTURE APPOINTMENTS:       - Follow-up visit in 6 mo  Work on weight loss  Regular exercise    Return in about 6 months (around 12/10/2019).    Yemi Nava MD  [facility]            "

## 2019-06-10 NOTE — NURSING NOTE
Rashaun is here today for a fasting med recheck.    Pre-visit Screening:  Immunizations:  up to date  Colonoscopy:  is up to date  Mammogram: NA  Asthma Action Test/Plan:  PRABHA  PHQ9:  NA  GAD7:  NA  Questioned patient about current smoking habits Pt. quit smoking some time ago.  Ok to leave detailed message on voice mail for today's visit only Yes, phone # [phone]

## 2019-07-22 ENCOUNTER — OFFICE VISIT (OUTPATIENT)
Dept: FAMILY MEDICINE | Facility: CLINIC | Age: 59
End: 2019-07-22

## 2019-07-22 VITALS
HEART RATE: 71 BPM | DIASTOLIC BLOOD PRESSURE: 60 MMHG | OXYGEN SATURATION: 94 % | WEIGHT: 228.2 LBS | BODY MASS INDEX: 33.94 KG/M2 | SYSTOLIC BLOOD PRESSURE: 140 MMHG | TEMPERATURE: 98.8 F

## 2019-07-22 DIAGNOSIS — F41.1 ANXIETY RELATED TO CHANGE IN ROLE: Primary | ICD-10-CM

## 2019-07-22 PROCEDURE — 99213 OFFICE O/P EST LOW 20 MIN: CPT | Performed by: FAMILY MEDICINE

## 2019-07-22 ASSESSMENT — ANXIETY QUESTIONNAIRES
2. NOT BEING ABLE TO STOP OR CONTROL WORRYING: SEVERAL DAYS
5. BEING SO RESTLESS THAT IT IS HARD TO SIT STILL: SEVERAL DAYS
1. FEELING NERVOUS, ANXIOUS, OR ON EDGE: SEVERAL DAYS
7. FEELING AFRAID AS IF SOMETHING AWFUL MIGHT HAPPEN: NOT AT ALL
IF YOU CHECKED OFF ANY PROBLEMS ON THIS QUESTIONNAIRE, HOW DIFFICULT HAVE THESE PROBLEMS MADE IT FOR YOU TO DO YOUR WORK, TAKE CARE OF THINGS AT HOME, OR GET ALONG WITH OTHER PEOPLE: SOMEWHAT DIFFICULT
GAD7 TOTAL SCORE: 6
6. BECOMING EASILY ANNOYED OR IRRITABLE: SEVERAL DAYS
3. WORRYING TOO MUCH ABOUT DIFFERENT THINGS: SEVERAL DAYS

## 2019-07-22 ASSESSMENT — PATIENT HEALTH QUESTIONNAIRE - PHQ9
5. POOR APPETITE OR OVEREATING: SEVERAL DAYS
SUM OF ALL RESPONSES TO PHQ QUESTIONS 1-9: 5

## 2019-07-22 NOTE — PROGRESS NOTES
Subjective     Rashaun Molina is a 59 year old male who presents to clinic today for the following health issues:    HPI   Abnormal Mood Symptoms  Onset: last several months    Description:   Depression: no  Anxiety: YES- relates to work stress, can't sleep thinking about staffing issues, shortages etc and then has to miss work-pt is supervisor for baggage handling/ground transport at [employer] he has asked to be demoted to general worker rather than supervisor but they have not been able to find anyone willing to take his job.  He has been advised to seek FMLA to protect himself for days missed    Accompanying Signs & Symptoms:  Still participating in activities that you used to enjoy: yes  Fatigue: YES- when no sleep  Irritability: no  Difficulty concentrating: YES- whe  No sleep  Changes in appetite: no  Problems with sleep: YES  Heart racing/beating fast : no  Thoughts of hurting yourself or others: none    History:   Recent stress: YES- work as above  Prior depression hospitalization: None  Family history of depression: no  Family history of anxiety: no    Precipitating factors:   Alcohol/drug use: no    Alleviating factors:  Sleep, off work    Therapies Tried and outcome: None      Patient Active Problem List   Diagnosis     Essential hypertension, benign     Pure hypercholesterolemia     History of colonic polyps     BCC (basal cell carcinoma)     Health Care Home     Colovesical fistula     S/P total hip arthroplasty     Obesity due to excess calories, unspecified obesity severity     Chronic bronchitis, unspecified chronic bronchitis type (H)     PVD (peripheral vascular disease) (H)     Palpitations     Past Surgical History:   Procedure Laterality Date     ARTHROPLASTY HIP Right 10/7/2015    Procedure: ARTHROPLASTY HIP;  Surgeon: Neil Davis MD;  Location: [facility] OR     COLONOSCOPY  9/05, 4/10/13    Per Hospital encounter dated 4/18/13     COLONOSCOPY N/A 4/9/2018    Procedure: COMBINED COLONOSCOPY,  SINGLE OR MULTIPLE BIOPSY/POLYPECTOMY BY BIOPSY;;  Surgeon: Tramaine Zuniga MD;  Location:  GI     COMBINED CYSTOSCOPY, INSERT CATHETER URETER  2013    Procedure: COMBINED CYSTOSCOPY, INSERT CATHETER URETER;;  Surgeon: Kashif Parks MD;  Location:  OR     HC COLONOSCOPY THRU STOMA W BIOPSY/CAUTERY TUMOR/POLYP/LESION      michael, tubular adenoma, next colonoscopy      HC COLONOSCOPY THRU STOMA, DIAGNOSTIC  01    negative, ligate two internal hemmorhoids  Dr rodriguez-repeat      HC LARYNGOSCOPY DIRECT W VOCAL CORD INJECTION      vocal cord polyps     LAPAROSCOPIC ASSISTED COLECTOMY  2013    Procedure: LAPAROSCOPIC ASSISTED COLECTOMY;  LOW ANTERIOR RESECTION ;  Surgeon: Tramaine Zuniga MD;  Location:  OR       Social History     Tobacco Use     Smoking status: Former Smoker     Packs/day: 1.50     Years: 40.00     Pack years: 60.00     Last attempt to quit: 2013     Years since quittin.2     Smokeless tobacco: Never Used   Substance Use Topics     Alcohol use: Yes     Alcohol/week: 8.4 oz     Types: 14 Cans of beer per week     Comment: 2 beers daily     Family History   Problem Relation Age of Onset     Hypertension Mother      Hypertension Father          MI     Heart Disease Father         MI  at age 55     Coronary Artery Disease Father      Hyperlipidemia Brother      Hypertension Brother      Heart Surgery Brother         defibrillator     Hypertension Sister          Current Outpatient Medications   Medication Sig Dispense Refill     ASPIRIN EC PO Take 81 mg by mouth daily.       lisinopril (PRINIVIL/ZESTRIL) 40 MG tablet Take 1 tablet (40 mg) by mouth daily 90 tablet 1     metoprolol succinate ER (TOPROL-XL) 50 MG 24 hr tablet Take 1 tablet (50 mg) by mouth daily 90 tablet 1     rosuvastatin (CRESTOR) 40 MG tablet Take 1 tablet (40 mg) by mouth At Bedtime 90 tablet 1     umeclidinium (INCRUSE ELLIPTA) 62.5 MCG/INH inhaler Inhale 1 puff into the lungs  daily       Allergies   Allergen Reactions     Spiriva Handihaler Blisters     Hctz [Hydrochlorothiazide] Other (See Comments)     Low sodium     Recent Labs   Lab Test 06/10/19 12/27/18  0812 10/22/18  0847 08/06/18  0718 05/14/18  0713 04/30/18  1005  07/23/14  1021   A1C  --   --   --  5.4 5.6  --   --   --    LDL 80  --   --  62  --  109*   < > 94   HDL 50  --   --  63  --  55   < > 56   TRIG 125  --   --  136  --  278*   < > 339*   ALT  --  57 51*  --   --  34   < > 40   CR 1.04 0.68 0.86 0.69  --  0.80   < > 1.13   GFRESTIMATED  --  >90 95 >90  --  98   < > 73   GFRESTBLACK  --  >90  --  >90  --   --    < >  --    POTASSIUM 4.77 4.4 4.7  --   --  5.1   < > 4.5   TSH  --  1.63  --   --   --   --   --  1.69    < > = values in this interval not displayed.      BP Readings from Last 3 Encounters:   07/22/19 140/60   06/10/19 138/82   05/08/19 142/78    Wt Readings from Last 3 Encounters:   07/22/19 103.5 kg (228 lb 3.2 oz)   06/10/19 100 kg (220 lb 6.4 oz)   05/08/19 101.9 kg (224 lb 9.6 oz)                      Reviewed and updated as needed this visit by Provider         Review of Systems   ROS COMP: Constitutional, HEENT, cardiovascular, pulmonary, gi and gu systems are negative, except as otherwise noted.      Objective    /60 (BP Location: Right arm, Patient Position: Sitting, Cuff Size: Adult Large)   Pulse 71   Temp 98.8  F (37.1  C) (Oral)   Wt 103.5 kg (228 lb 3.2 oz)   SpO2 94%   BMI 33.94 kg/m    Body mass index is 33.94 kg/m .  Physical Exam   GENERAL: healthy, alert and no distress  RESP: lungs clear to auscultation - no rales, rhonchi or wheezes  CV: regular rate and rhythm, normal S1 S2, no S3 or S4, no murmur, click or rub, no peripheral edema and peripheral pulses strong  PSYCH: mentation appears normal, affect normal/bright    Diagnostic Test Results:  Labs reviewed in Epic        Assessment & Plan   Assessment      Plan  (F41.1) Anxiety related to change in role  (primary encounter  "diagnosis)  Comment: pt FMLA completed, he expects this to be short lived until new postion approved, if persists he will return for possible medications or therapy  Plan:     BMI:   Estimated body mass index is 33.94 kg/m  as calculated from the following:    Height as of 6/10/19: 1.746 m (5' 8.75\").    Weight as of this encounter: 103.5 kg (228 lb 3.2 oz).               No follow-ups on file.    Yemi Nava MD  [facility]            "

## 2019-07-22 NOTE — NURSING NOTE
Rashaun is here for [facility] paperwork for anxiety and insomnia and missing work    Pre-visit Screening:  Immunizations:  up to date  Colonoscopy:  is up to date  Mammogram: NA  Asthma Action Test/Plan:  NA  PHQ9:  Done today  GAD7:  Done today  Questioned patient about current smoking habits Pt. quit smoking some time ago.  Ok to leave detailed message on voice mail for today's visit only Yes, phone # [phone]

## 2019-07-23 ASSESSMENT — ANXIETY QUESTIONNAIRES: GAD7 TOTAL SCORE: 6

## 2019-08-05 ENCOUNTER — TELEPHONE (OUTPATIENT)
Dept: FAMILY MEDICINE | Facility: CLINIC | Age: 59
End: 2019-08-05

## 2019-08-05 NOTE — TELEPHONE ENCOUNTER
Pt dropped off FMLA that needs clarification, informed pt that [employer] is out until the 12th, forms state due by 8/10.  Routing to Diane for assistance in poss another provider completing forms.     Please call pt on home if any issues.     Please Fax to number on front when complete.

## 2019-08-08 NOTE — TELEPHONE ENCOUNTER
Received a call from Billie at [facility] regarding Rashaun's [facility] paperwork.  She stated there were some questions not answered (Part E question 2) was left blank.  Part B was not answered thoroughly and needs clarification.  I have left a message to speak with her as [facility] is out until Monday and the paperwork is due on the 10th and no other provider is able to fill this out.  Awaiting Billie's call to see if I can answer the questions necessary according to the chart notes.    Billie's phone #[phone]

## 2019-11-11 ENCOUNTER — OFFICE VISIT (OUTPATIENT)
Dept: FAMILY MEDICINE | Facility: CLINIC | Age: 59
End: 2019-11-11

## 2019-11-11 VITALS
TEMPERATURE: 98 F | DIASTOLIC BLOOD PRESSURE: 90 MMHG | OXYGEN SATURATION: 100 % | WEIGHT: 227.2 LBS | BODY MASS INDEX: 33.65 KG/M2 | HEART RATE: 56 BPM | SYSTOLIC BLOOD PRESSURE: 150 MMHG | HEIGHT: 69 IN

## 2019-11-11 DIAGNOSIS — I73.9 PVD (PERIPHERAL VASCULAR DISEASE) (H): ICD-10-CM

## 2019-11-11 DIAGNOSIS — Z00.00 ROUTINE GENERAL MEDICAL EXAMINATION AT A HEALTH CARE FACILITY: Primary | ICD-10-CM

## 2019-11-11 DIAGNOSIS — R25.1 TREMOR: ICD-10-CM

## 2019-11-11 DIAGNOSIS — I10 ESSENTIAL HYPERTENSION, BENIGN: ICD-10-CM

## 2019-11-11 DIAGNOSIS — F41.1 ANXIETY RELATED TO CHANGE IN ROLE: ICD-10-CM

## 2019-11-11 DIAGNOSIS — E78.5 HYPERLIPIDEMIA LDL GOAL <70: ICD-10-CM

## 2019-11-11 DIAGNOSIS — F10.10 ALCOHOL ABUSE: ICD-10-CM

## 2019-11-11 DIAGNOSIS — L02.224 BOIL OF GROIN: ICD-10-CM

## 2019-11-11 DIAGNOSIS — Z23 NEED FOR VACCINATION: ICD-10-CM

## 2019-11-11 DIAGNOSIS — J42 CHRONIC BRONCHITIS, UNSPECIFIED CHRONIC BRONCHITIS TYPE (H): ICD-10-CM

## 2019-11-11 LAB
ALBUMIN SERPL-MCNC: 4.4 G/DL (ref 3.6–5.1)
ALBUMIN/GLOB SERPL: 1.1 {RATIO} (ref 1–2.5)
ALP SERPL-CCNC: 78 U/L (ref 33–130)
ALT 1742-6: 10 U/L (ref 5–30)
AST 1920-8: 11 U/L (ref 7–31)
BILIRUB SERPL-MCNC: 0.7 MG/DL (ref 0.2–1.2)
BUN SERPL-MCNC: 10 MG/DL (ref 7–25)
BUN/CREATININE RATIO: 10.1 (ref 6–22)
CALCIUM SERPL-MCNC: 10.3 MG/DL (ref 8.6–10.3)
CHLORIDE SERPLBLD-SCNC: 99.2 MMOL/L (ref 98–110)
CHOLEST SERPL-MCNC: 130 MG/DL (ref 0–199)
CHOLEST/HDLC SERPL: 3 {RATIO} (ref 0–5)
CO2 SERPL-SCNC: 30.2 MMOL/L (ref 20–32)
CREAT SERPL-MCNC: 0.99 MG/DL (ref 0.7–1.18)
GLOBULIN, CALCULATED - QUEST: 4 (ref 1.9–3.7)
GLUCOSE SERPL-MCNC: 109 MG/DL (ref 60–99)
HDLC SERPL-MCNC: 43 MG/DL (ref 40–150)
LDLC SERPL CALC-MCNC: 53 MG/DL (ref 0–130)
POTASSIUM SERPL-SCNC: 5.2 MMOL/L (ref 3.5–5.3)
PROT SERPL-MCNC: 8.4 G/DL (ref 6.1–8.1)
SODIUM SERPL-SCNC: 140.1 MMOL/L (ref 135–146)
TRIGL SERPL-MCNC: 171 MG/DL (ref 0–149)

## 2019-11-11 PROCEDURE — 90686 IIV4 VACC NO PRSV 0.5 ML IM: CPT | Performed by: FAMILY MEDICINE

## 2019-11-11 PROCEDURE — 36415 COLL VENOUS BLD VENIPUNCTURE: CPT | Performed by: FAMILY MEDICINE

## 2019-11-11 PROCEDURE — 90471 IMMUNIZATION ADMIN: CPT | Performed by: FAMILY MEDICINE

## 2019-11-11 PROCEDURE — 80053 COMPREHEN METABOLIC PANEL: CPT | Performed by: FAMILY MEDICINE

## 2019-11-11 PROCEDURE — 80061 LIPID PANEL: CPT | Performed by: FAMILY MEDICINE

## 2019-11-11 PROCEDURE — 99396 PREV VISIT EST AGE 40-64: CPT | Mod: 25 | Performed by: FAMILY MEDICINE

## 2019-11-11 RX ORDER — LISINOPRIL 40 MG/1
40 TABLET ORAL DAILY
Qty: 90 TABLET | Refills: 1 | Status: SHIPPED | OUTPATIENT
Start: 2019-11-11 | End: 2020-07-30

## 2019-11-11 RX ORDER — ROSUVASTATIN CALCIUM 40 MG/1
40 TABLET, COATED ORAL AT BEDTIME
Qty: 90 TABLET | Refills: 1 | Status: SHIPPED | OUTPATIENT
Start: 2019-11-11 | End: 2020-07-30

## 2019-11-11 RX ORDER — METOPROLOL SUCCINATE 50 MG/1
50 TABLET, EXTENDED RELEASE ORAL DAILY
Qty: 90 TABLET | Refills: 1 | Status: SHIPPED | OUTPATIENT
Start: 2019-11-11 | End: 2020-07-30

## 2019-11-11 RX ORDER — CEPHALEXIN 500 MG/1
500 CAPSULE ORAL 4 TIMES DAILY
Qty: 28 CAPSULE | Refills: 0 | Status: SHIPPED | OUTPATIENT
Start: 2019-11-11 | End: 2020-02-18

## 2019-11-11 ASSESSMENT — MIFFLIN-ST. JEOR: SCORE: 1830.95

## 2019-11-11 NOTE — LETTER
November 11, 2019      Rashaun Molina  [address] [facility] [address]        Rashaun THALIA Molina     The results of your recent Kidney Tests, Lipid profile and Liver Panel were within normal limits.    The blood sugar is still running high so make sure you are working to decrease sugars and carbohydrates in your diet and exercising regularly.       The results are now released on My Chart. Please contact me if you have further questions or concerns.    Sincerely,    AMIE aNva M.D.     Resulted Orders   Lipid Panel (BFP)   Result Value Ref Range    Cholesterol 130 0 - 199 mg/dL    Triglycerides 171 (A) 0 - 149 mg/dL    HDL Cholesterol 43 40 - 150 mg/dL    LDL Cholesterol Direct 53 0 - 130 mg/dL    Cholesterol/HDL Ratio 3 0 - 5   Comprehensive Metobolic Panel (BFP)   Result Value Ref Range    Carbon Dioxide 30.2 20 - 32 mmol/L    Creatinine 0.99 0.70 - 1.18 mg/dL    Glucose 109 (A) 60 - 99 mg/dL    Sodium 140.1 135 - 146 mmol/L    Potassium 5.20 3.5 - 5.3 mmol/L    Chloride 99.2 98 - 110 mmol/L    Protein Total 8.4 (A) 6.1 - 8.1 g/dL    Albumin 4.4 3.6 - 5.1 g/dL    Alkaline Phosphatase 78 33 - 130 U/L    ALT 10 5 - 30 U/L    AST 11 7 - 31 U/L    Bilirubin Total 0.7 0.2 - 1.2 mg/dL    Urea Nitrogen 10 7 - 25 mg/dL    Calcium 10.3 8.6 - 10.3 mg/dL    BUN/Creatinine Ratio 10.1 6 - 22    Globulin Calculated 4.0 (A) 1.9 - 3.7    A/G Ratio 1.1 1 - 2.5       If you have any questions or concerns, please call the clinic at the number listed above.       Sincerely,        Yemi Nava MD

## 2019-11-11 NOTE — NURSING NOTE
Rashaun is here for CP fasting    Pre-visit Screening:  Immunizations:  Flu shot today  Colonoscopy:  is up to date  Mammogram: NA  Asthma Action Test/Plan:  NA  PHQ9:  None  GAD7:  None  Questioned patient about current smoking habits Pt. quit smoking some time ago.  Ok to leave detailed message on voice mail for today's visit only Yes, phone # [phone]

## 2019-11-11 NOTE — PROGRESS NOTES
3  SUBJECTIVE:   CC: Rashaun Molina is an 59 year old male who presents for preventive health visit.     Healthy Habits:    Do you get at least three servings of calcium containing foods daily (dairy, green leafy vegetables, etc.)? yes    Amount of exercise or daily activities, outside of work: 1 day(s) per week    Problems taking medications regularly No    Medication side effects: No    Have you had an eye exam in the past two years? no    Do you see a dentist twice per year? yes    Do you have sleep apnea, excessive snoring or daytime drowsiness?no      Pt has increasing pain in lower legs whenever he walks-has known PAD- sees DR Lee    Pt also noting intermittent left arm tremor for a month-not intentional, no pain    Pt also noted lump in groin- gets bigger and smaller    Today's PHQ-2 Score:   PHQ-2 (  [employer]) 2019   Q1: Little interest or pleasure in doing things 0 0   Q2: Feeling down, depressed or hopeless 0 0   PHQ-2 Score 0 0       Abuse: Current or Past(Physical, Sexual or Emotional)- No  Do you feel safe in your environment? Yes        Social History     Tobacco Use     Smoking status: Former Smoker     Packs/day: 1.50     Years: 40.00     Pack years: 60.00     Last attempt to quit: 2013     Years since quittin.5     Smokeless tobacco: Never Used   Substance Use Topics     Alcohol use: Yes     Alcohol/week: 14.0 standard drinks     Types: 14 Cans of beer per week     Comment: 2 beers daily     If you drink alcohol do you typically have >3 drinks per day or >7 drinks per week? 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                      Last PSA:   [facility] PSA   Date Value Ref Range Status   10/22/2018 1.2 < OR = 4.0 ng/mL Final     Comment:     The total PSA value from this assay system is   standardized against the WHO standard. The test   result will be approximately 20% lower when compared   to the equimolar-standardized  total PSA (Selene   Be). Comparison of serial PSA results should be   interpreted with this fact in mind.     This test was performed using the Siemens   chemiluminescent method. Values obtained from   different assay methods cannot be used  interchangeably. PSA levels, regardless of  value, should not be interpreted as absolute  evidence of the presence or absence of disease.         Reviewed orders with patient. Reviewed health maintenance and updated orders accordingly - Yes  BP Readings from Last 3 Encounters:   11/11/19 (!) 150/90   07/22/19 140/60   06/10/19 138/82    Wt Readings from Last 3 Encounters:   11/11/19 103.1 kg (227 lb 3.2 oz)   07/22/19 103.5 kg (228 lb 3.2 oz)   06/10/19 100 kg (220 lb 6.4 oz)                  Patient Active Problem List   Diagnosis     Essential hypertension, benign     Pure hypercholesterolemia     History of colonic polyps     BCC (basal cell carcinoma)     Health Care Home     Colovesical fistula     S/P total hip arthroplasty     Obesity due to excess calories, unspecified obesity severity     Chronic bronchitis, unspecified chronic bronchitis type (H)     PVD (peripheral vascular disease) (H)     Palpitations     Past Surgical History:   Procedure Laterality Date     ARTHROPLASTY HIP Right 10/7/2015    Procedure: ARTHROPLASTY HIP;  Surgeon: Neil Davis MD;  Location: [facility] OR     COLONOSCOPY  9/05, 4/10/13    Per Hospital encounter dated 4/18/13     COLONOSCOPY N/A 4/9/2018    Procedure: COMBINED COLONOSCOPY, SINGLE OR MULTIPLE BIOPSY/POLYPECTOMY BY BIOPSY;;  Surgeon: Tramaine Zuniga MD;  Location:  GI     COMBINED CYSTOSCOPY, INSERT CATHETER URETER  4/11/2013    Procedure: COMBINED CYSTOSCOPY, INSERT CATHETER URETER;;  Surgeon: Kashif Parks MD;  Location:  OR     HC COLONOSCOPY THRU STOMA W BIOPSY/CAUTERY TUMOR/POLYP/LESION  1998    michael, tubular adenoma, next colonoscopy 2001     HC COLONOSCOPY THRU STOMA, DIAGNOSTIC  04/16/01    negative,  ligate two internal hemmorhoids  Dr rodriguez-repeat      HC LARYNGOSCOPY DIRECT W VOCAL CORD INJECTION      vocal cord polyps     LAPAROSCOPIC ASSISTED COLECTOMY  2013    Procedure: LAPAROSCOPIC ASSISTED COLECTOMY;  LOW ANTERIOR RESECTION ;  Surgeon: Tramaine Zuniga MD;  Location:  OR       Social History     Tobacco Use     Smoking status: Former Smoker     Packs/day: 1.50     Years: 40.00     Pack years: 60.00     Last attempt to quit: 2013     Years since quittin.5     Smokeless tobacco: Never Used   Substance Use Topics     Alcohol use: Yes     Alcohol/week: 14.0 standard drinks     Types: 14 Cans of beer per week     Comment: 2 beers daily     Family History   Problem Relation Age of Onset     Hypertension Mother      Hypertension Father          MI     Heart Disease Father         MI  at age 55     Coronary Artery Disease Father      Hyperlipidemia Brother      Hypertension Brother      Heart Surgery Brother         defibrillator     Hypertension Sister      Prostate Cancer No family hx of          Current Outpatient Medications   Medication Sig Dispense Refill     ASPIRIN EC PO Take 81 mg by mouth daily.       lisinopril (PRINIVIL/ZESTRIL) 40 MG tablet Take 1 tablet (40 mg) by mouth daily 90 tablet 1     metoprolol succinate ER (TOPROL-XL) 50 MG 24 hr tablet Take 1 tablet (50 mg) by mouth daily 90 tablet 1     rosuvastatin (CRESTOR) 40 MG tablet Take 1 tablet (40 mg) by mouth At Bedtime 90 tablet 1     umeclidinium (INCRUSE ELLIPTA) 62.5 MCG/INH inhaler Inhale 1 puff into the lungs daily 30 each 0     Allergies   Allergen Reactions     Spiriva Handihaler Blisters     Hctz [Hydrochlorothiazide] Other (See Comments)     Low sodium     Recent Labs   Lab Test 06/10/19 12/27/18  0812 10/22/18  0847 18  0718 18  0713 18  1005  14  1021   A1C  --   --   --  5.4 5.6  --   --   --    LDL 80  --   --  62  --  109*   < > 94   HDL 50  --   --  63  --  55   < > 56   TRIG  125  --   --  136  --  278*   < > 339*   ALT  --  57 51*  --   --  34   < > 40   CR 1.04 0.68 0.86 0.69  --  0.80   < > 1.13   GFRESTIMATED  --  >90 95 >90  --  98   < > 73   GFRESTBLACK  --  >90  --  >90  --   --    < >  --    POTASSIUM 4.77 4.4 4.7  --   --  5.1   < > 4.5   TSH  --  1.63  --   --   --   --   --  1.69    < > = values in this interval not displayed.        Reviewed and updated as needed this visit by clinical staff  Tobacco  Allergies  Meds  Problems         Reviewed and updated as needed this visit by Provider        Past Medical History:   Diagnosis Date     Arthritis      Chronic airway obstruction, not elsewhere classified 3/8/2004    pt says that he does not have COPD     Cough syncope 11/21/2012     Essential hypertension, benign      Fistula     colon/bladder     Fracture of right proximal fibula 7/30/2014     Orthostatic hypotension 11/24/2014     Other chronic pain     right hip pain     PAD (peripheral artery disease) (H) 08/2018    PTA right [facility]FA Dr. Lee     Palpitations 12/2018 01/2019 Event monitor- SR/ST     Personal history of colonic polyps 6/28/2005     Pure hypercholesterolemia      Tobacco use disorder 3/8/2004     Vasovagal syncopes 10/25/2014     Viral warts, unspecified     genital      Past Surgical History:   Procedure Laterality Date     ARTHROPLASTY HIP Right 10/7/2015    Procedure: ARTHROPLASTY HIP;  Surgeon: Neil Davis MD;  Location: [facility] OR     COLONOSCOPY  9/05, 4/10/13    Per Hospital encounter dated 4/18/13     COLONOSCOPY N/A 4/9/2018    Procedure: COMBINED COLONOSCOPY, SINGLE OR MULTIPLE BIOPSY/POLYPECTOMY BY BIOPSY;;  Surgeon: Tramaine Zuniga MD;  Location:  GI     COMBINED CYSTOSCOPY, INSERT CATHETER URETER  4/11/2013    Procedure: COMBINED CYSTOSCOPY, INSERT CATHETER URETER;;  Surgeon: Kashif Parks MD;  Location:  OR      COLONOSCOPY THRU STOMA W BIOPSY/CAUTERY TUMOR/POLYP/LESION  1998    nemer, tubular adenoma, next colonoscopy  "2001      COLONOSCOPY THRU STOMA, DIAGNOSTIC  04/16/01    negative, ligate two internal hemmorhoids  Dr rodriguez-repeat 2008      LARYNGOSCOPY DIRECT W VOCAL CORD INJECTION      vocal cord polyps     LAPAROSCOPIC ASSISTED COLECTOMY  4/11/2013    Procedure: LAPAROSCOPIC ASSISTED COLECTOMY;  LOW ANTERIOR RESECTION ;  Surgeon: Tramaine Zuniga MD;  Location:  OR       ROS:  CONSTITUTIONAL: NEGATIVE for fever, chills, change in weight  INTEGUMENTARY/SKIN: NEGATIVE for worrisome rashes, moles or lesions  EYES: NEGATIVE for vision changes or irritation  ENT: NEGATIVE for ear, mouth and throat problems  RESP: NEGATIVE for significant cough or SOB  CV: NEGATIVE for chest pain, palpitations or peripheral edema  GI: NEGATIVE for nausea, abdominal pain, heartburn, or change in bowel habits   male: negative for dysuria, hematuria, decreased urinary stream, erectile dysfunction, urethral discharge  MUSCULOSKELETAL: NEGATIVE for significant arthralgias or myalgia  NEURO: NEGATIVE for weakness, dizziness or paresthesias  ENDOCRINE: NEGATIVE for temperature intolerance, skin/hair changes  PSYCHIATRIC: NEGATIVE for changes in mood or affect    OBJECTIVE:   BP (!) 150/90 (BP Location: Right arm, Patient Position: Sitting, Cuff Size: Adult Large)   Pulse 56   Temp 98  F (36.7  C) (Oral)   Ht 1.753 m (5' 9\")   Wt 103.1 kg (227 lb 3.2 oz)   SpO2 100%   BMI 33.55 kg/m    EXAM:  GENERAL: healthy, alert and no distress  EYES: Eyes grossly normal to inspection, PERRL and conjunctivae and sclerae normal  HENT: ear canals and TM's normal, nose and mouth without ulcers or lesions  NECK: no adenopathy, no asymmetry, masses, or scars and thyroid normal to palpation  RESP: lungs clear to auscultation - no rales, rhonchi or wheezes  CV: regular rate and rhythm, normal S1 S2, no S3 or S4, no murmur, click or rub, no peripheral edema and peripheral pulses strong  ABDOMEN: soft, nontender, no hepatosplenomegaly, no masses and bowel sounds " normal   (male): normal male genitalia without lesions or urethral discharge, no hernia  RECTAL (male): deferred  MS: no gross musculoskeletal defects noted, no edema  SKIN: right groin boil noted, may run deeper but not fluctuance, tender  NEURO: Normal strength and tone, mentation intact and speech normal  NEURO: Normal strength and tone, sensory exam grossly normal, mentation intact and notble head tremor, also slight left arm, not intentional  PSYCH: mentation appears normal, affect normal/bright    Diagnostic Test Results:  Labs reviewed in Epic    ASSESSMENT/PLAN:   (Z00.00) Routine general medical examination at a health care facility  (primary encounter diagnosis)  Comment: discussed preventitive healthcare   Plan: Continue to work on healthy diet and exercise, discussed healthy habits     (Z23) Need for vaccination  Comment:   Plan: HC FLU VAC PRESRV FREE QUAD SPLIT VIR > 6         MONTHS IM            (I10) Essential hypertension, benign  Comment: suboptimal control today- ha not been checking at home-discussed adding amlodipine vs checking more data, did not tolerate HCTZ  Plan: lisinopril (PRINIVIL/ZESTRIL) 40 MG tablet,         metoprolol succinate ER (TOPROL-XL) 50 MG 24 hr        tablet, Comprehensive Metobolic Panel (BFP),         VENOUS COLLECTION        He elects to check at home before making changes, Check blood pressure readings outside of the clinic several times per week, write down values, and follow up if elevated within the next several weeks. Blood pressure can be checked at the firestation, drugstore,  or any valid site.     (E78.5) Hyperlipidemia LDL goal <70  Comment: control uncertain  Plan: rosuvastatin (CRESTOR) 40 MG tablet, Lipid         Panel (BFP), Comprehensive Metobolic Panel         (BFP), VENOUS COLLECTION        continue current medications at current doses     (J42) Chronic bronchitis, unspecified chronic bronchitis type (H)  Comment: stable symptomatically   Plan:  "umeclidinium (INCRUSE ELLIPTA) 62.5 MCG/INH         inhaler        continue current medications at current doses     (F41.1) Anxiety related to change in role  Comment: improved as new job role not managing others  Plan:     (R25.1) Tremor  Comment: new issue in arm- discussed ETOH use as possible contributor but needs w/u  Plan: NEUROLOGY ADULT REFERRAL            (I73.9) PVD (peripheral vascular disease) (H)  Comment: Patient is having persistent symptoms despite previous therapies.   Plan: he will be seeing Dr Conrad again soon    (F10.10) Alcohol abuse  Comment: needs to decrease-discussed ETOH effects on BP and tremors  Plan: decrease use    (L02.224) Boil of groin  Comment: boil, discussed need for soaks to help it drain- may need I and D in future if not-elect to add abx as pt hygiene may be an issue as well  Plan: cephALEXin (KEFLEX) 500 MG capsule        I reviewed the risks, benefits, and possible side effects of the medication.  The patient had an opportunity to ask any questions regarding the treatment plan. The patient was encouraged to call my office if any problems.      COUNSELING:  Reviewed preventive health counseling, as reflected in patient instructions       Regular exercise       Healthy diet/nutrition       Vision screening       Immunizations    Vaccinated for: Influenza             Colon cancer screening       Prostate cancer screening    Estimated body mass index is 33.55 kg/m  as calculated from the following:    Height as of this encounter: 1.753 m (5' 9\").    Weight as of this encounter: 103.1 kg (227 lb 3.2 oz).    Weight management plan: Discussed healthy diet and exercise guidelines     reports that he quit smoking about 6 years ago. He has a 60.00 pack-year smoking history. He has never used smokeless tobacco.      Counseling Resources:  ATP IV Guidelines  Pooled Cohorts Equation Calculator  FRAX Risk Assessment  ICSI Preventive Guidelines  Dietary Guidelines for Americans, " 2010  USDA's MyPlate  ASA Prophylaxis  Lung CA Screening    Yemi Nava MD  [facility]

## 2019-11-25 DIAGNOSIS — D68.52 HETEROZYGOUS FOR PROTHROMBIN G20210A MUTATION (H): Primary | ICD-10-CM

## 2019-11-25 DIAGNOSIS — G62.9 PERIPHERAL NERVE DISORDER: ICD-10-CM

## 2019-11-25 DIAGNOSIS — R25.1 MUSCLE TREMOR: ICD-10-CM

## 2019-11-25 LAB
% GRANULOCYTES: 67.3 %
HBA1C MFR BLD: 5.3 % (ref 4–7)
HCT VFR BLD AUTO: 40.4 % (ref 40–53)
HEMOGLOBIN: 13.4 G/DL (ref 13.3–17.7)
LYMPHOCYTES NFR BLD AUTO: 20.1 %
MCH RBC QN AUTO: 34.4 PG (ref 26–33)
MCHC RBC AUTO-ENTMCNC: 32.9 G/DL (ref 31–36)
MCV RBC AUTO: 104.4 FL (ref 78–100)
MONOCYTES NFR BLD AUTO: 12.6 %
PLATELET COUNT - QUEST: 229 10^9/L (ref 150–375)
RBC # BLD AUTO: 3.88 10*12/L (ref 4.4–5.9)
WBC # BLD AUTO: 9 10*9/L (ref 4–11)

## 2019-11-25 PROCEDURE — 85025 COMPLETE CBC W/AUTO DIFF WBC: CPT | Performed by: FAMILY MEDICINE

## 2019-11-25 PROCEDURE — 36415 COLL VENOUS BLD VENIPUNCTURE: CPT | Performed by: FAMILY MEDICINE

## 2019-11-25 PROCEDURE — 83036 HEMOGLOBIN GLYCOSYLATED A1C: CPT | Performed by: FAMILY MEDICINE

## 2019-11-27 LAB
ALBUMIN SERPL-MCNC: 4.1 G/DL (ref 3.8–4.8)
ALPHA-1-GLOBULIN - QUEST: 0.4 G/DL (ref 0.2–0.3)
ALPHA-2-GLOBULIN - QUEST: 0.8 G/DL (ref 0.5–0.9)
BETA 1 GLOBULIN: 0.6 G/DL (ref 0.4–0.6)
BETA2 GLOB SERPL ELPH-MCNC: 0.5 G/DL (ref 0.2–0.5)
GAMMA GLOBULIN - QUEST: 1.9 G/DL (ref 0.8–1.7)
INTERPRETATION: ABNORMAL
PROT SERPL-MCNC: 8.3 G/DL (ref 6.1–8.1)
RPR SCREEN - QUEST: NORMAL
TSH SERPL-ACNC: 1.84 MIU/L (ref 0.4–4.5)
VIT B12 SERPL-MCNC: 919 PG/ML (ref 200–1100)

## 2019-12-22 NOTE — TELEPHONE ENCOUNTER
Spoke to Billie and she is going to give the paperwork an extension d/t [employer] being out of the office. Please fill out form in your box.  Let Rashaun know the paperwork was given an extension.    Please update Rashaun when done -    no chills/no diaphoresis/no vomiting/no syncope/no fever/no dizziness/no chest pain/no nausea/no back pain/no congestion/no shortness of breath

## 2019-12-23 ENCOUNTER — TRANSFERRED RECORDS (OUTPATIENT)
Dept: FAMILY MEDICINE | Facility: CLINIC | Age: 59
End: 2019-12-23

## 2020-02-18 ENCOUNTER — OFFICE VISIT (OUTPATIENT)
Dept: FAMILY MEDICINE | Facility: CLINIC | Age: 60
End: 2020-02-18

## 2020-02-18 VITALS
BODY MASS INDEX: 32.67 KG/M2 | HEART RATE: 72 BPM | WEIGHT: 221.2 LBS | SYSTOLIC BLOOD PRESSURE: 146 MMHG | DIASTOLIC BLOOD PRESSURE: 80 MMHG | TEMPERATURE: 98.9 F | RESPIRATION RATE: 20 BRPM

## 2020-02-18 DIAGNOSIS — G25.9 MOVEMENT DISORDER: Primary | ICD-10-CM

## 2020-02-18 PROCEDURE — 99213 OFFICE O/P EST LOW 20 MIN: CPT | Performed by: FAMILY MEDICINE

## 2020-02-18 NOTE — PROGRESS NOTES
SUBJECTIVE:  Rashaun Molina, a 59 year old male scheduled an appointment to discuss the following issues:  Movement disorder  PT was recently evaluated by neurology for tremor and some movement issue- was told he may well have early Parkinson's Disease but cannot be certain.  The provider suggested either eval at a movement disorder clinic or get a CELINA ? Test.  Pt has had a flare in his anxiety driven insomnia due to this potential diagnosis.  He is here to request FMLA once again for his sleep issues.  In the past he was granted FMLA due to work stress leading to insomnia, was siutational until there was a change at work, - now it is more related to potential diagnosis      Medical, social, surgical, and family histories reviewed.    ROS:  CONSTITUTIONAL: NEGATIVE for fever, chills  EYES: NEGATIVE for vision changes   RESP: NEGATIVE for significant cough or SOB  CV: NEGATIVE for chest pain, palpitations   GI: NEGATIVE for nausea, abdominal pain, heartburn, or change in bowel habits  : NEGATIVE for frequency, dysuria, or hematuria  MUSCULOSKELETAL: NEGATIVE for significant arthralgias or myalgia  NEURO: NEGATIVE for weakness, dizziness or paresthesias or headache    OBJECTIVE:  BP (!) 146/80 (BP Location: Left arm, Patient Position: Chair, Cuff Size: Adult Large)   Pulse 72   Temp 98.9  F (37.2  C) (Oral)   Resp 20   Wt 100.3 kg (221 lb 3.2 oz)   BMI 32.67 kg/m    EXAM:  GENERAL APPEARANCE: healthy, alert and no distress  RESP: lungs clear to auscultation - no rales, rhonchi or wheezes  CV: regular rates and rhythm, normal S1 S2, no S3 or S4 and no murmur, click or rub -  PSYCH: mentation appears normal and worried    ASSESSMENT/PLAN:  (G25.9) Movement disorder  (primary encounter diagnosis)  Comment: pt with possible Parkinson's Diagnosis- this has been causing him a lot of stress and leading to very poor sleep.  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      Plan: he will plan to get another opinion at [facility].  I will OK FMLA as long as he is working toward resolution    We did discuss possible anxiety treatment if this proves to be more than just situtional    fmla completed and gaxed

## 2020-02-18 NOTE — NURSING NOTE
Questioned patient about current smoking habits.  Pt. quit smoking some time ago.  PULSE regular  My Chart:   CLASSIFICATION OF OVERWEIGHT AND OBESITY BY BMI                        Obesity Class           BMI(kg/m2)  Underweight                                    < 18.5  Normal                                         18.5-24.9  Overweight                                     25.0-29.9  OBESITY                     I                  30.0-34.9                             II                 35.0-39.9  EXTREME OBESITY             III                >40                            Patient's  BMI Body mass index is 32.67 kg/m .  http://hin.nhlbi.nih.gov/menuplanner/menu.cgi  Pre-visit planning  Immunizations - up to date  Colonoscopy - is up to date  Mammogram -   Asthma -   PHQ9 -    CHICHO-7 -

## 2020-02-20 ENCOUNTER — TELEPHONE (OUTPATIENT)
Dept: FAMILY MEDICINE | Facility: CLINIC | Age: 60
End: 2020-02-20

## 2020-02-20 NOTE — TELEPHONE ENCOUNTER
Billie from [employer] called and left a message stating that she has some questions about the [facility] paperwork that was just recently done and sent to them. She had the following questions pertaining the to the patient:  1. On part B of the form, she needs to know if his condition is chronic, permanent etc.  2.On part E 2A, they need to know if the patient needs time away from work for his appointments  3. They need to know how many episodes the patient has per month or year and how long these usually last for    She did give the ok to leave a detailed message answering her questions and stated that she can be reached at [phone] for any questions or concerns that we may have.    I called her back and left a detailed message informing her that per the form from Dr. Nava, we are unsure about what the patients actual diagnosis is and that we need to have him see a specialty provider to help figure out exactly what a diagnosis is for him. Per Dr. Nava in the form he stated that the patient has 2 episodes per month that last about a day so this was reiterated in the message. I told her that it is hard to answer some of the questions that she has because there are still many things that are unclear at this time about the patients condition and that is why he needs to be seen by a specialty provider to further discuss more and get an exact diagnosis. I told her that I am not sure if the patient would need a day off for his appointments or if he is still able to come to an appointment and be able to go back to work after the appointment is done. She was told to call back to further discuss what more needs to be done after these questions were answered.

## 2020-03-02 NOTE — TELEPHONE ENCOUNTER
Rashaun Molina called the clinic support line with the following:    States that his claim was denied. He states that they have not heard from us and the forms were incomplete.     Checked the boxes on part B and E 2A yes, as Dr Nava had time off for these.     Informed Pt that I faxed them back completed

## 2020-03-04 ENCOUNTER — TELEPHONE (OUTPATIENT)
Dept: FAMILY MEDICINE | Facility: CLINIC | Age: 60
End: 2020-03-04

## 2020-03-04 NOTE — TELEPHONE ENCOUNTER
"Billie called back and she took a verbal over the phone to check the \"chronic\" box on the form because that was the difference between this form and the last one. She did not receive the form that was faxed by april the other day stating that it was chronic so I did give her the verbal ok.She stated that this completes everything she needs and she will be able to get this processed.     "

## 2020-06-21 DIAGNOSIS — I10 ESSENTIAL HYPERTENSION, BENIGN: ICD-10-CM

## 2020-06-22 RX ORDER — LISINOPRIL 40 MG/1
TABLET ORAL
Qty: 90 TABLET | Refills: 1 | COMMUNITY
Start: 2020-06-22

## 2020-06-22 RX ORDER — METOPROLOL SUCCINATE 50 MG/1
TABLET, EXTENDED RELEASE ORAL
Qty: 90 TABLET | Refills: 1 | COMMUNITY
Start: 2020-06-22

## 2020-06-22 NOTE — TELEPHONE ENCOUNTER
Rashaun Molina is requesting a refill of:    Refused Prescriptions:                       Disp   Refills    metoprolol succinate ER (TOPROL-XL) 50 MG *90 tab*1        Sig: TAKE 1 TABLET BY MOUTH  DAILY  Refused By: JUAN PABLO DURAN  Reason for Refusal: Patient needs appointment    lisinopril (ZESTRIL) 40 MG tablet [Pharmac*90 tab*1        Sig: TAKE 1 TABLET BY MOUTH  DAILY  Refused By: JUAN PABLO DURAN  Reason for Refusal: Patient needs appointment

## 2020-07-30 ENCOUNTER — OFFICE VISIT (OUTPATIENT)
Dept: FAMILY MEDICINE | Facility: CLINIC | Age: 60
End: 2020-07-30

## 2020-07-30 ENCOUNTER — TELEPHONE (OUTPATIENT)
Dept: FAMILY MEDICINE | Facility: CLINIC | Age: 60
End: 2020-07-30

## 2020-07-30 VITALS
WEIGHT: 215.8 LBS | BODY MASS INDEX: 31.96 KG/M2 | SYSTOLIC BLOOD PRESSURE: 138 MMHG | HEART RATE: 63 BPM | TEMPERATURE: 97.9 F | DIASTOLIC BLOOD PRESSURE: 80 MMHG | HEIGHT: 69 IN | OXYGEN SATURATION: 97 %

## 2020-07-30 DIAGNOSIS — I10 ESSENTIAL HYPERTENSION, BENIGN: Primary | ICD-10-CM

## 2020-07-30 DIAGNOSIS — E78.5 HYPERLIPIDEMIA LDL GOAL <70: ICD-10-CM

## 2020-07-30 DIAGNOSIS — G20.A1 PARKINSON DISEASE (H): ICD-10-CM

## 2020-07-30 DIAGNOSIS — J42 CHRONIC BRONCHITIS, UNSPECIFIED CHRONIC BRONCHITIS TYPE (H): ICD-10-CM

## 2020-07-30 DIAGNOSIS — E87.1 HYPONATREMIA: Primary | ICD-10-CM

## 2020-07-30 DIAGNOSIS — E78.00 PURE HYPERCHOLESTEROLEMIA: ICD-10-CM

## 2020-07-30 LAB
ALBUMIN SERPL-MCNC: 4.2 G/DL (ref 3.6–5.1)
ALBUMIN/GLOB SERPL: 1.2 {RATIO} (ref 1–2.5)
ALP SERPL-CCNC: 67 U/L (ref 33–130)
ALT 1742-6: 6 U/L (ref 0–32)
AST 1920-8: 32 U/L (ref 0–35)
BILIRUB SERPL-MCNC: 0.9 MG/DL (ref 0.2–1.2)
BUN SERPL-MCNC: 10 MG/DL (ref 7–25)
BUN/CREATININE RATIO: 11 (ref 6–22)
CALCIUM SERPL-MCNC: 9 MG/DL (ref 8.6–10.3)
CHLORIDE SERPLBLD-SCNC: 85.5 MMOL/L (ref 98–110)
CHOLEST SERPL-MCNC: 140 MG/DL (ref 0–199)
CHOLEST/HDLC SERPL: 2 {RATIO} (ref 0–5)
CO2 SERPL-SCNC: 31.7 MMOL/L (ref 20–32)
CREAT SERPL-MCNC: 0.91 MG/DL (ref 0.7–1.18)
GLOBULIN, CALCULATED - QUEST: 3.5 (ref 1.9–3.7)
GLUCOSE SERPL-MCNC: 100 MG/DL (ref 60–99)
HDLC SERPL-MCNC: 57 MG/DL (ref 40–150)
LDLC SERPL CALC-MCNC: 65 MG/DL (ref 0–130)
POTASSIUM SERPL-SCNC: 5.42 MMOL/L (ref 3.5–5.3)
PROT SERPL-MCNC: 7.7 G/DL (ref 6.1–8.1)
SODIUM SERPL-SCNC: 125.5 MMOL/L (ref 135–146)
TRIGL SERPL-MCNC: 92 MG/DL (ref 0–149)

## 2020-07-30 PROCEDURE — 80053 COMPREHEN METABOLIC PANEL: CPT | Performed by: FAMILY MEDICINE

## 2020-07-30 PROCEDURE — 99214 OFFICE O/P EST MOD 30 MIN: CPT | Mod: 25 | Performed by: FAMILY MEDICINE

## 2020-07-30 PROCEDURE — 90714 TD VACC NO PRESV 7 YRS+ IM: CPT | Performed by: FAMILY MEDICINE

## 2020-07-30 PROCEDURE — 36415 COLL VENOUS BLD VENIPUNCTURE: CPT | Performed by: FAMILY MEDICINE

## 2020-07-30 PROCEDURE — 90471 IMMUNIZATION ADMIN: CPT | Performed by: FAMILY MEDICINE

## 2020-07-30 PROCEDURE — 80061 LIPID PANEL: CPT | Performed by: FAMILY MEDICINE

## 2020-07-30 RX ORDER — CARBIDOPA AND LEVODOPA 25; 100 MG/1; MG/1
2 TABLET ORAL 3 TIMES DAILY
Status: ON HOLD | COMMUNITY
Start: 2020-07-28 | End: 2022-06-11

## 2020-07-30 RX ORDER — ROSUVASTATIN CALCIUM 40 MG/1
40 TABLET, COATED ORAL AT BEDTIME
Qty: 90 TABLET | Refills: 1 | Status: SHIPPED | OUTPATIENT
Start: 2020-07-30 | End: 2020-12-21

## 2020-07-30 RX ORDER — METOPROLOL SUCCINATE 50 MG/1
50 TABLET, EXTENDED RELEASE ORAL DAILY
Qty: 90 TABLET | Refills: 1 | Status: SHIPPED | OUTPATIENT
Start: 2020-07-30 | End: 2020-12-21

## 2020-07-30 RX ORDER — LISINOPRIL 40 MG/1
40 TABLET ORAL DAILY
Qty: 90 TABLET | Refills: 1 | Status: SHIPPED | OUTPATIENT
Start: 2020-07-30 | End: 2020-12-21

## 2020-07-30 ASSESSMENT — MIFFLIN-ST. JEOR: SCORE: 1774.24

## 2020-07-30 NOTE — TELEPHONE ENCOUNTER
D/w pt, labs show electrolytes off significantly, no new meds, has been drinking 8 beers per day    Recommend he cut way back on alcohol, recheck BMP standing within a week    He agrees ot set up lab only    patient given instructions to go to emergency department immediately if worsening of symptoms and verbalizes this understanding

## 2020-07-30 NOTE — PROGRESS NOTES
Subjective     Rashaun Molina is a 60 year old male who presents to clinic today for the following health issues:    HPI       Hyperlipidemia Follow-Up      Are you regularly taking any medication or supplement to lower your cholesterol?   Yes- crestor     Are you having muscle aches or other side effects that you think could be caused by your cholesterol lowering medication?  No    Hypertension Follow-up      Do you check your blood pressure regularly outside of the clinic? No     Are you following a low salt diet? No    Are your blood pressures ever more than 140 on the top number (systolic) OR more   than 90 on the bottom number (diastolic), for example 140/90? No    COPD Follow-Up    Overall, how are your COPD symptoms since your last clinic visit?  Better    How much fatigue or shortness of breath do you have when you are walking?  Same as usual    How much shortness of breath do you have when you are resting?  Less than usual    How often do you cough? Rarely    Have you noticed any change in your sputum/phlegm?  No    Have you experienced a recent fever? No    Please describe how far you can walk without stopping to rest:  2-5 blocks    How many flights of stairs are you able to walk up without stopping?  2    Have you had any Emergency Room Visits, Urgent Care Visits, or Hospital Admissions because of your COPD since your last office visit?  No    History   Smoking Status     Former Smoker     Packs/day: 1.50     Years: 40.00     Quit date: 4/19/2013   Smokeless Tobacco     Never Used     No results found for: FEV1, [id number]      How many servings of fruits and vegetables do you eat daily?  2-3    On average, how many sweetened beverages do you drink each day (Examples: soda, juice, sweet tea, etc.  Do NOT count diet or artificially sweetened beverages)?   1    How many days per week do you exercise enough to make your heart beat faster? 3 or less    How many minutes a day do you exercise enough to make your  heart beat faster? 10 - 19    How many days per week do you miss taking your medication? 0    PT was formally diagnosed with Dr Luis Brink    Patient Active Problem List   Diagnosis     Essential hypertension, benign     Pure hypercholesterolemia     History of colonic polyps     BCC (basal cell carcinoma)     Health Care Home     Colovesical fistula     S/P total hip arthroplasty     Obesity due to excess calories, unspecified obesity severity     Chronic bronchitis, unspecified chronic bronchitis type (H)     PVD (peripheral vascular disease) (H)     Palpitations     Parkinson disease (H)     Past Surgical History:   Procedure Laterality Date     ARTHROPLASTY HIP Right 10/7/2015    Procedure: ARTHROPLASTY HIP;  Surgeon: Neil Davis MD;  Location: [facility] OR     COLONOSCOPY  , 4/10/13    Per Hospital encounter dated 13     COLONOSCOPY N/A 2018    Procedure: COMBINED COLONOSCOPY, SINGLE OR MULTIPLE BIOPSY/POLYPECTOMY BY BIOPSY;;  Surgeon: Tramaine Zuniga MD;  Location:  GI     COMBINED CYSTOSCOPY, INSERT CATHETER URETER  2013    Procedure: COMBINED CYSTOSCOPY, INSERT CATHETER URETER;;  Surgeon: Kashif Parks MD;  Location:  OR     HC COLONOSCOPY THRU STOMA W BIOPSY/CAUTERY TUMOR/POLYP/LESION      michael, tubular adenoma, next colonoscopy      HC COLONOSCOPY THRU STOMA, DIAGNOSTIC  01    negative, ligate two internal hemmorhoids  Dr rodriguez-repeat      HC LARYNGOSCOPY DIRECT W VOCAL CORD INJECTION      vocal cord polyps     LAPAROSCOPIC ASSISTED COLECTOMY  2013    Procedure: LAPAROSCOPIC ASSISTED COLECTOMY;  LOW ANTERIOR RESECTION ;  Surgeon: Tramaine Zuniga MD;  Location:  OR       Social History     Tobacco Use     Smoking status: Former Smoker     Packs/day: 1.50     Years: 40.00     Pack years: 60.00     Last attempt to quit: 2013     Years since quittin.2     Smokeless tobacco: Never Used   Substance Use Topics     Alcohol use:  Yes     Alcohol/week: 14.0 standard drinks     Types: 14 Cans of beer per week     Comment: 2 beers daily     Family History   Problem Relation Age of Onset     Hypertension Mother      Hypertension Father          MI     Heart Disease Father         MI  at age 55     Coronary Artery Disease Father      Hyperlipidemia Brother      Hypertension Brother      Heart Surgery Brother         defibrillator     Hypertension Sister      Prostate Cancer No family hx of          Current Outpatient Medications   Medication Sig Dispense Refill     ASPIRIN EC PO Take 81 mg by mouth daily.       carbidopa-levodopa (SINEMET)  MG tablet Take 1.5 tablets by mouth 3 times daily       lisinopril (PRINIVIL/ZESTRIL) 40 MG tablet Take 1 tablet (40 mg) by mouth daily 90 tablet 1     metoprolol succinate ER (TOPROL-XL) 50 MG 24 hr tablet Take 1 tablet (50 mg) by mouth daily 90 tablet 1     rosuvastatin (CRESTOR) 40 MG tablet Take 1 tablet (40 mg) by mouth At Bedtime 90 tablet 1     umeclidinium (INCRUSE ELLIPTA) 62.5 MCG/INH inhaler Inhale 1 puff into the lungs daily 30 each 0     Allergies   Allergen Reactions     Spiriva Handihaler Blisters     Hctz [Hydrochlorothiazide] Other (See Comments)     Low sodium     Recent Labs   Lab Test 19  1013 19  0920 11/11/19 06/10/19 12/27/18  0812 10/22/18  0847 18  0718 18  0713 18  1005   A1C  --  5.3  --   --   --   --  5.4 5.6  --    LDL  --   --  53 80  --   --  62  --  109*   HDL  --   --  43 50  --   --  63  --  55   TRIG  --   --  171* 125  --   --  136  --  278*   ALT  --   --   --   --  57 51*  --   --  34   CR  --   --  0.99 1.04 0.68 0.86 0.69  --  0.80   GFRESTIMATED  --   --   --   --  >90 95 >90  --  98   GFRESTBLACK  --   --   --   --  >90  --  >90  --   --    POTASSIUM  --   --  5.20 4.77 4.4 4.7  --   --  5.1   TSH 1.84  --   --   --  1.63  --   --   --   --       BP Readings from Last 3 Encounters:   20 138/80   20 (!) 146/80  "  11/11/19 (!) 150/90    Wt Readings from Last 3 Encounters:   07/30/20 97.9 kg (215 lb 12.8 oz)   02/18/20 100.3 kg (221 lb 3.2 oz)   11/11/19 103.1 kg (227 lb 3.2 oz)                    Reviewed and updated as needed this visit by Provider         Review of Systems   Constitutional, HEENT, cardiovascular, pulmonary, gi and gu systems are negative, except as otherwise noted.      Objective    /80 (BP Location: Left arm, Patient Position: Sitting, Cuff Size: Adult Large)   Pulse 63   Temp 97.9  F (36.6  C) (Oral)   Ht 1.753 m (5' 9\")   Wt 97.9 kg (215 lb 12.8 oz)   SpO2 97%   BMI 31.87 kg/m    Body mass index is 31.87 kg/m .  Physical Exam   GENERAL: healthy, alert and no distress  EYES: Eyes grossly normal to inspection, PERRL and conjunctivae and sclerae normal  HENT: ear canals and TM's normal, nose and mouth without ulcers or lesions  NECK: no adenopathy, no asymmetry, masses, or scars and thyroid normal to palpation  RESP: lungs clear to auscultation - no rales, rhonchi or wheezes  CV: regular rate and rhythm, normal S1 S2, no S3 or S4, no murmur, click or rub, no peripheral edema and peripheral pulses strong  ABDOMEN: soft, nontender, no hepatosplenomegaly, no masses and bowel sounds normal  MS: no gross musculoskeletal defects noted, no edema  SKIN: no suspicious lesions or rashes  NEURO: Normal strength and tone, mentation intact , notable left arm tremor  PSYCH: mentation appears normal, affect normal/bright    Diagnostic Test Results:  Labs reviewed in Epic        Assessment & Plan   Assessment      Plan  (I10) Essential hypertension, benign  (primary encounter diagnosis)  Comment: well controlled  Plan: continue current medications at current doses     (E78.00) Pure hypercholesterolemia  Comment: control uncertain  Plan: continue current medications at current doses     (J42) Chronic bronchitis, unspecified chronic bronchitis type (H)  Comment: stable symptomatically , has not used inhaler in " "some months  Plan: f/u if not improving or worsening     (G20) Parkinson disease (H)  Comment: new diagnosis through [facility], meds not helping tremor much  Plan: continue current medications at current doses for now, seeing [facility] again soon    BMI:   Estimated body mass index is 31.87 kg/m  as calculated from the following:    Height as of this encounter: 1.753 m (5' 9\").    Weight as of this encounter: 97.9 kg (215 lb 12.8 oz).   Weight management plan: Discussed healthy diet and exercise guidelines          FUTURE APPOINTMENTS:       - Follow-up visit in 6 mo  Work on weight loss  Regular exercise    No follow-ups on file.    Yemi Nava MD  [facility]    '  "

## 2020-08-06 DIAGNOSIS — E87.1 HYPONATREMIA: ICD-10-CM

## 2020-08-06 LAB
BUN SERPL-MCNC: 8 MG/DL (ref 7–25)
BUN/CREATININE RATIO: 8.9 (ref 6–22)
CALCIUM SERPL-MCNC: 9.7 MG/DL (ref 8.6–10.3)
CHLORIDE SERPLBLD-SCNC: 88.4 MMOL/L (ref 98–110)
CO2 SERPL-SCNC: 34.3 MMOL/L (ref 20–32)
CREAT SERPL-MCNC: 0.9 MG/DL (ref 0.7–1.18)
GLUCOSE SERPL-MCNC: 96 MG/DL (ref 60–99)
POTASSIUM SERPL-SCNC: 5.35 MMOL/L (ref 3.5–5.3)
SODIUM SERPL-SCNC: 128.9 MMOL/L (ref 135–146)

## 2020-08-06 PROCEDURE — 36415 COLL VENOUS BLD VENIPUNCTURE: CPT | Performed by: FAMILY MEDICINE

## 2020-08-06 PROCEDURE — 80048 BASIC METABOLIC PNL TOTAL CA: CPT | Performed by: FAMILY MEDICINE

## 2020-08-06 NOTE — LETTER
August 7, 2020      Rashaun Molina  [address] [facility] [address]        Dear ,    We are writing to inform you of your test results.    The sodium and elctrolytes are improved.  Please limit alcohol somewhat.     Resulted Orders   Basic Metabolic Panel (BFP)   Result Value Ref Range    Carbon Dioxide 34.3 (A) 20 - 32 mmol/L    Creatinine 0.90 0.70 - 1.18 mg/dL    Glucose 96 60 - 99 mg/dL    Sodium 128.9 (A) 135 - 146 mmol/L    Potassium 5.35 (A) 3.5 - 5.3 mmol/L    Chloride 88.4 (A) 98 - 110 mmol/L    Urea Nitrogen 8 7 - 25 mg/dL    Calcium 9.7 8.6 - 10.3 mg/dL    BUN/Creatinine Ratio 8.9 6 - 22       If you have any questions or concerns, please call the clinic at the number listed above.       Sincerely,        Yemi Nava MD

## 2020-12-21 ENCOUNTER — OFFICE VISIT (OUTPATIENT)
Dept: FAMILY MEDICINE | Facility: CLINIC | Age: 60
End: 2020-12-21

## 2020-12-21 ENCOUNTER — TELEPHONE (OUTPATIENT)
Dept: FAMILY MEDICINE | Facility: CLINIC | Age: 60
End: 2020-12-21

## 2020-12-21 VITALS
OXYGEN SATURATION: 96 % | WEIGHT: 221.4 LBS | BODY MASS INDEX: 32.79 KG/M2 | HEIGHT: 69 IN | TEMPERATURE: 98.3 F | HEART RATE: 70 BPM | SYSTOLIC BLOOD PRESSURE: 140 MMHG | DIASTOLIC BLOOD PRESSURE: 80 MMHG

## 2020-12-21 DIAGNOSIS — Z00.00 ROUTINE GENERAL MEDICAL EXAMINATION AT A HEALTH CARE FACILITY: Primary | ICD-10-CM

## 2020-12-21 DIAGNOSIS — R22.9 LUMP OF SKIN: ICD-10-CM

## 2020-12-21 DIAGNOSIS — I10 ESSENTIAL HYPERTENSION, BENIGN: ICD-10-CM

## 2020-12-21 DIAGNOSIS — G20.A1 PARKINSON DISEASE (H): ICD-10-CM

## 2020-12-21 DIAGNOSIS — E78.5 HYPERLIPIDEMIA LDL GOAL <70: ICD-10-CM

## 2020-12-21 DIAGNOSIS — Z12.5 SPECIAL SCREENING FOR MALIGNANT NEOPLASM OF PROSTATE: ICD-10-CM

## 2020-12-21 DIAGNOSIS — J42 CHRONIC BRONCHITIS, UNSPECIFIED CHRONIC BRONCHITIS TYPE (H): ICD-10-CM

## 2020-12-21 LAB
ALBUMIN SERPL-MCNC: 4.2 G/DL (ref 3.6–5.1)
ALBUMIN/GLOB SERPL: 1.2 {RATIO} (ref 1–2.5)
ALP SERPL-CCNC: 71 U/L (ref 33–130)
ALT 1742-6: 6 U/L (ref 0–32)
AST 1920-8: 33 U/L (ref 0–35)
BILIRUB SERPL-MCNC: 0.7 MG/DL (ref 0.2–1.2)
BUN SERPL-MCNC: 8 MG/DL (ref 7–25)
BUN/CREATININE RATIO: 7.5 (ref 6–22)
CALCIUM SERPL-MCNC: 10 MG/DL (ref 8.6–10.3)
CHLORIDE SERPLBLD-SCNC: 90.1 MMOL/L (ref 98–110)
CHOLEST SERPL-MCNC: 113 MG/DL (ref 0–199)
CHOLEST/HDLC SERPL: 2 {RATIO} (ref 0–5)
CO2 SERPL-SCNC: 31.3 MMOL/L (ref 20–32)
CREAT SERPL-MCNC: 1.06 MG/DL (ref 0.7–1.18)
GLOBULIN, CALCULATED - QUEST: 3.6 (ref 1.9–3.7)
GLUCOSE SERPL-MCNC: 106 MG/DL (ref 60–99)
HDLC SERPL-MCNC: 53 MG/DL (ref 40–150)
LDLC SERPL CALC-MCNC: 43 MG/DL (ref 0–130)
POTASSIUM SERPL-SCNC: 5.05 MMOL/L (ref 3.5–5.3)
PROT SERPL-MCNC: 7.8 G/DL (ref 6.1–8.1)
SODIUM SERPL-SCNC: 130.6 MMOL/L (ref 135–146)
TRIGL SERPL-MCNC: 83 MG/DL (ref 0–149)

## 2020-12-21 PROCEDURE — 99396 PREV VISIT EST AGE 40-64: CPT | Mod: 25 | Performed by: FAMILY MEDICINE

## 2020-12-21 PROCEDURE — 90686 IIV4 VACC NO PRSV 0.5 ML IM: CPT | Performed by: FAMILY MEDICINE

## 2020-12-21 PROCEDURE — 90471 IMMUNIZATION ADMIN: CPT | Performed by: FAMILY MEDICINE

## 2020-12-21 PROCEDURE — 80061 LIPID PANEL: CPT | Performed by: FAMILY MEDICINE

## 2020-12-21 PROCEDURE — 36415 COLL VENOUS BLD VENIPUNCTURE: CPT | Performed by: FAMILY MEDICINE

## 2020-12-21 PROCEDURE — 80053 COMPREHEN METABOLIC PANEL: CPT | Performed by: FAMILY MEDICINE

## 2020-12-21 RX ORDER — ROSUVASTATIN CALCIUM 40 MG/1
40 TABLET, COATED ORAL AT BEDTIME
Qty: 90 TABLET | Refills: 1 | Status: SHIPPED | OUTPATIENT
Start: 2020-12-21 | End: 2021-09-14

## 2020-12-21 RX ORDER — LISINOPRIL 40 MG/1
40 TABLET ORAL DAILY
Qty: 90 TABLET | Refills: 1 | Status: SHIPPED | OUTPATIENT
Start: 2020-12-21 | End: 2021-09-14

## 2020-12-21 RX ORDER — METOPROLOL SUCCINATE 50 MG/1
50 TABLET, EXTENDED RELEASE ORAL DAILY
Qty: 90 TABLET | Refills: 1 | Status: SHIPPED | OUTPATIENT
Start: 2020-12-21 | End: 2021-09-14

## 2020-12-21 SDOH — ECONOMIC STABILITY: INCOME INSECURITY: HOW HARD IS IT FOR YOU TO PAY FOR THE VERY BASICS LIKE FOOD, HOUSING, MEDICAL CARE, AND HEATING?: NOT ASKED

## 2020-12-21 SDOH — ECONOMIC STABILITY: FOOD INSECURITY: WITHIN THE PAST 12 MONTHS, THE FOOD YOU BOUGHT JUST DIDN'T LAST AND YOU DIDN'T HAVE MONEY TO GET MORE.: NOT ASKED

## 2020-12-21 SDOH — ECONOMIC STABILITY: TRANSPORTATION INSECURITY
IN THE PAST 12 MONTHS, HAS LACK OF TRANSPORTATION KEPT YOU FROM MEETINGS, WORK, OR FROM GETTING THINGS NEEDED FOR DAILY LIVING?: NOT ASKED

## 2020-12-21 SDOH — ECONOMIC STABILITY: FOOD INSECURITY: WITHIN THE PAST 12 MONTHS, YOU WORRIED THAT YOUR FOOD WOULD RUN OUT BEFORE YOU GOT MONEY TO BUY MORE.: NOT ASKED

## 2020-12-21 SDOH — ECONOMIC STABILITY: TRANSPORTATION INSECURITY
IN THE PAST 12 MONTHS, HAS THE LACK OF TRANSPORTATION KEPT YOU FROM MEDICAL APPOINTMENTS OR FROM GETTING MEDICATIONS?: NOT ASKED

## 2020-12-21 ASSESSMENT — MIFFLIN-ST. JEOR: SCORE: 1791.7

## 2020-12-21 NOTE — NURSING NOTE
Rashaun is here for a fasting CPX.    Pre-Visit Screening:  Immunizations:flu shot  Colonoscopy:UTD  Mammogram:NA  Asthma Action Test/Plan:NA  PHQ9:NA  GAD7:NA  Questioned patient about current smoking habits Pt.former smoker  OK to leave a detailed message on voice mail for today's visit yes, phone # [phone]

## 2020-12-21 NOTE — PROGRESS NOTES
3  SUBJECTIVE:   CC: Rashaun Molina is an 60 year old male who presents for preventive health visit.     Healthy Habits:    Do you get at least three servings of calcium containing foods daily (dairy, green leafy vegetables, etc.)? yes    Amount of exercise or daily activities, outside of work: 1 day(s) per week    Problems taking medications regularly No    Medication side effects: No    Have you had an eye exam in the past two years? no    Do you see a dentist twice per year? yes    Do you have sleep apnea, excessive snoring or daytime drowsiness?no          Today's PHQ-2 Score:   PHQ-2 (  [employer]) 2020   Q1: Little interest or pleasure in doing things 0 0   Q2: Feeling down, depressed or hopeless 0 0   PHQ-2 Score 0 0       Abuse: Current or Past(Physical, Sexual or Emotional)- No  Do you feel safe in your environment? Yes        Social History     Tobacco Use     Smoking status: Former Smoker     Packs/day: 1.50     Years: 40.00     Pack years: 60.00     Quit date: 2013     Years since quittin.6     Smokeless tobacco: Never Used   Substance Use Topics     Alcohol use: Yes     Alcohol/week: 14.0 standard drinks     Types: 14 Cans of beer per week     Comment: 2 beers daily     If you drink alcohol do you typically have >3 drinks per day or >7 drinks per week? Yes, 2-6 drinks per day                      Last PSA:   [facility]bbott PSA   Date Value Ref Range Status   10/22/2018 1.2 < OR = 4.0 ng/mL Final     Comment:     The total PSA value from this assay system is   standardized against the WHO standard. The test   result will be approximately 20% lower when compared   to the equimolar-standardized total PSA (Selene   Be). Comparison of serial PSA results should be   interpreted with this fact in mind.     This test was performed using the Siemens   chemiluminescent method. Values obtained from   different assay methods cannot be used  interchangeably. PSA levels, regardless of  value,  should not be interpreted as absolute  evidence of the presence or absence of disease.         Reviewed orders with patient. Reviewed health maintenance and updated orders accordingly - Yes  BP Readings from Last 3 Encounters:   12/21/20 (!) 140/80   07/30/20 138/80   02/18/20 (!) 146/80    Wt Readings from Last 3 Encounters:   12/21/20 100.4 kg (221 lb 6.4 oz)   07/30/20 97.9 kg (215 lb 12.8 oz)   02/18/20 100.3 kg (221 lb 3.2 oz)                  Patient Active Problem List   Diagnosis     Essential hypertension, benign     Pure hypercholesterolemia     History of colonic polyps     BCC (basal cell carcinoma)     Health Care Home     Colovesical fistula     S/P total hip arthroplasty     Obesity due to excess calories, unspecified obesity severity     Chronic bronchitis, unspecified chronic bronchitis type (H)     PVD (peripheral vascular disease) (H)     Palpitations     Parkinson disease (H)     Past Surgical History:   Procedure Laterality Date     ARTHROPLASTY HIP Right 10/7/2015    Procedure: ARTHROPLASTY HIP;  Surgeon: Neil Davis MD;  Location: [facility] OR     COLONOSCOPY  9/05, 4/10/13    Per Hospital encounter dated 4/18/13     COLONOSCOPY N/A 4/9/2018    Procedure: COMBINED COLONOSCOPY, SINGLE OR MULTIPLE BIOPSY/POLYPECTOMY BY BIOPSY;;  Surgeon: Tramaine Zuniga MD;  Location:  GI     COMBINED CYSTOSCOPY, INSERT CATHETER URETER  4/11/2013    Procedure: COMBINED CYSTOSCOPY, INSERT CATHETER URETER;;  Surgeon: Kashif Parks MD;  Location:  OR     HC COLONOSCOPY THRU STOMA W BIOPSY/CAUTERY TUMOR/POLYP/LESION  1998    michael, tubular adenoma, next colonoscopy 2001      COLONOSCOPY THRU STOMA, DIAGNOSTIC  04/16/01    negative, ligate two internal hemmorhoids  Dr rodriguez-repeat 2008     HC LARYNGOSCOPY DIRECT W VOCAL CORD INJECTION      vocal cord polyps     LAPAROSCOPIC ASSISTED COLECTOMY  4/11/2013    Procedure: LAPAROSCOPIC ASSISTED COLECTOMY;  LOW ANTERIOR RESECTION ;  Surgeon: Tramaine Zuniga  MD PATRICK;  Location:  OR       Social History     Tobacco Use     Smoking status: Former Smoker     Packs/day: 1.50     Years: 40.00     Pack years: 60.00     Quit date: 2013     Years since quittin.6     Smokeless tobacco: Never Used   Substance Use Topics     Alcohol use: Yes     Alcohol/week: 14.0 standard drinks     Types: 14 Cans of beer per week     Comment: 2 beers daily     Family History   Problem Relation Age of Onset     Hypertension Mother      Hypertension Father          MI     Heart Disease Father         MI  at age 55     Coronary Artery Disease Father      Hyperlipidemia Brother      Hypertension Brother      Heart Surgery Brother         defibrillator     Hypertension Sister      Prostate Cancer No family hx of          Current Outpatient Medications   Medication Sig Dispense Refill     ASPIRIN EC PO Take 81 mg by mouth daily.       carbidopa-levodopa (SINEMET)  MG tablet Take 1.5 tablets by mouth 3 times daily       lisinopril (ZESTRIL) 40 MG tablet Take 1 tablet (40 mg) by mouth daily 90 tablet 1     metoprolol succinate ER (TOPROL-XL) 50 MG 24 hr tablet Take 1 tablet (50 mg) by mouth daily 90 tablet 1     rosuvastatin (CRESTOR) 40 MG tablet Take 1 tablet (40 mg) by mouth At Bedtime 90 tablet 1     umeclidinium (INCRUSE ELLIPTA) 62.5 MCG/INH inhaler Inhale 1 puff into the lungs daily 30 each 0     Allergies   Allergen Reactions     Spiriva Handihaler Blisters     Hctz [Hydrochlorothiazide] Other (See Comments)     Low sodium     Recent Labs   Lab Test 20  1121 20  1013 19  0920 11/11/19 06/10/19 12/27/18  0812 10/22/18  0847 18  0718 18  0713 18  1005   A1C  --   --   --  5.3  --   --   --   --  5.4 5.6  --    LDL  --  65  --   --  53 80  --   --  62  --  109*   HDL  --  57  --   --  43 50  --   --  63  --  55   TRIG  --  92  --   --  171* 125  --   --  136  --  278*   ALT  --   --   --   --   --   --  57 51*  --   --  34   CR  0.90 0.91  --   --  0.99 1.04 0.68 0.86 0.69  --  0.80   GFRESTIMATED  --   --   --   --   --   --  >90 95 >90  --  98   GFRESTBLACK  --   --   --   --   --   --  >90  --  >90  --   --    POTASSIUM 5.35* 5.42*  --   --  5.20 4.77 4.4 4.7  --   --  5.1   TSH  --   --  1.84  --   --   --  1.63  --   --   --   --         Reviewed and updated as needed this visit by clinical staff  Tobacco  Allergies  Meds  Problems  Med Hx  Surg Hx  Fam Hx          Reviewed and updated as needed this visit by Provider                Past Medical History:   Diagnosis Date     Arthritis      Chronic airway obstruction, not elsewhere classified 3/8/2004    pt says that he does not have COPD     Cough syncope 11/21/2012     Essential hypertension, benign      Fistula     colon/bladder     Fracture of right proximal fibula 7/30/2014     Orthostatic hypotension 11/24/2014     Other chronic pain     right hip pain     PAD (peripheral artery disease) (H) 08/2018    PTA right [facility]FA Dr. Lee     Palpitations 12/2018 01/2019 Event monitor- SR/ST     Personal history of colonic polyps 6/28/2005     Pure hypercholesterolemia      Tobacco use disorder 3/8/2004     Vasovagal syncopes 10/25/2014     Viral warts, unspecified     genital      Past Surgical History:   Procedure Laterality Date     ARTHROPLASTY HIP Right 10/7/2015    Procedure: ARTHROPLASTY HIP;  Surgeon: Neil Davis MD;  Location: [facility] OR     COLONOSCOPY  9/05, 4/10/13    Per Hospital encounter dated 4/18/13     COLONOSCOPY N/A 4/9/2018    Procedure: COMBINED COLONOSCOPY, SINGLE OR MULTIPLE BIOPSY/POLYPECTOMY BY BIOPSY;;  Surgeon: Tramaine Zuniga MD;  Location:  GI     COMBINED CYSTOSCOPY, INSERT CATHETER URETER  4/11/2013    Procedure: COMBINED CYSTOSCOPY, INSERT CATHETER URETER;;  Surgeon: Kashif Parks MD;  Location:  OR      COLONOSCOPY THRU STOMA W BIOPSY/CAUTERY TUMOR/POLYP/LESION  1998    michael, tubular adenoma, next colonoscopy 2001       "COLONOSCOPY THRU STOMA, DIAGNOSTIC  04/16/01    negative, ligate two internal hemmorhoids  Dr rodriguez-repeat 2008     HC LARYNGOSCOPY DIRECT W VOCAL CORD INJECTION      vocal cord polyps     LAPAROSCOPIC ASSISTED COLECTOMY  4/11/2013    Procedure: LAPAROSCOPIC ASSISTED COLECTOMY;  LOW ANTERIOR RESECTION ;  Surgeon: Tramaine Zuniga MD;  Location:  OR       ROS:  CONSTITUTIONAL: NEGATIVE for fever, chills, change in weight  INTEGUMENTARY/SKIN: NEGATIVE for worrisome rashes, moles or lesions  EYES: NEGATIVE for vision changes or irritation  ENT: NEGATIVE for ear, mouth and throat problems  RESP: NEGATIVE for significant cough or SOB  CV: NEGATIVE for chest pain, palpitations or peripheral edema  GI: NEGATIVE for nausea, abdominal pain, heartburn, or change in bowel habits   male: negative for dysuria, hematuria, decreased urinary stream, erectile dysfunction, urethral discharge  MUSCULOSKELETAL: NEGATIVE for significant arthralgias or myalgia  NEURO: NEGATIVE for weakness, dizziness or paresthesias  ENDOCRINE: NEGATIVE for temperature intolerance, skin/hair changes  HEME/ALLERGY/IMMUNE: NEGATIVE for bleeding problems  PSYCHIATRIC: NEGATIVE for changes in mood or affect    OBJECTIVE:   BP (!) 140/80 (BP Location: Left arm, Patient Position: Sitting, Cuff Size: Adult Large)   Pulse 70   Temp 98.3  F (36.8  C) (Oral)   Ht 1.74 m (5' 8.5\")   Wt 100.4 kg (221 lb 6.4 oz)   SpO2 96%   BMI 33.17 kg/m    EXAM:  GENERAL: healthy, alert and no distress  EYES: Eyes grossly normal to inspection, PERRL and conjunctivae and sclerae normal  HENT: ear canals and TM's normal, nose and mouth without ulcers or lesions  NECK: no adenopathy, no asymmetry, masses, or scars and thyroid normal to palpation  RESP: lungs clear to auscultation - no rales, rhonchi or wheezes  CV: regular rate and rhythm, normal S1 S2, no S3 or S4, no murmur, click or rub, no peripheral edema and peripheral pulses strong  ABDOMEN: soft, nontender, no " hepatosplenomegaly, no masses and bowel sounds normal   (male): normal male genitalia without lesions or urethral discharge, no hernia  RECTAL (male): deferred  MS: no gross musculoskeletal defects noted, no edema  SKIN: no suspicious lesions or rashes. Does have 3 cm nodule left forehead  NEURO: Normal strength and tone, mentation intact and speech normal  PSYCH: mentation appears normal, affect normal/bright  LYMPH: no cervical, supraclavicular, axillary, or inguinal adenopathy    Diagnostic Test Results:  Labs reviewed in Epic    ASSESSMENT/PLAN:   (Z00.00) Routine general medical examination at a health care facility  (primary encounter diagnosis)  Comment: discussed preventitive healthcare   Plan: FLU VAC PRESRV FREE QUAD SPLIT VIR 3+YRS IM        Continue to work on healthy diet and exercise, discussed healthy habits     (I10) Essential hypertension, benign  Comment: controlled  Plan: lisinopril (ZESTRIL) 40 MG tablet, metoprolol         succinate ER (TOPROL-XL) 50 MG 24 hr tablet,         Comprehensive Metobolic Panel (BFP), VENOUS         COLLECTION        continue current medications at current doses     (E78.5) Hyperlipidemia LDL goal <70  Comment: control uncertain  Plan: rosuvastatin (CRESTOR) 40 MG tablet, Lipid         Panel (BFP), Comprehensive Metobolic Panel         (BFP), VENOUS COLLECTION        continue current medications at current doses     (J42) Chronic bronchitis, unspecified chronic bronchitis type (H)  Comment: not using inhaler-recommend he start again   Plan: umeclidinium (INCRUSE ELLIPTA) 62.5 MCG/INH         inhaler            (G20) Parkinson disease (H)  Comment: stable, meds were increased by neurology  Plan: continue current medications at current doses     (R22.9) Lump of skin  Comment: pt has 3 cm subcutaneous nodule left forehead-wants removed  Plan: GENERAL SURG ADULT REFERRAL            (Z12.5) Special screening for malignant neoplasm of prostate  Comment:   Plan: VENOUS  "COLLECTION, PSA Total ([facility])                COUNSELING:  Reviewed preventive health counseling, as reflected in patient instructions       Regular exercise       Healthy diet/nutrition       Vision screening       Immunizations    Vaccinated for: Influenza             Colon cancer screening       Prostate cancer screening    Estimated body mass index is 33.17 kg/m  as calculated from the following:    Height as of this encounter: 1.74 m (5' 8.5\").    Weight as of this encounter: 100.4 kg (221 lb 6.4 oz).    Weight management plan: Discussed healthy diet and exercise guidelines    He reports that he quit smoking about 7 years ago. He has a 60.00 pack-year smoking history. He has never used smokeless tobacco.      Counseling Resources:  ATP IV Guidelines  Pooled Cohorts Equation Calculator  FRAX Risk Assessment  ICSI Preventive Guidelines  Dietary Guidelines for Americans, 2010  USDA's MyPlate  ASA Prophylaxis  Lung CA Screening    Yemi Nava MD  [place] [facility]  "

## 2020-12-21 NOTE — LETTER
December 22, 2020      Rashaun Molina  [address] [facility] [address]        Dear ,    We are writing to inform you of your test results.    Your test results fall within the expected range(s) or remain unchanged from previous results.  Please continue with current treatment plan. The blood sugar is running a tad high so make sure you are working to decrease sugars and carbohydrates in your diet and exercising regularly.          Resulted Orders   Lipid Panel (BFP)   Result Value Ref Range    Cholesterol 113 0 - 199 mg/dL    Triglycerides 83 0 - 149 mg/dL    HDL Cholesterol 53 40 - 150 mg/dL    LDL Cholesterol Direct 43 0 - 130 mg/dL    Cholesterol/HDL Ratio 2 0 - 5   Comprehensive Metobolic Panel (BFP)   Result Value Ref Range    Carbon Dioxide 31.3 20 - 32 mmol/L    Creatinine 1.06 0.70 - 1.18 mg/dL    Glucose 106 (A) 60 - 99 mg/dL    Sodium 130.6 (A) 135 - 146 mmol/L    Potassium 5.05 3.5 - 5.3 mmol/L    Chloride 90.1 (A) 98 - 110 mmol/L    Protein Total 7.8 6.1 - 8.1 g/dL    Albumin 4.2 3.6 - 5.1 g/dL    Alkaline Phosphatase 71 33 - 130 U/L    ALT 6 0 - 32 U/L    AST 33 0 - 35 U/L    Bilirubin Total 0.7 0.2 - 1.2 mg/dL    Urea Nitrogen 8 7 - 25 mg/dL    Calcium 10.0 8.6 - 10.3 mg/dL    BUN/Creatinine Ratio 7.5 6 - 22    Globulin Calculated 3.6 1.9 - 3.7    A/G Ratio 1.2 1 - 2.5   PSA Total ([facility])   Result Value Ref Range    Abbott PSA 0.7 < OR = 4.0 ng/mL      Comment:      The total PSA value from this assay system is   standardized against the WHO standard. The test   result will be approximately 20% lower when compared   to the equimolar-standardized total PSA (Selene   Be). Comparison of serial PSA results should be   interpreted with this fact in mind.     This test was performed using the Siemens   chemiluminescent method. Values obtained from   different assay methods cannot be used  interchangeably. PSA levels, regardless of  value, should not be interpreted as absolute  evidence of  the presence or absence of disease.         If you have any questions or concerns, please call the clinic at the number listed above.       Sincerely,      Yemi Nava MD

## 2020-12-21 NOTE — TELEPHONE ENCOUNTER
You sent a referral to [facility] for a lump on the forehead. [facility] does not remove lumps on face. I gave the patient a number for [facility].     Thanks,  Patricia   
Surgical wound infection

## 2020-12-22 LAB — ABBOTT PSA - QUEST: 0.7 NG/ML

## 2021-02-04 ENCOUNTER — OFFICE VISIT (OUTPATIENT)
Dept: FAMILY MEDICINE | Facility: CLINIC | Age: 61
End: 2021-02-04

## 2021-02-04 VITALS
TEMPERATURE: 97.4 F | OXYGEN SATURATION: 95 % | BODY MASS INDEX: 33.18 KG/M2 | HEIGHT: 69 IN | HEART RATE: 75 BPM | SYSTOLIC BLOOD PRESSURE: 144 MMHG | DIASTOLIC BLOOD PRESSURE: 82 MMHG | WEIGHT: 224 LBS

## 2021-02-04 DIAGNOSIS — S01.81XA LACERATION OF FOREHEAD, INITIAL ENCOUNTER: ICD-10-CM

## 2021-02-04 DIAGNOSIS — S09.90XA TRAUMATIC INJURY OF HEAD, INITIAL ENCOUNTER: ICD-10-CM
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
## 2021-02-04 PROCEDURE — 12013 RPR F/E/E/N/L/M 2.6-5.0 CM: CPT | Performed by: FAMILY MEDICINE

## 2021-02-04 ASSESSMENT — MIFFLIN-ST. JEOR: SCORE: 1803.5

## 2021-02-04 NOTE — PROGRESS NOTES
"SUBJECTIVE:   60 year old male sustained laceration of forehead 2 hours ago. Nature of injury: slipped at restaurant and fell striking head on tile-no loss of consciousness , felt fine immediately afterward, no vision, hearing, or mentation changes.  He denies changes in strength and sensation.      He is noting swelling around eye.     Pt does have Parkinsons and is followed by neurology-states this causes his balance to be problematic    Tetanus vaccination status reviewed: last tetanus booster within 10 years, tetanus re-vaccination not indicated.     OBJECTIVE: BP (!) 144/82 (BP Location: Right arm, Patient Position: Sitting, Cuff Size: Adult Large)   Pulse 75   Temp 97.4  F (36.3  C) (Oral)   Ht 1.74 m (5' 8.5\")   Wt 101.6 kg (224 lb)   SpO2 95%   BMI 33.56 kg/m       Gen- alert, NAD, cheerful, mentation , judgement and insight intact.  Well groomed.    HEENT-pupils equal, round and reactive to light, extraocular movements intact, no conjunctival injection. TM's clear with normal appearing canals. Oropharynx in moist without mucosal lesion.  Maxillary and frontal sinus' non tender.     Pt does have periorbital mild swelling, no point tenderness, no fluctuance  S1 and S2 normal, no murmurs, clicks, gallops or rubs. Regular rate and rhythm. Chest is clear; no wheezes or rales. No edema or JVD.   Neuro-normal mentation, strength, sensation-CN 2-12 intact    Patient appears well, vitals are normal. Laceration 3 cm noted.  Description: clean wound edges, no foreign bodies. Neurovascular and tendon structures are intact.    ASSESSMENT:   Laceration as described.    PLAN:   Anesthesia with 1% Lidocaine with Epinephrine. Wound cleansed, debrided of visible foreign material and necrotic tissue, and sutured. 4 6-0 ethilon placed. Antibiotic ointment and dressing applied.  Wound care instructions provided.  Observe for any signs of infection or other problems.  Return for suture removal in 5 days.    (W19.XXXA) " Fall, initial encounter  (primary encounter diagnosis)  Comment: likely due to Parkinsons and balance issues  Plan: consider cane when sees neurology next    (S09.90XA) Traumatic injury of head, initial encounter  Comment: no signs concussion, intracranial pathology at this time  Plan: pt informed to go immediately to ED if emesis, vision, mentation , or motor changes    (S01.81XA) Laceration of forehead, initial encounter  Comment: Plan:

## 2021-02-04 NOTE — NURSING NOTE
Rashaun is here today for a laceration to his forehead after a fall earlier today.     Pre-visit Screening:  Immunizations:  up to date  Colonoscopy:  is up to date  Mammogram: NA  Asthma Action Test/Plan:  NA  PHQ9:  NA  GAD7:  NA  Questioned patient about current smoking habits Pt. quit smoking some time ago.  Ok to leave detailed message on voice mail for today's visit only Yes, phone # [phone]

## 2021-02-09 ENCOUNTER — OFFICE VISIT (OUTPATIENT)
Dept: FAMILY MEDICINE | Facility: CLINIC | Age: 61
End: 2021-02-09

## 2021-02-09 VITALS
BODY MASS INDEX: 33.18 KG/M2 | HEIGHT: 69 IN | WEIGHT: 224 LBS | SYSTOLIC BLOOD PRESSURE: 142 MMHG | TEMPERATURE: 97.8 F | DIASTOLIC BLOOD PRESSURE: 76 MMHG | HEART RATE: 68 BPM

## 2021-02-09 DIAGNOSIS — S01.81XA LACERATION OF FOREHEAD, INITIAL ENCOUNTER: ICD-10-CM

## 2021-02-09 DIAGNOSIS — I10 ESSENTIAL HYPERTENSION, BENIGN: Primary | ICD-10-CM
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
## 2021-02-09 PROCEDURE — 99024 POSTOP FOLLOW-UP VISIT: CPT | Performed by: PHYSICIAN ASSISTANT

## 2021-02-09 ASSESSMENT — MIFFLIN-ST. JEOR: SCORE: 1803.5

## 2021-02-09 NOTE — PROGRESS NOTES
"Suture removal  Chief Complaint   Patient presents with     Suture Removal     suture removal on right eyebrow       Subjective:  Rashaun Molina who presents for suture removal.  4 sitches were placed 5 days ago following a fall.  There have not been any problems with the suture site. No fever, sweats, chills. No worsening symptoms since 2/4/2021 appt. No evidence of concussion.     Objective:  BP (!) 142/76 (BP Location: Left arm, Patient Position: Sitting, Cuff Size: Adult Large)   Pulse 68   Temp 97.8  F (36.6  C) (Oral)   Ht 1.74 m (5' 8.5\")   Wt 101.6 kg (224 lb)   BMI 33.56 kg/m    There are 4 sutures in the face.  The site appears well healed.  The stitches were removed without difficulty.  Steri-strips were applied at this time.    Assessment:  Suture Removal    Plan  1. Keep wound clean and dry for the next 24-48 hours  May return to work as long as wound is kept clean and dry  Discussed the probability of scarring  2. RTC for any problems/concerns otherwise on a PRN basis    Emma Nance PA-C    "

## 2021-02-09 NOTE — NURSING NOTE
Rashaun is here for suture removal on right eyebrow.        Pre-visit Screening:  Immunizations:  up to date  Colonoscopy:  is up to date  Mammogram: NA  Asthma Action Test/Plan:  NA  PHQ9:  NA  GAD7:  NA  Questioned patient about current smoking habits Pt. quit smoking some time ago.  Ok to leave detailed message on voice mail for today's visit only Yes, phone # [phone]

## 2021-07-14 DIAGNOSIS — I10 ESSENTIAL HYPERTENSION, BENIGN: ICD-10-CM

## 2021-07-14 RX ORDER — LISINOPRIL 40 MG/1
TABLET ORAL
Qty: 90 TABLET | Refills: 3 | COMMUNITY
Start: 2021-07-14

## 2021-07-14 NOTE — TELEPHONE ENCOUNTER
Pt was here 12/21/2020, needs 6 month med check    Refused Prescriptions:                       Disp   Refills    lisinopril (ZESTRIL) 40 MG tablet [Pharmac*90 tab*3        Sig: TAKE 1 TABLET BY MOUTH  DAILY  Refused By: PERICO SKY  Reason for Refusal: Patient needs appointment

## 2021-07-20 DIAGNOSIS — I10 ESSENTIAL HYPERTENSION, BENIGN: ICD-10-CM

## 2021-07-20 RX ORDER — METOPROLOL SUCCINATE 50 MG/1
TABLET, EXTENDED RELEASE ORAL
Qty: 90 TABLET | Refills: 3 | COMMUNITY
Start: 2021-07-20

## 2021-07-20 NOTE — TELEPHONE ENCOUNTER
Refused Prescriptions:                       Disp   Refills    metoprolol succinate ER (TOPROL-XL) 50 MG *90 tab*3        Sig: TAKE 1 TABLET BY MOUTH  DAILY  Refused By: PERICO SKY  Reason for Refusal: Patient needs appointment

## 2021-09-14 DIAGNOSIS — I10 ESSENTIAL HYPERTENSION, BENIGN: ICD-10-CM

## 2021-09-14 DIAGNOSIS — E78.5 HYPERLIPIDEMIA LDL GOAL <70: ICD-10-CM

## 2021-09-14 RX ORDER — ROSUVASTATIN CALCIUM 40 MG/1
40 TABLET, COATED ORAL AT BEDTIME
Qty: 60 TABLET | Refills: 0 | Status: SHIPPED | OUTPATIENT
Start: 2021-09-14 | End: 2021-11-03

## 2021-09-14 RX ORDER — METOPROLOL SUCCINATE 50 MG/1
50 TABLET, EXTENDED RELEASE ORAL DAILY
Qty: 60 TABLET | Refills: 0 | Status: SHIPPED | OUTPATIENT
Start: 2021-09-14 | End: 2021-11-03

## 2021-09-14 RX ORDER — LISINOPRIL 40 MG/1
40 TABLET ORAL DAILY
Qty: 60 TABLET | Refills: 0 | Status: SHIPPED | OUTPATIENT
Start: 2021-09-14 | End: 2021-11-03

## 2021-09-14 NOTE — TELEPHONE ENCOUNTER
Rashaun scheduled an appt for Nov 4th. He needs a 2 month extension of his meds.  Routing to Dr. Nava to approve, thanks.        Pending Prescriptions:                       Disp   Refills    lisinopril (ZESTRIL) 40 MG tablet         60 tab*0            Sig: Take 1 tablet (40 mg) by mouth daily    metoprolol succinate ER (TOPROL-XL) 50 MG*60 tab*0            Sig: Take 1 tablet (50 mg) by mouth daily    rosuvastatin (CRESTOR) 40 MG tablet       60 tab*0            Sig: Take 1 tablet (40 mg) by mouth At Bedtime

## 2021-11-03 PROBLEM — M16.9 HIP ARTHROSIS: Status: ACTIVE | Noted: 2021-11-03

## 2021-11-03 PROBLEM — K63.5 COLONIC POLYP: Status: ACTIVE | Noted: 2021-11-03

## 2021-11-04 ENCOUNTER — OFFICE VISIT (OUTPATIENT)
Dept: FAMILY MEDICINE | Facility: CLINIC | Age: 61
End: 2021-11-04

## 2021-11-04 VITALS
BODY MASS INDEX: 31.7 KG/M2 | SYSTOLIC BLOOD PRESSURE: 128 MMHG | OXYGEN SATURATION: 93 % | DIASTOLIC BLOOD PRESSURE: 80 MMHG | WEIGHT: 214 LBS | RESPIRATION RATE: 22 BRPM | TEMPERATURE: 98.3 F | HEIGHT: 69 IN | HEART RATE: 64 BPM

## 2021-11-04 DIAGNOSIS — I10 ESSENTIAL HYPERTENSION, BENIGN: ICD-10-CM

## 2021-11-04 DIAGNOSIS — J42 CHRONIC BRONCHITIS, UNSPECIFIED CHRONIC BRONCHITIS TYPE (H): ICD-10-CM

## 2021-11-04 DIAGNOSIS — G60.9 IDIOPATHIC PERIPHERAL NEUROPATHY: ICD-10-CM

## 2021-11-04 DIAGNOSIS — G20.A1 PARKINSON DISEASE (H): ICD-10-CM

## 2021-11-04 DIAGNOSIS — I73.9 PVD (PERIPHERAL VASCULAR DISEASE) (H): ICD-10-CM

## 2021-11-04 DIAGNOSIS — E78.5 HYPERLIPIDEMIA LDL GOAL <70: ICD-10-CM

## 2021-11-04 DIAGNOSIS — Z00.00 ROUTINE GENERAL MEDICAL EXAMINATION AT A HEALTH CARE FACILITY: Primary | ICD-10-CM

## 2021-11-04 LAB
ALBUMIN SERPL-MCNC: 4.3 G/DL (ref 3.6–5.1)
ALBUMIN/GLOB SERPL: 1.1 {RATIO} (ref 1–2.5)
ALP SERPL-CCNC: 55 U/L (ref 33–130)
ALT 1742-6: 6 U/L (ref 0–32)
AST 1920-8: 21 U/L (ref 0–35)
BILIRUB SERPL-MCNC: 0.8 MG/DL (ref 0.2–1.2)
BUN SERPL-MCNC: 12 MG/DL (ref 7–25)
BUN/CREATININE RATIO: 10.7 (ref 6–22)
CALCIUM SERPL-MCNC: 9.9 MG/DL (ref 8.6–10.3)
CHLORIDE SERPLBLD-SCNC: 90.8 MMOL/L (ref 98–110)
CHOLEST SERPL-MCNC: 103 MG/DL (ref 0–199)
CHOLEST/HDLC SERPL: 3 {RATIO} (ref 0–5)
CO2 SERPL-SCNC: 27.1 MMOL/L (ref 20–32)
CREAT SERPL-MCNC: 1.12 MG/DL (ref 0.6–1.3)
GLOBULIN, CALCULATED - QUEST: 4 (ref 1.9–3.7)
GLUCOSE SERPL-MCNC: 108 MG/DL (ref 60–99)
HDLC SERPL-MCNC: 38 MG/DL (ref 40–150)
LDLC SERPL CALC-MCNC: 45 MG/DL (ref 0–130)
POTASSIUM SERPL-SCNC: 4.82 MMOL/L (ref 3.5–5.3)
PROT SERPL-MCNC: 8.3 G/DL (ref 6.1–8.1)
SODIUM SERPL-SCNC: 127.7 MMOL/L (ref 135–146)
TRIGL SERPL-MCNC: 101 MG/DL (ref 0–149)

## 2021-11-04 PROCEDURE — 80053 COMPREHEN METABOLIC PANEL: CPT | Performed by: FAMILY MEDICINE

## 2021-11-04 PROCEDURE — 90686 IIV4 VACC NO PRSV 0.5 ML IM: CPT | Performed by: FAMILY MEDICINE

## 2021-11-04 PROCEDURE — 36415 COLL VENOUS BLD VENIPUNCTURE: CPT | Performed by: FAMILY MEDICINE

## 2021-11-04 PROCEDURE — 99396 PREV VISIT EST AGE 40-64: CPT | Mod: 25 | Performed by: FAMILY MEDICINE

## 2021-11-04 PROCEDURE — 80061 LIPID PANEL: CPT | Performed by: FAMILY MEDICINE

## 2021-11-04 PROCEDURE — 90471 IMMUNIZATION ADMIN: CPT | Performed by: FAMILY MEDICINE

## 2021-11-04 RX ORDER — ROSUVASTATIN CALCIUM 40 MG/1
40 TABLET, COATED ORAL AT BEDTIME
Qty: 90 TABLET | Refills: 1 | Status: SHIPPED | OUTPATIENT
Start: 2021-11-04 | End: 2022-05-19

## 2021-11-04 RX ORDER — METOPROLOL SUCCINATE 50 MG/1
50 TABLET, EXTENDED RELEASE ORAL DAILY
Qty: 90 TABLET | Refills: 1 | Status: SHIPPED | OUTPATIENT
Start: 2021-11-04 | End: 2022-05-19

## 2021-11-04 RX ORDER — LISINOPRIL 40 MG/1
40 TABLET ORAL DAILY
Qty: 90 TABLET | Refills: 1 | Status: SHIPPED | OUTPATIENT
Start: 2021-11-04 | End: 2022-05-19

## 2021-11-04 ASSESSMENT — MIFFLIN-ST. JEOR: SCORE: 1753.14

## 2021-11-04 NOTE — LETTER
November 4, 2021      Rashaun Molina  [address] [facility] [address]        Dear ,    We are writing to inform you of your test results.    Test results indicate you may require additional follow up, see comment below.    Your sodium is very low again.  Last time this happened you were drinking a lot of beer.  Please cut back and lets have you return for recheck in a month.    Resulted Orders   Lipid Panel (BFP)   Result Value Ref Range    Cholesterol 103 0 - 199 mg/dL    Triglycerides 101 0 - 149 mg/dL    HDL Cholesterol 38 (A) 40 - 150 mg/dL    LDL Cholesterol Direct 45 0 - 130 mg/dL    Cholesterol/HDL Ratio 3 0 - 5   Comprehensive Metobolic Panel (BFP)   Result Value Ref Range    Carbon Dioxide 27.1 20 - 32 mmol/L    Creatinine 1.12 0.60 - 1.30 mg/dL    Glucose 108 (A) 60 - 99 mg/dL    Sodium 127.7 (A) 135 - 146 mmol/L    Potassium 4.82 3.5 - 5.3 mmol/L    Chloride 90.8 (A) 98 - 110 mmol/L    Protein Total 8.3 (A) 6.1 - 8.1 g/dL    Albumin 4.3 3.6 - 5.1 g/dL    Alkaline Phosphatase 55 33 - 130 U/L    ALT 6 0 - 32 U/L    AST 21 0 - 35 U/L    Bilirubin Total 0.8 0.2 - 1.2 mg/dL    Urea Nitrogen 12 7 - 25 mg/dL    Calcium 9.9 8.6 - 10.3 mg/dL    BUN/Creatinine Ratio 10.7 6 - 22    Globulin Calculated 4.0 (A) 1.9 - 3.7    A/G Ratio 1.1 1 - 2.5       If you have any questions or concerns, please call the clinic at the number listed above.       Sincerely,      Yemi Nava MD

## 2021-11-04 NOTE — PROGRESS NOTES
3  SUBJECTIVE:   CC: Rashaun Molina is an 61 year old male who presents for preventive health visit.       Patient has been advised of split billing requirements and indicates understanding: Yes  Healthy Habits:    Do you get at least three servings of calcium containing foods daily (dairy, green leafy vegetables, etc.)? yes    Amount of exercise or daily activities, outside of work: minimal day(s) per week    Problems taking medications regularly No    Medication side effects: No    Have you had an eye exam in the past two years? no    Do you see a dentist twice per year? yes    Do you have sleep apnea, excessive snoring or daytime drowsiness?no    Pt bothered by PVD and neuropathy- surgery suggested for PVD but he is not sure he wants to go that route    Pt seeing neurology for PD        Today's PHQ-2 Score:   PHQ-2 (  [employer]) 2020   Q1: Little interest or pleasure in doing things 0 0   Q2: Feeling down, depressed or hopeless 0 0   PHQ-2 Score 0 0       Abuse: Current or Past(Physical, Sexual or Emotional)- No  Do you feel safe in your environment? Yes        Social History     Tobacco Use     Smoking status: Former Smoker     Packs/day: 1.50     Years: 40.00     Pack years: 60.00     Quit date: 2013     Years since quittin.5     Smokeless tobacco: Never Used   Substance Use Topics     Alcohol use: Yes     Alcohol/week: 14.0 standard drinks     Types: 14 Cans of beer per week     Comment: 2 beers daily     If you drink alcohol do you typically have >3 drinks per day or >7 drinks per week? No                      Last PSA:   Abbott PSA   Date Value Ref Range Status   2020 0.7 < OR = 4.0 ng/mL Final     Comment:     The total PSA value from this assay system is   standardized against the WHO standard. The test   result will be approximately 20% lower when compared   to the equimolar-standardized total PSA (Selene   Be). Comparison of serial PSA results should be   interpreted  with this fact in mind.     This test was performed using the Siemens   chemiluminescent method. Values obtained from   different assay methods cannot be used  interchangeably. PSA levels, regardless of  value, should not be interpreted as absolute  evidence of the presence or absence of disease.         Reviewed orders with patient. Reviewed health maintenance and updated orders accordingly - Yes  BP Readings from Last 3 Encounters:   11/04/21 128/80   02/09/21 (!) 142/76   02/04/21 (!) 144/82    Wt Readings from Last 3 Encounters:   11/04/21 97.1 kg (214 lb)   02/09/21 101.6 kg (224 lb)   02/04/21 101.6 kg (224 lb)                  Patient Active Problem List   Diagnosis     Essential hypertension, benign     Pure hypercholesterolemia     History of colonic polyps     BCC (basal cell carcinoma)     Health Care Home     Colovesical fistula     S/P total hip arthroplasty     Obesity due to excess calories, unspecified obesity severity     Chronic bronchitis, unspecified chronic bronchitis type (H)     PVD (peripheral vascular disease) (H)     Palpitations     Parkinson disease (H)     Hip arthrosis     Colonic polyp     Past Surgical History:   Procedure Laterality Date     ARTHROPLASTY HIP Right 10/7/2015    Procedure: ARTHROPLASTY HIP;  Surgeon: Neil Davis MD;  Location: [facility] OR     COLONOSCOPY  9/05, 4/10/13    Per Hospital encounter dated 4/18/13     COLONOSCOPY N/A 4/9/2018    Procedure: COMBINED COLONOSCOPY, SINGLE OR MULTIPLE BIOPSY/POLYPECTOMY BY BIOPSY;;  Surgeon: Tramaine Zuniga MD;  Location:  GI     COMBINED CYSTOSCOPY, INSERT CATHETER URETER  4/11/2013    Procedure: COMBINED CYSTOSCOPY, INSERT CATHETER URETER;;  Surgeon: Kashif Parks MD;  Location:  OR      LARYNGOSCOPY DIRECT W VOCAL CORD INJECTION      vocal cord polyps     LAPAROSCOPIC ASSISTED COLECTOMY  4/11/2013    Procedure: LAPAROSCOPIC ASSISTED COLECTOMY;  LOW ANTERIOR RESECTION ;  Surgeon: Tramaine Zuniga MD;   Location:  OR     [facility] COLONOSCOPY THRU STOMA W BIOPSY/CAUTERY TUMOR/POLYP/LESION      michael, tubular adenoma, next colonoscopy      [facility] COLONOSCOPY THRU STOMA, DIAGNOSTIC  01    negative, ligate two internal hemmorhoids  Dr rodriguez-repeat        Social History     Tobacco Use     Smoking status: Former Smoker     Packs/day: 1.50     Years: 40.00     Pack years: 60.00     Quit date: 2013     Years since quittin.5     Smokeless tobacco: Never Used   Substance Use Topics     Alcohol use: Yes     Alcohol/week: 14.0 standard drinks     Types: 14 Cans of beer per week     Comment: 2 beers daily     Family History   Problem Relation Age of Onset     Hypertension Mother      Hypertension Father          MI     Heart Disease Father         MI  at age 55     Coronary Artery Disease Father      Hyperlipidemia Brother      Hypertension Brother      Heart Surgery Brother         defibrillator     Hypertension Sister      Prostate Cancer No family hx of          Current Outpatient Medications   Medication Sig Dispense Refill     ASPIRIN EC PO Take 81 mg by mouth daily.       carbidopa-levodopa (SINEMET)  MG tablet Take 1.5 tablets by mouth 3 times daily       lisinopril (ZESTRIL) 40 MG tablet Take 1 tablet (40 mg) by mouth daily 90 tablet 1     metoprolol succinate ER (TOPROL-XL) 50 MG 24 hr tablet Take 1 tablet (50 mg) by mouth daily 90 tablet 1     rosuvastatin (CRESTOR) 40 MG tablet Take 1 tablet (40 mg) by mouth At Bedtime 90 tablet 1     umeclidinium (INCRUSE ELLIPTA) 62.5 MCG/INH inhaler Inhale 1 puff into the lungs daily 30 each 1     Allergies   Allergen Reactions     Spiriva Handihaler Blisters     Hctz [Hydrochlorothiazide] Other (See Comments)     Low sodium     Recent Labs   Lab Test 20  0000 20  1121 20  0000 20  0000 19  1013 19  0920 19  0000 19  0000 06/10/19  0000 18  0812 10/22/18  0847 10/22/18  0847 18  0718  08/06/18  0718 05/14/18  0713 04/30/18  1005 04/30/18  1005   0000   A1C  --   --   --   --   --  5.3  --   --   --   --   --   --   --  5.4 5.6  --   --   --    LDL 43  --   --  65  --   --   --  53   < >  --   --   --   --  62  --    < > 109*  --    HDL 53  --   --  57  --   --   --  43   < >  --   --   --   --  63  --    < > 55  --    TRIG 83  --   --  92  --   --   --  171*   < >  --   --   --   --  136  --    < > 278*  --    ALT  --   --   --   --   --   --   --   --   --  57  --  51*  --   --   --   --  34  --    CR 1.06 0.90   < > 0.91  --   --    < > 0.99   < > 0.68   < > 0.86   < > 0.69  --    < > 0.80  --    GFRESTIMATED  --   --   --   --   --   --   --   --   --  >90  --  95   < > >90  --    < > 98  --    GFRESTBLACK  --   --   --   --   --   --   --   --   --  >90  --   --   --  >90  --   --   --    < >   POTASSIUM 5.05 5.35*   < > 5.42*  --   --    < > 5.20   < > 4.4   < > 4.7  --   --   --    < > 5.1  --    TSH  --   --   --   --  1.84  --   --   --   --  1.63  --   --   --   --   --   --   --    < >    < > = values in this interval not displayed.        Reviewed and updated as needed this visit by clinical staff  Tobacco  Allergies  Meds              Reviewed and updated as needed this visit by Provider                Past Medical History:   Diagnosis Date     Arthritis      Chronic airway obstruction, not elsewhere classified 3/8/2004    pt says that he does not have COPD     Cough syncope 11/21/2012     Essential hypertension, benign      Fistula     colon/bladder     Fracture of right proximal fibula 7/30/2014     Orthostatic hypotension 11/24/2014     Other chronic pain     right hip pain     PAD (peripheral artery disease) (H) 08/2018    PTA right [facility]FA Dr. Lee     Palpitations 12/2018 01/2019 Event monitor- SR/ST     Personal history of colonic polyps 6/28/2005     Pure hypercholesterolemia      Tobacco use disorder 3/8/2004     Vasovagal syncopes 10/25/2014     Viral warts, unspecified   "   genital      Past Surgical History:   Procedure Laterality Date     ARTHROPLASTY HIP Right 10/7/2015    Procedure: ARTHROPLASTY HIP;  Surgeon: Neil Davis MD;  Location: [facility] OR     COLONOSCOPY  9/05, 4/10/13    Per Hospital encounter dated 4/18/13     COLONOSCOPY N/A 4/9/2018    Procedure: COMBINED COLONOSCOPY, SINGLE OR MULTIPLE BIOPSY/POLYPECTOMY BY BIOPSY;;  Surgeon: Tramaine Zuniga MD;  Location:  GI     COMBINED CYSTOSCOPY, INSERT CATHETER URETER  4/11/2013    Procedure: COMBINED CYSTOSCOPY, INSERT CATHETER URETER;;  Surgeon: Kashif Parks MD;  Location: [facility] OR     [facility] LARYNGOSCOPY DIRECT W VOCAL CORD INJECTION      vocal cord polyps     LAPAROSCOPIC ASSISTED COLECTOMY  4/11/2013    Procedure: LAPAROSCOPIC ASSISTED COLECTOMY;  LOW ANTERIOR RESECTION ;  Surgeon: Tramaine Zuniga MD;  Location:  OR     [facility] COLONOSCOPY THRU STOMA W BIOPSY/CAUTERY TUMOR/POLYP/LESION  1998    michael, tubular adenoma, next colonoscopy 2001     [facility] COLONOSCOPY THRU STOMA, DIAGNOSTIC  04/16/01    negative, ligate two internal hemmorhoids  Dr rodriguez-repeat 2008       ROS:  CONSTITUTIONAL: NEGATIVE for fever, chills, change in weight  INTEGUMENTARY/SKIN: NEGATIVE for worrisome rashes, moles or lesions  EYES: NEGATIVE for vision changes or irritation  ENT: NEGATIVE for ear, mouth and throat problems  RESP: NEGATIVE for significant cough or SOB  CV: NEGATIVE for chest pain, palpitations or peripheral edema  GI: NEGATIVE for nausea, abdominal pain, heartburn, or change in bowel habits   male: negative for dysuria, hematuria, decreased urinary stream, erectile dysfunction, urethral discharge  MUSCULOSKELETAL: NEGATIVE for significant arthralgias or myalgia  PSYCHIATRIC: NEGATIVE for changes in mood or affect    OBJECTIVE:   /80 (BP Location: Left arm, Patient Position: Sitting, Cuff Size: Adult Regular)   Pulse 64   Temp 98.3  F (36.8  C) (Oral)   Resp 22   Ht 1.74 m (5' 8.5\")   Wt 97.1 kg (214 lb)   SpO2 " 93%   BMI 32.07 kg/m    EXAM:  GENERAL: healthy, alert and no distress  EYES: Eyes grossly normal to inspection, PERRL and conjunctivae and sclerae normal  HENT: ear canals and TM's normal, nose and mouth without ulcers or lesions  NECK: no adenopathy, no asymmetry, masses, or scars and thyroid normal to palpation  RESP: lungs clear to auscultation - no rales, rhonchi or wheezes  CV: regular rate and rhythm, normal S1 S2, no S3 or S4, no murmur, click or rub, no peripheral edema and peripheral pulses strong  ABDOMEN: soft, nontender, no hepatosplenomegaly, no masses and bowel sounds normal   (male): normal male genitalia without lesions or urethral discharge, no hernia  RECTAL (male): deferred  MS: no gross musculoskeletal defects noted, no edema  SKIN: no suspicious lesions or rashes  NEURO: Normal strength and tone, mentation intact and speech normal  PSYCH: mentation appears normal, affect normal/bright    Diagnostic Test Results:  Labs reviewed in Epic    ASSESSMENT/PLAN:   (Z00.00) Routine general medical examination at a health care facility  (primary encounter diagnosis)  Comment: discussed preventitive healthcare   Plan: FLU VAC PRESRV FREE QUAD SPLIT VIR 3+YRS IM        Continue to work on healthy diet and exercise, discussed healthy habits     (E78.5) Hyperlipidemia LDL goal <70  Comment: control uncertain  Plan: rosuvastatin (CRESTOR) 40 MG tablet, Lipid         Panel (BFP), Comprehensive Metobolic Panel         (BFP), VENOUS COLLECTION        continue current medications at current doses pending labs    (I10) Essential hypertension, benign  Comment: well controlled  Plan: metoprolol succinate ER (TOPROL-XL) 50 MG 24 hr        tablet, lisinopril (ZESTRIL) 40 MG tablet,         Comprehensive Metobolic Panel (BFP), VENOUS         COLLECTION        continue current medications at current doses     (G20) Parkinson disease (H)  Comment: stable, tremors gone with meds now  Plan: continue current medications  "at current doses     (I73.9) PVD (peripheral vascular disease) (H)  Comment: Patient is having persistent symptoms despite previous therapies.   Plan: consider intervention as recommended, stay away from smoking    (G60.9) Idiopathic peripheral neuropathy  Comment: stable, pt has not tried meds  Plan: recommend he consider meds through neurology    (J42) Chronic bronchitis, unspecified chronic bronchitis type (H)  Comment: stable  Plan: continue current medications at current doses     Patient has been advised of split billing requirements and indicates understanding: Yes  COUNSELING:  Reviewed preventive health counseling, as reflected in patient instructions       Regular exercise       Healthy diet/nutrition       Vision screening       Immunizations    Vaccinated for: Influenza             Colon cancer screening       Prostate cancer screening    Estimated body mass index is 32.07 kg/m  as calculated from the following:    Height as of this encounter: 1.74 m (5' 8.5\").    Weight as of this encounter: 97.1 kg (214 lb).    Weight management plan: Discussed healthy diet and exercise guidelines    He reports that he quit smoking about 8 years ago. He has a 60.00 pack-year smoking history. He has never used smokeless tobacco.      Counseling Resources:  ATP IV Guidelines  Pooled Cohorts Equation Calculator  FRAX Risk Assessment  ICSI Preventive Guidelines  Dietary Guidelines for Americans, 2010  USDA's MyPlate  ASA Prophylaxis  Lung CA Screening    Yemi Nava MD  [facility]  "

## 2022-01-01 ENCOUNTER — TRANSFERRED RECORDS (OUTPATIENT)
Dept: FAMILY MEDICINE | Facility: CLINIC | Age: 62
End: 2022-01-01

## 2022-05-19 DIAGNOSIS — E78.5 HYPERLIPIDEMIA LDL GOAL <70: ICD-10-CM

## 2022-05-19 DIAGNOSIS — I10 ESSENTIAL HYPERTENSION, BENIGN: ICD-10-CM

## 2022-05-19 NOTE — TELEPHONE ENCOUNTER
Rashaun Molina is requesting a refill of:    Pending Prescriptions:                       Disp   Refills    rosuvastatin (CRESTOR) 40 MG tablet [Phar*90 tab*             Sig: Take 1 tablet (40 mg) by mouth At Bedtime    lisinopril (ZESTRIL) 40 MG tablet [Pharma*90 tab*             Sig: Take 1 tablet (40 mg) by mouth daily.    metoprolol succinate ER (TOPROL XL) 50 MG*90 tab*             Sig: Take 1 tablet (50 mg) by mouth daily    Pt had low sodium at last OV on 11/4/21, never came back for lab recheck    Please advise

## 2022-05-23 RX ORDER — ROSUVASTATIN CALCIUM 40 MG/1
40 TABLET, COATED ORAL
Qty: 30 TABLET | Refills: 0 | Status: SHIPPED | OUTPATIENT
Start: 2022-05-23 | End: 2022-10-11

## 2022-05-23 RX ORDER — LISINOPRIL 40 MG/1
40 TABLET ORAL
Qty: 30 TABLET | Refills: 0 | Status: SHIPPED | OUTPATIENT
Start: 2022-05-23 | End: 2022-10-03

## 2022-05-23 RX ORDER — METOPROLOL SUCCINATE 50 MG/1
50 TABLET, EXTENDED RELEASE ORAL
Qty: 30 TABLET | Refills: 0 | Status: SHIPPED | OUTPATIENT
Start: 2022-05-23 | End: 2022-10-03

## 2022-05-23 NOTE — TELEPHONE ENCOUNTER
Rashaun Molina is requesting a refill of:    Pending Prescriptions:                       Disp   Refills    rosuvastatin (CRESTOR) 40 MG tablet [Phar*30 tab*0            Sig: Take 1 tablet (40 mg) by mouth At Bedtime    lisinopril (ZESTRIL) 40 MG tablet [Pharma*30 tab*0            Sig: Take 1 tablet (40 mg) by mouth daily.    metoprolol succinate ER (TOPROL XL) 50 MG*30 tab*0            Sig: Take 1 tablet (50 mg) by mouth daily    Needs Ov for refills

## 2022-05-28 ENCOUNTER — HOSPITAL ENCOUNTER (EMERGENCY)
Facility: CLINIC | Age: 62
Discharge: HOME | End: 2022-05-28
Attending: PHYSICIAN ASSISTANT | Admitting: PHYSICIAN ASSISTANT
Payer: COMMERCIAL

## 2022-05-28 ENCOUNTER — APPOINTMENT (OUTPATIENT)
Dept: GENERAL RADIOLOGY | Facility: CLINIC | Age: 62
End: 2022-05-28
Attending: EMERGENCY MEDICINE
Payer: COMMERCIAL

## 2022-05-28 VITALS
RESPIRATION RATE: 18 BRPM | SYSTOLIC BLOOD PRESSURE: 144 MMHG | TEMPERATURE: 99 F | DIASTOLIC BLOOD PRESSURE: 66 MMHG | OXYGEN SATURATION: 92 % | HEART RATE: 74 BPM | BODY MASS INDEX: 31.5 KG/M2 | WEIGHT: 220 LBS | HEIGHT: 70 IN

## 2022-05-28 DIAGNOSIS — R42 DIZZINESS: ICD-10-CM

## 2022-05-28 DIAGNOSIS — S93.402A SPRAIN OF LEFT ANKLE, UNSPECIFIED LIGAMENT, INITIAL ENCOUNTER: ICD-10-CM

## 2022-05-28 PROCEDURE — 250N000013 HC RX MED GY IP 250 OP 250 PS 637: Performed by: EMERGENCY MEDICINE

## 2022-05-28 PROCEDURE — 73610 X-RAY EXAM OF ANKLE: CPT | Mod: LT

## 2022-05-28 PROCEDURE — 99284 EMERGENCY DEPT VISIT MOD MDM: CPT

## 2022-05-28 RX ORDER — ACETAMINOPHEN 500 MG
1000 TABLET ORAL ONCE
Status: COMPLETED | OUTPATIENT
Start: 2022-05-28 | End: 2022-05-28

## 2022-05-28 RX ORDER — IBUPROFEN 600 MG/1
600 TABLET, FILM COATED ORAL ONCE
Status: COMPLETED | OUTPATIENT
Start: 2022-05-28 | End: 2022-05-28

## 2022-05-28 RX ADMIN — ACETAMINOPHEN 1000 MG: 500 TABLET, FILM COATED ORAL at 18:20

## 2022-05-28 RX ADMIN — IBUPROFEN 600 MG: 600 TABLET ORAL at 18:20

## 2022-05-28 ASSESSMENT — ENCOUNTER SYMPTOMS
FEVER: 0
WHEEZING: 1
NAUSEA: 0
DIZZINESS: 1
NECK PAIN: 0
HEADACHES: 0
NUMBNESS: 1
BACK PAIN: 0
ABDOMINAL PAIN: 0
LIGHT-HEADEDNESS: 1
VOMITING: 0

## 2022-05-28 NOTE — ED TRIAGE NOTES
Pt. Presents to ED with concern for L ankle fracture after tripping and falling earlier and feeling a crack in his ankle. L ankle appears edematous and bruised. Pt. Reports hx of parkinson's and some clogged arteries in his legs. Pt. Denies hitting his head or neck, denies blood thinning medications.      Triage Assessment     Row Name 05/28/22 9189       Triage Assessment (Adult)    Airway WDL WDL       Respiratory WDL    Respiratory WDL WDL       Skin Circulation/Temperature WDL    Skin Circulation/Temperature WDL WDL       Cardiac WDL    Cardiac WDL WDL       Peripheral/Neurovascular WDL    Peripheral Neurovascular WDL WDL       Cognitive/Neuro/Behavioral WDL    Cognitive/Neuro/Behavioral WDL WDL

## 2022-05-29 NOTE — ED PROVIDER NOTES
History     Chief Complaint:  Ankle Pain       HPI   Rashaun Molina is a 62 year old male with a medical history of  HTN, hypercholesterolemia, Obesity, palpitation, parkinson disease, PVD, COPD who presents to the ED today for concerns regarding his left ankle.  He reports today he got up too fast felt lightheaded and fell and twisted his left ankle.  Now it is bruised and swollen.  He denies losing consciousness or hitting his head.  He is not on any blood thinners.  He reports that these dizzy lightheaded episodes are normal for him and he has had a lot of work-up for this in the past with his primary doctor.  He is not interested in any any further testing for the lightheadedness and dizziness that he had today prior to the fall.  He does report that he has chronic wheezing from COPD and does admit that he is noncompliant with his albuterol that he has at home.  He does have an inhaler but does not utilize nebs.  No palpitations, chest pain or abdominal pain nausea vomiting or loss of consciousness.  No neck pain or pain anywhere else of the body other than the left ankle.  He denies any vision changes or weakness.  He does have chronic bilateral neuropathy and always has numbness in his distal extremities but this has not gotten worse.  No new numbness.  Patient is very resistant to give me more information.  He is accompanied by his friend today.  He does live alone.    ROS:  Review of Systems   Constitutional: Negative for fever.   Respiratory: Positive for wheezing (his normal due to COPD).    Cardiovascular: Negative for chest pain.   Gastrointestinal: Negative for abdominal pain, nausea and vomiting.   Musculoskeletal: Negative for back pain and neck pain.        Left ankle pain+  Left ankle swelling+   Neurological: Positive for dizziness, light-headedness and numbness (Chronic neuropathy, no new numbness). Negative for headaches.       Allergies:  Spiriva Handihaler  Hctz [Hydrochlorothiazide]      Medications:    ASPIRIN EC PO  carbidopa-levodopa (SINEMET)  MG tablet  lisinopril (ZESTRIL) 40 MG tablet  metoprolol succinate ER (TOPROL XL) 50 MG 24 hr tablet  rosuvastatin (CRESTOR) 40 MG tablet  umeclidinium (INCRUSE ELLIPTA) 62.5 MCG/INH inhaler        Past Medical History:    Past Medical History:   Diagnosis Date     Arthritis      Chronic airway obstruction, not elsewhere classified 3/8/2004     Cough syncope 11/21/2012     Essential hypertension, benign      Fistula      Fracture of right proximal fibula 7/30/2014     Orthostatic hypotension 11/24/2014     Other chronic pain      PAD (peripheral artery disease) (H) 08/2018     Palpitations 12/2018     Personal history of colonic polyps 6/28/2005     Pure hypercholesterolemia      Tobacco use disorder 3/8/2004     Vasovagal syncopes 10/25/2014     Viral warts, unspecified      Patient Active Problem List   Diagnosis     Essential hypertension, benign     Pure hypercholesterolemia     History of colonic polyps     BCC (basal cell carcinoma)     Health Care Home     Colovesical fistula     S/P total hip arthroplasty     Obesity due to excess calories, unspecified obesity severity     Chronic bronchitis, unspecified chronic bronchitis type (H)     PVD (peripheral vascular disease) (H)     Palpitations     Parkinson disease (H)     Hip arthrosis     Colonic polyp        Past Surgical History:    Past Surgical History:   Procedure Laterality Date     ARTHROPLASTY HIP Right 10/7/2015    Procedure: ARTHROPLASTY HIP;  Surgeon: Neil Davis MD;  Location: [facility] OR     COLONOSCOPY  9/05, 4/10/13    Per Hospital encounter dated 4/18/13     COLONOSCOPY N/A 4/9/2018    Procedure: COMBINED COLONOSCOPY, SINGLE OR MULTIPLE BIOPSY/POLYPECTOMY BY BIOPSY;;  Surgeon: Tramaine Zuniga MD;  Location:  GI     COMBINED CYSTOSCOPY, INSERT CATHETER URETER  4/11/2013    Procedure: COMBINED CYSTOSCOPY, INSERT CATHETER URETER;;  Surgeon: Kashif Parks MD;   "Location: [facility] OR     [facility] LARYNGOSCOPY DIRECT W VOCAL CORD INJECTION      vocal cord polyps     LAPAROSCOPIC ASSISTED COLECTOMY  4/11/2013    Procedure: LAPAROSCOPIC ASSISTED COLECTOMY;  LOW ANTERIOR RESECTION ;  Surgeon: Tramaine Zuniga MD;  [facility]     [facility]UT[facility]UMOR[facility]POLYP/LESION  1998    michael, tubular adenoma, next colonoscopy 2001     [facility] COLONOSCOPY THRU STOMA, DIAGNOSTIC  04/16/01    negative, ligate two internal hemmorhoids  Dr rodriguez-repeat 2008        Family History:    family history includes Coronary Artery Disease in his father; Heart Disease in his father; Heart Surgery in his brother; Hyperlipidemia in his brother; Hypertension in his brother, father, mother, and sister.    Social History:   reports that he quit smoking about 9 years ago. He has a 60.00 pack-year smoking history. He has never used smokeless tobacco. He reports current alcohol use of about 14.0 standard drinks of alcohol per week. He reports that he does not use drugs.  PCP: Yemi Nava     Physical Exam     Patient Vitals for the past 24 hrs:   BP Temp Temp src Pulse Resp SpO2 Height Weight   05/28/22 1813 (!) 144/66 99  F (37.2  C) Temporal 74 18 92 % 1.778 m (5' 10\") 99.8 kg (220 lb)        Physical Exam  General: Vitals reviewed  Head: No martinez sign or raccoon eyes.  Eyes: Nonicteric, noninjected, normal range of motion, PERRLA  Nose: Not congested, no rhinorrhea  Oropharynx: No cyanosis of the lips  Neck: No midline tenderness.  Full ROM without pain.  Heart: Regular rate and rhythm, no murmurs, rubs, gallops.  Lungs: Bilateral breath sounds.  Noted bilateral wheezing on expiration without respiratory retractions.  No crackles or rhonchi.  Skin: Swelling and bruising of the left medial malleolus.  Neuro: Normal mentation.  GCS 15.  Full range of motion of bilateral hips, knees, right ankle with full strength.  Left ankle range of motion is limited due to pain.  5-5 bilateral " handgrip strength.  No tongue deviation.  No slurred speech.  Symmetrical facial features.  Normal eye range of motion.  No tongue deviation.  Musculoskeletal:   Left lower leg: No swelling or edema of the left lower leg no pain with palpation of the calf.  Left ankle: Medial malleoli are swelling with ecchymosis.  Point tender to this area.  Limited ROM due to pain.  Left foot: No swelling or bruising of the left foot.  Nontender to the fifth metatarsal or navicular bone.  Right lower leg: No swelling or edema of the right lower leg.  No calf tenderness.  Vascular: Cap refill less than 2 seconds of the distal extremities.  PT and DP pulses palpable bilaterally 2+.  Lower extremities are warm and well-perfused.  Upper extremities are warm and well-perfused.       Emergency Department Course   ECG:  Declined by patient    Imaging:  XR Ankle Left G/E 3 Views   Final Result   IMPRESSION:    1.  Normal joint spacing and alignment.   2.  No fracture.   3.  Generalized mild soft tissue swelling about the ankle.   4.  Atherosclerotic calcification.         Report per radiology    Laboratory:  Labs Ordered and Resulted from Time of ED Arrival to Time of ED Departure - No data to display     Procedures       Emergency Department Course:             Reviewed:  I reviewed nursing notes, vitals and past medical history    Assessments/Consults:          Interventions:  Medications   acetaminophen (TYLENOL) tablet 1,000 mg (1,000 mg Oral Given 5/28/22 1820)   ibuprofen (ADVIL/MOTRIN) tablet 600 mg (600 mg Oral Given 5/28/22 1820)        Disposition:  The patient was discharged to home.     Impression & Plan    CMS Diagnoses: None    Medical Decision Making:    Rashaun is a 62-year-old male that presents emergency department after dizzy spell that resulted in fall and twisting of the left ankle.  Regarding the patient's left ankle the x-rays are negative and without fracture or dislocation.  Suspect he has an ankle sprain.  I  discussed frankly with the patient that I am concerned about his dizziness episode and lieu of his multiple medical problems.  Patient is very adamant that he does not want additional work-up done for these symptoms as he is already had this work-up in the past.  Patient notes that he is always wheezy and does not feel like this is worsened and he does not want further work-up of this either.  He does have an albuterol inhaler which I encouraged to use of at home.  Patient is not hypotensive is not hypoxic.  I suspect 92% is probably his baseline and would improve if he utilizes his albuterol inhaler at home.  Again he did not want any further treatment other than addressing his concern regarding his left ankle.  He understands that by not doing further work-up or investigation of my concerns for him that this meant may result in delayed diagnosis or death or further injury.  He understands this is against my medical advice discharge without any further investigation.  Patient will discharge home as I feel he has full capacity to make his own medical decisions.    My impression of today's diagnosis is:     ICD-10-CM    1. Dizziness  R42    2. Sprain of left ankle, unspecified ligament, initial encounter  S93.402A        Discharge Medications:  New Prescriptions    No medications on file        5/28/2022   Bob Berrios, Bob Coto PA-C  05/28/22 2026

## 2022-05-31 ENCOUNTER — OFFICE VISIT (OUTPATIENT)
Dept: FAMILY MEDICINE | Facility: CLINIC | Age: 62
End: 2022-05-31

## 2022-05-31 VITALS
WEIGHT: 220 LBS | HEART RATE: 75 BPM | DIASTOLIC BLOOD PRESSURE: 72 MMHG | HEIGHT: 70 IN | OXYGEN SATURATION: 96 % | BODY MASS INDEX: 31.5 KG/M2 | TEMPERATURE: 97.7 F | SYSTOLIC BLOOD PRESSURE: 130 MMHG

## 2022-05-31 DIAGNOSIS — S93.492D SPRAIN OF OTHER LIGAMENT OF LEFT ANKLE, SUBSEQUENT ENCOUNTER: Primary | ICD-10-CM

## 2022-05-31 PROCEDURE — 99213 OFFICE O/P EST LOW 20 MIN: CPT | Performed by: FAMILY MEDICINE

## 2022-05-31 RX ORDER — METHOCARBAMOL 500 MG/1
500 TABLET, FILM COATED ORAL 3 TIMES DAILY PRN
Qty: 20 TABLET | Refills: 0 | Status: ON HOLD | OUTPATIENT
Start: 2022-05-31 | End: 2022-06-11

## 2022-05-31 NOTE — PROGRESS NOTES
"  Assessment & Plan   Problem List Items Addressed This Visit    None     Visit Diagnoses     Sprain of other ligament of left ankle, subsequent encounter    -  Primary    Relevant Medications    methocarbamol (ROBAXIN) 500 MG tablet    Other Relevant Orders    Orthopedic  Referral         1. Sprain of other ligament of left ankle, subsequent encounter  Sprain. He asked for medication for pain, continue otc medications, try robaxin, call if this is not helping and I can give something stronger. Also referred him for followup with ortho.  - Orthopedic  Referral; Future  - methocarbamol (ROBAXIN) 500 MG tablet; Take 1 tablet (500 mg) by mouth 3 times daily as needed for muscle spasms  Dispense: 20 tablet; Refill: 0    956}     BMI:   Estimated body mass index is 31.57 kg/m  as calculated from the following:    Height as of this encounter: 1.778 m (5' 10\").    Weight as of this encounter: 99.8 kg (220 lb).       FUTURE APPOINTMENTS:       - Follow-up visit with ortho    No follow-ups on file.    Erlinda Mosley MD  [facility]    Gloria Rodney is a 62 year old who presents for the following health issues     HPI     ED/UC Followup:    Facility:  [facility], [place]   Date of visit: 05/28/22  Reason for visit: left ankle sprain  Current Status: Left ankle pain, pain is now radiating up the side of his calf, very bruised and swollen         here for flup from ER Saturday. Has an aircast.  Just walking and rolled his ankle and went down. [place] a loud pop, thought he broke his leg. They xrayed it and told him this was a sprain but it hurts up higher. It's pain on the outside of upper fibula, can't walk. Is using a cane.  Is doing ok on the cane.  He has a lot of healthy issues: neuropathy, parkinsons, copd. Is stable.  He was wheezing and hadn't used his inhaler. Is at baseline.    Review of Systems   Constitutional, HEENT, cardiovascular, pulmonary, gi and gu systems are negative, except as " "otherwise noted.      Objective    /72 (BP Location: Left arm, Patient Position: Sitting, Cuff Size: Adult Large)   Pulse 75   Temp 97.7  F (36.5  C) (Temporal)   Ht 1.778 m (5' 10\")   Wt 99.8 kg (220 lb)   SpO2 96%   BMI 31.57 kg/m    Body mass index is 31.57 kg/m .  Physical Exam   GENERAL: healthy, alert and no distress  MS: no gross musculoskeletal defects noted, no edema  NEURO: Normal strength and tone, mentation intact and speech normal  PSYCH: mentation appears normal, affect normal/bright  Left lower leg with some swelling to the knee, he came in with a cane and wearing a splint. Non tender to palpation but more swelling on ankle medial and lateral. Swelling of the foot. Some ecchymosis                  "

## 2022-05-31 NOTE — NURSING NOTE
Chief Complaint   Patient presents with     ER F/U     [facility] ER on 05/28/22 after rolling his left ankle, xrays done, showed sprain, pain is now radiating up side of calf     Pre-visit Screening:  Immunizations:  not up to date - shingrix at pharmacy  Colonoscopy:  is up to date  Mammogram: NA  Asthma Action Test/Plan:  NA  PHQ9:  NA  GAD7:  NA  Questioned patient about current smoking habits Pt. quit smoking some time ago.  Ok to leave detailed message on voice mail for today's visit only Yes, phone # [phone]

## 2022-06-08 ENCOUNTER — HOSPITAL ENCOUNTER (EMERGENCY)
Facility: CLINIC | Age: 62
Discharge: STILL ADMITTED OR ADMINISTRATIVE | DRG: 493 | End: 2022-06-08
Payer: COMMERCIAL

## 2022-06-08 ENCOUNTER — APPOINTMENT (OUTPATIENT)
Dept: CT IMAGING | Facility: CLINIC | Age: 62
DRG: 493 | End: 2022-06-08
Attending: PHYSICIAN ASSISTANT
Payer: COMMERCIAL

## 2022-06-08 ENCOUNTER — HOSPITAL ENCOUNTER (INPATIENT)
Facility: CLINIC | Age: 62
LOS: 6 days | Discharge: NURSING, REHAB OR LONG-TERM CARE | DRG: 493 | End: 2022-06-14
Attending: ORTHOPAEDIC SURGERY | Admitting: INTERNAL MEDICINE
Payer: COMMERCIAL

## 2022-06-08 VITALS
SYSTOLIC BLOOD PRESSURE: 148 MMHG | OXYGEN SATURATION: 97 % | DIASTOLIC BLOOD PRESSURE: 80 MMHG | RESPIRATION RATE: 18 BRPM | HEART RATE: 71 BPM | TEMPERATURE: 98.8 F

## 2022-06-08 DIAGNOSIS — S82.892A CLOSED FRACTURE OF LEFT ANKLE, INITIAL ENCOUNTER: ICD-10-CM

## 2022-06-08 DIAGNOSIS — Z98.890 STATUS POST OPEN REDUCTION WITH INTERNAL FIXATION (ORIF) OF FRACTURE OF ANKLE: Primary | ICD-10-CM

## 2022-06-08 DIAGNOSIS — Z78.9 ALCOHOL USE: ICD-10-CM

## 2022-06-08 DIAGNOSIS — I10 ESSENTIAL HYPERTENSION, BENIGN: ICD-10-CM

## 2022-06-08 DIAGNOSIS — Z87.81 STATUS POST OPEN REDUCTION WITH INTERNAL FIXATION (ORIF) OF FRACTURE OF ANKLE: Primary | ICD-10-CM

## 2022-06-08 DIAGNOSIS — S93.492D SPRAIN OF OTHER LIGAMENT OF LEFT ANKLE, SUBSEQUENT ENCOUNTER: ICD-10-CM

## 2022-06-08 LAB
ABO/RH(D): NORMAL
ALBUMIN SERPL-MCNC: 3.6 G/DL (ref 3.4–5)
ALBUMIN UR-MCNC: NEGATIVE MG/DL
ALP SERPL-CCNC: 83 U/L (ref 40–150)
ALT SERPL W P-5'-P-CCNC: 10 U/L (ref 0–70)
ANION GAP SERPL CALCULATED.3IONS-SCNC: 5 MMOL/L (ref 3–14)
ANTIBODY SCREEN: NEGATIVE
APPEARANCE UR: CLEAR
AST SERPL W P-5'-P-CCNC: 30 U/L (ref 0–45)
BASOPHILS # BLD AUTO: 0 10E3/UL (ref 0–0.2)
BASOPHILS NFR BLD AUTO: 1 %
BILIRUB SERPL-MCNC: 1 MG/DL (ref 0.2–1.3)
BILIRUB UR QL STRIP: NEGATIVE
BUN SERPL-MCNC: 12 MG/DL (ref 7–30)
CALCIUM SERPL-MCNC: 9.2 MG/DL (ref 8.5–10.1)
CHLORIDE BLD-SCNC: 85 MMOL/L (ref 94–109)
CO2 SERPL-SCNC: 30 MMOL/L (ref 20–32)
COLOR UR AUTO: NORMAL
CREAT SERPL-MCNC: 0.75 MG/DL (ref 0.66–1.25)
EOSINOPHIL # BLD AUTO: 0.1 10E3/UL (ref 0–0.7)
EOSINOPHIL NFR BLD AUTO: 1 %
ERYTHROCYTE [DISTWIDTH] IN BLOOD BY AUTOMATED COUNT: 14.7 % (ref 10–15)
GFR SERPL CREATININE-BSD FRML MDRD: >90 ML/MIN/1.73M2
GLUCOSE BLD-MCNC: 103 MG/DL (ref 70–99)
GLUCOSE UR STRIP-MCNC: NEGATIVE MG/DL
HCT VFR BLD AUTO: 32.9 % (ref 40–53)
HGB BLD-MCNC: 11.1 G/DL (ref 13.3–17.7)
HGB UR QL STRIP: NEGATIVE
IMM GRANULOCYTES # BLD: 0.1 10E3/UL
IMM GRANULOCYTES NFR BLD: 1 %
INR PPP: 1.11 (ref 0.85–1.15)
KETONES UR STRIP-MCNC: NEGATIVE MG/DL
LEUKOCYTE ESTERASE UR QL STRIP: NEGATIVE
LYMPHOCYTES # BLD AUTO: 0.9 10E3/UL (ref 0.8–5.3)
LYMPHOCYTES NFR BLD AUTO: 13 %
MAGNESIUM SERPL-MCNC: 1.9 MG/DL (ref 1.6–2.3)
MCH RBC QN AUTO: 32.6 PG (ref 26.5–33)
MCHC RBC AUTO-ENTMCNC: 33.7 G/DL (ref 31.5–36.5)
MCV RBC AUTO: 97 FL (ref 78–100)
MONOCYTES # BLD AUTO: 0.8 10E3/UL (ref 0–1.3)
MONOCYTES NFR BLD AUTO: 12 %
NEUTROPHILS # BLD AUTO: 5.1 10E3/UL (ref 1.6–8.3)
NEUTROPHILS NFR BLD AUTO: 72 %
NITRATE UR QL: NEGATIVE
NRBC # BLD AUTO: 0 10E3/UL
NRBC BLD AUTO-RTO: 0 /100
OSMOLALITY SERPL: 252 MMOL/KG (ref 280–301)
OSMOLALITY UR: 360 MMOL/KG (ref 100–1200)
PH UR STRIP: 6.5 [PH] (ref 5–7)
PHOSPHATE SERPL-MCNC: 3.4 MG/DL (ref 2.5–4.5)
PLATELET # BLD AUTO: 122 10E3/UL (ref 150–450)
POTASSIUM BLD-SCNC: 4.6 MMOL/L (ref 3.4–5.3)
PROT SERPL-MCNC: 8.2 G/DL (ref 6.8–8.8)
RBC # BLD AUTO: 3.41 10E6/UL (ref 4.4–5.9)
SODIUM SERPL-SCNC: 120 MMOL/L (ref 133–144)
SODIUM SERPL-SCNC: 122 MMOL/L (ref 133–144)
SODIUM UR-SCNC: 36 MMOL/L
SP GR UR STRIP: 1.01 (ref 1–1.03)
SPECIMEN EXPIRATION DATE: NORMAL
TSH SERPL DL<=0.005 MIU/L-ACNC: 1.74 MU/L (ref 0.4–4)
UROBILINOGEN UR STRIP-MCNC: NORMAL MG/DL
WBC # BLD AUTO: 6.9 10E3/UL (ref 4–11)

## 2022-06-08 PROCEDURE — 83935 ASSAY OF URINE OSMOLALITY: CPT | Performed by: PHYSICIAN ASSISTANT

## 2022-06-08 PROCEDURE — 250N000011 HC RX IP 250 OP 636: Performed by: PHYSICIAN ASSISTANT

## 2022-06-08 PROCEDURE — 86850 RBC ANTIBODY SCREEN: CPT | Performed by: PHYSICIAN ASSISTANT

## 2022-06-08 PROCEDURE — 85610 PROTHROMBIN TIME: CPT | Performed by: PHYSICIAN ASSISTANT

## 2022-06-08 PROCEDURE — 84443 ASSAY THYROID STIM HORMONE: CPT | Performed by: PHYSICIAN ASSISTANT

## 2022-06-08 PROCEDURE — 80053 COMPREHEN METABOLIC PANEL: CPT | Performed by: PHYSICIAN ASSISTANT

## 2022-06-08 PROCEDURE — 85025 COMPLETE CBC W/AUTO DIFF WBC: CPT | Performed by: PHYSICIAN ASSISTANT

## 2022-06-08 PROCEDURE — 93010 ELECTROCARDIOGRAM REPORT: CPT | Performed by: INTERNAL MEDICINE

## 2022-06-08 PROCEDURE — 84295 ASSAY OF SERUM SODIUM: CPT | Performed by: INTERNAL MEDICINE

## 2022-06-08 PROCEDURE — 83930 ASSAY OF BLOOD OSMOLALITY: CPT | Performed by: PHYSICIAN ASSISTANT

## 2022-06-08 PROCEDURE — 84300 ASSAY OF URINE SODIUM: CPT | Performed by: PHYSICIAN ASSISTANT

## 2022-06-08 PROCEDURE — 36415 COLL VENOUS BLD VENIPUNCTURE: CPT | Performed by: PHYSICIAN ASSISTANT

## 2022-06-08 PROCEDURE — 120N000001 HC R&B MED SURG/OB

## 2022-06-08 PROCEDURE — 99222 1ST HOSP IP/OBS MODERATE 55: CPT | Performed by: INTERNAL MEDICINE

## 2022-06-08 PROCEDURE — 82306 VITAMIN D 25 HYDROXY: CPT | Performed by: PHYSICIAN ASSISTANT

## 2022-06-08 PROCEDURE — 250N000013 HC RX MED GY IP 250 OP 250 PS 637: Performed by: PHYSICIAN ASSISTANT

## 2022-06-08 PROCEDURE — 73700 CT LOWER EXTREMITY W/O DYE: CPT | Mod: LT

## 2022-06-08 PROCEDURE — 84100 ASSAY OF PHOSPHORUS: CPT | Performed by: PHYSICIAN ASSISTANT

## 2022-06-08 PROCEDURE — 36415 COLL VENOUS BLD VENIPUNCTURE: CPT | Performed by: INTERNAL MEDICINE

## 2022-06-08 PROCEDURE — 81003 URINALYSIS AUTO W/O SCOPE: CPT | Performed by: PHYSICIAN ASSISTANT

## 2022-06-08 PROCEDURE — 83735 ASSAY OF MAGNESIUM: CPT | Performed by: PHYSICIAN ASSISTANT

## 2022-06-08 PROCEDURE — U0005 INFEC AGEN DETEC AMPLI PROBE: HCPCS | Performed by: PHYSICIAN ASSISTANT

## 2022-06-08 RX ORDER — LIDOCAINE 40 MG/G
CREAM TOPICAL
Status: DISCONTINUED | OUTPATIENT
Start: 2022-06-08 | End: 2022-06-09

## 2022-06-08 RX ORDER — NALOXONE HYDROCHLORIDE 0.4 MG/ML
0.2 INJECTION, SOLUTION INTRAMUSCULAR; INTRAVENOUS; SUBCUTANEOUS
Status: DISCONTINUED | OUTPATIENT
Start: 2022-06-08 | End: 2022-06-14 | Stop reason: HOSPADM

## 2022-06-08 RX ORDER — OXYCODONE HYDROCHLORIDE 5 MG/1
5-10 TABLET ORAL EVERY 4 HOURS PRN
Status: DISCONTINUED | OUTPATIENT
Start: 2022-06-08 | End: 2022-06-09

## 2022-06-08 RX ORDER — BISACODYL 10 MG
10 SUPPOSITORY, RECTAL RECTAL DAILY PRN
Status: DISCONTINUED | OUTPATIENT
Start: 2022-06-08 | End: 2022-06-09

## 2022-06-08 RX ORDER — HYDRALAZINE HYDROCHLORIDE 20 MG/ML
10 INJECTION INTRAMUSCULAR; INTRAVENOUS EVERY 4 HOURS PRN
Status: DISCONTINUED | OUTPATIENT
Start: 2022-06-08 | End: 2022-06-14 | Stop reason: HOSPADM

## 2022-06-08 RX ORDER — AMOXICILLIN 250 MG
3 CAPSULE ORAL 2 TIMES DAILY PRN
Status: DISCONTINUED | OUTPATIENT
Start: 2022-06-08 | End: 2022-06-14 | Stop reason: HOSPADM

## 2022-06-08 RX ORDER — AMOXICILLIN 250 MG
2 CAPSULE ORAL 2 TIMES DAILY PRN
Status: DISCONTINUED | OUTPATIENT
Start: 2022-06-08 | End: 2022-06-14 | Stop reason: HOSPADM

## 2022-06-08 RX ORDER — ALBUTEROL SULFATE 0.83 MG/ML
2.5 SOLUTION RESPIRATORY (INHALATION)
Status: DISCONTINUED | OUTPATIENT
Start: 2022-06-08 | End: 2022-06-14 | Stop reason: HOSPADM

## 2022-06-08 RX ORDER — ACETAMINOPHEN 650 MG/1
650 SUPPOSITORY RECTAL EVERY 6 HOURS PRN
Status: DISCONTINUED | OUTPATIENT
Start: 2022-06-08 | End: 2022-06-09

## 2022-06-08 RX ORDER — NALOXONE HYDROCHLORIDE 0.4 MG/ML
0.4 INJECTION, SOLUTION INTRAMUSCULAR; INTRAVENOUS; SUBCUTANEOUS
Status: DISCONTINUED | OUTPATIENT
Start: 2022-06-08 | End: 2022-06-14 | Stop reason: HOSPADM

## 2022-06-08 RX ORDER — LISINOPRIL 40 MG/1
40 TABLET ORAL DAILY
Status: DISCONTINUED | OUTPATIENT
Start: 2022-06-09 | End: 2022-06-08

## 2022-06-08 RX ORDER — CARBIDOPA AND LEVODOPA 25; 100 MG/1; MG/1
2 TABLET, EXTENDED RELEASE ORAL 3 TIMES DAILY
Status: DISCONTINUED | OUTPATIENT
Start: 2022-06-08 | End: 2022-06-14 | Stop reason: HOSPADM

## 2022-06-08 RX ORDER — ACETAMINOPHEN 325 MG/1
650 TABLET ORAL EVERY 6 HOURS PRN
Status: DISCONTINUED | OUTPATIENT
Start: 2022-06-08 | End: 2022-06-09

## 2022-06-08 RX ORDER — PROCHLORPERAZINE MALEATE 5 MG
10 TABLET ORAL EVERY 6 HOURS PRN
Status: DISCONTINUED | OUTPATIENT
Start: 2022-06-08 | End: 2022-06-09

## 2022-06-08 RX ORDER — HYDROMORPHONE HYDROCHLORIDE 1 MG/ML
.3-.5 INJECTION, SOLUTION INTRAMUSCULAR; INTRAVENOUS; SUBCUTANEOUS
Status: DISCONTINUED | OUTPATIENT
Start: 2022-06-08 | End: 2022-06-09

## 2022-06-08 RX ORDER — ONDANSETRON 4 MG/1
4 TABLET, ORALLY DISINTEGRATING ORAL EVERY 6 HOURS PRN
Status: DISCONTINUED | OUTPATIENT
Start: 2022-06-08 | End: 2022-06-09

## 2022-06-08 RX ORDER — SODIUM CHLORIDE AND POTASSIUM CHLORIDE 150; 900 MG/100ML; MG/100ML
INJECTION, SOLUTION INTRAVENOUS CONTINUOUS
Status: DISCONTINUED | OUTPATIENT
Start: 2022-06-08 | End: 2022-06-09

## 2022-06-08 RX ORDER — ROSUVASTATIN CALCIUM 20 MG/1
40 TABLET, COATED ORAL AT BEDTIME
Status: DISCONTINUED | OUTPATIENT
Start: 2022-06-08 | End: 2022-06-14 | Stop reason: HOSPADM

## 2022-06-08 RX ORDER — METOPROLOL SUCCINATE 50 MG/1
50 TABLET, EXTENDED RELEASE ORAL DAILY
Status: DISCONTINUED | OUTPATIENT
Start: 2022-06-09 | End: 2022-06-14 | Stop reason: HOSPADM

## 2022-06-08 RX ORDER — POLYETHYLENE GLYCOL 3350 17 G/17G
17 POWDER, FOR SOLUTION ORAL 2 TIMES DAILY
Status: DISCONTINUED | OUTPATIENT
Start: 2022-06-08 | End: 2022-06-09

## 2022-06-08 RX ORDER — PROCHLORPERAZINE 25 MG
25 SUPPOSITORY, RECTAL RECTAL EVERY 12 HOURS PRN
Status: DISCONTINUED | OUTPATIENT
Start: 2022-06-08 | End: 2022-06-09

## 2022-06-08 RX ORDER — METHOCARBAMOL 500 MG/1
500 TABLET, FILM COATED ORAL 3 TIMES DAILY PRN
Status: DISCONTINUED | OUTPATIENT
Start: 2022-06-08 | End: 2022-06-14 | Stop reason: HOSPADM

## 2022-06-08 RX ORDER — ONDANSETRON 2 MG/ML
4 INJECTION INTRAMUSCULAR; INTRAVENOUS EVERY 6 HOURS PRN
Status: DISCONTINUED | OUTPATIENT
Start: 2022-06-08 | End: 2022-06-09

## 2022-06-08 RX ADMIN — OXYCODONE HYDROCHLORIDE 5 MG: 5 TABLET ORAL at 18:17

## 2022-06-08 RX ADMIN — ACETAMINOPHEN 650 MG: 325 TABLET ORAL at 22:41

## 2022-06-08 RX ADMIN — OXYCODONE HYDROCHLORIDE 5 MG: 5 TABLET ORAL at 22:42

## 2022-06-08 RX ADMIN — SODIUM CHLORIDE AND POTASSIUM CHLORIDE: 9; 1.49 INJECTION, SOLUTION INTRAVENOUS at 20:26

## 2022-06-08 RX ADMIN — ROSUVASTATIN CALCIUM 40 MG: 20 TABLET, FILM COATED ORAL at 22:39

## 2022-06-08 ASSESSMENT — ACTIVITIES OF DAILY LIVING (ADL)
ADLS_ACUITY_SCORE: 38
ADLS_ACUITY_SCORE: 25
CONCENTRATING,_REMEMBERING_OR_MAKING_DECISIONS_DIFFICULTY: NO
EQUIPMENT_CURRENTLY_USED_AT_HOME: CRUTCHES
DOING_ERRANDS_INDEPENDENTLY_DIFFICULTY: NO
ADLS_ACUITY_SCORE: 25
TRANSFERRING: 1-->ASSISTANCE (EQUIPMENT/PERSON) NEEDED (NOT DEVELOPMENTALLY APPROPRIATE)
ADLS_ACUITY_SCORE: 36
WALKING_OR_CLIMBING_STAIRS: AMBULATION DIFFICULTY, REQUIRES EQUIPMENT;STAIR CLIMBING DIFFICULTY, REQUIRES EQUIPMENT
DRESSING/BATHING_DIFFICULTY: NO
CHANGE_IN_FUNCTIONAL_STATUS_SINCE_ONSET_OF_CURRENT_ILLNESS/INJURY: NO
FALL_HISTORY_WITHIN_LAST_SIX_MONTHS: NO
TRANSFERRING: 1-->ASSISTANCE (EQUIPMENT/PERSON) NEEDED
WALKING_OR_CLIMBING_STAIRS_DIFFICULTY: YES
TOILETING_ISSUES: NO
DIFFICULTY_EATING/SWALLOWING: NO

## 2022-06-08 NOTE — H&P
"[facility]  History and Physical  Orthopedics       Date of Admission:  6/8/2022    Assessment & Plan   Rashaun Molina is a 62 year old male with Parkinsons, HTN, COPD, chronic hyponatremia, and heavy alcohol use who was directly admitted to [facility] on 6/8/2022 from [facility] clinic due to left ankle fracture/dislocation.      Left ankle fracture dislocation  Initial injury > 7 days ago; mechanical fall during which patient heard a snap and had immediate ankle pain. Unfortunately, ankle x-ray at that time appeared negative and so patient thought to have a sprain.  He was placed in an aircast and continued to ambulate on injured ankle with progressive pain, swelling, and deformity.  He had a difficult time establishing Orthopedics follow-up but was ultimately seen today where fracture was diagnosed, ankle splinted, and patient referred to the hospital for surgical management.  He is hemodynamically stable and NVI.   -- NWB LLE with walker   -- Routine labs, pre-op EKG, and CT Ankle ordered and pending  -- Keep ankle elevated, icing as much as able to minimize swelling  -- NPO at midnight  -- Hold PTA ASA 81 for now  -- Plan for ORIF vs ex-fix tomorrow (to be determined based on amount of swelling once splint removed)  -- Pre-op orders completed   -- PT/OT/SW; unsafe at home - will likely need TCU    Hospitalist consult for comanagement of underlying comorbidities - including Parkinsons, HTN, and COPD - and medical optimization in light of hyponatremia and heavy alcohol use.  Appreciate assistance.      Clinically Significant Risk Factors Present on Admission         # Hyponatremia: Na = 122 mmol/L (Ref range: 133 - 144 mmol/L) on admission, will monitor as appropriate        # Platelet Defect: home medication list includes an antiplatelet medication   # Obesity: Estimated body mass index is 31.57 kg/m  as calculated from the following:    Height as of 5/31/22: 1.778 m (5' 10\").    Weight as of 5/31/22: " 99.8 kg (220 lb).      Code Status: Full Code     Disposition: Expected discharge in 3-7 days; anticipate will need TCU.      HERMINIA Otero  [facility]  Orthopedic Service      PRIMARY CARE PROVIDER: Yemi Nava    CHIEF COMPLAINT  Left ankle pain    History is obtained from the patient and Epic    HISTORY OF PRESENT ILLNESS  Rashaun Molina is a 62 year old male with Parkinsons, HTN, COPD, chronic hyponatremia, and heavy alcohol use who was directly admitted to [facility] on 6/8/2022 from [facility] clinic with a subacute left ankle fracture/dislocation.    Initial injury > 7 days ago; mechanical fall during which patient heard a snap and had immediate left ankle pain.  Evaluated in the ED but unfortunately, ankle x-ray at that time appeared negative. Patient thought to have an ankle sprain.  He was placed in an air cast and discharged home.  Over last few days, he has had progressive pain, swelling, and difficulty bearing weight.  He tried using a cane at home but, with underlying Parkinsons, was very unsteady.  He followed up in PMD clinic and was instructed to follow-up with [facility].  He contacted [facility] but was unable to get an appointment in [place] until late June.  Fortunately, he was able to get an appointment in [place] but it wasn't until today.  (Note, it is unclear why he was not referred to [facility].)  At his Ortho appointment today, imaging revealed a distal tibial fracture with disruption of the mortise.  Ankle was splinted in clinic and patient asked to be NWB LLE.  He was provided with crutches for ambulation.  Direct admission attempted but could not be completed because no beds were available at either [facility] or [facility].  Patient attempted ambulating with crutches but was very unsteady because he couldn't bear weight on his leg and he has baseline unsteadiness from his Parkinsons.  He was felt to be unsafe at home alone. Only option for patient to be admitted was for him to go in  through the ER which had a 3-4 hour wait.  Ultimately, patient able to come in as a direct admission to Orthopedics Service with plans for surgical fixation tomorrow.  Orders placed for routine labs, pre-op EKG, and CT Ankle for surgical planning.  Hospitalist consult for pre-op optimization.     Patient denies recent fever, chills, night sweats, shortness of breath, chest pain, abdominal pain, cough, URI symptoms.  He has had a really difficult time managing at home because of unsteadiness and ankle injury.  He reports prior tobacco use but quit about 9 years ago when he had surgery to correct what sounds like a colovesical fistula.  He drinks beer daily, anywhere between 2 and 9-10 beers.  No history of alcohol withdrawal or seizure.  His average alcohol intake is about 5 beers per night.  He denies baseline tremors from Parkinsons unless he doesn't get his medications on time (takes them at 4AM, 10AM, 4PM).    PAST MEDICAL HISTORY  I have reviewed this patient's medical history and updated it with pertinent information if needed.   Past Medical History:   Diagnosis Date     Arthritis      Chronic airway obstruction, not elsewhere classified 3/8/2004    pt says that he does not have COPD     Cough syncope 11/21/2012     Essential hypertension, benign      Fistula     colon/bladder     Fracture of right proximal fibula 7/30/2014     Orthostatic hypotension 11/24/2014     Other chronic pain     right hip pain     PAD (peripheral artery disease) (H) 08/2018    PTA right SFA Dr. Lee     Palpitations 12/2018 01/2019 Event monitor- SR/ST     Personal history of colonic polyps 6/28/2005     Pure hypercholesterolemia      Tobacco use disorder 3/8/2004     Vasovagal syncopes 10/25/2014     Viral warts, unspecified     genital     Parkinsons disease    Mild hyponatremia      PAST SURGICAL HISTORY  I have reviewed this patient's surgical history and updated it with pertinent information if needed.  Past Surgical  History:   Procedure Laterality Date     ARTHROPLASTY HIP Right 10/7/2015    Procedure: ARTHROPLASTY HIP;  Surgeon: Neil Davis MD;  Location: [facility] OR     COLONOSCOPY  9/05, 4/10/13    Per Hospital encounter dated 4/18/13     COLONOSCOPY N/A 4/9/2018    Procedure: COMBINED COLONOSCOPY, SINGLE OR MULTIPLE BIOPSY/POLYPECTOMY BY BIOPSY;;  Surgeon: Tramaine Zuniga MD;  Location: [facility] GI     COMBINED CYSTOSCOPY, INSERT CATHETER URETER  4/11/2013    Procedure: COMBINED CYSTOSCOPY, INSERT CATHETER URETER;;  Surgeon: Kashif Parks MD;  Location: [facility] OR     HC LARYNGOSCOPY DIRECT W VOCAL CORD INJECTION      vocal cord polyps     LAPAROSCOPIC ASSISTED COLECTOMY  4/11/2013    Procedure: LAPAROSCOPIC ASSISTED COLECTOMY;  LOW ANTERIOR RESECTION ;  Surgeon: Tramaine Zuniga MD;  Location:  OR     ZZ COLONOSCOPY THRU STOMA W BIOPSY/CAUTERY TUMOR/POLYP/LESION  1998    michael, tubular adenoma, next colonoscopy 2001     ZZHC COLONOSCOPY THRU STOMA, DIAGNOSTIC  04/16/01    negative, ligate two internal hemmorhoids  Dr rodriguez-repeat 2008       PRIOR TO ADMISSION MEDICATIONS  Prior to Admission Medications   Prescriptions Last Dose Informant Patient Reported? Taking?   ASPIRIN EC PO  Self Yes No   Sig: Take 81 mg by mouth daily.   carbidopa-levodopa (SINEMET)  MG tablet 6/8/2022 at x3  Yes Yes   Sig: Take 2 tablets by mouth 3 times daily   lisinopril (ZESTRIL) 40 MG tablet 6/8/2022 at Unknown time  No Yes   Sig: Take 1 tablet (40 mg) by mouth daily.   methocarbamol (ROBAXIN) 500 MG tablet   No Yes   Sig: Take 1 tablet (500 mg) by mouth 3 times daily as needed for muscle spasms   metoprolol succinate ER (TOPROL XL) 50 MG 24 hr tablet 6/8/2022 at Unknown time  No Yes   Sig: Take 1 tablet (50 mg) by mouth daily   rosuvastatin (CRESTOR) 40 MG tablet 6/7/2022 at Unknown time  No Yes   Sig: Take 1 tablet (40 mg) by mouth At Bedtime   umeclidinium (INCRUSE ELLIPTA) 62.5 MCG/INH inhaler 6/8/2022 at Unknown time  No Yes    Sig: Inhale 1 puff into the lungs daily      Facility-Administered Medications: None       ALLERGIES  Allergies   Allergen Reactions     Spiriva Handihaler Blisters     Hctz [Hydrochlorothiazide] Other (See Comments)     Low sodium       SOCIAL HISTORY  Single.  Lives alone. Brother lives nearby -  they are close.  Former cigarette smoker.  Quit 9 years ago.  Daily alcohol use anywhere between 2-9 beers, per patient.  Drinks daily.  No h/o withdrawal.  Denies dependence or abuse.      FAMILY HISTORY  I have reviewed this patient's family history and updated it with pertinent information if needed.   Family History   Problem Relation Age of Onset     Hypertension Mother      Hypertension Father          MI     Heart Disease Father         MI  at age 55     Coronary Artery Disease Father      Hyperlipidemia Brother      Hypertension Brother      Heart Surgery Brother         defibrillator     Hypertension Sister      Prostate Cancer No family hx of    Mother lived to age 91.     REVIEW OF SYSTEMS:  14 point comprehensive ROS undertaken with pertinent positives and negatives as above and otherwise unremarkable.     PHYSICAL EXAM  Temp: 97.9  F (36.6  C) Temp src: Temporal BP: (!) 168/75 Pulse: 62   Resp: 18 SpO2: 98 % O2 Device: None (Room air)    Vital Signs with Ranges  Temp:  [97.9  F (36.6  C)-98.8  F (37.1  C)] 97.9  F (36.6  C)  Pulse:  [62-71] 62  Resp:  [18] 18  BP: (148-168)/(75-80) 168/75  SpO2:  [97 %-98 %] 98 %  0 lbs 0 oz    GENERAL:  Pleasant, cooperative, alert. Well developed, well nourished. Isaac, dull complexion  HEENT: Normocephalic, atraumatic.  Extra occular mm intact.  Sclera clear. PERR.  Rhinophyma.  Mucous membranes moist.   PULMONARY: Clear to auscultation bilaterally   CARDIAC: Regular but distant.  No appreciated murmur.    ABDOMEN: Soft, nontender   MUSCULOSKELETAL:  Moving x 4 spontaneously with CMS intact x4.  Normal bulk and tone.  Right hand deformity from prior nerve injury.   Left LE in splint elevated on foam bolster.  Sensation and motor intact distal to injury.  Toes WWP.  Brisk cap refill.  NEURO: Alert and oriented x3.  CN II-XII grossly intact and symmetric.  Mild tremor of head.  Masked faces.  No significant tremor in extremities.  No noted ataxia.   SKIN:  Warm, pink, dry.    DATA  Data reviewed today:  I personally reviewed no images or EKG's today.    Recent Labs   Lab 06/08/22  2151 06/08/22  1657   WBC  --  6.9   HGB  --  11.1*   MCV  --  97   PLT  --  122*   INR  --  1.11   * 120*   POTASSIUM  --  4.6   CHLORIDE  --  85*   CO2  --  30   BUN  --  12   CR  --  0.75   ANIONGAP  --  5   FRANNY  --  9.2   GLC  --  103*   ALBUMIN  --  3.6   PROTTOTAL  --  8.2   BILITOTAL  --  1.0   ALKPHOS  --  83   ALT  --  10   AST  --  30       Recent Results (from the past 24 hour(s))   CT Ankle Left w/o Contrast    Narrative    EXAM: CT ANKLE LEFT W/O CONTRAST  LOCATION: [facility]  DATE/TIME: 6/8/2022 5:28 PM    INDICATION: Left-sided ankle pain and fracture.  COMPARISON: 05/28/2022 radiographs.  TECHNIQUE: Noncontrast. Axial, sagittal and coronal thin-section reconstruction. Dose reduction techniques were used.     FINDINGS:     BONES AND JOINTS:  -Small acute fracture of the tibia posterior malleolus. The fracture fragment measures 3 x 19 x 13 mm (AP, LR, SI) and demonstrates 6 mm of lateral displacement.  -Widened distal tibia-fibula syndesmosis, measuring 11 mm posteriorly (series 2, image 74). Widening of the medial ankle clear space, measuring 8.5 mm (series 4, image 29).  -There is a 6 mm subchondral lucency in the medial talar dome. No subchondral impaction or fragmentation.  -Prominence of the calcaneus anterior process adjacent to the navicular bone.    SOFT TISSUES:  -Moderate subcutaneous edema about the ankle.  -No CT evidence of tendon tear or entrapment.  -Ankle cast.      Impression    IMPRESSION:  1.  Small mildly displaced fracture of the tibia  posterior malleolus.  2.  Distal tibia-fibula syndesmosis and deltoid ligament injuries. There is evidenced by widening of the distal tibia-fibula syndesmosis as well as the medial ankle clear space.  3.  Moderate subcutaneous edema about the ankle.  4.  Tiny subchondral cyst in the medial talar dome, consistent with an osteochondral lesion. No evidence of subchondral impaction or fragmentation.  5.  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

## 2022-06-08 NOTE — ED TRIAGE NOTES
Pt presents to ED with fibula fracture. Seen by [facility] today and states they sent him here for admission for pre op to do surgery tomorrow.

## 2022-06-08 NOTE — ED TRIAGE NOTES
Triage Assessment     Row Name 06/08/22 1243       Triage Assessment (Adult)    Airway WDL WDL

## 2022-06-08 NOTE — ED NOTES
Got a call from Mariangel at [facility], this patient is to be seen by an ED doctor and admitted and see the ortho surgeon here as with his parkinsons they don't feel he can stay home.  This will be communicated with the patient by the writer.

## 2022-06-08 NOTE — PHARMACY-ADMISSION MEDICATION HISTORY
Admission medication history interview status for this patient is complete. See [facility] admission navigator for allergy information, prior to admission medications and immunization status.     Medication history interview done, indicate source(s): Patient  Medication history resources (including written lists, pill bottles, clinic record):None      Changes made to PTA medication list:  Added: none  Changed: sinemet  Reported as Not Taking: none  Removed: none    Actions taken by pharmacist (provider contacted, etc):None     Additional medication history information:None    Medication reconciliation/reorder completed by provider prior to medication history?  y   (Y/N)     For patients on insulin therapy:   Do you use sliding scale insulin based on blood sugars?   What is your pre-meal insulin coverage?    Do you typically eat three meals a day?   How many times do you check your blood glucose per day?   How many episodes of hypoglycemia do you typically have per month?   Do you have a Continuous Glucose Monitor (CGM)?      Prior to Admission medications    Medication Sig Last Dose Taking? Auth Provider   carbidopa-levodopa (SINEMET)  MG tablet Take 2 tablets by mouth 3 times daily 6/8/2022 at x3 Yes Reported, Patient   lisinopril (ZESTRIL) 40 MG tablet Take 1 tablet (40 mg) by mouth daily. 6/8/2022 at Unknown time Yes Yemi Nava MD   methocarbamol (ROBAXIN) 500 MG tablet Take 1 tablet (500 mg) by mouth 3 times daily as needed for muscle spasms  Yes Erlinda Mosley MD   metoprolol succinate ER (TOPROL XL) 50 MG 24 hr tablet Take 1 tablet (50 mg) by mouth daily 6/8/2022 at Unknown time Yes Yemi Nava MD   rosuvastatin (CRESTOR) 40 MG tablet Take 1 tablet (40 mg) by mouth At Bedtime 6/7/2022 at Unknown time Yes Yemi Nava MD   umeclidinium (INCRUSE ELLIPTA) 62.5 MCG/INH inhaler Inhale 1 puff into the lungs daily 6/8/2022 at Unknown time Yes Yemi Nava MD    ASPIRIN EC PO Take 81 mg by mouth daily.   Unknown, Entered By History

## 2022-06-09 ENCOUNTER — APPOINTMENT (OUTPATIENT)
Dept: GENERAL RADIOLOGY | Facility: CLINIC | Age: 62
DRG: 493 | End: 2022-06-09
Attending: ORTHOPAEDIC SURGERY
Payer: COMMERCIAL

## 2022-06-09 ENCOUNTER — ANESTHESIA (OUTPATIENT)
Dept: SURGERY | Facility: CLINIC | Age: 62
DRG: 493 | End: 2022-06-09
Payer: COMMERCIAL

## 2022-06-09 ENCOUNTER — ANESTHESIA EVENT (OUTPATIENT)
Dept: SURGERY | Facility: CLINIC | Age: 62
DRG: 493 | End: 2022-06-09
Payer: COMMERCIAL

## 2022-06-09 LAB
ALBUMIN SERPL-MCNC: 3.4 G/DL (ref 3.4–5)
ALP SERPL-CCNC: 78 U/L (ref 40–150)
ALT SERPL W P-5'-P-CCNC: 12 U/L (ref 0–70)
ANION GAP SERPL CALCULATED.3IONS-SCNC: 5 MMOL/L (ref 3–14)
AST SERPL W P-5'-P-CCNC: 28 U/L (ref 0–45)
BILIRUB SERPL-MCNC: 0.9 MG/DL (ref 0.2–1.3)
BUN SERPL-MCNC: 13 MG/DL (ref 7–30)
CALCIUM SERPL-MCNC: 9 MG/DL (ref 8.5–10.1)
CHLORIDE BLD-SCNC: 92 MMOL/L (ref 94–109)
CO2 SERPL-SCNC: 29 MMOL/L (ref 20–32)
CREAT SERPL-MCNC: 0.83 MG/DL (ref 0.66–1.25)
DEPRECATED CALCIDIOL+CALCIFEROL SERPL-MC: 20 UG/L (ref 20–75)
GFR SERPL CREATININE-BSD FRML MDRD: >90 ML/MIN/1.73M2
GLUCOSE BLD-MCNC: 102 MG/DL (ref 70–99)
POTASSIUM BLD-SCNC: 4.3 MMOL/L (ref 3.4–5.3)
PROT SERPL-MCNC: 8 G/DL (ref 6.8–8.8)
SARS-COV-2 RNA RESP QL NAA+PROBE: NEGATIVE
SODIUM SERPL-SCNC: 126 MMOL/L (ref 133–144)

## 2022-06-09 PROCEDURE — 0SHG04Z INSERTION OF INTERNAL FIXATION DEVICE INTO LEFT ANKLE JOINT, OPEN APPROACH: ICD-10-PCS | Performed by: ORTHOPAEDIC SURGERY

## 2022-06-09 PROCEDURE — 250N000011 HC RX IP 250 OP 636: Performed by: ANESTHESIOLOGY

## 2022-06-09 PROCEDURE — C1713 ANCHOR/SCREW BN/BN,TIS/BN: HCPCS | Performed by: ORTHOPAEDIC SURGERY

## 2022-06-09 PROCEDURE — 710N000009 HC RECOVERY PHASE 1, LEVEL 1, PER MIN: Performed by: ORTHOPAEDIC SURGERY

## 2022-06-09 PROCEDURE — 272N000002 HC OR SUPPLY OTHER OPNP: Performed by: ORTHOPAEDIC SURGERY

## 2022-06-09 PROCEDURE — 272N000001 HC OR GENERAL SUPPLY STERILE: Performed by: ORTHOPAEDIC SURGERY

## 2022-06-09 PROCEDURE — 80053 COMPREHEN METABOLIC PANEL: CPT | Performed by: INTERNAL MEDICINE

## 2022-06-09 PROCEDURE — 250N000011 HC RX IP 250 OP 636: Performed by: PHYSICIAN ASSISTANT

## 2022-06-09 PROCEDURE — 250N000013 HC RX MED GY IP 250 OP 250 PS 637: Performed by: INTERNAL MEDICINE

## 2022-06-09 PROCEDURE — 250N000013 HC RX MED GY IP 250 OP 250 PS 637

## 2022-06-09 PROCEDURE — 258N000003 HC RX IP 258 OP 636: Performed by: ANESTHESIOLOGY

## 2022-06-09 PROCEDURE — 250N000009 HC RX 250: Performed by: NURSE ANESTHETIST, CERTIFIED REGISTERED

## 2022-06-09 PROCEDURE — 250N000011 HC RX IP 250 OP 636: Performed by: NURSE ANESTHETIST, CERTIFIED REGISTERED

## 2022-06-09 PROCEDURE — 258N000003 HC RX IP 258 OP 636

## 2022-06-09 PROCEDURE — 258N000003 HC RX IP 258 OP 636: Performed by: NURSE ANESTHETIST, CERTIFIED REGISTERED

## 2022-06-09 PROCEDURE — 250N000013 HC RX MED GY IP 250 OP 250 PS 637: Performed by: PHYSICIAN ASSISTANT

## 2022-06-09 PROCEDURE — 250N000011 HC RX IP 250 OP 636

## 2022-06-09 PROCEDURE — 360N000084 HC SURGERY LEVEL 4 W/ FLUORO, PER MIN: Performed by: ORTHOPAEDIC SURGERY

## 2022-06-09 PROCEDURE — 999N000179 XR SURGERY CARM FLUORO LESS THAN 5 MIN W STILLS: Mod: TC

## 2022-06-09 PROCEDURE — 999N000141 HC STATISTIC PRE-PROCEDURE NURSING ASSESSMENT: Performed by: ORTHOPAEDIC SURGERY

## 2022-06-09 PROCEDURE — 36415 COLL VENOUS BLD VENIPUNCTURE: CPT | Performed by: INTERNAL MEDICINE

## 2022-06-09 PROCEDURE — 250N000009 HC RX 250: Performed by: ANESTHESIOLOGY

## 2022-06-09 PROCEDURE — 370N000017 HC ANESTHESIA TECHNICAL FEE, PER MIN: Performed by: ORTHOPAEDIC SURGERY

## 2022-06-09 PROCEDURE — 120N000001 HC R&B MED SURG/OB

## 2022-06-09 PROCEDURE — 250N000009 HC RX 250: Performed by: ORTHOPAEDIC SURGERY

## 2022-06-09 PROCEDURE — 0MSR0ZZ REPOSITION LEFT ANKLE BURSA AND LIGAMENT, OPEN APPROACH: ICD-10-PCS | Performed by: ORTHOPAEDIC SURGERY

## 2022-06-09 PROCEDURE — 99232 SBSQ HOSP IP/OBS MODERATE 35: CPT | Performed by: INTERNAL MEDICINE

## 2022-06-09 DEVICE — IMPLANTABLE DEVICE
Type: IMPLANTABLE DEVICE | Site: ANKLE | Status: NON-FUNCTIONAL
Removed: 2022-08-24

## 2022-06-09 RX ORDER — FENTANYL CITRATE 50 UG/ML
INJECTION, SOLUTION INTRAMUSCULAR; INTRAVENOUS PRN
Status: DISCONTINUED | OUTPATIENT
Start: 2022-06-09 | End: 2022-06-09

## 2022-06-09 RX ORDER — CEFAZOLIN SODIUM 2 G/100ML
2 INJECTION, SOLUTION INTRAVENOUS EVERY 8 HOURS
Status: COMPLETED | OUTPATIENT
Start: 2022-06-09 | End: 2022-06-10

## 2022-06-09 RX ORDER — DEXAMETHASONE SODIUM PHOSPHATE 4 MG/ML
INJECTION, SOLUTION INTRA-ARTICULAR; INTRALESIONAL; INTRAMUSCULAR; INTRAVENOUS; SOFT TISSUE PRN
Status: DISCONTINUED | OUTPATIENT
Start: 2022-06-09 | End: 2022-06-09

## 2022-06-09 RX ORDER — OXYCODONE HYDROCHLORIDE 5 MG/1
5 TABLET ORAL EVERY 4 HOURS PRN
Status: DISCONTINUED | OUTPATIENT
Start: 2022-06-09 | End: 2022-06-09 | Stop reason: HOSPADM

## 2022-06-09 RX ORDER — SODIUM CHLORIDE, SODIUM LACTATE, POTASSIUM CHLORIDE, CALCIUM CHLORIDE 600; 310; 30; 20 MG/100ML; MG/100ML; MG/100ML; MG/100ML
INJECTION, SOLUTION INTRAVENOUS CONTINUOUS
Status: DISCONTINUED | OUTPATIENT
Start: 2022-06-09 | End: 2022-06-09 | Stop reason: HOSPADM

## 2022-06-09 RX ORDER — ONDANSETRON 4 MG/1
4 TABLET, ORALLY DISINTEGRATING ORAL EVERY 6 HOURS PRN
Status: DISCONTINUED | OUTPATIENT
Start: 2022-06-09 | End: 2022-06-14 | Stop reason: HOSPADM

## 2022-06-09 RX ORDER — BUPIVACAINE HYDROCHLORIDE AND EPINEPHRINE 5; 5 MG/ML; UG/ML
INJECTION, SOLUTION PERINEURAL
Status: COMPLETED | OUTPATIENT
Start: 2022-06-09 | End: 2022-06-09

## 2022-06-09 RX ORDER — AMOXICILLIN 250 MG
1 CAPSULE ORAL 2 TIMES DAILY
Status: DISCONTINUED | OUTPATIENT
Start: 2022-06-09 | End: 2022-06-14 | Stop reason: HOSPADM

## 2022-06-09 RX ORDER — MULTIVITAMIN,THERAPEUTIC
1 TABLET ORAL DAILY
Status: DISCONTINUED | OUTPATIENT
Start: 2022-06-09 | End: 2022-06-14 | Stop reason: HOSPADM

## 2022-06-09 RX ORDER — DIPHENHYDRAMINE HCL 25 MG
25 CAPSULE ORAL EVERY 6 HOURS PRN
Status: DISCONTINUED | OUTPATIENT
Start: 2022-06-09 | End: 2022-06-14 | Stop reason: HOSPADM

## 2022-06-09 RX ORDER — HYDROMORPHONE HCL IN WATER/PF 6 MG/30 ML
0.4 PATIENT CONTROLLED ANALGESIA SYRINGE INTRAVENOUS
Status: DISCONTINUED | OUTPATIENT
Start: 2022-06-09 | End: 2022-06-14 | Stop reason: HOSPADM

## 2022-06-09 RX ORDER — HYDROMORPHONE HCL IN WATER/PF 6 MG/30 ML
0.2 PATIENT CONTROLLED ANALGESIA SYRINGE INTRAVENOUS
Status: DISCONTINUED | OUTPATIENT
Start: 2022-06-09 | End: 2022-06-14 | Stop reason: HOSPADM

## 2022-06-09 RX ORDER — OXYCODONE HYDROCHLORIDE 5 MG/1
10 TABLET ORAL EVERY 4 HOURS PRN
Status: DISCONTINUED | OUTPATIENT
Start: 2022-06-09 | End: 2022-06-14 | Stop reason: HOSPADM

## 2022-06-09 RX ORDER — GLYCOPYRROLATE 0.2 MG/ML
INJECTION, SOLUTION INTRAMUSCULAR; INTRAVENOUS PRN
Status: DISCONTINUED | OUTPATIENT
Start: 2022-06-09 | End: 2022-06-09

## 2022-06-09 RX ORDER — ASPIRIN 81 MG/1
81 TABLET ORAL 2 TIMES DAILY
Status: DISCONTINUED | OUTPATIENT
Start: 2022-06-09 | End: 2022-06-14 | Stop reason: HOSPADM

## 2022-06-09 RX ORDER — POLYETHYLENE GLYCOL 3350 17 G/17G
17 POWDER, FOR SOLUTION ORAL DAILY
Status: DISCONTINUED | OUTPATIENT
Start: 2022-06-10 | End: 2022-06-14 | Stop reason: HOSPADM

## 2022-06-09 RX ORDER — OXYCODONE HYDROCHLORIDE 5 MG/1
5 TABLET ORAL EVERY 4 HOURS PRN
Status: DISCONTINUED | OUTPATIENT
Start: 2022-06-09 | End: 2022-06-14 | Stop reason: HOSPADM

## 2022-06-09 RX ORDER — ACETAMINOPHEN 325 MG/1
650 TABLET ORAL EVERY 4 HOURS PRN
Status: DISCONTINUED | OUTPATIENT
Start: 2022-06-12 | End: 2022-06-14 | Stop reason: HOSPADM

## 2022-06-09 RX ORDER — FOLIC ACID 1 MG/1
1 TABLET ORAL DAILY
Status: DISCONTINUED | OUTPATIENT
Start: 2022-06-09 | End: 2022-06-14 | Stop reason: HOSPADM

## 2022-06-09 RX ORDER — HYDROMORPHONE HCL IN WATER/PF 6 MG/30 ML
0.4 PATIENT CONTROLLED ANALGESIA SYRINGE INTRAVENOUS EVERY 5 MIN PRN
Status: DISCONTINUED | OUTPATIENT
Start: 2022-06-09 | End: 2022-06-09 | Stop reason: HOSPADM

## 2022-06-09 RX ORDER — HYDRALAZINE HYDROCHLORIDE 20 MG/ML
2.5-5 INJECTION INTRAMUSCULAR; INTRAVENOUS EVERY 10 MIN PRN
Status: DISCONTINUED | OUTPATIENT
Start: 2022-06-09 | End: 2022-06-09 | Stop reason: HOSPADM

## 2022-06-09 RX ORDER — LABETALOL HYDROCHLORIDE 5 MG/ML
10 INJECTION, SOLUTION INTRAVENOUS
Status: DISCONTINUED | OUTPATIENT
Start: 2022-06-09 | End: 2022-06-09 | Stop reason: HOSPADM

## 2022-06-09 RX ORDER — ACETAMINOPHEN 325 MG/1
975 TABLET ORAL ONCE
Status: COMPLETED | OUTPATIENT
Start: 2022-06-09 | End: 2022-06-09

## 2022-06-09 RX ORDER — LIDOCAINE 40 MG/G
CREAM TOPICAL
Status: DISCONTINUED | OUTPATIENT
Start: 2022-06-09 | End: 2022-06-14 | Stop reason: HOSPADM

## 2022-06-09 RX ORDER — LISINOPRIL 5 MG/1
5 TABLET ORAL DAILY
Status: DISCONTINUED | OUTPATIENT
Start: 2022-06-09 | End: 2022-06-10

## 2022-06-09 RX ORDER — PROPOFOL 10 MG/ML
INJECTION, EMULSION INTRAVENOUS PRN
Status: DISCONTINUED | OUTPATIENT
Start: 2022-06-09 | End: 2022-06-09

## 2022-06-09 RX ORDER — ACETAMINOPHEN 325 MG/1
975 TABLET ORAL EVERY 8 HOURS
Status: COMPLETED | OUTPATIENT
Start: 2022-06-09 | End: 2022-06-12

## 2022-06-09 RX ORDER — PROCHLORPERAZINE MALEATE 5 MG
10 TABLET ORAL EVERY 6 HOURS PRN
Status: DISCONTINUED | OUTPATIENT
Start: 2022-06-09 | End: 2022-06-14 | Stop reason: HOSPADM

## 2022-06-09 RX ORDER — BISACODYL 10 MG
10 SUPPOSITORY, RECTAL RECTAL DAILY PRN
Status: DISCONTINUED | OUTPATIENT
Start: 2022-06-09 | End: 2022-06-14 | Stop reason: HOSPADM

## 2022-06-09 RX ORDER — CEFAZOLIN SODIUM/WATER 2 G/20 ML
2 SYRINGE (ML) INTRAVENOUS
Status: COMPLETED | OUTPATIENT
Start: 2022-06-09 | End: 2022-06-09

## 2022-06-09 RX ORDER — SODIUM CHLORIDE, SODIUM LACTATE, POTASSIUM CHLORIDE, CALCIUM CHLORIDE 600; 310; 30; 20 MG/100ML; MG/100ML; MG/100ML; MG/100ML
INJECTION, SOLUTION INTRAVENOUS CONTINUOUS
Status: DISCONTINUED | OUTPATIENT
Start: 2022-06-09 | End: 2022-06-10

## 2022-06-09 RX ORDER — ONDANSETRON 2 MG/ML
4 INJECTION INTRAMUSCULAR; INTRAVENOUS EVERY 30 MIN PRN
Status: DISCONTINUED | OUTPATIENT
Start: 2022-06-09 | End: 2022-06-09 | Stop reason: HOSPADM

## 2022-06-09 RX ORDER — LIDOCAINE 40 MG/G
CREAM TOPICAL
Status: DISCONTINUED | OUTPATIENT
Start: 2022-06-09 | End: 2022-06-09 | Stop reason: HOSPADM

## 2022-06-09 RX ORDER — ALBUTEROL SULFATE 0.83 MG/ML
2.5 SOLUTION RESPIRATORY (INHALATION) EVERY 4 HOURS PRN
Status: DISCONTINUED | OUTPATIENT
Start: 2022-06-09 | End: 2022-06-09 | Stop reason: HOSPADM

## 2022-06-09 RX ORDER — FENTANYL CITRATE 50 UG/ML
50 INJECTION, SOLUTION INTRAMUSCULAR; INTRAVENOUS EVERY 5 MIN PRN
Status: DISCONTINUED | OUTPATIENT
Start: 2022-06-09 | End: 2022-06-09 | Stop reason: HOSPADM

## 2022-06-09 RX ORDER — CEFAZOLIN SODIUM/WATER 2 G/20 ML
2 SYRINGE (ML) INTRAVENOUS SEE ADMIN INSTRUCTIONS
Status: DISCONTINUED | OUTPATIENT
Start: 2022-06-09 | End: 2022-06-09 | Stop reason: HOSPADM

## 2022-06-09 RX ORDER — ONDANSETRON 2 MG/ML
4 INJECTION INTRAMUSCULAR; INTRAVENOUS EVERY 6 HOURS PRN
Status: DISCONTINUED | OUTPATIENT
Start: 2022-06-09 | End: 2022-06-14 | Stop reason: HOSPADM

## 2022-06-09 RX ORDER — MAGNESIUM HYDROXIDE 1200 MG/15ML
LIQUID ORAL PRN
Status: DISCONTINUED | OUTPATIENT
Start: 2022-06-09 | End: 2022-06-09 | Stop reason: HOSPADM

## 2022-06-09 RX ORDER — EPHEDRINE SULFATE 50 MG/ML
INJECTION, SOLUTION INTRAVENOUS PRN
Status: DISCONTINUED | OUTPATIENT
Start: 2022-06-09 | End: 2022-06-09

## 2022-06-09 RX ORDER — TRANEXAMIC ACID 650 MG/1
1950 TABLET ORAL ONCE
Status: DISCONTINUED | OUTPATIENT
Start: 2022-06-09 | End: 2022-06-09 | Stop reason: HOSPADM

## 2022-06-09 RX ORDER — ONDANSETRON 4 MG/1
4 TABLET, ORALLY DISINTEGRATING ORAL EVERY 30 MIN PRN
Status: DISCONTINUED | OUTPATIENT
Start: 2022-06-09 | End: 2022-06-09 | Stop reason: HOSPADM

## 2022-06-09 RX ADMIN — FENTANYL CITRATE 100 MCG: 50 INJECTION, SOLUTION INTRAMUSCULAR; INTRAVENOUS at 07:31

## 2022-06-09 RX ADMIN — MIDAZOLAM 2 MG: 1 INJECTION INTRAMUSCULAR; INTRAVENOUS at 07:50

## 2022-06-09 RX ADMIN — FENTANYL CITRATE 100 MCG: 50 INJECTION, SOLUTION INTRAMUSCULAR; INTRAVENOUS at 08:00

## 2022-06-09 RX ADMIN — METOPROLOL SUCCINATE 50 MG: 50 TABLET, EXTENDED RELEASE ORAL at 13:35

## 2022-06-09 RX ADMIN — PHENYLEPHRINE HYDROCHLORIDE 100 MCG: 10 INJECTION INTRAVENOUS at 08:18

## 2022-06-09 RX ADMIN — CARBIDOPA AND LEVODOPA 2 TABLET: 25; 100 TABLET, EXTENDED RELEASE ORAL at 17:15

## 2022-06-09 RX ADMIN — SODIUM CHLORIDE, POTASSIUM CHLORIDE, SODIUM LACTATE AND CALCIUM CHLORIDE: 600; 310; 30; 20 INJECTION, SOLUTION INTRAVENOUS at 07:50

## 2022-06-09 RX ADMIN — LISINOPRIL 5 MG: 5 TABLET ORAL at 16:48

## 2022-06-09 RX ADMIN — THIAMINE HCL TAB 100 MG 100 MG: 100 TAB at 16:48

## 2022-06-09 RX ADMIN — LIDOCAINE HYDROCHLORIDE 5 ML: 10 INJECTION, SOLUTION EPIDURAL; INFILTRATION; INTRACAUDAL; PERINEURAL at 08:00

## 2022-06-09 RX ADMIN — SODIUM CHLORIDE, POTASSIUM CHLORIDE, SODIUM LACTATE AND CALCIUM CHLORIDE: 600; 310; 30; 20 INJECTION, SOLUTION INTRAVENOUS at 13:29

## 2022-06-09 RX ADMIN — EPHEDRINE SULFATE 5 MG: 50 INJECTION, SOLUTION INTRAVENOUS at 08:33

## 2022-06-09 RX ADMIN — DEXAMETHASONE SODIUM PHOSPHATE 4 MG: 4 INJECTION, SOLUTION INTRA-ARTICULAR; INTRALESIONAL; INTRAMUSCULAR; INTRAVENOUS; SOFT TISSUE at 08:00

## 2022-06-09 RX ADMIN — OXYCODONE HYDROCHLORIDE 5 MG: 5 TABLET ORAL at 04:29

## 2022-06-09 RX ADMIN — ACETAMINOPHEN 975 MG: 325 TABLET ORAL at 13:28

## 2022-06-09 RX ADMIN — THERA TABS 1 TABLET: TAB at 16:48

## 2022-06-09 RX ADMIN — Medication 2 G: at 07:50

## 2022-06-09 RX ADMIN — CEFAZOLIN SODIUM 2 G: 2 INJECTION, SOLUTION INTRAVENOUS at 17:15

## 2022-06-09 RX ADMIN — SODIUM CHLORIDE AND POTASSIUM CHLORIDE: 9; 1.49 INJECTION, SOLUTION INTRAVENOUS at 05:20

## 2022-06-09 RX ADMIN — CARBIDOPA AND LEVODOPA 2 TABLET: 25; 100 TABLET, EXTENDED RELEASE ORAL at 11:35

## 2022-06-09 RX ADMIN — FOLIC ACID 1 MG: 1 TABLET ORAL at 16:48

## 2022-06-09 RX ADMIN — HYDRALAZINE HYDROCHLORIDE 2.5 MG: 20 INJECTION INTRAMUSCULAR; INTRAVENOUS at 10:08

## 2022-06-09 RX ADMIN — HYDRALAZINE HYDROCHLORIDE 5 MG: 20 INJECTION INTRAMUSCULAR; INTRAVENOUS at 10:28

## 2022-06-09 RX ADMIN — ROSUVASTATIN CALCIUM 40 MG: 20 TABLET, FILM COATED ORAL at 22:10

## 2022-06-09 RX ADMIN — BUPIVACAINE HYDROCHLORIDE AND EPINEPHRINE BITARTRATE 15 ML: 5; .005 INJECTION, SOLUTION EPIDURAL; INTRACAUDAL; PERINEURAL at 07:30

## 2022-06-09 RX ADMIN — ACETAMINOPHEN 975 MG: 325 TABLET ORAL at 20:18

## 2022-06-09 RX ADMIN — PROPOFOL 200 MG: 10 INJECTION, EMULSION INTRAVENOUS at 08:00

## 2022-06-09 RX ADMIN — HYDRALAZINE HYDROCHLORIDE 5 MG: 20 INJECTION INTRAMUSCULAR; INTRAVENOUS at 10:38

## 2022-06-09 RX ADMIN — HYDRALAZINE HYDROCHLORIDE 5 MG: 20 INJECTION INTRAMUSCULAR; INTRAVENOUS at 10:19

## 2022-06-09 RX ADMIN — ACETAMINOPHEN 975 MG: 325 TABLET ORAL at 07:26

## 2022-06-09 RX ADMIN — OXYCODONE HYDROCHLORIDE 5 MG: 5 TABLET ORAL at 20:18

## 2022-06-09 RX ADMIN — ASPIRIN 81 MG: 81 TABLET, COATED ORAL at 13:29

## 2022-06-09 RX ADMIN — ASPIRIN 81 MG: 81 TABLET, COATED ORAL at 20:18

## 2022-06-09 RX ADMIN — MIDAZOLAM 2 MG: 1 INJECTION INTRAMUSCULAR; INTRAVENOUS at 07:31

## 2022-06-09 RX ADMIN — SENNOSIDES AND DOCUSATE SODIUM 1 TABLET: 50; 8.6 TABLET ORAL at 20:19

## 2022-06-09 RX ADMIN — GLYCOPYRROLATE 0.2 MG: 0.2 INJECTION, SOLUTION INTRAMUSCULAR; INTRAVENOUS at 07:55

## 2022-06-09 RX ADMIN — CARBIDOPA AND LEVODOPA 2 TABLET: 25; 100 TABLET, EXTENDED RELEASE ORAL at 04:29

## 2022-06-09 RX ADMIN — BUPIVACAINE HYDROCHLORIDE AND EPINEPHRINE 25 ML: 5; 5 INJECTION, SOLUTION PERINEURAL at 07:33

## 2022-06-09 ASSESSMENT — ACTIVITIES OF DAILY LIVING (ADL)
ADLS_ACUITY_SCORE: 32
ADLS_ACUITY_SCORE: 25
ADLS_ACUITY_SCORE: 32
ADLS_ACUITY_SCORE: 25
ADLS_ACUITY_SCORE: 32
ADLS_ACUITY_SCORE: 33
ADLS_ACUITY_SCORE: 32
ADLS_ACUITY_SCORE: 32
DEPENDENT_IADLS:: INDEPENDENT
ADLS_ACUITY_SCORE: 25
ADLS_ACUITY_SCORE: 33

## 2022-06-09 ASSESSMENT — COPD QUESTIONNAIRES: COPD: 1

## 2022-06-09 NOTE — ANESTHESIA CARE TRANSFER NOTE
Patient: Rashaun Molina    Procedure: Procedure(s):  OPEN REDUCTION INTERNAL FIXATION, FRACTURE, ANKLE       Diagnosis: Closed fracture of left ankle, initial encounter [S86.562Z]  Diagnosis Additional Information: No value filed.    Anesthesia Type:   General     Note:    Oropharynx: oropharynx clear of all foreign objects  Level of Consciousness: drowsy  Oxygen Supplementation: face mask  Level of Supplemental Oxygen (L/min / FiO2): 6  Independent Airway: airway patency satisfactory and stable  Dentition: dentition unchanged  Vital Signs Stable: post-procedure vital signs reviewed and stable  Report to RN Given: handoff report given  Patient transferred to: PACU    Handoff Report: Identifed the Patient, Identified the Reponsible Provider, Reviewed the pertinent medical history, Discussed the surgical course, Reviewed Intra-OP anesthesia mangement and issues during anesthesia, Set expectations for post-procedure period and Allowed opportunity for questions and acknowledgement of understanding      Vitals:  Vitals Value Taken Time   /86 06/09/22 0923   Temp     Pulse 70 06/09/22 0925   Resp 11 06/09/22 0925   SpO2 92 % 06/09/22 0925   Vitals shown include unvalidated device data.    Electronically Signed By: LEIGHA Mckeon CRNA  June 9, 2022  9:26 AM

## 2022-06-09 NOTE — PLAN OF CARE
Goal Outcome Evaluation:    Plan of Care Reviewed With: patient, family     Overall Patient Progress: no change    Outcome Evaluation: Pt direct admit from [facility]-arrived to floor around 1630.  Pivot transfers with Ax1 et walker. LLE elevated on blue wedge pillow and ice applied. Started on 5mg Oxycodone for pain.  CMS at baseline. Splint intact to LLE.  Hospitalist consult for low sodium level. Plan is for OR tomorrow with Dr. Jackson either for ORIF or possible external fixator if too much swelling.  May need TCU at discharge.

## 2022-06-09 NOTE — ANESTHESIA PROCEDURE NOTES
Sciatic Procedure Note    Pre-Procedure   Staff -        Anesthesiologist:  Marcela Pineda MD       Performed By: anesthesiologist       Procedure Start/Stop Times: 6/9/2022 7:33 AM and 6/9/2022 7:36 AM       Pre-Anesthestic Checklist: patient identified, IV checked, site marked, risks and benefits discussed, informed consent, monitors and equipment checked, pre-op evaluation, at physician/surgeon's request and post-op pain management  Timeout:       Correct Patient: Yes        Correct Procedure: Yes        Correct Site: Yes        Correct Position: Yes        Correct Laterality: Yes        Site Marked: Yes  Procedure Documentation  Procedure: Sciatic       Laterality: left       Patient Position: supine       Skin prep: Betadine (popliteal approach).       Needle Type: short bevel       Needle Gauge: 20.        Needle Length (Inches): 4        Ultrasound guided       1. Ultrasound was used to identify targeted nerve, plexus, vascular marker, or fascial plane and place a needle adjacent to it in real-time.       2. Ultrasound was used to visualize the spread of anesthetic in close proximity to the above referenced structure.       4. The visualized anatomic structures appeared normal.       5. There were no apparent abnormal pathologic findings.    Assessment/Narrative         The placement was negative for: blood aspirated, painful injection and site bleeding       Paresthesias: No.       Bolus given via needle..        Secured via.        Insertion/Infusion Method: Single Shot       Complications: none       Injection made incrementally with aspirations every 5 mL.    Medication(s) Administered   Bupivacaine 0.5% w/ 1:200K Epi (Other) - Other   25 mL - 6/9/2022 7:33:00 AM  Medication Administration Time: 6/9/2022 7:33 AM

## 2022-06-09 NOTE — H&P (VIEW-ONLY)
"[facility]  Consult Note - Hospitalist Service  Date of Admission:  6/8/2022  Consult Requested by: Orthopedic surgery  Reason for Consult: Hyponatremia.    Assessment & Plan   Rashaun Molina is a 62 year old male admitted on 6/8/2022. He has a past medical history significant for Parkinson's, hypertension, hyperlipidemia, COPD, and chronic hyponatremia.  He was found to have left tibia fracture.  Sent to the hospital with plans for operative repair tomorrow.  Laboratory work also noted hyponatremia with a sodium of 120.    1.  Acute on chronic hyponatremia.  Mild.  Sodium 120.  Asymptomatic.  -Continue IV fluids as ordered.  -Recheck sodium tonight and tomorrow morning.    2.  Hypertension.  -Hold lisinopril with plans for surgery tomorrow.  -Continue metoprolol.  -IV hydralazine if needed.    3.  Parkinson's disease.  -Continue carbidopa/levodopa.    4.  COPD.  No acute exacerbation.  -Continue scheduled umeclidinium.  -Additional albuterol if needed.    5.  Hyperlipidemia.  -Continue rosuvastatin.           Jhony Krishnamurthy, DO  [facility]        Hospitalist Service    Clinically Significant Risk Factors Present on Admission         # Hyponatremia: Na = 120 mmol/L (Ref range: 133 - 144 mmol/L) on admission, will monitor as appropriate        # Platelet Defect: home medication list includes an antiplatelet medication   # Obesity: Estimated body mass index is 31.57 kg/m  as calculated from the following:    Height as of 5/31/22: 1.778 m (5' 10\").    Weight as of 5/31/22: 99.8 kg (220 lb).      ______________________________________________________________________    Chief Complaint   Left leg pain.    History is obtained from the patient    History of Present Illness   Rashaun Molina is a 62 year old male who has a past medical history significant for Parkinson's, hypertension, hyperlipidemia, COPD, and chronic hyponatremia.  He had a fall approximately 10 days ago.  " Having left ankle pain and left lower leg pain since that time.  Presented to orthopedic surgery clinic today for evaluation.  Found to have left tibia fracture.  Sent to hospital with plan for operative repair tomorrow.  Currently, feels the pain is under good control with pain medications.  Denies shortness of breath, cough, fevers, chills, dysuria.  No other acute complaints.    Review of Systems   The 10 point Review of Systems is negative other than noted in the HPI     Past Medical History    I have reviewed this patient's medical history and updated it with pertinent information if needed.   Past Medical History:   Diagnosis Date     Arthritis      Chronic airway obstruction, not elsewhere classified 3/8/2004    pt says that he does not have COPD     Cough syncope 11/21/2012     Essential hypertension, benign      Fistula     colon/bladder     Fracture of right proximal fibula 7/30/2014     Orthostatic hypotension 11/24/2014     Other chronic pain     right hip pain     PAD (peripheral artery disease) (H) 08/2018    PTA right SFA Dr. Lee     Palpitations 12/2018 01/2019 Event monitor- SR/ST     Personal history of colonic polyps 6/28/2005     Pure hypercholesterolemia      Tobacco use disorder 3/8/2004     Vasovagal syncopes 10/25/2014     Viral warts, unspecified     genital       Past Surgical History   I have reviewed this patient's surgical history and updated it with pertinent information if needed.  Past Surgical History:   Procedure Laterality Date     ARTHROPLASTY HIP Right 10/7/2015    Procedure: ARTHROPLASTY HIP;  Surgeon: Neil Davis MD;  Location: [facility] OR     COLONOSCOPY  9/05, 4/10/13    Per Hospital encounter dated 4/18/13     COLONOSCOPY N/A 4/9/2018    Procedure: COMBINED COLONOSCOPY, SINGLE OR MULTIPLE BIOPSY/POLYPECTOMY BY BIOPSY;;  Surgeon: Tramaine Zuniga MD;  Location:  GI     COMBINED CYSTOSCOPY, INSERT CATHETER URETER  4/11/2013    Procedure: COMBINED CYSTOSCOPY, INSERT  CATHETER URETER;;  Surgeon: Kashif Parks MD;  Location: [facility] OR     HC LARYNGOSCOPY DIRECT W VOCAL CORD INJECTION      vocal cord polyps     LAPAROSCOPIC ASSISTED COLECTOMY  2013    Procedure: LAPAROSCOPIC ASSISTED COLECTOMY;  LOW ANTERIOR RESECTION ;  Surgeon: Tramaine Zuniga MD;  Location: [facility] OR     ZZ COLONOSCOPY THRU STOMA W BIOPSY/CAUTERY TUMOR/POLYP/LESION      nemer, tubular adenoma, next colonoscopy      ZZHC COLONOSCOPY THRU STOMA, DIAGNOSTIC  01    negative, ligate two internal hemmorhoids  Dr rodriguez-repeat        Social History   I have reviewed this patient's social history and updated it with pertinent information if needed.  Social History     Tobacco Use     Smoking status: Former Smoker     Packs/day: 1.50     Years: 40.00     Pack years: 60.00     Quit date: 2013     Years since quittin.1     Smokeless tobacco: Never Used   Substance Use Topics     Alcohol use: Yes     Alcohol/week: 14.0 standard drinks     Types: 14 Cans of beer per week     Comment: 2 beers daily     Drug use: No     Comment: in 1970's used marijauna and cocaine       Family History   I have reviewed this patient's family history and updated it with pertinent information if needed.  Family History   Problem Relation Age of Onset     Hypertension Mother      Hypertension Father          MI     Heart Disease Father         MI  at age 55     Coronary Artery Disease Father      Hyperlipidemia Brother      Hypertension Brother      Heart Surgery Brother         defibrillator     Hypertension Sister      Prostate Cancer No family hx of        Medications   I have reviewed this patient's current medications    Allergies   Allergies   Allergen Reactions     Spiriva Handihaler Blisters     Hctz [Hydrochlorothiazide] Other (See Comments)     Low sodium       Physical Exam   Vital Signs: Temp: 97.9  F (36.6  C) Temp src: Temporal BP: (!) 168/75 Pulse: 62   Resp: 18 SpO2: 98 % O2 Device: None  (Room air)    Weight: 0 lbs 0 oz    Gen:  NAD, A&Ox3.  Eyes:  PERRL, sclera anicteric.  OP:  MMM, no lesions.  Neck:  Supple.  CV:  Regular, no murmurs.  Lung:  CTA b/l, normal effort.  Ab:  +BS, soft.  Skin:  Warm, dry to touch.  No rash.  Ext:  No pitting edema LE b/l.

## 2022-06-09 NOTE — INTERVAL H&P NOTE
"I have reviewed the surgical (or preoperative) H&P that is linked to this encounter, and examined the patient. There are no significant changes    Clinical Conditions Present on Arrival:  Clinically Significant Risk Factors Present on Admission           # Hyponatremia: Na = 126 mmol/L (Ref range: 133 - 144 mmol/L) on admission, will monitor as appropriate        # Platelet Defect: home medication list includes an antiplatelet medication  # Obesity: Estimated body mass index is 31.57 kg/m  as calculated from the following:    Height as of 5/31/22: 1.778 m (5' 10\").    Weight as of 5/31/22: 99.8 kg (220 lb).       "

## 2022-06-09 NOTE — CONSULTS
"[facility]  Consult Note - Hospitalist Service  Date of Admission:  6/8/2022  Consult Requested by: Orthopedic surgery  Reason for Consult: Hyponatremia.    Assessment & Plan   Rashaun Molina is a 62 year old male admitted on 6/8/2022. He has a past medical history significant for Parkinson's, hypertension, hyperlipidemia, COPD, and chronic hyponatremia.  He was found to have left tibia fracture.  Sent to the hospital with plans for operative repair tomorrow.  Laboratory work also noted hyponatremia with a sodium of 120.    1.  Acute on chronic hyponatremia.  Mild.  Sodium 120.  Asymptomatic.  -Continue IV fluids as ordered.  -Recheck sodium tonight and tomorrow morning.    2.  Hypertension.  -Hold lisinopril with plans for surgery tomorrow.  -Continue metoprolol.  -IV hydralazine if needed.    3.  Parkinson's disease.  -Continue carbidopa/levodopa.    4.  COPD.  No acute exacerbation.  -Continue scheduled umeclidinium.  -Additional albuterol if needed.    5.  Hyperlipidemia.  -Continue rosuvastatin.           Jhony Krishnamurthy, DO  [facility]        Hospitalist Service    Clinically Significant Risk Factors Present on Admission         # Hyponatremia: Na = 120 mmol/L (Ref range: 133 - 144 mmol/L) on admission, will monitor as appropriate        # Platelet Defect: home medication list includes an antiplatelet medication   # Obesity: Estimated body mass index is 31.57 kg/m  as calculated from the following:    Height as of 5/31/22: 1.778 m (5' 10\").    Weight as of 5/31/22: 99.8 kg (220 lb).      ______________________________________________________________________    Chief Complaint   Left leg pain.    History is obtained from the patient    History of Present Illness   Rashaun Molina is a 62 year old male who has a past medical history significant for Parkinson's, hypertension, hyperlipidemia, COPD, and chronic hyponatremia.  He had a fall approximately 10 days ago.  " Having left ankle pain and left lower leg pain since that time.  Presented to orthopedic surgery clinic today for evaluation.  Found to have left tibia fracture.  Sent to hospital with plan for operative repair tomorrow.  Currently, feels the pain is under good control with pain medications.  Denies shortness of breath, cough, fevers, chills, dysuria.  No other acute complaints.    Review of Systems   The 10 point Review of Systems is negative other than noted in the HPI     Past Medical History    I have reviewed this patient's medical history and updated it with pertinent information if needed.   Past Medical History:   Diagnosis Date     Arthritis      Chronic airway obstruction, not elsewhere classified 3/8/2004    pt says that he does not have COPD     Cough syncope 11/21/2012     Essential hypertension, benign      Fistula     colon/bladder     Fracture of right proximal fibula 7/30/2014     Orthostatic hypotension 11/24/2014     Other chronic pain     right hip pain     PAD (peripheral artery disease) (H) 08/2018    PTA right SFA Dr. Lee     Palpitations 12/2018 01/2019 Event monitor- SR/ST     Personal history of colonic polyps 6/28/2005     Pure hypercholesterolemia      Tobacco use disorder 3/8/2004     Vasovagal syncopes 10/25/2014     Viral warts, unspecified     genital       Past Surgical History   I have reviewed this patient's surgical history and updated it with pertinent information if needed.  Past Surgical History:   Procedure Laterality Date     ARTHROPLASTY HIP Right 10/7/2015    Procedure: ARTHROPLASTY HIP;  Surgeon: Neil Davis MD;  Location: [facility] OR     COLONOSCOPY  9/05, 4/10/13    Per Hospital encounter dated 4/18/13     COLONOSCOPY N/A 4/9/2018    Procedure: COMBINED COLONOSCOPY, SINGLE OR MULTIPLE BIOPSY/POLYPECTOMY BY BIOPSY;;  Surgeon: Tramaine Zuniga MD;  Location:  GI     COMBINED CYSTOSCOPY, INSERT CATHETER URETER  4/11/2013    Procedure: COMBINED CYSTOSCOPY, INSERT  CATHETER URETER;;  Surgeon: Kashif Parks MD;  Location: [facility] OR     HC LARYNGOSCOPY DIRECT W VOCAL CORD INJECTION      vocal cord polyps     LAPAROSCOPIC ASSISTED COLECTOMY  2013    Procedure: LAPAROSCOPIC ASSISTED COLECTOMY;  LOW ANTERIOR RESECTION ;  Surgeon: Tramaine Zuniga MD;  Location: [facility] OR     ZZ COLONOSCOPY THRU STOMA W BIOPSY/CAUTERY TUMOR/POLYP/LESION      nemer, tubular adenoma, next colonoscopy      ZZHC COLONOSCOPY THRU STOMA, DIAGNOSTIC  01    negative, ligate two internal hemmorhoids  Dr rodriguez-repeat        Social History   I have reviewed this patient's social history and updated it with pertinent information if needed.  Social History     Tobacco Use     Smoking status: Former Smoker     Packs/day: 1.50     Years: 40.00     Pack years: 60.00     Quit date: 2013     Years since quittin.1     Smokeless tobacco: Never Used   Substance Use Topics     Alcohol use: Yes     Alcohol/week: 14.0 standard drinks     Types: 14 Cans of beer per week     Comment: 2 beers daily     Drug use: No     Comment: in 1970's used marijauna and cocaine       Family History   I have reviewed this patient's family history and updated it with pertinent information if needed.  Family History   Problem Relation Age of Onset     Hypertension Mother      Hypertension Father          MI     Heart Disease Father         MI  at age 55     Coronary Artery Disease Father      Hyperlipidemia Brother      Hypertension Brother      Heart Surgery Brother         defibrillator     Hypertension Sister      Prostate Cancer No family hx of        Medications   I have reviewed this patient's current medications    Allergies   Allergies   Allergen Reactions     Spiriva Handihaler Blisters     Hctz [Hydrochlorothiazide] Other (See Comments)     Low sodium       Physical Exam   Vital Signs: Temp: 97.9  F (36.6  C) Temp src: Temporal BP: (!) 168/75 Pulse: 62   Resp: 18 SpO2: 98 % O2 Device: None  (Room air)    Weight: 0 lbs 0 oz    Gen:  NAD, A&Ox3.  Eyes:  PERRL, sclera anicteric.  OP:  MMM, no lesions.  Neck:  Supple.  CV:  Regular, no murmurs.  Lung:  CTA b/l, normal effort.  Ab:  +BS, soft.  Skin:  Warm, dry to touch.  No rash.  Ext:  No pitting edema LE b/l.

## 2022-06-09 NOTE — PLAN OF CARE
Patient vital signs are at baseline: Yes  Patient able to ambulate as they were prior to admission or with assist devices provided by therapies during their stay:  No,  Reason:  Not out of bed yet after surgery, will be NWB  Patient MUST void prior to discharge:  Yes  Patient able to tolerate oral intake:  Yes  Pain has adequate pain control using Oral analgesics:  Yes  Does patient have an identified :  No-will likely need TCU  Has goal D/C date and time been discussed with patient:  No,  Reason:  Will likely need TCU placement pending therapy recommendations and progress.

## 2022-06-09 NOTE — ANESTHESIA PREPROCEDURE EVALUATION
Anesthesia Pre-Procedure Evaluation    Patient: Rashaun Molina   MRN: [id number] : 1960        Procedure : Procedure(s):  OPEN REDUCTION INTERNAL FIXATION, FRACTURE, ANKLE          Past Medical History:   Diagnosis Date     Arthritis      Chronic airway obstruction, not elsewhere classified 3/8/2004    pt says that he does not have COPD     Cough syncope 2012     Essential hypertension, benign      Fistula     colon/bladder     Fracture of right proximal fibula 2014     Orthostatic hypotension 2014     Other chronic pain     right hip pain     PAD (peripheral artery disease) (H) 2018    PTA right SFA Dr. Lee     Palpitations 2018 Event monitor- SR/ST     Personal history of colonic polyps 2005     Pure hypercholesterolemia      Tobacco use disorder 3/8/2004     Vasovagal syncopes 10/25/2014     Viral warts, unspecified     genital      Past Surgical History:   Procedure Laterality Date     ARTHROPLASTY HIP Right 10/7/2015    Procedure: ARTHROPLASTY HIP;  Surgeon: Neil Davis MD;  Location: [facility] OR     COLONOSCOPY  , 4/10/13    Per Hospital encounter dated 13     COLONOSCOPY N/A 2018    Procedure: COMBINED COLONOSCOPY, SINGLE OR MULTIPLE BIOPSY/POLYPECTOMY BY BIOPSY;;  Surgeon: Tramaine Zuniga MD;  Location:  GI     COMBINED CYSTOSCOPY, INSERT CATHETER URETER  2013    Procedure: COMBINED CYSTOSCOPY, INSERT CATHETER URETER;;  Surgeon: Kashif Parks MD;  Location:  OR      LARYNGOSCOPY DIRECT W VOCAL CORD INJECTION      vocal cord polyps     LAPAROSCOPIC ASSISTED COLECTOMY  2013    Procedure: LAPAROSCOPIC ASSISTED COLECTOMY;  LOW ANTERIOR RESECTION ;  Surgeon: Tramaine Zuniga MD;  [facility]     [facility]/CAUTERY TUMOR/POLYP/LESION      jacoboer, tubular adenoma, next colonoscopy      ZZHC COLONOSCOPY THRU STOMA, DIAGNOSTIC  01    negative, ligate two internal hemmorhoids    nemer-repeat 2008      Allergies   Allergen Reactions     Spiriva Handihaler Blisters     Hctz [Hydrochlorothiazide] Other (See Comments)     Low sodium      Social History     Tobacco Use     Smoking status: Former Smoker     Packs/day: 1.50     Years: 40.00     Pack years: 60.00     Quit date: 2013     Years since quittin.1     Smokeless tobacco: Never Used   Substance Use Topics     Alcohol use: Yes     Alcohol/week: 14.0 standard drinks     Types: 14 Cans of beer per week     Comment: 2 beers daily      Wt Readings from Last 1 Encounters:   22 99.8 kg (220 lb)        Anesthesia Evaluation            ROS/MED HX  ENT/Pulmonary:     (+) COPD,     Neurologic: Comment: Parkinson's -- took sinemet today        Cardiovascular:     (+) hypertension-----    METS/Exercise Tolerance:     Hematologic:       Musculoskeletal:   (+) fracture,     GI/Hepatic:       Renal/Genitourinary: Comment: Chronic hyponatremia, Na 120s      Endo:     (+) Obesity (BMI 31),     Psychiatric/Substance Use:     (+) alcohol abuse     Infectious Disease:       Malignancy:       Other:      (+) , H/O Chronic Pain,        Physical Exam    Airway        Mallampati: II   TM distance: > 3 FB   Neck ROM: full     Respiratory Devices and Support         Dental           Cardiovascular   cardiovascular exam normal          Pulmonary   pulmonary exam normal                OUTSIDE LABS:  CBC:   Lab Results   Component Value Date    WBC 6.9 2022    WBC 9.0 2019    HGB 11.1 (L) 2022    HGB 13.4 2019    HCT 32.9 (L) 2022    HCT 40.4 2019     (L) 2022     2019     BMP:   Lab Results   Component Value Date     (L) 2022     (L) 2022    POTASSIUM 4.6 2022    POTASSIUM 4.82 2021    CHLORIDE 85 (L) 2022    CHLORIDE 90.8 (A) 2021    CO2 30 2022    CO2 27.1 2021    BUN 12 2022    BUN 12 2021    BUN 10.7 2021    CR  0.75 06/08/2022    CR 1.12 11/04/2021     (H) 06/08/2022     (A) 11/04/2021     COAGS:   Lab Results   Component Value Date    PTT 31 08/06/2018    INR 1.11 06/08/2022     POC:   Lab Results   Component Value Date     (H) 04/13/2013     HEPATIC:   Lab Results   Component Value Date    ALBUMIN 3.6 06/08/2022    PROTTOTAL 8.2 06/08/2022    ALT 10 06/08/2022    AST 30 06/08/2022    ALKPHOS 83 06/08/2022    BILITOTAL 1.0 06/08/2022    BILIDIRECT 0.1 02/13/2006     OTHER:   Lab Results   Component Value Date    PH 7.0 07/10/2000    A1C 5.3 11/25/2019    FRANNY 9.2 06/08/2022    PHOS 3.4 06/08/2022    MAG 1.9 06/08/2022    LIPASE 28 04/15/2013    TSH 1.74 06/08/2022    SED 12 09/01/2015       Anesthesia Plan    ASA Status:  2   NPO Status:  NPO Appropriate    Anesthesia Type: General.   Induction: Intravenous.   Maintenance: Balanced.        Consents            Postoperative Care    Pain management: IV analgesics, Oral pain medications, Multi-modal analgesia.   PONV prophylaxis: Ondansetron (or other 5HT-3), Dexamethasone or Solumedrol     Comments:                Marcela Pineda MD

## 2022-06-09 NOTE — PROGRESS NOTES
"[facility]    Medicine Progress Note - Hospitalist Service    Date of Admission:  6/8/2022    Assessment & Plan          Rashaun Molina is a 62 year old male admitted on 6/8/2022. He has a past medical history significant for Parkinson's, hypertension, hyperlipidemia, COPD, and chronic hyponatremia.  He was found to have left ankle syndesmotic injury.    1.  Left ankle syndesmotic injury.  -Status post ORIF by orthopedic surgery on 6/9.  -Pain medications as needed.  -Use incentive spirometry.  -Work with therapy.    2.  Acute on chronic hyponatremia.  -Sodium initially noted to be 120.  -Has improved to 126 with IV fluids.  -Continue IV fluids today.  -Recheck metabolic panel tomorrow.    3.  Parkinson's disease.  -Continue carbidopa/levodopa.    4.  Hypertension.  -Restart lisinopril 5 mg a day.  -Continue metoprolol 50 mg a day.    5.  Hyperlipidemia.  -Continue rosuvastatin 40 mg a day.    6.  COPD.  No acute exacerbation.  -Continue umeclidinium.  -Albuterol if needed.           Diet: Advance Diet as Tolerated: Regular Diet Adult    DVT Prophylaxis: Aspirin 81 mg twice a day.  Escalante Catheter: Not present  Central Lines: None  Cardiac Monitoring: None  Code Status: Full Code          Jhony Krishnamurthy DO  Hospitalist Service  [facility]  Securely message with the Vocera Web Console (learn more here)  Text page via [employer] Paging/Directory         Clinically Significant Risk Factors Present on Admission         # Hyponatremia: Na = 126 mmol/L (Ref range: 133 - 144 mmol/L) on admission, will monitor as appropriate        # Platelet Defect: home medication list includes an antiplatelet medication   # Obesity: Estimated body mass index is 31.57 kg/m  as calculated from the following:    Height as of 5/31/22: 1.778 m (5' 10\").    Weight as of 5/31/22: 99.8 kg (220 lb).      ______________________________________________________________________    Interval History   Having " left leg pain.  Denies chest pain, shortness of breath, fevers, chills, nausea, or vomiting.    Data reviewed today: I reviewed all medications, new labs and imaging results over the last 24 hours.    Physical Exam   Vital Signs: Temp: 97.7  F (36.5  C) Temp src: Temporal BP: (!) 160/63 Pulse: 68   Resp: 16 SpO2: 94 % O2 Device: Nasal cannula Oxygen Delivery: 2 LPM  Weight: 0 lbs 0 oz  Gen:  NAD, A&Ox3.  Eyes:  PERRL, sclera anicteric.  OP:  MMM, no lesions.  Neck:  Supple.  CV:  Regular, no murmurs.  Lung:  CTA b/l, normal effort.  Ab:  +BS, soft.  Skin:  Warm, dry to touch.  No rash.  Ext:  No pitting edema LE b/l.      Data   Recent Labs   Lab 06/09/22  0645 06/08/22  2151 06/08/22  1657   WBC  --   --  6.9   HGB  --   --  11.1*   MCV  --   --  97   PLT  --   --  122*   INR  --   --  1.11   * 122* 120*   POTASSIUM 4.3  --  4.6   CHLORIDE 92*  --  85*   CO2 29  --  30   BUN 13  --  12   CR 0.83  --  0.75   ANIONGAP 5  --  5   FRANNY 9.0  --  9.2   *  --  103*   ALBUMIN 3.4  --  3.6   PROTTOTAL 8.0  --  8.2   BILITOTAL 0.9  --  1.0   ALKPHOS 78  --  83   ALT 12  --  10   AST 28  --  30

## 2022-06-09 NOTE — CONSULTS
Care Management Initial Consult    General Information  Assessment completed with: Patient,    Type of CM/SW Visit: Initial Assessment    Primary Care Provider verified and updated as needed: Yes   Readmission within the last 30 days: no previous admission in last 30 days   Return Category: New Diagnosis  Reason for Consult: discharge planning  Advance Care Planning:            Communication Assessment  Patient's communication style: spoken language (English or Bilingual)    Hearing Difficulty or Deaf: no   Wear Glasses or Blind: yes    Cognitive  Cognitive/Neuro/Behavioral: WDL                      Living Environment:   People in home: alone     Current living Arrangements: house      Able to return to prior arrangements: yes       Family/Social Support:  Care provided by: self  Provides care for: no one  Marital Status: Single  Sibling(s)          Description of Support System: Supportive    Support Assessment: Adequate family and caregiver support    Current Resources:   Patient receiving home care services: No     Community Resources: None  Equipment currently used at home: crutches, cane, straight  Supplies currently used at home: None    Employment/Financial:  Employment Status:          Financial Concerns:             Lifestyle & Psychosocial Needs:  Social Determinants of Health     Tobacco Use: Medium Risk     Smoking Tobacco Use: Former Smoker     Smokeless Tobacco Use: Never Used   Alcohol Use: Not on file   Financial Resource Strain: Not on file   Food Insecurity: Not on file   Transportation Needs: Not on file   Physical Activity: Not on file   Stress: Not on file   Social Connections: Not on file   Intimate Partner Violence: Not on file   Depression: Not at risk     PHQ-2 Score: 0   Housing Stability: Not on file       Functional Status:  Prior to admission patient needed assistance:   Dependent ADLs:: Independent  Dependent IADLs:: Independent  Assesssment of Functional Status: Not at baseline with  ADL Functioning    Mental Health Status:  Mental Health Status: No Current Concerns       Chemical Dependency Status:  Chemical Dependency Status: No Current Concerns             Values/Beliefs:  Spiritual, Cultural Beliefs, Restoration Practices, Values that affect care: no               Additional Information:  Pt admitted on 6/8/22. Introduced self and role at bedside. Pt was alert and oriented laying in bed and was answering questions appropriately. Pt lives alone in a one level home. At baseline pt is independent with ADL and mobility, sometimes uses a cane. Pt reports coming in with his own crutches. Informed pt that PT and OT is ordered. Pt reports no prior experience with TCU or homecare. Pt goal is to return home but will go to a TCU if recommended. Left TCU packet at bedside.     Pt reports that he does not have an HCD but states that he would like his brother Pastor Molina ([phone]) to make any decisions if he was unable to do so.     Sahara Ryan, MSW, LGSW

## 2022-06-09 NOTE — PLAN OF CARE
[id number] RN    Patient vital signs are at baseline: Yes  Patient able to ambulate as they were prior to admission or with assist devices provided by therapies during their stay:  No,  Reason:  NWB.  Patient MUST void prior to discharge:  Yes  Patient able to tolerate oral intake:  Yes  Pain has adequate pain control using Oral analgesics:  Yes  Does patient have an identified :  Yes, brother.  Has goal D/C date and time been discussed with patient:  No,  Reason:  TBD.    A&O.  VSS on RA.  Leg elevated with ice during the night.  Surgical scrub complete.

## 2022-06-09 NOTE — ANESTHESIA PROCEDURE NOTES
Adductor canal Procedure Note    Pre-Procedure   Staff -        Anesthesiologist:  Marcela Pineda MD       Performed By: anesthesiologist       Location: pre-op       Procedure Start/Stop Times: 6/9/2022 7:30 AM and 6/9/2022 7:33 AM       Pre-Anesthestic Checklist: patient identified, IV checked, site marked, risks and benefits discussed, informed consent, monitors and equipment checked, pre-op evaluation, at physician/surgeon's request and post-op pain management  Timeout:       Correct Patient: Yes        Correct Procedure: Yes        Correct Site: Yes        Correct Position: Yes        Correct Laterality: Yes        Site Marked: Yes  Procedure Documentation  Procedure: Adductor canal       Laterality: left       Patient Position: supine       Skin prep: Betadine       Needle Type: short bevel       Needle Gauge: 20.        Needle Length (Inches): 4        Ultrasound guided       1. Ultrasound was used to identify targeted nerve, plexus, vascular marker, or fascial plane and place a needle adjacent to it in real-time.       2. Ultrasound was used to visualize the spread of anesthetic in close proximity to the above referenced structure.       4. The visualized anatomic structures appeared normal.       5. There were no apparent abnormal pathologic findings.    Assessment/Narrative         The placement was negative for: blood aspirated, painful injection and site bleeding       Paresthesias: No.       Bolus given via needle..        Secured via.        Insertion/Infusion Method: Single Shot       Complications: none       Injection made incrementally with aspirations every 5 mL.    Medication(s) Administered   Bupivacaine 0.5% w/ 1:200K Epi (Other) - Other   15 mL - 6/9/2022 7:30:00 AM  Medication Administration Time: 6/9/2022 7:30 AM

## 2022-06-09 NOTE — ANESTHESIA POSTPROCEDURE EVALUATION
Patient: Rashaun Molina    Procedure: Procedure(s):  OPEN REDUCTION INTERNAL FIXATION, FRACTURE, ANKLE       Anesthesia Type:  General    Note:  Disposition: Outpatient   Postop Pain Control: Uneventful            Sign Out: Well controlled pain   PONV: No   Neuro/Psych: Uneventful            Sign Out: Acceptable/Baseline neuro status   Airway/Respiratory: Uneventful            Sign Out: Acceptable/Baseline resp. status   CV/Hemodynamics: Uneventful            Sign Out: Acceptable CV status; No obvious hypovolemia; No obvious fluid overload   Other NRE: NONE   DID A NON-ROUTINE EVENT OCCUR? No           Last vitals:  Vitals Value Taken Time   /83 06/09/22 1055   Temp 97.8  F (36.6  C) 06/09/22 1057   Pulse 68 06/09/22 1101   Resp 27 06/09/22 1101   SpO2 98 % 06/09/22 1109   Vitals shown include unvalidated device data.    Electronically Signed By: Marcela Pineda MD  June 9, 2022  11:17 AM

## 2022-06-09 NOTE — OP NOTE
ORTHOPEDIC OPERATIVE NOTE      PREOPERATIVE DIAGNOSIS:  Left ankle syndesmotic injury and proximal fibula fracture    POSTOPERATIVE DIAGNOSIS: Same    PROCEDURE:      1.  Open reduction internal fixation of left ankle syndesmotic injury  2.  Non-operative treatment of left proximal fibula fracture    SURGEON:  Mike Jackson MD    ASSISTANT: Villa Garcia PA-C    SPECIMENS:  None    COMPLICATIONS:  None    ESTIMATED BLOOD LOSS: Minimal    IMPLANTS:   Implant Name Type Inv. Item Serial No.  Lot No. LRB No. Used Action   1/3 Tubular Plates, 23mm LGTH, 2 Hole    LUCINDA [id number] Left 1 Implanted   3.5mm Non-Locking Screw, 55mm LGTH    LUCINDA 8003 [id number] Left 1 Implanted   3.5mm Non-Locking, 60mm LGTH    LUCINDA 8003 [id number] Left 1 Implanted         TOURNIQUET TIME:  N/A    INDICATIONS: The patient is a pleasant 62-year-old male who was seen in the ER a few weeks ago and diagnosed with an ankle sprain.  He was weightbearing on this and then presented to [facility] with lateral subluxation of the talus and a proximal fibula fracture consistent with a Maisonneuve injury.  He was admitted to the hospital due to inability maintain toe-touch weightbearing status.  He has a normal neurovascular exam.  Given these findings he is an appropriate candidate for open reduction to fixation of the syndesmosis.   I discussed the risk benefits and alternatives of the procedure with the patient going risk infection, wound healing, pain, neurovascular, blood loss, DVT PE, malunion, nonunion, stiffness, and the medical risk of anesthesia.  We discussed benefits including improved chance of union etc. alignment and the alternatives include nonoperative management.  The informed consent was signed and documented.  Met with the patient preoperatively to regine the operative extremity.      OPERATIVE COURSE:  The patient underwent general anesthesia and was positioned supine.  Bony prominences well-padded.  Left lower  extremity following generalized agreement a lateral approach to the distal fibula was performed.  We dissected anterior and visualize the syndesmosis.  The syndesmosis was debrided of loose tissue.  We then reduced the syndesmosis with manual traction on the fibula to restore length was prepped and draped in sterile orthopedic fashion ChloraPrep.  The surgical team scrubbed in.  Procedural pause was conducted to verify correct patient, correct extremity, presence of the surgeon on the operative extremity, administration of antibiotics, in this case Ancef.  Using a pointed reduction forcep and secured the reduction to the tibia with a large pointed reduction forcep.  We confirm reduction proximally and demonstrated that we restored length the fibula on fluoroscopy.  While holding manual reduction we secured the fibula to the tibia with a pointed reduction forcep.  We confirm reduction on AP and lateral imaging and were quite satisfied.  We confirm reduction within the incisura as well as rotation of the fibula within the wound and on x-ray.  When we were satisfied we  placed a 2 hole one third tubular plate over the pin and drilled for and placed 1 3.5 mm syndesmotic screw across all 4 cortices.  This was tightened by hand.  The pin was then removed and we drilled for and placed another 3.5 mm nonlocking screw across all 4 cortices.  These were sequentially tightened with the foot maximal dorsiflexion.  Final fluoroscopic imaging demonstrate good reduction of the syndesmosis and appropriate length and position of all hardware.  We thoroughly irrigated and closed in layers with 2-0 Vicryl and 3-0 nylon suture.  Sterile dressings and a splint were applied.  Patient was transferred to the recovery room in stable condition, sustaining no complications.      POST OP PLAN:    1. Activity: Keep splint clean/dry/intact.  Toe touch weightbearing operative extremity x6 weeks  2. ABX: Ancef x24 hrs post op  3. DVT prophylaxis:  SCDs, ASA  4. Dispo: discharge planning

## 2022-06-10 ENCOUNTER — APPOINTMENT (OUTPATIENT)
Dept: PHYSICAL THERAPY | Facility: CLINIC | Age: 62
DRG: 493 | End: 2022-06-10
Attending: PHYSICIAN ASSISTANT
Payer: COMMERCIAL

## 2022-06-10 LAB
ANION GAP SERPL CALCULATED.3IONS-SCNC: 3 MMOL/L (ref 3–14)
ATRIAL RATE - MUSE: 65 BPM
BUN SERPL-MCNC: 16 MG/DL (ref 7–30)
CALCIUM SERPL-MCNC: 9.4 MG/DL (ref 8.5–10.1)
CHLORIDE BLD-SCNC: 94 MMOL/L (ref 94–109)
CO2 SERPL-SCNC: 28 MMOL/L (ref 20–32)
CREAT SERPL-MCNC: 0.88 MG/DL (ref 0.66–1.25)
DIASTOLIC BLOOD PRESSURE - MUSE: NORMAL MMHG
ERYTHROCYTE [DISTWIDTH] IN BLOOD BY AUTOMATED COUNT: 14.8 % (ref 10–15)
GFR SERPL CREATININE-BSD FRML MDRD: >90 ML/MIN/1.73M2
GLUCOSE BLD-MCNC: 100 MG/DL (ref 70–99)
HCT VFR BLD AUTO: 32 % (ref 40–53)
HGB BLD-MCNC: 10.3 G/DL (ref 13.3–17.7)
HOLD SPECIMEN: NORMAL
INTERPRETATION ECG - MUSE: NORMAL
MCH RBC QN AUTO: 32.1 PG (ref 26.5–33)
MCHC RBC AUTO-ENTMCNC: 32.2 G/DL (ref 31.5–36.5)
MCV RBC AUTO: 100 FL (ref 78–100)
P AXIS - MUSE: 12 DEGREES
PLATELET # BLD AUTO: 125 10E3/UL (ref 150–450)
POTASSIUM BLD-SCNC: 4.2 MMOL/L (ref 3.4–5.3)
PR INTERVAL - MUSE: 136 MS
QRS DURATION - MUSE: 80 MS
QT - MUSE: 414 MS
QTC - MUSE: 430 MS
R AXIS - MUSE: 43 DEGREES
RBC # BLD AUTO: 3.21 10E6/UL (ref 4.4–5.9)
SODIUM SERPL-SCNC: 125 MMOL/L (ref 133–144)
SYSTOLIC BLOOD PRESSURE - MUSE: NORMAL MMHG
T AXIS - MUSE: 45 DEGREES
VENTRICULAR RATE- MUSE: 65 BPM
WBC # BLD AUTO: 8.7 10E3/UL (ref 4–11)

## 2022-06-10 PROCEDURE — 97161 PT EVAL LOW COMPLEX 20 MIN: CPT | Mod: GP | Performed by: PHYSICAL THERAPIST

## 2022-06-10 PROCEDURE — 94640 AIRWAY INHALATION TREATMENT: CPT

## 2022-06-10 PROCEDURE — 999N000157 HC STATISTIC RCP TIME EA 10 MIN

## 2022-06-10 PROCEDURE — 36415 COLL VENOUS BLD VENIPUNCTURE: CPT | Performed by: INTERNAL MEDICINE

## 2022-06-10 PROCEDURE — 120N000001 HC R&B MED SURG/OB

## 2022-06-10 PROCEDURE — 85027 COMPLETE CBC AUTOMATED: CPT | Performed by: INTERNAL MEDICINE

## 2022-06-10 PROCEDURE — 80048 BASIC METABOLIC PNL TOTAL CA: CPT | Performed by: INTERNAL MEDICINE

## 2022-06-10 PROCEDURE — 250N000013 HC RX MED GY IP 250 OP 250 PS 637

## 2022-06-10 PROCEDURE — 250N000011 HC RX IP 250 OP 636

## 2022-06-10 PROCEDURE — 97530 THERAPEUTIC ACTIVITIES: CPT | Mod: GP | Performed by: PHYSICAL THERAPIST

## 2022-06-10 PROCEDURE — 99232 SBSQ HOSP IP/OBS MODERATE 35: CPT | Performed by: INTERNAL MEDICINE

## 2022-06-10 PROCEDURE — 250N000013 HC RX MED GY IP 250 OP 250 PS 637: Performed by: PHYSICIAN ASSISTANT

## 2022-06-10 PROCEDURE — 250N000013 HC RX MED GY IP 250 OP 250 PS 637: Performed by: INTERNAL MEDICINE

## 2022-06-10 RX ORDER — LISINOPRIL 40 MG/1
40 TABLET ORAL DAILY
Status: DISCONTINUED | OUTPATIENT
Start: 2022-06-10 | End: 2022-06-14 | Stop reason: HOSPADM

## 2022-06-10 RX ORDER — OXYCODONE HYDROCHLORIDE 5 MG/1
5 TABLET ORAL EVERY 4 HOURS PRN
Qty: 30 TABLET | Refills: 0 | Status: SHIPPED | OUTPATIENT
Start: 2022-06-10 | End: 2022-07-27

## 2022-06-10 RX ORDER — ACETAMINOPHEN 325 MG/1
650 TABLET ORAL EVERY 4 HOURS PRN
Status: ON HOLD | DISCHARGE
Start: 2022-06-12 | End: 2022-08-26

## 2022-06-10 RX ORDER — AMOXICILLIN 250 MG
2 CAPSULE ORAL 2 TIMES DAILY PRN
DISCHARGE
Start: 2022-06-10 | End: 2022-07-27

## 2022-06-10 RX ORDER — POLYETHYLENE GLYCOL 3350 17 G/17G
17 POWDER, FOR SOLUTION ORAL DAILY PRN
Qty: 510 G | DISCHARGE
Start: 2022-06-10 | End: 2022-07-27

## 2022-06-10 RX ADMIN — ACETAMINOPHEN 975 MG: 325 TABLET ORAL at 12:38

## 2022-06-10 RX ADMIN — FOLIC ACID 1 MG: 1 TABLET ORAL at 08:35

## 2022-06-10 RX ADMIN — CARBIDOPA AND LEVODOPA 2 TABLET: 25; 100 TABLET, EXTENDED RELEASE ORAL at 16:34

## 2022-06-10 RX ADMIN — ASPIRIN 81 MG: 81 TABLET, COATED ORAL at 20:32

## 2022-06-10 RX ADMIN — CEFAZOLIN SODIUM 2 G: 2 INJECTION, SOLUTION INTRAVENOUS at 01:26

## 2022-06-10 RX ADMIN — ROSUVASTATIN CALCIUM 40 MG: 20 TABLET, FILM COATED ORAL at 22:29

## 2022-06-10 RX ADMIN — SENNOSIDES AND DOCUSATE SODIUM 1 TABLET: 50; 8.6 TABLET ORAL at 08:35

## 2022-06-10 RX ADMIN — CARBIDOPA AND LEVODOPA 2 TABLET: 25; 100 TABLET, EXTENDED RELEASE ORAL at 10:48

## 2022-06-10 RX ADMIN — ACETAMINOPHEN 975 MG: 325 TABLET ORAL at 20:32

## 2022-06-10 RX ADMIN — ASPIRIN 81 MG: 81 TABLET, COATED ORAL at 08:35

## 2022-06-10 RX ADMIN — OXYCODONE HYDROCHLORIDE 5 MG: 5 TABLET ORAL at 10:48

## 2022-06-10 RX ADMIN — ACETAMINOPHEN 975 MG: 325 TABLET ORAL at 04:40

## 2022-06-10 RX ADMIN — CARBIDOPA AND LEVODOPA 2 TABLET: 25; 100 TABLET, EXTENDED RELEASE ORAL at 04:40

## 2022-06-10 RX ADMIN — UMECLIDINIUM 1 PUFF: 62.5 AEROSOL, POWDER ORAL at 07:35

## 2022-06-10 RX ADMIN — METOPROLOL SUCCINATE 50 MG: 50 TABLET, EXTENDED RELEASE ORAL at 08:35

## 2022-06-10 RX ADMIN — POLYETHYLENE GLYCOL 3350 17 G: 17 POWDER, FOR SOLUTION ORAL at 08:35

## 2022-06-10 RX ADMIN — OXYCODONE HYDROCHLORIDE 5 MG: 5 TABLET ORAL at 21:01

## 2022-06-10 RX ADMIN — THERA TABS 1 TABLET: TAB at 08:35

## 2022-06-10 RX ADMIN — THIAMINE HCL TAB 100 MG 100 MG: 100 TAB at 08:35

## 2022-06-10 RX ADMIN — LISINOPRIL 40 MG: 40 TABLET ORAL at 08:35

## 2022-06-10 RX ADMIN — SENNOSIDES AND DOCUSATE SODIUM 1 TABLET: 50; 8.6 TABLET ORAL at 20:32

## 2022-06-10 ASSESSMENT — ACTIVITIES OF DAILY LIVING (ADL)
ADLS_ACUITY_SCORE: 32

## 2022-06-10 NOTE — PROGRESS NOTES
06/10/22 0857   Quick Adds   Type of Visit Initial PT Evaluation   Living Environment   People in Home alone   Current Living Arrangements house   Home Accessibility stairs to enter home;stairs within home   Number of Stairs, Main Entrance 2   Stair Railings, Main Entrance none   Number of Stairs, Within Home, Primary greater than 10 stairs   Stair Railings, Within Home, Primary railings safe and in good condition  (1 side railing; laundry in basement)   Transportation Anticipated car, drives self   Living Environment Comments Lives alone, laundry in basement; has brother that lives locally that can help somewhat   Self-Care   Usual Activity Tolerance moderate   Current Activity Tolerance fair   Regular Exercise No   Equipment Currently Used at Home cane, straight;crutches   Fall history within last six months yes   Number of times patient has fallen within last six months 1   Activity/Exercise/Self-Care Comment Reports typically IND with all ADLs and IADLs, was given crutches by [facility] ~2 wks ago and has SPC from past hip surgery ~8 years ago   General Information   Onset of Illness/Injury or Date of Surgery 06/09/22   Referring Physician Villa Garcia PA-C   Patient/Family Therapy Goals Statement (PT) would like to go home but agreeable to TCU   Pertinent History of Current Problem (include personal factors and/or comorbidities that impact the POC) 62 year old male admitted on 6/8/2022. He has a past medical history significant for Parkinson's, hypertension, hyperlipidemia, COPD, and chronic hyponatremia.  He was found to have left ankle syndesmotic injury.POD #1 s/p L ORIF   Existing Precautions/Restrictions fall   Weight-Bearing Status - LLE nonweight-bearing   General Observations semi-guillen's upon arrival, observed some full body twitches (per discussion with RN following session, RN also noticed tremors)   Cognition   Affect/Mental Status (Cognition) flat/blunted affect   Orientation Status (Cognition)  oriented x 3   Follows Commands (Cognition) follows two-step commands   Safety Deficit (Cognition) at risk behavior observed;awareness of need for assistance;impulsivity;insight into deficits/self-awareness   Pain Assessment   Patient Currently in Pain No   Integumentary/Edema   Integumentary/Edema Comments LLE in ACE bandage/splint   Posture    Posture Forward head position;Protracted shoulders   Range of Motion (ROM)   Range of Motion ROM is WFL   ROM Comment ROM not assessed of L ankle 2/2 surgical procedure/splint   Strength (Manual Muscle Testing)   Strength (Manual Muscle Testing) Deficits observed during functional mobility   Bed Mobility   Bed Mobility supine-sit   Supine-Sit [place] (Bed Mobility) supervision;verbal cues   Transfers   Transfers sit-stand transfer;bed-chair transfer   Maintains Weight-bearing Status (Transfers) able to maintain   Impairments Contributing to Impaired Transfers decreased strength   Bed-Chair Transfer   Assistive Device (Bed-Chair Transfers) walker, front-wheeled  (trial with 2WW and without AD)   Bed-Chair [place] (Transfers) contact guard;verbal cues   Sit-Stand Transfer   Sit-Stand [place] (Transfers) contact guard;verbal cues   Assistive Device (Sit-Stand Transfers) walker, front-wheeled   Comment, (Sit-Stand Transfer) cues for hand placement   Gait/Stairs (Locomotion)   [place] Level (Gait) not tested   Balance   Balance Comments Fair with use of 2WW   Clinical Impression   Criteria for Skilled Therapeutic Intervention Yes, treatment indicated   PT Diagnosis (PT) decreased functional mobility   Influenced by the following impairments weakness, NWB status   Functional limitations due to impairments bed mobility, transfers, ambulation, stair climbing   Clinical Presentation (PT Evaluation Complexity) Stable/Uncomplicated   Clinical Presentation Rationale clinical judgement   Clinical Decision Making (Complexity) low complexity   Planned Therapy  Interventions (PT) balance training;bed mobility training;gait training;home exercise program;manual therapy techniques;neuromuscular re-education;patient/family education;ROM (range of motion);stair training;strengthening;stretching;transfer training;progressive activity/exercise;risk factor education;home program guidelines   Anticipated Equipment Needs at Discharge (PT)   (2WW, possible knee scooter)   Risk & Benefits of therapy have been explained evaluation/treatment results reviewed;care plan/treatment goals reviewed;risks/benefits reviewed;current/potential barriers reviewed;participants voiced agreement with care plan;participants included;patient   PT Discharge Planning   PT Discharge Recommendation (DC Rec) Transitional Care Facility   PT Rationale for DC Rec Pt below baseline for mobility.  Needing Ax1 for transfers, pt presents with some impulsivity which places him at high falls risk.  Pt lives alone and would need to IND wiht all transfers/mobility prior to discharge home.  Recommend TCU for strengthening and progression of IND with mobility.   PT Brief overview of current status CGA pivot transfer bed <> chair with or without 2WW   Plan of Care Review   Plan of Care Reviewed With patient   Total Evaluation Time   Total Evaluation Time (Minutes) 10   Physical Therapy Goals   PT Frequency Daily   PT Predicted Duration/Target Date for Goal Attainment 06/14/22   PT Goals Bed Mobility;Transfers;Wheelchair Mobility   PT: Bed Mobility Independent;Supine to/from sit;Within precautions   PT: Transfers Supervision/stand-by assist;Sit to/from stand;Bed to/from chair;Assistive device;Within precautions   PT: Wheelchair Mobility 150 feet;manual wheelchair

## 2022-06-10 NOTE — PROGRESS NOTES
Care Management Follow Up    Length of Stay (days): 2    Expected Discharge Date: 06/10/2022     Concerns to be Addressed:       Patient plan of care discussed at interdisciplinary rounds: Yes    Anticipated Discharge Disposition:       Anticipated Discharge Services:    Anticipated Discharge DME:      Patient/family educated on Medicare website which has current facility and service quality ratings:    Education Provided on the Discharge Plan:    Patient/Family in Agreement with the Plan:      Referrals Placed by CM/SW:    Private pay costs discussed: private room/amenity fees    Additional Information:  PT recommends TCU. Discussed with pt, provided [facility] TCU list. Pt agreeable to TCU and prefers referral sent to [facility]. Referral sent. Pt reports that his brother will provide transportation.       ADDENDUM:   [facility] accepted pt to a semi-shared room. Updated pt who was agreeable to plan. TCU will begin prior authorization. Updated ANGELA Mendoza, LGSW

## 2022-06-10 NOTE — PROGRESS NOTES
"[facility]    Medicine Progress Note - Hospitalist Service    Date of Admission:  6/8/2022    Assessment & Plan          Rashaun Molina is a 62 year old male admitted on 6/8/2022. He has a past medical history significant for Parkinson's, hypertension, hyperlipidemia, COPD, and chronic hyponatremia.  He was found to have left ankle syndesmotic injury.     1.  Left ankle syndesmotic injury.  -Status post ORIF by orthopedic surgery on 6/9.  -Pain medications as needed.  -Use incentive spirometry.  -Work with therapy.     2.  Acute on chronic hyponatremia.  -Sodium initially noted to be 120.  -Sodium now stable at 125.  -Stop IV fluids today.  -Recheck metabolic panel with primary care provider in outpatient setting.     3.  Parkinson's disease.  -Continue carbidopa/levodopa.     4.  Hypertension.  -Increase lisinopril to prior to admission dosing of 40 mg a day.  -Continue metoprolol 50 mg a day.     5.  Hyperlipidemia.  -Continue rosuvastatin 40 mg a day.     6.  COPD.  No acute exacerbation.  -Continue umeclidinium.  -Albuterol if needed.       Diet: Advance Diet as Tolerated: Regular Diet Adult  Diet    DVT Prophylaxis: Aspirin 81 mg twice a day  Escalante Catheter: Not present  Central Lines: None  Cardiac Monitoring: None  Code Status: Full Code          Jhony Krishnamurthy DO  Hospitalist Service  [facility]          Clinically Significant Risk Factors Present on Admission             # Obesity: Estimated body mass index is 32 kg/m  as calculated from the following:    Height as of 5/31/22: 1.778 m (5' 10\").    Weight as of this encounter: 101.2 kg (223 lb).      ______________________________________________________________________    Interval History   Having some left leg pain.  Better than previous.  Denies chest pain, shortness of breath, fevers, chills, nausea, or vomiting.    Data reviewed today: I reviewed all medications, new labs and imaging results over the " last 24 hours.     Physical Exam   Vital Signs: Temp: 97.8  F (36.6  C) Temp src: Temporal BP: (!) 151/73 Pulse: 59   Resp: 18 SpO2: 99 % O2 Device: None (Room air) Oxygen Delivery: 2 LPM  Weight: 223 lbs 0 oz  Gen:  NAD, A&Ox3.  Eyes:  PERRL, sclera anicteric.  OP:  MMM, no lesions.  Neck:  Supple.  CV:  Regular, no murmurs.  Lung:  CTA b/l, normal effort.  Ab:  +BS, soft.  Skin:  Warm, dry to touch.  No rash.  EXT: Right lower extremity with no pitting edema.  Postoperative dressing not removed from left lower extremity.        Data   Recent Labs   Lab 06/10/22  0654 06/09/22  0645 06/08/22  2151 06/08/22  1657   WBC 8.7  --   --  6.9   HGB 10.3*  --   --  11.1*     --   --  97   *  --   --  122*   INR  --   --   --  1.11   * 126* 122* 120*   POTASSIUM 4.2 4.3  --  4.6   CHLORIDE 94 92*  --  85*   CO2 28 29  --  30   BUN 16 13  --  12   CR 0.88 0.83  --  0.75   ANIONGAP 3 5  --  5   FRANNY 9.4 9.0  --  9.2   * 102*  --  103*   ALBUMIN  --  3.4  --  3.6   PROTTOTAL  --  8.0  --  8.2   BILITOTAL  --  0.9  --  1.0   ALKPHOS  --  78  --  83   ALT  --  12  --  10   AST  --  28  --  30

## 2022-06-10 NOTE — PLAN OF CARE
Goal Outcome Evaluation:    Plan of Care Reviewed With: patient     Overall Patient Progress: no change    3pm-11pm RN    Patient VS are at baseline:Yes  Patient able to ambulate as they were prior to admission or with assist devices provided by therapy during their stay: No did not walk yet  Patient must void prior to discharge: Yes  Patient able to tolerate oral intake: Yes  Patient has adequate pain control using oral analgesics: Yes    Tylenol used for pain management. Splint on left leg intact. Voiding in the urinal. Will continue to monitor.

## 2022-06-10 NOTE — PLAN OF CARE
"OT: Orders received. Chart reviewed and discussed with care team.  IP OT not indicated as patient will require TCU, per PT  note \"Pt below baseline for mobility.  Needing Ax1 for transfers, pt presents with some impulsivity which places him at high falls risk.  Pt lives alone and would need to IND wiht all transfers/mobility prior to discharge home.  Recommend TCU for strengthening and progression of IND with mobility\".  Defer discharge recommendations to care team and OT to next level of care.  Will complete orders.    "

## 2022-06-10 NOTE — PLAN OF CARE
Goal Outcome Evaluation:    Plan of Care Reviewed With: patient     Overall Patient Progress: improving    3pm-11pm RN    Patient VS are at baseline: Yes  Patient able to ambulate as they were prior to admission or with assist devices provided by therapy during their stay: No refused to get out of bed, is NWB  Patient must void prior to discharge: Yes  Patient able to tolerate oral intake: Yes  Patient has adequate pain control using oral analgesics: Yes    Patient refused to get out of bed stating he would not be able to do it because he is NWB. Writer encouraged patient to dangle and sit at the bedside but patient refused. Oxycodone and tylenol used for pain management. Voiding in urinal. Plan is to discharge to TCU.

## 2022-06-10 NOTE — PROGRESS NOTES
Orthopedic Surgery  Rashaun Molina  6/10/2022  Admit Date:  2022  POD # 1  S/P Open reduction internal fixation of left ankle syndesmotic injury and non-operative treatment of left proximal fibula fracture    Patient resting comfortably in chair.  Pain controlled.  Tolerating oral intake.    Denies nausea or vomiting  Denies chest pain or shortness of breath  No events overnight.     Alert and orient to person, place, and time.  Vital Sign Ranges  Temperature Temp  Av.4  F (36.3  C)  Min: 97  F (36.1  C)  Max: 97.8  F (36.6  C)   Blood pressure Systolic (24hrs), Av , Min:115 , Max:190        Diastolic (24hrs), Av, Min:49, Max:95      Pulse Pulse  Av.3  Min: 59  Max: 81   Respirations Resp  Av.9  Min: 11  Max: 18   Pulse oximetry SpO2  Av.7 %  Min: 92 %  Max: 99 %       Splint is clean, dry, and intact.   Minimal erythema of the surrounding skin.   Bilateral lower extremity is NVI.  Sensation intact bilateral lower extremities.    Labs:  Recent Labs   Lab Test 06/10/22  0654 22  0645 22  1657   POTASSIUM 4.2 4.3 4.6     Recent Labs   Lab Test 06/10/22  0654 22  1657 19  0920   HGB 10.3* 11.1* 13.4     Recent Labs   Lab Test 22  1657 18  0718 18  0713   INR 1.11 1.04 1.07     Recent Labs   Lab Test 06/10/22  0654 22  1657 19  0920   * 122* 229       A/P  1. S/P ORIF left ankle syndesmotic injury and non-op treatment of left proximal fibula fracture   Continue ASA for DVT prophylaxis.     Mobilize with PT/OT    TTWB left LE.     Continue current pain regiment.   Dressing: Leave splint intact.    2. Disposition   Anticipate d/c to home vs. TCU.    Susu Nuñez PA-C

## 2022-06-10 NOTE — PLAN OF CARE
"Goal Outcome Evaluation:    Plan of Care Reviewed With: patient     Overall Patient Progress: improving    Patient vital signs are at baseline: No,  Reason:  Still hypertensive, medications adjusted this am with not much improvement.  Patient able to ambulate as they were prior to admission or with assist devices provided by therapies during their stay:  No,  Reason:  Currently still a PIVOT transfer with assistive person, NWB to LLE.  Patient MUST void prior to discharge:  Yes  Patient able to tolerate oral intake:  Yes  Pain has adequate pain control using Oral analgesics:  Yes  Plan on discharge is TCU as patient lives alone.     1745: Patient hesitant to take pain medication. RN asked why. Patient stated \"Because sometimes when I open my eyes the TV is on the floor and I am on the wall.\" Patient is a daily drinker. MD notified.   "

## 2022-06-11 PROCEDURE — 94640 AIRWAY INHALATION TREATMENT: CPT

## 2022-06-11 PROCEDURE — 99232 SBSQ HOSP IP/OBS MODERATE 35: CPT | Performed by: INTERNAL MEDICINE

## 2022-06-11 PROCEDURE — 250N000013 HC RX MED GY IP 250 OP 250 PS 637: Performed by: PHYSICIAN ASSISTANT

## 2022-06-11 PROCEDURE — 999N000157 HC STATISTIC RCP TIME EA 10 MIN

## 2022-06-11 PROCEDURE — 250N000013 HC RX MED GY IP 250 OP 250 PS 637

## 2022-06-11 PROCEDURE — 120N000001 HC R&B MED SURG/OB

## 2022-06-11 PROCEDURE — 250N000013 HC RX MED GY IP 250 OP 250 PS 637: Performed by: INTERNAL MEDICINE

## 2022-06-11 RX ORDER — METHOCARBAMOL 500 MG/1
500 TABLET, FILM COATED ORAL 3 TIMES DAILY PRN
Qty: 20 TABLET | Refills: 0 | DISCHARGE
Start: 2022-06-11 | End: 2022-06-14

## 2022-06-11 RX ORDER — AMLODIPINE BESYLATE 2.5 MG/1
2.5 TABLET ORAL DAILY
Status: DISCONTINUED | OUTPATIENT
Start: 2022-06-11 | End: 2022-06-13

## 2022-06-11 RX ORDER — CARBIDOPA AND LEVODOPA 25; 100 MG/1; MG/1
2 TABLET ORAL 3 TIMES DAILY
DISCHARGE
Start: 2022-06-11

## 2022-06-11 RX ADMIN — ROSUVASTATIN CALCIUM 40 MG: 20 TABLET, FILM COATED ORAL at 21:40

## 2022-06-11 RX ADMIN — CARBIDOPA AND LEVODOPA 2 TABLET: 25; 100 TABLET, EXTENDED RELEASE ORAL at 03:57

## 2022-06-11 RX ADMIN — ASPIRIN 81 MG: 81 TABLET, COATED ORAL at 19:40

## 2022-06-11 RX ADMIN — LISINOPRIL 40 MG: 40 TABLET ORAL at 08:28

## 2022-06-11 RX ADMIN — SENNOSIDES AND DOCUSATE SODIUM 1 TABLET: 50; 8.6 TABLET ORAL at 08:28

## 2022-06-11 RX ADMIN — POLYETHYLENE GLYCOL 3350 17 G: 17 POWDER, FOR SOLUTION ORAL at 08:28

## 2022-06-11 RX ADMIN — THERA TABS 1 TABLET: TAB at 08:28

## 2022-06-11 RX ADMIN — UMECLIDINIUM 1 PUFF: 62.5 AEROSOL, POWDER ORAL at 07:38

## 2022-06-11 RX ADMIN — THIAMINE HCL TAB 100 MG 100 MG: 100 TAB at 08:28

## 2022-06-11 RX ADMIN — AMLODIPINE BESYLATE 2.5 MG: 2.5 TABLET ORAL at 15:34

## 2022-06-11 RX ADMIN — FOLIC ACID 1 MG: 1 TABLET ORAL at 08:28

## 2022-06-11 RX ADMIN — ACETAMINOPHEN 975 MG: 325 TABLET ORAL at 15:34

## 2022-06-11 RX ADMIN — OXYCODONE HYDROCHLORIDE 5 MG: 5 TABLET ORAL at 15:34

## 2022-06-11 RX ADMIN — ACETAMINOPHEN 975 MG: 325 TABLET ORAL at 04:01

## 2022-06-11 RX ADMIN — SENNOSIDES AND DOCUSATE SODIUM 1 TABLET: 50; 8.6 TABLET ORAL at 19:40

## 2022-06-11 RX ADMIN — CARBIDOPA AND LEVODOPA 2 TABLET: 25; 100 TABLET, EXTENDED RELEASE ORAL at 15:34

## 2022-06-11 RX ADMIN — ACETAMINOPHEN 975 MG: 325 TABLET ORAL at 21:40

## 2022-06-11 RX ADMIN — METOPROLOL SUCCINATE 50 MG: 50 TABLET, EXTENDED RELEASE ORAL at 08:28

## 2022-06-11 RX ADMIN — ASPIRIN 81 MG: 81 TABLET, COATED ORAL at 08:28

## 2022-06-11 RX ADMIN — OXYCODONE HYDROCHLORIDE 5 MG: 5 TABLET ORAL at 19:40

## 2022-06-11 RX ADMIN — CARBIDOPA AND LEVODOPA 2 TABLET: 25; 100 TABLET, EXTENDED RELEASE ORAL at 10:56

## 2022-06-11 ASSESSMENT — ACTIVITIES OF DAILY LIVING (ADL)
ADLS_ACUITY_SCORE: 32
ADLS_ACUITY_SCORE: 27
ADLS_ACUITY_SCORE: 32

## 2022-06-11 NOTE — PROGRESS NOTES
Pt is alert and oriented x4, VS at baseline on RA, A x1 with a walker and gait belt, voiding well, CMS at baseline, on regular diet, the dressing on the L feet is DCI, plan is to discharge to a TCU unit. Continue to monitor.

## 2022-06-11 NOTE — PROGRESS NOTES
"[facility]    Medicine Progress Note - Hospitalist Service    Date of Admission:  6/8/2022    Assessment & Plan          Rashaun Molina is a 62 year old male admitted on 6/8/2022. He has a past medical history significant for Parkinson's, hypertension, hyperlipidemia, COPD, and chronic hyponatremia.  He was found to have left ankle syndesmotic injury.     1.  Left ankle syndesmotic injury.  -Status post ORIF by orthopedic surgery on 6/9.  -Pain medications as needed.  -Use incentive spirometry.  -Work with therapy.     2.  Acute on chronic hyponatremia.  -Sodium initially noted to be 120.  -Sodium now stable at 125.  -Recheck metabolic panel with primary care provider in outpatient setting.     3.  Parkinson's disease.  -Continue carbidopa/levodopa.     4.  Hypertension.  -Add Norvasc 2.5 mg a day.  -Continue lisinopril 40 mg a day.  -Continue metoprolol 50 mg a day.  -IV hydralazine if needed.     5.  Hyperlipidemia.  -Continue rosuvastatin 40 mg a day.     6.  COPD.  No acute exacerbation.  -Continue umeclidinium.  -Albuterol if needed.       Diet: Advance Diet as Tolerated: Regular Diet Adult  Diet    DVT Prophylaxis: Pneumatic Compression Devices  Escalante Catheter: Not present  Central Lines: None  Cardiac Monitoring: None  Code Status: Full Code          Jhony Krishnamurthy,   Hospitalist Service  [facility]  Securely message with the Vocera Web Console (learn more here)  Text page via [employer] Paging/Directory         Clinically Significant Risk Factors Present on Admission             # Obesity: Estimated body mass index is 32 kg/m  as calculated from the following:    Height as of 5/31/22: 1.778 m (5' 10\").    Weight as of this encounter: 101.2 kg (223 lb).      ______________________________________________________________________    Interval History   Having some left leg pain.  Denies chest pain, shortness of breath, fevers, chills, nausea, or vomiting.    Data " reviewed today: I reviewed all medications, new labs and imaging results over the last 24 hours.    Physical Exam   Vital Signs: Temp: 98  F (36.7  C) Temp src: Temporal BP: (!) 166/80 Pulse: 62   Resp: 18 SpO2: 95 % O2 Device: None (Room air)    Weight: 223 lbs 0 oz  Gen:  NAD, A&Ox3.  Eyes:  PERRL, sclera anicteric.  OP:  MMM, no lesions.  Neck:  Supple.  CV:  Regular, no murmurs.  Lung:  CTA b/l, normal effort.  Ab:  +BS, soft.  Skin:  Warm, dry to touch.  No rash.  Ext:  No pitting edema LE b/l.      Data   Recent Labs   Lab 06/10/22  0654 06/09/22  0645 06/08/22  2151 06/08/22  1657   WBC 8.7  --   --  6.9   HGB 10.3*  --   --  11.1*     --   --  97   *  --   --  122*   INR  --   --   --  1.11   * 126* 122* 120*   POTASSIUM 4.2 4.3  --  4.6   CHLORIDE 94 92*  --  85*   CO2 28 29  --  30   BUN 16 13  --  12   CR 0.88 0.83  --  0.75   ANIONGAP 3 5  --  5   FRANNY 9.4 9.0  --  9.2   * 102*  --  103*   ALBUMIN  --  3.4  --  3.6   PROTTOTAL  --  8.0  --  8.2   BILITOTAL  --  0.9  --  1.0   ALKPHOS  --  78  --  83   ALT  --  12  --  10   AST  --  28  --  30

## 2022-06-11 NOTE — PLAN OF CARE
Patient vital signs are at baseline: Yes  Patient able to ambulate as they were prior to admission or with assist devices provided by therapies during their stay:  Yes  Patient MUST void prior to discharge:  Yes  Patient able to tolerate oral intake:  Yes  Pain has adequate pain control using Oral analgesics:  Yes  Patient lives alone, place is TCU. Awaiting insurance authorization.

## 2022-06-11 NOTE — PROGRESS NOTES
Orthopedic Surgery  Rashaun Molina  2022  Admit Date:  2022  POD # 2  S/P Open reduction internal fixation of left ankle syndesmotic injury and non-operative treatment of left proximal fibula fracture     Patient resting comfortably in bed.  Pain controlled.  Tolerating oral intake.    Denies nausea or vomiting  Denies chest pain or shortness of breath  No events overnight.      Alert and orient to person, place, and time.  Vital Sign Ranges  Temperature Temp  Av.9  F (36.6  C)  Min: 97.7  F (36.5  C)  Max: 98.1  F (36.7  C)   Blood pressure Systolic (24hrs), Av , Min:132 , Max:166        Diastolic (24hrs), Av, Min:54, Max:80      Pulse Pulse  Av.5  Min: 58  Max: 75   Respirations Resp  Av.5  Min: 16  Max: 18   Pulse oximetry SpO2  Av.3 %  Min: 94 %  Max: 97 %       Splint is clean, dry, and intact.   Minimal erythema of the surrounding skin.   Bilateral lower extremity is NVI.  Sensation intact bilateral lower extremities.    Labs:  Recent Labs   Lab Test 06/10/22  0654 22  0645 22  1657   POTASSIUM 4.2 4.3 4.6     Recent Labs   Lab Test 06/10/22  0654 22  1657 19  0920   HGB 10.3* 11.1* 13.4     Recent Labs   Lab Test 22  1657 18  0718 18  0713   INR 1.11 1.04 1.07     Recent Labs   Lab Test 06/10/22  0654 22  1657 19  0920   * 122* 229       A/P  1. S/P ORIF left ankle syndesmotic injury and non-op treatment of left proximal fibula fracture              Continue ASA for DVT prophylaxis.                Mobilize with PT/OT               TTWB left LE.                Continue current pain regiment.              Dressing: Leave splint intact.     2. Disposition              Anticipate d/c to home vs. TCU.    Susu Nuñez PA-C

## 2022-06-11 NOTE — PLAN OF CARE
Patient vital signs are at baseline: Yes  Patient able to ambulate as they were prior to admission or with assist devices provided by therapies during their stay:  No,  Reason:  PIVOT, nonweightbearing.   Patient MUST void prior to discharge:  Yes  Patient able to tolerate oral intake:  Yes  Pain has adequate pain control using Oral analgesics:  Yes, scheduled Tylenol given.   Does patient have an identified :  Yes  Has goal D/C date and time been discussed with patient:  Yes, possibly to TCU.     CMS intact. Dressing dry and intact. No new concerns overnight

## 2022-06-12 ENCOUNTER — APPOINTMENT (OUTPATIENT)
Dept: PHYSICAL THERAPY | Facility: CLINIC | Age: 62
DRG: 493 | End: 2022-06-12
Attending: ORTHOPAEDIC SURGERY
Payer: COMMERCIAL

## 2022-06-12 LAB
ERYTHROCYTE [DISTWIDTH] IN BLOOD BY AUTOMATED COUNT: 14.6 % (ref 10–15)
HCT VFR BLD AUTO: 33.6 % (ref 40–53)
HGB BLD-MCNC: 10.7 G/DL (ref 13.3–17.7)
HOLD SPECIMEN: NORMAL
MCH RBC QN AUTO: 31.9 PG (ref 26.5–33)
MCHC RBC AUTO-ENTMCNC: 31.8 G/DL (ref 31.5–36.5)
MCV RBC AUTO: 100 FL (ref 78–100)
PLATELET # BLD AUTO: 123 10E3/UL (ref 150–450)
RBC # BLD AUTO: 3.35 10E6/UL (ref 4.4–5.9)
WBC # BLD AUTO: 7.2 10E3/UL (ref 4–11)

## 2022-06-12 PROCEDURE — 999N000157 HC STATISTIC RCP TIME EA 10 MIN

## 2022-06-12 PROCEDURE — 36415 COLL VENOUS BLD VENIPUNCTURE: CPT | Performed by: INTERNAL MEDICINE

## 2022-06-12 PROCEDURE — 97530 THERAPEUTIC ACTIVITIES: CPT | Mod: GP

## 2022-06-12 PROCEDURE — 250N000013 HC RX MED GY IP 250 OP 250 PS 637: Performed by: PHYSICIAN ASSISTANT

## 2022-06-12 PROCEDURE — 99232 SBSQ HOSP IP/OBS MODERATE 35: CPT | Performed by: INTERNAL MEDICINE

## 2022-06-12 PROCEDURE — 97116 GAIT TRAINING THERAPY: CPT | Mod: GP

## 2022-06-12 PROCEDURE — 120N000001 HC R&B MED SURG/OB

## 2022-06-12 PROCEDURE — 250N000013 HC RX MED GY IP 250 OP 250 PS 637

## 2022-06-12 PROCEDURE — 97110 THERAPEUTIC EXERCISES: CPT | Mod: GP

## 2022-06-12 PROCEDURE — 85027 COMPLETE CBC AUTOMATED: CPT | Performed by: INTERNAL MEDICINE

## 2022-06-12 PROCEDURE — 250N000013 HC RX MED GY IP 250 OP 250 PS 637: Performed by: INTERNAL MEDICINE

## 2022-06-12 PROCEDURE — 94640 AIRWAY INHALATION TREATMENT: CPT

## 2022-06-12 RX ADMIN — CARBIDOPA AND LEVODOPA 2 TABLET: 25; 100 TABLET, EXTENDED RELEASE ORAL at 11:00

## 2022-06-12 RX ADMIN — LISINOPRIL 40 MG: 40 TABLET ORAL at 07:43

## 2022-06-12 RX ADMIN — AMLODIPINE BESYLATE 2.5 MG: 2.5 TABLET ORAL at 07:43

## 2022-06-12 RX ADMIN — ACETAMINOPHEN 650 MG: 325 TABLET ORAL at 21:38

## 2022-06-12 RX ADMIN — ROSUVASTATIN CALCIUM 40 MG: 20 TABLET, FILM COATED ORAL at 21:38

## 2022-06-12 RX ADMIN — ASPIRIN 81 MG: 81 TABLET, COATED ORAL at 19:55

## 2022-06-12 RX ADMIN — MAGNESIUM HYDROXIDE 30 ML: 400 SUSPENSION ORAL at 07:44

## 2022-06-12 RX ADMIN — CARBIDOPA AND LEVODOPA 2 TABLET: 25; 100 TABLET, EXTENDED RELEASE ORAL at 04:36

## 2022-06-12 RX ADMIN — ASPIRIN 81 MG: 81 TABLET, COATED ORAL at 07:44

## 2022-06-12 RX ADMIN — CARBIDOPA AND LEVODOPA 2 TABLET: 25; 100 TABLET, EXTENDED RELEASE ORAL at 15:52

## 2022-06-12 RX ADMIN — METOPROLOL SUCCINATE 50 MG: 50 TABLET, EXTENDED RELEASE ORAL at 07:44

## 2022-06-12 RX ADMIN — SENNOSIDES AND DOCUSATE SODIUM 1 TABLET: 50; 8.6 TABLET ORAL at 07:43

## 2022-06-12 RX ADMIN — THIAMINE HCL TAB 100 MG 100 MG: 100 TAB at 07:44

## 2022-06-12 RX ADMIN — FOLIC ACID 1 MG: 1 TABLET ORAL at 07:43

## 2022-06-12 RX ADMIN — ACETAMINOPHEN 975 MG: 325 TABLET ORAL at 04:36

## 2022-06-12 RX ADMIN — THERA TABS 1 TABLET: TAB at 07:44

## 2022-06-12 RX ADMIN — POLYETHYLENE GLYCOL 3350 17 G: 17 POWDER, FOR SOLUTION ORAL at 07:44

## 2022-06-12 RX ADMIN — UMECLIDINIUM 1 PUFF: 62.5 AEROSOL, POWDER ORAL at 08:49

## 2022-06-12 ASSESSMENT — ACTIVITIES OF DAILY LIVING (ADL)
ADLS_ACUITY_SCORE: 27

## 2022-06-12 NOTE — PROGRESS NOTES
Orthopedic Surgery  Rashaun Molina  2022  Admit Date:  2022  POD #   S/P Open reduction internal fixation of left ankle syndesmotic injury and non-operative treatment of left proximal fibula fracture    Patient resting comfortably in bed.    Pain controlled.  Tolerating oral intake.    Denies nausea or vomiting  Denies chest pain or shortness of breath  No events overnight.     Alert and orient to person, place, and time.  Vital Sign Ranges  Temperature Temp  Av.9  F (36.6  C)  Min: 97.6  F (36.4  C)  Max: 98.1  F (36.7  C)   Blood pressure Systolic (24hrs), Av , Min:120 , Max:166        Diastolic (24hrs), Av, Min:62, Max:80      Pulse Pulse  Av.8  Min: 55  Max: 74   Respirations Resp  Av  Min: 16  Max: 18   Pulse oximetry SpO2  Av.5 %  Min: 94 %  Max: 95 %       Splint is clean, dry, and intact.   Minimal erythema of the surrounding skin.   Bilateral lower extremity is NVI.  Sensation intact compared to baseline with peripheral neuropathy bilateral lower extremities.    Labs:  Recent Labs   Lab Test 06/10/22  0654 22  0645 22  1657   POTASSIUM 4.2 4.3 4.6     Recent Labs   Lab Test 22  0601 06/10/22  0654 22  1657   HGB 10.7* 10.3* 11.1*     Recent Labs   Lab Test 22  1657 18  0718 18  0713   INR 1.11 1.04 1.07     Recent Labs   Lab Test 22  0601 06/10/22  0654 22  1657   * 125* 122*       A/P  1. S/P ORIF left ankle syndesmotic injury and non-op treatment of left proximal fibula fracture              Continue ASA for DVT prophylaxis.                Mobilize with PT/OT               TTWB left LE.                Continue current pain regiment.              Dressing: Leave splint intact.     2. Disposition              Anticipate d/c to home vs. TCU, awaiting placement.    Susu Nuñez PA-C

## 2022-06-12 NOTE — PROGRESS NOTES
"[facility]    Medicine Progress Note - Hospitalist Service    Date of Admission:  6/8/2022    Assessment & Plan          Rashaun Molina is a 62 year old male admitted on 6/8/2022. He has a past medical history significant for Parkinson's, hypertension, hyperlipidemia, COPD, and chronic hyponatremia.  He was found to have left ankle syndesmotic injury.     1.  Left ankle syndesmotic injury.  -Status post ORIF by orthopedic surgery on 6/9.  -Pain medications as needed.  -Use incentive spirometry.  -Work with therapy.     2.  Acute on chronic hyponatremia.  -Sodium initially noted to be 120.  -Sodium now stable at 125.  -Recheck metabolic panel tomorrow.     3.  Parkinson's disease.  -Continue carbidopa/levodopa.     4.  Hypertension.  -Continue Norvasc 2.5 mg a day.  -Continue lisinopril 40 mg a day.  -Continue metoprolol 50 mg a day.  -IV hydralazine if needed.     5.  Hyperlipidemia.  -Continue rosuvastatin 40 mg a day.     6.  COPD.  No acute exacerbation.  -Continue umeclidinium.  -Albuterol if needed.       Diet: Advance Diet as Tolerated: Regular Diet Adult  Diet    DVT Prophylaxis: Pneumatic Compression Devices  Escalante Catheter: Not present  Central Lines: None  Cardiac Monitoring: None  Code Status: Full Code          Jhony Krishnamurthy DO  Hospitalist Service  [facility]        Clinically Significant Risk Factors Present on Admission             # Overweight: Estimated body mass index is 29.06 kg/m  as calculated from the following:    Height as of 5/31/22: 1.778 m (5' 10\").    Weight as of this encounter: 91.9 kg (202 lb 8 oz).      ______________________________________________________________________    Interval History   Some left leg pain.  Feels that this is fairly well controlled with pain medications.  Denies chest pain, shortness of breath, fevers, chills, nausea, or vomiting.    Data reviewed today: I reviewed all medications, new labs and imaging results " over the last 24 hours.    Physical Exam   Vital Signs: Temp: 97.8  F (36.6  C) Temp src: Temporal BP: 120/62 Pulse: 73   Resp: 18 SpO2: 97 % O2 Device: None (Room air)    Weight: 202 lbs 8 oz  Gen:  NAD, A&Ox3.  Eyes:  PERRL, sclera anicteric.  OP:  MMM, no lesions.  Neck:  Supple.  CV:  Regular, no murmurs.  Lung:  CTA b/l, normal effort.  Ab:  +BS, soft.  Skin:  Warm, dry to touch.  No rash.  Ext:  No pitting edema LE b/l.      Data   Recent Labs   Lab 06/12/22  0601 06/10/22  0654 06/09/22  0645 06/08/22  2151 06/08/22  1657   WBC 7.2 8.7  --   --  6.9   HGB 10.7* 10.3*  --   --  11.1*    100  --   --  97   * 125*  --   --  122*   INR  --   --   --   --  1.11   NA  --  125* 126* 122* 120*   POTASSIUM  --  4.2 4.3  --  4.6   CHLORIDE  --  94 92*  --  85*   CO2  --  28 29  --  30   BUN  --  16 13  --  12   CR  --  0.88 0.83  --  0.75   ANIONGAP  --  3 5  --  5   FRANNY  --  9.4 9.0  --  9.2   GLC  --  100* 102*  --  103*   ALBUMIN  --   --  3.4  --  3.6   PROTTOTAL  --   --  8.0  --  8.2   BILITOTAL  --   --  0.9  --  1.0   ALKPHOS  --   --  78  --  83   ALT  --   --  12  --  10   AST  --   --  28  --  30

## 2022-06-12 NOTE — PLAN OF CARE
Goal Outcome Evaluation:    Plan of Care Reviewed With: patient     Overall Patient Progress: improving       Patient vital signs are at baseline: Yes  Patient able to ambulate as they were prior to admission or with assist devices provided by therapies during their stay:  No,  Reason:  ONly able to PIVOT transfer  Patient MUST void prior to discharge:  Yes  Patient able to tolerate oral intake:  Yes  Pain has adequate pain control using Oral analgesics:  Yes    Plan is TCU, awaiting placement.

## 2022-06-12 NOTE — PLAN OF CARE
Goal Outcome Evaluation:    Patient vital signs are at baseline: Yes  Patient able to ambulate as they were prior to admission or with assist devices provided by therapies during their stay:  No,  Reason:  A1-2 pivot, NWB to LLE  Patient MUST void prior to discharge:  Yes  Patient able to tolerate oral intake:  Yes, regular diet  Pain has adequate pain control using Oral analgesics:  Yes, PRN oxycodone, see MAR  Does patient have an identified :  No,  Reason:  plan to discharge to TCU  Has goal D/C date and time been discussed with patient:  Yes, plan to discharge once insurance goes through    Pt A/O x4. Dressing CDI and splint to left ankle, elevated on wedge in bed. CMS intact. ASA. Will continue to monitor.

## 2022-06-13 ENCOUNTER — APPOINTMENT (OUTPATIENT)
Dept: PHYSICAL THERAPY | Facility: CLINIC | Age: 62
DRG: 493 | End: 2022-06-13
Attending: ORTHOPAEDIC SURGERY
Payer: COMMERCIAL

## 2022-06-13 LAB
ANION GAP SERPL CALCULATED.3IONS-SCNC: 6 MMOL/L (ref 3–14)
BUN SERPL-MCNC: 18 MG/DL (ref 7–30)
CALCIUM SERPL-MCNC: 9.4 MG/DL (ref 8.5–10.1)
CHLORIDE BLD-SCNC: 92 MMOL/L (ref 94–109)
CO2 SERPL-SCNC: 27 MMOL/L (ref 20–32)
CREAT SERPL-MCNC: 0.9 MG/DL (ref 0.66–1.25)
GFR SERPL CREATININE-BSD FRML MDRD: >90 ML/MIN/1.73M2
GLUCOSE BLD-MCNC: 108 MG/DL (ref 70–99)
POTASSIUM BLD-SCNC: 4.3 MMOL/L (ref 3.4–5.3)
SODIUM SERPL-SCNC: 125 MMOL/L (ref 133–144)

## 2022-06-13 PROCEDURE — 80048 BASIC METABOLIC PNL TOTAL CA: CPT | Performed by: INTERNAL MEDICINE

## 2022-06-13 PROCEDURE — 94640 AIRWAY INHALATION TREATMENT: CPT

## 2022-06-13 PROCEDURE — 97110 THERAPEUTIC EXERCISES: CPT | Mod: GP | Performed by: PHYSICAL THERAPIST

## 2022-06-13 PROCEDURE — 36415 COLL VENOUS BLD VENIPUNCTURE: CPT | Performed by: INTERNAL MEDICINE

## 2022-06-13 PROCEDURE — 250N000013 HC RX MED GY IP 250 OP 250 PS 637: Performed by: INTERNAL MEDICINE

## 2022-06-13 PROCEDURE — 99232 SBSQ HOSP IP/OBS MODERATE 35: CPT | Performed by: INTERNAL MEDICINE

## 2022-06-13 PROCEDURE — 250N000013 HC RX MED GY IP 250 OP 250 PS 637

## 2022-06-13 PROCEDURE — 999N000157 HC STATISTIC RCP TIME EA 10 MIN

## 2022-06-13 PROCEDURE — 250N000013 HC RX MED GY IP 250 OP 250 PS 637: Performed by: PHYSICIAN ASSISTANT

## 2022-06-13 PROCEDURE — 120N000001 HC R&B MED SURG/OB

## 2022-06-13 PROCEDURE — 97530 THERAPEUTIC ACTIVITIES: CPT | Mod: GP | Performed by: PHYSICAL THERAPIST

## 2022-06-13 RX ORDER — AMLODIPINE BESYLATE 5 MG/1
5 TABLET ORAL DAILY
Status: DISCONTINUED | OUTPATIENT
Start: 2022-06-13 | End: 2022-06-14 | Stop reason: HOSPADM

## 2022-06-13 RX ADMIN — THIAMINE HCL TAB 100 MG 100 MG: 100 TAB at 08:43

## 2022-06-13 RX ADMIN — FOLIC ACID 1 MG: 1 TABLET ORAL at 08:44

## 2022-06-13 RX ADMIN — ROSUVASTATIN CALCIUM 40 MG: 20 TABLET, FILM COATED ORAL at 22:30

## 2022-06-13 RX ADMIN — UMECLIDINIUM 1 PUFF: 62.5 AEROSOL, POWDER ORAL at 07:49

## 2022-06-13 RX ADMIN — ACETAMINOPHEN 650 MG: 325 TABLET ORAL at 05:21

## 2022-06-13 RX ADMIN — CARBIDOPA AND LEVODOPA 2 TABLET: 25; 100 TABLET, EXTENDED RELEASE ORAL at 10:36

## 2022-06-13 RX ADMIN — ACETAMINOPHEN 650 MG: 325 TABLET ORAL at 10:36

## 2022-06-13 RX ADMIN — ASPIRIN 81 MG: 81 TABLET, COATED ORAL at 20:21

## 2022-06-13 RX ADMIN — ASPIRIN 81 MG: 81 TABLET, COATED ORAL at 08:44

## 2022-06-13 RX ADMIN — METOPROLOL SUCCINATE 50 MG: 50 TABLET, EXTENDED RELEASE ORAL at 08:43

## 2022-06-13 RX ADMIN — THERA TABS 1 TABLET: TAB at 08:44

## 2022-06-13 RX ADMIN — AMLODIPINE BESYLATE 5 MG: 5 TABLET ORAL at 08:44

## 2022-06-13 RX ADMIN — CARBIDOPA AND LEVODOPA 2 TABLET: 25; 100 TABLET, EXTENDED RELEASE ORAL at 05:00

## 2022-06-13 RX ADMIN — LISINOPRIL 40 MG: 40 TABLET ORAL at 08:44

## 2022-06-13 RX ADMIN — CARBIDOPA AND LEVODOPA 2 TABLET: 25; 100 TABLET, EXTENDED RELEASE ORAL at 16:05

## 2022-06-13 RX ADMIN — OXYCODONE HYDROCHLORIDE 5 MG: 5 TABLET ORAL at 08:43

## 2022-06-13 RX ADMIN — ACETAMINOPHEN 650 MG: 325 TABLET ORAL at 16:04

## 2022-06-13 ASSESSMENT — ACTIVITIES OF DAILY LIVING (ADL)
ADLS_ACUITY_SCORE: 27
ADLS_ACUITY_SCORE: 29
ADLS_ACUITY_SCORE: 29
ADLS_ACUITY_SCORE: 27
ADLS_ACUITY_SCORE: 29

## 2022-06-13 NOTE — PLAN OF CARE
Goal Outcome Evaluation:    Patient vital signs are at baseline: Yes  Patient able to ambulate as they were prior to admission or with assist devices provided by therapies during their stay:  Yes, 1A pivot with walker/GB  Patient MUST void prior to discharge:  Yes  Patient able to tolerate oral intake:  Yes, regular diet  Pain has adequate pain control using Oral analgesics:  Yes, PRN Tylenol, see MAR  Does patient have an identified :  Yes, discharge to TCU  Has goal D/C date and time been discussed with patient:  Yes, discharge pending insurance    Pt A/O x4. Dressing to left ankle CDI. CMS intact except baseline numbness. NWB to LLE. ASA. Plan to discharge to [facility] today once insurance goes through. Will continue to monitor.

## 2022-06-13 NOTE — PROGRESS NOTES
[facility]    Medicine Progress Note - Hospitalist Service    Date of Admission:  6/8/2022    Assessment & Plan          Rashaun Molina is a 62 year old male admitted on 6/8/2022. He has a past medical history significant for Parkinson's, hypertension, hyperlipidemia, COPD, and chronic hyponatremia.  He was found to have left ankle syndesmotic injury.     1.  Left ankle syndesmotic injury.  -Status post ORIF by orthopedic surgery on 6/9.  -Pain medications as needed.  -Use incentive spirometry.  -Work with therapy.     2.  Acute on chronic hyponatremia.  -Sodium initially noted to be 120.  -Sodium now stable at 125.  -Recheck metabolic panel intermittently.     3.  Parkinson's disease.  -Continue carbidopa/levodopa.     4.  Hypertension.  -Increase Norvasc to 5 mg a day.  -Continue lisinopril 40 mg a day.  -Continue metoprolol 50 mg a day.  -IV hydralazine if needed.     5.  Hyperlipidemia.  -Continue rosuvastatin 40 mg a day.     6.  COPD.  No acute exacerbation.  -Continue umeclidinium.  -Albuterol if needed.       Diet: Advance Diet as Tolerated: Regular Diet Adult  Diet    DVT Prophylaxis: Pneumatic Compression Devices  Escalante Catheter: Not present  Central Lines: None  Cardiac Monitoring: None  Code Status: Full Code        Jhony Krishnamurthy DO  Hospitalist Service  [facility]  Securely message with the Vocera Web Console (learn more here)  Text page via [employer] Paging/Directory         Clinically Significant Risk Factors Present on Admission                    ______________________________________________________________________    Interval History   Having some left leg pain.  Denies chest pain, shortness of breath, fevers, chills, nausea, or vomiting.    Data reviewed today: I reviewed all medications, new labs and imaging results over the last 24 hours.    Physical Exam   Vital Signs: Temp: 97.7  F (36.5  C) Temp src: Temporal BP: (!) 155/60 Pulse: 74   Resp: 18  SpO2: 96 % O2 Device: None (Room air)    Weight: 202 lbs 8 oz  Gen:  NAD, A&Ox3.  Eyes:  PERRL, sclera anicteric.  OP:  MMM, no lesions.  Neck:  Supple.  CV:  Regular, no murmurs.  Lung:  CTA b/l, normal effort.  Ab:  +BS, soft.  Skin:  Warm, dry to touch.  No rash.  Ext:  No pitting edema LE b/l.      Data   Recent Labs   Lab 06/13/22  0605 06/12/22  0601 06/10/22  0654 06/09/22  0645 06/08/22  2151 06/08/22  1657   WBC  --  7.2 8.7  --   --  6.9   HGB  --  10.7* 10.3*  --   --  11.1*   MCV  --  100 100  --   --  97   PLT  --  123* 125*  --   --  122*   INR  --   --   --   --   --  1.11   *  --  125* 126*   < > 120*   POTASSIUM 4.3  --  4.2 4.3  --  4.6   CHLORIDE 92*  --  94 92*  --  85*   CO2 27  --  28 29  --  30   BUN 18  --  16 13  --  12   CR 0.90  --  0.88 0.83  --  0.75   ANIONGAP 6  --  3 5  --  5   FRANNY 9.4  --  9.4 9.0  --  9.2   *  --  100* 102*  --  103*   ALBUMIN  --   --   --  3.4  --  3.6   PROTTOTAL  --   --   --  8.0  --  8.2   BILITOTAL  --   --   --  0.9  --  1.0   ALKPHOS  --   --   --  78  --  83   ALT  --   --   --  12  --  10   AST  --   --   --  28  --  30    < > = values in this interval not displayed.

## 2022-06-13 NOTE — PROGRESS NOTES
Orthopedic Surgery  Rashaun Molina  06/13/2022     Admit Date:  6/8/2022  POD: 4 Days Post-Op   Procedure(s):  .  Open reduction internal fixation of left ankle syndesmotic injury 2.  Non-operative treatment of left proximal fibula fracture    Alert and oriented.   Patient resting comfortably in chair.    Pain controlled.  Tolerating oral intake.      Temp:  [97.3  F (36.3  C)-98.1  F (36.7  C)] 97.7  F (36.5  C)  Pulse:  [60-75] 74  Resp:  [16-18] 18  BP: (140-190)/() 155/60  SpO2:  [95 %-96 %] 96 %    Splint is clean, dry, and intact.   Minimal erythema of the surrounding skin.   Bilateral lower extremity is NVI.  Sensation intact compared to baseline with peripheral neuropathy bilateral lower extremities.    Labs:  Recent Labs   Lab Test 06/12/22  0601 06/10/22  0654 06/08/22  1657   WBC 7.2 8.7 6.9   HGB 10.7* 10.3* 11.1*   * 125* 122*     Recent Labs   Lab Test 06/08/22  1657 08/06/18  0718 05/14/18  0713   INR 1.11 1.04 1.07     No lab results found.    Assessment/Plan:   1. S/P ORIF left ankle syndesmotic injury and non-op treatment of left proximal fibula fracture              Continue ASA for DVT prophylaxis.                Mobilize with PT/OT    Elevate when at rest.               Toe Touch WB left LE.  Ok to use scooter if able.               Continue current pain regiment.              Dressing: Leave splint intact.     2. Disposition              Anticipate d/c to TCU, awaiting placement.  Ortho stable.      Miriam Ramos PA-C

## 2022-06-14 VITALS
WEIGHT: 197.5 LBS | DIASTOLIC BLOOD PRESSURE: 55 MMHG | TEMPERATURE: 97.5 F | SYSTOLIC BLOOD PRESSURE: 125 MMHG | OXYGEN SATURATION: 96 % | HEART RATE: 68 BPM | RESPIRATION RATE: 20 BRPM | BODY MASS INDEX: 28.34 KG/M2

## 2022-06-14 PROCEDURE — 250N000013 HC RX MED GY IP 250 OP 250 PS 637: Performed by: INTERNAL MEDICINE

## 2022-06-14 PROCEDURE — 250N000013 HC RX MED GY IP 250 OP 250 PS 637

## 2022-06-14 PROCEDURE — 250N000013 HC RX MED GY IP 250 OP 250 PS 637: Performed by: PHYSICIAN ASSISTANT

## 2022-06-14 PROCEDURE — 94640 AIRWAY INHALATION TREATMENT: CPT

## 2022-06-14 PROCEDURE — 999N000157 HC STATISTIC RCP TIME EA 10 MIN

## 2022-06-14 PROCEDURE — 99239 HOSP IP/OBS DSCHRG MGMT >30: CPT | Performed by: HOSPITALIST

## 2022-06-14 RX ORDER — LANOLIN ALCOHOL/MO/W.PET/CERES
100 CREAM (GRAM) TOPICAL DAILY
Qty: 30 TABLET | Refills: 0 | DISCHARGE
Start: 2022-06-15 | End: 2022-07-15

## 2022-06-14 RX ORDER — FOLIC ACID 1 MG/1
1 TABLET ORAL DAILY
Qty: 30 TABLET | Refills: 0 | DISCHARGE
Start: 2022-06-15 | End: 2022-07-15

## 2022-06-14 RX ORDER — AMLODIPINE BESYLATE 5 MG/1
5 TABLET ORAL DAILY
Qty: 30 TABLET | Refills: 0 | DISCHARGE
Start: 2022-06-15 | End: 2022-07-27

## 2022-06-14 RX ORDER — METHOCARBAMOL 500 MG/1
500 TABLET, FILM COATED ORAL 3 TIMES DAILY PRN
Qty: 20 TABLET | Refills: 0 | Status: SHIPPED | OUTPATIENT
Start: 2022-06-14 | End: 2022-07-27

## 2022-06-14 RX ORDER — METHOCARBAMOL 500 MG/1
500 TABLET, FILM COATED ORAL 4 TIMES DAILY PRN
Qty: 20 TABLET | DISCHARGE
Start: 2022-06-14 | End: 2022-06-14

## 2022-06-14 RX ADMIN — THERA TABS 1 TABLET: TAB at 07:59

## 2022-06-14 RX ADMIN — CARBIDOPA AND LEVODOPA 2 TABLET: 25; 100 TABLET, EXTENDED RELEASE ORAL at 05:03

## 2022-06-14 RX ADMIN — FOLIC ACID 1 MG: 1 TABLET ORAL at 07:59

## 2022-06-14 RX ADMIN — LISINOPRIL 40 MG: 40 TABLET ORAL at 07:59

## 2022-06-14 RX ADMIN — THIAMINE HCL TAB 100 MG 100 MG: 100 TAB at 07:58

## 2022-06-14 RX ADMIN — OXYCODONE HYDROCHLORIDE 5 MG: 5 TABLET ORAL at 07:59

## 2022-06-14 RX ADMIN — ASPIRIN 81 MG: 81 TABLET, COATED ORAL at 07:59

## 2022-06-14 RX ADMIN — AMLODIPINE BESYLATE 5 MG: 5 TABLET ORAL at 07:59

## 2022-06-14 RX ADMIN — OXYCODONE HYDROCHLORIDE 5 MG: 5 TABLET ORAL at 13:53

## 2022-06-14 RX ADMIN — UMECLIDINIUM 1 PUFF: 62.5 AEROSOL, POWDER ORAL at 07:15

## 2022-06-14 RX ADMIN — METOPROLOL SUCCINATE 50 MG: 50 TABLET, EXTENDED RELEASE ORAL at 07:58

## 2022-06-14 RX ADMIN — CARBIDOPA AND LEVODOPA 2 TABLET: 25; 100 TABLET, EXTENDED RELEASE ORAL at 10:19

## 2022-06-14 RX ADMIN — SENNOSIDES AND DOCUSATE SODIUM 1 TABLET: 50; 8.6 TABLET ORAL at 07:58

## 2022-06-14 ASSESSMENT — ACTIVITIES OF DAILY LIVING (ADL)
ADLS_ACUITY_SCORE: 29

## 2022-06-14 NOTE — PLAN OF CARE
Physical Therapy Discharge Summary    Reason for therapy discharge:    Discharged to transitional care facility.    Progress towards therapy goal(s). See goals on Care Plan in [facility] electronic health record for goal details.  Goals partially met.  Barriers to achieving goals:   discharge from facility.    Therapy recommendation(s):    Continued therapy is recommended.  Rationale/Recommendations:  TCU recommended for further progression of functional mobility and safety.

## 2022-06-14 NOTE — PLAN OF CARE
PRIMARY DIAGNOSIS: Left Ankle Fracture   OUTPATIENT/OBSERVATION GOALS TO BE MET BEFORE DISCHARGE:  1. ADLs back to baseline: No, Assist x1 with Pivot. Baseline; Independent.     2. Activity and level of assistance: Up with standby assistance.    3. Pain status: Improved-controlled with oral pain medications. Pt is trying not to use the oxy too much. Per pt, it slows down the healing process.     4. Return to near baseline physical activity: No     Discharge Planner Nurse   Safe discharge environment identified: Yes, Discharging to a TCU, awaiting Insurance paperwork to finalize.   Barriers to discharge: Yes    Pt is AOX4, VSS, afebrile, CMS ok denies numbness in BOLE. Saline locked, baseline neuropathy in BLE, no tingling, uses urinal in bed, NWB on left leg. Pt does not like to get out of bed, Writer attempted to have pt ambulate t the bathroom to void. But prefers to stay in bed instead of  ambulating to the bathroom to void.     Pt's discharge is pending insurance paperwork for the TCU admission.          Entered by: Patricia So RN 06/14/2022      Please review provider order for any additional goals.   Nurse to notify provider when observation goals have been met and patient is ready for discharge.

## 2022-06-14 NOTE — DISCHARGE SUMMARY
[facility]  Hospitalist Discharge Summary      Date of Admission:  6/8/2022  Date of Discharge:  6/14/2022  Discharging Provider: Nash Penaloza DO  Discharge Service: Hospitalist Service    Discharge Diagnoses   -L ankle syndesmotic injury s/p ORIF by ortho on 6/9/2022  -acute on chronic hyponatremia  -parkinsons disease  -HTN  -alcohol use  -hx DLD, COPD    Follow-ups Needed After Discharge   Follow-up Appointments     Follow Up and recommended labs and tests      Follow up with Dr. Jackson, at [facility] [place], within 2   weeks  to evaluate after surgery. No follow up labs or test are needed.  Call Ally for appointment and questions during normal business hours   [phone]  After hours and on-call [phone]    If emergent need with dressing/splint or operative site you may also be   seen at walk-in clinic open from 8am to 8 pm everyday           Discharge Disposition   Discharge to TCU  Condition at discharge: Stable  Patient ready to discharge to a skilled nursing facility as soon as possible in order to create capacity for patients related to the COVID-19 pandemic.    Hospital Course   Rashaun Molina is a 62 year old male admitted on 6/8/2022. He has a past medical history significant for Parkinson's, hypertension, hyperlipidemia, COPD, and chronic hyponatremia.  He was found to have left ankle syndesmotic injury.     1.  Left ankle syndesmotic injury.  -Status post ORIF by orthopedic surgery on 6/9.  -cont ASA 81 BID per ortho and follow-up with them as outpt  -discharge to TCU for further therapy, pain control and bowel regimen as ordered     2.  Acute on chronic hyponatremia.  -Sodium initially noted to be 120.  -Sodium now stable at 125 which is near baseline for past two years  -follow-up with PCP     3.  Parkinson's disease.  -Continue carbidopa/levodopa.     4.  Hypertension.  -Increase Norvasc to 5 mg a day.  -Continue lisinopril 40 mg a day.  -Continue  metoprolol 50 mg a day.     5.  Hyperlipidemia.  -Continue rosuvastatin 40 mg a day.     6.  COPD.  No acute exacerbation.  -Continue umeclidinium.  -Albuterol if needed.    7. Alcohol use  -reports 3 beers a day, no withdrawal while here  -cont thiamine and folate      Consultations This Hospital Stay   CARE MANAGEMENT / SOCIAL WORK IP CONSULT  PHYSICAL THERAPY ADULT IP CONSULT  HOSPITALIST IP CONSULT  CARE MANAGEMENT / SOCIAL WORK IP CONSULT  PHYSICAL THERAPY ADULT IP CONSULT  OCCUPATIONAL THERAPY ADULT IP CONSULT  PHYSICAL THERAPY ADULT IP CONSULT  OCCUPATIONAL THERAPY ADULT IP CONSULT  OCCUPATIONAL THERAPY ADULT IP CONSULT    Code Status   Full Code    Time Spent on this Encounter   I, Nash Penaloza DO, personally saw the patient today and spent greater than 30 minutes discharging this patient.       Nash Penaloza DO  [facility]  [address] [place] [address]  Phone: [phone]  Fax: [phone]  ______________________________________________________________________    Physical Exam   Vital Signs: Temp: 97.5  F (36.4  C) Temp src: Temporal BP: 125/55 Pulse: 68   Resp: 20 SpO2: 96 % O2 Device: None (Room air)    Weight: 197 lbs 8 oz  Face to face completed day of discharge       Primary Care Physician   Yemi Nava    Discharge Orders      General info for SNF    Length of Stay Estimate: Short Term Care: Estimated # of Days <30  Condition at Discharge: Improving  Level of care:skilled   Rehabilitation Potential: Good  Admission H&P remains valid and up-to-date: Yes  Recent Chemotherapy: N/A  Use Nursing Home Standing Orders: Yes     Mantoux instructions    Give two-step Mantoux (PPD) Per Facility Policy Yes     Follow Up and recommended labs and tests    Follow up with Dr. Jackson, at [facility] [place], within 2 weeks  to evaluate after surgery. No follow up labs or test are needed.  Call Ally for appointment and questions during normal  business hours [phone]  After hours and on-call [phone]    If emergent need with dressing/splint or operative site you may also be seen at walk-in clinic open from 8am to 8 pm everyday     Reason for your hospital stay    Left ankle fracture/dislocation open reduction and internal fixation     Weight bearing status    TTWB left LE with walker     Activity - Up with assistive device     Additional Discharge Instructions    Aggressive elevation of the left LE x 2 weeks  Ice PRN     Wound care    Site:   Left LE  Instructions:  keep splint clean, dry and intact     Occupational Therapy Adult Consult    Evaluate and treat as clinically indicated.    Reason:  Left ankle fracture/dislocation open reduction and internal fixation     Physical Therapy Adult Consult    Evaluate and treat as clinically indicated.    Reason:  Left ankle fracture/dislocation open reduction and internal fixation     Fall precautions     Rolling Knee Walker DME    DME Documentation: Describe the reason for need to support medical necessity: Impaired gait due to Foot/Ankle surgery. Anticipated length of need: 3 months     Crutches DME    DME Documentation: Describe the reason for need to support medical necessity: Impaired gait due to Foot/Ankle surgery. Anticipated length of need: 3 months     Diet    Follow this diet upon discharge: Orders Placed This Encounter      Advance Diet as Tolerated: Regular Diet Adult       Significant Results and Procedures   Results for orders placed or performed during the hospital encounter of 06/08/22   CT Ankle Left w/o Contrast    Narrative    EXAM: CT ANKLE LEFT W/O CONTRAST  LOCATION: [facility]  DATE/TIME: 6/8/2022 5:28 PM    INDICATION: Left-sided ankle pain and fracture.  COMPARISON: 05/28/2022 radiographs.  TECHNIQUE: Noncontrast. Axial, sagittal and coronal thin-section reconstruction. Dose reduction techniques were used.     FINDINGS:     BONES AND JOINTS:  -Small acute  fracture of the tibia posterior malleolus. The fracture fragment measures 3 x 19 x 13 mm (AP, LR, SI) and demonstrates 6 mm of lateral displacement.  -Widened distal tibia-fibula syndesmosis, measuring 11 mm posteriorly (series 2, image 74). Widening of the medial ankle clear space, measuring 8.5 mm (series 4, image 29).  -There is a 6 mm subchondral lucency in the medial talar dome. No subchondral impaction or fragmentation.  -Prominence of the calcaneus anterior process adjacent to the navicular bone.    SOFT TISSUES:  -Moderate subcutaneous edema about the ankle.  -No CT evidence of tendon tear or entrapment.  -Ankle cast.      Impression    IMPRESSION:  1.  Small mildly displaced fracture of the tibia posterior malleolus.  2.  Distal tibia-fibula syndesmosis and deltoid ligament injuries. There is evidenced by widening of the distal tibia-fibula syndesmosis as well as the medial ankle clear space.  3.  Moderate subcutaneous edema about the ankle.  4.  Tiny subchondral cyst in the medial talar dome, consistent with an osteochondral lesion. No evidence of subchondral impaction or fragmentation.  5.  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     XR Surgery SARABJIT L/T 5 Min Fluoro w Stills    Narrative    This exam was marked as non-reportable because it will not be read by a   radiologist or a [facility] non-radiologist provider.               Discharge Medications   Current Discharge Medication List      START taking these medications    Details   acetaminophen (TYLENOL) 325 MG tablet Take 2 tablets (650 mg) by mouth every 4 hours as needed for other (For optimal non-opioid multimodal pain management to improve pain control.)    Associated Diagnoses: Status post open reduction with internal fixation (ORIF) of fracture of ankle      amLODIPine (NORVASC) 5 MG tablet Take 1 tablet (5 mg) by mouth daily for 30 days  Qty: 30 tablet, Refills: 0    Associated Diagnoses: Essential hypertension, benign       folic acid (FOLVITE) 1 MG tablet Take 1 tablet (1 mg) by mouth daily for 30 days  Qty: 30 tablet, Refills: 0    Associated Diagnoses: Alcohol use      oxyCODONE (ROXICODONE) 5 MG tablet Take 1 tablet (5 mg) by mouth every 4 hours as needed for moderate to severe pain  Qty: 30 tablet, Refills: 0    Associated Diagnoses: Status post open reduction with internal fixation (ORIF) of fracture of ankle      polyethylene glycol (MIRALAX) 17 GM/Dose powder Take 17 g by mouth daily as needed for constipation  Qty: 510 g    Associated Diagnoses: Status post open reduction with internal fixation (ORIF) of fracture of ankle      senna-docusate (SENOKOT-S/PERICOLACE) 8.6-50 MG tablet Take 2 tablets by mouth 2 times daily as needed for constipation    Associated Diagnoses: Status post open reduction with internal fixation (ORIF) of fracture of ankle      thiamine (B-1) 100 MG tablet Take 1 tablet (100 mg) by mouth daily for 30 days  Qty: 30 tablet, Refills: 0    Associated Diagnoses: Alcohol use         CONTINUE these medications which have CHANGED    Details   aspirin (ASA) 81 MG EC tablet Take 1 tablet (81 mg) by mouth 2 times daily For 30 days (DVT prophylaxis)  Qty: 60 tablet, Refills: 0    Associated Diagnoses: Status post open reduction with internal fixation (ORIF) of fracture of ankle      carbidopa-levodopa (SINEMET)  MG tablet Take 2 tablets by mouth 3 times daily (Takes at 0400, 1000, and 1600)    Associated Diagnoses: Closed fracture of left ankle, initial encounter      methocarbamol (ROBAXIN) 500 MG tablet Take 1 tablet (500 mg) by mouth 3 times daily as needed for muscle spasms  Qty: 20 tablet, Refills: 0    Associated Diagnoses: Sprain of other ligament of left ankle, subsequent encounter         CONTINUE these medications which have NOT CHANGED    Details   lisinopril (ZESTRIL) 40 MG tablet Take 1 tablet (40 mg) by mouth daily.  Qty: 30 tablet, Refills: 0    Comments: Needs OV for refills  Associated  Diagnoses: Essential hypertension, benign      metoprolol succinate ER (TOPROL XL) 50 MG 24 hr tablet Take 1 tablet (50 mg) by mouth daily  Qty: 30 tablet, Refills: 0    Comments: Needs OV for refills  Associated Diagnoses: Essential hypertension, benign      rosuvastatin (CRESTOR) 40 MG tablet Take 1 tablet (40 mg) by mouth At Bedtime  Qty: 30 tablet, Refills: 0    Comments: Needs OV for refills  Associated Diagnoses: Hyperlipidemia LDL goal <70      umeclidinium (INCRUSE ELLIPTA) 62.5 MCG/INH inhaler Inhale 1 puff into the lungs daily  Qty: 30 each, Refills: 1    Associated Diagnoses: Chronic bronchitis, unspecified chronic bronchitis type (H)           Allergies   Allergies   Allergen Reactions     Spiriva Handihaler Blisters     Hctz [Hydrochlorothiazide] Other (See Comments)     Low sodium

## 2022-06-14 NOTE — PLAN OF CARE
Day RN (0700-1930)    Patient vital signs are at baseline: No,  Reason:  BP's still a bit high, improved with adjusted scheduled BP meds.  Patient able to ambulate as they were prior to admission or with assist devices provided by therapies during their stay:  Yes  Patient MUST void prior to discharge:  Yes  Patient able to tolerate oral intake:  Yes  Pain has adequate pain control using Oral analgesics:  Yes  Does patient have an identified :  Yes  Has goal D/C date and time been discussed with patient:  Yes    Pt A/O x4.  VSS and afebrile.  Pain managed adequately with PRN PO tylenol and one dose of PRN PO oxycodone.  CMS intact - baseline neuropathy to BLE's.  Dressing CDI to L ankle, ankle elevated on wedge pillow all shift.  Up A1 with walker and gait belt - NWB to LLE.  Voiding adequately.  Loose stool again today, stool softeners held.  Tolerating regular diet well.  Plan is TCU on discharge pending insurance authorization.  Will continue to monitor.

## 2022-06-14 NOTE — PROGRESS NOTES
Care Management Discharge Note    Discharge Date: 06/14/2022       Discharge Disposition: Transitional Care    Discharge Services: None    Discharge DME: None    Discharge Transportation: family or friend will provide    Private pay costs discussed: transportation costs    PAS Confirmation Code: [id number]  Patient/family educated on Medicare website which has current facility and service quality ratings: no    Education Provided on the Discharge Plan: Yes    Persons Notified of Discharge Plans: Pt  Patient/Family in Agreement with the Plan: yes    Handoff Referral Completed: No    Additional Information:  Talked to pt about discharge plans. Pt was agreeable to plan to go to [facility] today. Talked to pt brother about a ride to [facility] who expressed that it would be preferred to get a wheelchair ride, pt was in agreement. Ride is set for 2:30.     Reviewed out of pocket cost for [employer] transport, $81.80 for base rate and $5.26 per mile to the destination. Spoke with pt, they expressed understanding and are agreeable to this.     Updated nurse, HUC, and the facility.       DAPHNEY Pappas

## 2022-06-27 DIAGNOSIS — I10 ESSENTIAL HYPERTENSION, BENIGN: ICD-10-CM

## 2022-06-27 DIAGNOSIS — E78.5 HYPERLIPIDEMIA LDL GOAL <70: ICD-10-CM

## 2022-06-27 RX ORDER — LISINOPRIL 40 MG/1
40 TABLET ORAL
Qty: 30 TABLET | Refills: 0 | COMMUNITY
Start: 2022-06-27

## 2022-06-27 RX ORDER — METOPROLOL SUCCINATE 50 MG/1
50 TABLET, EXTENDED RELEASE ORAL
Qty: 30 TABLET | Refills: 0 | COMMUNITY
Start: 2022-06-27

## 2022-06-27 RX ORDER — ROSUVASTATIN CALCIUM 40 MG/1
40 TABLET, COATED ORAL
Qty: 30 TABLET | Refills: 0 | COMMUNITY
Start: 2022-06-27

## 2022-06-29 ENCOUNTER — TRANSFERRED RECORDS (OUTPATIENT)
Dept: FAMILY MEDICINE | Facility: CLINIC | Age: 62
End: 2022-06-29

## 2022-07-18 ENCOUNTER — TRANSFERRED RECORDS (OUTPATIENT)
Dept: FAMILY MEDICINE | Facility: CLINIC | Age: 62
End: 2022-07-18

## 2022-07-27 ENCOUNTER — TRANSFERRED RECORDS (OUTPATIENT)
Dept: FAMILY MEDICINE | Facility: CLINIC | Age: 62
End: 2022-07-27

## 2022-07-27 ENCOUNTER — OFFICE VISIT (OUTPATIENT)
Dept: FAMILY MEDICINE | Facility: CLINIC | Age: 62
End: 2022-07-27

## 2022-07-27 VITALS
BODY MASS INDEX: 28.92 KG/M2 | HEART RATE: 65 BPM | HEIGHT: 70 IN | WEIGHT: 202 LBS | SYSTOLIC BLOOD PRESSURE: 128 MMHG | TEMPERATURE: 98.1 F | DIASTOLIC BLOOD PRESSURE: 68 MMHG | OXYGEN SATURATION: 96 %

## 2022-07-27 DIAGNOSIS — Z01.818 PRE-OP EXAM: Primary | ICD-10-CM

## 2022-07-27 DIAGNOSIS — S82.892A ANKLE FRACTURE, LEFT, CLOSED, INITIAL ENCOUNTER: ICD-10-CM

## 2022-07-27 LAB
% GRANULOCYTES: 70.9 %
BUN SERPL-MCNC: 15 MG/DL (ref 7–25)
BUN/CREATININE RATIO: 14.9 (ref 6–22)
CALCIUM SERPL-MCNC: 9.6 MG/DL (ref 8.6–10.3)
CHLORIDE SERPLBLD-SCNC: 94.3 MMOL/L (ref 98–110)
CO2 SERPL-SCNC: 27.6 MMOL/L (ref 20–32)
CREAT SERPL-MCNC: 1.01 MG/DL (ref 0.6–1.3)
GLUCOSE SERPL-MCNC: 100 MG/DL (ref 60–99)
HCT VFR BLD AUTO: 33.8 % (ref 40–53)
HEMOGLOBIN: 11.2 G/DL (ref 13.3–17.7)
LYMPHOCYTES NFR BLD AUTO: 22.7 %
MCH RBC QN AUTO: 33.5 PG (ref 26–33)
MCHC RBC AUTO-ENTMCNC: 33.1 G/DL (ref 31–36)
MCV RBC AUTO: 101.3 FL (ref 78–100)
MONOCYTES NFR BLD AUTO: 6.4 %
PLATELET COUNT - QUEST: 273 10^9/L (ref 150–375)
POTASSIUM SERPL-SCNC: 4.61 MMOL/L (ref 3.5–5.3)
RBC # BLD AUTO: 3.34 10*12/L (ref 4.4–5.9)
SODIUM SERPL-SCNC: 130.9 MMOL/L (ref 135–146)
WBC # BLD AUTO: 9.8 10*9/L (ref 4–11)

## 2022-07-27 PROCEDURE — 99214 OFFICE O/P EST MOD 30 MIN: CPT | Performed by: PHYSICIAN ASSISTANT

## 2022-07-27 PROCEDURE — 36415 COLL VENOUS BLD VENIPUNCTURE: CPT | Performed by: PHYSICIAN ASSISTANT

## 2022-07-27 PROCEDURE — 85025 COMPLETE CBC W/AUTO DIFF WBC: CPT | Performed by: PHYSICIAN ASSISTANT

## 2022-07-27 PROCEDURE — 80048 BASIC METABOLIC PNL TOTAL CA: CPT | Performed by: PHYSICIAN ASSISTANT

## 2022-07-27 NOTE — PROGRESS NOTES
[facility]YSIC[facility]ANS  [address] [place] [address]  [address]  [facility] [address]  Phone: [phone]  Fax: [phone]  Primary Provider: Yemi Nava  Pre-op Performing Provider: DONNA VILLEGAS      PREOPERATIVE EVALUATION:  Today's date: 7/27/2022    Rashaun Molina is a 62 year old male who presents for a preoperative evaluation.    Surgical Information:  Surgery/Procedure: left ankle repair surgery  Surgery Location: [facility]  Surgeon: Dr. Jackson  Surgery Date: 7/29/2022  Time of Surgery: 7:30 am  Where patient plans to recover: At home with family  Fax number for surgical facility: [phone]    Type of Anesthesia Anticipated: to be determined    Assessment & Plan     The proposed surgical procedure is considered INTERMEDIATE risk.    Pre-op exam  Proceed with surgery at surgeon's discretion    Ankle fracture, left, closed, initial encounter      Risks and Recommendations:  The patient has the following additional risks and recommendations for perioperative complications:   - No identified additional risk factors other than previously addressed    Medication Instructions:  Patient is to take all scheduled medications on the day of surgery EXCEPT for modifications listed below: Discussed with surgical team, BP meds before surgery, all others after    RECOMMENDATION:  APPROVAL GIVEN to proceed with proposed procedure, without further diagnostic evaluation.      Subjective     HPI related to upcoming procedure: Revision from previous surgery-outpatient      1. No - Have you ever had a heart attack or stroke?  2. No - Have you ever had surgery on your heart or blood vessels, such as a stent, coronary (heart) bypass, or surgery on an artery in the head, neck, heart, or legs?  3. No - Do you have chest pain when you are physically active?  4. No - Do you have a history of heart failure?  5. No - Do you currently have a cold, bronchitis, or symptoms of other respiratory (head and chest)  infections?  6. No - Do you have a cough, shortness of breath, or wheezing?  7. No - Do you or anyone in your family have a history of blood clots?  8. No - Do you or anyone in your family have a serious bleeding problem, such as long-lasting bleeding after surgeries or cuts?  9. No - Have you ever had anemia or been told to take iron pills?  10. No - Have you had any abnormal blood loss such as black, tarry or bloody stools, or abnormal vaginal bleeding?  11. No - Have you ever had a blood transfusion?  12. Yes - Are you willing to have a blood transfusion if it is medically needed before, during, or after your surgery?  13. No - Have you or anyone in your family ever had problems with anesthesia (sedation for surgery)?  14. No - Do you have sleep apnea, excessive snoring, or daytime drowsiness?   15. No - Do you have any artifical heart valves or other implanted medical devices, such as a pacemaker, defibrillator, or continuous glucose monitor?  16. Yes - Do you have any artifical joints? R hip  17. No - Are you allergic to latex?      Health Care Directive:  Patient does not have a Health Care Directive or Living Will: Discussed advance care planning with patient; however, patient declined at this time.    Preoperative Review of :   reviewed - controlled substances reflected in medication list.      Status of Chronic Conditions:  HYPERLIPIDEMIA - Patient has a long history of significant Hyperlipidemia requiring medication for treatment with recent good control. Patient reports no problems or side effects with the medication.     HYPERTENSION - Patient has longstanding history of HTN , currently denies any symptoms referable to elevated blood pressure. Specifically denies chest pain, palpitations, dyspnea, orthopnea, PND or peripheral edema. Blood pressure readings have been in normal range. Current medication regimen is as listed below. Patient denies any side effects of medication.       Review of  Systems  CONSTITUTIONAL: NEGATIVE for fever, chills, change in weight  INTEGUMENTARY/SKIN: NEGATIVE for worrisome rashes, moles or lesions  EYES: NEGATIVE for vision changes or irritation  ENT/MOUTH: NEGATIVE for ear, mouth and throat problems  RESP: NEGATIVE for significant cough or SOB  CV: NEGATIVE for chest pain, palpitations or peripheral edema  GI: NEGATIVE for nausea, abdominal pain, heartburn, or change in bowel habits  : NEGATIVE for frequency, dysuria, or hematuria  NEURO: NEGATIVE for weakness, dizziness or paresthesias  ENDOCRINE: NEGATIVE for temperature intolerance, skin/hair changes  HEME: NEGATIVE for bleeding problems  PSYCHIATRIC: NEGATIVE for changes in mood or affect    Patient Active Problem List    Diagnosis Date Noted     Closed left ankle fracture 06/08/2022     Priority: Medium     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     Priority: Medium     Colonic polyp 11/03/2021     Priority: Medium     Parkinson disease (H) 07/30/2020     Priority: Medium     Palpitations 12/31/2018     Priority: Medium     PVD (peripheral vascular disease) (H) 04/30/2018     Priority: Medium     Chronic bronchitis, unspecified chronic bronchitis type (H) 10/30/2017     Priority: Medium     Obesity due to excess calories, unspecified obesity severity 07/06/2016     Priority: Medium     S/P total hip arthroplasty 10/07/2015     Priority: Medium     Colovesical fistula 04/08/2013     Priority: Medium     BCC (basal cell carcinoma) 04/13/2011     Priority: Medium     Mohs 2011       History of colonic polyps 06/28/2005     Priority: Medium     Problem list name updated by automated process. Provider to review       Pure hypercholesterolemia      Priority: Medium     Essential hypertension, benign      Priority: Medium     Health Care Home 03/18/2013     Priority: Low     State Tier Level:  Tier 2  Status:  na  Care Coordinator:      See Letters for [facility] Care Plan            Past Medical History:   Diagnosis Date     Arthritis       Chronic airway obstruction, not elsewhere classified 3/8/2004    pt says that he does not have COPD     Cough syncope 11/21/2012     Essential hypertension, benign      Fistula     colon/bladder     Fracture of right proximal fibula 7/30/2014     Orthostatic hypotension 11/24/2014     Other chronic pain     right hip pain     PAD (peripheral artery disease) (H) 08/2018    PTA right SFA Dr. Lee     Palpitations 12/2018 01/2019 Event monitor- SR/ST     Personal history of colonic polyps 6/28/2005     Pure hypercholesterolemia      Tobacco use disorder 3/8/2004     Vasovagal syncopes 10/25/2014     Viral warts, unspecified     genital     Past Surgical History:   Procedure Laterality Date     ARTHROPLASTY HIP Right 10/7/2015    Procedure: ARTHROPLASTY HIP;  Surgeon: Neil Davis MD;  Location:  OR     COLONOSCOPY  9/05, 4/10/13    Per Hospital encounter dated 4/18/13     COLONOSCOPY N/A 4/9/2018    Procedure: COMBINED COLONOSCOPY, SINGLE OR MULTIPLE BIOPSY/POLYPECTOMY BY BIOPSY;;  Surgeon: Tramaine Zuniga MD;  Location:  GI     COMBINED CYSTOSCOPY, INSERT CATHETER URETER  4/11/2013    Procedure: COMBINED CYSTOSCOPY, INSERT CATHETER URETER;;  Surgeon: Kashif Parks MD;  Location:  OR     HC LARYNGOSCOPY DIRECT W VOCAL CORD INJECTION      vocal cord polyps     LAPAROSCOPIC ASSISTED COLECTOMY  4/11/2013    Procedure: LAPAROSCOPIC ASSISTED COLECTOMY;  LOW ANTERIOR RESECTION ;  Surgeon: Tramaine Zuniga MD;  Location:  OR     OPEN REDUCTION INTERNAL FIXATION ANKLE Left 6/9/2022    Procedure: .  Open reduction internal fixation of left ankle syndesmotic injury 2.  Non-operative treatment of left proximal fibula fracture;  Surgeon: Mike Jackson MD;  Location:  OR     ZZ COLONOSCOPY THRU STOMA W BIOPSY/CAUTERY TUMOR/POLYP/LESION  1998    jacoboer, tubular adenoma, next colonoscopy 2001     ZZ COLONOSCOPY THRU STOMA, DIAGNOSTIC  04/16/01    negative, ligate two internal hemmorhoids    nemer-repeat 2008     Current Outpatient Medications   Medication Sig Dispense Refill     acetaminophen (TYLENOL) 325 MG tablet Take 2 tablets (650 mg) by mouth every 4 hours as needed for other (For optimal non-opioid multimodal pain management to improve pain control.)       aspirin (ASA) 81 MG EC tablet Take 1 tablet (81 mg) by mouth 2 times daily For 30 days (DVT prophylaxis) 60 tablet 0     carbidopa-levodopa (SINEMET)  MG tablet Take 2 tablets by mouth 3 times daily (Takes at 0400, 1000, and 1600)       lisinopril (ZESTRIL) 40 MG tablet Take 1 tablet (40 mg) by mouth daily. 30 tablet 0     metoprolol succinate ER (TOPROL XL) 50 MG 24 hr tablet Take 1 tablet (50 mg) by mouth daily 30 tablet 0     rosuvastatin (CRESTOR) 40 MG tablet Take 1 tablet (40 mg) by mouth At Bedtime 30 tablet 0     umeclidinium (INCRUSE ELLIPTA) 62.5 MCG/INH inhaler Inhale 1 puff into the lungs daily 30 each 1       Allergies   Allergen Reactions     Spiriva Handihaler Blisters     Hctz [Hydrochlorothiazide] Other (See Comments)     Low sodium        Social History     Tobacco Use     Smoking status: Former Smoker     Packs/day: 1.50     Years: 40.00     Pack years: 60.00     Quit date: 2013     Years since quittin.2     Smokeless tobacco: Never Used   Substance Use Topics     Alcohol use: Yes     Alcohol/week: 14.0 standard drinks     Types: 14 Cans of beer per week     Comment: 2 beers daily     Family History   Problem Relation Age of Onset     Hypertension Mother      Hypertension Father          MI     Heart Disease Father         MI  at age 55     Coronary Artery Disease Father      Hyperlipidemia Brother      Hypertension Brother      Heart Surgery Brother         defibrillator     Hypertension Sister      Prostate Cancer No family hx of      History   Drug Use No     Comment: in 1970's used marijauna and cocaine         Objective     /68 (BP Location: Right arm, Patient Position: Sitting, Cuff Size:  "Adult Large)   Pulse 65   Temp 98.1  F (36.7  C) (Temporal)   Ht 1.778 m (5' 10\")   Wt 91.6 kg (202 lb)   SpO2 96%   BMI 28.98 kg/m      Physical Exam    GENERAL APPEARANCE: healthy, alert and no distress     HENT: nose and mouth without ulcers or lesions     NECK: no adenopathy, no asymmetry, masses, or scars and thyroid normal to palpation     RESP: lungs clear to auscultation - no rales, rhonchi or wheezes     CV: regular rates and rhythm, normal S1 S2, no S3 or S4 and no murmur, click or rub     ABDOMEN:  soft, nontender, no HSM or masses and bowel sounds normal     MS: extremities normal- no gross deformities noted, no evidence of inflammation in joints, FROM in all extremities.     NEURO: Normal strength and tone, sensory exam grossly normal, mentation intact and speech normal  Skin: small boil noted on L cheek     PSYCH: mentation appears normal. and affect normal/bright     LYMPHATICS: No cervical adenopathy    Recent Labs   Lab Test 06/13/22  0605 06/12/22  0601 06/10/22  0654 06/08/22  2151 06/08/22  1657   HGB  --  10.7* 10.3*  --  11.1*   PLT  --  123* 125*  --  122*   INR  --   --   --   --  1.11   *  --  125*   < > 120*   POTASSIUM 4.3  --  4.2   < > 4.6   CR 0.90  --  0.88   < > 0.75    < > = values in this interval not displayed.      Patient states Na has been chronically low his whole life, improved vs last BMP  CBC Hb has improved today vs last reading.    Diagnostics:  Recent Results (from the past 24 hour(s))   HEMOGRAM PLATELET DIFF (BFP)    Collection Time: 07/27/22  2:34 PM   Result Value Ref Range    WBC 9.8 4.0 - 11 10*9/L    RBC Count 3.34 (A) 4.4 - 5.9 10*12/L    Hemoglobin 11.2 (A) 13.3 - 17.7 g/dL    Hematocrit 33.8 (A) 40.0 - 53.0 %    .3 (A) 78 - 100 fL    MCH 33.5 (A) 26 - 33 pg    MCHC 33.1 31 - 36 g/dL    Platelet Count 273 150 - 375 10^9/L    % Granulocytes 70.9 %    % Lymphocytes 22.7 %    % Monocytes 6.4 %      No EKG this visit, completed in the last 90 " days.    Revised Cardiac Risk Index (RCRI):  The patient has the following serious cardiovascular risks for perioperative complications:   - No serious cardiac risks = 0 points     RCRI Interpretation: 0 points: Class I (very low risk - 0.4% complication rate)           Signed Electronically by: Candelario Saul PA-C  Copy of this evaluation report is provided to requesting physician.

## 2022-07-27 NOTE — H&P (VIEW-ONLY)
[facility] PHYSICIANS  [address] [place] [address]  [address]  [facility] [address]  Phone: [phone]  Fax: [phone]  Primary Provider: Yemi Nava  Pre-op Performing Provider: DONNA VILLEGAS      PREOPERATIVE EVALUATION:  Today's date: 7/27/2022    Rashaun Molina is a 62 year old male who presents for a preoperative evaluation.    Surgical Information:  Surgery/Procedure: left ankle repair surgery  Surgery Location: [facility]  Surgeon: Dr. Jackson  Surgery Date: 7/29/2022  Time of Surgery: 7:30 am  Where patient plans to recover: At home with family  Fax number for surgical facility: [phone]    Type of Anesthesia Anticipated: to be determined    Assessment & Plan     The proposed surgical procedure is considered INTERMEDIATE risk.    Pre-op exam  Proceed with surgery at surgeon's discretion    Ankle fracture, left, closed, initial encounter      Risks and Recommendations:  The patient has the following additional risks and recommendations for perioperative complications:   - No identified additional risk factors other than previously addressed    Medication Instructions:  Patient is to take all scheduled medications on the day of surgery EXCEPT for modifications listed below: Discussed with surgical team, BP meds before surgery, all others after    RECOMMENDATION:  APPROVAL GIVEN to proceed with proposed procedure, without further diagnostic evaluation.      Subjective     HPI related to upcoming procedure: Revision from previous surgery-outpatient      1. No - Have you ever had a heart attack or stroke?  2. No - Have you ever had surgery on your heart or blood vessels, such as a stent, coronary (heart) bypass, or surgery on an artery in the head, neck, heart, or legs?  3. No - Do you have chest pain when you are physically active?  4. No - Do you have a history of heart failure?  5. No - Do you currently have a cold, bronchitis, or symptoms of other respiratory (head and chest)  infections?  6. No - Do you have a cough, shortness of breath, or wheezing?  7. No - Do you or anyone in your family have a history of blood clots?  8. No - Do you or anyone in your family have a serious bleeding problem, such as long-lasting bleeding after surgeries or cuts?  9. No - Have you ever had anemia or been told to take iron pills?  10. No - Have you had any abnormal blood loss such as black, tarry or bloody stools, or abnormal vaginal bleeding?  11. No - Have you ever had a blood transfusion?  12. Yes - Are you willing to have a blood transfusion if it is medically needed before, during, or after your surgery?  13. No - Have you or anyone in your family ever had problems with anesthesia (sedation for surgery)?  14. No - Do you have sleep apnea, excessive snoring, or daytime drowsiness?   15. No - Do you have any artifical heart valves or other implanted medical devices, such as a pacemaker, defibrillator, or continuous glucose monitor?  16. Yes - Do you have any artifical joints? R hip  17. No - Are you allergic to latex?      Health Care Directive:  Patient does not have a Health Care Directive or Living Will: Discussed advance care planning with patient; however, patient declined at this time.    Preoperative Review of :   reviewed - controlled substances reflected in medication list.      Status of Chronic Conditions:  HYPERLIPIDEMIA - Patient has a long history of significant Hyperlipidemia requiring medication for treatment with recent good control. Patient reports no problems or side effects with the medication.     HYPERTENSION - Patient has longstanding history of HTN , currently denies any symptoms referable to elevated blood pressure. Specifically denies chest pain, palpitations, dyspnea, orthopnea, PND or peripheral edema. Blood pressure readings have been in normal range. Current medication regimen is as listed below. Patient denies any side effects of medication.       Review of  Systems  CONSTITUTIONAL: NEGATIVE for fever, chills, change in weight  INTEGUMENTARY/SKIN: NEGATIVE for worrisome rashes, moles or lesions  EYES: NEGATIVE for vision changes or irritation  ENT/MOUTH: NEGATIVE for ear, mouth and throat problems  RESP: NEGATIVE for significant cough or SOB  CV: NEGATIVE for chest pain, palpitations or peripheral edema  GI: NEGATIVE for nausea, abdominal pain, heartburn, or change in bowel habits  : NEGATIVE for frequency, dysuria, or hematuria  NEURO: NEGATIVE for weakness, dizziness or paresthesias  ENDOCRINE: NEGATIVE for temperature intolerance, skin/hair changes  HEME: NEGATIVE for bleeding problems  PSYCHIATRIC: NEGATIVE for changes in mood or affect    Patient Active Problem List    Diagnosis Date Noted     Closed left ankle fracture 06/08/2022     Priority: Medium     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     Priority: Medium     Colonic polyp 11/03/2021     Priority: Medium     Parkinson disease (H) 07/30/2020     Priority: Medium     Palpitations 12/31/2018     Priority: Medium     PVD (peripheral vascular disease) (H) 04/30/2018     Priority: Medium     Chronic bronchitis, unspecified chronic bronchitis type (H) 10/30/2017     Priority: Medium     Obesity due to excess calories, unspecified obesity severity 07/06/2016     Priority: Medium     S/P total hip arthroplasty 10/07/2015     Priority: Medium     Colovesical fistula 04/08/2013     Priority: Medium     BCC (basal cell carcinoma) 04/13/2011     Priority: Medium     Mohs 2011       History of colonic polyps 06/28/2005     Priority: Medium     Problem list name updated by automated process. Provider to review       Pure hypercholesterolemia      Priority: Medium     Essential hypertension, benign      Priority: Medium     Health Care Home 03/18/2013     Priority: Low     State Tier Level:  Tier 2  Status:  na  Care Coordinator:      See Letters for [facility] Care Plan            Past Medical History:   Diagnosis Date     Arthritis       Chronic airway obstruction, not elsewhere classified 3/8/2004    pt says that he does not have COPD     Cough syncope 11/21/2012     Essential hypertension, benign      Fistula     colon/bladder     Fracture of right proximal fibula 7/30/2014     Orthostatic hypotension 11/24/2014     Other chronic pain     right hip pain     PAD (peripheral artery disease) (H) 08/2018    PTA right SFA Dr. Lee     Palpitations 12/2018 01/2019 Event monitor- SR/ST     Personal history of colonic polyps 6/28/2005     Pure hypercholesterolemia      Tobacco use disorder 3/8/2004     Vasovagal syncopes 10/25/2014     Viral warts, unspecified     genital     Past Surgical History:   Procedure Laterality Date     ARTHROPLASTY HIP Right 10/7/2015    Procedure: ARTHROPLASTY HIP;  Surgeon: Neil Davis MD;  Location:  OR     COLONOSCOPY  9/05, 4/10/13    Per Hospital encounter dated 4/18/13     COLONOSCOPY N/A 4/9/2018    Procedure: COMBINED COLONOSCOPY, SINGLE OR MULTIPLE BIOPSY/POLYPECTOMY BY BIOPSY;;  Surgeon: Tramaine Zuniga MD;  Location:  GI     COMBINED CYSTOSCOPY, INSERT CATHETER URETER  4/11/2013    Procedure: COMBINED CYSTOSCOPY, INSERT CATHETER URETER;;  Surgeon: Kashif Parks MD;  Location:  OR     HC LARYNGOSCOPY DIRECT W VOCAL CORD INJECTION      vocal cord polyps     LAPAROSCOPIC ASSISTED COLECTOMY  4/11/2013    Procedure: LAPAROSCOPIC ASSISTED COLECTOMY;  LOW ANTERIOR RESECTION ;  Surgeon: Tramaine Zuniga MD;  Location:  OR     OPEN REDUCTION INTERNAL FIXATION ANKLE Left 6/9/2022    Procedure: .  Open reduction internal fixation of left ankle syndesmotic injury 2.  Non-operative treatment of left proximal fibula fracture;  Surgeon: Mike Jackson MD;  Location:  OR     ZZ COLONOSCOPY THRU STOMA W BIOPSY/CAUTERY TUMOR/POLYP/LESION  1998    jacoboer, tubular adenoma, next colonoscopy 2001     ZZ COLONOSCOPY THRU STOMA, DIAGNOSTIC  04/16/01    negative, ligate two internal hemmorhoids    nemer-repeat 2008     Current Outpatient Medications   Medication Sig Dispense Refill     acetaminophen (TYLENOL) 325 MG tablet Take 2 tablets (650 mg) by mouth every 4 hours as needed for other (For optimal non-opioid multimodal pain management to improve pain control.)       aspirin (ASA) 81 MG EC tablet Take 1 tablet (81 mg) by mouth 2 times daily For 30 days (DVT prophylaxis) 60 tablet 0     carbidopa-levodopa (SINEMET)  MG tablet Take 2 tablets by mouth 3 times daily (Takes at 0400, 1000, and 1600)       lisinopril (ZESTRIL) 40 MG tablet Take 1 tablet (40 mg) by mouth daily. 30 tablet 0     metoprolol succinate ER (TOPROL XL) 50 MG 24 hr tablet Take 1 tablet (50 mg) by mouth daily 30 tablet 0     rosuvastatin (CRESTOR) 40 MG tablet Take 1 tablet (40 mg) by mouth At Bedtime 30 tablet 0     umeclidinium (INCRUSE ELLIPTA) 62.5 MCG/INH inhaler Inhale 1 puff into the lungs daily 30 each 1       Allergies   Allergen Reactions     Spiriva Handihaler Blisters     Hctz [Hydrochlorothiazide] Other (See Comments)     Low sodium        Social History     Tobacco Use     Smoking status: Former Smoker     Packs/day: 1.50     Years: 40.00     Pack years: 60.00     Quit date: 2013     Years since quittin.2     Smokeless tobacco: Never Used   Substance Use Topics     Alcohol use: Yes     Alcohol/week: 14.0 standard drinks     Types: 14 Cans of beer per week     Comment: 2 beers daily     Family History   Problem Relation Age of Onset     Hypertension Mother      Hypertension Father          MI     Heart Disease Father         MI  at age 55     Coronary Artery Disease Father      Hyperlipidemia Brother      Hypertension Brother      Heart Surgery Brother         defibrillator     Hypertension Sister      Prostate Cancer No family hx of      History   Drug Use No     Comment: in 1970's used marijauna and cocaine         Objective     /68 (BP Location: Right arm, Patient Position: Sitting, Cuff Size:  "Adult Large)   Pulse 65   Temp 98.1  F (36.7  C) (Temporal)   Ht 1.778 m (5' 10\")   Wt 91.6 kg (202 lb)   SpO2 96%   BMI 28.98 kg/m      Physical Exam    GENERAL APPEARANCE: healthy, alert and no distress     HENT: nose and mouth without ulcers or lesions     NECK: no adenopathy, no asymmetry, masses, or scars and thyroid normal to palpation     RESP: lungs clear to auscultation - no rales, rhonchi or wheezes     CV: regular rates and rhythm, normal S1 S2, no S3 or S4 and no murmur, click or rub     ABDOMEN:  soft, nontender, no HSM or masses and bowel sounds normal     MS: extremities normal- no gross deformities noted, no evidence of inflammation in joints, FROM in all extremities.     NEURO: Normal strength and tone, sensory exam grossly normal, mentation intact and speech normal  Skin: small boil noted on L cheek     PSYCH: mentation appears normal. and affect normal/bright     LYMPHATICS: No cervical adenopathy    Recent Labs   Lab Test 06/13/22  0605 06/12/22  0601 06/10/22  0654 06/08/22  2151 06/08/22  1657   HGB  --  10.7* 10.3*  --  11.1*   PLT  --  123* 125*  --  122*   INR  --   --   --   --  1.11   *  --  125*   < > 120*   POTASSIUM 4.3  --  4.2   < > 4.6   CR 0.90  --  0.88   < > 0.75    < > = values in this interval not displayed.      Patient states Na has been chronically low his whole life, improved vs last BMP  CBC Hb has improved today vs last reading.    Diagnostics:  Recent Results (from the past 24 hour(s))   HEMOGRAM PLATELET DIFF (BFP)    Collection Time: 07/27/22  2:34 PM   Result Value Ref Range    WBC 9.8 4.0 - 11 10*9/L    RBC Count 3.34 (A) 4.4 - 5.9 10*12/L    Hemoglobin 11.2 (A) 13.3 - 17.7 g/dL    Hematocrit 33.8 (A) 40.0 - 53.0 %    .3 (A) 78 - 100 fL    MCH 33.5 (A) 26 - 33 pg    MCHC 33.1 31 - 36 g/dL    Platelet Count 273 150 - 375 10^9/L    % Granulocytes 70.9 %    % Lymphocytes 22.7 %    % Monocytes 6.4 %      No EKG this visit, completed in the last 90 " days.    Revised Cardiac Risk Index (RCRI):  The patient has the following serious cardiovascular risks for perioperative complications:   - No serious cardiac risks = 0 points     RCRI Interpretation: 0 points: Class I (very low risk - 0.4% complication rate)           Signed Electronically by: Candelario Saul PA-C  Copy of this evaluation report is provided to requesting physician.

## 2022-07-27 NOTE — NURSING NOTE
Chief Complaint   Patient presents with     Pre-Op Exam     Left ankle surgery/repair on 7/29/2022 at [facility]

## 2022-08-06 NOTE — ED NOTES
Air cast applied, writer asked pt if he would like a walker to go home with as well, pt declined, stated that he has a cane at home and will use that. Pt wheeled to friend's truck and assisted into vehicle. Strongly advised pt to return if he starts to feel unwell for any reason. Pt verbalized understanding.   Unable to assess

## 2022-08-17 ENCOUNTER — MEDICAL CORRESPONDENCE (OUTPATIENT)
Dept: HEALTH INFORMATION MANAGEMENT | Facility: CLINIC | Age: 62
End: 2022-08-17

## 2022-08-19 ENCOUNTER — TRANSFERRED RECORDS (OUTPATIENT)
Dept: FAMILY MEDICINE | Facility: CLINIC | Age: 62
End: 2022-08-19

## 2022-08-24 ENCOUNTER — APPOINTMENT (OUTPATIENT)
Dept: GENERAL RADIOLOGY | Facility: CLINIC | Age: 62
DRG: 493 | End: 2022-08-24
Attending: ORTHOPAEDIC SURGERY
Payer: COMMERCIAL

## 2022-08-24 ENCOUNTER — HOSPITAL ENCOUNTER (INPATIENT)
Facility: CLINIC | Age: 62
LOS: 3 days | Discharge: HOME | DRG: 493 | End: 2022-08-27
Attending: ORTHOPAEDIC SURGERY | Admitting: ORTHOPAEDIC SURGERY
Payer: COMMERCIAL

## 2022-08-24 ENCOUNTER — ANESTHESIA (OUTPATIENT)
Dept: SURGERY | Facility: CLINIC | Age: 62
DRG: 493 | End: 2022-08-24
Payer: COMMERCIAL

## 2022-08-24 ENCOUNTER — ANESTHESIA EVENT (OUTPATIENT)
Dept: SURGERY | Facility: CLINIC | Age: 62
DRG: 493 | End: 2022-08-24
Payer: COMMERCIAL

## 2022-08-24 ENCOUNTER — APPOINTMENT (OUTPATIENT)
Dept: PHYSICAL THERAPY | Facility: CLINIC | Age: 62
DRG: 493 | End: 2022-08-24
Payer: COMMERCIAL

## 2022-08-24 DIAGNOSIS — S93.432S SYNDESMOTIC DISRUPTION OF ANKLE, LEFT, SEQUELA: Primary | ICD-10-CM

## 2022-08-24 DIAGNOSIS — T84.7XXA INFECTED HARDWARE IN LEFT LOWER EXTREMITY, INITIAL ENCOUNTER (H): ICD-10-CM

## 2022-08-24 LAB
CREAT SERPL-MCNC: 0.69 MG/DL (ref 0.67–1.17)
CRP SERPL-MCNC: 16.26 MG/L
ERYTHROCYTE [SEDIMENTATION RATE] IN BLOOD BY WESTERGREN METHOD: 24 MM/HR (ref 0–20)
GFR SERPL CREATININE-BSD FRML MDRD: >90 ML/MIN/1.73M2
GRAM STAIN RESULT: NORMAL
GRAM STAIN RESULT: NORMAL
HGB BLD-MCNC: 12.6 G/DL (ref 13.3–17.7)
HIV 1+2 AB+HIV1P24 AG SERPLBLD IA.RAPID: NON REACTIVE
HIV 1+2 AB+HIV1P24 AG SERPLBLD IA.RAPID: NON REACTIVE
HIV INTERPRETATION: NORMAL
SARS-COV-2 RNA RESP QL NAA+PROBE: NEGATIVE

## 2022-08-24 PROCEDURE — 360N000084 HC SURGERY LEVEL 4 W/ FLUORO, PER MIN: Performed by: ORTHOPAEDIC SURGERY

## 2022-08-24 PROCEDURE — 82565 ASSAY OF CREATININE: CPT | Performed by: ANESTHESIOLOGY

## 2022-08-24 PROCEDURE — 250N000011 HC RX IP 250 OP 636: Performed by: NURSE ANESTHETIST, CERTIFIED REGISTERED

## 2022-08-24 PROCEDURE — 258N000003 HC RX IP 258 OP 636

## 2022-08-24 PROCEDURE — 97530 THERAPEUTIC ACTIVITIES: CPT | Mod: GP

## 2022-08-24 PROCEDURE — C1713 ANCHOR/SCREW BN/BN,TIS/BN: HCPCS | Performed by: ORTHOPAEDIC SURGERY

## 2022-08-24 PROCEDURE — 36415 COLL VENOUS BLD VENIPUNCTURE: CPT | Performed by: INTERNAL MEDICINE

## 2022-08-24 PROCEDURE — U0003 INFECTIOUS AGENT DETECTION BY NUCLEIC ACID (DNA OR RNA); SEVERE ACUTE RESPIRATORY SYNDROME CORONAVIRUS 2 (SARS-COV-2) (CORONAVIRUS DISEASE [COVID-19]), AMPLIFIED PROBE TECHNIQUE, MAKING USE OF HIGH THROUGHPUT TECHNOLOGIES AS DESCRIBED BY CMS-2020-01-R: HCPCS | Performed by: ANESTHESIOLOGY

## 2022-08-24 PROCEDURE — 710N000009 HC RECOVERY PHASE 1, LEVEL 1, PER MIN: Performed by: ORTHOPAEDIC SURGERY

## 2022-08-24 PROCEDURE — 0QBH0ZZ EXCISION OF LEFT TIBIA, OPEN APPROACH: ICD-10-PCS | Performed by: ORTHOPAEDIC SURGERY

## 2022-08-24 PROCEDURE — 85652 RBC SED RATE AUTOMATED: CPT | Performed by: INTERNAL MEDICINE

## 2022-08-24 PROCEDURE — 250N000013 HC RX MED GY IP 250 OP 250 PS 637: Performed by: ANESTHESIOLOGY

## 2022-08-24 PROCEDURE — 99222 1ST HOSP IP/OBS MODERATE 55: CPT | Performed by: INTERNAL MEDICINE

## 2022-08-24 PROCEDURE — 250N000013 HC RX MED GY IP 250 OP 250 PS 637: Performed by: HOSPITALIST

## 2022-08-24 PROCEDURE — 0QPH04Z REMOVAL OF INTERNAL FIXATION DEVICE FROM LEFT TIBIA, OPEN APPROACH: ICD-10-PCS | Performed by: ORTHOPAEDIC SURGERY

## 2022-08-24 PROCEDURE — 0QBK0ZZ EXCISION OF LEFT FIBULA, OPEN APPROACH: ICD-10-PCS | Performed by: ORTHOPAEDIC SURGERY

## 2022-08-24 PROCEDURE — 250N000009 HC RX 250: Performed by: ORTHOPAEDIC SURGERY

## 2022-08-24 PROCEDURE — 99222 1ST HOSP IP/OBS MODERATE 55: CPT | Performed by: HOSPITALIST

## 2022-08-24 PROCEDURE — 87102 FUNGUS ISOLATION CULTURE: CPT | Performed by: ORTHOPAEDIC SURGERY

## 2022-08-24 PROCEDURE — 258N000003 HC RX IP 258 OP 636: Performed by: NURSE ANESTHETIST, CERTIFIED REGISTERED

## 2022-08-24 PROCEDURE — 250N000009 HC RX 250: Performed by: NURSE ANESTHETIST, CERTIFIED REGISTERED

## 2022-08-24 PROCEDURE — 87205 SMEAR GRAM STAIN: CPT | Performed by: ORTHOPAEDIC SURGERY

## 2022-08-24 PROCEDURE — 85018 HEMOGLOBIN: CPT

## 2022-08-24 PROCEDURE — 999N000141 HC STATISTIC PRE-PROCEDURE NURSING ASSESSMENT: Performed by: ORTHOPAEDIC SURGERY

## 2022-08-24 PROCEDURE — 272N000001 HC OR GENERAL SUPPLY STERILE: Performed by: ORTHOPAEDIC SURGERY

## 2022-08-24 PROCEDURE — 250N000013 HC RX MED GY IP 250 OP 250 PS 637

## 2022-08-24 PROCEDURE — 0SSG04Z REPOSITION LEFT ANKLE JOINT WITH INTERNAL FIXATION DEVICE, OPEN APPROACH: ICD-10-PCS | Performed by: ORTHOPAEDIC SURGERY

## 2022-08-24 PROCEDURE — 120N000001 HC R&B MED SURG/OB

## 2022-08-24 PROCEDURE — 87075 CULTR BACTERIA EXCEPT BLOOD: CPT | Performed by: ORTHOPAEDIC SURGERY

## 2022-08-24 PROCEDURE — 258N000003 HC RX IP 258 OP 636: Performed by: ANESTHESIOLOGY

## 2022-08-24 PROCEDURE — 86140 C-REACTIVE PROTEIN: CPT | Performed by: INTERNAL MEDICINE

## 2022-08-24 PROCEDURE — 999N000179 XR SURGERY CARM FLUORO LESS THAN 5 MIN W STILLS: Mod: TC

## 2022-08-24 PROCEDURE — 250N000011 HC RX IP 250 OP 636

## 2022-08-24 PROCEDURE — 97161 PT EVAL LOW COMPLEX 20 MIN: CPT | Mod: GP

## 2022-08-24 PROCEDURE — 0LQT0ZZ REPAIR LEFT ANKLE TENDON, OPEN APPROACH: ICD-10-PCS | Performed by: ORTHOPAEDIC SURGERY

## 2022-08-24 PROCEDURE — 370N000017 HC ANESTHESIA TECHNICAL FEE, PER MIN: Performed by: ORTHOPAEDIC SURGERY

## 2022-08-24 PROCEDURE — 0QPK04Z REMOVAL OF INTERNAL FIXATION DEVICE FROM LEFT FIBULA, OPEN APPROACH: ICD-10-PCS | Performed by: ORTHOPAEDIC SURGERY

## 2022-08-24 PROCEDURE — 999N000104 HIV RAPID ANTIBODY SCREEN: Performed by: INTERNAL MEDICINE

## 2022-08-24 PROCEDURE — 87077 CULTURE AEROBIC IDENTIFY: CPT | Performed by: ORTHOPAEDIC SURGERY

## 2022-08-24 DEVICE — IMP ANCHOR ARTHREX 5.5MM BIOCOMP CRKSCR W/4 NDL AR-1927BCNF: Type: IMPLANTABLE DEVICE | Site: ANKLE | Status: FUNCTIONAL

## 2022-08-24 DEVICE — IMP ANCHOR ARTHREX BIOCOMP PUSHLOCK 3.5X19.5MM AR-1926BC: Type: IMPLANTABLE DEVICE | Site: ANKLE | Status: FUNCTIONAL

## 2022-08-24 DEVICE — IMP SCR SYN CORTEX 3.5X60MM SELF TAP SS 204.860: Type: IMPLANTABLE DEVICE | Site: ANKLE | Status: FUNCTIONAL

## 2022-08-24 DEVICE — IMP PLATE SYN 1/3 TUBULAR 81MM 07H SS 241.371: Type: IMPLANTABLE DEVICE | Site: ANKLE | Status: FUNCTIONAL

## 2022-08-24 DEVICE — IMP SCR SYN CORTEX 3.5X50MM SELF TAP SS 204.850: Type: IMPLANTABLE DEVICE | Site: ANKLE | Status: FUNCTIONAL

## 2022-08-24 DEVICE — IMP SCR SYN CORTEX 3.5X55MM SELF TAP SS 204.855: Type: IMPLANTABLE DEVICE | Site: ANKLE | Status: FUNCTIONAL

## 2022-08-24 DEVICE — IMP SCR SYN 3.5X18MM LOCKING W/STARDRIVE SS 212.105: Type: IMPLANTABLE DEVICE | Site: ANKLE | Status: FUNCTIONAL

## 2022-08-24 RX ORDER — NALOXONE HYDROCHLORIDE 0.4 MG/ML
0.4 INJECTION, SOLUTION INTRAMUSCULAR; INTRAVENOUS; SUBCUTANEOUS
Status: DISCONTINUED | OUTPATIENT
Start: 2022-08-24 | End: 2022-08-27 | Stop reason: HOSPADM

## 2022-08-24 RX ORDER — OXYCODONE HYDROCHLORIDE 5 MG/1
5 TABLET ORAL EVERY 4 HOURS PRN
Status: DISCONTINUED | OUTPATIENT
Start: 2022-08-24 | End: 2022-08-24 | Stop reason: HOSPADM

## 2022-08-24 RX ORDER — FENTANYL CITRATE 50 UG/ML
INJECTION, SOLUTION INTRAMUSCULAR; INTRAVENOUS PRN
Status: DISCONTINUED | OUTPATIENT
Start: 2022-08-24 | End: 2022-08-24

## 2022-08-24 RX ORDER — PROPOFOL 10 MG/ML
INJECTION, EMULSION INTRAVENOUS PRN
Status: DISCONTINUED | OUTPATIENT
Start: 2022-08-24 | End: 2022-08-24

## 2022-08-24 RX ORDER — DEXAMETHASONE SODIUM PHOSPHATE 4 MG/ML
INJECTION, SOLUTION INTRA-ARTICULAR; INTRALESIONAL; INTRAMUSCULAR; INTRAVENOUS; SOFT TISSUE PRN
Status: DISCONTINUED | OUTPATIENT
Start: 2022-08-24 | End: 2022-08-24

## 2022-08-24 RX ORDER — ONDANSETRON 2 MG/ML
4 INJECTION INTRAMUSCULAR; INTRAVENOUS EVERY 6 HOURS PRN
Status: DISCONTINUED | OUTPATIENT
Start: 2022-08-24 | End: 2022-08-27 | Stop reason: HOSPADM

## 2022-08-24 RX ORDER — ACETAMINOPHEN 325 MG/1
975 TABLET ORAL ONCE
Status: COMPLETED | OUTPATIENT
Start: 2022-08-24 | End: 2022-08-24

## 2022-08-24 RX ORDER — GLYCOPYRROLATE 0.2 MG/ML
INJECTION, SOLUTION INTRAMUSCULAR; INTRAVENOUS PRN
Status: DISCONTINUED | OUTPATIENT
Start: 2022-08-24 | End: 2022-08-24

## 2022-08-24 RX ORDER — BISACODYL 10 MG
10 SUPPOSITORY, RECTAL RECTAL DAILY PRN
Status: DISCONTINUED | OUTPATIENT
Start: 2022-08-24 | End: 2022-08-27 | Stop reason: HOSPADM

## 2022-08-24 RX ORDER — POLYETHYLENE GLYCOL 3350 17 G/17G
17 POWDER, FOR SOLUTION ORAL DAILY
Status: DISCONTINUED | OUTPATIENT
Start: 2022-08-25 | End: 2022-08-27 | Stop reason: HOSPADM

## 2022-08-24 RX ORDER — METOPROLOL TARTRATE 1 MG/ML
1-2 INJECTION, SOLUTION INTRAVENOUS EVERY 5 MIN PRN
Status: DISCONTINUED | OUTPATIENT
Start: 2022-08-24 | End: 2022-08-24 | Stop reason: HOSPADM

## 2022-08-24 RX ORDER — HYDROMORPHONE HCL IN WATER/PF 6 MG/30 ML
0.2 PATIENT CONTROLLED ANALGESIA SYRINGE INTRAVENOUS EVERY 5 MIN PRN
Status: DISCONTINUED | OUTPATIENT
Start: 2022-08-24 | End: 2022-08-24 | Stop reason: HOSPADM

## 2022-08-24 RX ORDER — CEFAZOLIN SODIUM/WATER 2 G/20 ML
2 SYRINGE (ML) INTRAVENOUS SEE ADMIN INSTRUCTIONS
Status: DISCONTINUED | OUTPATIENT
Start: 2022-08-24 | End: 2022-08-24 | Stop reason: HOSPADM

## 2022-08-24 RX ORDER — FENTANYL CITRATE 50 UG/ML
25 INJECTION, SOLUTION INTRAMUSCULAR; INTRAVENOUS EVERY 5 MIN PRN
Status: DISCONTINUED | OUTPATIENT
Start: 2022-08-24 | End: 2022-08-24 | Stop reason: HOSPADM

## 2022-08-24 RX ORDER — HYDROMORPHONE HCL IN WATER/PF 6 MG/30 ML
0.2 PATIENT CONTROLLED ANALGESIA SYRINGE INTRAVENOUS
Status: DISCONTINUED | OUTPATIENT
Start: 2022-08-24 | End: 2022-08-27 | Stop reason: HOSPADM

## 2022-08-24 RX ORDER — PROCHLORPERAZINE MALEATE 5 MG
10 TABLET ORAL EVERY 6 HOURS PRN
Status: DISCONTINUED | OUTPATIENT
Start: 2022-08-24 | End: 2022-08-27 | Stop reason: HOSPADM

## 2022-08-24 RX ORDER — NALOXONE HYDROCHLORIDE 0.4 MG/ML
0.2 INJECTION, SOLUTION INTRAMUSCULAR; INTRAVENOUS; SUBCUTANEOUS
Status: DISCONTINUED | OUTPATIENT
Start: 2022-08-24 | End: 2022-08-27 | Stop reason: HOSPADM

## 2022-08-24 RX ORDER — LIDOCAINE 40 MG/G
CREAM TOPICAL
Status: DISCONTINUED | OUTPATIENT
Start: 2022-08-24 | End: 2022-08-24 | Stop reason: HOSPADM

## 2022-08-24 RX ORDER — ONDANSETRON 4 MG/1
4 TABLET, ORALLY DISINTEGRATING ORAL EVERY 6 HOURS PRN
Status: DISCONTINUED | OUTPATIENT
Start: 2022-08-24 | End: 2022-08-27 | Stop reason: HOSPADM

## 2022-08-24 RX ORDER — CEFAZOLIN SODIUM/WATER 2 G/20 ML
2 SYRINGE (ML) INTRAVENOUS
Status: COMPLETED | OUTPATIENT
Start: 2022-08-24 | End: 2022-08-24

## 2022-08-24 RX ORDER — METOPROLOL SUCCINATE 50 MG/1
50 TABLET, EXTENDED RELEASE ORAL DAILY
Status: DISCONTINUED | OUTPATIENT
Start: 2022-08-24 | End: 2022-08-27 | Stop reason: HOSPADM

## 2022-08-24 RX ORDER — ACETAMINOPHEN 325 MG/1
650 TABLET ORAL EVERY 4 HOURS PRN
Status: DISCONTINUED | OUTPATIENT
Start: 2022-08-27 | End: 2022-08-27 | Stop reason: HOSPADM

## 2022-08-24 RX ORDER — ACETAMINOPHEN 325 MG/1
975 TABLET ORAL EVERY 8 HOURS
Status: COMPLETED | OUTPATIENT
Start: 2022-08-24 | End: 2022-08-27

## 2022-08-24 RX ORDER — SODIUM CHLORIDE, SODIUM LACTATE, POTASSIUM CHLORIDE, CALCIUM CHLORIDE 600; 310; 30; 20 MG/100ML; MG/100ML; MG/100ML; MG/100ML
INJECTION, SOLUTION INTRAVENOUS CONTINUOUS
Status: DISCONTINUED | OUTPATIENT
Start: 2022-08-24 | End: 2022-08-24 | Stop reason: HOSPADM

## 2022-08-24 RX ORDER — MEPERIDINE HYDROCHLORIDE 25 MG/ML
12.5 INJECTION INTRAMUSCULAR; INTRAVENOUS; SUBCUTANEOUS
Status: DISCONTINUED | OUTPATIENT
Start: 2022-08-24 | End: 2022-08-24 | Stop reason: HOSPADM

## 2022-08-24 RX ORDER — LIDOCAINE HYDROCHLORIDE 10 MG/ML
INJECTION, SOLUTION INFILTRATION; PERINEURAL PRN
Status: DISCONTINUED | OUTPATIENT
Start: 2022-08-24 | End: 2022-08-24

## 2022-08-24 RX ORDER — LIDOCAINE 40 MG/G
CREAM TOPICAL
Status: DISCONTINUED | OUTPATIENT
Start: 2022-08-24 | End: 2022-08-26

## 2022-08-24 RX ORDER — HYDROMORPHONE HCL IN WATER/PF 6 MG/30 ML
0.4 PATIENT CONTROLLED ANALGESIA SYRINGE INTRAVENOUS
Status: DISCONTINUED | OUTPATIENT
Start: 2022-08-24 | End: 2022-08-27 | Stop reason: HOSPADM

## 2022-08-24 RX ORDER — MAGNESIUM HYDROXIDE 1200 MG/15ML
LIQUID ORAL PRN
Status: DISCONTINUED | OUTPATIENT
Start: 2022-08-24 | End: 2022-08-24 | Stop reason: HOSPADM

## 2022-08-24 RX ORDER — FENTANYL CITRATE 50 UG/ML
25 INJECTION, SOLUTION INTRAMUSCULAR; INTRAVENOUS
Status: DISCONTINUED | OUTPATIENT
Start: 2022-08-24 | End: 2022-08-24 | Stop reason: HOSPADM

## 2022-08-24 RX ORDER — SODIUM CHLORIDE, SODIUM LACTATE, POTASSIUM CHLORIDE, CALCIUM CHLORIDE 600; 310; 30; 20 MG/100ML; MG/100ML; MG/100ML; MG/100ML
INJECTION, SOLUTION INTRAVENOUS CONTINUOUS
Status: DISCONTINUED | OUTPATIENT
Start: 2022-08-24 | End: 2022-08-27 | Stop reason: HOSPADM

## 2022-08-24 RX ORDER — KETOROLAC TROMETHAMINE 30 MG/ML
INJECTION, SOLUTION INTRAMUSCULAR; INTRAVENOUS PRN
Status: DISCONTINUED | OUTPATIENT
Start: 2022-08-24 | End: 2022-08-24

## 2022-08-24 RX ORDER — OXYCODONE HYDROCHLORIDE 5 MG/1
5 TABLET ORAL EVERY 4 HOURS PRN
Status: DISCONTINUED | OUTPATIENT
Start: 2022-08-24 | End: 2022-08-27 | Stop reason: HOSPADM

## 2022-08-24 RX ORDER — CARBIDOPA AND LEVODOPA 25; 100 MG/1; MG/1
2 TABLET ORAL 3 TIMES DAILY
Status: DISCONTINUED | OUTPATIENT
Start: 2022-08-24 | End: 2022-08-27 | Stop reason: HOSPADM

## 2022-08-24 RX ORDER — BUPIVACAINE HYDROCHLORIDE 5 MG/ML
INJECTION, SOLUTION EPIDURAL; INTRACAUDAL PRN
Status: DISCONTINUED | OUTPATIENT
Start: 2022-08-24 | End: 2022-08-24 | Stop reason: HOSPADM

## 2022-08-24 RX ORDER — HYDRALAZINE HYDROCHLORIDE 20 MG/ML
2.5-5 INJECTION INTRAMUSCULAR; INTRAVENOUS EVERY 10 MIN PRN
Status: DISCONTINUED | OUTPATIENT
Start: 2022-08-24 | End: 2022-08-24 | Stop reason: HOSPADM

## 2022-08-24 RX ORDER — ONDANSETRON 2 MG/ML
4 INJECTION INTRAMUSCULAR; INTRAVENOUS EVERY 30 MIN PRN
Status: DISCONTINUED | OUTPATIENT
Start: 2022-08-24 | End: 2022-08-24 | Stop reason: HOSPADM

## 2022-08-24 RX ORDER — ONDANSETRON 2 MG/ML
INJECTION INTRAMUSCULAR; INTRAVENOUS PRN
Status: DISCONTINUED | OUTPATIENT
Start: 2022-08-24 | End: 2022-08-24

## 2022-08-24 RX ORDER — LISINOPRIL 40 MG/1
40 TABLET ORAL DAILY
Status: DISCONTINUED | OUTPATIENT
Start: 2022-08-25 | End: 2022-08-27 | Stop reason: HOSPADM

## 2022-08-24 RX ORDER — CARBIDOPA AND LEVODOPA 25; 100 MG/1; MG/1
2 TABLET ORAL ONCE
Status: COMPLETED | OUTPATIENT
Start: 2022-08-24 | End: 2022-08-24

## 2022-08-24 RX ORDER — KETOROLAC TROMETHAMINE 15 MG/ML
15 INJECTION, SOLUTION INTRAMUSCULAR; INTRAVENOUS EVERY 6 HOURS
Status: COMPLETED | OUTPATIENT
Start: 2022-08-24 | End: 2022-08-25

## 2022-08-24 RX ORDER — ONDANSETRON 4 MG/1
4 TABLET, ORALLY DISINTEGRATING ORAL EVERY 30 MIN PRN
Status: DISCONTINUED | OUTPATIENT
Start: 2022-08-24 | End: 2022-08-24 | Stop reason: HOSPADM

## 2022-08-24 RX ORDER — KETOROLAC TROMETHAMINE 15 MG/ML
15 INJECTION, SOLUTION INTRAMUSCULAR; INTRAVENOUS EVERY 6 HOURS PRN
Status: DISCONTINUED | OUTPATIENT
Start: 2022-08-24 | End: 2022-08-24 | Stop reason: HOSPADM

## 2022-08-24 RX ORDER — ROSUVASTATIN CALCIUM 20 MG/1
40 TABLET, COATED ORAL DAILY
Status: DISCONTINUED | OUTPATIENT
Start: 2022-08-24 | End: 2022-08-27 | Stop reason: HOSPADM

## 2022-08-24 RX ORDER — OXYCODONE HYDROCHLORIDE 10 MG/1
10 TABLET ORAL EVERY 4 HOURS PRN
Status: DISCONTINUED | OUTPATIENT
Start: 2022-08-24 | End: 2022-08-27 | Stop reason: HOSPADM

## 2022-08-24 RX ORDER — AMOXICILLIN 250 MG
1 CAPSULE ORAL 2 TIMES DAILY
Status: DISCONTINUED | OUTPATIENT
Start: 2022-08-24 | End: 2022-08-27 | Stop reason: HOSPADM

## 2022-08-24 RX ORDER — EPHEDRINE SULFATE 50 MG/ML
INJECTION, SOLUTION INTRAMUSCULAR; INTRAVENOUS; SUBCUTANEOUS PRN
Status: DISCONTINUED | OUTPATIENT
Start: 2022-08-24 | End: 2022-08-24

## 2022-08-24 RX ADMIN — FENTANYL CITRATE 25 MCG: 50 INJECTION, SOLUTION INTRAMUSCULAR; INTRAVENOUS at 08:41

## 2022-08-24 RX ADMIN — HYDROMORPHONE HYDROCHLORIDE 0.5 MG: 1 INJECTION, SOLUTION INTRAMUSCULAR; INTRAVENOUS; SUBCUTANEOUS at 09:38

## 2022-08-24 RX ADMIN — FENTANYL CITRATE 100 MCG: 50 INJECTION, SOLUTION INTRAMUSCULAR; INTRAVENOUS at 08:01

## 2022-08-24 RX ADMIN — ROSUVASTATIN CALCIUM 40 MG: 20 TABLET, FILM COATED ORAL at 22:22

## 2022-08-24 RX ADMIN — TAZOBACTAM SODIUM AND PIPERACILLIN SODIUM 3.38 G: 375; 3 INJECTION, SOLUTION INTRAVENOUS at 17:47

## 2022-08-24 RX ADMIN — FENTANYL CITRATE 25 MCG: 50 INJECTION, SOLUTION INTRAMUSCULAR; INTRAVENOUS at 09:12

## 2022-08-24 RX ADMIN — PHENYLEPHRINE HYDROCHLORIDE 50 MCG: 10 INJECTION INTRAVENOUS at 09:34

## 2022-08-24 RX ADMIN — METOPROLOL SUCCINATE 50 MG: 50 TABLET, EXTENDED RELEASE ORAL at 16:32

## 2022-08-24 RX ADMIN — GLYCOPYRROLATE 0.2 MCG: 0.2 INJECTION, SOLUTION INTRAMUSCULAR; INTRAVENOUS at 08:01

## 2022-08-24 RX ADMIN — PHENYLEPHRINE HYDROCHLORIDE 50 MCG: 10 INJECTION INTRAVENOUS at 08:47

## 2022-08-24 RX ADMIN — LIDOCAINE HYDROCHLORIDE 50 MG: 10 INJECTION, SOLUTION INFILTRATION; PERINEURAL at 08:01

## 2022-08-24 RX ADMIN — ACETAMINOPHEN 975 MG: 325 TABLET ORAL at 07:43

## 2022-08-24 RX ADMIN — PHENYLEPHRINE HYDROCHLORIDE 50 MCG: 10 INJECTION INTRAVENOUS at 08:13

## 2022-08-24 RX ADMIN — VANCOMYCIN HYDROCHLORIDE 1750 MG: 10 INJECTION, POWDER, LYOPHILIZED, FOR SOLUTION INTRAVENOUS at 12:17

## 2022-08-24 RX ADMIN — SODIUM CHLORIDE, POTASSIUM CHLORIDE, SODIUM LACTATE AND CALCIUM CHLORIDE: 600; 310; 30; 20 INJECTION, SOLUTION INTRAVENOUS at 09:36

## 2022-08-24 RX ADMIN — KETOROLAC TROMETHAMINE 15 MG: 15 INJECTION, SOLUTION INTRAMUSCULAR; INTRAVENOUS at 16:33

## 2022-08-24 RX ADMIN — ACETAMINOPHEN 975 MG: 325 TABLET, FILM COATED ORAL at 13:38

## 2022-08-24 RX ADMIN — KETOROLAC TROMETHAMINE 15 MG: 15 INJECTION, SOLUTION INTRAMUSCULAR; INTRAVENOUS at 22:24

## 2022-08-24 RX ADMIN — DEXAMETHASONE SODIUM PHOSPHATE 4 MG: 4 INJECTION, SOLUTION INTRA-ARTICULAR; INTRALESIONAL; INTRAMUSCULAR; INTRAVENOUS; SOFT TISSUE at 08:01

## 2022-08-24 RX ADMIN — Medication 5 MG: at 08:10

## 2022-08-24 RX ADMIN — PROPOFOL 200 MG: 10 INJECTION, EMULSION INTRAVENOUS at 08:01

## 2022-08-24 RX ADMIN — VANCOMYCIN HYDROCHLORIDE 1250 MG: 10 INJECTION, POWDER, LYOPHILIZED, FOR SOLUTION INTRAVENOUS at 22:26

## 2022-08-24 RX ADMIN — MIDAZOLAM 2 MG: 1 INJECTION INTRAMUSCULAR; INTRAVENOUS at 07:52

## 2022-08-24 RX ADMIN — CARBIDOPA AND LEVODOPA 2 TABLET: 25; 100 TABLET ORAL at 16:32

## 2022-08-24 RX ADMIN — SENNOSIDES AND DOCUSATE SODIUM 1 TABLET: 50; 8.6 TABLET ORAL at 20:01

## 2022-08-24 RX ADMIN — Medication 10 MG: at 08:05

## 2022-08-24 RX ADMIN — ONDANSETRON HYDROCHLORIDE 4 MG: 2 INJECTION, SOLUTION INTRAVENOUS at 10:05

## 2022-08-24 RX ADMIN — ASPIRIN 325 MG: 325 TABLET ORAL at 17:46

## 2022-08-24 RX ADMIN — Medication 2 G: at 08:29

## 2022-08-24 RX ADMIN — SODIUM CHLORIDE, POTASSIUM CHLORIDE, SODIUM LACTATE AND CALCIUM CHLORIDE: 600; 310; 30; 20 INJECTION, SOLUTION INTRAVENOUS at 06:26

## 2022-08-24 RX ADMIN — ACETAMINOPHEN 975 MG: 325 TABLET, FILM COATED ORAL at 22:22

## 2022-08-24 RX ADMIN — CARBIDOPA AND LEVODOPA 2 TABLET: 25; 100 TABLET ORAL at 11:41

## 2022-08-24 RX ADMIN — FENTANYL CITRATE 25 MCG: 50 INJECTION, SOLUTION INTRAMUSCULAR; INTRAVENOUS at 09:05

## 2022-08-24 RX ADMIN — FENTANYL CITRATE 25 MCG: 50 INJECTION, SOLUTION INTRAMUSCULAR; INTRAVENOUS at 08:38

## 2022-08-24 RX ADMIN — PHENYLEPHRINE HYDROCHLORIDE 50 MCG: 10 INJECTION INTRAVENOUS at 09:08

## 2022-08-24 RX ADMIN — KETOROLAC TROMETHAMINE 30 MG: 30 INJECTION, SOLUTION INTRAMUSCULAR at 10:08

## 2022-08-24 RX ADMIN — TAZOBACTAM SODIUM AND PIPERACILLIN SODIUM 3.38 G: 375; 3 INJECTION, SOLUTION INTRAVENOUS at 11:41

## 2022-08-24 ASSESSMENT — ACTIVITIES OF DAILY LIVING (ADL)
DEPENDENT_IADLS:: INDEPENDENT
ADLS_ACUITY_SCORE: 35
DRESSING/BATHING_DIFFICULTY: NO
WEAR_GLASSES_OR_BLIND: YES
CHANGE_IN_FUNCTIONAL_STATUS_SINCE_ONSET_OF_CURRENT_ILLNESS/INJURY: YES
ADLS_ACUITY_SCORE: 24
ADLS_ACUITY_SCORE: 35
DOING_ERRANDS_INDEPENDENTLY_DIFFICULTY: NO
ADLS_ACUITY_SCORE: 37
ADLS_ACUITY_SCORE: 35
ADLS_ACUITY_SCORE: 24
DIFFICULTY_EATING/SWALLOWING: NO
TOILETING_ISSUES: NO
VISION_MANAGEMENT: GLASSES
ADLS_ACUITY_SCORE: 24
FALL_HISTORY_WITHIN_LAST_SIX_MONTHS: YES
ADLS_ACUITY_SCORE: 24
NUMBER_OF_TIMES_PATIENT_HAS_FALLEN_WITHIN_LAST_SIX_MONTHS: 1
ADLS_ACUITY_SCORE: 24
WALKING_OR_CLIMBING_STAIRS: AMBULATION DIFFICULTY, REQUIRES EQUIPMENT;STAIR CLIMBING DIFFICULTY, REQUIRES EQUIPMENT
WALKING_OR_CLIMBING_STAIRS_DIFFICULTY: YES
CONCENTRATING,_REMEMBERING_OR_MAKING_DECISIONS_DIFFICULTY: NO

## 2022-08-24 ASSESSMENT — COPD QUESTIONNAIRES
CAT_SEVERITY: MILD
COPD: 1

## 2022-08-24 NOTE — PHARMACY-VANCOMYCIN DOSING SERVICE
"Pharmacy Vancomycin Initial Note  Date of Service 2022  Patient's  1960  62 year old, male    Indication: Skin and Soft Tissue Infection    Current estimated CrCl = Estimated Creatinine Clearance: 124 mL/min (based on SCr of 0.69 mg/dL).    Creatinine for last 3 days  2022:  6:21 AM Creatinine 0.69 mg/dL    Recent Vancomycin Level(s) for last 3 days  No results found for requested labs within last 72 hours.      Vancomycin IV Administrations (past 72 hours)      No vancomycin orders with administrations in past 72 hours.                Nephrotoxins and other renal medications (From now, onward)    Start     Dose/Rate Route Frequency Ordered Stop    22 2200  vancomycin 1250 mg in 0.9% NaCl 250 mL intermittent infusion 1,250 mg         1,250 mg  over 90 Minutes Intravenous EVERY 12 HOURS 22 1131      22 1200  vancomycin 1750 mg in 0.9% NaCl 500 ml intermittent infusion 1,750 mg         1,750 mg  over 2 Hours Intravenous ONCE 22 1131      22 1100  piperacillin-tazobactam (ZOSYN) infusion 3.375 g        Note to Pharmacy: For [facility], [facility] and [facility]: For Zosyn-naive patients, use the \"Zosyn initial dose + extended infusion\" order panel.    3.375 g  100 mL/hr over 30 Minutes Intravenous EVERY 6 HOURS 22 1053      22 1045  ketorolac (TORADOL) injection 15 mg         15 mg Intravenous EVERY 6 HOURS PRN 22 1045 22 1044          Contrast Orders - past 72 hours (72h ago, onward)    None          [employer] Prediction of Planned Initial Vancomycin Regimen  Loading dose: 1750 mg at 12:00 2022.  Regimen: 1250 mg IV every 12 hours.  Start time: 11:29 on 2022  Exposure target: AUC24 (range)400-600 mg/L.hr   AUC24,ss: 488 mg/L.hr  Probability of AUC24 > 400: 70 %  Ctrough,ss: 14.8 mg/L  Probability of Ctrough,ss > 20: 27 %  Probability of nephrotoxicity (Lodise LATIA ): 10 %        Plan:  1. Start vancomycin  1750mg load, then 1250 mg IV q12h. "   2. Vancomycin monitoring method: AUC  3. Vancomycin therapeutic monitoring goal: 400-600 mg*h/L  4. Pharmacy will check vancomycin levels as appropriate in 1-3 Days.    5. Serum creatinine levels will be ordered daily for the first week of therapy and at least twice weekly for subsequent weeks.      Candelario Pantoja RP

## 2022-08-24 NOTE — INTERVAL H&P NOTE
"I have reviewed the surgical (or preoperative) H&P that is linked to this encounter, and examined the patient. There are no significant changes    Clinical Conditions Present on Arrival:  Clinically Significant Risk Factors Present on Admission           # Hyponatremia: Na = N/A on admission, will monitor as appropriate         # Overweight: Estimated body mass index is 28.63 kg/m  as calculated from the following:    Height as of this encounter: 1.778 m (5' 10\").    Weight as of this encounter: 90.5 kg (199 lb 8.3 oz).       "

## 2022-08-24 NOTE — PLAN OF CARE
Goal Outcome Evaluation: Ongoing, progressing.    Plan of care reviewed with: Patient    Pertinent Assessments: VSS. On RA. A/Ox4. LS wheezes on the right. Baseline numbness/tingling to BLE. LLE cap refill < 3 seconds. Cast intact. LLE elevated on wedge pillow.   Major Shift Events: Admit from PACU  Treatment Plan: ID consult. PT/OT. Per ortho note, anticipate need for 6 weeks IV abx. Discharge plan: to brother's house vs TCU.

## 2022-08-24 NOTE — PROGRESS NOTES
"   08/24/22 1435   Quick Adds   Type of Visit Initial PT Evaluation   Living Environment   People in Home alone   Current Living Arrangements house   Home Accessibility stairs to enter home   Number of Stairs, Main Entrance 2   Stair Railings, Main Entrance railings on both sides of stairs   Transportation Anticipated family or friend will provide   Living Environment Comments Pt is staying with his brother at discharge in a 1 level townhouse. No ANA LAURA. Brother is self-employed, has to leave at times but has a flexible schedule.   Self-Care   Usual Activity Tolerance moderate   Current Activity Tolerance fair   Regular Exercise No   Equipment Currently Used at Home cane, straight;crutches;other (see comments);walker, standard;shower chair  (knee scooter)   Fall history within last six months yes   Number of times patient has fallen within last six months 1   Activity/Exercise/Self-Care Comment Reports use of manual WC in home. Sometimes uses knee scooter outside of home. Reports IND with showering (with use of shower chair) and dressing.   General Information   Onset of Illness/Injury or Date of Surgery 08/24/22   Referring Physician Villa Garcia PA-C   Patient/Family Therapy Goals Statement (PT) \"to go home\"   Pertinent History of Current Problem (include personal factors and/or comorbidities that impact the POC) POD 0 L ankle ORIF revision and hardware removal   Existing Precautions/Restrictions weight bearing;fall   Weight-Bearing Status - LLE nonweight-bearing   Cognition   Affect/Mental Status (Cognition) WFL   Orientation Status (Cognition) oriented x 3   Follows Commands (Cognition) WFL   Pain Assessment   Patient Currently in Pain No   Integumentary/Edema   Integumentary/Edema other (describe)   Integumentary/Edema Comments ACE wrap over L LE, appears CDI   Posture    Posture Forward head position;Protracted shoulders   Range of Motion (ROM)   ROM Comment ROM WFL except L ankle ROM limitations consistent " with surgical history   Strength (Manual Muscle Testing)   Strength (Manual Muscle Testing) Able to perform L SLR;Able to perform R SLR   Bed Mobility   Bed Mobility supine-sit   Supine-Sit [place] (Bed Mobility) supervision   Transfers   Comment, (Transfers) Pt declines transfer trial this date due to fatigue and being POD 0   Gait/Stairs (Locomotion)   Comment, (Gait/Stairs) Ambulation not yet initiated this date - pt declines OOB mobility due to fatigue and being POD 0   Balance   Balance other (describe)   Balance Comments Good sitting balance   Sensory Examination   Sensory Perception other (describe)   Sensory Perception Comments Pt reports B LE neuropathy, light touch intact in B LE   Coordination   Coordination no deficits were identified   Clinical Impression   Criteria for Skilled Therapeutic Intervention Yes, treatment indicated   PT Diagnosis (PT) impaired functional mobility   Influenced by the following impairments impaired activity tolerance, post-op precautions/WB status, strength impairments   Functional limitations due to impairments Impaired bed mob, transfers, gait   Clinical Presentation (PT Evaluation Complexity) Stable/Uncomplicated   Clinical Presentation Rationale Based on current presentation, PMH, social support   Clinical Decision Making (Complexity) low complexity   Planned Therapy Interventions (PT) bed mobility training;gait training;home exercise program;patient/family education;strengthening;transfer training;home program guidelines   Risk & Benefits of therapy have been explained evaluation/treatment results reviewed;care plan/treatment goals reviewed;risks/benefits reviewed;current/potential barriers reviewed;participants voiced agreement with care plan;participants included;patient   PT Discharge Planning   PT Discharge Recommendation (DC Rec) home with assist;home with home care physical therapy   PT Rationale for DC Rec Pt SBA for bed mobility but declines OOB this date.  Appears to have a solid discharge plan. Owns all necessary DME, has been NWB for previous surgery, plans to stay with family at discharge and does not have to complete stairs. Anticipate that with continued skilled therapy, pt would be appropriate to discharge home with assist from family and HC PT. Pending pt progression, may benefit from TCU stay to maximize safety at initial discharge.   PT Brief overview of current status SBA bed mob, declines OOB this date   Plan of Care Review   Plan of Care Reviewed With patient   Total Evaluation Time   Total Evaluation Time (Minutes) 20   Physical Therapy Goals   PT Frequency Daily   PT Predicted Duration/Target Date for Goal Attainment 08/31/22   PT Goals Bed Mobility;Gait;Transfers   PT: Bed Mobility Independent;Supine to/from sit   PT: Transfers Modified independent;Bed to/from chair;Sit to/from stand;Assistive device   PT: Gait Modified independent;25 feet;Assistive device  (Knee scooter)

## 2022-08-24 NOTE — PHARMACY-ADMISSION MEDICATION HISTORY
Medication history and patient interview completed by pharmacy intern/student or pre-admitting RN. Reviewed by pharmacist, including [facility] dispense records, [facility] Care Everywhere, and chart review.       Gustavo Chowdhury, Pharm.D., BCPS      Nurse Complete Set By: Arleth Fonseca, RN at 08/23/2022 12:11 PM      Prior to Admission medications    Medication Sig Last Dose Taking? Auth Provider Long Term End Date   acetaminophen (TYLENOL) 325 MG tablet Take 2 tablets (650 mg) by mouth every 4 hours as needed for other (For optimal non-opioid multimodal pain management to improve pain control.) Past Week at Unknown time Yes Jazz Baumann PA-C     carbidopa-levodopa (SINEMET)  MG tablet Take 2 tablets by mouth 3 times daily (Takes at 0400, 1000, and 1600) 8/24/2022 at 0400 Yes Chiquita Schwartz PA Yes    umeclidinium (INCRUSE ELLIPTA) 62.5 MCG/INH inhaler Inhale 1 puff into the lungs daily Past Month at Unknown time Yes Yemi Nava MD     lisinopril (ZESTRIL) 40 MG tablet Take 1 tablet (40 mg) by mouth daily. 8/23/2022 at 0900  Yemi Nava MD Yes    metoprolol succinate ER (TOPROL XL) 50 MG 24 hr tablet Take 1 tablet (50 mg) by mouth daily 8/23/2022 at 0900  Yemi Nava MD Yes    rosuvastatin (CRESTOR) 40 MG tablet Take 1 tablet (40 mg) by mouth At Bedtime 8/23/2022 at 2100  Yemi Nava MD Yes

## 2022-08-24 NOTE — CONSULTS
[facility]    Hospitalist Consultation    Date of Admission:  8/24/2022  Date of Consult (When I saw the patient): 08/24/22    Provider: José Antonio Flores MD    Assessment & Plan   Rashaun Molina is a 62 year old male who was admitted on 8/24/2022. I was asked to see the patient for postsurgical management of medical comorbidities..    1.  Left ankle syndesmotic injury with failed hardware and wound dehiscence, presumed infection.  POD #0.  Postsurgical care, pain control, rehab and DVT prophylaxis per primary team.  Infectious disease has been involved for better antibiotic management.    2.   Parkinson disease well-controlled on home medication.  -Resume Sinemet as prior to admission.  3.  Essential hypertension.  -Resume metoprolol and losartan.  4.  Dyslipidemia.  -Continue rosuvastatin.  5.  Obstructive pulmonary disease without exacerbation.  -Continue Incruse Ellipta as prior to admission.     DVT Prophylaxis: Defer to primary service    Code Status: Prior    Disposition: Expected discharge per primary team plan.    Thank you so much for the opportunity of this consultation. I or one of my colleagues will follow along with you. Please contact me if you have any question regarding the plan of care.      Primary Care Physician   Yemi Nava    Chief Complaint   Left ankle pain.      History is obtained from the patient, electronic health record and emergency department physician    History of Present Illness   Rashaun Molina is a 62 year old male who has a complicated past medical history of Parkinson disease, essential hypertension, dyslipidemia, peripheral arterial disease, COPD and history of smoking among others.  He admitted for x-ray intervention in his left ankle with failed hardware at wound dehiscence and presumed superimposed infection.  Originally he had had a syndesmotic injury in June of this year.  He underwent surgery today and came back to his bed around 2 hours  ago.  He denies any chest pain, nausea, vomiting or shortness of breath.  He already tolerated lunch.  His level of pain is not significant at the moment.  He is now complaining of tremors from his Parkinson's disease or any other symptoms.    Past Medical History    I have reviewed this patient's medical history.   Past Medical History:   Diagnosis Date     Arthritis      Chronic airway obstruction, not elsewhere classified 03/08/2004    pt says that he does not have COPD     Cough syncope 11/21/2012     Essential hypertension, benign      Fistula     colon/bladder     Fracture of right proximal fibula 07/30/2014     Orthostatic hypotension 11/24/2014     Other chronic pain     right hip pain     PAD (peripheral artery disease) (H) 08/2018    PTA right SFA Dr. Lee     Palpitations 12/2018 01/2019 Event monitor- SR/ST     Parkinsons disease (H)      Personal history of colonic polyps 06/28/2005     Pure hypercholesterolemia      Tobacco use disorder 03/08/2004     Vasovagal syncopes 10/25/2014     Viral warts, unspecified     genital       Past Surgical History   I have reviewed this patient's surgical history and updated it with pertinent information if needed.  Past Surgical History:   Procedure Laterality Date     ARTHROPLASTY HIP Right 10/07/2015    Procedure: ARTHROPLASTY HIP;  Surgeon: Neil Davis MD;  Location: [facility] OR     COLONOSCOPY  09/05/2004    Per Hospital encounter dated 4/18/13     COLONOSCOPY N/A 04/09/2018    Procedure: COMBINED COLONOSCOPY, SINGLE OR MULTIPLE BIOPSY/POLYPECTOMY BY BIOPSY;;  Surgeon: Tramaine Zuniga MD;  Location:  GI     COMBINED CYSTOSCOPY, INSERT CATHETER URETER  04/11/2013    Procedure: COMBINED CYSTOSCOPY, INSERT CATHETER URETER;;  Surgeon: Kashif Parks MD;  Location:  OR      LARYNGOSCOPY DIRECT W VOCAL CORD INJECTION      vocal cord polyps     IRRIGATION AND DEBRIDEMENT Left 08/2022    ankle     LAPAROSCOPIC ASSISTED COLECTOMY  04/11/2013     Procedure: LAPAROSCOPIC ASSISTED COLECTOMY;  LOW ANTERIOR RESECTION ;  Surgeon: Tramaine Zuniga MD;  Location: [facility] OR     OPEN REDUCTION INTERNAL FIXATION ANKLE Left 06/09/2022    Procedure: .  Open reduction internal fixation of left ankle syndesmotic injury 2.  Non-operative treatment of left proximal fibula fracture;  Surgeon: Mike Jackson MD;  Location:  OR     [facility] COLONOSCOPY THRU STOMA W BIOPSY/CAUTERY TUMOR/POLYP/LESION  1998    michael, tubular adenoma, next colonoscopy 2001     [facility]  [facility]/2001    negative, ligate two internal hemmorhoids  Dr rodriguez-repeat 2008         Prior to Admission Medications   Prior to Admission Medications   Prescriptions Last Dose Informant Patient Reported? Taking?   acetaminophen (TYLENOL) 325 MG tablet Past Week at Unknown time  No Yes   Sig: Take 2 tablets (650 mg) by mouth every 4 hours as needed for other (For optimal non-opioid multimodal pain management to improve pain control.)   carbidopa-levodopa (SINEMET)  MG tablet 8/24/2022 at 0400  No Yes   Sig: Take 2 tablets by mouth 3 times daily (Takes at 0400, 1000, and 1600)   lisinopril (ZESTRIL) 40 MG tablet 8/23/2022 at 0900  No No   Sig: Take 1 tablet (40 mg) by mouth daily.   metoprolol succinate ER (TOPROL XL) 50 MG 24 hr tablet 8/23/2022 at 0900  No No   Sig: Take 1 tablet (50 mg) by mouth daily   rosuvastatin (CRESTOR) 40 MG tablet 8/23/2022 at 2100  No No   Sig: Take 1 tablet (40 mg) by mouth At Bedtime   umeclidinium (INCRUSE ELLIPTA) 62.5 MCG/INH inhaler Past Month at Unknown time  No Yes   Sig: Inhale 1 puff into the lungs daily      Facility-Administered Medications: None     Allergies   Allergies   Allergen Reactions     Spiriva Handihaler Blisters     Hctz [Hydrochlorothiazide] Other (See Comments)     Low sodium       Social History   I have personally reviewed the social history with the patient showing.  Social History     Tobacco Use     Smoking status: Former  Smoker     Packs/day: 1.50     Years: 40.00     Pack years: 60.00     Quit date: 2013     Years since quittin.3     Smokeless tobacco: Never Used   Substance Use Topics     Alcohol use: Yes     Alcohol/week: 14.0 standard drinks     Types: 14 Cans of beer per week     Comment: 2 beers daily       Family History   I have reviewed this patient's family history and it is not contributory to the admission..   Family History   Problem Relation Age of Onset     Hypertension Mother      Hypertension Father          MI     Heart Disease Father         MI  at age 55     Coronary Artery Disease Father      Hyperlipidemia Brother      Hypertension Brother      Heart Surgery Brother         defibrillator     Hypertension Sister      Prostate Cancer No family hx of           Review of Systems   Ten points ROS done and pertinent as per HPI, otherwise negative    Physical Exam   Temp: 98.3  F (36.8  C) Temp src: Temporal BP: (Abnormal) 153/68 Pulse: 88   Resp: 12 SpO2: 95 % O2 Device: None (Room air) Oxygen Delivery: 2 LPM  Vital Signs with Ranges  Temp:  [98.2  F (36.8  C)-100.4  F (38  C)] 98.3  F (36.8  C)  Pulse:  [63-88] 88  Resp:  [11-20] 12  BP: (114-161)/(49-90) 153/68  SpO2:  [90 %-99 %] 95 %  199 lbs 8.26 oz    GEN:  Alert, oriented x 3, appears comfortable, NAD.  HEENT:  Normocephalic/atraumatic, no scleral icterus, no nasal discharge, mouth moist.  CV:  Regular rate and rhythm, no murmur or JVD.  S1 + S2 noted, no S3 or S4.  LUNGS:  Clear to auscultation bilaterally without rales/rhonchi/wheezing/retractions.  Symmetric chest rise on inhalation noted.  ABD:  Active bowel sounds, soft, non-tender/non-distended.  No rebound/guarding/rigidity.  EXT:  No edema or cyanosis.  No joint synovitis noted.  SKIN:  Dry to touch, no exanthems noted in the visualized areas.     Data   -Data reviewed today: All pertinent laboratory and imaging results from this encounter were reviewed. I personally reviewed .  Recent  Labs   Lab 08/24/22  0739 08/24/22  0621   HGB 12.6*  --    CR  --  0.69       Recent Results (from the past 24 hour(s))   XR Surgery SARABJIT L/T 5 Min Fluoro w Stills    Narrative    This exam was marked as non-reportable because it will not be read by a   radiologist or a [place] non-radiologist provider.               Disclaimer: This note consists of symbols derived from keyboarding, dictation and/or voice recognition software. As a result, there may be errors in the script that have gone undetected. Please consider this when interpreting information found in this chart.

## 2022-08-24 NOTE — ANESTHESIA PROCEDURE NOTES
Airway       Patient location during procedure: OR       Procedure Start/Stop Times: 8/24/2022 7:59 AM  Staff -        CRNA: Ravinder Schwartz APRN CRNA       Performed By: CRNA  Consent for Airway        Urgency: elective  Indications and Patient Condition       Indications for airway management: caroline-procedural       Induction type:intravenous       Mask difficulty assessment: 1 - vent by mask    Final Airway Details       Final airway type: supraglottic airway    Supraglottic Airway Details        Type: LMA       Brand: I-Gel       LMA size: 5    Post intubation assessment        Placement verified by: capnometry, equal breath sounds and chest rise        Number of attempts at approach: 1       Number of other approaches attempted: 0       Secured with: commercial tube acevedo       Ease of procedure: easy       Dentition: Intact and Unchanged    Medication(s) Administered   Medication Administration Time: 8/24/2022 7:59 AM

## 2022-08-24 NOTE — CONSULTS
Care Management Discharge Note    Discharge Date:         Discharge Disposition: Home    Discharge Services: None    Discharge DME: None    Discharge Transportation: family or friend will provide    Private pay costs discussed: Not applicable    PAS Confirmation Code:    Patient/family educated on Medicare website which has current facility and service quality ratings: no    Education Provided on the Discharge Plan:    Persons Notified of Discharge Plans:   Patient/Family in Agreement with the Plan: yes    Handoff Referral Completed: yes    Additional Information:    Met with pt at bedside to discuss PT recommendations. Pt explains that he will be staying with family and has all necessary equipment at home including grab bars, walker, and a cane. Explained recommendation of homecare PT in which pt explained that he recently discharged from a TCU and feels that he does not need additional therapy once discharged home. Plan is discharge home with assist from family. SW will sign off. Please alert SW if needs arise.     ANGELA Pappas, [facility]  Inpatient Care Coordination  Ortho/Spine Unit  [phone]  Sahara Ryan, [facility]

## 2022-08-24 NOTE — CONSULTS
ID consult dictated IMP 1 61 yo male 3 mos nonfusing , draining L ankle fx, op and hardware removal    REC 1 vanco/zosyn and awaitcxs will need prolonged Iv, wait 1 day on line , care coordinator help

## 2022-08-24 NOTE — ANESTHESIA POSTPROCEDURE EVALUATION
Patient: Rashaun Molina    Procedure: Procedure(s):  Revision Open Reduction Internal Fixation of Left Ankle Fracture, Hardware Removal, Deltoid Ligament Repair       Anesthesia Type:  General    Note:     Postop Pain Control: Uneventful            Sign Out: Well controlled pain   PONV: No   Neuro/Psych: Uneventful            Sign Out: Acceptable/Baseline neuro status   Airway/Respiratory: Uneventful            Sign Out: Acceptable/Baseline resp. status   CV/Hemodynamics: Uneventful            Sign Out: Acceptable CV status; No obvious hypovolemia; No obvious fluid overload   Other NRE: NONE   DID A NON-ROUTINE EVENT OCCUR? No           Last vitals:  Vitals Value Taken Time   /60 08/24/22 1130   Temp 98.4  F (36.9  C) 08/24/22 1055   Pulse 73 08/24/22 1144   Resp 17 08/24/22 1144   SpO2 97 % 08/24/22 1144   Vitals shown include unvalidated device data.    Electronically Signed By: Dudley Ayon MD  August 24, 2022  11:45 AM

## 2022-08-24 NOTE — ANESTHESIA PREPROCEDURE EVALUATION
Anesthesia Pre-Procedure Evaluation    Patient: Rashaun Molina   MRN: [id number] : 1960        Procedure : Procedure(s):  REMOVAL, HARDWARE, ANKLE          Past Medical History:   Diagnosis Date     Arthritis      Chronic airway obstruction, not elsewhere classified 2004    pt says that he does not have COPD     Cough syncope 2012     Essential hypertension, benign      Fistula     colon/bladder     Fracture of right proximal fibula 2014     Orthostatic hypotension 2014     Other chronic pain     right hip pain     PAD (peripheral artery disease) (H) 2018    PTA right SFA Dr. Lee     Palpitations 2018 Event monitor- SR/ST     Parkinsons disease (H)      Personal history of colonic polyps 2005     Pure hypercholesterolemia      Tobacco use disorder 2004     Vasovagal syncopes 10/25/2014     Viral warts, unspecified     genital      Past Surgical History:   Procedure Laterality Date     ARTHROPLASTY HIP Right 10/07/2015    Procedure: ARTHROPLASTY HIP;  Surgeon: Neil Davis MD;  Location:  OR     COLONOSCOPY  2004    Per Hospital encounter dated 13     COLONOSCOPY N/A 2018    Procedure: COMBINED COLONOSCOPY, SINGLE OR MULTIPLE BIOPSY/POLYPECTOMY BY BIOPSY;;  Surgeon: Tramaine Zuniga MD;  Location:  GI     COMBINED CYSTOSCOPY, INSERT CATHETER URETER  2013    Procedure: COMBINED CYSTOSCOPY, INSERT CATHETER URETER;;  Surgeon: Kashif Parks MD;  Location:  OR      LARYNGOSCOPY DIRECT W VOCAL CORD INJECTION      vocal cord polyps     IRRIGATION AND DEBRIDEMENT Left 2022    ankle     LAPAROSCOPIC ASSISTED COLECTOMY  2013    Procedure: LAPAROSCOPIC ASSISTED COLECTOMY;  LOW ANTERIOR RESECTION ;  Surgeon: Tramaine Zuniga MD;  Location:  OR     OPEN REDUCTION INTERNAL FIXATION ANKLE Left 2022    Procedure: .  Open reduction internal fixation of left ankle syndesmotic injury 2.  Non-operative treatment of  left proximal fibula fracture;  Surgeon: Mike Jackson MD;  [facility]     [facility]UTERY TUMOR/POLYP/LESION      michael, tubular adenoma, next colonoscopy      [facility] COLONOSCOPY THRU STOMA, DIAGNOSTIC  2001    negative, ligate two internal hemmorhoids  Dr rodriguez-repeat       Allergies   Allergen Reactions     Spiriva Handihaler Blisters     Hctz [Hydrochlorothiazide] Other (See Comments)     Low sodium      Social History     Tobacco Use     Smoking status: Former Smoker     Packs/day: 1.50     Years: 40.00     Pack years: 60.00     Quit date: 2013     Years since quittin.3     Smokeless tobacco: Never Used   Substance Use Topics     Alcohol use: Yes     Alcohol/week: 14.0 standard drinks     Types: 14 Cans of beer per week     Comment: 2 beers daily      Wt Readings from Last 1 Encounters:   22 90.5 kg (199 lb 8.3 oz)        Anesthesia Evaluation            ROS/MED HX  ENT/Pulmonary:     (+) mild,  COPD,     Neurologic:     (+) Parkinson's disease,     Cardiovascular:     (+) hypertension-----    METS/Exercise Tolerance:     Hematologic: Comments: Lab Test        07/27/22     06/12/22     06/10/22     06/08/22     12/27/18     08/06/18     07/30/18     05/14/18                       1434          0601          0654          1657          0705          0718          0824          0713          WBC          9.8          7.2          8.7          6.9            < >        6.0           --           --           HGB          11.2*        10.7*        10.3*        11.1*          < >        13.2*          < >         --           MCV          101.3*       100          100          97             < >        96            --           --           PLT          273          123*         125*         122*           < >        187           --           --           INR           --           --           --          1.11          --          1.04           --          1.07           < > = values in this interval not displayed.                  Lab Test        07/27/22     06/13/22     06/10/22     06/09/22                       0000          0605          0654          0645          NA           130.9*       125*         125*         126*          POTASSIUM    4.61         4.3          4.2          4.3           CHLORIDE     94.3*        92*          94           92*           CO2          27.6         27           28           29            BUN          15  14.9    18           16           13            CR           1.01         0.90         0.88         0.83          ANIONGAP      --          6            3            5             FRANNY          9.6          9.4          9.4          9.0           GLC          100*         108*         100*         102*           - neg hematologic  ROS     Musculoskeletal:       GI/Hepatic:  - neg GI/hepatic ROS     Renal/Genitourinary:  - neg Renal ROS     Endo:     (+) Obesity,     Psychiatric/Substance Use:  - neg psychiatric ROS     Infectious Disease:  - neg infectious disease ROS     Malignancy:  - neg malignancy ROS     Other:  - neg other ROS    (+) , H/O Chronic Pain,        Physical Exam    Airway        Mallampati: II   TM distance: > 3 FB   Neck ROM: full   Mouth opening: > 3 cm    Respiratory Devices and Support         Dental  no notable dental history         Cardiovascular   cardiovascular exam normal          Pulmonary   pulmonary exam normal                OUTSIDE LABS:  CBC:   Lab Results   Component Value Date    WBC 9.8 07/27/2022    WBC 7.2 06/12/2022    HGB 11.2 (A) 07/27/2022    HGB 10.7 (L) 06/12/2022    HCT 33.8 (A) 07/27/2022    HCT 33.6 (L) 06/12/2022     07/27/2022     (L) 06/12/2022     BMP:   Lab Results   Component Value Date    .9 (A) 07/27/2022     (L) 06/13/2022    POTASSIUM 4.61 07/27/2022    POTASSIUM 4.3 06/13/2022    CHLORIDE 94.3 (A) 07/27/2022    CHLORIDE 92 (L)  06/13/2022    CO2 27.6 07/27/2022    CO2 27 06/13/2022    BUN 15 07/27/2022    BUN 14.9 07/27/2022    CR 1.01 07/27/2022    CR 0.90 06/13/2022     (A) 07/27/2022     (H) 06/13/2022     COAGS:   Lab Results   Component Value Date    PTT 31 08/06/2018    INR 1.11 06/08/2022     POC:   Lab Results   Component Value Date     (H) 04/13/2013     HEPATIC:   Lab Results   Component Value Date    ALBUMIN 3.4 06/09/2022    PROTTOTAL 8.0 06/09/2022    ALT 12 06/09/2022    AST 28 06/09/2022    ALKPHOS 78 06/09/2022    BILITOTAL 0.9 06/09/2022    BILIDIRECT 0.1 02/13/2006     OTHER:   Lab Results   Component Value Date    PH 7.0 07/10/2000    A1C 5.3 11/25/2019    FRANNY 9.6 07/27/2022    PHOS 3.4 06/08/2022    MAG 1.9 06/08/2022    LIPASE 28 04/15/2013    TSH 1.74 06/08/2022    SED 12 09/01/2015       Anesthesia Plan    ASA Status:  3      Anesthesia Type: General.     - Airway: LMA   Induction: Intravenous, Propofol.   Maintenance: Balanced.        Consents    Anesthesia Plan(s) and associated risks, benefits, and realistic alternatives discussed. Questions answered and patient/representative(s) expressed understanding.    - Discussed:     - Discussed with:  Patient      - Extended Intubation/Ventilatory Support Discussed: No.      - Patient is DNR/DNI Status: No    Use of blood products discussed: Yes.     - Discussed with: Patient.     - Consented: consented to blood products            Reason for refusal: other.     Postoperative Care    Pain management: IV analgesics.   PONV prophylaxis: Ondansetron (or other 5HT-3), Dexamethasone or Solumedrol     Comments:                Dudley Ayon MD

## 2022-08-24 NOTE — OP NOTE
ORTHOPEDIC OPERATIVE NOTE      PREOPERATIVE DIAGNOSIS:      1.  Left ankle syndesmotic injury with failed hardware and wound dehiscence, presumed infection    POSTOPERATIVE DIAGNOSIS: same    PROCEDURE:      1.  Removal hardware left ankle  2.  Excisional debridement of left lateral ankle wound deepest level of debridement bone  3.  Revision open reduction internal fixation of left ankle syndesmotic injury  4.  Deltoid ligament repair left ankle    SURGEON:  Mike Jackson MD    ASSISTANT: Villa Garcia PA-C whose assistance was critical for positioning, prep and drape, exposure, debridement, implant removal, reduction, fixation, and closure    SPECIMENS: Cultures    COMPLICATIONS: None    ESTIMATED BLOOD LOSS: 100 mL    IMPLANTS:   Implant Name Type Inv. Item Serial No.  Lot No. LRB No. Used Action   1/3 Tubular Plates, 23mm LGTH, 2 Hole    LUCINDA [id number] Left 1 Explanted   3.5mm Non-Locking Screw, 55mm LGTH    LUCINDA 8003 [id number] Left 1 Explanted   3.5mm Non-Locking, 60mm LGTH    LUCINDA 8003 [id number] Left 1 Explanted   IMP ANCHOR ARTHREX BIOCOMP PUSHLOCK 3.5X19.5MM [id number] - [id number] Metallic Hardware/[place] IMP ANCHOR ARTHREX BIOCOMP PUSHLOCK 3.5X19.5MM [id number]  ARTHREX [id number] Left 1 Implanted   IMP ANCHOR ARTHREX 5.5MM BIOCOMP CRKSCR W/4 NDL [id number] - [id number] Metallic Hardware/[place] IMP ANCHOR ARTHREX 5.5MM BIOCOMP CRKSCR W/4 NDL [id number]  ARTHREX [id number] Left 1 Implanted   IMP SCR SYN 3.5X18MM LOCKING W/STARDRIVE .105 - [id number] Metallic Hardware/[place] IMP SCR SYN 3.5X18MM LOCKING W/STARDRIVE .105  SYNTHES-STRATEC 8007 [id number] Left 1 Implanted   IMP SCR SYN CORTEX 3.5X50MM SELF TAP .850 - [id number] Metallic Hardware/[place] IMP SCR SYN CORTEX 3.5X50MM SELF TAP .850  SYNTHES-STRATEC 8007 [id number] Left 2 Implanted   IMP SCR SYN CORTEX 3.5X55MM SELF TAP .855 - [id number] Metallic Hardware/[place] IMP SCR SYN CORTEX 3.5X55MM SELF  TAP .855  SYNTHES-STRATEC 8007 [id number] Left 1 Implanted   IMP SCR SYN CORTEX 3.5X60MM SELF TAP .860 - [id number] Metallic Hardware/[place] IMP SCR SYN CORTEX 3.5X60MM SELF TAP .860  SYNTHES-STRATEC 8007 [id number] Left 1 Implanted   IMP PLATE SYN 1/3 TUBULAR 81MM 07H .371 - [id number] Metallic Hardware/[place] IMP PLATE SYN 1/3 TUBULAR 81MM 07H .371  SYNTHES-STRATEC 8007 [id number] Left 1 Implanted         TOURNIQUET TIME: Not inflated    INDICATIONS: The patient is a 62-year-old male who presents just over 2 months out from left ankle syndesmotic repair.  He had some wound problems following his initial surgery which resulted in debridement and repeat wound closure.  Cultures from this were negative.  He continued to have wound healing difficulty after the second debridement and then began to show progressive signs of lucency of his hardware and loss of reduction of the syndesmosis.  Given these findings and his presumed infection he is an appropriate indicated candidate for hardware removal debridement and revision ORIF.  I discussed the risk benefits and alternatives with the patient including risk of infection, wound healing complication, neurovascular injury, blood loss, DVT PE, stiffness, malunion, nonunion, need for additional surgery including a likely hard removal, the medical risks of anesthesia.  I discussed benefits including improved function.  We discussed alternatives including nonoperative management, which was not recommended.  The informed consent was signed and documented.  Met with the patient preoperatively to regine the operative extremity.      OPERATIVE COURSE: The patient underwent general anesthesia and was positioned supine.  All bony prominences were well-padded.  The left lower extremity was prepped and draped in sterile orthopedic fashion using an iodine prep.  The surgical team scrubbed in.  Procedural pause was conducted.  Antibiotics were held.  Following  generalized agreement, we reopened the lateral incision and extended both proximally distally.  We dissected down to the plate which was grossly loose.  The screws were removed by hand and the plate was removed.  Large voids around the screws were noted within the fibula however the fibula itself was intact.  We debrided the bone of the fibula and the tibia with curettes.  Cultures were obtained from both the soft tissue beneath the plate as well as from the bone.  We debrided all unhealthy appearing tissue away.  Debridement was excisional and deepest level of the debridement was the bone of the fibula and the tibia.  Instruments used were rongeurs curettes forceps and cautery as well as scalpel.  We exposed the incisura and debrided scar tissue from the incisura.  A curette was used to roughen the bony surfaces of the incisura.  We then thoroughly irrigated the wound once only healthy-appearing tissue remained with copious amounts of normal saline.      We then made a curvilinear medial incision and dissected down to the medial malleolus and medial gutter.  Copious amounts of scar tissue was removed from the wound and a small amount was removed from the gutter.  The deltoid ligament remnant was identified and the end freshened a bit.  Unhealthy disorganized tissue was removed with scalpel.  The bone of the medial malleolus was roughened to accept the repair.      When that was complete we returned our attention laterally.  A Synthes 7 hole one third tubular locking plate was selected and positioned within the wound.  We secured this proximally with a single bicortical nonlocking screw and distally with a single unicortical locking screw to position the plate properly.  We then manually reduced the fibula within the incisura ensuring appropriate length.  We clamped the reduction with a Pineda reduction forcep with the ankle in maximal dorsiflexion.  We then drilled for and placed 3 3.5 mm bicortical nonlocking  screws across the syndesmosis proximally.  We also drilled for and placed one 3.5 mm cortical screw across the incisura into the tibia.  Good purchase was obtained with all screws.  All screws were final tightened including the distal locking screw.  When that was complete we returned our attention medially.      The punch for a Arthrex corkscrew anchor was placed at the tip of the medial malleolus its position confirmed on fluoroscopy.  We then advanced into the bone and placed a corkscrew anchor.  Melo-Gabo sutures were placed in the deltoid ligament and the sutures were sequentially tied while holding the talus reduction manually to ensure there is no lateral tilt.  We then drilled for and passed the tails of the FiberWire sutures through a push lock anchor and advanced this anchor into the tibia medially proximal to the corkscrew to backup our repair.  The anchor was deployed after the sutures were tightened.  Few suture knots were then tied and then the sutures were cut.  Final fluoroscopic imaging demonstrated excellent reduction of the ankle and good position and length of all hardware.  We thoroughly irrigated again and closed in layers with a few 2-0 Monocryl and 3-0 nylon sutures.  The wounds were anesthetized.  Sterile dressings were applied.  The patient was transferred to the recovery room in stable condition, sustaining no complications.    POST OP PLAN:    1. Activity: Splint immobilization.  Keep splint clean dry and intact.  Nonweightbearing x6 weeks postop.  2. ABX: Broad-spectrum antibiotics pending ID consult.  Follow cultures.  Anticipate a minimum of 6 weeks of antibiotics followed by possible suppression until healing is complete and hardware removal is possible.  3. DVT prophylaxis: SCDs and aspirin if no contraindication  4. Dispo: Follow-up at [facility] in 2-3 weeks.  Discharge planning for IV antibiotics

## 2022-08-25 ENCOUNTER — APPOINTMENT (OUTPATIENT)
Dept: GENERAL RADIOLOGY | Facility: CLINIC | Age: 62
DRG: 493 | End: 2022-08-25
Attending: INTERNAL MEDICINE
Payer: COMMERCIAL

## 2022-08-25 ENCOUNTER — APPOINTMENT (OUTPATIENT)
Dept: PHYSICAL THERAPY | Facility: CLINIC | Age: 62
DRG: 493 | End: 2022-08-25
Attending: ORTHOPAEDIC SURGERY
Payer: COMMERCIAL

## 2022-08-25 ENCOUNTER — HOME INFUSION (PRE-WILLOW HOME INFUSION) (OUTPATIENT)
Dept: PHARMACY | Facility: CLINIC | Age: 62
End: 2022-08-25

## 2022-08-25 LAB
CREAT SERPL-MCNC: 1.03 MG/DL (ref 0.67–1.17)
GFR SERPL CREATININE-BSD FRML MDRD: 82 ML/MIN/1.73M2
GLUCOSE SERPL-MCNC: 194 MG/DL (ref 70–99)
HBV SURFACE AG SERPL QL IA: NONREACTIVE
HCV RNA SERPL NAA+PROBE-ACNC: NOT DETECTED IU/ML
HIV 1+2 AB+HIV1 P24 AG SERPL QL IA: NONREACTIVE

## 2022-08-25 PROCEDURE — 250N000011 HC RX IP 250 OP 636

## 2022-08-25 PROCEDURE — 258N000003 HC RX IP 258 OP 636

## 2022-08-25 PROCEDURE — 82947 ASSAY GLUCOSE BLOOD QUANT: CPT | Performed by: ORTHOPAEDIC SURGERY

## 2022-08-25 PROCEDURE — 999N000065 XR CHEST PORT 1 VIEW

## 2022-08-25 PROCEDURE — 250N000013 HC RX MED GY IP 250 OP 250 PS 637

## 2022-08-25 PROCEDURE — 99233 SBSQ HOSP IP/OBS HIGH 50: CPT | Performed by: INTERNAL MEDICINE

## 2022-08-25 PROCEDURE — 36569 INSJ PICC 5 YR+ W/O IMAGING: CPT

## 2022-08-25 PROCEDURE — 99232 SBSQ HOSP IP/OBS MODERATE 35: CPT | Performed by: INTERNAL MEDICINE

## 2022-08-25 PROCEDURE — 97530 THERAPEUTIC ACTIVITIES: CPT | Mod: GP

## 2022-08-25 PROCEDURE — 82565 ASSAY OF CREATININE: CPT

## 2022-08-25 PROCEDURE — 36415 COLL VENOUS BLD VENIPUNCTURE: CPT

## 2022-08-25 PROCEDURE — 272N000034 HC KIT VALVED SINGLE LUMEN

## 2022-08-25 PROCEDURE — 120N000001 HC R&B MED SURG/OB

## 2022-08-25 PROCEDURE — 250N000013 HC RX MED GY IP 250 OP 250 PS 637: Performed by: HOSPITALIST

## 2022-08-25 RX ORDER — LIDOCAINE 40 MG/G
CREAM TOPICAL
Status: DISCONTINUED | OUTPATIENT
Start: 2022-08-25 | End: 2022-08-27 | Stop reason: HOSPADM

## 2022-08-25 RX ADMIN — TAZOBACTAM SODIUM AND PIPERACILLIN SODIUM 3.38 G: 375; 3 INJECTION, SOLUTION INTRAVENOUS at 12:20

## 2022-08-25 RX ADMIN — OXYCODONE HYDROCHLORIDE 5 MG: 5 TABLET ORAL at 16:01

## 2022-08-25 RX ADMIN — TAZOBACTAM SODIUM AND PIPERACILLIN SODIUM 3.38 G: 375; 3 INJECTION, SOLUTION INTRAVENOUS at 19:41

## 2022-08-25 RX ADMIN — KETOROLAC TROMETHAMINE 15 MG: 15 INJECTION, SOLUTION INTRAMUSCULAR; INTRAVENOUS at 10:13

## 2022-08-25 RX ADMIN — ACETAMINOPHEN 975 MG: 325 TABLET, FILM COATED ORAL at 22:24

## 2022-08-25 RX ADMIN — LISINOPRIL 40 MG: 40 TABLET ORAL at 08:19

## 2022-08-25 RX ADMIN — CARBIDOPA AND LEVODOPA 2 TABLET: 25; 100 TABLET ORAL at 16:01

## 2022-08-25 RX ADMIN — CARBIDOPA AND LEVODOPA 2 TABLET: 25; 100 TABLET ORAL at 04:03

## 2022-08-25 RX ADMIN — TAZOBACTAM SODIUM AND PIPERACILLIN SODIUM 3.38 G: 375; 3 INJECTION, SOLUTION INTRAVENOUS at 00:23

## 2022-08-25 RX ADMIN — SENNOSIDES AND DOCUSATE SODIUM 1 TABLET: 50; 8.6 TABLET ORAL at 08:19

## 2022-08-25 RX ADMIN — ROSUVASTATIN CALCIUM 40 MG: 20 TABLET, FILM COATED ORAL at 22:24

## 2022-08-25 RX ADMIN — ACETAMINOPHEN 975 MG: 325 TABLET, FILM COATED ORAL at 05:39

## 2022-08-25 RX ADMIN — POLYETHYLENE GLYCOL 3350 17 G: 17 POWDER, FOR SOLUTION ORAL at 08:19

## 2022-08-25 RX ADMIN — TAZOBACTAM SODIUM AND PIPERACILLIN SODIUM 3.38 G: 375; 3 INJECTION, SOLUTION INTRAVENOUS at 05:39

## 2022-08-25 RX ADMIN — SODIUM CHLORIDE, POTASSIUM CHLORIDE, SODIUM LACTATE AND CALCIUM CHLORIDE: 600; 310; 30; 20 INJECTION, SOLUTION INTRAVENOUS at 03:52

## 2022-08-25 RX ADMIN — ASPIRIN 325 MG: 325 TABLET ORAL at 08:19

## 2022-08-25 RX ADMIN — VANCOMYCIN HYDROCHLORIDE 1250 MG: 10 INJECTION, POWDER, LYOPHILIZED, FOR SOLUTION INTRAVENOUS at 10:16

## 2022-08-25 RX ADMIN — METOPROLOL SUCCINATE 50 MG: 50 TABLET, EXTENDED RELEASE ORAL at 08:19

## 2022-08-25 RX ADMIN — KETOROLAC TROMETHAMINE 15 MG: 15 INJECTION, SOLUTION INTRAMUSCULAR; INTRAVENOUS at 04:04

## 2022-08-25 RX ADMIN — CARBIDOPA AND LEVODOPA 2 TABLET: 25; 100 TABLET ORAL at 10:12

## 2022-08-25 RX ADMIN — UMECLIDINIUM 1 PUFF: 62.5 AEROSOL, POWDER ORAL at 08:22

## 2022-08-25 RX ADMIN — ACETAMINOPHEN 975 MG: 325 TABLET, FILM COATED ORAL at 16:01

## 2022-08-25 ASSESSMENT — ACTIVITIES OF DAILY LIVING (ADL)
ADLS_ACUITY_SCORE: 24
ADLS_ACUITY_SCORE: 28
ADLS_ACUITY_SCORE: 24
ADLS_ACUITY_SCORE: 24
ADLS_ACUITY_SCORE: 28
ADLS_ACUITY_SCORE: 24

## 2022-08-25 NOTE — PROGRESS NOTES
"[facility]  Infectious Disease Progress Note          Assessment and Plan:   IMPRESSION:    1.  A 62-year-old male near 3 months out from left ankle hardware repair, nonhealing fracture line, drainage and redness, probable infection, underwent surgical intervention and hardware removal.  Not grossly infected-appearing at surgery. Cultures are now pending.  2.  Parkinson's disease.  3.  Chronic lung disease without significant symptoms currently.     RECOMMENDATIONS:    1. DC vancomycin  Zosyn  Continue for now.  2.  cx pseudomonas  3.  Presumed deep infection. Will need prolonged IV antibiotics.   PICC line place.  4.  Get Social Service involved to look at IV home options.        Interval History:   no new complaints pain OK; no cp, sob, n/v/d, or abd pain. cx pseudomonas              Medications:       acetaminophen  975 mg Oral Q8H     aspirin  325 mg Oral Daily     carbidopa-levodopa  2 tablet Oral TID     lisinopril  40 mg Oral Daily     metoprolol succinate ER  50 mg Oral Daily     piperacillin-tazobactam  3.375 g Intravenous Q6H     polyethylene glycol  17 g Oral Daily     rosuvastatin  40 mg Oral Daily     senna-docusate  1 tablet Oral BID     sodium chloride (PF)  3 mL Intracatheter Q8H     umeclidinium  1 puff Inhalation Daily                  Physical Exam:   Blood pressure 113/58, pulse 65, temperature 97.9  F (36.6  C), temperature source Temporal, resp. rate 16, height 1.778 m (5' 10\"), weight 90.5 kg (199 lb 8.3 oz), SpO2 95 %.  Wt Readings from Last 2 Encounters:   08/24/22 90.5 kg (199 lb 8.3 oz)   07/27/22 91.6 kg (202 lb)     Vital Signs with Ranges  Temp:  [97.4  F (36.3  C)-97.9  F (36.6  C)] 97.9  F (36.6  C)  Pulse:  [56-77] 65  Resp:  [14-18] 16  BP: (112-169)/(51-78) 113/58  SpO2:  [95 %-97 %] 95 %    Constitutional: Awake, alert, cooperative, no apparent distress   Lungs: Clear to auscultation bilaterally, no crackles or wheezing   Cardiovascular: Regular rate and " rhythm, normal S1 and S2, and no murmur noted   Abdomen: Normal bowel sounds, soft, non-distended, non-tender   Skin: No rashes, no cyanosis, no edema ankle covered   Other:           Data:   All microbiology laboratory data reviewed.  Recent Labs   Lab Test 08/24/22  0739 07/27/22  1434 06/12/22  0601 06/10/22  0654   WBC  --  9.8 7.2 8.7   HGB 12.6* 11.2* 10.7* 10.3*   HCT  --  33.8* 33.6* 32.0*   MCV  --  101.3* 100 100   PLT  --  273 123* 125*     Recent Labs   Lab Test 08/25/22  0737 08/24/22  0621 07/27/22  0000   CR 1.03 0.69 1.01     Recent Labs   Lab Test 08/24/22  1447   SED 24*     No lab results found.    Invalid input(s): UC

## 2022-08-25 NOTE — PROGRESS NOTES
"      [facility]  Hospitalist Progress Note  Ryan Wan M.D., M.B.A.   08/25/2022    Reason for Stay/active problem list     Left ankle syndesmotic injury with failed hardware and wound dehiscence, presumed infection         Assessment and Plan:        Summary of Stay:   Rashaun Molina is a 62 year old male who was admitted on 8/24/2022. Hospitalist service was consulted for  postsurgical management of medical comorbidities..     1.  Left ankle syndesmotic injury with failed hardware and wound dehiscence, presumed infection.  POD #1.  --Postsurgical care, pain control, rehab and DVT prophylaxis per primary team.  --Infectious disease has been involved for better antibiotic management.  -- was seen by ortho physician Dr Jackson     2.   Parkinson disease well-controlled on home medication.  -Resumed Sinemet as prior to admission.  3.  Essential hypertension.  -Resumed metoprolol and losartan.  4.  Dyslipidemia.  -Continue rosuvastatin.  5.  Obstructive pulmonary disease without exacerbation.  -Continue Incruse Ellipta as prior to admission.      DVT Prophylaxis: Defer to primary service     Code Status: full      Disposition:  per primary team            Interval History (Subjective):        Patient is seen and examined by me today and medical record reviewed.Overnight events noted and care discussed with nursing staff.    doing well; no cp, sob, n/v/d, or abd pain.                          Physical Exam:        Last Vital Signs:  BP (!) 156/74 (BP Location: Right arm)   Pulse 60   Temp 98  F (36.7  C) (Temporal)   Resp 16   Ht 1.778 m (5' 10\")   Wt 90.5 kg (199 lb 8.3 oz)   SpO2 95%   BMI 28.63 kg/m      I/O last 3 completed shifts:  In: 360 [P.O.:360]  Out: 1120 [Urine:1120]    Wt Readings from Last 5 Encounters:   08/24/22 90.5 kg (199 lb 8.3 oz)   07/27/22 91.6 kg (202 lb)   06/14/22 89.6 kg (197 lb 8 oz)   05/31/22 99.8 kg (220 lb)   05/28/22 99.8 kg (220 lb)        Constitutional: Awake, " alert, cooperative, no apparent distress     Respiratory: Clear to auscultation bilaterally, no crackles or wheezing   Cardiovascular: Regular rate and rhythm, normal S1 and S2, and no murmur noted   Abdomen: Normal bowel sounds, soft, non-distended, non-tender   Skin: No new rashes, no cyanosis, dry to touch   Neuro: Alert with  no new focal weakness   Extremities: Dressed and wrapped LLE    Other(s):        All other systems: Negative          Medications:        All current medications were reviewed with changes reflected in problem list.         Data:      All new lab and imaging data was reviewed.      Data reviewed today: I reviewed all new labs and imaging results over the last 24 hours. I personally reviewed       Recent Labs   Lab 08/24/22  0739   HGB 12.6*     No results for input(s): CULT in the last 168 hours.  Recent Labs   Lab 08/25/22  0737 08/24/22  0621   *  --    CR 1.03 0.69   GFRESTIMATED 82 >90       Recent Labs   Lab 08/25/22  0737   *       No results for input(s): INR in the last 168 hours.      No results for input(s): TROPONIN, TROPI, TROPR in the last 168 hours.    Invalid input(s): TROP, TROPONINIES    Recent Results (from the past 48 hour(s))   XR Surgery SARABJIT L/T 5 Min Fluoro w Stills    Narrative    This exam was marked as non-reportable because it will not be read by a   radiologist or a [place] non-radiologist provider.             COVID Status:  COVID-19 PCR Results    COVID-19 PCR Results 6/8/22 8/24/22   SARS CoV2 PCR Negative Negative      Comments are available for some flowsheets but are not being displayed.         COVID-19 Antibody Results, Testing for Immunity    COVID-19 Antibody Results, Testing for Immunity   No data to display.              Disclaimer: This note consists of symbols derived from keyboarding, dictation and/or voice recognition software. As a result, there may be errors in the script that have gone undetected. Please consider this when  interpreting information found in this chart.

## 2022-08-25 NOTE — PROCEDURES
[facility]    Single Lumen PICC Placement    Date/Time: 8/25/2022 6:44 PM  Performed by: Kat Garcia RN  Authorized by: Reji Vang MD   Indications: vascular access      UNIVERSAL PROTOCOL   Site Marked: Yes  Prior Images Obtained and Reviewed:  Yes  Required items: Required blood products, implants, devices and special equipment available    Patient identity confirmed:  Verbally with patient, provided demographic data and hospital-assigned identification number  NA - No sedation, light sedation, or local anesthesia  Confirmation Checklist:  Patient's identity using two indicators, relevant allergies, procedure was appropriate and matched the consent or emergent situation and correct equipment/implants were available  Time out: Immediately prior to the procedure a time out was called    Universal Protocol: the Joint Commission Universal Protocol was followed    Preparation: Patient was prepped and draped in usual sterile fashion       ANESTHESIA    Local Anesthetic: Lidocaine 1% without epinephrine  Anesthetic Total (mL):  4      SEDATION    Patient Sedated: No        Preparation: skin prepped with ChloraPrep  Skin prep agent: skin prep agent completely dried prior to procedure  Sterile barriers: maximum sterile barriers were used: cap, mask, sterile gown, sterile gloves, and large sterile sheet  Hand hygiene: hand hygiene performed prior to central venous catheter insertion  Type of line used: PICC  Catheter type: single lumen  Lumen type: valved  Catheter size: 4 Fr  Brand: Bard  Placement method: venipuncture, ultrasound and tip navigation system (CXR)  Number of attempts: 1  Difficulty threading catheter: no  Successful placement: yes  Orientation: right    Location: basilic vein  : SVC.  Arm circumference: adults 15 cm  Extremity circumference: 31.5  Visible catheter length: 0  Internal length: 42 cm  Total catheter length: 42  Dressing and securement: adhesive securement  device, blood cleaned with CHG, chlorhexidine disc applied and sterile dressing applied  Post procedure assessment: blood return through all ports, free fluid flow and placement verified by x-ray  PROCEDURE   Patient Tolerance:  Patient tolerated the procedure well with no immediate complications   Do not use PICC until VAT nurse confirms placement.

## 2022-08-25 NOTE — PROGRESS NOTES
Ortho Daily Progress Note    Pain controlled.  Doing well today.    Temp:  [97.4  F (36.3  C)-98  F (36.7  C)] 98  F (36.7  C)  Pulse:  [56-65] 60  Resp:  [16-18] 16  BP: (113-169)/(51-78) 156/74  SpO2:  [95 %-97 %] 95 %  Hemoglobin   Date Value Ref Range Status   08/24/2022 12.6 (L) 13.3 - 17.7 g/dL Final   07/27/2022 11.2 (A) 13.3 - 17.7 g/dL Final   ]    Alert, appropriate, and following commands  Breathing easily without wheeze  Splint c/d/i, intact df/pf/ehl, SILT, palpable dp pulse        A/P - 62 year old male s/p L ankle I&D and revision ORIF for hardware infection/failure    ABX: Per ID recs for Pseudomonas  Activity: NWB LLE  DVT: SCDs, ASA OK  Dispo: discharge planning for home with IV ABX    Mike Walker  [facility]

## 2022-08-25 NOTE — PROGRESS NOTES
Pt has coverage for iv abx through their [facility] plan. Pt has met their deductible and OOP so he should be covered at 100%.       Please contact Intake with any questions, [phone] or In Basket pool, [facility] ([phone]).

## 2022-08-25 NOTE — PLAN OF CARE
Goal Outcome Evaluation:    Patient vital signs are at baseline: Yes  Patient able to ambulate as they were prior to admission or with assist devices provided by therapies during their stay:  No,  Reason:  PT following, stood at bedside, using urinal at bedside.   Patient MUST void prior to discharge:  Yes  Patient able to tolerate oral intake:  Yes  Pain has adequate pain control using Oral analgesics:  Yes  Does patient have an identified :  Yes  Has goal D/C date and time been discussed with patient:  Yes    A&Ox4, VSS, 95% sats on RA.  Pain managed with scheduled Tylenol and oxycodone 5mg x1.  CMS intact with baseline numbness/tingling to LE's, able to wiggle toes LLE/warm.  Glucose this a.m. 194, Dr. Wan notified per ordered parameters, no new orders.  I.D. following.  Vanco discontinued, continues on IV Zosyn.  PICC to be placed this evening for home IV antibiotics.  [facility] to follow.  IV saline locked.

## 2022-08-25 NOTE — PROGRESS NOTES
[facility]    Received request for benefit check for home IV abx. Pt has coverage for IV abx through their [facility] plan. Pt has met their deductible and OOP so he should be covered at 100%.    I spoke with Rashaun to introduce home infusion services, review benefits and offer choice of providers. He would like to proceed with [facility] for home IV abx needs. If pt discharges tomorrow, I will meet with him and his brother at bedside after we have final abx orders and a line to do IV education. If he discharges over the weekend, [facility] will coordinate home IV education.     Thank you for the referral    Emma Zhang RN  [facility] Liaison  [phone] (Mon thru Fri 8am - 5pm)  [phone] Office

## 2022-08-25 NOTE — CONSULTS
Og .     Reji Vang MD        D: 2022   T: 2022   MT: LOS    Name:     GERALDO VALDEZ  MRN:      [id number]        Account:      [id number]   :      1960     Document: [id number]

## 2022-08-25 NOTE — CONSULTS
"CLINICAL NUTRITION SERVICES  -  ASSESSMENT NOTE      Recommendations:   - Continue diet as ordered.     MALNUTRITION:  % Weight Loss:  None noted  % Intake:  No decreased intake noted  Subcutaneous Fat Loss:  None observed   Muscle Loss:  Temporal region mild depletion, Clavicle bone region mild depletion and Acromion bone region mild depletion --> age-related  Fluid Retention:  None noted or documented    Malnutrition Diagnosis: Patient does not meet two of the above criteria necessary for diagnosing malnutrition          REASON FOR ASSESSMENT  Rashaun Molina is a 62 year old male seen by Registered Dietitian for Admission Nutrition Risk Screen for positive.    PMH of: Parkinson's, COPD.    Admit 2/2: Planned L ankle hardware removal with excisional debridement 8/24 (see op note).      NUTRITION HISTORY  - Information obtained from patient and chart.  - Diet at home: Regular.  - Usual intakes: Meals BID-TID.  - Barriers to PO intakes: None.  Reports previous wt loss d/t decreased exercise, but no skipping of meals or smaller portions.  - Use of oral supplements: Denies.  - Chewing/swallowing issues: Denies.  - Allergies: NKFA.      CURRENT NUTRITION ORDERS  Diet Order:     Regular    Current Intake/Tolerance:  Limited timeframe since admit.  Reports good appetite thus far.      Obtained from Chart/Interdisciplinary Team:  - No documentation of PI  - Stooling patterns reviewed    ANTHROPOMETRICS  Height: 5' 10\"  Weight: 199 lbs 8.26 oz  Body mass index is 28.63 kg/m .  Weight Status:  Overweight BMI 25-29.9  Weight History:  Wt Readings from Last 10 Encounters:   08/24/22 90.5 kg (199 lb 8.3 oz)   07/27/22 91.6 kg (202 lb)   06/14/22 89.6 kg (197 lb 8 oz)   05/31/22 99.8 kg (220 lb)   05/28/22 99.8 kg (220 lb)   11/04/21 97.1 kg (214 lb)   02/09/21 101.6 kg (224 lb)   02/04/21 101.6 kg (224 lb)   12/21/20 100.4 kg (221 lb 6.4 oz)   07/30/20 97.9 kg (215 lb 12.8 oz)     - Wt stable for the past ~2 months.  Confirmed w/ " "Rashaun.  He describes a ~10# wt loss \"back around Memorial Day\" but since stable.      LABS  Labs reviewed.      MEDICATIONS  Medications reviewed.      ASSESSED NUTRITION NEEDS PER APPROVED PRACTICE GUIDELINES:    Dosing Weight 91 kg   Estimated Energy Needs: 20-25 Kcal/Kg  Justification: maintenance  Estimated Protein Needs: 1-1.2 g pro/Kg  Justification: preservation of lean body mass  Estimated Fluid Needs: per MD      NUTRITION DIAGNOSIS:  No nutrition diagnosis at this time.    NUTRITION INTERVENTIONS  Recommendations / Nutrition Prescription  See above.      Implementation  Nutrition education: No education needs assessed at this time      MONITORING AND EVALUATION:  Progress towards goals will be monitored and evaluated per protocol and Practice Guidelines          Patricia Kemp RDN, LD  Clinical Dietitian  3rd floor/ICU: [phone]  All other floors: [phone]  Weekend/holiday: [phone]  Office: [phone]  "

## 2022-08-25 NOTE — PLAN OF CARE
Goal Outcome Evaluation:    Patient vital signs are at baseline: Yes  Patient able to ambulate as they were prior to admission or with assist devices provided by therapies during their stay:  No,  Reason:  Has not got up and ambulated yet. Sat up to the side of the bed x2-3 during shift.  Patient MUST void prior to discharge:  Yes to urinal at the bedside.   Patient able to tolerate oral intake:  Yes  Pain has adequate pain control using Oral analgesics:  Yes  Does patient have an identified :  Yes, brother.   Has goal D/C date and time been discussed with patient:  Yes

## 2022-08-25 NOTE — PROGRESS NOTES
Care Management Follow Up    Length of Stay (days): 1    Expected Discharge Date: 08/26/2022     Concerns to be Addressed:       Patient plan of care discussed at interdisciplinary rounds: Yes    Anticipated Discharge Disposition: Home     Anticipated Discharge Services: IV abx  Anticipated Discharge DME: None    Patient/family educated on Medicare website which has current facility and service quality ratings: no  Education Provided on the Discharge Plan: yes   Patient/Family in Agreement with the Plan: yes    Referrals Placed by CM/SW:  [facility]  Private pay costs discussed: insurance costs IV abx    Additional Information:  CM following for long term IV abx.  Met with pt to discuss discharge options once abx plan is clear.  Pt said he would like to discharge to his brothers house and do home IV abx. He would like his brother present when teaching occurs. CM sent [facility] a referral. Will continue to follow for care coordination.    Addendum 1400:  Met with pt and his brother to discuss discharge plans.  Pt is interested in home care PT and HHA.  CM notified [facility] liaison.     Jenny Olson RN, BSN, PHN, CCM  Care Coordinator  [facility]  [phone]

## 2022-08-25 NOTE — CONSULTS
Consult Date: 08/24/2022    INFECTIOUS DISEASE CONSULTATION    LOCATION:  Room 602, [facility].    REFERRING PHYSICIAN:  Dr. Flores.    IMPRESSION:    1.  A 62-year-old male near 3 months out from left ankle hardware repair, nonhealing fracture line, drainage and redness, probable infection, underwent surgical intervention and hardware removal.  Not grossly infected-appearing at surgery. Cultures are now pending.  2.  Parkinson's disease.  3.  Chronic lung disease without significant symptoms currently.    RECOMMENDATIONS:    1.  Currently getting vancomycin and Zosyn.  Continue for now.  2.  Await cultures. Should be positive in this case.  No preceding antibiotics and presumed grossly infected deep infection.  3.  Presumed deep infection. Will need prolonged IV antibiotics.  Probably hold 1 day on PICC line, then place.  4.  Get Social Service involved to look at IV home options.    HISTORY OF PRESENT ILLNESS:  This 62-year-old male is seen in consultation due to apparent left ankle hardware related infection.  The patient had a left ankle fracture and repair near 3 months ago.  He had always had some slight wound issues, occasional drainage and some pain with it.  He continued to have this despite courses of antibiotics.  Has now been elected to do a major intervention.  He has also signs of a nonunion occurring.  He has never had fevers, chills, sweats or major illness symptoms.  He has no particular other history of major infection problems.    PAST MEDICAL HISTORY:  Includes Parkinson's disease, history of chronic lung disease.  No major infection problems in general.    ALLERGIES:  NONE TO ANTIBIOTICS.    SOCIAL AND FAMILY HISTORY:  No recent travel exposures.  No known resistant pathogens.    REVIEW OF SYSTEMS:  Some pain in the ankle, but primarily been an ongoing wound issue.    PHYSICAL EXAMINATION:    GENERAL:  The patient appears his stated age.  He is postop.  VITAL SIGNS:  Currently,  normal including being afebrile.  HEENT:  No thrush or intraoral lesions.  HEART AND LUNGS:  Unremarkable.  ABDOMEN:  Soft and nontender.  EXTREMITIES:  The ankle area is wrapped.  No proximal signs of major infection.    LABORATORY DATA:  Inflammatory markers just ordered.  Other labs unremarkable.  Creatinine in the normal range.  Cultures are all pending.  When Gram stain done, interestingly showed no PMNs and no organisms.    Thank you very much for the consultation.  I will follow the patient with you.    Reji Vang MD        D: 2022   T: 2022   MT: [id number]    Name:     GERALDO VALDEZ  MRN:      [id number]        Account:      [id number]   :      1960           Consult Date: 2022     Document: [id number]

## 2022-08-26 LAB
ANION GAP SERPL CALCULATED.3IONS-SCNC: 7 MMOL/L (ref 7–15)
BACTERIA TISS BX CULT: ABNORMAL
BUN SERPL-MCNC: 10.6 MG/DL (ref 8–23)
CALCIUM SERPL-MCNC: 8.9 MG/DL (ref 8.8–10.2)
CHLORIDE SERPL-SCNC: 95 MMOL/L (ref 98–107)
CREAT SERPL-MCNC: 0.98 MG/DL (ref 0.67–1.17)
DEPRECATED HCO3 PLAS-SCNC: 29 MMOL/L (ref 22–29)
ERYTHROCYTE [DISTWIDTH] IN BLOOD BY AUTOMATED COUNT: 14.7 % (ref 10–15)
GFR SERPL CREATININE-BSD FRML MDRD: 87 ML/MIN/1.73M2
GLUCOSE SERPL-MCNC: 91 MG/DL (ref 70–99)
HCT VFR BLD AUTO: 31.8 % (ref 40–53)
HGB BLD-MCNC: 9.9 G/DL (ref 13.3–17.7)
MCH RBC QN AUTO: 32 PG (ref 26.5–33)
MCHC RBC AUTO-ENTMCNC: 31.1 G/DL (ref 31.5–36.5)
MCV RBC AUTO: 103 FL (ref 78–100)
PLATELET # BLD AUTO: 139 10E3/UL (ref 150–450)
POTASSIUM SERPL-SCNC: 4.1 MMOL/L (ref 3.4–5.3)
RBC # BLD AUTO: 3.09 10E6/UL (ref 4.4–5.9)
SODIUM SERPL-SCNC: 131 MMOL/L (ref 136–145)
WBC # BLD AUTO: 7.2 10E3/UL (ref 4–11)

## 2022-08-26 PROCEDURE — 99232 SBSQ HOSP IP/OBS MODERATE 35: CPT | Performed by: INTERNAL MEDICINE

## 2022-08-26 PROCEDURE — 80048 BASIC METABOLIC PNL TOTAL CA: CPT | Performed by: INTERNAL MEDICINE

## 2022-08-26 PROCEDURE — 250N000013 HC RX MED GY IP 250 OP 250 PS 637

## 2022-08-26 PROCEDURE — 250N000011 HC RX IP 250 OP 636

## 2022-08-26 PROCEDURE — 85027 COMPLETE CBC AUTOMATED: CPT | Performed by: INTERNAL MEDICINE

## 2022-08-26 PROCEDURE — 120N000001 HC R&B MED SURG/OB

## 2022-08-26 PROCEDURE — 999N000157 HC STATISTIC RCP TIME EA 10 MIN

## 2022-08-26 PROCEDURE — 94640 AIRWAY INHALATION TREATMENT: CPT

## 2022-08-26 PROCEDURE — 36415 COLL VENOUS BLD VENIPUNCTURE: CPT | Performed by: INTERNAL MEDICINE

## 2022-08-26 PROCEDURE — 99233 SBSQ HOSP IP/OBS HIGH 50: CPT | Performed by: INTERNAL MEDICINE

## 2022-08-26 PROCEDURE — 250N000011 HC RX IP 250 OP 636: Performed by: ORTHOPAEDIC SURGERY

## 2022-08-26 PROCEDURE — 250N000013 HC RX MED GY IP 250 OP 250 PS 637: Performed by: HOSPITALIST

## 2022-08-26 RX ORDER — ASPIRIN 325 MG
325 TABLET, DELAYED RELEASE (ENTERIC COATED) ORAL DAILY
Qty: 31 TABLET | Refills: 0 | Status: SHIPPED | OUTPATIENT
Start: 2022-08-26 | End: 2022-09-26

## 2022-08-26 RX ORDER — ACETAMINOPHEN 325 MG/1
650 TABLET ORAL EVERY 4 HOURS PRN
Qty: 200 TABLET | Refills: 0 | Status: SHIPPED | OUTPATIENT
Start: 2022-08-27 | End: 2022-09-26

## 2022-08-26 RX ORDER — AMOXICILLIN 250 MG
1 CAPSULE ORAL 2 TIMES DAILY PRN
Qty: 60 TABLET | Refills: 0 | Status: SHIPPED | OUTPATIENT
Start: 2022-08-26 | End: 2022-10-11

## 2022-08-26 RX ORDER — OXYCODONE HYDROCHLORIDE 5 MG/1
5 TABLET ORAL EVERY 4 HOURS PRN
Qty: 10 TABLET | Refills: 0 | Status: SHIPPED | OUTPATIENT
Start: 2022-08-26 | End: 2022-10-11

## 2022-08-26 RX ORDER — TAZOBACTAM SODIUM AND PIPERACILLIN SODIUM 500; 4 MG/100ML; G/100ML
4.5 INJECTION, SOLUTION INTRAVENOUS EVERY 8 HOURS
Qty: 12000 ML | Refills: 0 | Status: SHIPPED | OUTPATIENT
Start: 2022-08-26 | End: 2022-10-04

## 2022-08-26 RX ADMIN — UMECLIDINIUM 1 PUFF: 62.5 AEROSOL, POWDER ORAL at 07:53

## 2022-08-26 RX ADMIN — ACETAMINOPHEN 975 MG: 325 TABLET, FILM COATED ORAL at 15:44

## 2022-08-26 RX ADMIN — CARBIDOPA AND LEVODOPA 2 TABLET: 25; 100 TABLET ORAL at 10:19

## 2022-08-26 RX ADMIN — CARBIDOPA AND LEVODOPA 2 TABLET: 25; 100 TABLET ORAL at 04:02

## 2022-08-26 RX ADMIN — ACETAMINOPHEN 975 MG: 325 TABLET, FILM COATED ORAL at 06:02

## 2022-08-26 RX ADMIN — METOPROLOL SUCCINATE 50 MG: 50 TABLET, EXTENDED RELEASE ORAL at 09:02

## 2022-08-26 RX ADMIN — LISINOPRIL 40 MG: 40 TABLET ORAL at 09:02

## 2022-08-26 RX ADMIN — TAZOBACTAM SODIUM AND PIPERACILLIN SODIUM 3.38 G: 375; 3 INJECTION, SOLUTION INTRAVENOUS at 06:03

## 2022-08-26 RX ADMIN — ASPIRIN 325 MG: 325 TABLET ORAL at 09:02

## 2022-08-26 RX ADMIN — ACETAMINOPHEN 975 MG: 325 TABLET, FILM COATED ORAL at 21:57

## 2022-08-26 RX ADMIN — ROSUVASTATIN CALCIUM 40 MG: 20 TABLET, FILM COATED ORAL at 20:42

## 2022-08-26 RX ADMIN — TAZOBACTAM SODIUM AND PIPERACILLIN SODIUM 4.5 G: 500; 4 INJECTION, SOLUTION INTRAVENOUS at 18:21

## 2022-08-26 RX ADMIN — CARBIDOPA AND LEVODOPA 2 TABLET: 25; 100 TABLET ORAL at 15:45

## 2022-08-26 RX ADMIN — TAZOBACTAM SODIUM AND PIPERACILLIN SODIUM 3.38 G: 375; 3 INJECTION, SOLUTION INTRAVENOUS at 00:03

## 2022-08-26 ASSESSMENT — ACTIVITIES OF DAILY LIVING (ADL)
ADLS_ACUITY_SCORE: 28

## 2022-08-26 NOTE — PLAN OF CARE
Goal Outcome Evaluation:                  Patient vital signs are at baseline: Yes  Patient able to ambulate as they were prior to admission or with assist devices provided by therapies during their stay:  No,  Reason:  pt has cast to L/ ankle. No WB to LLE. Pt is able to to L/ankle.   Patient MUST void prior to discharge:  Yes  Patient able to tolerate oral intake:  Yes  Pain has adequate pain control using Oral analgesics:  Yes  Does patient have an identified :  Yes, brother  Has goal D/C date and time been discussed with patient:  Yes, pt will discharge home and will continue PT/OT.

## 2022-08-26 NOTE — PROGRESS NOTES
[facility]  Hospitalist Progress Note  Admit 8/24/2022  5:22 AM    Name: Rashaun Molina    MRN: [id number]  Provider:  Raymon Ferreira MD, [facility]    Date of Service: 08/26/2022     Reason for Stay (Diagnosis):    Left ankle syndesmotic injury with failed hardware and wound dehiscence, infected         Summary of hospital stay & Assessment/Plan:   Summary of Stay: Rashaun Molina is a 62 year old male who was admitted on 8/24/2022  62 year old male who was admitted on 8/24/2022. Hospitalist service was consulted for  postsurgical management of medical comorbidities..       1.  Left ankle syndesmotic injury with failed hardware and wound dehiscence, presumed infection.  POD #2.  --Postsurgical care, pain control, rehab and DVT prophylaxis per primary team.  --Infectious disease has been involved for better antibiotic management. Has pseudomonas growing on culture further ID sens. Is pening     2.   Parkinson disease well-controlled on home medication.  -Resumed Sinemet as prior to admission.  3.  Essential hypertension.  -Resumed metoprolol and losartan.  4.  Dyslipidemia.-Continue rosuvastatin.  5.  Obstructive pulmonary disease without exacerbation.  -Continue Incruse Ellipta as prior to admission.         DVT Prophylaxis:  Per primary team  Code Status:  Full Code    Disposition Plan      Per primary team ortho      Entered: Raymon Ferreira MD 08/26/2022, 1:42 PM                 Interval History:         Today's plan detailed above discussed with nursing               Physical Exam:   Physical Exam   Temp: 98.4  F (36.9  C) Temp src: Temporal BP: (!) 174/83 Pulse: 67   Resp: 14 SpO2: 96 % O2 Device: None (Room air)    Vitals:    08/24/22 0536   Weight: 90.5 kg (199 lb 8.3 oz)     I/O last 3 completed shifts:  In: 120 [P.O.:120]  Out: 1200 [Urine:1200]          GENERAL:  Comfortable.   PSYCH: pleasant, oriented, No acute distress.  EYES: PERRLA, Normal conjunctiva.  HEART:  Normal S1, S2 with no  edema.  LUNGS:  Clear to auscultation, normal Respiratory effort.  ABDOMEN:  Soft, no hepatosplenomegaly, normal bowel sounds.  SKIN:  Dry to touch, No rash.  Left lower extremity in postop dressing      Medications     lactated ringers Stopped (08/25/22 1603)       acetaminophen  975 mg Oral Q8H     aspirin  325 mg Oral Daily     carbidopa-levodopa  2 tablet Oral TID     lisinopril  40 mg Oral Daily     metoprolol succinate ER  50 mg Oral Daily     piperacillin-tazobactam  4.5 g Intravenous Q6H     polyethylene glycol  17 g Oral Daily     rosuvastatin  40 mg Oral Daily     senna-docusate  1 tablet Oral BID     sodium chloride (PF)  3 mL Intracatheter Q8H     umeclidinium  1 puff Inhalation Daily     Data     -Data reviewed today:  I personally reviewed  all new labs and imaging results over the last 24 hours.    Recent Labs   Lab 08/26/22 0724 08/24/22  0739   WBC 7.2  --    HGB 9.9* 12.6*   HCT 31.8*  --    *  --    *  --      Recent Labs   Lab 08/26/22 0724 08/25/22  0737 08/24/22  0621   *  --   --    POTASSIUM 4.1  --   --    CHLORIDE 95*  --   --    CO2 29  --   --    ANIONGAP 7  --   --    GLC 91 194*  --    BUN 10.6  --   --    CR 0.98 1.03 0.69   GFRESTIMATED 87 82 >90   FRANNY 8.9  --   --        Recent Results (from the past 24 hour(s))   XR Chest Port 1 View    Addendum: 8/25/2022    EXAM: XR CHEST PORT 1 VIEW  LOCATION: [facility]  DATE/TIME: 8/25/2022 6:53 PM    INDICATION: RN placed PICC   verify tip placement  COMPARISON: 4/11/2013    IMPRESSION: Right PICC line with tip seen at SVC. Prominent interstitium with pulmonary vascular congestion, correlate to exclude interstitial pulmonary edema. No pneumothorax or pleural effusion.        Narrative    EXAM: XR CHEST PORT 1 VIEW  LOCATION: [facility]  DATE/TIME: 8/25/2022 6:53 PM    INDICATION: RN placed PICC   verify tip placement  COMPARISON: 4/11/2013      Impression    IMPRESSION: PICC  line with tip seen at IVC. Prominent interstitium with pulmonary vascular congestion, correlate to exclude interstitial pulmonary edema. No pneumothorax or pleural effusion.       This document was produced using voice recognition software

## 2022-08-26 NOTE — PROGRESS NOTES
"Orthopedic Surgery  8/26/2022  POD 2    S: Patient voices no unexpected ortho complaints today. Denies chest pain or shortness of breath. Has baseline neuropathy and therefore minimal pain.    O: Blood pressure (!) 160/72, pulse 57, temperature 97.2  F (36.2  C), temperature source Temporal, resp. rate 16, height 1.778 m (5' 10\"), weight 90.5 kg (199 lb 8.3 oz), SpO2 94 %.  Lab Results   Component Value Date    HGB 12.6 08/24/2022    HGB 11.2 07/27/2022     Lab Results   Component Value Date    INR 1.11 06/08/2022    INR 1.04 08/06/2018     I/O last 3 completed shifts:  In: 120 [P.O.:120]  Out: 1200 [Urine:1200]  Distal extremity CMS at baseline  Calves are negative bilaterally, both soft and nontender.  The dressing is C/D/I.    Noted   1+ Pseudomonas aeruginosa Abnormal   On culture    A: Mr. Molina is doing well status post Procedure(s):  1.  Removal hardware left ankle 2.  Excisional debridement of left lateral ankle wound deepest level of debridement bone 3.  Revision open reduction internal fixation of left ankle syndesmotic injury 4.  Deltoid ligament repair left ankle  Open reduction internal fixation ankle.    P: Continue physical therapy. Continue DVT pphx with ASA . Anticipate discharge to [facility] with IV home infusion today if antibiotics established. Ortho discharge orders written will await ID recommendations and orders for IV infusion.    Swapna Barclay PA-C  [phone]  "

## 2022-08-26 NOTE — PROGRESS NOTES
"[facility]  Infectious Disease Progress Note          Assessment and Plan:   IMPRESSION:    1.  A 62-year-old male near 3 months out from left ankle hardware repair, nonhealing fracture line, drainage and redness, probable infection, underwent surgical intervention and hardware removal.  Not grossly infected-appearing at surgery. Cultures are now pending.  2.  Parkinson's disease.  3.  Chronic lung disease without significant symptoms currently.     RECOMMENDATIONS:    1. DC vancomycin  Zosyn  Continue for now.  2.  cx pseudomonas  3.  Presumed deep infection. Will need prolonged IV antibiotics.   PICC line place.  4.  has IV coverage  Orders in for zosyn, OK home by me I will Follow-up on final info and adjust as outpt  If here Dr Domínguez will adjust tomorrow if needed        Interval History:   no new complaints pain OK; no cp, sob, n/v/d, or abd pain. cx pseudomonas              Medications:       acetaminophen  975 mg Oral Q8H     aspirin  325 mg Oral Daily     carbidopa-levodopa  2 tablet Oral TID     lisinopril  40 mg Oral Daily     metoprolol succinate ER  50 mg Oral Daily     piperacillin-tazobactam  4.5 g Intravenous Q6H     polyethylene glycol  17 g Oral Daily     rosuvastatin  40 mg Oral Daily     senna-docusate  1 tablet Oral BID     sodium chloride (PF)  3 mL Intracatheter Q8H     umeclidinium  1 puff Inhalation Daily                  Physical Exam:   Blood pressure (!) 174/83, pulse 67, temperature 98.4  F (36.9  C), temperature source Temporal, resp. rate 14, height 1.778 m (5' 10\"), weight 90.5 kg (199 lb 8.3 oz), SpO2 96 %.  Wt Readings from Last 2 Encounters:   08/24/22 90.5 kg (199 lb 8.3 oz)   07/27/22 91.6 kg (202 lb)     Vital Signs with Ranges  Temp:  [97.2  F (36.2  C)-98.4  F (36.9  C)] 98.4  F (36.9  C)  Pulse:  [57-67] 67  Resp:  [14-20] 14  BP: (156-174)/(72-83) 174/83  SpO2:  [93 %-96 %] 96 %    Constitutional: Awake, alert, cooperative, no apparent distress "   Lungs: Clear to auscultation bilaterally, no crackles or wheezing   Cardiovascular: Regular rate and rhythm, normal S1 and S2, and no murmur noted   Abdomen: Normal bowel sounds, soft, non-distended, non-tender   Skin: No rashes, no cyanosis, no edema ankle covered   Other:           Data:   All microbiology laboratory data reviewed.  Recent Labs   Lab Test 08/26/22  0724 08/24/22  0739 07/27/22  1434 06/12/22  0601   WBC 7.2  --  9.8 7.2   HGB 9.9* 12.6* 11.2* 10.7*   HCT 31.8*  --  33.8* 33.6*   *  --  101.3* 100   *  --  273 123*     Recent Labs   Lab Test 08/26/22  0724 08/25/22  0737 08/24/22  0621   CR 0.98 1.03 0.69     Recent Labs   Lab Test 08/24/22  1447   SED 24*     No lab results found.    Invalid input(s): UC

## 2022-08-26 NOTE — PLAN OF CARE
Goal Outcome Evaluation:    Patient vital signs are at baseline: Yes  Patient able to ambulate as they were prior to admission or with assist devices provided by therapies during their stay:  No,  Reason:  NWB pivot  Patient MUST void prior to discharge:  Yes  Patient able to tolerate oral intake:  Yes  Pain has adequate pain control using Oral analgesics:  Yes  Does patient have an identified :  Yes  Has goal D/C date and time been discussed with patient:  Yes    Pt will be discharged, pending long term at home abx tx    Continue to monitor

## 2022-08-27 ENCOUNTER — HOME INFUSION (PRE-WILLOW HOME INFUSION) (OUTPATIENT)
Dept: PHARMACY | Facility: CLINIC | Age: 62
End: 2022-08-27

## 2022-08-27 VITALS
DIASTOLIC BLOOD PRESSURE: 58 MMHG | HEART RATE: 74 BPM | RESPIRATION RATE: 16 BRPM | BODY MASS INDEX: 28.56 KG/M2 | HEIGHT: 70 IN | WEIGHT: 199.52 LBS | TEMPERATURE: 97.4 F | OXYGEN SATURATION: 94 % | SYSTOLIC BLOOD PRESSURE: 131 MMHG

## 2022-08-27 LAB — BACTERIA BONE ANAEROBE+AEROBE CULT: ABNORMAL

## 2022-08-27 PROCEDURE — 999N000157 HC STATISTIC RCP TIME EA 10 MIN

## 2022-08-27 PROCEDURE — 99232 SBSQ HOSP IP/OBS MODERATE 35: CPT | Performed by: HOSPITALIST

## 2022-08-27 PROCEDURE — 250N000011 HC RX IP 250 OP 636: Performed by: ORTHOPAEDIC SURGERY

## 2022-08-27 PROCEDURE — 94640 AIRWAY INHALATION TREATMENT: CPT

## 2022-08-27 PROCEDURE — 250N000013 HC RX MED GY IP 250 OP 250 PS 637

## 2022-08-27 PROCEDURE — 250N000013 HC RX MED GY IP 250 OP 250 PS 637: Performed by: HOSPITALIST

## 2022-08-27 RX ADMIN — CARBIDOPA AND LEVODOPA 2 TABLET: 25; 100 TABLET ORAL at 10:07

## 2022-08-27 RX ADMIN — ACETAMINOPHEN 975 MG: 325 TABLET, FILM COATED ORAL at 06:14

## 2022-08-27 RX ADMIN — TAZOBACTAM SODIUM AND PIPERACILLIN SODIUM 4.5 G: 500; 4 INJECTION, SOLUTION INTRAVENOUS at 12:07

## 2022-08-27 RX ADMIN — TAZOBACTAM SODIUM AND PIPERACILLIN SODIUM 4.5 G: 500; 4 INJECTION, SOLUTION INTRAVENOUS at 00:10

## 2022-08-27 RX ADMIN — METOPROLOL SUCCINATE 50 MG: 50 TABLET, EXTENDED RELEASE ORAL at 08:58

## 2022-08-27 RX ADMIN — TAZOBACTAM SODIUM AND PIPERACILLIN SODIUM 4.5 G: 500; 4 INJECTION, SOLUTION INTRAVENOUS at 06:15

## 2022-08-27 RX ADMIN — CARBIDOPA AND LEVODOPA 2 TABLET: 25; 100 TABLET ORAL at 04:12

## 2022-08-27 RX ADMIN — UMECLIDINIUM 1 PUFF: 62.5 AEROSOL, POWDER ORAL at 07:46

## 2022-08-27 RX ADMIN — LISINOPRIL 40 MG: 40 TABLET ORAL at 08:58

## 2022-08-27 RX ADMIN — ASPIRIN 325 MG: 325 TABLET ORAL at 08:58

## 2022-08-27 ASSESSMENT — ACTIVITIES OF DAILY LIVING (ADL)
ADLS_ACUITY_SCORE: 28
ADLS_ACUITY_SCORE: 29
ADLS_ACUITY_SCORE: 29
ADLS_ACUITY_SCORE: 28

## 2022-08-27 NOTE — PLAN OF CARE
Goal Outcome Evaluation:  Patient vital signs are at baseline: Yes, except for elevated /79  Patient able to ambulate as they were prior to admission or with assist devices provided by therapies during their stay:  No,  Reason:  NWB status, pivot to the bedside commode.  Patient MUST void prior to discharge:  Yes  Patient able to tolerate oral intake:  Yes  Pain has adequate pain control using Oral analgesics:  Yes  Does patient have an identified :  Yes  Has goal D/C date and time been discussed with patient:  Yes        PICC in place for long term therapy, pain managed with tylenol, will continue to monitor.

## 2022-08-27 NOTE — PROGRESS NOTES
[facility]    Hospitalist Consult Progress Note      Assessment & Plan   Rashaun Molina is a 62 year old male who was admitted on 8/24/2022  62 year old male who was admitted on 8/24/2022. Hospitalist service was consulted for  postsurgical management of medical comorbidities..        #Left ankle syndesmotic injury with failed hardware and wound dehiscence, presumed infection.  Cultures positive for pseudomonas.  POD #3.  --Postsurgical care, pain control, rehab and DVT prophylaxis per primary team.  --Infectious disease has been involved for better antibiotic management. Has pseudomonas growing on culture further ID sens.  ID team has written for zosyn on discharge with PICC line in place.      #Parkinson disease well-controlled on home medication.  Resumed Sinemet as prior to admission.  #Essential hypertension: Resumed metoprolol and lisinopril.  #Dyslipidemia.-Continue rosuvastatin.  #Obstructive pulmonary disease without exacerbation: Continue Incruse Ellipta as prior to admission.     DVT Prophylaxis: Defer to primary service  Code Status: Full Code  Dispo; Defer to primary service. Med rec done for medication management in anticipation of discharge.     Misha Garcia MD    Interval History   Assumed care.  No events.  Patient notes he wants to go home.  Denies chest pain or shortness of breath.  No fevers or chills.  No significant pain at surgical site.    -Data reviewed today: I reviewed all new labs and imaging results over the last 24 hours.     Physical Exam   Temp: 97.8  F (36.6  C) Temp src: Temporal BP: 130/56 Pulse: 71   Resp: 16 SpO2: 96 % O2 Device: None (Room air)    Vitals:    08/24/22 0536   Weight: 90.5 kg (199 lb 8.3 oz)     Vital Signs with Ranges  Temp:  [97.8  F (36.6  C)-98.9  F (37.2  C)] 97.8  F (36.6  C)  Pulse:  [57-71] 71  Resp:  [14-18] 16  BP: (130-170)/(56-79) 130/56  SpO2:  [92 %-96 %] 96 %  I/O last 3 completed shifts:  In: -   Out: 1850  [Urine:1850]    GENERAL:  Comfortable.   HEENT: MMM, no oral lesions. No elevation of JVD noted.   HEART:  Normal S1, S2 with no edema.  LUNGS:  Clear to auscultation, normal Respiratory effort.  ABDOMEN:  Soft, no hepatosplenomegaly, normal bowel sounds.  SKIN:  Dry to touch, No rash.  Left lower extremity in postop dressing    Medications     lactated ringers Stopped (08/25/22 1603)       aspirin  325 mg Oral Daily     carbidopa-levodopa  2 tablet Oral TID     lisinopril  40 mg Oral Daily     metoprolol succinate ER  50 mg Oral Daily     piperacillin-tazobactam  4.5 g Intravenous Q6H     polyethylene glycol  17 g Oral Daily     rosuvastatin  40 mg Oral Daily     senna-docusate  1 tablet Oral BID     sodium chloride (PF)  3 mL Intracatheter Q8H     umeclidinium  1 puff Inhalation Daily       Data   Recent Labs   Lab 08/26/22  0724 08/25/22  0737 08/24/22  0739 08/24/22  0621   WBC 7.2  --   --   --    HGB 9.9*  --  12.6*  --    *  --   --   --    *  --   --   --    *  --   --   --    POTASSIUM 4.1  --   --   --    CHLORIDE 95*  --   --   --    CO2 29  --   --   --    BUN 10.6  --   --   --    CR 0.98 1.03  --  0.69   ANIONGAP 7  --   --   --    FRANNY 8.9  --   --   --    GLC 91 194*  --   --        No results found for this or any previous visit (from the past 24 hour(s)).

## 2022-08-27 NOTE — DISCHARGE INSTRUCTIONS
Your home infusion referral was sent to [facility]  If you haven't heard from them regarding supply delivery, nurse visits, or have questions,  Please call them at [phone]

## 2022-08-27 NOTE — PROGRESS NOTES
Care Management Discharge Note    Discharge Date: 08/27/2022       Discharge Disposition: Home    Discharge Services: None    Discharge DME: None    Discharge Transportation: family or friend will provide    Private pay costs discussed: Not applicable    Patient/Family in Agreement with the Plan: yes    Handoff Referral Completed: Yes    Additional Information:  Care management following for discharge planning. [facility] has accepted for home infusion.   ID Dr Vang has ordered IV ABX for discharge:   Zosyn 4-0.5 GM/100ML Soln IVPB  Generic drug: piperacillin-tazobactam    Dose: 4.5 g  Inject 100 mLs (4.5 g) into the vein every 8 hours for 39 days ESR,CRP,CBC with differential, creatinine, SGOT weekly while on this medication to be faxed to Dr. Vang office     Patient is currently receiving IV Zosyn every 6 hours in hospital. Patient will need to receive scheduled dose at noon in hospital. Will need confirmation from home infusion that will schedule nurse visit, teaching, and supply delivery for next dose.     Request provider to order home infusion referral and home care PT/HHA for discharge.     Update 1055: Provider has cleared patient for discharge and completed order for discharge with home infusion and home care PT/HHA.   Spoke with nursing at [facility] [phone] to update on completed discharge orders. They will review and contact patient for scheduling nurse visit.   AVS updated with contact information for [facility] [phone].        Chiquita Smith, RN      Chiquita Smith RN Case Manager  Inpatient Care Coordination  [facility]   [phone]

## 2022-08-27 NOTE — PLAN OF CARE
Goal Outcome Evaluation:    Plan of Care Reviewed With: patient      VS-stable, afebrile, tremors in upper body, has hx of parkinsons,.   Lung Sounds-clear, no cough, on room air, taking deep breathes.,   O2-on room air.   GI-+bs, +flatus, had stool today. Tolerating reg diet, denies nausea.   - voiding well, no difficulties.   IVF-picc line in place on R arm.   Dressings-ace wrap on LLE. Elevated on wedge. Refused ice. C/d/I.  CMS-has baseline numbness and tingling in both feet. R side +pp, dorsi/planter flexion. L side ace and splinted. Warm toes and can wiggle. Elevated.   Drain-none.   Activity-bedrest. Pivot to bsc. NWB on L leg.   Pain-denies pain and no pain meds given. Tylenol scheduled.   D/C Plan-home to [facility] today. With iv abx. RN /aide. Sws following, home infusion.          1200 hung the 1200 zosyn. Home care will come to his house and do teaching and supplies this afternoon at his [facility]. Ate lunch here. meds filled and given to pt.

## 2022-08-27 NOTE — PLAN OF CARE
Patient vital signs are at baseline: Yes  Patient able to ambulate as they were prior to admission or with assist devices provided by therapies during their stay:  Yes assist x2 with GB and walker  Patient MUST void prior to discharge:  Yes to urinal in bed   Patient able to tolerate oral intake:  Yes on regular diet. Takes PO medications   Pain has adequate pain control using Oral analgesics:  Yes minimal pain, addressed with scheduled Tylenol  Does patient have an identified :  Yes from home  Has goal D/C date and time been discussed with patient:  Yes discharge plan pending, pt states on 8/27    VSS. On RA. Hypertensive. A&O x4. Numbness, tingling in feet at baseline. Tremors at baseline. Non-weight bearing. Has PICC 1x lumen. Dressing Ace wrap, CDI. LLE elevated. Requests no stool softener d/t multiple loose stools day before. Does not sleep between cares at night.

## 2022-08-27 NOTE — CARE PLAN
Physical Therapy Discharge Summary    Reason for therapy discharge:    Discharged to home with home therapy.    Progress towards therapy goal(s). See goals on Care Plan in [facility] electronic health record for goal details.  Goals partially met.  Barriers to achieving goals:   discharge from facility.    Therapy recommendation(s):    Continued therapy is recommended.  Rationale/Recommendations:  HC PT to maximize safety and functional mobility at initial discharge.    **Pt not seen by therapist this date. Discharge summary written according to documentation from prior PT sessions.

## 2022-08-27 NOTE — PROGRESS NOTES
"Orthopedic Surgery  8/27/2022  POD 3    S: Patient voices no unexpected ortho complaints today. Denies chest pain or shortness of breath. Did well over night with abx.    O: Blood pressure 130/56, pulse 71, temperature 97.8  F (36.6  C), temperature source Temporal, resp. rate 16, height 1.778 m (5' 10\"), weight 90.5 kg (199 lb 8.3 oz), SpO2 96 %.  Lab Results   Component Value Date    HGB 9.9 08/26/2022    HGB 11.2 07/27/2022     Lab Results   Component Value Date    INR 1.11 06/08/2022    INR 1.04 08/06/2018     I/O last 3 completed shifts:  In: -   Out: 1850 [Urine:1850]  Distal extremity CMSI bilaterally.  Calves are negative bilaterally, both soft and nontender.  The dressing is C/D/I.      A: Mr. Molina is doing well status post Procedure(s):  1.  Removal hardware left ankle 2.  Excisional debridement of left lateral ankle wound deepest level of debridement bone 3.  Revision open reduction internal fixation of left ankle syndesmotic injury 4.  Deltoid ligament repair left ankle  Open reduction internal fixation ankle.    P: Anticipate home with home health care for infusion, PT, HHA to his brothers house later today after 12pm  IV dose of abx.    Swapna Barclay PA-C  [phone]  "

## 2022-08-30 ENCOUNTER — PATIENT OUTREACH (OUTPATIENT)
Dept: CARE COORDINATION | Facility: CLINIC | Age: 62
End: 2022-08-30

## 2022-08-30 ENCOUNTER — HOME INFUSION (PRE-WILLOW HOME INFUSION) (OUTPATIENT)
Dept: PHARMACY | Facility: CLINIC | Age: 62
End: 2022-08-30

## 2022-08-30 NOTE — PROGRESS NOTES
"Clinic Care Coordination Contact  [facility]: Post-Discharge Note  SITUATION                                                      Admission:    Admission Date: 08/24/22   Reason for Admission: postsurgical management of medical comorbidities  Discharge:   Discharge Date: 08/27/22  Discharge Diagnosis: Left ankle syndesmotic injury with failed hardware and wound dehiscence, presumed infection.  Cultures positive for pseudomonas, parkinsons disease, essential hypertension, dyslipidemia, obstructive pulmonary disease without exacerbation, DVT prophylaxis    BACKGROUND                                                      Per hospital discharge summary and inpatient provider notes:    Rashaun Molina is a 62 year old male who has a complicated past medical history of Parkinson disease, essential hypertension, dyslipidemia, peripheral arterial disease, COPD and history of smoking among others.  He admitted for x-ray intervention in his left ankle with failed hardware at wound dehiscence and presumed superimposed infection.  Originally he had had a syndesmotic injury in June of this year.  He underwent surgery today and came back to his bed around 2 hours ago.  He denies any chest pain, nausea, vomiting or shortness of breath.  He already tolerated lunch.  His level of pain is not significant at the moment.  He is now complaining of tremors from his Parkinson's disease or any other symptoms.    ASSESSMENT      Discharge Assessment  How are you doing now that you are home?: \"Doing alright\"  How are your symptoms? (Red Flag symptoms escalate to triage hotline per guidelines): Improved  Do you feel your condition is stable enough to be safe at home until your provider visit?: Yes  Does the patient have their discharge instructions? : Yes  Does the patient have questions regarding their discharge instructions? : No  Were you started on any new medications or were there changes to any of your previous medications? : " Yes  Does the patient have all of their medications?: Yes  Do you have questions regarding any of your medications? : No  Do you have all of your needed medical supplies or equipment (DME)?  (i.e. oxygen tank, CPAP, cane, etc.): Yes  Discharge follow-up appointment scheduled within 14 calendar days? : Yes  Discharge Follow Up Appointment Scheduled with?: Specialty Care Provider    Post-op ([facility] Only)  If the patient had a surgery or procedure, do they have any questions for a nurse?: No    PLAN                                                      Outpatient Plan:      Follow-up with Dr Jackson for wound check in 10-14 days and transition to boot. Bring boot to appointment. Call for  appointment [phone].    No future appointments.      For any urgent concerns, please contact our 24 hour nurse triage line: [phone] ([phone])       Jazz Pugh  Community Health Worker  [facility]  Ph: [phone]

## 2022-08-31 LAB
BACTERIA BONE ANAEROBE+AEROBE CULT: NORMAL
BACTERIA TISS BX CULT: NORMAL

## 2022-08-31 NOTE — PROGRESS NOTES
This is a recent snapshot of the patient's [facility] medical record.  For current drug dose and complete information and questions, call [phone] or In Basket pool, fv home infusion ([id number])  CSN Number:  [id number]

## 2022-08-31 NOTE — DISCHARGE SUMMARY
Admitting Physician:  Mike Jackson MD    Discharging Physician:  Same    Admission Date:  8/24/2022    Discharge Date:  August 31, 2022    Admission Diagnosis:  Left ankle wound infection, failed fixation     Procedure:  Debridement of left ankle wound, removal of hardware, revision ORIF of left ankle syndesmosis and left ankle deltoid ligament repair     Hospital Course:  The patient was admitted to the hospital and underwent the procedure listed above.  The procedure was uncomplicated.  The patient was subsequently admitted to the floor.  The patient's stay was brief and uncomplicated.  At the time of discharge, the patient tolerated an oral diet, their pain was controlled, and they were mobilizing safely. Infectious Disease was consulted in the hospital, their recommendations are reflected in the discharge medication reconciliation.    Follow Up:  Follow up with Dr. Jackson in 10-14 days post op for suture/staple removal, wound check, and x-rays.    Discharge Instructions:    1.  Pain control:  The patient will be discharged on a narcotic pain medication.  This medication should not be taken more frequently than prescribed. The patient should not drive or operate machinery while taking this medication.  The patient should use an over the counter stool softener such as Colace or Senna while taking narcotic pain medications  2. Splint Care:  The patient should keep the splint clean, dry, and in place.  The patient should elevate the operative extremity above the level of the heart to help reduce swelling.  If the patient's pain markedly increases without explanation or the patient develops numbness and pain in the toes, the extremity should be elevated above the level of the heart for 20 minutes. If not improved with elevation, the splint may need to be loosened.  If not improved, the patient should then proceed to a [facility] (8 AM - 8 PM) or a local emergency room.  3. Wound care:  The patient should leave  the operative dressing in place until their follow-up.  If the dressing becomes saturated, it may be removed.  If this occurs, please contact [facility].  If the patient develops new redness, drainage, or other concerning changes to the incision, please contact Dr. Jackson at [facility].  The operative dressing should be covered with saran wrap during showers.  4. Activity:  Please continue all exercises as demonstrated in the hospital.  The patient should remain nonweightbearing with the operative extremity.  Elevate the extremity at rest to reduce swelling, as demonstrated in the hospital  5. Please contact [facility] if you notice any of the following:  Fever >100.5 degrees F, new or concerning wound drainage, uncontrolled pain, or other concerning symptoms.  6. Complete all antibiotics as directed by infectious disease recommendations.

## 2022-09-01 ENCOUNTER — LAB REQUISITION (OUTPATIENT)
Dept: LAB | Facility: CLINIC | Age: 62
End: 2022-09-01
Payer: COMMERCIAL

## 2022-09-01 ENCOUNTER — TELEPHONE (OUTPATIENT)
Dept: FAMILY MEDICINE | Facility: CLINIC | Age: 62
End: 2022-09-01

## 2022-09-01 ENCOUNTER — HOME INFUSION (PRE-WILLOW HOME INFUSION) (OUTPATIENT)
Dept: PHARMACY | Facility: CLINIC | Age: 62
End: 2022-09-01

## 2022-09-01 DIAGNOSIS — T84.7XXA INFECTION AND INFLAMMATORY REACTION DUE TO OTHER INTERNAL ORTHOPEDIC PROSTHETIC DEVICES, IMPLANTS AND GRAFTS, INITIAL ENCOUNTER (H): ICD-10-CM

## 2022-09-01 LAB
AST SERPL W P-5'-P-CCNC: 20 U/L (ref 10–50)
BASOPHILS # BLD AUTO: 0.1 10E3/UL (ref 0–0.2)
BASOPHILS NFR BLD AUTO: 1 %
CREAT SERPL-MCNC: 0.87 MG/DL (ref 0.67–1.17)
CRP SERPL-MCNC: 20.22 MG/L
EOSINOPHIL # BLD AUTO: 0.8 10E3/UL (ref 0–0.7)
EOSINOPHIL NFR BLD AUTO: 9 %
ERYTHROCYTE [DISTWIDTH] IN BLOOD BY AUTOMATED COUNT: 13.8 % (ref 10–15)
ERYTHROCYTE [SEDIMENTATION RATE] IN BLOOD BY WESTERGREN METHOD: 45 MM/HR (ref 0–20)
GFR SERPL CREATININE-BSD FRML MDRD: >90 ML/MIN/1.73M2
HCT VFR BLD AUTO: 33.6 % (ref 40–53)
HGB BLD-MCNC: 11 G/DL (ref 13.3–17.7)
IMM GRANULOCYTES # BLD: 0 10E3/UL
IMM GRANULOCYTES NFR BLD: 1 %
LYMPHOCYTES # BLD AUTO: 2.1 10E3/UL (ref 0.8–5.3)
LYMPHOCYTES NFR BLD AUTO: 24 %
MCH RBC QN AUTO: 32.4 PG (ref 26.5–33)
MCHC RBC AUTO-ENTMCNC: 32.7 G/DL (ref 31.5–36.5)
MCV RBC AUTO: 99 FL (ref 78–100)
MONOCYTES # BLD AUTO: 1 10E3/UL (ref 0–1.3)
MONOCYTES NFR BLD AUTO: 11 %
NEUTROPHILS # BLD AUTO: 4.8 10E3/UL (ref 1.6–8.3)
NEUTROPHILS NFR BLD AUTO: 54 %
NRBC # BLD AUTO: 0 10E3/UL
NRBC BLD AUTO-RTO: 0 /100
PLATELET # BLD AUTO: 213 10E3/UL (ref 150–450)
RBC # BLD AUTO: 3.39 10E6/UL (ref 4.4–5.9)
WBC # BLD AUTO: 8.8 10E3/UL (ref 4–11)

## 2022-09-01 PROCEDURE — 84450 TRANSFERASE (AST) (SGOT): CPT | Performed by: INTERNAL MEDICINE

## 2022-09-01 PROCEDURE — 85025 COMPLETE CBC W/AUTO DIFF WBC: CPT | Performed by: INTERNAL MEDICINE

## 2022-09-01 PROCEDURE — 85652 RBC SED RATE AUTOMATED: CPT | Performed by: INTERNAL MEDICINE

## 2022-09-01 PROCEDURE — 82565 ASSAY OF CREATININE: CPT | Performed by: INTERNAL MEDICINE

## 2022-09-01 PROCEDURE — 86140 C-REACTIVE PROTEIN: CPT | Performed by: INTERNAL MEDICINE

## 2022-09-01 NOTE — TELEPHONE ENCOUNTER
Sumaya from [facility] left a voicemail this afternoon stating that she did an assessment of the patient today and she has some semi-urgent information to pass along to the provider.    I called back and left a detailed voicemail that she can call us back with a more information.    Sumaya's phone # [phone]

## 2022-09-02 ENCOUNTER — HOME INFUSION (PRE-WILLOW HOME INFUSION) (OUTPATIENT)
Dept: PHARMACY | Facility: CLINIC | Age: 62
End: 2022-09-02

## 2022-09-02 NOTE — TELEPHONE ENCOUNTER
Sumaya called back. Upon her assessment yesterday, she found a heart arrhthymia. Pt's pulse was in the 40s and jumped to 60s. No hx of cardiac issues. This is new for him and wanted to reach out to PCP if he should make an appt.    Routing to Dr. Nava.  Please advise, thanks.

## 2022-09-06 NOTE — TELEPHONE ENCOUNTER
Pt is home-bound for home infusion post surgical infection.   I scheduled him for an OV on 9/19/2022.

## 2022-09-06 NOTE — PROGRESS NOTES
This is a recent snapshot of the patient's [facility] medical record.  For current drug dose and complete information and questions, call [phone] or In Basket pool, fv home infusion ([id number])  CSN Number:  [id number]

## 2022-09-06 NOTE — PROGRESS NOTES
This is a recent snapshot of the patient's [facility] medical record.  For current drug dose and complete information and questions, call [phone] or In Basket pool, fv home infusion ([id number])  CSN Number:  [id number]

## 2022-09-07 ENCOUNTER — HOME INFUSION (PRE-WILLOW HOME INFUSION) (OUTPATIENT)
Dept: PHARMACY | Facility: CLINIC | Age: 62
End: 2022-09-07

## 2022-09-08 ENCOUNTER — LAB REQUISITION (OUTPATIENT)
Dept: LAB | Facility: CLINIC | Age: 62
End: 2022-09-08
Payer: COMMERCIAL

## 2022-09-08 ENCOUNTER — HOME INFUSION (PRE-WILLOW HOME INFUSION) (OUTPATIENT)
Dept: PHARMACY | Facility: CLINIC | Age: 62
End: 2022-09-08

## 2022-09-08 DIAGNOSIS — T84.7XXA INFECTION AND INFLAMMATORY REACTION DUE TO OTHER INTERNAL ORTHOPEDIC PROSTHETIC DEVICES, IMPLANTS AND GRAFTS, INITIAL ENCOUNTER (H): ICD-10-CM

## 2022-09-08 LAB
AST SERPL W P-5'-P-CCNC: 29 U/L (ref 10–50)
BASOPHILS # BLD AUTO: 0.1 10E3/UL (ref 0–0.2)
BASOPHILS NFR BLD AUTO: 1 %
CREAT SERPL-MCNC: 0.78 MG/DL (ref 0.67–1.17)
CRP SERPL-MCNC: 28.94 MG/L
EOSINOPHIL # BLD AUTO: 0.9 10E3/UL (ref 0–0.7)
EOSINOPHIL NFR BLD AUTO: 8 %
ERYTHROCYTE [DISTWIDTH] IN BLOOD BY AUTOMATED COUNT: 13.4 % (ref 10–15)
ERYTHROCYTE [SEDIMENTATION RATE] IN BLOOD BY WESTERGREN METHOD: 39 MM/HR (ref 0–20)
GFR SERPL CREATININE-BSD FRML MDRD: >90 ML/MIN/1.73M2
HCT VFR BLD AUTO: 35.4 % (ref 40–53)
HGB BLD-MCNC: 11.6 G/DL (ref 13.3–17.7)
IMM GRANULOCYTES # BLD: 0.1 10E3/UL
IMM GRANULOCYTES NFR BLD: 1 %
LYMPHOCYTES # BLD AUTO: 2.3 10E3/UL (ref 0.8–5.3)
LYMPHOCYTES NFR BLD AUTO: 19 %
MCH RBC QN AUTO: 32 PG (ref 26.5–33)
MCHC RBC AUTO-ENTMCNC: 32.8 G/DL (ref 31.5–36.5)
MCV RBC AUTO: 98 FL (ref 78–100)
MONOCYTES # BLD AUTO: 1.1 10E3/UL (ref 0–1.3)
MONOCYTES NFR BLD AUTO: 9 %
NEUTROPHILS # BLD AUTO: 7.3 10E3/UL (ref 1.6–8.3)
NEUTROPHILS NFR BLD AUTO: 62 %
NRBC # BLD AUTO: 0 10E3/UL
NRBC BLD AUTO-RTO: 0 /100
PLATELET # BLD AUTO: 206 10E3/UL (ref 150–450)
RBC # BLD AUTO: 3.63 10E6/UL (ref 4.4–5.9)
WBC # BLD AUTO: 11.7 10E3/UL (ref 4–11)

## 2022-09-08 PROCEDURE — 84450 TRANSFERASE (AST) (SGOT): CPT | Performed by: INTERNAL MEDICINE

## 2022-09-08 PROCEDURE — 85025 COMPLETE CBC W/AUTO DIFF WBC: CPT | Performed by: INTERNAL MEDICINE

## 2022-09-08 PROCEDURE — 85652 RBC SED RATE AUTOMATED: CPT | Performed by: INTERNAL MEDICINE

## 2022-09-08 PROCEDURE — 82565 ASSAY OF CREATININE: CPT | Performed by: INTERNAL MEDICINE

## 2022-09-08 PROCEDURE — 86140 C-REACTIVE PROTEIN: CPT | Performed by: INTERNAL MEDICINE

## 2022-09-08 NOTE — PROGRESS NOTES
This is a recent snapshot of the patient's [facility] medical record.  For current drug dose and complete information and questions, call [phone] or In Basket pool, fv home infusion ([id number])  CSN Number:  [id number]

## 2022-09-09 ENCOUNTER — HOME INFUSION (PRE-WILLOW HOME INFUSION) (OUTPATIENT)
Dept: PHARMACY | Facility: CLINIC | Age: 62
End: 2022-09-09

## 2022-09-09 NOTE — PROGRESS NOTES
This is a recent snapshot of the patient's [facility] medical record.  For current drug dose and complete information and questions, call [phone] or In Basket pool, fv home infusion ([id number])  CSN Number:  [id number]

## 2022-09-12 NOTE — PROGRESS NOTES
This is a recent snapshot of the patient's [facility] medical record.  For current drug dose and complete information and questions, call [phone] or In Basket pool, fv home infusion ([id number])  CSN Number:  [id number]

## 2022-09-15 ENCOUNTER — LAB REQUISITION (OUTPATIENT)
Dept: LAB | Facility: CLINIC | Age: 62
End: 2022-09-15
Payer: COMMERCIAL

## 2022-09-15 ENCOUNTER — HOME INFUSION (PRE-WILLOW HOME INFUSION) (OUTPATIENT)
Dept: PHARMACY | Facility: CLINIC | Age: 62
End: 2022-09-15

## 2022-09-15 DIAGNOSIS — T84.7XXA INFECTION AND INFLAMMATORY REACTION DUE TO OTHER INTERNAL ORTHOPEDIC PROSTHETIC DEVICES, IMPLANTS AND GRAFTS, INITIAL ENCOUNTER (H): ICD-10-CM

## 2022-09-15 LAB
AST SERPL W P-5'-P-CCNC: 51 U/L (ref 10–50)
BASOPHILS # BLD AUTO: 0.1 10E3/UL (ref 0–0.2)
BASOPHILS NFR BLD AUTO: 1 %
CREAT SERPL-MCNC: 0.74 MG/DL (ref 0.67–1.17)
CRP SERPL-MCNC: 60.76 MG/L
EOSINOPHIL # BLD AUTO: 1.1 10E3/UL (ref 0–0.7)
EOSINOPHIL NFR BLD AUTO: 9 %
ERYTHROCYTE [DISTWIDTH] IN BLOOD BY AUTOMATED COUNT: 13 % (ref 10–15)
ERYTHROCYTE [SEDIMENTATION RATE] IN BLOOD BY WESTERGREN METHOD: 48 MM/HR (ref 0–20)
GFR SERPL CREATININE-BSD FRML MDRD: >90 ML/MIN/1.73M2
HCT VFR BLD AUTO: 34.6 % (ref 40–53)
HGB BLD-MCNC: 11.3 G/DL (ref 13.3–17.7)
IMM GRANULOCYTES # BLD: 0.1 10E3/UL
IMM GRANULOCYTES NFR BLD: 1 %
LYMPHOCYTES # BLD AUTO: 2.2 10E3/UL (ref 0.8–5.3)
LYMPHOCYTES NFR BLD AUTO: 20 %
MCH RBC QN AUTO: 31.4 PG (ref 26.5–33)
MCHC RBC AUTO-ENTMCNC: 32.7 G/DL (ref 31.5–36.5)
MCV RBC AUTO: 96 FL (ref 78–100)
MONOCYTES # BLD AUTO: 1.3 10E3/UL (ref 0–1.3)
MONOCYTES NFR BLD AUTO: 11 %
NEUTROPHILS # BLD AUTO: 6.7 10E3/UL (ref 1.6–8.3)
NEUTROPHILS NFR BLD AUTO: 58 %
NRBC # BLD AUTO: 0 10E3/UL
NRBC BLD AUTO-RTO: 0 /100
PLATELET # BLD AUTO: 194 10E3/UL (ref 150–450)
RBC # BLD AUTO: 3.6 10E6/UL (ref 4.4–5.9)
WBC # BLD AUTO: 11.4 10E3/UL (ref 4–11)

## 2022-09-15 PROCEDURE — 86140 C-REACTIVE PROTEIN: CPT | Performed by: INTERNAL MEDICINE

## 2022-09-15 PROCEDURE — 84450 TRANSFERASE (AST) (SGOT): CPT | Performed by: INTERNAL MEDICINE

## 2022-09-15 PROCEDURE — 85025 COMPLETE CBC W/AUTO DIFF WBC: CPT | Performed by: INTERNAL MEDICINE

## 2022-09-15 PROCEDURE — 85652 RBC SED RATE AUTOMATED: CPT | Performed by: INTERNAL MEDICINE

## 2022-09-15 PROCEDURE — 82565 ASSAY OF CREATININE: CPT | Performed by: INTERNAL MEDICINE

## 2022-09-16 ENCOUNTER — HOME INFUSION (PRE-WILLOW HOME INFUSION) (OUTPATIENT)
Dept: PHARMACY | Facility: CLINIC | Age: 62
End: 2022-09-16

## 2022-09-21 LAB
BACTERIA BONE ANAEROBE+AEROBE CULT: NO GROWTH
BACTERIA TISS BX CULT: NO GROWTH

## 2022-09-22 NOTE — PROGRESS NOTES
This is a recent snapshot of the patient's [facility] medical record.  For current drug dose and complete information and questions, call [phone] or In Basket pool, fv home infusion ([id number])  CSN Number:  [id number]

## 2022-09-23 NOTE — PROGRESS NOTES
This is a recent snapshot of the patient's [facility] medical record.  For current drug dose and complete information and questions, call [phone] or In Basket pool, fv home infusion ([id number])  CSN Number:  [id number]

## 2022-09-27 ENCOUNTER — HOME INFUSION (PRE-WILLOW HOME INFUSION) (OUTPATIENT)
Dept: PHARMACY | Facility: CLINIC | Age: 62
End: 2022-09-27

## 2022-09-28 ENCOUNTER — HOME INFUSION (PRE-WILLOW HOME INFUSION) (OUTPATIENT)
Dept: PHARMACY | Facility: CLINIC | Age: 62
End: 2022-09-28

## 2022-09-28 ENCOUNTER — TRANSFERRED RECORDS (OUTPATIENT)
Dept: FAMILY MEDICINE | Facility: CLINIC | Age: 62
End: 2022-09-28

## 2022-09-28 ENCOUNTER — LAB REQUISITION (OUTPATIENT)
Dept: LAB | Facility: CLINIC | Age: 62
End: 2022-09-28
Payer: COMMERCIAL

## 2022-09-28 DIAGNOSIS — T84.7XXA INFECTION AND INFLAMMATORY REACTION DUE TO OTHER INTERNAL ORTHOPEDIC PROSTHETIC DEVICES, IMPLANTS AND GRAFTS, INITIAL ENCOUNTER (H): ICD-10-CM

## 2022-09-28 LAB
AST SERPL W P-5'-P-CCNC: 69 U/L (ref 10–50)
BASOPHILS # BLD AUTO: 0.1 10E3/UL (ref 0–0.2)
BASOPHILS NFR BLD AUTO: 1 %
CREAT SERPL-MCNC: 0.69 MG/DL (ref 0.67–1.17)
CRP SERPL-MCNC: 33.88 MG/L
EOSINOPHIL # BLD AUTO: 0.4 10E3/UL (ref 0–0.7)
EOSINOPHIL NFR BLD AUTO: 4 %
ERYTHROCYTE [DISTWIDTH] IN BLOOD BY AUTOMATED COUNT: 13.2 % (ref 10–15)
ERYTHROCYTE [SEDIMENTATION RATE] IN BLOOD BY WESTERGREN METHOD: 58 MM/HR (ref 0–20)
GFR SERPL CREATININE-BSD FRML MDRD: >90 ML/MIN/1.73M2
HCT VFR BLD AUTO: 32.5 % (ref 40–53)
HGB BLD-MCNC: 10.6 G/DL (ref 13.3–17.7)
IMM GRANULOCYTES # BLD: 0.1 10E3/UL
IMM GRANULOCYTES NFR BLD: 0 %
LYMPHOCYTES # BLD AUTO: 1.8 10E3/UL (ref 0.8–5.3)
LYMPHOCYTES NFR BLD AUTO: 16 %
MCH RBC QN AUTO: 30.8 PG (ref 26.5–33)
MCHC RBC AUTO-ENTMCNC: 32.6 G/DL (ref 31.5–36.5)
MCV RBC AUTO: 95 FL (ref 78–100)
MONOCYTES # BLD AUTO: 1.5 10E3/UL (ref 0–1.3)
MONOCYTES NFR BLD AUTO: 13 %
NEUTROPHILS # BLD AUTO: 7.4 10E3/UL (ref 1.6–8.3)
NEUTROPHILS NFR BLD AUTO: 66 %
NRBC # BLD AUTO: 0 10E3/UL
NRBC BLD AUTO-RTO: 0 /100
PLATELET # BLD AUTO: 239 10E3/UL (ref 150–450)
RBC # BLD AUTO: 3.44 10E6/UL (ref 4.4–5.9)
WBC # BLD AUTO: 11.2 10E3/UL (ref 4–11)

## 2022-09-28 PROCEDURE — 85025 COMPLETE CBC W/AUTO DIFF WBC: CPT | Performed by: INTERNAL MEDICINE

## 2022-09-28 PROCEDURE — 84450 TRANSFERASE (AST) (SGOT): CPT | Performed by: INTERNAL MEDICINE

## 2022-09-28 PROCEDURE — 82565 ASSAY OF CREATININE: CPT | Performed by: INTERNAL MEDICINE

## 2022-09-28 PROCEDURE — 86140 C-REACTIVE PROTEIN: CPT | Performed by: INTERNAL MEDICINE

## 2022-09-28 PROCEDURE — 85652 RBC SED RATE AUTOMATED: CPT | Performed by: INTERNAL MEDICINE

## 2022-09-28 NOTE — PROGRESS NOTES
This is a recent snapshot of the patient's [facility] medical record.  For current drug dose and complete information and questions, call [phone] or In Basket pool, fv home infusion ([id number])  CSN Number:  [id number]

## 2022-09-29 ENCOUNTER — HOME INFUSION (PRE-WILLOW HOME INFUSION) (OUTPATIENT)
Dept: PHARMACY | Facility: CLINIC | Age: 62
End: 2022-09-29

## 2022-09-30 ENCOUNTER — LAB REQUISITION (OUTPATIENT)
Dept: LAB | Facility: CLINIC | Age: 62
End: 2022-09-30
Payer: COMMERCIAL

## 2022-09-30 DIAGNOSIS — T84.7XXA INFECTION AND INFLAMMATORY REACTION DUE TO OTHER INTERNAL ORTHOPEDIC PROSTHETIC DEVICES, IMPLANTS AND GRAFTS, INITIAL ENCOUNTER (H): ICD-10-CM

## 2022-09-30 LAB
AST SERPL W P-5'-P-CCNC: 81 U/L (ref 10–50)
BASOPHILS # BLD AUTO: 0 10E3/UL (ref 0–0.2)
BASOPHILS NFR BLD AUTO: 0 %
CREAT SERPL-MCNC: 0.71 MG/DL (ref 0.67–1.17)
CRP SERPL-MCNC: 27.53 MG/L
EOSINOPHIL # BLD AUTO: 0.4 10E3/UL (ref 0–0.7)
EOSINOPHIL NFR BLD AUTO: 5 %
ERYTHROCYTE [DISTWIDTH] IN BLOOD BY AUTOMATED COUNT: 12.9 % (ref 10–15)
ERYTHROCYTE [SEDIMENTATION RATE] IN BLOOD BY WESTERGREN METHOD: 53 MM/HR (ref 0–20)
GFR SERPL CREATININE-BSD FRML MDRD: >90 ML/MIN/1.73M2
HCT VFR BLD AUTO: 32 % (ref 40–53)
HGB BLD-MCNC: 10.5 G/DL (ref 13.3–17.7)
HOLD SPECIMEN: NORMAL
IMM GRANULOCYTES # BLD: 0.1 10E3/UL
IMM GRANULOCYTES NFR BLD: 1 %
LYMPHOCYTES # BLD AUTO: 1.6 10E3/UL (ref 0.8–5.3)
LYMPHOCYTES NFR BLD AUTO: 16 %
MCH RBC QN AUTO: 31 PG (ref 26.5–33)
MCHC RBC AUTO-ENTMCNC: 32.8 G/DL (ref 31.5–36.5)
MCV RBC AUTO: 94 FL (ref 78–100)
MONOCYTES # BLD AUTO: 1.1 10E3/UL (ref 0–1.3)
MONOCYTES NFR BLD AUTO: 12 %
NEUTROPHILS # BLD AUTO: 6.4 10E3/UL (ref 1.6–8.3)
NEUTROPHILS NFR BLD AUTO: 66 %
NRBC # BLD AUTO: 0 10E3/UL
NRBC BLD AUTO-RTO: 0 /100
PLATELET # BLD AUTO: 280 10E3/UL (ref 150–450)
RBC # BLD AUTO: 3.39 10E6/UL (ref 4.4–5.9)
WBC # BLD AUTO: 9.6 10E3/UL (ref 4–11)

## 2022-09-30 PROCEDURE — 84450 TRANSFERASE (AST) (SGOT): CPT | Performed by: INTERNAL MEDICINE

## 2022-09-30 PROCEDURE — 82565 ASSAY OF CREATININE: CPT | Performed by: INTERNAL MEDICINE

## 2022-09-30 PROCEDURE — 86140 C-REACTIVE PROTEIN: CPT | Performed by: INTERNAL MEDICINE

## 2022-09-30 PROCEDURE — 85025 COMPLETE CBC W/AUTO DIFF WBC: CPT | Performed by: INTERNAL MEDICINE

## 2022-09-30 PROCEDURE — 85652 RBC SED RATE AUTOMATED: CPT | Performed by: INTERNAL MEDICINE

## 2022-09-30 NOTE — PROGRESS NOTES
This is a recent snapshot of the patient's [facility] medical record.  For current drug dose and complete information and questions, call [phone] or In Basket pool, fv home infusion ([id number])  CSN Number:  [id number]

## 2022-09-30 NOTE — PROGRESS NOTES
This is a recent snapshot of the patient's [facility] medical record.  For current drug dose and complete information and questions, call [phone] or In Basket pool, fv home infusion ([id number])  CSN Number:  [id number]

## 2022-10-01 ENCOUNTER — HOME INFUSION (PRE-WILLOW HOME INFUSION) (OUTPATIENT)
Dept: PHARMACY | Facility: CLINIC | Age: 62
End: 2022-10-01

## 2022-10-02 NOTE — PROGRESS NOTES
This is a recent snapshot of the patient's [facility] medical record.  For current drug dose and complete information and questions, call [phone] or In Basket pool, fv home infusion ([id number])  CSN Number:  [id number]

## 2022-10-03 DIAGNOSIS — I10 ESSENTIAL HYPERTENSION, BENIGN: ICD-10-CM

## 2022-10-03 RX ORDER — METOPROLOL SUCCINATE 50 MG/1
50 TABLET, EXTENDED RELEASE ORAL DAILY
Qty: 14 TABLET | Refills: 0 | Status: SHIPPED | OUTPATIENT
Start: 2022-10-03 | End: 2022-10-11

## 2022-10-03 RX ORDER — LISINOPRIL 40 MG/1
40 TABLET ORAL DAILY
Qty: 14 TABLET | Refills: 0 | Status: SHIPPED | OUTPATIENT
Start: 2022-10-03 | End: 2022-10-11

## 2022-10-03 NOTE — TELEPHONE ENCOUNTER
Rashaun Molina is requesting a refill of:    Pending Prescriptions:                       Disp   Refills    lisinopril (ZESTRIL) 40 MG tablet         14 tab*0            Sig: Take 1 tablet (40 mg) by mouth daily    metoprolol succinate ER (TOPROL XL) 50 MG*14 tab*0            Sig: Take 1 tablet (50 mg) by mouth daily    Pt has OV on 10/11/22

## 2022-10-05 ENCOUNTER — TRANSFERRED RECORDS (OUTPATIENT)
Dept: FAMILY MEDICINE | Facility: CLINIC | Age: 62
End: 2022-10-05

## 2022-10-07 ENCOUNTER — HOME INFUSION (PRE-WILLOW HOME INFUSION) (OUTPATIENT)
Dept: PHARMACY | Facility: CLINIC | Age: 62
End: 2022-10-07

## 2022-10-07 ENCOUNTER — LAB REQUISITION (OUTPATIENT)
Dept: LAB | Facility: CLINIC | Age: 62
End: 2022-10-07
Payer: COMMERCIAL

## 2022-10-07 DIAGNOSIS — T84.7XXA INFECTION AND INFLAMMATORY REACTION DUE TO OTHER INTERNAL ORTHOPEDIC PROSTHETIC DEVICES, IMPLANTS AND GRAFTS, INITIAL ENCOUNTER (H): ICD-10-CM

## 2022-10-07 LAB
AST SERPL W P-5'-P-CCNC: 45 U/L (ref 10–50)
BASOPHILS # BLD AUTO: 0.1 10E3/UL (ref 0–0.2)
BASOPHILS NFR BLD AUTO: 1 %
CREAT SERPL-MCNC: 0.77 MG/DL (ref 0.67–1.17)
CRP SERPL-MCNC: 15.17 MG/L
EOSINOPHIL # BLD AUTO: 0.9 10E3/UL (ref 0–0.7)
EOSINOPHIL NFR BLD AUTO: 8 %
ERYTHROCYTE [DISTWIDTH] IN BLOOD BY AUTOMATED COUNT: 13.2 % (ref 10–15)
ERYTHROCYTE [SEDIMENTATION RATE] IN BLOOD BY WESTERGREN METHOD: 35 MM/HR (ref 0–20)
GFR SERPL CREATININE-BSD FRML MDRD: >90 ML/MIN/1.73M2
HCT VFR BLD AUTO: 32.6 % (ref 40–53)
HGB BLD-MCNC: 10.5 G/DL (ref 13.3–17.7)
IMM GRANULOCYTES # BLD: 0.1 10E3/UL
IMM GRANULOCYTES NFR BLD: 1 %
LYMPHOCYTES # BLD AUTO: 2.2 10E3/UL (ref 0.8–5.3)
LYMPHOCYTES NFR BLD AUTO: 20 %
MCH RBC QN AUTO: 30.7 PG (ref 26.5–33)
MCHC RBC AUTO-ENTMCNC: 32.2 G/DL (ref 31.5–36.5)
MCV RBC AUTO: 95 FL (ref 78–100)
MONOCYTES # BLD AUTO: 1.2 10E3/UL (ref 0–1.3)
MONOCYTES NFR BLD AUTO: 11 %
NEUTROPHILS # BLD AUTO: 6.5 10E3/UL (ref 1.6–8.3)
NEUTROPHILS NFR BLD AUTO: 59 %
NRBC # BLD AUTO: 0 10E3/UL
NRBC BLD AUTO-RTO: 0 /100
PLATELET # BLD AUTO: 225 10E3/UL (ref 150–450)
RBC # BLD AUTO: 3.42 10E6/UL (ref 4.4–5.9)
WBC # BLD AUTO: 10.9 10E3/UL (ref 4–11)

## 2022-10-07 PROCEDURE — 85025 COMPLETE CBC W/AUTO DIFF WBC: CPT | Performed by: INTERNAL MEDICINE

## 2022-10-07 PROCEDURE — 82565 ASSAY OF CREATININE: CPT | Performed by: INTERNAL MEDICINE

## 2022-10-07 PROCEDURE — 86140 C-REACTIVE PROTEIN: CPT | Performed by: INTERNAL MEDICINE

## 2022-10-07 PROCEDURE — 84450 TRANSFERASE (AST) (SGOT): CPT | Performed by: INTERNAL MEDICINE

## 2022-10-07 PROCEDURE — 85652 RBC SED RATE AUTOMATED: CPT | Performed by: INTERNAL MEDICINE

## 2022-10-11 ENCOUNTER — OFFICE VISIT (OUTPATIENT)
Dept: FAMILY MEDICINE | Facility: CLINIC | Age: 62
End: 2022-10-11

## 2022-10-11 VITALS
OXYGEN SATURATION: 94 % | TEMPERATURE: 97.1 F | DIASTOLIC BLOOD PRESSURE: 74 MMHG | HEART RATE: 68 BPM | SYSTOLIC BLOOD PRESSURE: 140 MMHG

## 2022-10-11 DIAGNOSIS — E78.5 HYPERLIPIDEMIA LDL GOAL <70: ICD-10-CM

## 2022-10-11 DIAGNOSIS — G20.A1 PARKINSON DISEASE (H): ICD-10-CM

## 2022-10-11 DIAGNOSIS — T84.7XXS INFECTION AND INFLAMMATORY REACTION DUE TO OTHER INTERNAL ORTHOPEDIC PROSTHETIC DEVICES, IMPLANTS AND GRAFTS, SEQUELA: Primary | ICD-10-CM

## 2022-10-11 DIAGNOSIS — I10 ESSENTIAL HYPERTENSION, BENIGN: ICD-10-CM

## 2022-10-11 DIAGNOSIS — I73.9 PVD (PERIPHERAL VASCULAR DISEASE) (H): ICD-10-CM

## 2022-10-11 PROCEDURE — 99214 OFFICE O/P EST MOD 30 MIN: CPT | Performed by: FAMILY MEDICINE

## 2022-10-11 RX ORDER — CLOPIDOGREL BISULFATE 75 MG/1
75 TABLET ORAL
COMMUNITY
Start: 2022-09-26 | End: 2023-01-01

## 2022-10-11 RX ORDER — METOPROLOL SUCCINATE 50 MG/1
50 TABLET, EXTENDED RELEASE ORAL DAILY
Qty: 90 TABLET | Refills: 1 | Status: SHIPPED | OUTPATIENT
Start: 2022-10-11 | End: 2023-01-01

## 2022-10-11 RX ORDER — ROSUVASTATIN CALCIUM 40 MG/1
40 TABLET, COATED ORAL DAILY
Qty: 90 TABLET | Refills: 1 | Status: SHIPPED | OUTPATIENT
Start: 2022-10-11 | End: 2023-01-01

## 2022-10-11 RX ORDER — LISINOPRIL 40 MG/1
40 TABLET ORAL DAILY
Qty: 90 TABLET | Refills: 1 | Status: SHIPPED | OUTPATIENT
Start: 2022-10-11 | End: 2023-01-01

## 2022-10-11 NOTE — PROGRESS NOTES
This is a recent snapshot of the patient's [facility] medical record.  For current drug dose and complete information and questions, call [phone] or In Basket pool, fv home infusion ([id number])  CSN Number:  [id number]

## 2022-10-11 NOTE — PROGRESS NOTES
"  Assessment & Plan     Infection and inflammatory reaction due to other internal orthopedic prosthetic devices, implants and grafts, sequela  Stable, followed closely by ortho and ID    Hyperlipidemia LDL goal <70  Control reasonable based on labs last month, continue current medications at current doses   - rosuvastatin (CRESTOR) 40 MG tablet  Dispense: 90 tablet; Refill: 1    Essential hypertension, benign  Well controlled overall, continue current medications at current doses   - lisinopril (ZESTRIL) 40 MG tablet  Dispense: 90 tablet; Refill: 1  - metoprolol succinate ER (TOPROL XL) 50 MG 24 hr tablet  Dispense: 90 tablet; Refill: 1    Parkinson disease (H)  Stable, continue current medications at current doses     PVD (peripheral vascular disease) (H)        Review of external notes as documented elsewhere in note  Prescription drug management  32 minutes spent on the date of the encounter doing chart review, review of outside records, review of test results, patient visit and documentation        BMI:   Estimated body mass index is 28.63 kg/m  as calculated from the following:    Height as of 8/24/22: 1.778 m (5' 10\").    Weight as of 8/24/22: 90.5 kg (199 lb 8.3 oz).       FUTURE APPOINTMENTS:       - Follow-up visit in 6 mo  Regular exercise    No follow-ups on file.    Yemi Nava MD  [facility]    Gloria Rodney is a 62 year old, presenting for the following health issues:  No chief complaint on file.      [facility]       Hospital Follow-up Visit:    Hospital/Nursing Home/IP Rehab Facility: [facility]  Date of Admission: 9/19/2022  Date of Discharge: 9/27/2022  Reason(s) for Admission: L ankle wound-infection, not healong    Was your hospitalization related to COVID-19? No   Problems taking medications regularly:  None  Medication changes since discharge: None  Problems adhering to non-medication therapy:  None    Summary of hospitalization:  CareEverywhere information " obtained and reviewed  Diagnostic Tests/Treatments reviewed.  Follow up needed: ID , orthopedics  Other Healthcare Providers Involved in Patient s Care:         Specialist appointment - orthopedics and ID  Update since discharge: stable.         Post Medication Reconciliation Status:        Plan of care communicated with patient           Hyperlipidemia Follow-Up      Are you regularly taking any medication or supplement to lower your cholesterol?   Yes- crestor    Are you having muscle aches or other side effects that you think could be caused by your cholesterol lowering medication?  No    Hypertension Follow-up      Do you check your blood pressure regularly outside of the clinic? Yes     Are you following a low salt diet? No    Are your blood pressures ever more than 140 on the top number (systolic) OR more   than 90 on the bottom number (diastolic), for example 140/90? No    PD-sees neurology yearly-limited by inabiltiy to exercise now    How many servings of fruits and vegetables do you eat daily?  2-3    On average, how many sweetened beverages do you drink each day (Examples: soda, juice, sweet tea, etc.  Do NOT count diet or artificially sweetened beverages)?   1    How many days per week do you exercise enough to make your heart beat faster? unable until ankle healed    How many minutes a day do you exercise enough to make your heart beat faster?     How many days per week do you miss taking your medication? 0      How are you feeling today? No change  In the past 24 hours have you had shortness of breath when speaking, walking, or climbing stairs? My breathing issues have improved  Do you have a cough? I don't have a cough  When is the last time you had a fever greater than 100? None recently  Are you having any other symptoms? None   Do you have any other stressors you would like to discuss with your provider? No                            Review of Systems         Objective    There were no vitals taken  for this visit.  There is no height or weight on file to calculate BMI.  Physical Exam   GENERAL: alert, no distress and in WC  NECK: no adenopathy, no asymmetry, masses, or scars and thyroid normal to palpation  RESP: lungs clear to auscultation - no rales, rhonchi or wheezes  CV: regular rate and rhythm, normal S1 S2, no S3 or S4, no murmur, click or rub, no peripheral edema and peripheral pulses strong  ABDOMEN: soft, nontender, no hepatosplenomegaly, no masses and bowel sounds normal  MS: left ankle in brace  SKIN: no suspicious lesions or rashes    Lab Requisition on 10/07/2022   Component Date Value Ref Range Status     CRP Inflammation 10/07/2022 15.17 (H)  <5.00 mg/L Final     AST 10/07/2022 45  10 - 50 U/L Final     Creatinine 10/07/2022 0.77  0.67 - 1.17 mg/dL Final     GFR Estimate 10/07/2022 >90  >60 mL/min/1.73m2 Final    Effective December 21, 2021 eGFRcr in adults is calculated using the 2021 CKD-EPI creatinine equation which includes age and gender (Nancy et al., NEJ, DOI: 10.1056/ISSXba9939710)     Erythrocyte Sedimentation Rate 10/07/2022 35 (H)  0 - 20 mm/hr Final     WBC Count 10/07/2022 10.9  4.0 - 11.0 10e3/uL Final     RBC Count 10/07/2022 3.42 (L)  4.40 - 5.90 10e6/uL Final     Hemoglobin 10/07/2022 10.5 (L)  13.3 - 17.7 g/dL Final     Hematocrit 10/07/2022 32.6 (L)  40.0 - 53.0 % Final     MCV 10/07/2022 95  78 - 100 fL Final     MCH 10/07/2022 30.7  26.5 - 33.0 pg Final     MCHC 10/07/2022 32.2  31.5 - 36.5 g/dL Final     RDW 10/07/2022 13.2  10.0 - 15.0 % Final     Platelet Count 10/07/2022 225  150 - 450 10e3/uL Final     % Neutrophils 10/07/2022 59  % Final     % Lymphocytes 10/07/2022 20  % Final     % Monocytes 10/07/2022 11  % Final     % Eosinophils 10/07/2022 8  % Final     % Basophils 10/07/2022 1  % Final     % Immature Granulocytes 10/07/2022 1  % Final     NRBCs per 100 WBC 10/07/2022 0  <1 /100 Final     Absolute Neutrophils 10/07/2022 6.5  1.6 - 8.3 10e3/uL Final     Absolute  Lymphocytes 10/07/2022 2.2  0.8 - 5.3 10e3/uL Final     Absolute Monocytes 10/07/2022 1.2  0.0 - 1.3 10e3/uL Final     Absolute Eosinophils 10/07/2022 0.9 (H)  0.0 - 0.7 10e3/uL Final     Absolute Basophils 10/07/2022 0.1  0.0 - 0.2 10e3/uL Final     Absolute Immature Granulocytes 10/07/2022 0.1  <=0.4 10e3/uL Final     Absolute NRBCs 10/07/2022 0.0  10e3/uL Final

## 2022-10-12 ENCOUNTER — LAB REQUISITION (OUTPATIENT)
Dept: LAB | Facility: CLINIC | Age: 62
End: 2022-10-12
Payer: COMMERCIAL

## 2022-10-12 DIAGNOSIS — T84.7XXA INFECTION AND INFLAMMATORY REACTION DUE TO OTHER INTERNAL ORTHOPEDIC PROSTHETIC DEVICES, IMPLANTS AND GRAFTS, INITIAL ENCOUNTER (H): ICD-10-CM

## 2022-10-12 LAB
AST SERPL W P-5'-P-CCNC: 68 U/L (ref 10–50)
BASOPHILS # BLD AUTO: 0 10E3/UL (ref 0–0.2)
BASOPHILS NFR BLD AUTO: 0 %
CREAT SERPL-MCNC: 0.8 MG/DL (ref 0.67–1.17)
CRP SERPL-MCNC: 12.53 MG/L
EOSINOPHIL # BLD AUTO: 0.7 10E3/UL (ref 0–0.7)
EOSINOPHIL NFR BLD AUTO: 8 %
ERYTHROCYTE [DISTWIDTH] IN BLOOD BY AUTOMATED COUNT: 13.7 % (ref 10–15)
ERYTHROCYTE [SEDIMENTATION RATE] IN BLOOD BY WESTERGREN METHOD: 32 MM/HR (ref 0–20)
GFR SERPL CREATININE-BSD FRML MDRD: >90 ML/MIN/1.73M2
HCT VFR BLD AUTO: 33.4 % (ref 40–53)
HGB BLD-MCNC: 10.9 G/DL (ref 13.3–17.7)
IMM GRANULOCYTES # BLD: 0 10E3/UL
IMM GRANULOCYTES NFR BLD: 0 %
LYMPHOCYTES # BLD AUTO: 2 10E3/UL (ref 0.8–5.3)
LYMPHOCYTES NFR BLD AUTO: 22 %
MCH RBC QN AUTO: 30.6 PG (ref 26.5–33)
MCHC RBC AUTO-ENTMCNC: 32.6 G/DL (ref 31.5–36.5)
MCV RBC AUTO: 94 FL (ref 78–100)
MONOCYTES # BLD AUTO: 0.9 10E3/UL (ref 0–1.3)
MONOCYTES NFR BLD AUTO: 10 %
NEUTROPHILS # BLD AUTO: 5.3 10E3/UL (ref 1.6–8.3)
NEUTROPHILS NFR BLD AUTO: 60 %
NRBC # BLD AUTO: 0 10E3/UL
NRBC BLD AUTO-RTO: 0 /100
PLATELET # BLD AUTO: 208 10E3/UL (ref 150–450)
RBC # BLD AUTO: 3.56 10E6/UL (ref 4.4–5.9)
WBC # BLD AUTO: 9 10E3/UL (ref 4–11)

## 2022-10-12 PROCEDURE — 85652 RBC SED RATE AUTOMATED: CPT | Performed by: INTERNAL MEDICINE

## 2022-10-12 PROCEDURE — 84450 TRANSFERASE (AST) (SGOT): CPT | Performed by: INTERNAL MEDICINE

## 2022-10-12 PROCEDURE — 86140 C-REACTIVE PROTEIN: CPT | Performed by: INTERNAL MEDICINE

## 2022-10-12 PROCEDURE — 85025 COMPLETE CBC W/AUTO DIFF WBC: CPT | Performed by: INTERNAL MEDICINE

## 2022-10-12 PROCEDURE — 82565 ASSAY OF CREATININE: CPT | Performed by: INTERNAL MEDICINE

## 2022-10-19 ENCOUNTER — LAB REQUISITION (OUTPATIENT)
Dept: LAB | Facility: CLINIC | Age: 62
End: 2022-10-19
Payer: COMMERCIAL

## 2022-10-19 DIAGNOSIS — T84.7XXA INFECTION AND INFLAMMATORY REACTION DUE TO OTHER INTERNAL ORTHOPEDIC PROSTHETIC DEVICES, IMPLANTS AND GRAFTS, INITIAL ENCOUNTER (H): ICD-10-CM

## 2022-10-19 LAB
AST SERPL W P-5'-P-CCNC: 75 U/L (ref 10–50)
BASOPHILS # BLD AUTO: 0 10E3/UL (ref 0–0.2)
BASOPHILS NFR BLD AUTO: 1 %
CREAT SERPL-MCNC: 0.78 MG/DL (ref 0.67–1.17)
CRP SERPL-MCNC: 8.78 MG/L
EOSINOPHIL # BLD AUTO: 0.5 10E3/UL (ref 0–0.7)
EOSINOPHIL NFR BLD AUTO: 5 %
ERYTHROCYTE [DISTWIDTH] IN BLOOD BY AUTOMATED COUNT: 14.2 % (ref 10–15)
ERYTHROCYTE [SEDIMENTATION RATE] IN BLOOD BY WESTERGREN METHOD: 26 MM/HR (ref 0–20)
GFR SERPL CREATININE-BSD FRML MDRD: >90 ML/MIN/1.73M2
HCT VFR BLD AUTO: 36 % (ref 40–53)
HGB BLD-MCNC: 11.9 G/DL (ref 13.3–17.7)
IMM GRANULOCYTES # BLD: 0 10E3/UL
IMM GRANULOCYTES NFR BLD: 0 %
LYMPHOCYTES # BLD AUTO: 2.1 10E3/UL (ref 0.8–5.3)
LYMPHOCYTES NFR BLD AUTO: 24 %
MCH RBC QN AUTO: 30.6 PG (ref 26.5–33)
MCHC RBC AUTO-ENTMCNC: 33.1 G/DL (ref 31.5–36.5)
MCV RBC AUTO: 93 FL (ref 78–100)
MONOCYTES # BLD AUTO: 1 10E3/UL (ref 0–1.3)
MONOCYTES NFR BLD AUTO: 12 %
NEUTROPHILS # BLD AUTO: 5.1 10E3/UL (ref 1.6–8.3)
NEUTROPHILS NFR BLD AUTO: 58 %
NRBC # BLD AUTO: 0 10E3/UL
NRBC BLD AUTO-RTO: 0 /100
PLATELET # BLD AUTO: 181 10E3/UL (ref 150–450)
RBC # BLD AUTO: 3.89 10E6/UL (ref 4.4–5.9)
WBC # BLD AUTO: 8.7 10E3/UL (ref 4–11)

## 2022-10-19 PROCEDURE — 82565 ASSAY OF CREATININE: CPT | Performed by: INTERNAL MEDICINE

## 2022-10-19 PROCEDURE — 85652 RBC SED RATE AUTOMATED: CPT | Performed by: INTERNAL MEDICINE

## 2022-10-19 PROCEDURE — 85041 AUTOMATED RBC COUNT: CPT | Performed by: INTERNAL MEDICINE

## 2022-10-19 PROCEDURE — 86140 C-REACTIVE PROTEIN: CPT | Performed by: INTERNAL MEDICINE

## 2022-10-19 PROCEDURE — 84450 TRANSFERASE (AST) (SGOT): CPT | Performed by: INTERNAL MEDICINE

## 2022-10-19 PROCEDURE — 85014 HEMATOCRIT: CPT | Performed by: INTERNAL MEDICINE

## 2022-10-28 NOTE — PROGRESS NOTES
This is a recent snapshot of the patient's [facility] medical record.  For current drug dose and complete information and questions, call [phone] or In Basket pool, fv home infusion ([id number])  CSN Number:  [id number]

## 2022-12-23 ENCOUNTER — TRANSFERRED RECORDS (OUTPATIENT)
Dept: FAMILY MEDICINE | Facility: CLINIC | Age: 62
End: 2022-12-23

## 2023-01-01 ENCOUNTER — TRANSFERRED RECORDS (OUTPATIENT)
Dept: FAMILY MEDICINE | Facility: CLINIC | Age: 63
End: 2023-01-01

## 2023-01-01 ENCOUNTER — HOSPITAL ENCOUNTER (INPATIENT)
Facility: CLINIC | Age: 63
LOS: 3 days | Discharge: HOME HEALTH | DRG: 645 | End: 2023-01-21
Attending: EMERGENCY MEDICINE | Admitting: INTERNAL MEDICINE
Payer: COMMERCIAL

## 2023-01-01 ENCOUNTER — TELEPHONE (OUTPATIENT)
Dept: WOUND CARE | Facility: CLINIC | Age: 63
End: 2023-01-01
Payer: COMMERCIAL

## 2023-01-01 ENCOUNTER — ANESTHESIA EVENT (OUTPATIENT)
Dept: SURGERY | Facility: CLINIC | Age: 63
DRG: 501 | End: 2023-01-01
Payer: COMMERCIAL

## 2023-01-01 ENCOUNTER — PATIENT OUTREACH (OUTPATIENT)
Dept: GASTROENTEROLOGY | Facility: CLINIC | Age: 63
End: 2023-01-01
Payer: COMMERCIAL

## 2023-01-01 ENCOUNTER — HOSPITAL ENCOUNTER (INPATIENT)
Facility: CLINIC | Age: 63
LOS: 7 days | Discharge: HOME HEALTH | DRG: 501 | End: 2023-07-12
Attending: STUDENT IN AN ORGANIZED HEALTH CARE EDUCATION/TRAINING PROGRAM | Admitting: HOSPITALIST
Payer: COMMERCIAL

## 2023-01-01 ENCOUNTER — TELEPHONE (OUTPATIENT)
Dept: FAMILY MEDICINE | Facility: CLINIC | Age: 63
End: 2023-01-01

## 2023-01-01 ENCOUNTER — ANESTHESIA EVENT (OUTPATIENT)
Dept: SURGERY | Facility: CLINIC | Age: 63
DRG: 475 | End: 2023-01-01
Payer: COMMERCIAL

## 2023-01-01 ENCOUNTER — ANESTHESIA (OUTPATIENT)
Dept: SURGERY | Facility: CLINIC | Age: 63
DRG: 475 | End: 2023-01-01
Payer: COMMERCIAL

## 2023-01-01 ENCOUNTER — APPOINTMENT (OUTPATIENT)
Dept: PHYSICAL THERAPY | Facility: CLINIC | Age: 63
DRG: 475 | End: 2023-01-01
Attending: ORTHOPAEDIC SURGERY
Payer: COMMERCIAL

## 2023-01-01 ENCOUNTER — CARE COORDINATION (OUTPATIENT)
Dept: FAMILY MEDICINE | Facility: CLINIC | Age: 63
End: 2023-01-01

## 2023-01-01 ENCOUNTER — HOSPITAL ENCOUNTER (OUTPATIENT)
Dept: WOUND CARE | Facility: CLINIC | Age: 63
Discharge: HOME | End: 2023-08-01
Attending: FAMILY MEDICINE | Admitting: FAMILY MEDICINE
Payer: COMMERCIAL

## 2023-01-01 ENCOUNTER — MEDICAL CORRESPONDENCE (OUTPATIENT)
Dept: HEALTH INFORMATION MANAGEMENT | Facility: CLINIC | Age: 63
End: 2023-01-01
Payer: COMMERCIAL

## 2023-01-01 ENCOUNTER — OFFICE VISIT (OUTPATIENT)
Dept: FAMILY MEDICINE | Facility: CLINIC | Age: 63
End: 2023-01-01

## 2023-01-01 ENCOUNTER — HOSPITAL ENCOUNTER (OUTPATIENT)
Dept: WOUND CARE | Facility: CLINIC | Age: 63
Discharge: HOME | End: 2023-10-03
Attending: FAMILY MEDICINE | Admitting: FAMILY MEDICINE
Payer: COMMERCIAL

## 2023-01-01 ENCOUNTER — APPOINTMENT (OUTPATIENT)
Dept: OCCUPATIONAL THERAPY | Facility: CLINIC | Age: 63
DRG: 475 | End: 2023-01-01
Attending: PHYSICIAN ASSISTANT
Payer: COMMERCIAL

## 2023-01-01 ENCOUNTER — DOCUMENTATION ONLY (OUTPATIENT)
Dept: ORTHOPEDICS | Facility: CLINIC | Age: 63
End: 2023-01-01

## 2023-01-01 ENCOUNTER — HOSPITAL ENCOUNTER (INPATIENT)
Facility: CLINIC | Age: 63
LOS: 5 days | Discharge: HOME HEALTH | DRG: 475 | End: 2023-06-05
Attending: ORTHOPAEDIC SURGERY | Admitting: ORTHOPAEDIC SURGERY
Payer: COMMERCIAL

## 2023-01-01 ENCOUNTER — ANESTHESIA (OUTPATIENT)
Dept: SURGERY | Facility: CLINIC | Age: 63
DRG: 501 | End: 2023-01-01
Payer: COMMERCIAL

## 2023-01-01 ENCOUNTER — TELEPHONE (OUTPATIENT)
Dept: NURSING | Facility: CLINIC | Age: 63
End: 2023-01-01

## 2023-01-01 ENCOUNTER — APPOINTMENT (OUTPATIENT)
Dept: CT IMAGING | Facility: CLINIC | Age: 63
DRG: 501 | End: 2023-01-01
Attending: STUDENT IN AN ORGANIZED HEALTH CARE EDUCATION/TRAINING PROGRAM
Payer: COMMERCIAL

## 2023-01-01 ENCOUNTER — HOSPITAL ENCOUNTER (OUTPATIENT)
Dept: WOUND CARE | Facility: CLINIC | Age: 63
Discharge: HOME | End: 2023-09-05
Attending: FAMILY MEDICINE | Admitting: FAMILY MEDICINE
Payer: COMMERCIAL

## 2023-01-01 ENCOUNTER — HOSPITAL ENCOUNTER (OUTPATIENT)
Dept: WOUND CARE | Facility: CLINIC | Age: 63
Discharge: HOME | End: 2023-08-22
Attending: FAMILY MEDICINE | Admitting: FAMILY MEDICINE
Payer: COMMERCIAL

## 2023-01-01 VITALS
HEART RATE: 80 BPM | RESPIRATION RATE: 24 BRPM | WEIGHT: 192 LBS | BODY MASS INDEX: 27.55 KG/M2 | SYSTOLIC BLOOD PRESSURE: 111 MMHG | DIASTOLIC BLOOD PRESSURE: 52 MMHG | OXYGEN SATURATION: 93 % | TEMPERATURE: 97.4 F

## 2023-01-01 VITALS — DIASTOLIC BLOOD PRESSURE: 78 MMHG | HEART RATE: 68 BPM | SYSTOLIC BLOOD PRESSURE: 130 MMHG | TEMPERATURE: 98.3 F

## 2023-01-01 VITALS
HEIGHT: 69 IN | OXYGEN SATURATION: 100 % | DIASTOLIC BLOOD PRESSURE: 54 MMHG | TEMPERATURE: 97.4 F | BODY MASS INDEX: 27.96 KG/M2 | SYSTOLIC BLOOD PRESSURE: 113 MMHG | WEIGHT: 188.8 LBS | HEART RATE: 80 BPM | RESPIRATION RATE: 18 BRPM

## 2023-01-01 VITALS — HEART RATE: 65 BPM | TEMPERATURE: 97.8 F | SYSTOLIC BLOOD PRESSURE: 162 MMHG | DIASTOLIC BLOOD PRESSURE: 89 MMHG

## 2023-01-01 VITALS
TEMPERATURE: 98.2 F | RESPIRATION RATE: 18 BRPM | DIASTOLIC BLOOD PRESSURE: 62 MMHG | HEART RATE: 79 BPM | BODY MASS INDEX: 25.98 KG/M2 | OXYGEN SATURATION: 96 % | WEIGHT: 175.93 LBS | SYSTOLIC BLOOD PRESSURE: 140 MMHG

## 2023-01-01 VITALS
WEIGHT: 199 LBS | HEART RATE: 68 BPM | SYSTOLIC BLOOD PRESSURE: 124 MMHG | BODY MASS INDEX: 28.49 KG/M2 | DIASTOLIC BLOOD PRESSURE: 70 MMHG | RESPIRATION RATE: 20 BRPM | TEMPERATURE: 97.2 F | HEIGHT: 70 IN

## 2023-01-01 VITALS — TEMPERATURE: 97.7 F | HEART RATE: 68 BPM | SYSTOLIC BLOOD PRESSURE: 146 MMHG | DIASTOLIC BLOOD PRESSURE: 79 MMHG

## 2023-01-01 VITALS
RESPIRATION RATE: 20 BRPM | HEIGHT: 70 IN | HEART RATE: 68 BPM | DIASTOLIC BLOOD PRESSURE: 60 MMHG | WEIGHT: 192 LBS | BODY MASS INDEX: 27.49 KG/M2 | SYSTOLIC BLOOD PRESSURE: 108 MMHG | TEMPERATURE: 97.6 F

## 2023-01-01 VITALS
RESPIRATION RATE: 20 BRPM | SYSTOLIC BLOOD PRESSURE: 138 MMHG | DIASTOLIC BLOOD PRESSURE: 82 MMHG | BODY MASS INDEX: 27.49 KG/M2 | HEIGHT: 70 IN | WEIGHT: 192 LBS | HEART RATE: 68 BPM | TEMPERATURE: 97.9 F

## 2023-01-01 VITALS
HEART RATE: 71 BPM | TEMPERATURE: 97.9 F | DIASTOLIC BLOOD PRESSURE: 84 MMHG | SYSTOLIC BLOOD PRESSURE: 140 MMHG | HEIGHT: 70 IN | BODY MASS INDEX: 25.05 KG/M2 | WEIGHT: 175 LBS

## 2023-01-01 DIAGNOSIS — E78.5 HYPERLIPIDEMIA LDL GOAL <70: ICD-10-CM

## 2023-01-01 DIAGNOSIS — T81.89XD NON-HEALING SURGICAL WOUND, SUBSEQUENT ENCOUNTER: Primary | ICD-10-CM

## 2023-01-01 DIAGNOSIS — J42 CHRONIC BRONCHITIS, UNSPECIFIED CHRONIC BRONCHITIS TYPE (H): ICD-10-CM

## 2023-01-01 DIAGNOSIS — S81.802A OPEN WOUND OF LEFT LOWER EXTREMITY, INITIAL ENCOUNTER: ICD-10-CM

## 2023-01-01 DIAGNOSIS — I10 ESSENTIAL HYPERTENSION, BENIGN: ICD-10-CM

## 2023-01-01 DIAGNOSIS — E22.2 SIADH (SYNDROME OF INAPPROPRIATE ADH PRODUCTION) (H): ICD-10-CM

## 2023-01-01 DIAGNOSIS — T81.49XA SURGICAL SITE INFECTION: ICD-10-CM

## 2023-01-01 DIAGNOSIS — T81.89XA NON-HEALING SURGICAL WOUND, INITIAL ENCOUNTER: Primary | ICD-10-CM

## 2023-01-01 DIAGNOSIS — E87.1 HYPONATREMIA: ICD-10-CM

## 2023-01-01 DIAGNOSIS — Z01.818 PREOPERATIVE EXAMINATION: Primary | ICD-10-CM

## 2023-01-01 DIAGNOSIS — I73.9 PVD (PERIPHERAL VASCULAR DISEASE) (H): ICD-10-CM

## 2023-01-01 DIAGNOSIS — E22.2 SIADH (SYNDROME OF INAPPROPRIATE ADH PRODUCTION) (H): Primary | ICD-10-CM

## 2023-01-01 DIAGNOSIS — M86.172: ICD-10-CM

## 2023-01-01 DIAGNOSIS — G20.A1 PARKINSON DISEASE (H): ICD-10-CM

## 2023-01-01 DIAGNOSIS — L02.91 ABSCESS: ICD-10-CM

## 2023-01-01 DIAGNOSIS — Z89.512 STATUS POST BELOW KNEE AMPUTATION, LEFT (H): Primary | ICD-10-CM

## 2023-01-01 DIAGNOSIS — Z12.11 SPECIAL SCREENING FOR MALIGNANT NEOPLASMS, COLON: Primary | ICD-10-CM

## 2023-01-01 DIAGNOSIS — T84.7XXS INFECTION AND INFLAMMATORY REACTION DUE TO OTHER INTERNAL ORTHOPEDIC PROSTHETIC DEVICES, IMPLANTS AND GRAFTS, SEQUELA: ICD-10-CM

## 2023-01-01 DIAGNOSIS — N17.9 ACUTE KIDNEY INJURY (H): ICD-10-CM

## 2023-01-01 LAB
% GRANULOCYTES: 71.7 %
% GRANULOCYTES: 76.1 %
ABO/RH(D): NORMAL
ALBUMIN SERPL BCG-MCNC: 4 G/DL (ref 3.5–5.2)
ALBUMIN UR-MCNC: NEGATIVE MG/DL
ALP SERPL-CCNC: 120 U/L (ref 40–129)
ALT SERPL W P-5'-P-CCNC: <5 U/L (ref 0–70)
ANION GAP SERPL CALCULATED.3IONS-SCNC: 10 MMOL/L (ref 7–15)
ANION GAP SERPL CALCULATED.3IONS-SCNC: 11 MMOL/L (ref 7–15)
ANION GAP SERPL CALCULATED.3IONS-SCNC: 12 MMOL/L (ref 7–15)
ANION GAP SERPL CALCULATED.3IONS-SCNC: 13 MMOL/L (ref 7–15)
ANION GAP SERPL CALCULATED.3IONS-SCNC: 13 MMOL/L (ref 7–15)
ANION GAP SERPL CALCULATED.3IONS-SCNC: 6 MMOL/L (ref 7–15)
ANION GAP SERPL CALCULATED.3IONS-SCNC: 7 MMOL/L (ref 7–15)
ANION GAP SERPL CALCULATED.3IONS-SCNC: 8 MMOL/L (ref 7–15)
ANION GAP SERPL CALCULATED.3IONS-SCNC: 9 MMOL/L (ref 7–15)
ANION GAP SERPL CALCULATED.3IONS-SCNC: 9 MMOL/L (ref 7–15)
ANTIBODY SCREEN: NEGATIVE
APPEARANCE UR: CLEAR
AST SERPL W P-5'-P-CCNC: 35 U/L (ref 0–45)
ATRIAL RATE - MUSE: 65 BPM
ATRIAL RATE - MUSE: 68 BPM
BACTERIA BLD CULT: NO GROWTH
BACTERIA BLD CULT: NO GROWTH
BACTERIA WND CULT: ABNORMAL
BACTERIA WND CULT: NORMAL
BASOPHILS # BLD AUTO: 0 10E3/UL (ref 0–0.2)
BASOPHILS # BLD AUTO: 0 10E3/UL (ref 0–0.2)
BASOPHILS # BLD AUTO: 0.1 10E3/UL (ref 0–0.2)
BASOPHILS # BLD AUTO: 0.1 10E3/UL (ref 0–0.2)
BASOPHILS NFR BLD AUTO: 0 %
BASOPHILS NFR BLD AUTO: 0 %
BASOPHILS NFR BLD AUTO: 1 %
BASOPHILS NFR BLD AUTO: 1 %
BILIRUB SERPL-MCNC: 0.3 MG/DL
BILIRUB UR QL STRIP: NEGATIVE
BUN SERPL-MCNC: 10.2 MG/DL (ref 8–23)
BUN SERPL-MCNC: 10.3 MG/DL (ref 8–23)
BUN SERPL-MCNC: 10.5 MG/DL (ref 8–23)
BUN SERPL-MCNC: 10.6 MG/DL (ref 8–23)
BUN SERPL-MCNC: 10.6 MG/DL (ref 8–23)
BUN SERPL-MCNC: 11 MG/DL (ref 7–25)
BUN SERPL-MCNC: 11.6 MG/DL (ref 8–23)
BUN SERPL-MCNC: 12 MG/DL (ref 7–25)
BUN SERPL-MCNC: 12.4 MG/DL (ref 8–23)
BUN SERPL-MCNC: 12.9 MG/DL (ref 8–23)
BUN SERPL-MCNC: 13 MG/DL (ref 7–25)
BUN SERPL-MCNC: 13.2 MG/DL (ref 8–23)
BUN SERPL-MCNC: 13.3 MG/DL (ref 8–23)
BUN SERPL-MCNC: 13.6 MG/DL (ref 8–23)
BUN SERPL-MCNC: 13.7 MG/DL (ref 8–23)
BUN SERPL-MCNC: 14.8 MG/DL (ref 8–23)
BUN SERPL-MCNC: 19.5 MG/DL (ref 8–23)
BUN SERPL-MCNC: 33.4 MG/DL (ref 8–23)
BUN SERPL-MCNC: 45.8 MG/DL (ref 8–23)
BUN SERPL-MCNC: 7.2 MG/DL (ref 8–23)
BUN SERPL-MCNC: 7.3 MG/DL (ref 8–23)
BUN SERPL-MCNC: 7.7 MG/DL (ref 8–23)
BUN SERPL-MCNC: 9.8 MG/DL (ref 8–23)
BUN/CREATININE RATIO: 12.4 (ref 6–22)
BUN/CREATININE RATIO: 13.3 (ref 6–32)
BUN/CREATININE RATIO: 15.1 (ref 6–22)
C COLI+JEJUNI+LARI FUSA STL QL NAA+PROBE: NOT DETECTED
C DIFF TOX B STL QL: NEGATIVE
CALCIUM SERPL-MCNC: 8.1 MG/DL (ref 8.8–10.2)
CALCIUM SERPL-MCNC: 8.1 MG/DL (ref 8.8–10.2)
CALCIUM SERPL-MCNC: 8.2 MG/DL (ref 8.8–10.2)
CALCIUM SERPL-MCNC: 8.3 MG/DL (ref 8.8–10.2)
CALCIUM SERPL-MCNC: 8.4 MG/DL (ref 8.8–10.2)
CALCIUM SERPL-MCNC: 8.4 MG/DL (ref 8.8–10.2)
CALCIUM SERPL-MCNC: 8.5 MG/DL (ref 8.8–10.2)
CALCIUM SERPL-MCNC: 8.6 MG/DL (ref 8.8–10.2)
CALCIUM SERPL-MCNC: 8.6 MG/DL (ref 8.8–10.2)
CALCIUM SERPL-MCNC: 8.7 MG/DL (ref 8.8–10.2)
CALCIUM SERPL-MCNC: 8.8 MG/DL (ref 8.8–10.2)
CALCIUM SERPL-MCNC: 8.9 MG/DL (ref 8.8–10.2)
CALCIUM SERPL-MCNC: 8.9 MG/DL (ref 8.8–10.2)
CALCIUM SERPL-MCNC: 9 MG/DL (ref 8.6–10.3)
CALCIUM SERPL-MCNC: 9 MG/DL (ref 8.8–10.2)
CALCIUM SERPL-MCNC: 9 MG/DL (ref 8.8–10.2)
CALCIUM SERPL-MCNC: 9.1 MG/DL (ref 8.8–10.2)
CALCIUM SERPL-MCNC: 9.1 MG/DL (ref 8.8–10.2)
CALCIUM SERPL-MCNC: 9.2 MG/DL (ref 8.6–10.3)
CALCIUM SERPL-MCNC: 9.3 MG/DL (ref 8.6–10.3)
CALCIUM SERPL-MCNC: 9.3 MG/DL (ref 8.8–10.2)
CALCIUM SERPL-MCNC: 9.4 MG/DL (ref 8.8–10.2)
CALCIUM SERPL-MCNC: 9.5 MG/DL (ref 8.8–10.2)
CHLORIDE SERPL-SCNC: 78 MMOL/L (ref 98–107)
CHLORIDE SERPL-SCNC: 80 MMOL/L (ref 98–107)
CHLORIDE SERPL-SCNC: 82 MMOL/L (ref 98–107)
CHLORIDE SERPL-SCNC: 83 MMOL/L (ref 98–107)
CHLORIDE SERPL-SCNC: 84 MMOL/L (ref 98–107)
CHLORIDE SERPL-SCNC: 86 MMOL/L (ref 98–107)
CHLORIDE SERPL-SCNC: 88 MMOL/L (ref 98–107)
CHLORIDE SERPL-SCNC: 88 MMOL/L (ref 98–107)
CHLORIDE SERPL-SCNC: 89 MMOL/L (ref 98–107)
CHLORIDE SERPL-SCNC: 89 MMOL/L (ref 98–107)
CHLORIDE SERPL-SCNC: 90 MMOL/L (ref 98–107)
CHLORIDE SERPL-SCNC: 91 MMOL/L (ref 98–107)
CHLORIDE SERPL-SCNC: 91 MMOL/L (ref 98–107)
CHLORIDE SERPL-SCNC: 93 MMOL/L (ref 98–107)
CHLORIDE SERPL-SCNC: 93 MMOL/L (ref 98–107)
CHLORIDE SERPL-SCNC: 94 MMOL/L (ref 98–107)
CHLORIDE SERPL-SCNC: 96 MMOL/L (ref 98–107)
CHLORIDE SERPL-SCNC: 97 MMOL/L (ref 98–107)
CHLORIDE SERPL-SCNC: 98 MMOL/L (ref 98–107)
CHLORIDE SERPL-SCNC: 98 MMOL/L (ref 98–107)
CHLORIDE SERPLBLD-SCNC: 82.3 MMOL/L (ref 98–110)
CHLORIDE SERPLBLD-SCNC: 88.2 MMOL/L (ref 98–110)
CHLORIDE SERPLBLD-SCNC: 89.8 MMOL/L (ref 98–110)
CO2 SERPL-SCNC: 26.9 MMOL/L (ref 20–32)
CO2 SERPL-SCNC: 29 MMOL/L (ref 20–32)
CO2 SERPL-SCNC: 30.9 MMOL/L (ref 20–32)
COLOR UR AUTO: NORMAL
CORTIS SERPL-MCNC: 14.5 UG/DL
CORTIS SERPL-MCNC: 20.9 UG/DL
CREAT SERPL-MCNC: 0.76 MG/DL (ref 0.67–1.17)
CREAT SERPL-MCNC: 0.78 MG/DL (ref 0.67–1.17)
CREAT SERPL-MCNC: 0.83 MG/DL (ref 0.6–1.3)
CREAT SERPL-MCNC: 0.84 MG/DL (ref 0.67–1.17)
CREAT SERPL-MCNC: 0.85 MG/DL (ref 0.67–1.17)
CREAT SERPL-MCNC: 0.86 MG/DL (ref 0.6–1.3)
CREAT SERPL-MCNC: 0.89 MG/DL (ref 0.67–1.17)
CREAT SERPL-MCNC: 0.91 MG/DL (ref 0.67–1.17)
CREAT SERPL-MCNC: 0.92 MG/DL (ref 0.67–1.17)
CREAT SERPL-MCNC: 0.93 MG/DL (ref 0.67–1.17)
CREAT SERPL-MCNC: 0.95 MG/DL (ref 0.67–1.17)
CREAT SERPL-MCNC: 0.97 MG/DL (ref 0.67–1.17)
CREAT SERPL-MCNC: 0.97 MG/DL (ref 0.67–1.17)
CREAT SERPL-MCNC: 0.97 MG/DL (ref 0.6–1.3)
CREAT SERPL-MCNC: 1 MG/DL (ref 0.67–1.17)
CREAT SERPL-MCNC: 1.01 MG/DL (ref 0.67–1.17)
CREAT SERPL-MCNC: 1.02 MG/DL (ref 0.67–1.17)
CREAT SERPL-MCNC: 1.05 MG/DL (ref 0.67–1.17)
CREAT SERPL-MCNC: 1.06 MG/DL (ref 0.67–1.17)
CREAT SERPL-MCNC: 1.1 MG/DL (ref 0.67–1.17)
CREAT SERPL-MCNC: 1.14 MG/DL (ref 0.67–1.17)
CREAT SERPL-MCNC: 1.23 MG/DL (ref 0.67–1.17)
CREAT UR-MCNC: 38.9 MG/DL
CRP SERPL-MCNC: 20.61 MG/L
DEPRECATED HCO3 PLAS-SCNC: 21 MMOL/L (ref 22–29)
DEPRECATED HCO3 PLAS-SCNC: 21 MMOL/L (ref 22–29)
DEPRECATED HCO3 PLAS-SCNC: 22 MMOL/L (ref 22–29)
DEPRECATED HCO3 PLAS-SCNC: 22 MMOL/L (ref 22–29)
DEPRECATED HCO3 PLAS-SCNC: 23 MMOL/L (ref 22–29)
DEPRECATED HCO3 PLAS-SCNC: 24 MMOL/L (ref 22–29)
DEPRECATED HCO3 PLAS-SCNC: 25 MMOL/L (ref 22–29)
DEPRECATED HCO3 PLAS-SCNC: 26 MMOL/L (ref 22–29)
DEPRECATED HCO3 PLAS-SCNC: 26 MMOL/L (ref 22–29)
DEPRECATED HCO3 PLAS-SCNC: 27 MMOL/L (ref 22–29)
DIASTOLIC BLOOD PRESSURE - MUSE: NORMAL MMHG
DIASTOLIC BLOOD PRESSURE - MUSE: NORMAL MMHG
EC STX1 GENE STL QL NAA+PROBE: NOT DETECTED
EC STX2 GENE STL QL NAA+PROBE: NOT DETECTED
EOSINOPHIL # BLD AUTO: 0.1 10E3/UL (ref 0–0.7)
EOSINOPHIL # BLD AUTO: 0.1 10E3/UL (ref 0–0.7)
EOSINOPHIL # BLD AUTO: 0.2 10E3/UL (ref 0–0.7)
EOSINOPHIL # BLD AUTO: 0.2 10E3/UL (ref 0–0.7)
EOSINOPHIL NFR BLD AUTO: 1 %
EOSINOPHIL NFR BLD AUTO: 1 %
EOSINOPHIL NFR BLD AUTO: 2 %
EOSINOPHIL NFR BLD AUTO: 3 %
ERYTHROCYTE [DISTWIDTH] IN BLOOD BY AUTOMATED COUNT: 15 % (ref 10–15)
ERYTHROCYTE [DISTWIDTH] IN BLOOD BY AUTOMATED COUNT: 15 % (ref 10–15)
ERYTHROCYTE [DISTWIDTH] IN BLOOD BY AUTOMATED COUNT: 15.8 % (ref 10–15)
ERYTHROCYTE [DISTWIDTH] IN BLOOD BY AUTOMATED COUNT: 16.2 % (ref 10–15)
ERYTHROCYTE [DISTWIDTH] IN BLOOD BY AUTOMATED COUNT: 16.2 % (ref 10–15)
ERYTHROCYTE [DISTWIDTH] IN BLOOD BY AUTOMATED COUNT: 16.3 % (ref 10–15)
ERYTHROCYTE [DISTWIDTH] IN BLOOD BY AUTOMATED COUNT: 16.4 % (ref 10–15)
ERYTHROCYTE [DISTWIDTH] IN BLOOD BY AUTOMATED COUNT: 16.4 % (ref 10–15)
ERYTHROCYTE [DISTWIDTH] IN BLOOD BY AUTOMATED COUNT: 16.5 % (ref 10–15)
ERYTHROCYTE [SEDIMENTATION RATE] IN BLOOD BY WESTERGREN METHOD: 27 MM/HR (ref 0–20)
FRACT EXCRET NA UR+SERPL-RTO: 0.4 %
GFR SERPL CREATININE-BSD FRML MDRD: 66 ML/MIN/1.73M2
GFR SERPL CREATININE-BSD FRML MDRD: 72 ML/MIN/1.73M2
GFR SERPL CREATININE-BSD FRML MDRD: 75 ML/MIN/1.73M2
GFR SERPL CREATININE-BSD FRML MDRD: 78 ML/MIN/1.73M2
GFR SERPL CREATININE-BSD FRML MDRD: 79 ML/MIN/1.73M2
GFR SERPL CREATININE-BSD FRML MDRD: 82 ML/MIN/1.73M2
GFR SERPL CREATININE-BSD FRML MDRD: 83 ML/MIN/1.73M2
GFR SERPL CREATININE-BSD FRML MDRD: 84 ML/MIN/1.73M2
GFR SERPL CREATININE-BSD FRML MDRD: 87 ML/MIN/1.73M2
GFR SERPL CREATININE-BSD FRML MDRD: 88 ML/MIN/1.73M2
GFR SERPL CREATININE-BSD FRML MDRD: 89 ML/MIN/1.73M2
GFR SERPL CREATININE-BSD FRML MDRD: >90 ML/MIN/1.73M2
GLUCOSE BLDC GLUCOMTR-MCNC: 119 MG/DL (ref 70–99)
GLUCOSE BLDC GLUCOMTR-MCNC: 125 MG/DL (ref 70–99)
GLUCOSE BLDC GLUCOMTR-MCNC: 94 MG/DL (ref 70–99)
GLUCOSE BLDC GLUCOMTR-MCNC: 99 MG/DL (ref 70–99)
GLUCOSE SERPL-MCNC: 101 MG/DL (ref 70–99)
GLUCOSE SERPL-MCNC: 103 MG/DL (ref 70–99)
GLUCOSE SERPL-MCNC: 105 MG/DL (ref 70–99)
GLUCOSE SERPL-MCNC: 107 MG/DL (ref 70–99)
GLUCOSE SERPL-MCNC: 107 MG/DL (ref 70–99)
GLUCOSE SERPL-MCNC: 109 MG/DL (ref 70–99)
GLUCOSE SERPL-MCNC: 110 MG/DL (ref 70–99)
GLUCOSE SERPL-MCNC: 111 MG/DL (ref 60–99)
GLUCOSE SERPL-MCNC: 111 MG/DL (ref 70–99)
GLUCOSE SERPL-MCNC: 112 MG/DL (ref 70–99)
GLUCOSE SERPL-MCNC: 113 MG/DL (ref 70–99)
GLUCOSE SERPL-MCNC: 127 MG/DL (ref 70–99)
GLUCOSE SERPL-MCNC: 137 MG/DL (ref 70–99)
GLUCOSE SERPL-MCNC: 138 MG/DL (ref 70–99)
GLUCOSE SERPL-MCNC: 140 MG/DL (ref 70–99)
GLUCOSE SERPL-MCNC: 153 MG/DL (ref 70–99)
GLUCOSE SERPL-MCNC: 166 MG/DL (ref 70–99)
GLUCOSE SERPL-MCNC: 220 MG/DL (ref 70–99)
GLUCOSE SERPL-MCNC: 236 MG/DL (ref 70–99)
GLUCOSE SERPL-MCNC: 83 MG/DL (ref 60–99)
GLUCOSE SERPL-MCNC: 88 MG/DL (ref 70–99)
GLUCOSE SERPL-MCNC: 94 MG/DL (ref 70–99)
GLUCOSE SERPL-MCNC: 94 MG/DL (ref 70–99)
GLUCOSE SERPL-MCNC: 96 MG/DL (ref 60–99)
GLUCOSE SERPL-MCNC: 97 MG/DL (ref 70–99)
GLUCOSE SERPL-MCNC: 98 MG/DL (ref 70–99)
GLUCOSE UR STRIP-MCNC: NEGATIVE MG/DL
HCT VFR BLD AUTO: 25 % (ref 40–53)
HCT VFR BLD AUTO: 26.4 % (ref 40–53)
HCT VFR BLD AUTO: 28.4 % (ref 40–53)
HCT VFR BLD AUTO: 28.5 % (ref 40–53)
HCT VFR BLD AUTO: 29.2 % (ref 40–53)
HCT VFR BLD AUTO: 29.4 % (ref 40–53)
HCT VFR BLD AUTO: 29.7 % (ref 40–53)
HCT VFR BLD AUTO: 29.9 % (ref 40–53)
HCT VFR BLD AUTO: 30.7 % (ref 40–53)
HCT VFR BLD AUTO: 31.4 % (ref 40–53)
HCT VFR BLD AUTO: 32 % (ref 40–53)
HCT VFR BLD AUTO: 33.8 % (ref 40–53)
HCT VFR BLD AUTO: 35.8 % (ref 40–53)
HEMOGLOBIN: 10.5 G/DL (ref 13.3–17.7)
HEMOGLOBIN: 11.6 G/DL (ref 13.3–17.7)
HGB BLD-MCNC: 10 G/DL (ref 13.3–17.7)
HGB BLD-MCNC: 10.1 G/DL (ref 13.3–17.7)
HGB BLD-MCNC: 10.4 G/DL (ref 13.3–17.7)
HGB BLD-MCNC: 10.8 G/DL (ref 13.3–17.7)
HGB BLD-MCNC: 7.7 G/DL (ref 13.3–17.7)
HGB BLD-MCNC: 8.4 G/DL (ref 13.3–17.7)
HGB BLD-MCNC: 8.4 G/DL (ref 13.3–17.7)
HGB BLD-MCNC: 8.9 G/DL (ref 13.3–17.7)
HGB BLD-MCNC: 9 G/DL (ref 13.3–17.7)
HGB BLD-MCNC: 9.3 G/DL (ref 13.3–17.7)
HGB BLD-MCNC: 9.4 G/DL (ref 13.3–17.7)
HGB BLD-MCNC: 9.6 G/DL (ref 13.3–17.7)
HGB BLD-MCNC: 9.6 G/DL (ref 13.3–17.7)
HGB BLD-MCNC: 9.9 G/DL (ref 13.3–17.7)
HGB UR QL STRIP: NEGATIVE
HOLD SPECIMEN: NORMAL
IMM GRANULOCYTES # BLD: 0 10E3/UL
IMM GRANULOCYTES # BLD: 0.1 10E3/UL
IMM GRANULOCYTES NFR BLD: 1 %
INTERPRETATION ECG - MUSE: NORMAL
INTERPRETATION ECG - MUSE: NORMAL
KETONES UR STRIP-MCNC: NEGATIVE MG/DL
LACTATE SERPL-SCNC: 1.8 MMOL/L (ref 0.7–2)
LEUKOCYTE ESTERASE UR QL STRIP: NEGATIVE
LYMPHOCYTES # BLD AUTO: 1 10E3/UL (ref 0.8–5.3)
LYMPHOCYTES # BLD AUTO: 1.2 10E3/UL (ref 0.8–5.3)
LYMPHOCYTES # BLD AUTO: 1.8 10E3/UL (ref 0.8–5.3)
LYMPHOCYTES # BLD AUTO: 1.8 10E3/UL (ref 0.8–5.3)
LYMPHOCYTES NFR BLD AUTO: 13 %
LYMPHOCYTES NFR BLD AUTO: 15 %
LYMPHOCYTES NFR BLD AUTO: 16 %
LYMPHOCYTES NFR BLD AUTO: 16.6 %
LYMPHOCYTES NFR BLD AUTO: 16.8 %
LYMPHOCYTES NFR BLD AUTO: 19 %
MAGNESIUM SERPL-MCNC: 1.6 MG/DL (ref 1.7–2.3)
MAGNESIUM SERPL-MCNC: 1.8 MG/DL (ref 1.7–2.3)
MCH RBC QN AUTO: 29.9 PG (ref 26–33)
MCH RBC QN AUTO: 30.2 PG (ref 26.5–33)
MCH RBC QN AUTO: 31.4 PG (ref 26.5–33)
MCH RBC QN AUTO: 31.4 PG (ref 26.5–33)
MCH RBC QN AUTO: 31.5 PG (ref 26.5–33)
MCH RBC QN AUTO: 31.5 PG (ref 26.5–33)
MCH RBC QN AUTO: 31.6 PG (ref 26.5–33)
MCH RBC QN AUTO: 31.7 PG (ref 26.5–33)
MCH RBC QN AUTO: 31.8 PG (ref 26.5–33)
MCH RBC QN AUTO: 31.8 PG (ref 26.5–33)
MCH RBC QN AUTO: 31.8 PG (ref 26–33)
MCH RBC QN AUTO: 32 PG (ref 26.5–33)
MCH RBC QN AUTO: 32.1 PG (ref 26.5–33)
MCHC RBC AUTO-ENTMCNC: 30.8 G/DL (ref 31.5–36.5)
MCHC RBC AUTO-ENTMCNC: 31.1 G/DL (ref 31–36)
MCHC RBC AUTO-ENTMCNC: 31.3 G/DL (ref 31.5–36.5)
MCHC RBC AUTO-ENTMCNC: 31.7 G/DL (ref 31.5–36.5)
MCHC RBC AUTO-ENTMCNC: 32 G/DL (ref 31.5–36.5)
MCHC RBC AUTO-ENTMCNC: 32.1 G/DL (ref 31.5–36.5)
MCHC RBC AUTO-ENTMCNC: 32.4 G/DL (ref 31–36)
MCHC RBC AUTO-ENTMCNC: 33.1 G/DL (ref 31.5–36.5)
MCHC RBC AUTO-ENTMCNC: 33.3 G/DL (ref 31.5–36.5)
MCHC RBC AUTO-ENTMCNC: 33.7 G/DL (ref 31.5–36.5)
MCHC RBC AUTO-ENTMCNC: 33.8 G/DL (ref 31.5–36.5)
MCHC RBC AUTO-ENTMCNC: 34.6 G/DL (ref 31.5–36.5)
MCHC RBC AUTO-ENTMCNC: 35.1 G/DL (ref 31.5–36.5)
MCV RBC AUTO: 101 FL (ref 78–100)
MCV RBC AUTO: 91 FL (ref 78–100)
MCV RBC AUTO: 91 FL (ref 78–100)
MCV RBC AUTO: 93 FL (ref 78–100)
MCV RBC AUTO: 95 FL (ref 78–100)
MCV RBC AUTO: 95 FL (ref 78–100)
MCV RBC AUTO: 96 FL (ref 78–100)
MCV RBC AUTO: 96.4 FL (ref 78–100)
MCV RBC AUTO: 98 FL (ref 78–100)
MCV RBC AUTO: 98.1 FL (ref 78–100)
MCV RBC AUTO: 99 FL (ref 78–100)
MONOCYTES # BLD AUTO: 0.7 10E3/UL (ref 0–1.3)
MONOCYTES # BLD AUTO: 0.8 10E3/UL (ref 0–1.3)
MONOCYTES # BLD AUTO: 1.2 10E3/UL (ref 0–1.3)
MONOCYTES # BLD AUTO: 1.2 10E3/UL (ref 0–1.3)
MONOCYTES NFR BLD AUTO: 11 %
MONOCYTES NFR BLD AUTO: 11 %
MONOCYTES NFR BLD AUTO: 11.7 %
MONOCYTES NFR BLD AUTO: 12 %
MONOCYTES NFR BLD AUTO: 7.1 %
MONOCYTES NFR BLD AUTO: 8 %
MRSA DNA SPEC QL NAA+PROBE: NEGATIVE
NEUTROPHILS # BLD AUTO: 4.8 10E3/UL (ref 1.6–8.3)
NEUTROPHILS # BLD AUTO: 6.6 10E3/UL (ref 1.6–8.3)
NEUTROPHILS # BLD AUTO: 7.3 10E3/UL (ref 1.6–8.3)
NEUTROPHILS # BLD AUTO: 7.7 10E3/UL (ref 1.6–8.3)
NEUTROPHILS NFR BLD AUTO: 68 %
NEUTROPHILS NFR BLD AUTO: 69 %
NEUTROPHILS NFR BLD AUTO: 72 %
NEUTROPHILS NFR BLD AUTO: 73 %
NITRATE UR QL: NEGATIVE
NOROV GI+II ORF1-ORF2 JNC STL QL NAA+PR: NOT DETECTED
NRBC # BLD AUTO: 0 10E3/UL
NRBC BLD AUTO-RTO: 0 /100
OSMOLALITY SERPL: 256 MMOL/KG (ref 280–301)
OSMOLALITY UR: 161 MMOL/KG (ref 100–1200)
OSMOLALITY UR: 201 MMOL/KG (ref 100–1200)
OSMOLALITY UR: 441 MMOL/KG (ref 100–1200)
P AXIS - MUSE: 21 DEGREES
P AXIS - MUSE: 41 DEGREES
PATH REPORT.COMMENTS IMP SPEC: NORMAL
PATH REPORT.COMMENTS IMP SPEC: NORMAL
PATH REPORT.FINAL DX SPEC: NORMAL
PATH REPORT.GROSS SPEC: NORMAL
PATH REPORT.MICROSCOPIC SPEC OTHER STN: NORMAL
PATH REPORT.RELEVANT HX SPEC: NORMAL
PH UR STRIP: 6 [PH] (ref 5–7)
PHOTO IMAGE: NORMAL
PLATELET # BLD AUTO: 119 10E3/UL (ref 150–450)
PLATELET # BLD AUTO: 125 10E3/UL (ref 150–450)
PLATELET # BLD AUTO: 126 10E3/UL (ref 150–450)
PLATELET # BLD AUTO: 139 10E3/UL (ref 150–450)
PLATELET # BLD AUTO: 140 10E3/UL (ref 150–450)
PLATELET # BLD AUTO: 145 10E3/UL (ref 150–450)
PLATELET # BLD AUTO: 145 10E3/UL (ref 150–450)
PLATELET # BLD AUTO: 154 10E3/UL (ref 150–450)
PLATELET # BLD AUTO: 170 10E3/UL (ref 150–450)
PLATELET # BLD AUTO: 176 10E3/UL (ref 150–450)
PLATELET # BLD AUTO: 185 10E3/UL (ref 150–450)
PLATELET COUNT - QUEST: 224 10^9/L (ref 150–375)
PLATELET COUNT - QUEST: 239 10^9/L (ref 150–375)
POTASSIUM SERPL-SCNC: 3.8 MMOL/L (ref 3.4–5.3)
POTASSIUM SERPL-SCNC: 3.9 MMOL/L (ref 3.4–5.3)
POTASSIUM SERPL-SCNC: 4.2 MMOL/L (ref 3.4–5.3)
POTASSIUM SERPL-SCNC: 4.2 MMOL/L (ref 3.4–5.3)
POTASSIUM SERPL-SCNC: 4.3 MMOL/L (ref 3.4–5.3)
POTASSIUM SERPL-SCNC: 4.4 MMOL/L (ref 3.4–5.3)
POTASSIUM SERPL-SCNC: 4.4 MMOL/L (ref 3.4–5.3)
POTASSIUM SERPL-SCNC: 4.5 MMOL/L (ref 3.4–5.3)
POTASSIUM SERPL-SCNC: 4.6 MMOL/L (ref 3.4–5.3)
POTASSIUM SERPL-SCNC: 4.6 MMOL/L (ref 3.4–5.3)
POTASSIUM SERPL-SCNC: 4.62 MMOL/L (ref 3.5–5.3)
POTASSIUM SERPL-SCNC: 4.7 MMOL/L (ref 3.4–5.3)
POTASSIUM SERPL-SCNC: 4.72 MMOL/L (ref 3.5–5.3)
POTASSIUM SERPL-SCNC: 4.72 MMOL/L (ref 3.5–5.3)
POTASSIUM SERPL-SCNC: 4.8 MMOL/L (ref 3.4–5.3)
POTASSIUM SERPL-SCNC: 5 MMOL/L (ref 3.4–5.3)
POTASSIUM SERPL-SCNC: 5.1 MMOL/L (ref 3.4–5.3)
POTASSIUM SERPL-SCNC: 5.2 MMOL/L (ref 3.4–5.3)
POTASSIUM SERPL-SCNC: 5.4 MMOL/L (ref 3.4–5.3)
POTASSIUM SERPL-SCNC: 5.4 MMOL/L (ref 3.4–5.3)
POTASSIUM SERPL-SCNC: 5.9 MMOL/L (ref 3.4–5.3)
POTASSIUM UR-SCNC: 32.8 MMOL/L
PR INTERVAL - MUSE: 172 MS
PR INTERVAL - MUSE: 188 MS
PROT SERPL-MCNC: 7.4 G/DL (ref 6.4–8.3)
QRS DURATION - MUSE: 82 MS
QRS DURATION - MUSE: 84 MS
QT - MUSE: 402 MS
QT - MUSE: 406 MS
QTC - MUSE: 422 MS
QTC - MUSE: 427 MS
R AXIS - MUSE: 40 DEGREES
R AXIS - MUSE: 46 DEGREES
RBC # BLD AUTO: 2.55 10E6/UL (ref 4.4–5.9)
RBC # BLD AUTO: 2.77 10E6/UL (ref 4.4–5.9)
RBC # BLD AUTO: 2.86 10E6/UL (ref 4.4–5.9)
RBC # BLD AUTO: 2.96 10E6/UL (ref 4.4–5.9)
RBC # BLD AUTO: 3.02 10E6/UL (ref 4.4–5.9)
RBC # BLD AUTO: 3.05 10E6/UL (ref 4.4–5.9)
RBC # BLD AUTO: 3.09 10E6/UL (ref 4.4–5.9)
RBC # BLD AUTO: 3.15 10E6/UL (ref 4.4–5.9)
RBC # BLD AUTO: 3.2 10E6/UL (ref 4.4–5.9)
RBC # BLD AUTO: 3.31 10E6/UL (ref 4.4–5.9)
RBC # BLD AUTO: 3.44 10E6/UL (ref 4.4–5.9)
RBC # BLD AUTO: 3.51 10*12/L (ref 4.4–5.9)
RBC # BLD AUTO: 3.65 10*12/L (ref 4.4–5.9)
RBC URINE: 1 /HPF
RVA NSP5 STL QL NAA+PROBE: NOT DETECTED
SA TARGET DNA: NEGATIVE
SALMONELLA SP RPOD STL QL NAA+PROBE: NOT DETECTED
SARS-COV-2 RNA RESP QL NAA+PROBE: NEGATIVE
SHIGELLA SP+EIEC IPAH STL QL NAA+PROBE: NOT DETECTED
SODIUM SERPL-SCNC: 114 MMOL/L (ref 136–145)
SODIUM SERPL-SCNC: 115 MMOL/L (ref 136–145)
SODIUM SERPL-SCNC: 115 MMOL/L (ref 136–145)
SODIUM SERPL-SCNC: 116 MMOL/L (ref 136–145)
SODIUM SERPL-SCNC: 116 MMOL/L (ref 136–145)
SODIUM SERPL-SCNC: 117 MMOL/L (ref 136–145)
SODIUM SERPL-SCNC: 117 MMOL/L (ref 136–145)
SODIUM SERPL-SCNC: 118 MMOL/L (ref 136–145)
SODIUM SERPL-SCNC: 120 MMOL/L (ref 136–145)
SODIUM SERPL-SCNC: 120.1 MMOL/L (ref 135–146)
SODIUM SERPL-SCNC: 121 MMOL/L (ref 136–145)
SODIUM SERPL-SCNC: 121 MMOL/L (ref 136–145)
SODIUM SERPL-SCNC: 122 MMOL/L (ref 136–145)
SODIUM SERPL-SCNC: 123 MMOL/L (ref 136–145)
SODIUM SERPL-SCNC: 124 MMOL/L (ref 136–145)
SODIUM SERPL-SCNC: 125 MMOL/L (ref 136–145)
SODIUM SERPL-SCNC: 125 MMOL/L (ref 136–145)
SODIUM SERPL-SCNC: 125.4 MMOL/L (ref 135–146)
SODIUM SERPL-SCNC: 126.1 MMOL/L (ref 135–146)
SODIUM SERPL-SCNC: 128 MMOL/L (ref 136–145)
SODIUM SERPL-SCNC: 128 MMOL/L (ref 136–145)
SODIUM SERPL-SCNC: 129 MMOL/L (ref 136–145)
SODIUM SERPL-SCNC: 130 MMOL/L (ref 136–145)
SODIUM SERPL-SCNC: 131 MMOL/L (ref 136–145)
SODIUM SERPL-SCNC: 131 MMOL/L (ref 136–145)
SODIUM SERPL-SCNC: 132 MMOL/L (ref 136–145)
SODIUM SERPL-SCNC: 133 MMOL/L (ref 136–145)
SODIUM UR-SCNC: 23 MMOL/L
SODIUM UR-SCNC: 24 MMOL/L
SODIUM UR-SCNC: 37 MMOL/L
SODIUM UR-SCNC: 45 MMOL/L
SODIUM UR-SCNC: 74 MMOL/L
SP GR UR STRIP: 1.01 (ref 1–1.03)
SPECIMEN EXPIRATION DATE: NORMAL
SYSTOLIC BLOOD PRESSURE - MUSE: NORMAL MMHG
SYSTOLIC BLOOD PRESSURE - MUSE: NORMAL MMHG
T AXIS - MUSE: 48 DEGREES
T AXIS - MUSE: 54 DEGREES
TSH SERPL DL<=0.005 MIU/L-ACNC: 1 UIU/ML (ref 0.3–4.2)
UROBILINOGEN UR STRIP-MCNC: NORMAL MG/DL
V CHOL+PARA RFBL+TRKH+TNAA STL QL NAA+PR: NOT DETECTED
VENTRICULAR RATE- MUSE: 65 BPM
VENTRICULAR RATE- MUSE: 68 BPM
WBC # BLD AUTO: 10.9 10E3/UL (ref 4–11)
WBC # BLD AUTO: 6.7 10E3/UL (ref 4–11)
WBC # BLD AUTO: 7.3 10E3/UL (ref 4–11)
WBC # BLD AUTO: 7.3 10E3/UL (ref 4–11)
WBC # BLD AUTO: 8.1 10E3/UL (ref 4–11)
WBC # BLD AUTO: 8.3 10*9/L (ref 4–11)
WBC # BLD AUTO: 8.4 10E3/UL (ref 4–11)
WBC # BLD AUTO: 9.1 10*9/L (ref 4–11)
WBC # BLD AUTO: 9.4 10E3/UL (ref 4–11)
WBC # BLD AUTO: 9.8 10E3/UL (ref 4–11)
WBC # BLD AUTO: 9.9 10E3/UL (ref 4–11)
WBC URINE: <1 /HPF
Y ENTERO RECN STL QL NAA+PROBE: NOT DETECTED

## 2023-01-01 PROCEDURE — 84295 ASSAY OF SERUM SODIUM: CPT | Performed by: INTERNAL MEDICINE

## 2023-01-01 PROCEDURE — 360N000076 HC SURGERY LEVEL 3, PER MIN: Performed by: ORTHOPAEDIC SURGERY

## 2023-01-01 PROCEDURE — 258N000003 HC RX IP 258 OP 636: Performed by: STUDENT IN AN ORGANIZED HEALTH CARE EDUCATION/TRAINING PROGRAM

## 2023-01-01 PROCEDURE — 120N000001 HC R&B MED SURG/OB

## 2023-01-01 PROCEDURE — 85027 COMPLETE CBC AUTOMATED: CPT | Performed by: HOSPITALIST

## 2023-01-01 PROCEDURE — 97535 SELF CARE MNGMENT TRAINING: CPT | Mod: GO

## 2023-01-01 PROCEDURE — C9803 HOPD COVID-19 SPEC COLLECT: HCPCS

## 2023-01-01 PROCEDURE — 99232 SBSQ HOSP IP/OBS MODERATE 35: CPT | Performed by: INTERNAL MEDICINE

## 2023-01-01 PROCEDURE — L8420 PROSTHETIC SOCK MULTI PLY BK: HCPCS

## 2023-01-01 PROCEDURE — 36415 COLL VENOUS BLD VENIPUNCTURE: CPT | Performed by: HOSPITALIST

## 2023-01-01 PROCEDURE — 83930 ASSAY OF BLOOD OSMOLALITY: CPT | Performed by: INTERNAL MEDICINE

## 2023-01-01 PROCEDURE — 81001 URINALYSIS AUTO W/SCOPE: CPT | Performed by: INTERNAL MEDICINE

## 2023-01-01 PROCEDURE — 93000 ELECTROCARDIOGRAM COMPLETE: CPT | Performed by: FAMILY MEDICINE

## 2023-01-01 PROCEDURE — 250N000009 HC RX 250

## 2023-01-01 PROCEDURE — 99223 1ST HOSP IP/OBS HIGH 75: CPT | Performed by: HOSPITALIST

## 2023-01-01 PROCEDURE — 97602 WOUND(S) CARE NON-SELECTIVE: CPT

## 2023-01-01 PROCEDURE — 250N000011 HC RX IP 250 OP 636: Performed by: HOSPITALIST

## 2023-01-01 PROCEDURE — G0463 HOSPITAL OUTPT CLINIC VISIT: HCPCS | Mod: 25

## 2023-01-01 PROCEDURE — 99222 1ST HOSP IP/OBS MODERATE 55: CPT | Performed by: INTERNAL MEDICINE

## 2023-01-01 PROCEDURE — 80048 BASIC METABOLIC PNL TOTAL CA: CPT | Performed by: HOSPITALIST

## 2023-01-01 PROCEDURE — 250N000013 HC RX MED GY IP 250 OP 250 PS 637: Performed by: INTERNAL MEDICINE

## 2023-01-01 PROCEDURE — 82533 TOTAL CORTISOL: CPT | Performed by: INTERNAL MEDICINE

## 2023-01-01 PROCEDURE — 250N000013 HC RX MED GY IP 250 OP 250 PS 637: Performed by: HOSPITALIST

## 2023-01-01 PROCEDURE — 99204 OFFICE O/P NEW MOD 45 MIN: CPT | Mod: 25 | Performed by: FAMILY MEDICINE

## 2023-01-01 PROCEDURE — 710N000009 HC RECOVERY PHASE 1, LEVEL 1, PER MIN: Performed by: ORTHOPAEDIC SURGERY

## 2023-01-01 PROCEDURE — 999N000157 HC STATISTIC RCP TIME EA 10 MIN

## 2023-01-01 PROCEDURE — 250N000013 HC RX MED GY IP 250 OP 250 PS 637: Performed by: STUDENT IN AN ORGANIZED HEALTH CARE EDUCATION/TRAINING PROGRAM

## 2023-01-01 PROCEDURE — 94640 AIRWAY INHALATION TREATMENT: CPT

## 2023-01-01 PROCEDURE — 272N000001 HC OR GENERAL SUPPLY STERILE: Performed by: ORTHOPAEDIC SURGERY

## 2023-01-01 PROCEDURE — 36415 COLL VENOUS BLD VENIPUNCTURE: CPT | Performed by: FAMILY MEDICINE

## 2023-01-01 PROCEDURE — 258N000003 HC RX IP 258 OP 636: Performed by: HOSPITALIST

## 2023-01-01 PROCEDURE — 87641 MR-STAPH DNA AMP PROBE: CPT | Performed by: HOSPITALIST

## 2023-01-01 PROCEDURE — 99222 1ST HOSP IP/OBS MODERATE 55: CPT | Performed by: STUDENT IN AN ORGANIZED HEALTH CARE EDUCATION/TRAINING PROGRAM

## 2023-01-01 PROCEDURE — 250N000011 HC RX IP 250 OP 636: Performed by: PHYSICIAN ASSISTANT

## 2023-01-01 PROCEDURE — 250N000013 HC RX MED GY IP 250 OP 250 PS 637: Performed by: PHYSICIAN ASSISTANT

## 2023-01-01 PROCEDURE — U0005 INFEC AGEN DETEC AMPLI PROBE: HCPCS | Performed by: STUDENT IN AN ORGANIZED HEALTH CARE EDUCATION/TRAINING PROGRAM

## 2023-01-01 PROCEDURE — 250N000011 HC RX IP 250 OP 636: Performed by: ANESTHESIOLOGY

## 2023-01-01 PROCEDURE — 85025 COMPLETE CBC W/AUTO DIFF WBC: CPT | Performed by: EMERGENCY MEDICINE

## 2023-01-01 PROCEDURE — 84300 ASSAY OF URINE SODIUM: CPT | Performed by: STUDENT IN AN ORGANIZED HEALTH CARE EDUCATION/TRAINING PROGRAM

## 2023-01-01 PROCEDURE — 97162 PT EVAL MOD COMPLEX 30 MIN: CPT | Mod: GP | Performed by: PHYSICAL THERAPIST

## 2023-01-01 PROCEDURE — 84300 ASSAY OF URINE SODIUM: CPT | Performed by: INTERNAL MEDICINE

## 2023-01-01 PROCEDURE — 36415 COLL VENOUS BLD VENIPUNCTURE: CPT | Performed by: INTERNAL MEDICINE

## 2023-01-01 PROCEDURE — 258N000003 HC RX IP 258 OP 636: Performed by: ANESTHESIOLOGY

## 2023-01-01 PROCEDURE — 97161 PT EVAL LOW COMPLEX 20 MIN: CPT | Mod: GP | Performed by: PHYSICAL THERAPIST

## 2023-01-01 PROCEDURE — 87075 CULTR BACTERIA EXCEPT BLOOD: CPT | Performed by: STUDENT IN AN ORGANIZED HEALTH CARE EDUCATION/TRAINING PROGRAM

## 2023-01-01 PROCEDURE — 999N000197 HC STATISTIC WOC PT EDUCATION, 0-15 MIN

## 2023-01-01 PROCEDURE — 82310 ASSAY OF CALCIUM: CPT | Performed by: HOSPITALIST

## 2023-01-01 PROCEDURE — L8470 PROS SOCK SINGLE PLY BK: HCPCS

## 2023-01-01 PROCEDURE — 99231 SBSQ HOSP IP/OBS SF/LOW 25: CPT | Performed by: INTERNAL MEDICINE

## 2023-01-01 PROCEDURE — 85652 RBC SED RATE AUTOMATED: CPT | Performed by: STUDENT IN AN ORGANIZED HEALTH CARE EDUCATION/TRAINING PROGRAM

## 2023-01-01 PROCEDURE — 36415 COLL VENOUS BLD VENIPUNCTURE: CPT | Performed by: PHYSICIAN ASSISTANT

## 2023-01-01 PROCEDURE — 82947 ASSAY GLUCOSE BLOOD QUANT: CPT | Performed by: ORTHOPAEDIC SURGERY

## 2023-01-01 PROCEDURE — 82310 ASSAY OF CALCIUM: CPT | Performed by: INTERNAL MEDICINE

## 2023-01-01 PROCEDURE — 99214 OFFICE O/P EST MOD 30 MIN: CPT | Performed by: FAMILY MEDICINE

## 2023-01-01 PROCEDURE — 11042 DBRDMT SUBQ TIS 1ST 20SQCM/<: CPT | Performed by: FAMILY MEDICINE

## 2023-01-01 PROCEDURE — 88307 TISSUE EXAM BY PATHOLOGIST: CPT | Mod: 26 | Performed by: PATHOLOGY

## 2023-01-01 PROCEDURE — 250N000009 HC RX 250: Performed by: HOSPITALIST

## 2023-01-01 PROCEDURE — 250N000011 HC RX IP 250 OP 636: Mod: JZ | Performed by: ANESTHESIOLOGY

## 2023-01-01 PROCEDURE — 80048 BASIC METABOLIC PNL TOTAL CA: CPT | Performed by: FAMILY MEDICINE

## 2023-01-01 PROCEDURE — 99291 CRITICAL CARE FIRST HOUR: CPT | Mod: 25

## 2023-01-01 PROCEDURE — 258N000001 HC RX 258: Performed by: ORTHOPAEDIC SURGERY

## 2023-01-01 PROCEDURE — 84300 ASSAY OF URINE SODIUM: CPT | Performed by: HOSPITALIST

## 2023-01-01 PROCEDURE — 250N000009 HC RX 250: Performed by: NURSE ANESTHETIST, CERTIFIED REGISTERED

## 2023-01-01 PROCEDURE — 370N000017 HC ANESTHESIA TECHNICAL FEE, PER MIN: Performed by: ORTHOPAEDIC SURGERY

## 2023-01-01 PROCEDURE — 99223 1ST HOSP IP/OBS HIGH 75: CPT | Performed by: INTERNAL MEDICINE

## 2023-01-01 PROCEDURE — 258N000003 HC RX IP 258 OP 636: Performed by: PHYSICIAN ASSISTANT

## 2023-01-01 PROCEDURE — 97110 THERAPEUTIC EXERCISES: CPT | Mod: GP | Performed by: PHYSICAL THERAPIST

## 2023-01-01 PROCEDURE — 87493 C DIFF AMPLIFIED PROBE: CPT | Performed by: INTERNAL MEDICINE

## 2023-01-01 PROCEDURE — 86850 RBC ANTIBODY SCREEN: CPT | Performed by: PHYSICIAN ASSISTANT

## 2023-01-01 PROCEDURE — 999N000199 HC STATISTIC WOC PT EDUCATION, 31-45 MIN

## 2023-01-01 PROCEDURE — 85004 AUTOMATED DIFF WBC COUNT: CPT | Performed by: EMERGENCY MEDICINE

## 2023-01-01 PROCEDURE — 84443 ASSAY THYROID STIM HORMONE: CPT | Performed by: HOSPITALIST

## 2023-01-01 PROCEDURE — 80053 COMPREHEN METABOLIC PANEL: CPT | Performed by: STUDENT IN AN ORGANIZED HEALTH CARE EDUCATION/TRAINING PROGRAM

## 2023-01-01 PROCEDURE — 83735 ASSAY OF MAGNESIUM: CPT | Performed by: INTERNAL MEDICINE

## 2023-01-01 PROCEDURE — 999N000111 HC STATISTIC OT IP EVAL DEFER: Performed by: OCCUPATIONAL THERAPIST

## 2023-01-01 PROCEDURE — 80048 BASIC METABOLIC PNL TOTAL CA: CPT | Performed by: INTERNAL MEDICINE

## 2023-01-01 PROCEDURE — 250N000011 HC RX IP 250 OP 636: Performed by: NURSE ANESTHETIST, CERTIFIED REGISTERED

## 2023-01-01 PROCEDURE — 250N000011 HC RX IP 250 OP 636: Mod: JZ | Performed by: INTERNAL MEDICINE

## 2023-01-01 PROCEDURE — 88307 TISSUE EXAM BY PATHOLOGIST: CPT | Mod: TC | Performed by: ORTHOPAEDIC SURGERY

## 2023-01-01 PROCEDURE — 83935 ASSAY OF URINE OSMOLALITY: CPT | Performed by: HOSPITALIST

## 2023-01-01 PROCEDURE — 250N000009 HC RX 250: Performed by: ANESTHESIOLOGY

## 2023-01-01 PROCEDURE — 999N000147 HC STATISTIC PT IP EVAL DEFER

## 2023-01-01 PROCEDURE — 250N000025 HC SEVOFLURANE, PER MIN: Performed by: ORTHOPAEDIC SURGERY

## 2023-01-01 PROCEDURE — 120N000013 HC R&B IMCU

## 2023-01-01 PROCEDURE — 250N000011 HC RX IP 250 OP 636

## 2023-01-01 PROCEDURE — 83935 ASSAY OF URINE OSMOLALITY: CPT | Performed by: INTERNAL MEDICINE

## 2023-01-01 PROCEDURE — 250N000009 HC RX 250: Performed by: PHYSICIAN ASSISTANT

## 2023-01-01 PROCEDURE — 258N000003 HC RX IP 258 OP 636: Performed by: EMERGENCY MEDICINE

## 2023-01-01 PROCEDURE — 99285 EMERGENCY DEPT VISIT HI MDM: CPT | Mod: 25

## 2023-01-01 PROCEDURE — 97542 WHEELCHAIR MNGMENT TRAINING: CPT | Mod: GP | Performed by: PHYSICAL THERAPIST

## 2023-01-01 PROCEDURE — 85025 COMPLETE CBC W/AUTO DIFF WBC: CPT | Performed by: FAMILY MEDICINE

## 2023-01-01 PROCEDURE — 87040 BLOOD CULTURE FOR BACTERIA: CPT | Performed by: STUDENT IN AN ORGANIZED HEALTH CARE EDUCATION/TRAINING PROGRAM

## 2023-01-01 PROCEDURE — 36415 COLL VENOUS BLD VENIPUNCTURE: CPT | Performed by: ORTHOPAEDIC SURGERY

## 2023-01-01 PROCEDURE — 86901 BLOOD TYPING SEROLOGIC RH(D): CPT | Performed by: PHYSICIAN ASSISTANT

## 2023-01-01 PROCEDURE — 250N000011 HC RX IP 250 OP 636: Performed by: STUDENT IN AN ORGANIZED HEALTH CARE EDUCATION/TRAINING PROGRAM

## 2023-01-01 PROCEDURE — 99214 OFFICE O/P EST MOD 30 MIN: CPT | Mod: 25 | Performed by: FAMILY MEDICINE

## 2023-01-01 PROCEDURE — 86140 C-REACTIVE PROTEIN: CPT | Performed by: STUDENT IN AN ORGANIZED HEALTH CARE EDUCATION/TRAINING PROGRAM

## 2023-01-01 PROCEDURE — 85014 HEMATOCRIT: CPT | Performed by: INTERNAL MEDICINE

## 2023-01-01 PROCEDURE — 258N000003 HC RX IP 258 OP 636: Performed by: INTERNAL MEDICINE

## 2023-01-01 PROCEDURE — 250N000011 HC RX IP 250 OP 636: Mod: JZ | Performed by: HOSPITALIST

## 2023-01-01 PROCEDURE — G0463 HOSPITAL OUTPT CLINIC VISIT: HCPCS

## 2023-01-01 PROCEDURE — 87070 CULTURE OTHR SPECIMN AEROBIC: CPT | Performed by: STUDENT IN AN ORGANIZED HEALTH CARE EDUCATION/TRAINING PROGRAM

## 2023-01-01 PROCEDURE — 96365 THER/PROPH/DIAG IV INF INIT: CPT

## 2023-01-01 PROCEDURE — 250N000009 HC RX 250: Performed by: STUDENT IN AN ORGANIZED HEALTH CARE EDUCATION/TRAINING PROGRAM

## 2023-01-01 PROCEDURE — 0KBT0ZZ EXCISION OF LEFT LOWER LEG MUSCLE, OPEN APPROACH: ICD-10-PCS | Performed by: ORTHOPAEDIC SURGERY

## 2023-01-01 PROCEDURE — 84133 ASSAY OF URINE POTASSIUM: CPT | Performed by: INTERNAL MEDICINE

## 2023-01-01 PROCEDURE — 85018 HEMOGLOBIN: CPT | Performed by: PHYSICIAN ASSISTANT

## 2023-01-01 PROCEDURE — 85025 COMPLETE CBC W/AUTO DIFF WBC: CPT | Performed by: INTERNAL MEDICINE

## 2023-01-01 PROCEDURE — 999N000141 HC STATISTIC PRE-PROCEDURE NURSING ASSESSMENT: Performed by: ORTHOPAEDIC SURGERY

## 2023-01-01 PROCEDURE — 93005 ELECTROCARDIOGRAM TRACING: CPT

## 2023-01-01 PROCEDURE — 88311 DECALCIFY TISSUE: CPT | Mod: 26 | Performed by: PATHOLOGY

## 2023-01-01 PROCEDURE — 99233 SBSQ HOSP IP/OBS HIGH 50: CPT | Performed by: INTERNAL MEDICINE

## 2023-01-01 PROCEDURE — 99233 SBSQ HOSP IP/OBS HIGH 50: CPT | Performed by: HOSPITALIST

## 2023-01-01 PROCEDURE — 80048 BASIC METABOLIC PNL TOTAL CA: CPT | Performed by: EMERGENCY MEDICINE

## 2023-01-01 PROCEDURE — 36415 COLL VENOUS BLD VENIPUNCTURE: CPT | Performed by: STUDENT IN AN ORGANIZED HEALTH CARE EDUCATION/TRAINING PROGRAM

## 2023-01-01 PROCEDURE — 73701 CT LOWER EXTREMITY W/DYE: CPT | Mod: LT

## 2023-01-01 PROCEDURE — 250N000011 HC RX IP 250 OP 636: Mod: JZ | Performed by: STUDENT IN AN ORGANIZED HEALTH CARE EDUCATION/TRAINING PROGRAM

## 2023-01-01 PROCEDURE — L5999 LOWR EXTREMITY PROSTHES NOS: HCPCS

## 2023-01-01 PROCEDURE — 99239 HOSP IP/OBS DSCHRG MGMT >30: CPT | Performed by: INTERNAL MEDICINE

## 2023-01-01 PROCEDURE — 250N000013 HC RX MED GY IP 250 OP 250 PS 637: Performed by: ANESTHESIOLOGY

## 2023-01-01 PROCEDURE — 87506 IADNA-DNA/RNA PROBE TQ 6-11: CPT | Performed by: INTERNAL MEDICINE

## 2023-01-01 PROCEDURE — 85025 COMPLETE CBC W/AUTO DIFF WBC: CPT | Performed by: STUDENT IN AN ORGANIZED HEALTH CARE EDUCATION/TRAINING PROGRAM

## 2023-01-01 PROCEDURE — 82565 ASSAY OF CREATININE: CPT | Performed by: ORTHOPAEDIC SURGERY

## 2023-01-01 PROCEDURE — 85018 HEMOGLOBIN: CPT | Performed by: HOSPITALIST

## 2023-01-01 PROCEDURE — L8440 SHRINKER BELOW KNEE: HCPCS

## 2023-01-01 PROCEDURE — 97530 THERAPEUTIC ACTIVITIES: CPT | Mod: GP | Performed by: PHYSICAL THERAPIST

## 2023-01-01 PROCEDURE — 82565 ASSAY OF CREATININE: CPT | Performed by: INTERNAL MEDICINE

## 2023-01-01 PROCEDURE — 83735 ASSAY OF MAGNESIUM: CPT | Performed by: FAMILY MEDICINE

## 2023-01-01 PROCEDURE — 0Y6J0Z2 DETACHMENT AT LEFT LOWER LEG, MID, OPEN APPROACH: ICD-10-PCS | Performed by: ORTHOPAEDIC SURGERY

## 2023-01-01 PROCEDURE — 250N000009 HC RX 250: Performed by: ORTHOPAEDIC SURGERY

## 2023-01-01 PROCEDURE — L5688 BK WAIST BELT WEBBING: HCPCS

## 2023-01-01 PROCEDURE — 85014 HEMATOCRIT: CPT | Performed by: HOSPITALIST

## 2023-01-01 PROCEDURE — 83605 ASSAY OF LACTIC ACID: CPT | Performed by: STUDENT IN AN ORGANIZED HEALTH CARE EDUCATION/TRAINING PROGRAM

## 2023-01-01 PROCEDURE — L5450 POSTOP APP NON-WGT BEAR DSG: HCPCS

## 2023-01-01 PROCEDURE — 258N000003 HC RX IP 258 OP 636

## 2023-01-01 PROCEDURE — 97165 OT EVAL LOW COMPLEX 30 MIN: CPT | Mod: GO

## 2023-01-01 PROCEDURE — 36415 COLL VENOUS BLD VENIPUNCTURE: CPT | Performed by: EMERGENCY MEDICINE

## 2023-01-01 PROCEDURE — 99239 HOSP IP/OBS DSCHRG MGMT >30: CPT | Performed by: FAMILY MEDICINE

## 2023-01-01 PROCEDURE — 272N000002 HC OR SUPPLY OTHER OPNP: Performed by: ORTHOPAEDIC SURGERY

## 2023-01-01 PROCEDURE — 250N000011 HC RX IP 250 OP 636: Mod: JZ | Performed by: PHYSICIAN ASSISTANT

## 2023-01-01 PROCEDURE — 85027 COMPLETE CBC AUTOMATED: CPT | Performed by: PHYSICIAN ASSISTANT

## 2023-01-01 PROCEDURE — 97605 NEG PRS WND THER DME<=50SQCM: CPT

## 2023-01-01 RX ORDER — NALOXONE HYDROCHLORIDE 0.4 MG/ML
0.4 INJECTION, SOLUTION INTRAMUSCULAR; INTRAVENOUS; SUBCUTANEOUS
Status: DISCONTINUED | OUTPATIENT
Start: 2023-01-01 | End: 2023-01-01 | Stop reason: HOSPADM

## 2023-01-01 RX ORDER — MAGNESIUM SULFATE HEPTAHYDRATE 40 MG/ML
2 INJECTION, SOLUTION INTRAVENOUS
Status: DISCONTINUED | OUTPATIENT
Start: 2023-01-01 | End: 2023-01-01 | Stop reason: HOSPADM

## 2023-01-01 RX ORDER — BUPIVACAINE HYDROCHLORIDE AND EPINEPHRINE 5; 5 MG/ML; UG/ML
INJECTION, SOLUTION PERINEURAL PRN
Status: DISCONTINUED | OUTPATIENT
Start: 2023-01-01 | End: 2023-01-01

## 2023-01-01 RX ORDER — HYDROMORPHONE HCL IN WATER/PF 6 MG/30 ML
0.2 PATIENT CONTROLLED ANALGESIA SYRINGE INTRAVENOUS
Status: DISCONTINUED | OUTPATIENT
Start: 2023-01-01 | End: 2023-01-01 | Stop reason: HOSPADM

## 2023-01-01 RX ORDER — OXYCODONE HYDROCHLORIDE 5 MG/1
5-10 TABLET ORAL EVERY 4 HOURS PRN
Qty: 20 TABLET | Refills: 0 | Status: SHIPPED | OUTPATIENT
Start: 2023-01-01 | End: 2023-01-01

## 2023-01-01 RX ORDER — DEXAMETHASONE SODIUM PHOSPHATE 4 MG/ML
INJECTION, SOLUTION INTRA-ARTICULAR; INTRALESIONAL; INTRAMUSCULAR; INTRAVENOUS; SOFT TISSUE PRN
Status: DISCONTINUED | OUTPATIENT
Start: 2023-01-01 | End: 2023-01-01

## 2023-01-01 RX ORDER — ACETAMINOPHEN 325 MG/1
650 TABLET ORAL EVERY 4 HOURS PRN
Qty: 100 TABLET | Refills: 0 | Status: SHIPPED | OUTPATIENT
Start: 2023-01-01

## 2023-01-01 RX ORDER — ALBUTEROL SULFATE 0.83 MG/ML
2.5 SOLUTION RESPIRATORY (INHALATION) EVERY 4 HOURS PRN
Status: DISCONTINUED | OUTPATIENT
Start: 2023-01-01 | End: 2023-01-01 | Stop reason: HOSPADM

## 2023-01-01 RX ORDER — ACETAMINOPHEN 325 MG/1
650 TABLET ORAL EVERY 4 HOURS PRN
Status: DISCONTINUED | OUTPATIENT
Start: 2023-01-01 | End: 2023-01-01 | Stop reason: HOSPADM

## 2023-01-01 RX ORDER — HYDROMORPHONE HCL IN WATER/PF 6 MG/30 ML
0.2 PATIENT CONTROLLED ANALGESIA SYRINGE INTRAVENOUS EVERY 5 MIN PRN
Status: DISCONTINUED | OUTPATIENT
Start: 2023-01-01 | End: 2023-01-01 | Stop reason: HOSPADM

## 2023-01-01 RX ORDER — SODIUM CHLORIDE 9 MG/ML
INJECTION, SOLUTION INTRAVENOUS ONCE
Status: COMPLETED | OUTPATIENT
Start: 2023-01-01 | End: 2023-01-01

## 2023-01-01 RX ORDER — METOPROLOL SUCCINATE 50 MG/1
50 TABLET, EXTENDED RELEASE ORAL DAILY
Status: DISCONTINUED | OUTPATIENT
Start: 2023-01-01 | End: 2023-01-01 | Stop reason: HOSPADM

## 2023-01-01 RX ORDER — LIDOCAINE HYDROCHLORIDE 20 MG/ML
INJECTION, SOLUTION INFILTRATION; PERINEURAL PRN
Status: DISCONTINUED | OUTPATIENT
Start: 2023-01-01 | End: 2023-01-01

## 2023-01-01 RX ORDER — METOPROLOL SUCCINATE 50 MG/1
50 TABLET, EXTENDED RELEASE ORAL DAILY
Qty: 90 TABLET | Refills: 1 | Status: SHIPPED | OUTPATIENT
Start: 2023-01-01

## 2023-01-01 RX ORDER — IPRATROPIUM BROMIDE AND ALBUTEROL SULFATE 2.5; .5 MG/3ML; MG/3ML
3 SOLUTION RESPIRATORY (INHALATION)
Status: DISCONTINUED | OUTPATIENT
Start: 2023-01-01 | End: 2023-01-01

## 2023-01-01 RX ORDER — FENTANYL CITRATE 50 UG/ML
25 INJECTION, SOLUTION INTRAMUSCULAR; INTRAVENOUS EVERY 5 MIN PRN
Status: DISCONTINUED | OUTPATIENT
Start: 2023-01-01 | End: 2023-01-01 | Stop reason: HOSPADM

## 2023-01-01 RX ORDER — LIDOCAINE 40 MG/G
CREAM TOPICAL
Status: DISCONTINUED | OUTPATIENT
Start: 2023-01-01 | End: 2023-01-01 | Stop reason: HOSPADM

## 2023-01-01 RX ORDER — SODIUM CHLORIDE 9 MG/ML
INJECTION, SOLUTION INTRAVENOUS CONTINUOUS
Status: DISCONTINUED | OUTPATIENT
Start: 2023-01-01 | End: 2023-01-01

## 2023-01-01 RX ORDER — CALCIUM GLUCONATE 94 MG/ML
1 INJECTION, SOLUTION INTRAVENOUS ONCE
Status: COMPLETED | OUTPATIENT
Start: 2023-01-01 | End: 2023-01-01

## 2023-01-01 RX ORDER — HYDRALAZINE HYDROCHLORIDE 20 MG/ML
2.5-5 INJECTION INTRAMUSCULAR; INTRAVENOUS EVERY 10 MIN PRN
Status: DISCONTINUED | OUTPATIENT
Start: 2023-01-01 | End: 2023-01-01 | Stop reason: HOSPADM

## 2023-01-01 RX ORDER — KETOROLAC TROMETHAMINE 15 MG/ML
15 INJECTION, SOLUTION INTRAMUSCULAR; INTRAVENOUS
Status: DISCONTINUED | OUTPATIENT
Start: 2023-01-01 | End: 2023-01-01 | Stop reason: HOSPADM

## 2023-01-01 RX ORDER — CIPROFLOXACIN 500 MG/1
1 TABLET, FILM COATED ORAL 2 TIMES DAILY
COMMUNITY
Start: 2023-01-01 | End: 2023-01-01

## 2023-01-01 RX ORDER — GABAPENTIN 300 MG/1
300 CAPSULE ORAL 3 TIMES DAILY
Status: DISCONTINUED | OUTPATIENT
Start: 2023-01-01 | End: 2023-01-01 | Stop reason: HOSPADM

## 2023-01-01 RX ORDER — ONDANSETRON 2 MG/ML
4 INJECTION INTRAMUSCULAR; INTRAVENOUS EVERY 30 MIN PRN
Status: DISCONTINUED | OUTPATIENT
Start: 2023-01-01 | End: 2023-01-01 | Stop reason: HOSPADM

## 2023-01-01 RX ORDER — ONDANSETRON 4 MG/1
4 TABLET, ORALLY DISINTEGRATING ORAL EVERY 6 HOURS PRN
Status: DISCONTINUED | OUTPATIENT
Start: 2023-01-01 | End: 2023-01-01 | Stop reason: HOSPADM

## 2023-01-01 RX ORDER — ACETAMINOPHEN 325 MG/1
650 TABLET ORAL EVERY 6 HOURS PRN
Status: DISCONTINUED | OUTPATIENT
Start: 2023-01-01 | End: 2023-01-01 | Stop reason: HOSPADM

## 2023-01-01 RX ORDER — SODIUM CHLORIDE, SODIUM LACTATE, POTASSIUM CHLORIDE, CALCIUM CHLORIDE 600; 310; 30; 20 MG/100ML; MG/100ML; MG/100ML; MG/100ML
INJECTION, SOLUTION INTRAVENOUS CONTINUOUS
Status: DISCONTINUED | OUTPATIENT
Start: 2023-01-01 | End: 2023-01-01 | Stop reason: HOSPADM

## 2023-01-01 RX ORDER — ONDANSETRON 2 MG/ML
INJECTION INTRAMUSCULAR; INTRAVENOUS PRN
Status: DISCONTINUED | OUTPATIENT
Start: 2023-01-01 | End: 2023-01-01

## 2023-01-01 RX ORDER — GLYCOPYRROLATE 0.2 MG/ML
INJECTION, SOLUTION INTRAMUSCULAR; INTRAVENOUS PRN
Status: DISCONTINUED | OUTPATIENT
Start: 2023-01-01 | End: 2023-01-01

## 2023-01-01 RX ORDER — OXYCODONE HYDROCHLORIDE 5 MG/1
10 TABLET ORAL
Status: DISCONTINUED | OUTPATIENT
Start: 2023-01-01 | End: 2023-01-01 | Stop reason: HOSPADM

## 2023-01-01 RX ORDER — DEXTROSE MONOHYDRATE 25 G/50ML
25-50 INJECTION, SOLUTION INTRAVENOUS
Status: DISCONTINUED | OUTPATIENT
Start: 2023-01-01 | End: 2023-01-01 | Stop reason: HOSPADM

## 2023-01-01 RX ORDER — CARBIDOPA AND LEVODOPA 25; 100 MG/1; MG/1
2 TABLET ORAL 3 TIMES DAILY
Status: DISCONTINUED | OUTPATIENT
Start: 2023-01-01 | End: 2023-01-01

## 2023-01-01 RX ORDER — ONDANSETRON 4 MG/1
4 TABLET, ORALLY DISINTEGRATING ORAL EVERY 30 MIN PRN
Status: DISCONTINUED | OUTPATIENT
Start: 2023-01-01 | End: 2023-01-01 | Stop reason: HOSPADM

## 2023-01-01 RX ORDER — ONDANSETRON 2 MG/ML
4 INJECTION INTRAMUSCULAR; INTRAVENOUS EVERY 6 HOURS PRN
Status: DISCONTINUED | OUTPATIENT
Start: 2023-01-01 | End: 2023-01-01

## 2023-01-01 RX ORDER — ROSUVASTATIN CALCIUM 20 MG/1
40 TABLET, COATED ORAL DAILY
Status: DISCONTINUED | OUTPATIENT
Start: 2023-01-01 | End: 2023-01-01 | Stop reason: HOSPADM

## 2023-01-01 RX ORDER — FLUTICASONE FUROATE AND VILANTEROL 200; 25 UG/1; UG/1
1 POWDER RESPIRATORY (INHALATION) DAILY
Status: DISCONTINUED | OUTPATIENT
Start: 2023-01-01 | End: 2023-01-01

## 2023-01-01 RX ORDER — LIDOCAINE HYDROCHLORIDE 40 MG/ML
SOLUTION TOPICAL PRN
Status: DISCONTINUED | OUTPATIENT
Start: 2023-01-01 | End: 2023-01-01

## 2023-01-01 RX ORDER — CARBIDOPA AND LEVODOPA 25; 100 MG/1; MG/1
2 TABLET ORAL 3 TIMES DAILY
Status: COMPLETED | OUTPATIENT
Start: 2023-01-01 | End: 2023-01-01

## 2023-01-01 RX ORDER — METHOCARBAMOL 750 MG/1
750 TABLET, FILM COATED ORAL EVERY 6 HOURS PRN
Status: DISCONTINUED | OUTPATIENT
Start: 2023-01-01 | End: 2023-01-01 | Stop reason: HOSPADM

## 2023-01-01 RX ORDER — AMOXICILLIN 250 MG
1 CAPSULE ORAL 2 TIMES DAILY
Status: DISCONTINUED | OUTPATIENT
Start: 2023-01-01 | End: 2023-01-01 | Stop reason: HOSPADM

## 2023-01-01 RX ORDER — ASPIRIN 325 MG
325 TABLET, DELAYED RELEASE (ENTERIC COATED) ORAL DAILY
Status: DISCONTINUED | OUTPATIENT
Start: 2023-01-01 | End: 2023-01-01 | Stop reason: HOSPADM

## 2023-01-01 RX ORDER — SODIUM CHLORIDE, SODIUM LACTATE, POTASSIUM CHLORIDE, CALCIUM CHLORIDE 600; 310; 30; 20 MG/100ML; MG/100ML; MG/100ML; MG/100ML
INJECTION, SOLUTION INTRAVENOUS CONTINUOUS
Status: DISCONTINUED | OUTPATIENT
Start: 2023-01-01 | End: 2023-01-01

## 2023-01-01 RX ORDER — OXYCODONE HYDROCHLORIDE 10 MG/1
10 TABLET ORAL EVERY 4 HOURS PRN
Status: DISCONTINUED | OUTPATIENT
Start: 2023-01-01 | End: 2023-01-01 | Stop reason: HOSPADM

## 2023-01-01 RX ORDER — POLYETHYLENE GLYCOL 3350 17 G/17G
17 POWDER, FOR SOLUTION ORAL DAILY
Status: DISCONTINUED | OUTPATIENT
Start: 2023-01-01 | End: 2023-01-01

## 2023-01-01 RX ORDER — HYDROXYZINE HYDROCHLORIDE 25 MG/1
25 TABLET, FILM COATED ORAL EVERY 6 HOURS PRN
Qty: 30 TABLET | Refills: 0 | Status: ON HOLD | OUTPATIENT
Start: 2023-01-01 | End: 2023-01-01

## 2023-01-01 RX ORDER — HALOPERIDOL 5 MG/ML
1 INJECTION INTRAMUSCULAR
Status: DISCONTINUED | OUTPATIENT
Start: 2023-01-01 | End: 2023-01-01 | Stop reason: HOSPADM

## 2023-01-01 RX ORDER — PROPOFOL 10 MG/ML
INJECTION, EMULSION INTRAVENOUS PRN
Status: DISCONTINUED | OUTPATIENT
Start: 2023-01-01 | End: 2023-01-01

## 2023-01-01 RX ORDER — OXYCODONE HYDROCHLORIDE 5 MG/1
5 TABLET ORAL
Status: DISCONTINUED | OUTPATIENT
Start: 2023-01-01 | End: 2023-01-01 | Stop reason: HOSPADM

## 2023-01-01 RX ORDER — ACETAMINOPHEN 325 MG/1
975 TABLET ORAL EVERY 8 HOURS
Status: COMPLETED | OUTPATIENT
Start: 2023-01-01 | End: 2023-01-01

## 2023-01-01 RX ORDER — TOLVAPTAN 15 MG/1
15 TABLET ORAL DAILY
Status: DISCONTINUED | OUTPATIENT
Start: 2023-01-01 | End: 2023-01-01

## 2023-01-01 RX ORDER — CIPROFLOXACIN 500 MG/1
500 TABLET, FILM COATED ORAL 2 TIMES DAILY
Qty: 28 TABLET | Refills: 0 | Status: SHIPPED | OUTPATIENT
Start: 2023-01-01

## 2023-01-01 RX ORDER — CEFAZOLIN SODIUM 2 G/100ML
2 INJECTION, SOLUTION INTRAVENOUS EVERY 8 HOURS
Status: COMPLETED | OUTPATIENT
Start: 2023-01-01 | End: 2023-01-01

## 2023-01-01 RX ORDER — NALOXONE HYDROCHLORIDE 0.4 MG/ML
0.2 INJECTION, SOLUTION INTRAMUSCULAR; INTRAVENOUS; SUBCUTANEOUS
Status: DISCONTINUED | OUTPATIENT
Start: 2023-01-01 | End: 2023-01-01 | Stop reason: HOSPADM

## 2023-01-01 RX ORDER — OXYCODONE HYDROCHLORIDE 5 MG/1
5 TABLET ORAL
COMMUNITY
Start: 2023-01-01 | End: 2023-01-01

## 2023-01-01 RX ORDER — ROSUVASTATIN CALCIUM 40 MG/1
40 TABLET, COATED ORAL DAILY
Qty: 90 TABLET | Refills: 1 | Status: SHIPPED | OUTPATIENT
Start: 2023-01-01

## 2023-01-01 RX ORDER — SODIUM CHLORIDE 1 G/1
2 TABLET ORAL
Status: DISCONTINUED | OUTPATIENT
Start: 2023-01-01 | End: 2023-01-01 | Stop reason: HOSPADM

## 2023-01-01 RX ORDER — OXYCODONE HYDROCHLORIDE 5 MG/1
5 TABLET ORAL EVERY 4 HOURS PRN
Status: DISCONTINUED | OUTPATIENT
Start: 2023-01-01 | End: 2023-01-01 | Stop reason: HOSPADM

## 2023-01-01 RX ORDER — HYDROMORPHONE HCL IN WATER/PF 6 MG/30 ML
0.4 PATIENT CONTROLLED ANALGESIA SYRINGE INTRAVENOUS EVERY 5 MIN PRN
Status: DISCONTINUED | OUTPATIENT
Start: 2023-01-01 | End: 2023-01-01 | Stop reason: HOSPADM

## 2023-01-01 RX ORDER — GABAPENTIN 100 MG/1
100 CAPSULE ORAL 3 TIMES DAILY
Status: DISCONTINUED | OUTPATIENT
Start: 2023-01-01 | End: 2023-01-01

## 2023-01-01 RX ORDER — BISACODYL 10 MG
10 SUPPOSITORY, RECTAL RECTAL DAILY PRN
Status: DISCONTINUED | OUTPATIENT
Start: 2023-01-01 | End: 2023-01-01 | Stop reason: HOSPADM

## 2023-01-01 RX ORDER — ACETAMINOPHEN 325 MG/1
650 TABLET ORAL EVERY 4 HOURS PRN
Status: DISCONTINUED | OUTPATIENT
Start: 2023-01-01 | End: 2023-01-01

## 2023-01-01 RX ORDER — AMOXICILLIN 250 MG
1 CAPSULE ORAL 2 TIMES DAILY PRN
Qty: 30 TABLET | Refills: 0 | Status: SHIPPED | OUTPATIENT
Start: 2023-01-01

## 2023-01-01 RX ORDER — DEXTROSE MONOHYDRATE 25 G/50ML
25 INJECTION, SOLUTION INTRAVENOUS ONCE
Status: COMPLETED | OUTPATIENT
Start: 2023-01-01 | End: 2023-01-01

## 2023-01-01 RX ORDER — DIPHENHYDRAMINE HCL 25 MG
25 CAPSULE ORAL EVERY 6 HOURS PRN
Status: DISCONTINUED | OUTPATIENT
Start: 2023-01-01 | End: 2023-01-01 | Stop reason: HOSPADM

## 2023-01-01 RX ORDER — SODIUM CHLORIDE 1 G/1
2 TABLET ORAL 2 TIMES DAILY WITH MEALS
Qty: 60 TABLET | Refills: 0 | Status: SHIPPED | OUTPATIENT
Start: 2023-01-01

## 2023-01-01 RX ORDER — SODIUM CHLORIDE, SODIUM LACTATE, POTASSIUM CHLORIDE, CALCIUM CHLORIDE 600; 310; 30; 20 MG/100ML; MG/100ML; MG/100ML; MG/100ML
INJECTION, SOLUTION INTRAVENOUS CONTINUOUS
Status: DISCONTINUED | OUTPATIENT
Start: 2023-01-01 | End: 2023-01-01 | Stop reason: ALTCHOICE

## 2023-01-01 RX ORDER — LABETALOL HYDROCHLORIDE 5 MG/ML
10 INJECTION, SOLUTION INTRAVENOUS
Status: COMPLETED | OUTPATIENT
Start: 2023-01-01 | End: 2023-01-01

## 2023-01-01 RX ORDER — AMOXICILLIN 250 MG
1 CAPSULE ORAL 2 TIMES DAILY
Status: DISCONTINUED | OUTPATIENT
Start: 2023-01-01 | End: 2023-01-01

## 2023-01-01 RX ORDER — PROCHLORPERAZINE MALEATE 5 MG
10 TABLET ORAL EVERY 6 HOURS PRN
Status: DISCONTINUED | OUTPATIENT
Start: 2023-01-01 | End: 2023-01-01 | Stop reason: HOSPADM

## 2023-01-01 RX ORDER — AMOXICILLIN 250 MG
1 CAPSULE ORAL 2 TIMES DAILY PRN
Status: DISCONTINUED | OUTPATIENT
Start: 2023-01-01 | End: 2023-01-01 | Stop reason: HOSPADM

## 2023-01-01 RX ORDER — IBUPROFEN 600 MG/1
600 TABLET, FILM COATED ORAL EVERY 6 HOURS PRN
Status: DISCONTINUED | OUTPATIENT
Start: 2023-01-01 | End: 2023-01-01 | Stop reason: HOSPADM

## 2023-01-01 RX ORDER — METOPROLOL SUCCINATE 50 MG/1
50 TABLET, EXTENDED RELEASE ORAL DAILY
COMMUNITY
Start: 2023-01-01

## 2023-01-01 RX ORDER — DIMENHYDRINATE 50 MG/ML
25 INJECTION, SOLUTION INTRAMUSCULAR; INTRAVENOUS
Status: DISCONTINUED | OUTPATIENT
Start: 2023-01-01 | End: 2023-01-01 | Stop reason: HOSPADM

## 2023-01-01 RX ORDER — CARBIDOPA AND LEVODOPA 25; 100 MG/1; MG/1
2 TABLET ORAL 3 TIMES DAILY
Status: DISCONTINUED | OUTPATIENT
Start: 2023-01-01 | End: 2023-01-01 | Stop reason: HOSPADM

## 2023-01-01 RX ORDER — ACETAMINOPHEN 325 MG/1
650 TABLET ORAL EVERY 4 HOURS PRN
Qty: 100 TABLET | Refills: 0 | Status: ON HOLD | OUTPATIENT
Start: 2023-01-01 | End: 2023-01-01

## 2023-01-01 RX ORDER — HYDRALAZINE HYDROCHLORIDE 20 MG/ML
10 INJECTION INTRAMUSCULAR; INTRAVENOUS EVERY 4 HOURS PRN
Status: DISCONTINUED | OUTPATIENT
Start: 2023-01-01 | End: 2023-01-01 | Stop reason: HOSPADM

## 2023-01-01 RX ORDER — IPRATROPIUM BROMIDE AND ALBUTEROL SULFATE 2.5; .5 MG/3ML; MG/3ML
3 SOLUTION RESPIRATORY (INHALATION) EVERY 4 HOURS PRN
Status: DISCONTINUED | OUTPATIENT
Start: 2023-01-01 | End: 2023-01-01 | Stop reason: HOSPADM

## 2023-01-01 RX ORDER — OXYCODONE HYDROCHLORIDE 5 MG/1
5 TABLET ORAL EVERY 4 HOURS PRN
Status: DISCONTINUED | OUTPATIENT
Start: 2023-01-01 | End: 2023-01-01

## 2023-01-01 RX ORDER — HYDROMORPHONE HYDROCHLORIDE 2 MG/1
2 TABLET ORAL ONCE
Status: COMPLETED | OUTPATIENT
Start: 2023-01-01 | End: 2023-01-01

## 2023-01-01 RX ORDER — ALBUTEROL SULFATE 90 UG/1
2 AEROSOL, METERED RESPIRATORY (INHALATION) EVERY 6 HOURS PRN
Qty: 18 G | Refills: 0 | Status: SHIPPED | OUTPATIENT
Start: 2023-01-01

## 2023-01-01 RX ORDER — HYDROMORPHONE HCL IN WATER/PF 6 MG/30 ML
0.4 PATIENT CONTROLLED ANALGESIA SYRINGE INTRAVENOUS
Status: DISCONTINUED | OUTPATIENT
Start: 2023-01-01 | End: 2023-01-01 | Stop reason: HOSPADM

## 2023-01-01 RX ORDER — AMOXICILLIN 250 MG
2 CAPSULE ORAL 2 TIMES DAILY
Status: DISCONTINUED | OUTPATIENT
Start: 2023-01-01 | End: 2023-01-01 | Stop reason: HOSPADM

## 2023-01-01 RX ORDER — IPRATROPIUM BROMIDE AND ALBUTEROL SULFATE 2.5; .5 MG/3ML; MG/3ML
3 SOLUTION RESPIRATORY (INHALATION) EVERY 4 HOURS PRN
Status: DISCONTINUED | OUTPATIENT
Start: 2023-01-01 | End: 2023-01-01

## 2023-01-01 RX ORDER — FENTANYL CITRATE 50 UG/ML
50 INJECTION, SOLUTION INTRAMUSCULAR; INTRAVENOUS EVERY 5 MIN PRN
Status: DISCONTINUED | OUTPATIENT
Start: 2023-01-01 | End: 2023-01-01 | Stop reason: HOSPADM

## 2023-01-01 RX ORDER — TRANEXAMIC ACID 10 MG/ML
1 INJECTION, SOLUTION INTRAVENOUS ONCE
Status: COMPLETED | OUTPATIENT
Start: 2023-01-01 | End: 2023-01-01

## 2023-01-01 RX ORDER — LIDOCAINE HCL/EPINEPHRINE/PF 2%-1:200K
VIAL (ML) INJECTION PRN
Status: DISCONTINUED | OUTPATIENT
Start: 2023-01-01 | End: 2023-01-01

## 2023-01-01 RX ORDER — CEFAZOLIN SODIUM 1 G/3ML
1 INJECTION, POWDER, FOR SOLUTION INTRAMUSCULAR; INTRAVENOUS EVERY 8 HOURS
Status: COMPLETED | OUTPATIENT
Start: 2023-01-01 | End: 2023-01-01

## 2023-01-01 RX ORDER — ACETAMINOPHEN 650 MG/1
650 SUPPOSITORY RECTAL EVERY 6 HOURS PRN
Status: DISCONTINUED | OUTPATIENT
Start: 2023-01-01 | End: 2023-01-01 | Stop reason: HOSPADM

## 2023-01-01 RX ORDER — LISINOPRIL 40 MG/1
40 TABLET ORAL DAILY
COMMUNITY
Start: 2023-01-01

## 2023-01-01 RX ORDER — OXYCODONE HYDROCHLORIDE 5 MG/1
5 TABLET ORAL EVERY 4 HOURS PRN
Qty: 18 TABLET | Refills: 0 | Status: SHIPPED | OUTPATIENT
Start: 2023-01-01

## 2023-01-01 RX ORDER — TRAMADOL HYDROCHLORIDE 50 MG/1
50 TABLET ORAL EVERY 12 HOURS PRN
COMMUNITY
Start: 2023-01-01 | End: 2023-01-01

## 2023-01-01 RX ORDER — POLYETHYLENE GLYCOL 3350 17 G/17G
17 POWDER, FOR SOLUTION ORAL DAILY
Status: DISCONTINUED | OUTPATIENT
Start: 2023-01-01 | End: 2023-01-01 | Stop reason: HOSPADM

## 2023-01-01 RX ORDER — LISINOPRIL 40 MG/1
40 TABLET ORAL DAILY
Qty: 90 TABLET | Refills: 1 | Status: SHIPPED | OUTPATIENT
Start: 2023-01-01

## 2023-01-01 RX ORDER — LIDOCAINE 40 MG/G
CREAM TOPICAL
Status: DISCONTINUED | OUTPATIENT
Start: 2023-01-01 | End: 2023-01-01

## 2023-01-01 RX ORDER — DIPHENHYDRAMINE HYDROCHLORIDE 50 MG/ML
25 INJECTION INTRAMUSCULAR; INTRAVENOUS EVERY 6 HOURS PRN
Status: DISCONTINUED | OUTPATIENT
Start: 2023-01-01 | End: 2023-01-01 | Stop reason: HOSPADM

## 2023-01-01 RX ORDER — ALBUTEROL SULFATE 90 UG/1
2 AEROSOL, METERED RESPIRATORY (INHALATION) EVERY 6 HOURS PRN
Status: DISCONTINUED | OUTPATIENT
Start: 2023-01-01 | End: 2023-01-01 | Stop reason: HOSPADM

## 2023-01-01 RX ORDER — NICOTINE POLACRILEX 4 MG
15-30 LOZENGE BUCCAL
Status: DISCONTINUED | OUTPATIENT
Start: 2023-01-01 | End: 2023-01-01 | Stop reason: HOSPADM

## 2023-01-01 RX ORDER — ONDANSETRON 4 MG/1
4 TABLET, ORALLY DISINTEGRATING ORAL EVERY 6 HOURS PRN
Status: DISCONTINUED | OUTPATIENT
Start: 2023-01-01 | End: 2023-01-01

## 2023-01-01 RX ORDER — CEFAZOLIN SODIUM 2 G/100ML
2 INJECTION, SOLUTION INTRAVENOUS ONCE
Status: COMPLETED | OUTPATIENT
Start: 2023-01-01 | End: 2023-01-01

## 2023-01-01 RX ORDER — ASPIRIN 325 MG
325 TABLET, DELAYED RELEASE (ENTERIC COATED) ORAL DAILY
Qty: 30 TABLET | Refills: 0 | Status: ON HOLD | OUTPATIENT
Start: 2023-01-01 | End: 2023-01-01

## 2023-01-01 RX ORDER — CALCIUM CARBONATE 500 MG/1
500 TABLET, CHEWABLE ORAL 3 TIMES DAILY PRN
Status: DISCONTINUED | OUTPATIENT
Start: 2023-01-01 | End: 2023-01-01 | Stop reason: HOSPADM

## 2023-01-01 RX ORDER — ROSUVASTATIN CALCIUM 40 MG/1
40 TABLET, COATED ORAL DAILY
Qty: 90 TABLET | Refills: 0 | COMMUNITY
Start: 2023-01-01

## 2023-01-01 RX ORDER — HYDROXYZINE HYDROCHLORIDE 25 MG/1
25 TABLET, FILM COATED ORAL EVERY 6 HOURS PRN
Status: DISCONTINUED | OUTPATIENT
Start: 2023-01-01 | End: 2023-01-01 | Stop reason: HOSPADM

## 2023-01-01 RX ORDER — LABETALOL HYDROCHLORIDE 5 MG/ML
10 INJECTION, SOLUTION INTRAVENOUS
Status: DISCONTINUED | OUTPATIENT
Start: 2023-01-01 | End: 2023-01-01 | Stop reason: HOSPADM

## 2023-01-01 RX ORDER — LISINOPRIL 40 MG/1
40 TABLET ORAL DAILY
Qty: 90 TABLET | Refills: 0 | COMMUNITY
Start: 2023-01-01

## 2023-01-01 RX ORDER — ONDANSETRON 2 MG/ML
4 INJECTION INTRAMUSCULAR; INTRAVENOUS EVERY 6 HOURS PRN
Status: DISCONTINUED | OUTPATIENT
Start: 2023-01-01 | End: 2023-01-01 | Stop reason: HOSPADM

## 2023-01-01 RX ORDER — POLYETHYLENE GLYCOL 3350 17 G/17G
17 POWDER, FOR SOLUTION ORAL DAILY PRN
Status: DISCONTINUED | OUTPATIENT
Start: 2023-01-01 | End: 2023-01-01 | Stop reason: HOSPADM

## 2023-01-01 RX ORDER — ASPIRIN 325 MG
325 TABLET, DELAYED RELEASE (ENTERIC COATED) ORAL DAILY
Qty: 42 TABLET | Refills: 0 | Status: SHIPPED | OUTPATIENT
Start: 2023-01-01

## 2023-01-01 RX ORDER — METOPROLOL SUCCINATE 50 MG/1
50 TABLET, EXTENDED RELEASE ORAL DAILY
Qty: 90 TABLET | Refills: 0 | COMMUNITY
Start: 2023-01-01

## 2023-01-01 RX ORDER — ACETAMINOPHEN 500 MG
500-1000 TABLET ORAL EVERY 6 HOURS PRN
Status: ON HOLD | COMMUNITY
End: 2023-01-01

## 2023-01-01 RX ORDER — CEFAZOLIN SODIUM/WATER 2 G/20 ML
2 SYRINGE (ML) INTRAVENOUS SEE ADMIN INSTRUCTIONS
Status: DISCONTINUED | OUTPATIENT
Start: 2023-01-01 | End: 2023-01-01 | Stop reason: HOSPADM

## 2023-01-01 RX ORDER — CARBIDOPA AND LEVODOPA 25; 100 MG/1; MG/1
2 TABLET ORAL ONCE
Status: COMPLETED | OUTPATIENT
Start: 2023-01-01 | End: 2023-01-01

## 2023-01-01 RX ORDER — FENTANYL CITRATE 50 UG/ML
INJECTION, SOLUTION INTRAMUSCULAR; INTRAVENOUS PRN
Status: DISCONTINUED | OUTPATIENT
Start: 2023-01-01 | End: 2023-01-01

## 2023-01-01 RX ORDER — OXYCODONE HYDROCHLORIDE 5 MG/1
5 TABLET ORAL EVERY 6 HOURS PRN
Status: ON HOLD | COMMUNITY
Start: 2023-01-01 | End: 2023-01-01

## 2023-01-01 RX ORDER — ACETAMINOPHEN 325 MG/1
975 TABLET ORAL ONCE
Status: COMPLETED | OUTPATIENT
Start: 2023-01-01 | End: 2023-01-01

## 2023-01-01 RX ORDER — SODIUM CHLORIDE 1 G/1
2 TABLET ORAL
Status: DISCONTINUED | OUTPATIENT
Start: 2023-01-01 | End: 2023-01-01 | Stop reason: ALTCHOICE

## 2023-01-01 RX ORDER — HYDROMORPHONE HYDROCHLORIDE 1 MG/ML
0.5 INJECTION, SOLUTION INTRAMUSCULAR; INTRAVENOUS; SUBCUTANEOUS EVERY 5 MIN PRN
Status: DISCONTINUED | OUTPATIENT
Start: 2023-01-01 | End: 2023-01-01 | Stop reason: HOSPADM

## 2023-01-01 RX ORDER — OXYCODONE HYDROCHLORIDE 5 MG/1
5-10 TABLET ORAL EVERY 4 HOURS PRN
Qty: 20 TABLET | Refills: 0 | Status: ON HOLD | OUTPATIENT
Start: 2023-01-01 | End: 2023-01-01

## 2023-01-01 RX ORDER — GABAPENTIN 300 MG/1
300 CAPSULE ORAL 3 TIMES DAILY
Qty: 90 CAPSULE | Refills: 0 | Status: SHIPPED | OUTPATIENT
Start: 2023-01-01

## 2023-01-01 RX ORDER — ROSUVASTATIN CALCIUM 40 MG/1
40 TABLET, COATED ORAL DAILY
COMMUNITY
Start: 2023-01-01

## 2023-01-01 RX ORDER — HYDROMORPHONE HCL IN WATER/PF 6 MG/30 ML
0.2 PATIENT CONTROLLED ANALGESIA SYRINGE INTRAVENOUS
Status: DISCONTINUED | OUTPATIENT
Start: 2023-01-01 | End: 2023-01-01

## 2023-01-01 RX ORDER — DIAZEPAM 10 MG/2ML
2.5 INJECTION, SOLUTION INTRAMUSCULAR; INTRAVENOUS
Status: DISCONTINUED | OUTPATIENT
Start: 2023-01-01 | End: 2023-01-01 | Stop reason: HOSPADM

## 2023-01-01 RX ORDER — CALCIUM CARBONATE 500 MG/1
500 TABLET, CHEWABLE ORAL DAILY PRN
Status: DISCONTINUED | OUTPATIENT
Start: 2023-01-01 | End: 2023-01-01 | Stop reason: HOSPADM

## 2023-01-01 RX ORDER — CEFAZOLIN SODIUM/WATER 2 G/20 ML
2 SYRINGE (ML) INTRAVENOUS
Status: COMPLETED | OUTPATIENT
Start: 2023-01-01 | End: 2023-01-01

## 2023-01-01 RX ORDER — KETOROLAC TROMETHAMINE 30 MG/ML
INJECTION, SOLUTION INTRAMUSCULAR; INTRAVENOUS PRN
Status: DISCONTINUED | OUTPATIENT
Start: 2023-01-01 | End: 2023-01-01

## 2023-01-01 RX ORDER — ROSUVASTATIN CALCIUM 20 MG/1
40 TABLET, COATED ORAL EVERY EVENING
Status: DISCONTINUED | OUTPATIENT
Start: 2023-01-01 | End: 2023-01-01 | Stop reason: HOSPADM

## 2023-01-01 RX ORDER — MAGNESIUM HYDROXIDE 1200 MG/15ML
LIQUID ORAL PRN
Status: DISCONTINUED | OUTPATIENT
Start: 2023-01-01 | End: 2023-01-01 | Stop reason: HOSPADM

## 2023-01-01 RX ORDER — IBUPROFEN 600 MG/1
600 TABLET, FILM COATED ORAL EVERY 6 HOURS PRN
Qty: 30 TABLET | Refills: 0 | Status: ON HOLD | OUTPATIENT
Start: 2023-01-01 | End: 2023-01-01

## 2023-01-01 RX ORDER — IOPAMIDOL 755 MG/ML
500 INJECTION, SOLUTION INTRAVASCULAR ONCE
Status: COMPLETED | OUTPATIENT
Start: 2023-01-01 | End: 2023-01-01

## 2023-01-01 RX ORDER — LISINOPRIL 40 MG/1
40 TABLET ORAL DAILY
Status: DISCONTINUED | OUTPATIENT
Start: 2023-01-01 | End: 2023-01-01 | Stop reason: HOSPADM

## 2023-01-01 RX ADMIN — ACETAMINOPHEN 650 MG: 325 TABLET ORAL at 06:22

## 2023-01-01 RX ADMIN — METHOCARBAMOL 750 MG: 750 TABLET ORAL at 17:59

## 2023-01-01 RX ADMIN — LISINOPRIL 40 MG: 40 TABLET ORAL at 11:22

## 2023-01-01 RX ADMIN — SODIUM CHLORIDE, POTASSIUM CHLORIDE, SODIUM LACTATE AND CALCIUM CHLORIDE: 600; 310; 30; 20 INJECTION, SOLUTION INTRAVENOUS at 15:00

## 2023-01-01 RX ADMIN — CARBIDOPA AND LEVODOPA 2 TABLET: 25; 100 TABLET ORAL at 04:05

## 2023-01-01 RX ADMIN — GABAPENTIN 300 MG: 300 CAPSULE ORAL at 08:34

## 2023-01-01 RX ADMIN — LIDOCAINE HYDROCHLORIDE,EPINEPHRINE BITARTRATE 10 ML: 20; .005 INJECTION, SOLUTION EPIDURAL; INFILTRATION; INTRACAUDAL; PERINEURAL at 14:40

## 2023-01-01 RX ADMIN — GABAPENTIN 300 MG: 300 CAPSULE ORAL at 21:04

## 2023-01-01 RX ADMIN — ASPIRIN 325 MG: 325 TABLET, COATED ORAL at 08:22

## 2023-01-01 RX ADMIN — CARBIDOPA AND LEVODOPA 2 TABLET: 25; 100 TABLET ORAL at 15:48

## 2023-01-01 RX ADMIN — CARBIDOPA AND LEVODOPA 2 TABLET: 25; 100 TABLET ORAL at 03:12

## 2023-01-01 RX ADMIN — ACETAMINOPHEN 975 MG: 325 TABLET ORAL at 23:40

## 2023-01-01 RX ADMIN — SODIUM CHLORIDE 65 ML: 9 INJECTION, SOLUTION INTRAVENOUS at 12:53

## 2023-01-01 RX ADMIN — SENNOSIDES AND DOCUSATE SODIUM 1 TABLET: 50; 8.6 TABLET ORAL at 21:04

## 2023-01-01 RX ADMIN — GABAPENTIN 300 MG: 300 CAPSULE ORAL at 08:03

## 2023-01-01 RX ADMIN — CARBIDOPA AND LEVODOPA 2 TABLET: 25; 100 TABLET ORAL at 16:21

## 2023-01-01 RX ADMIN — ASPIRIN 325 MG: 325 TABLET, COATED ORAL at 07:39

## 2023-01-01 RX ADMIN — ASPIRIN 325 MG: 325 TABLET ORAL at 08:39

## 2023-01-01 RX ADMIN — HYDROMORPHONE HYDROCHLORIDE 1 MG: 1 INJECTION, SOLUTION INTRAMUSCULAR; INTRAVENOUS; SUBCUTANEOUS at 14:46

## 2023-01-01 RX ADMIN — FENTANYL CITRATE 50 MCG: 50 INJECTION, SOLUTION INTRAMUSCULAR; INTRAVENOUS at 15:54

## 2023-01-01 RX ADMIN — HYDROXYZINE HYDROCHLORIDE 25 MG: 25 TABLET, FILM COATED ORAL at 12:11

## 2023-01-01 RX ADMIN — OXYCODONE HYDROCHLORIDE 10 MG: 5 TABLET ORAL at 16:16

## 2023-01-01 RX ADMIN — ACETAMINOPHEN 975 MG: 325 TABLET ORAL at 07:18

## 2023-01-01 RX ADMIN — ROSUVASTATIN CALCIUM 40 MG: 20 TABLET, FILM COATED ORAL at 08:08

## 2023-01-01 RX ADMIN — FENTANYL CITRATE 50 MCG: 50 INJECTION, SOLUTION INTRAMUSCULAR; INTRAVENOUS at 15:22

## 2023-01-01 RX ADMIN — SODIUM CHLORIDE 2 G: 1 TABLET ORAL at 09:01

## 2023-01-01 RX ADMIN — CARBIDOPA AND LEVODOPA 2 TABLET: 25; 100 TABLET ORAL at 04:25

## 2023-01-01 RX ADMIN — IBUPROFEN 600 MG: 600 TABLET, FILM COATED ORAL at 05:48

## 2023-01-01 RX ADMIN — METOPROLOL SUCCINATE 50 MG: 50 TABLET, EXTENDED RELEASE ORAL at 08:36

## 2023-01-01 RX ADMIN — FENTANYL CITRATE 50 MCG: 50 INJECTION, SOLUTION INTRAMUSCULAR; INTRAVENOUS at 14:51

## 2023-01-01 RX ADMIN — ROSUVASTATIN CALCIUM 40 MG: 20 TABLET, FILM COATED ORAL at 20:52

## 2023-01-01 RX ADMIN — OXYCODONE HYDROCHLORIDE 10 MG: 5 TABLET ORAL at 10:38

## 2023-01-01 RX ADMIN — PIPERACILLIN SODIUM AND TAZOBACTAM SODIUM 3.38 G: 3; .375 INJECTION, SOLUTION INTRAVENOUS at 14:40

## 2023-01-01 RX ADMIN — ACETAMINOPHEN 975 MG: 325 TABLET, FILM COATED ORAL at 09:01

## 2023-01-01 RX ADMIN — CARBIDOPA AND LEVODOPA 2 TABLET: 25; 100 TABLET ORAL at 10:00

## 2023-01-01 RX ADMIN — GABAPENTIN 300 MG: 300 CAPSULE ORAL at 09:48

## 2023-01-01 RX ADMIN — SODIUM CHLORIDE 2 G: 1 TABLET ORAL at 08:34

## 2023-01-01 RX ADMIN — LIDOCAINE HYDROCHLORIDE 40 MG: 20 INJECTION, SOLUTION INFILTRATION; PERINEURAL at 14:46

## 2023-01-01 RX ADMIN — SODIUM CHLORIDE 2 G: 1 TABLET ORAL at 11:11

## 2023-01-01 RX ADMIN — SODIUM CHLORIDE 2 G: 1 TABLET ORAL at 18:16

## 2023-01-01 RX ADMIN — UMECLIDINIUM 1 PUFF: 62.5 AEROSOL, POWDER ORAL at 08:05

## 2023-01-01 RX ADMIN — METHOCARBAMOL 750 MG: 750 TABLET ORAL at 12:11

## 2023-01-01 RX ADMIN — SODIUM CHLORIDE 2 G: 1 TABLET ORAL at 08:22

## 2023-01-01 RX ADMIN — CARBIDOPA AND LEVODOPA 2 TABLET: 25; 100 TABLET ORAL at 03:57

## 2023-01-01 RX ADMIN — CARBIDOPA AND LEVODOPA 2 TABLET: 25; 100 TABLET ORAL at 04:40

## 2023-01-01 RX ADMIN — PIPERACILLIN SODIUM AND TAZOBACTAM SODIUM 3.38 G: 3; .375 INJECTION, SOLUTION INTRAVENOUS at 15:13

## 2023-01-01 RX ADMIN — SODIUM CHLORIDE: 9 INJECTION, SOLUTION INTRAVENOUS at 18:52

## 2023-01-01 RX ADMIN — ALBUTEROL SULFATE 2 PUFF: 90 AEROSOL, METERED RESPIRATORY (INHALATION) at 17:46

## 2023-01-01 RX ADMIN — CARBIDOPA AND LEVODOPA 2 TABLET: 25; 100 TABLET ORAL at 17:09

## 2023-01-01 RX ADMIN — Medication 30 G: at 09:00

## 2023-01-01 RX ADMIN — ACETAMINOPHEN 650 MG: 325 TABLET, FILM COATED ORAL at 16:14

## 2023-01-01 RX ADMIN — FENTANYL CITRATE 100 MCG: 50 INJECTION, SOLUTION INTRAMUSCULAR; INTRAVENOUS at 14:46

## 2023-01-01 RX ADMIN — CARBIDOPA AND LEVODOPA 2 TABLET: 25; 100 TABLET ORAL at 09:02

## 2023-01-01 RX ADMIN — ACETAMINOPHEN 975 MG: 325 TABLET ORAL at 17:09

## 2023-01-01 RX ADMIN — SODIUM CHLORIDE 2 G: 1 TABLET ORAL at 11:53

## 2023-01-01 RX ADMIN — Medication 30 G: at 21:09

## 2023-01-01 RX ADMIN — METOPROLOL SUCCINATE 50 MG: 50 TABLET, EXTENDED RELEASE ORAL at 08:34

## 2023-01-01 RX ADMIN — CARBIDOPA AND LEVODOPA 2 TABLET: 25; 100 TABLET ORAL at 16:20

## 2023-01-01 RX ADMIN — GABAPENTIN 300 MG: 300 CAPSULE ORAL at 21:17

## 2023-01-01 RX ADMIN — ACETAMINOPHEN 650 MG: 325 TABLET ORAL at 03:44

## 2023-01-01 RX ADMIN — GABAPENTIN 300 MG: 300 CAPSULE ORAL at 20:24

## 2023-01-01 RX ADMIN — HYDROXYZINE HYDROCHLORIDE 25 MG: 25 TABLET, FILM COATED ORAL at 14:43

## 2023-01-01 RX ADMIN — ACETAMINOPHEN 650 MG: 325 TABLET, FILM COATED ORAL at 06:44

## 2023-01-01 RX ADMIN — HYDROMORPHONE HYDROCHLORIDE 2 MG: 2 TABLET ORAL at 20:39

## 2023-01-01 RX ADMIN — BUPIVACAINE HYDROCHLORIDE AND EPINEPHRINE BITARTRATE 20 ML: 5; .005 INJECTION, SOLUTION PERINEURAL at 14:40

## 2023-01-01 RX ADMIN — SODIUM CHLORIDE 2 G: 1 TABLET ORAL at 17:46

## 2023-01-01 RX ADMIN — ACETAMINOPHEN 650 MG: 325 TABLET, FILM COATED ORAL at 21:12

## 2023-01-01 RX ADMIN — IBUPROFEN 600 MG: 600 TABLET, FILM COATED ORAL at 15:44

## 2023-01-01 RX ADMIN — OXYCODONE HYDROCHLORIDE 10 MG: 5 TABLET ORAL at 00:12

## 2023-01-01 RX ADMIN — DEXMEDETOMIDINE HYDROCHLORIDE 0.5 MCG/KG/HR: 100 INJECTION, SOLUTION INTRAVENOUS at 12:11

## 2023-01-01 RX ADMIN — CARBIDOPA AND LEVODOPA 2 TABLET: 25; 100 TABLET ORAL at 03:44

## 2023-01-01 RX ADMIN — FENTANYL CITRATE 50 MCG: 50 INJECTION, SOLUTION INTRAMUSCULAR; INTRAVENOUS at 14:29

## 2023-01-01 RX ADMIN — OXYCODONE HYDROCHLORIDE 10 MG: 5 TABLET ORAL at 23:41

## 2023-01-01 RX ADMIN — PIPERACILLIN SODIUM AND TAZOBACTAM SODIUM 3.38 G: 3; .375 INJECTION, SOLUTION INTRAVENOUS at 03:04

## 2023-01-01 RX ADMIN — GABAPENTIN 300 MG: 300 CAPSULE ORAL at 13:40

## 2023-01-01 RX ADMIN — UMECLIDINIUM 1 PUFF: 62.5 AEROSOL, POWDER ORAL at 08:24

## 2023-01-01 RX ADMIN — ACETAMINOPHEN 650 MG: 325 TABLET, FILM COATED ORAL at 12:52

## 2023-01-01 RX ADMIN — HYDROMORPHONE HYDROCHLORIDE 0.4 MG: 0.2 INJECTION, SOLUTION INTRAMUSCULAR; INTRAVENOUS; SUBCUTANEOUS at 16:09

## 2023-01-01 RX ADMIN — PIPERACILLIN SODIUM AND TAZOBACTAM SODIUM 3.38 G: 3; .375 INJECTION, SOLUTION INTRAVENOUS at 02:37

## 2023-01-01 RX ADMIN — CARBIDOPA AND LEVODOPA 2 TABLET: 25; 100 TABLET ORAL at 15:28

## 2023-01-01 RX ADMIN — OXYCODONE HYDROCHLORIDE 10 MG: 5 TABLET ORAL at 07:01

## 2023-01-01 RX ADMIN — CARBIDOPA AND LEVODOPA 2 TABLET: 25; 100 TABLET ORAL at 16:02

## 2023-01-01 RX ADMIN — METOPROLOL SUCCINATE 50 MG: 50 TABLET, EXTENDED RELEASE ORAL at 08:08

## 2023-01-01 RX ADMIN — PIPERACILLIN SODIUM AND TAZOBACTAM SODIUM 3.38 G: 3; .375 INJECTION, SOLUTION INTRAVENOUS at 22:33

## 2023-01-01 RX ADMIN — ROSUVASTATIN CALCIUM 40 MG: 20 TABLET, FILM COATED ORAL at 21:18

## 2023-01-01 RX ADMIN — METOPROLOL SUCCINATE 50 MG: 50 TABLET, EXTENDED RELEASE ORAL at 09:04

## 2023-01-01 RX ADMIN — CARBIDOPA AND LEVODOPA 2 TABLET: 25; 100 TABLET ORAL at 16:04

## 2023-01-01 RX ADMIN — CARBIDOPA AND LEVODOPA 2 TABLET: 25; 100 TABLET ORAL at 09:47

## 2023-01-01 RX ADMIN — UMECLIDINIUM 1 PUFF: 62.5 AEROSOL, POWDER ORAL at 09:03

## 2023-01-01 RX ADMIN — LISINOPRIL 40 MG: 40 TABLET ORAL at 08:08

## 2023-01-01 RX ADMIN — GABAPENTIN 100 MG: 100 CAPSULE ORAL at 07:19

## 2023-01-01 RX ADMIN — PIPERACILLIN SODIUM AND TAZOBACTAM SODIUM 3.38 G: 3; .375 INJECTION, SOLUTION INTRAVENOUS at 09:49

## 2023-01-01 RX ADMIN — METOPROLOL SUCCINATE 50 MG: 50 TABLET, EXTENDED RELEASE ORAL at 08:22

## 2023-01-01 RX ADMIN — TRANEXAMIC ACID 1 G: 10 INJECTION, SOLUTION INTRAVENOUS at 12:20

## 2023-01-01 RX ADMIN — OXYCODONE HYDROCHLORIDE 10 MG: 5 TABLET ORAL at 10:51

## 2023-01-01 RX ADMIN — SENNOSIDES AND DOCUSATE SODIUM 1 TABLET: 50; 8.6 TABLET ORAL at 21:17

## 2023-01-01 RX ADMIN — FENTANYL CITRATE 50 MCG: 50 INJECTION, SOLUTION INTRAMUSCULAR; INTRAVENOUS at 12:54

## 2023-01-01 RX ADMIN — METOPROLOL SUCCINATE 50 MG: 50 TABLET, EXTENDED RELEASE ORAL at 09:21

## 2023-01-01 RX ADMIN — SODIUM CHLORIDE 2 G: 1 TABLET ORAL at 11:50

## 2023-01-01 RX ADMIN — METHOCARBAMOL 750 MG: 750 TABLET ORAL at 21:04

## 2023-01-01 RX ADMIN — HYDROXYZINE HYDROCHLORIDE 25 MG: 25 TABLET, FILM COATED ORAL at 16:31

## 2023-01-01 RX ADMIN — METOPROLOL SUCCINATE 50 MG: 50 TABLET, EXTENDED RELEASE ORAL at 09:48

## 2023-01-01 RX ADMIN — GABAPENTIN 300 MG: 300 CAPSULE ORAL at 14:38

## 2023-01-01 RX ADMIN — SODIUM CHLORIDE, POTASSIUM CHLORIDE, SODIUM LACTATE AND CALCIUM CHLORIDE: 600; 310; 30; 20 INJECTION, SOLUTION INTRAVENOUS at 18:11

## 2023-01-01 RX ADMIN — Medication 80 MG: at 12:11

## 2023-01-01 RX ADMIN — CARBIDOPA AND LEVODOPA 2 TABLET: 25; 100 TABLET ORAL at 21:04

## 2023-01-01 RX ADMIN — ACETAMINOPHEN 975 MG: 325 TABLET ORAL at 16:26

## 2023-01-01 RX ADMIN — CALCIUM CARBONATE (ANTACID) CHEW TAB 500 MG 500 MG: 500 CHEW TAB at 04:40

## 2023-01-01 RX ADMIN — CARBIDOPA AND LEVODOPA 2 TABLET: 25; 100 TABLET ORAL at 09:20

## 2023-01-01 RX ADMIN — PIPERACILLIN SODIUM AND TAZOBACTAM SODIUM 3.38 G: 3; .375 INJECTION, SOLUTION INTRAVENOUS at 07:51

## 2023-01-01 RX ADMIN — ACETAMINOPHEN 650 MG: 325 TABLET ORAL at 20:09

## 2023-01-01 RX ADMIN — SODIUM CHLORIDE 2 G: 1 TABLET ORAL at 07:39

## 2023-01-01 RX ADMIN — ACETAMINOPHEN 975 MG: 325 TABLET, FILM COATED ORAL at 09:04

## 2023-01-01 RX ADMIN — THIAMINE HCL TAB 100 MG 100 MG: 100 TAB at 08:54

## 2023-01-01 RX ADMIN — GABAPENTIN 100 MG: 100 CAPSULE ORAL at 19:52

## 2023-01-01 RX ADMIN — Medication 1 MG: at 02:40

## 2023-01-01 RX ADMIN — ACETAMINOPHEN 650 MG: 325 TABLET, FILM COATED ORAL at 20:06

## 2023-01-01 RX ADMIN — KETOROLAC TROMETHAMINE 15 MG: 30 INJECTION, SOLUTION INTRAMUSCULAR at 12:11

## 2023-01-01 RX ADMIN — GLYCOPYRROLATE 0.2 MG: 0.2 INJECTION, SOLUTION INTRAMUSCULAR; INTRAVENOUS at 14:46

## 2023-01-01 RX ADMIN — SODIUM CHLORIDE: 9 INJECTION, SOLUTION INTRAVENOUS at 18:56

## 2023-01-01 RX ADMIN — LISINOPRIL 40 MG: 40 TABLET ORAL at 08:36

## 2023-01-01 RX ADMIN — PIPERACILLIN SODIUM AND TAZOBACTAM SODIUM 3.38 G: 3; .375 INJECTION, SOLUTION INTRAVENOUS at 21:17

## 2023-01-01 RX ADMIN — CARBIDOPA AND LEVODOPA 2 TABLET: 25; 100 TABLET ORAL at 16:26

## 2023-01-01 RX ADMIN — HYDRALAZINE HYDROCHLORIDE 10 MG: 20 INJECTION INTRAMUSCULAR; INTRAVENOUS at 04:43

## 2023-01-01 RX ADMIN — LISINOPRIL 40 MG: 40 TABLET ORAL at 08:39

## 2023-01-01 RX ADMIN — SENNOSIDES AND DOCUSATE SODIUM 1 TABLET: 50; 8.6 TABLET ORAL at 08:36

## 2023-01-01 RX ADMIN — IBUPROFEN 600 MG: 600 TABLET, FILM COATED ORAL at 04:36

## 2023-01-01 RX ADMIN — OXYCODONE HYDROCHLORIDE 5 MG: 5 TABLET ORAL at 09:59

## 2023-01-01 RX ADMIN — OXYCODONE HYDROCHLORIDE 5 MG: 5 TABLET ORAL at 06:56

## 2023-01-01 RX ADMIN — IBUPROFEN 600 MG: 600 TABLET, FILM COATED ORAL at 16:31

## 2023-01-01 RX ADMIN — ASPIRIN 325 MG: 325 TABLET, COATED ORAL at 08:34

## 2023-01-01 RX ADMIN — SODIUM CHLORIDE, POTASSIUM CHLORIDE, SODIUM LACTATE AND CALCIUM CHLORIDE: 600; 310; 30; 20 INJECTION, SOLUTION INTRAVENOUS at 12:08

## 2023-01-01 RX ADMIN — SODIUM CHLORIDE 2 G: 1 TABLET ORAL at 09:10

## 2023-01-01 RX ADMIN — THIAMINE HCL TAB 100 MG 100 MG: 100 TAB at 08:00

## 2023-01-01 RX ADMIN — ASPIRIN 325 MG: 325 TABLET ORAL at 08:02

## 2023-01-01 RX ADMIN — IBUPROFEN 600 MG: 600 TABLET, FILM COATED ORAL at 10:05

## 2023-01-01 RX ADMIN — SODIUM CHLORIDE, POTASSIUM CHLORIDE, SODIUM LACTATE AND CALCIUM CHLORIDE: 600; 310; 30; 20 INJECTION, SOLUTION INTRAVENOUS at 14:38

## 2023-01-01 RX ADMIN — GABAPENTIN 300 MG: 300 CAPSULE ORAL at 07:39

## 2023-01-01 RX ADMIN — IBUPROFEN 600 MG: 600 TABLET, FILM COATED ORAL at 21:59

## 2023-01-01 RX ADMIN — CARBIDOPA AND LEVODOPA 2 TABLET: 25; 100 TABLET ORAL at 10:44

## 2023-01-01 RX ADMIN — SODIUM CHLORIDE 2 G: 1 TABLET ORAL at 17:54

## 2023-01-01 RX ADMIN — PIPERACILLIN SODIUM AND TAZOBACTAM SODIUM 3.38 G: 3; .375 INJECTION, SOLUTION INTRAVENOUS at 20:35

## 2023-01-01 RX ADMIN — GABAPENTIN 300 MG: 300 CAPSULE ORAL at 08:22

## 2023-01-01 RX ADMIN — CEFAZOLIN SODIUM 2 G: 2 INJECTION, SOLUTION INTRAVENOUS at 13:39

## 2023-01-01 RX ADMIN — SENNOSIDES AND DOCUSATE SODIUM 1 TABLET: 50; 8.6 TABLET ORAL at 08:03

## 2023-01-01 RX ADMIN — GABAPENTIN 300 MG: 300 CAPSULE ORAL at 09:05

## 2023-01-01 RX ADMIN — LIDOCAINE HYDROCHLORIDE 4 ML: 40 SOLUTION TOPICAL at 12:12

## 2023-01-01 RX ADMIN — ROSUVASTATIN CALCIUM 40 MG: 20 TABLET, FILM COATED ORAL at 21:04

## 2023-01-01 RX ADMIN — PIPERACILLIN SODIUM AND TAZOBACTAM SODIUM 3.38 G: 3; .375 INJECTION, SOLUTION INTRAVENOUS at 15:31

## 2023-01-01 RX ADMIN — PIPERACILLIN SODIUM AND TAZOBACTAM SODIUM 3.38 G: 3; .375 INJECTION, SOLUTION INTRAVENOUS at 15:11

## 2023-01-01 RX ADMIN — CARBIDOPA AND LEVODOPA 2 TABLET: 25; 100 TABLET ORAL at 15:51

## 2023-01-01 RX ADMIN — PIPERACILLIN SODIUM AND TAZOBACTAM SODIUM 3.38 G: 3; .375 INJECTION, SOLUTION INTRAVENOUS at 09:06

## 2023-01-01 RX ADMIN — CARBIDOPA AND LEVODOPA 2 TABLET: 25; 100 TABLET ORAL at 03:06

## 2023-01-01 RX ADMIN — SENNOSIDES AND DOCUSATE SODIUM 1 TABLET: 50; 8.6 TABLET ORAL at 07:19

## 2023-01-01 RX ADMIN — IBUPROFEN 600 MG: 600 TABLET, FILM COATED ORAL at 04:23

## 2023-01-01 RX ADMIN — GABAPENTIN 300 MG: 300 CAPSULE ORAL at 09:21

## 2023-01-01 RX ADMIN — PIPERACILLIN SODIUM AND TAZOBACTAM SODIUM 3.38 G: 3; .375 INJECTION, SOLUTION INTRAVENOUS at 10:33

## 2023-01-01 RX ADMIN — ACETAMINOPHEN 650 MG: 325 TABLET, FILM COATED ORAL at 09:21

## 2023-01-01 RX ADMIN — PIPERACILLIN SODIUM AND TAZOBACTAM SODIUM 3.38 G: 3; .375 INJECTION, SOLUTION INTRAVENOUS at 08:41

## 2023-01-01 RX ADMIN — SENNOSIDES AND DOCUSATE SODIUM 1 TABLET: 50; 8.6 TABLET ORAL at 20:10

## 2023-01-01 RX ADMIN — METHOCARBAMOL 750 MG: 750 TABLET ORAL at 17:09

## 2023-01-01 RX ADMIN — METHOCARBAMOL 750 MG: 750 TABLET ORAL at 04:23

## 2023-01-01 RX ADMIN — FENTANYL CITRATE 100 MCG: 50 INJECTION, SOLUTION INTRAMUSCULAR; INTRAVENOUS at 12:11

## 2023-01-01 RX ADMIN — CARBIDOPA AND LEVODOPA 2 TABLET: 25; 100 TABLET ORAL at 16:03

## 2023-01-01 RX ADMIN — FENTANYL CITRATE 50 MCG: 50 INJECTION, SOLUTION INTRAMUSCULAR; INTRAVENOUS at 12:30

## 2023-01-01 RX ADMIN — SODIUM CHLORIDE 2 G: 1 TABLET ORAL at 17:20

## 2023-01-01 RX ADMIN — OXYCODONE HYDROCHLORIDE 5 MG: 5 TABLET ORAL at 11:57

## 2023-01-01 RX ADMIN — ACETAMINOPHEN 975 MG: 325 TABLET ORAL at 08:02

## 2023-01-01 RX ADMIN — CARBIDOPA AND LEVODOPA 2 TABLET: 25; 100 TABLET ORAL at 03:52

## 2023-01-01 RX ADMIN — ACETAMINOPHEN 975 MG: 325 TABLET, FILM COATED ORAL at 00:28

## 2023-01-01 RX ADMIN — ACETAMINOPHEN 975 MG: 325 TABLET, FILM COATED ORAL at 16:03

## 2023-01-01 RX ADMIN — GABAPENTIN 300 MG: 300 CAPSULE ORAL at 14:54

## 2023-01-01 RX ADMIN — CEFAZOLIN 1 G: 1 INJECTION, POWDER, FOR SOLUTION INTRAMUSCULAR; INTRAVENOUS at 18:27

## 2023-01-01 RX ADMIN — HYDROXYZINE HYDROCHLORIDE 25 MG: 25 TABLET, FILM COATED ORAL at 20:57

## 2023-01-01 RX ADMIN — ACETAMINOPHEN 975 MG: 325 TABLET, FILM COATED ORAL at 09:48

## 2023-01-01 RX ADMIN — ACETAMINOPHEN 650 MG: 325 TABLET ORAL at 08:08

## 2023-01-01 RX ADMIN — ROSUVASTATIN CALCIUM 40 MG: 20 TABLET, FILM COATED ORAL at 11:22

## 2023-01-01 RX ADMIN — GABAPENTIN 300 MG: 300 CAPSULE ORAL at 14:12

## 2023-01-01 RX ADMIN — CARBIDOPA AND LEVODOPA 2 TABLET: 25; 100 TABLET ORAL at 10:07

## 2023-01-01 RX ADMIN — PIPERACILLIN SODIUM AND TAZOBACTAM SODIUM 3.38 G: 3; .375 INJECTION, SOLUTION INTRAVENOUS at 14:38

## 2023-01-01 RX ADMIN — UMECLIDINIUM 1 PUFF: 62.5 AEROSOL, POWDER ORAL at 08:06

## 2023-01-01 RX ADMIN — CARBIDOPA AND LEVODOPA 2 TABLET: 25; 100 TABLET ORAL at 04:59

## 2023-01-01 RX ADMIN — METOPROLOL SUCCINATE 50 MG: 50 TABLET, EXTENDED RELEASE ORAL at 08:03

## 2023-01-01 RX ADMIN — OXYCODONE HYDROCHLORIDE 10 MG: 5 TABLET ORAL at 13:51

## 2023-01-01 RX ADMIN — METOPROLOL SUCCINATE 50 MG: 50 TABLET, EXTENDED RELEASE ORAL at 08:39

## 2023-01-01 RX ADMIN — CARBIDOPA AND LEVODOPA 2 TABLET: 25; 100 TABLET ORAL at 10:10

## 2023-01-01 RX ADMIN — ROSUVASTATIN CALCIUM 40 MG: 20 TABLET, FILM COATED ORAL at 20:10

## 2023-01-01 RX ADMIN — GABAPENTIN 300 MG: 300 CAPSULE ORAL at 20:12

## 2023-01-01 RX ADMIN — HYDROMORPHONE HYDROCHLORIDE 0.4 MG: 0.2 INJECTION, SOLUTION INTRAMUSCULAR; INTRAVENOUS; SUBCUTANEOUS at 22:12

## 2023-01-01 RX ADMIN — METHOCARBAMOL 750 MG: 750 TABLET ORAL at 12:33

## 2023-01-01 RX ADMIN — OXYCODONE HYDROCHLORIDE 10 MG: 5 TABLET ORAL at 18:59

## 2023-01-01 RX ADMIN — ACETAMINOPHEN 975 MG: 325 TABLET ORAL at 08:36

## 2023-01-01 RX ADMIN — OXYCODONE HYDROCHLORIDE 5 MG: 5 TABLET ORAL at 11:54

## 2023-01-01 RX ADMIN — PIPERACILLIN SODIUM AND TAZOBACTAM SODIUM 3.38 G: 3; .375 INJECTION, SOLUTION INTRAVENOUS at 21:11

## 2023-01-01 RX ADMIN — CARBIDOPA AND LEVODOPA 2 TABLET: 25; 100 TABLET ORAL at 09:49

## 2023-01-01 RX ADMIN — MIDAZOLAM 2 MG: 1 INJECTION INTRAMUSCULAR; INTRAVENOUS at 14:38

## 2023-01-01 RX ADMIN — GABAPENTIN 300 MG: 300 CAPSULE ORAL at 13:51

## 2023-01-01 RX ADMIN — PIPERACILLIN SODIUM AND TAZOBACTAM SODIUM 3.38 G: 3; .375 INJECTION, SOLUTION INTRAVENOUS at 03:24

## 2023-01-01 RX ADMIN — PROPOFOL 200 MG: 10 INJECTION, EMULSION INTRAVENOUS at 12:11

## 2023-01-01 RX ADMIN — Medication 2 G: at 12:08

## 2023-01-01 RX ADMIN — HYDROXYZINE HYDROCHLORIDE 25 MG: 25 TABLET, FILM COATED ORAL at 14:14

## 2023-01-01 RX ADMIN — GABAPENTIN 300 MG: 300 CAPSULE ORAL at 08:08

## 2023-01-01 RX ADMIN — PIPERACILLIN SODIUM AND TAZOBACTAM SODIUM 3.38 G: 3; .375 INJECTION, SOLUTION INTRAVENOUS at 03:13

## 2023-01-01 RX ADMIN — GABAPENTIN 300 MG: 300 CAPSULE ORAL at 20:26

## 2023-01-01 RX ADMIN — METHOCARBAMOL 750 MG: 750 TABLET ORAL at 03:44

## 2023-01-01 RX ADMIN — GABAPENTIN 300 MG: 300 CAPSULE ORAL at 08:36

## 2023-01-01 RX ADMIN — HYDROMORPHONE HYDROCHLORIDE 0.4 MG: 0.2 INJECTION, SOLUTION INTRAMUSCULAR; INTRAVENOUS; SUBCUTANEOUS at 16:33

## 2023-01-01 RX ADMIN — FENTANYL CITRATE 50 MCG: 50 INJECTION, SOLUTION INTRAMUSCULAR; INTRAVENOUS at 15:14

## 2023-01-01 RX ADMIN — GABAPENTIN 300 MG: 300 CAPSULE ORAL at 16:15

## 2023-01-01 RX ADMIN — GABAPENTIN 300 MG: 300 CAPSULE ORAL at 09:01

## 2023-01-01 RX ADMIN — CARBIDOPA AND LEVODOPA 2 TABLET: 25; 100 TABLET ORAL at 09:10

## 2023-01-01 RX ADMIN — UMECLIDINIUM 1 PUFF: 62.5 AEROSOL, POWDER ORAL at 13:41

## 2023-01-01 RX ADMIN — BUPIVACAINE HYDROCHLORIDE AND EPINEPHRINE BITARTRATE 40 ML: 5; .005 INJECTION, SOLUTION PERINEURAL at 12:00

## 2023-01-01 RX ADMIN — METOPROLOL SUCCINATE 50 MG: 50 TABLET, EXTENDED RELEASE ORAL at 16:15

## 2023-01-01 RX ADMIN — OXYCODONE HYDROCHLORIDE 10 MG: 5 TABLET ORAL at 03:51

## 2023-01-01 RX ADMIN — OXYCODONE HYDROCHLORIDE 5 MG: 5 TABLET ORAL at 11:53

## 2023-01-01 RX ADMIN — CEFAZOLIN SODIUM 2 G: 2 INJECTION, SOLUTION INTRAVENOUS at 04:23

## 2023-01-01 RX ADMIN — CARBIDOPA AND LEVODOPA 2 TABLET: 25; 100 TABLET ORAL at 03:27

## 2023-01-01 RX ADMIN — GABAPENTIN 300 MG: 300 CAPSULE ORAL at 20:11

## 2023-01-01 RX ADMIN — OXYCODONE HYDROCHLORIDE 10 MG: 5 TABLET ORAL at 08:02

## 2023-01-01 RX ADMIN — ASPIRIN 325 MG: 325 TABLET ORAL at 07:19

## 2023-01-01 RX ADMIN — SODIUM CHLORIDE: 9 INJECTION, SOLUTION INTRAVENOUS at 15:24

## 2023-01-01 RX ADMIN — UMECLIDINIUM 1 PUFF: 62.5 AEROSOL, POWDER ORAL at 07:58

## 2023-01-01 RX ADMIN — GABAPENTIN 300 MG: 300 CAPSULE ORAL at 13:43

## 2023-01-01 RX ADMIN — LIDOCAINE HYDROCHLORIDE 50 MG: 20 INJECTION, SOLUTION INFILTRATION; PERINEURAL at 12:11

## 2023-01-01 RX ADMIN — ASPIRIN 325 MG: 325 TABLET ORAL at 08:36

## 2023-01-01 RX ADMIN — ACETAMINOPHEN 975 MG: 325 TABLET, FILM COATED ORAL at 00:11

## 2023-01-01 RX ADMIN — ROSUVASTATIN CALCIUM 40 MG: 20 TABLET, FILM COATED ORAL at 20:12

## 2023-01-01 RX ADMIN — HYDROXYZINE HYDROCHLORIDE 25 MG: 25 TABLET, FILM COATED ORAL at 21:59

## 2023-01-01 RX ADMIN — WATER: 1 INJECTION INTRAMUSCULAR; INTRAVENOUS; SUBCUTANEOUS at 12:30

## 2023-01-01 RX ADMIN — ACETAMINOPHEN 975 MG: 325 TABLET, FILM COATED ORAL at 16:20

## 2023-01-01 RX ADMIN — UMECLIDINIUM 1 PUFF: 62.5 AEROSOL, POWDER ORAL at 08:34

## 2023-01-01 RX ADMIN — CARBIDOPA AND LEVODOPA 2 TABLET: 25; 100 TABLET ORAL at 16:19

## 2023-01-01 RX ADMIN — HYDROXYZINE HYDROCHLORIDE 25 MG: 25 TABLET, FILM COATED ORAL at 23:41

## 2023-01-01 RX ADMIN — IOPAMIDOL 100 ML: 755 INJECTION, SOLUTION INTRAVENOUS at 12:53

## 2023-01-01 RX ADMIN — ASPIRIN 325 MG: 325 TABLET, COATED ORAL at 09:03

## 2023-01-01 RX ADMIN — LABETALOL HYDROCHLORIDE 10 MG: 5 INJECTION, SOLUTION INTRAVENOUS at 16:07

## 2023-01-01 RX ADMIN — PIPERACILLIN SODIUM AND TAZOBACTAM SODIUM 3.38 G: 3; .375 INJECTION, SOLUTION INTRAVENOUS at 03:30

## 2023-01-01 RX ADMIN — PROPOFOL 150 MG: 10 INJECTION, EMULSION INTRAVENOUS at 14:46

## 2023-01-01 RX ADMIN — GABAPENTIN 300 MG: 300 CAPSULE ORAL at 20:52

## 2023-01-01 RX ADMIN — SODIUM CHLORIDE 2 G: 1 TABLET ORAL at 12:09

## 2023-01-01 RX ADMIN — CARBIDOPA AND LEVODOPA 2 TABLET: 25; 100 TABLET ORAL at 10:28

## 2023-01-01 RX ADMIN — IBUPROFEN 600 MG: 600 TABLET, FILM COATED ORAL at 20:10

## 2023-01-01 RX ADMIN — DEXAMETHASONE SODIUM PHOSPHATE 8 MG: 4 INJECTION, SOLUTION INTRA-ARTICULAR; INTRALESIONAL; INTRAMUSCULAR; INTRAVENOUS; SOFT TISSUE at 14:46

## 2023-01-01 RX ADMIN — METOPROLOL SUCCINATE 50 MG: 50 TABLET, EXTENDED RELEASE ORAL at 07:39

## 2023-01-01 RX ADMIN — Medication 30 G: at 18:09

## 2023-01-01 RX ADMIN — OXYCODONE HYDROCHLORIDE 10 MG: 5 TABLET ORAL at 20:11

## 2023-01-01 RX ADMIN — FENTANYL CITRATE 25 MCG: 50 INJECTION, SOLUTION INTRAMUSCULAR; INTRAVENOUS at 15:47

## 2023-01-01 RX ADMIN — CARBIDOPA AND LEVODOPA 2 TABLET: 25; 100 TABLET ORAL at 10:38

## 2023-01-01 RX ADMIN — GABAPENTIN 300 MG: 300 CAPSULE ORAL at 08:39

## 2023-01-01 RX ADMIN — OXYCODONE HYDROCHLORIDE 5 MG: 5 TABLET ORAL at 16:40

## 2023-01-01 RX ADMIN — ROSUVASTATIN CALCIUM 40 MG: 20 TABLET, FILM COATED ORAL at 20:26

## 2023-01-01 RX ADMIN — TOLVAPTAN 15 MG: 15 TABLET ORAL at 12:52

## 2023-01-01 RX ADMIN — OXYCODONE HYDROCHLORIDE 5 MG: 5 TABLET ORAL at 17:10

## 2023-01-01 RX ADMIN — PIPERACILLIN SODIUM AND TAZOBACTAM SODIUM 3.38 G: 3; .375 INJECTION, SOLUTION INTRAVENOUS at 02:59

## 2023-01-01 RX ADMIN — SODIUM CHLORIDE 2 G: 1 TABLET ORAL at 11:51

## 2023-01-01 RX ADMIN — OXYCODONE HYDROCHLORIDE 10 MG: 5 TABLET ORAL at 17:52

## 2023-01-01 RX ADMIN — PIPERACILLIN SODIUM AND TAZOBACTAM SODIUM 3.38 G: 3; .375 INJECTION, SOLUTION INTRAVENOUS at 20:13

## 2023-01-01 RX ADMIN — CARBIDOPA AND LEVODOPA 2 TABLET: 25; 100 TABLET ORAL at 04:28

## 2023-01-01 RX ADMIN — SENNOSIDES AND DOCUSATE SODIUM 1 TABLET: 50; 8.6 TABLET ORAL at 19:52

## 2023-01-01 RX ADMIN — UMECLIDINIUM 1 PUFF: 62.5 AEROSOL, POWDER ORAL at 09:52

## 2023-01-01 RX ADMIN — PIPERACILLIN SODIUM AND TAZOBACTAM SODIUM 3.38 G: 3; .375 INJECTION, SOLUTION INTRAVENOUS at 02:35

## 2023-01-01 RX ADMIN — OXYCODONE HYDROCHLORIDE 10 MG: 5 TABLET ORAL at 20:57

## 2023-01-01 RX ADMIN — GABAPENTIN 300 MG: 300 CAPSULE ORAL at 14:43

## 2023-01-01 RX ADMIN — ACETAMINOPHEN 650 MG: 325 TABLET, FILM COATED ORAL at 16:15

## 2023-01-01 RX ADMIN — CARBIDOPA AND LEVODOPA 2 TABLET: 25; 100 TABLET ORAL at 03:22

## 2023-01-01 RX ADMIN — ACETAMINOPHEN 975 MG: 325 TABLET, FILM COATED ORAL at 16:16

## 2023-01-01 RX ADMIN — SODIUM CHLORIDE: 9 INJECTION, SOLUTION INTRAVENOUS at 22:52

## 2023-01-01 RX ADMIN — GLYCOPYRROLATE 0.2 MG: 0.2 INJECTION, SOLUTION INTRAMUSCULAR; INTRAVENOUS at 12:21

## 2023-01-01 RX ADMIN — ASPIRIN 325 MG: 325 TABLET, COATED ORAL at 09:48

## 2023-01-01 RX ADMIN — ONDANSETRON 4 MG: 2 INJECTION INTRAMUSCULAR; INTRAVENOUS at 14:46

## 2023-01-01 RX ADMIN — GABAPENTIN 300 MG: 300 CAPSULE ORAL at 13:16

## 2023-01-01 RX ADMIN — UMECLIDINIUM 1 PUFF: 62.5 AEROSOL, POWDER ORAL at 08:44

## 2023-01-01 RX ADMIN — PIPERACILLIN SODIUM AND TAZOBACTAM SODIUM 3.38 G: 3; .375 INJECTION, SOLUTION INTRAVENOUS at 08:29

## 2023-01-01 RX ADMIN — CARBIDOPA AND LEVODOPA 2 TABLET: 25; 100 TABLET ORAL at 15:39

## 2023-01-01 RX ADMIN — CEFAZOLIN SODIUM 2 G: 2 INJECTION, SOLUTION INTRAVENOUS at 19:52

## 2023-01-01 RX ADMIN — TRANEXAMIC ACID 1 G: 10 INJECTION, SOLUTION INTRAVENOUS at 13:55

## 2023-01-01 RX ADMIN — HYDROXYZINE HYDROCHLORIDE 25 MG: 25 TABLET, FILM COATED ORAL at 10:11

## 2023-01-01 RX ADMIN — GABAPENTIN 300 MG: 300 CAPSULE ORAL at 14:11

## 2023-01-01 RX ADMIN — CEFAZOLIN 1 G: 1 INJECTION, POWDER, FOR SOLUTION INTRAMUSCULAR; INTRAVENOUS at 01:56

## 2023-01-01 RX ADMIN — CALCIUM CARBONATE (ANTACID) CHEW TAB 500 MG 500 MG: 500 CHEW TAB at 22:12

## 2023-01-01 RX ADMIN — METHOCARBAMOL 750 MG: 750 TABLET ORAL at 04:36

## 2023-01-01 RX ADMIN — CARBIDOPA AND LEVODOPA 2 TABLET: 25; 100 TABLET ORAL at 05:13

## 2023-01-01 RX ADMIN — PIPERACILLIN SODIUM AND TAZOBACTAM SODIUM 3.38 G: 3; .375 INJECTION, SOLUTION INTRAVENOUS at 21:27

## 2023-01-01 RX ADMIN — UMECLIDINIUM 1 PUFF: 62.5 AEROSOL, POWDER ORAL at 07:40

## 2023-01-01 RX ADMIN — METHOCARBAMOL 750 MG: 750 TABLET ORAL at 14:14

## 2023-01-01 RX ADMIN — METOPROLOL SUCCINATE 50 MG: 50 TABLET, EXTENDED RELEASE ORAL at 11:22

## 2023-01-01 RX ADMIN — OXYCODONE HYDROCHLORIDE 10 MG: 5 TABLET ORAL at 18:54

## 2023-01-01 RX ADMIN — ROSUVASTATIN CALCIUM 40 MG: 20 TABLET, FILM COATED ORAL at 08:36

## 2023-01-01 RX ADMIN — OXYCODONE HYDROCHLORIDE 10 MG: 5 TABLET ORAL at 15:14

## 2023-01-01 RX ADMIN — METHOCARBAMOL 750 MG: 750 TABLET ORAL at 06:22

## 2023-01-01 RX ADMIN — ROSUVASTATIN CALCIUM 40 MG: 20 TABLET, FILM COATED ORAL at 08:02

## 2023-01-01 RX ADMIN — PIPERACILLIN SODIUM AND TAZOBACTAM SODIUM 3.38 G: 3; .375 INJECTION, SOLUTION INTRAVENOUS at 20:15

## 2023-01-01 RX ADMIN — WATER: 1 INJECTION INTRAMUSCULAR; INTRAVENOUS; SUBCUTANEOUS at 20:27

## 2023-01-01 RX ADMIN — HYDROMORPHONE HYDROCHLORIDE 0.2 MG: 0.2 INJECTION, SOLUTION INTRAMUSCULAR; INTRAVENOUS; SUBCUTANEOUS at 14:43

## 2023-01-01 RX ADMIN — THIAMINE HCL TAB 100 MG 100 MG: 100 TAB at 08:34

## 2023-01-01 RX ADMIN — HYDROXYZINE HYDROCHLORIDE 25 MG: 25 TABLET, FILM COATED ORAL at 17:51

## 2023-01-01 RX ADMIN — GABAPENTIN 100 MG: 100 CAPSULE ORAL at 13:55

## 2023-01-01 RX ADMIN — HYDROXYZINE HYDROCHLORIDE 25 MG: 25 TABLET, FILM COATED ORAL at 05:46

## 2023-01-01 RX ADMIN — ASPIRIN 325 MG: 325 TABLET, COATED ORAL at 09:01

## 2023-01-01 RX ADMIN — CALCIUM CARBONATE (ANTACID) CHEW TAB 500 MG 500 MG: 500 CHEW TAB at 16:05

## 2023-01-01 RX ADMIN — GABAPENTIN 300 MG: 300 CAPSULE ORAL at 20:09

## 2023-01-01 RX ADMIN — DEXAMETHASONE SODIUM PHOSPHATE 8 MG: 4 INJECTION, SOLUTION INTRA-ARTICULAR; INTRALESIONAL; INTRAMUSCULAR; INTRAVENOUS; SOFT TISSUE at 12:11

## 2023-01-01 RX ADMIN — OXYCODONE HYDROCHLORIDE 5 MG: 5 TABLET ORAL at 04:40

## 2023-01-01 RX ADMIN — OXYCODONE HYDROCHLORIDE 10 MG: 5 TABLET ORAL at 02:36

## 2023-01-01 RX ADMIN — ACETAMINOPHEN 975 MG: 325 TABLET ORAL at 16:52

## 2023-01-01 RX ADMIN — CARBIDOPA AND LEVODOPA 2 TABLET: 25; 100 TABLET ORAL at 16:14

## 2023-01-01 RX ADMIN — PIPERACILLIN SODIUM AND TAZOBACTAM SODIUM 3.38 G: 3; .375 INJECTION, SOLUTION INTRAVENOUS at 14:55

## 2023-01-01 RX ADMIN — THIAMINE HCL TAB 100 MG 100 MG: 100 TAB at 20:06

## 2023-01-01 RX ADMIN — SODIUM CHLORIDE, POTASSIUM CHLORIDE, SODIUM LACTATE AND CALCIUM CHLORIDE: 600; 310; 30; 20 INJECTION, SOLUTION INTRAVENOUS at 21:28

## 2023-01-01 RX ADMIN — OXYCODONE HYDROCHLORIDE 10 MG: 5 TABLET ORAL at 10:55

## 2023-01-01 RX ADMIN — SODIUM CHLORIDE, POTASSIUM CHLORIDE, SODIUM LACTATE AND CALCIUM CHLORIDE 500 ML: 600; 310; 30; 20 INJECTION, SOLUTION INTRAVENOUS at 13:42

## 2023-01-01 RX ADMIN — SODIUM CHLORIDE, POTASSIUM CHLORIDE, SODIUM LACTATE AND CALCIUM CHLORIDE: 600; 310; 30; 20 INJECTION, SOLUTION INTRAVENOUS at 02:00

## 2023-01-01 RX ADMIN — ASPIRIN 325 MG: 325 TABLET, COATED ORAL at 20:11

## 2023-01-01 RX ADMIN — OXYCODONE HYDROCHLORIDE 5 MG: 5 TABLET ORAL at 04:55

## 2023-01-01 RX ADMIN — ONDANSETRON 4 MG: 2 INJECTION INTRAMUSCULAR; INTRAVENOUS at 12:11

## 2023-01-01 RX ADMIN — GABAPENTIN 100 MG: 100 CAPSULE ORAL at 16:52

## 2023-01-01 RX ADMIN — GABAPENTIN 100 MG: 100 CAPSULE ORAL at 20:11

## 2023-01-01 RX ADMIN — CARBIDOPA AND LEVODOPA 2 TABLET: 25; 100 TABLET ORAL at 09:48

## 2023-01-01 RX ADMIN — ROSUVASTATIN CALCIUM 40 MG: 20 TABLET, FILM COATED ORAL at 08:39

## 2023-01-01 RX ADMIN — CARBIDOPA AND LEVODOPA 2 TABLET: 25; 100 TABLET ORAL at 11:57

## 2023-01-01 RX ADMIN — CARBIDOPA AND LEVODOPA 2 TABLET: 25; 100 TABLET ORAL at 10:05

## 2023-01-01 RX ADMIN — SODIUM CHLORIDE 2 G: 1 TABLET ORAL at 17:53

## 2023-01-01 RX ADMIN — METOPROLOL SUCCINATE 50 MG: 50 TABLET, EXTENDED RELEASE ORAL at 09:01

## 2023-01-01 RX ADMIN — CALCIUM CARBONATE (ANTACID) CHEW TAB 500 MG 500 MG: 500 CHEW TAB at 11:44

## 2023-01-01 RX ADMIN — ASPIRIN 325 MG: 325 TABLET ORAL at 08:08

## 2023-01-01 RX ADMIN — OXYCODONE HYDROCHLORIDE 5 MG: 5 TABLET ORAL at 07:44

## 2023-01-01 RX ADMIN — GABAPENTIN 300 MG: 300 CAPSULE ORAL at 19:57

## 2023-01-01 RX ADMIN — FENTANYL CITRATE 25 MCG: 50 INJECTION, SOLUTION INTRAMUSCULAR; INTRAVENOUS at 16:02

## 2023-01-01 RX ADMIN — ROSUVASTATIN CALCIUM 40 MG: 20 TABLET, FILM COATED ORAL at 20:25

## 2023-01-01 RX ADMIN — CARBIDOPA AND LEVODOPA 2 TABLET: 25; 100 TABLET ORAL at 06:56

## 2023-01-01 RX ADMIN — LISINOPRIL 40 MG: 40 TABLET ORAL at 08:03

## 2023-01-01 RX ADMIN — ACETAMINOPHEN 650 MG: 325 TABLET, FILM COATED ORAL at 04:48

## 2023-01-01 RX ADMIN — ACETAMINOPHEN 975 MG: 325 TABLET ORAL at 00:05

## 2023-01-01 ASSESSMENT — ACTIVITIES OF DAILY LIVING (ADL)
ADLS_ACUITY_SCORE: 40
ADLS_ACUITY_SCORE: 30
ADLS_ACUITY_SCORE: 26
ADLS_ACUITY_SCORE: 34
ADLS_ACUITY_SCORE: 26
ADLS_ACUITY_SCORE: 33
ADLS_ACUITY_SCORE: 26
ADLS_ACUITY_SCORE: 39
ADLS_ACUITY_SCORE: 40
ADLS_ACUITY_SCORE: 37
ADLS_ACUITY_SCORE: 36
ADLS_ACUITY_SCORE: 41
ADLS_ACUITY_SCORE: 24
ADLS_ACUITY_SCORE: 26
CONCENTRATING,_REMEMBERING_OR_MAKING_DECISIONS_DIFFICULTY: NO
ADLS_ACUITY_SCORE: 40
ADLS_ACUITY_SCORE: 35
WALKING_OR_CLIMBING_STAIRS_DIFFICULTY: YES
ADLS_ACUITY_SCORE: 28
ADLS_ACUITY_SCORE: 42
ADLS_ACUITY_SCORE: 33
ADLS_ACUITY_SCORE: 33
ADLS_ACUITY_SCORE: 37
ADLS_ACUITY_SCORE: 28
VISION_MANAGEMENT: GLASSES
ADLS_ACUITY_SCORE: 28
ADLS_ACUITY_SCORE: 28
ADLS_ACUITY_SCORE: 34
ADLS_ACUITY_SCORE: 36
ADLS_ACUITY_SCORE: 26
ADLS_ACUITY_SCORE: 33
ADLS_ACUITY_SCORE: 44
ADLS_ACUITY_SCORE: 40
ADLS_ACUITY_SCORE: 33
ADLS_ACUITY_SCORE: 42
ADLS_ACUITY_SCORE: 41
ADLS_ACUITY_SCORE: 41
ADLS_ACUITY_SCORE: 35
ADLS_ACUITY_SCORE: 28
ADLS_ACUITY_SCORE: 26
ADLS_ACUITY_SCORE: 24
ADLS_ACUITY_SCORE: 36
ADLS_ACUITY_SCORE: 28
ADLS_ACUITY_SCORE: 36
ADLS_ACUITY_SCORE: 31
ADLS_ACUITY_SCORE: 28
EQUIPMENT_CURRENTLY_USED_AT_HOME: CANE, STRAIGHT;GRAB BAR, TOILET;GRAB BAR, TUB/SHOWER
ADLS_ACUITY_SCORE: 28
ADLS_ACUITY_SCORE: 26
ADLS_ACUITY_SCORE: 33
ADLS_ACUITY_SCORE: 24
ADLS_ACUITY_SCORE: 26
ADLS_ACUITY_SCORE: 33
ADLS_ACUITY_SCORE: 36
ADLS_ACUITY_SCORE: 33
ADLS_ACUITY_SCORE: 36
ADLS_ACUITY_SCORE: 33
ADLS_ACUITY_SCORE: 28
TRANSFERRING: 1-->ASSISTANCE (EQUIPMENT/PERSON) NEEDED (NOT DEVELOPMENTALLY APPROPRIATE)
ADLS_ACUITY_SCORE: 41
ADLS_ACUITY_SCORE: 26
ADLS_ACUITY_SCORE: 36
TRANSFERRING: 1-->ASSISTANCE (EQUIPMENT/PERSON) NEEDED
CHANGE_IN_FUNCTIONAL_STATUS_SINCE_ONSET_OF_CURRENT_ILLNESS/INJURY: YES
ADLS_ACUITY_SCORE: 40
ADLS_ACUITY_SCORE: 28
ADLS_ACUITY_SCORE: 24
ADLS_ACUITY_SCORE: 34
ADLS_ACUITY_SCORE: 26
ADLS_ACUITY_SCORE: 37
ADLS_ACUITY_SCORE: 40
ADLS_ACUITY_SCORE: 34
DIFFICULTY_COMMUNICATING: NO
ADLS_ACUITY_SCORE: 42
ADLS_ACUITY_SCORE: 28
ADLS_ACUITY_SCORE: 34
ADLS_ACUITY_SCORE: 28
WEAR_GLASSES_OR_BLIND: YES
ADLS_ACUITY_SCORE: 40
ADLS_ACUITY_SCORE: 36
ADLS_ACUITY_SCORE: 33
ADLS_ACUITY_SCORE: 34
ADLS_ACUITY_SCORE: 42
ADLS_ACUITY_SCORE: 40
ADLS_ACUITY_SCORE: 35
ADLS_ACUITY_SCORE: 40
ADLS_ACUITY_SCORE: 35
ADLS_ACUITY_SCORE: 26
ADLS_ACUITY_SCORE: 33
ADLS_ACUITY_SCORE: 24
ADLS_ACUITY_SCORE: 36
ADLS_ACUITY_SCORE: 36
ADLS_ACUITY_SCORE: 34
ADLS_ACUITY_SCORE: 36
ADLS_ACUITY_SCORE: 26
ADLS_ACUITY_SCORE: 37
ADLS_ACUITY_SCORE: 24
ADLS_ACUITY_SCORE: 24
ADLS_ACUITY_SCORE: 36
ADLS_ACUITY_SCORE: 30
ADLS_ACUITY_SCORE: 39
ADLS_ACUITY_SCORE: 41
TOILETING_ISSUES: NO
ADLS_ACUITY_SCORE: 26
ADLS_ACUITY_SCORE: 33
ADLS_ACUITY_SCORE: 33
ADLS_ACUITY_SCORE: 26
ADLS_ACUITY_SCORE: 28
ADLS_ACUITY_SCORE: 26
ADLS_ACUITY_SCORE: 35
ADLS_ACUITY_SCORE: 41
ADLS_ACUITY_SCORE: 26
ADLS_ACUITY_SCORE: 26
ADLS_ACUITY_SCORE: 36
ADLS_ACUITY_SCORE: 34
ADLS_ACUITY_SCORE: 41
ADLS_ACUITY_SCORE: 30
ADLS_ACUITY_SCORE: 36
ADLS_ACUITY_SCORE: 34
ADLS_ACUITY_SCORE: 36
ADLS_ACUITY_SCORE: 35
ADLS_ACUITY_SCORE: 43
ADLS_ACUITY_SCORE: 33
ADLS_ACUITY_SCORE: 34
ADLS_ACUITY_SCORE: 36
DEPENDENT_IADLS:: INDEPENDENT
ADLS_ACUITY_SCORE: 28
ADLS_ACUITY_SCORE: 41
ADLS_ACUITY_SCORE: 31
ADLS_ACUITY_SCORE: 40
ADLS_ACUITY_SCORE: 35
ADLS_ACUITY_SCORE: 28
ADLS_ACUITY_SCORE: 26
ADLS_ACUITY_SCORE: 26
ADLS_ACUITY_SCORE: 37
ADLS_ACUITY_SCORE: 28
ADLS_ACUITY_SCORE: 30
ADLS_ACUITY_SCORE: 39
ADLS_ACUITY_SCORE: 41
ADLS_ACUITY_SCORE: 37
ADLS_ACUITY_SCORE: 34
ADLS_ACUITY_SCORE: 26
ADLS_ACUITY_SCORE: 36
ADLS_ACUITY_SCORE: 40
ADLS_ACUITY_SCORE: 26
ADLS_ACUITY_SCORE: 24
ADLS_ACUITY_SCORE: 24
ADLS_ACUITY_SCORE: 26
ADLS_ACUITY_SCORE: 41
ADLS_ACUITY_SCORE: 30
ADLS_ACUITY_SCORE: 35
ADLS_ACUITY_SCORE: 26
ADLS_ACUITY_SCORE: 34
ADLS_ACUITY_SCORE: 28
WALKING_OR_CLIMBING_STAIRS: AMBULATION DIFFICULTY, REQUIRES EQUIPMENT
ADLS_ACUITY_SCORE: 37
ADLS_ACUITY_SCORE: 34
ADLS_ACUITY_SCORE: 36
ADLS_ACUITY_SCORE: 32
DEPENDENT_IADLS:: INDEPENDENT
ADLS_ACUITY_SCORE: 35
ADLS_ACUITY_SCORE: 26
ADLS_ACUITY_SCORE: 34
ADLS_ACUITY_SCORE: 28
ADLS_ACUITY_SCORE: 40
ADLS_ACUITY_SCORE: 36
ADLS_ACUITY_SCORE: 31
DOING_ERRANDS_INDEPENDENTLY_DIFFICULTY: YES
ADLS_ACUITY_SCORE: 36
ADLS_ACUITY_SCORE: 35
ADLS_ACUITY_SCORE: 35
ADLS_ACUITY_SCORE: 28
ADLS_ACUITY_SCORE: 36
ADLS_ACUITY_SCORE: 28
ADLS_ACUITY_SCORE: 26
ADLS_ACUITY_SCORE: 28
FALL_HISTORY_WITHIN_LAST_SIX_MONTHS: NO
ADLS_ACUITY_SCORE: 40
ADLS_ACUITY_SCORE: 38
ADLS_ACUITY_SCORE: 39
ADLS_ACUITY_SCORE: 38
ADLS_ACUITY_SCORE: 26
HEARING_DIFFICULTY_OR_DEAF: NO
ADLS_ACUITY_SCORE: 26
ADLS_ACUITY_SCORE: 40
ADLS_ACUITY_SCORE: 37
ADLS_ACUITY_SCORE: 30
ADLS_ACUITY_SCORE: 26
DRESSING/BATHING_DIFFICULTY: NO
ADLS_ACUITY_SCORE: 37
ADLS_ACUITY_SCORE: 34
ADLS_ACUITY_SCORE: 33
ADLS_ACUITY_SCORE: 26
DIFFICULTY_EATING/SWALLOWING: NO
ADLS_ACUITY_SCORE: 26
ADLS_ACUITY_SCORE: 30

## 2023-01-01 ASSESSMENT — COPD QUESTIONNAIRES
COPD: 1
COPD: 1
CAT_SEVERITY: MODERATE
CAT_SEVERITY: MODERATE

## 2023-01-01 ASSESSMENT — LIFESTYLE VARIABLES: TOBACCO_USE: 1

## 2023-01-18 PROBLEM — E87.1 HYPONATREMIA: Status: ACTIVE | Noted: 2023-01-01

## 2023-01-18 NOTE — LETTER
[facility]  [address] [place] [address]  [address]  [facility] [address]  Phone: [phone]  Fax: [phone]  Primary Provider: Rodrigo Nava  Pre-op Performing Provider: RODRIGO NAVA      PREOPERATIVE EVALUATION:  Today's date: 1/18/2023    Rashaun Molina is a 62 year old male who presents for a preoperative evaluation.    Surgical Information:  Surgery/Procedure: left fibula hardware removal  Surgery Location: [facility]  Surgeon: Dr Mike Jackson  Surgery Date: 1/24/23  Time of Surgery: am  Where patient plans to recover: Other: hospital stay  Fax number for surgical facility:     Type of Anesthesia Anticipated: to be determined    Assessment & Plan     The proposed surgical procedure is considered INTERMEDIATE risk.    Preoperative examination      Infection and inflammatory reaction due to other internal orthopedic prosthetic devices, implants and grafts, sequela      Essential hypertension, benign  Well controlled    Parkinson disease (H)  Stable    Chronic bronchitis, unspecified chronic bronchitis type (H)  Control stable    HYPONATREMIA-pt found to have critical hyponatremia during preop-send to hospital where he was admitted for 4 days-ultimately diagnosed with SIADH- has difficulty tolerating fluid restrictions and salt tab relatively contraindicated due to HTN-was seen by nephrology and started on Tolvaptan-recommend close monitoring of sodium and fluids            Risks and Recommendations:  The patient has the following additional risks and recommendations for perioperative complications:   - No identified additional risk factors other than previously addressed    Medication Instructions:  Patient is to take all scheduled medications on the day of surgery    RECOMMENDATION:  APPROVAL GIVEN to proceed with proposed procedure-please note hyponatremia issue as above-was inpatient 1/18-1/21    Review of external notes as documented elsewhere in note  Review of the  result(s) of each unique test - labs and EKG  Ordering of each unique test        Subjective     1. No - Have you ever had a heart attack or stroke?  2. No - Have you ever had surgery on your heart or blood vessels, such as a stent, coronary (heart) bypass, or surgery on an artery in the head, neck, heart, or legs?  3. No - Do you have chest pain when you are physically active?  4. No - Do you have a history of heart failure?  5. No - Do you currently have a cold, bronchitis, or symptoms of other respiratory (head and chest) infections?  6. No - Do you have a cough, shortness of breath, or wheezing?  7. No - Do you or anyone in your family have a history of blood clots?  8. No - Do you or anyone in your family have a serious bleeding problem, such as long-lasting bleeding after surgeries or cuts?  9. No - Have you ever had anemia or been told to take iron pills?  10. No - Have you had any abnormal blood loss such as black, tarry or bloody stools, or abnormal vaginal bleeding?  11. No - Have you ever had a blood transfusion?  12. Yes - Are you willing to have a blood transfusion if it is medically needed before, during, or after your surgery?  13. No - Have you or anyone in your family ever had problems with anesthesia (sedation for surgery)?  14. No - Do you have sleep apnea, excessive snoring, or daytime drowsiness?   15. No - Do you have any artifical heart valves or other implanted medical devices, such as a pacemaker, defibrillator, or continuous glucose monitor?  16. Yes - Do you have any artifical joints? Right hip  17. No - Are you allergic to latex?  18. No - Is there any chance that you may be pregnant?      Health Care Directive:  Patient does not have a Health Care Directive or Living Will: Discussed advance care planning with patient; information given to patient to review.    Preoperative Review of :   reviewed - controlled substances reflected in medication list.          Review of  Systems  CONSTITUTIONAL: NEGATIVE for fever, chills, change in weight  ENT/MOUTH: NEGATIVE for ear, mouth and throat problems  RESP: NEGATIVE for significant cough or SOB  CV: NEGATIVE for chest pain, palpitations or peripheral edema    Patient Active Problem List    Diagnosis Date Noted     Infected hardware in left lower extremity (H) 08/24/2022     Priority: Medium     Closed left ankle fracture 06/08/2022     Priority: Medium     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     Priority: Medium     Colonic polyp 11/03/2021     Priority: Medium     Parkinson disease (H) 07/30/2020     Priority: Medium     Palpitations 12/31/2018     Priority: Medium     PVD (peripheral vascular disease) (H) 04/30/2018     Priority: Medium     Chronic bronchitis, unspecified chronic bronchitis type (H) 10/30/2017     Priority: Medium     Obesity due to excess calories, unspecified obesity severity 07/06/2016     Priority: Medium     S/P total hip arthroplasty 10/07/2015     Priority: Medium     Colovesical fistula 04/08/2013     Priority: Medium     BCC (basal cell carcinoma) 04/13/2011     Priority: Medium     Mohs 2011       History of colonic polyps 06/28/2005     Priority: Medium     Problem list name updated by automated process. Provider to review       Pure hypercholesterolemia      Priority: Medium     Essential hypertension, benign      Priority: Medium     Health Care Home 03/18/2013     Priority: Low     State Tier Level:  Tier 2  Status:  na  Care Coordinator:      See Letters for [facility] Care Plan            Past Medical History:   Diagnosis Date     Arthritis      Chronic airway obstruction, not elsewhere classified 03/08/2004    pt says that he does not have COPD     Cough syncope 11/21/2012     Essential hypertension, benign      Fistula     colon/bladder     Fracture of right proximal fibula 07/30/2014     Orthostatic hypotension 11/24/2014     Other chronic pain     right hip pain     PAD (peripheral artery disease) (H) 08/2018    PTA  right SFA Dr. Lee     Palpitations 12/2018 01/2019 Event monitor- SR/ST     Parkinsons disease (H)      Personal history of colonic polyps 06/28/2005     Pure hypercholesterolemia      Tobacco use disorder 03/08/2004     Vasovagal syncopes 10/25/2014     Viral warts, unspecified     genital     Past Surgical History:   Procedure Laterality Date     ARTHROPLASTY HIP Right 10/07/2015    Procedure: ARTHROPLASTY HIP;  Surgeon: Neil Davis MD;  Location:  OR     COLONOSCOPY  09/05/2004    Per Hospital encounter dated 4/18/13     COLONOSCOPY N/A 04/09/2018    Procedure: COMBINED COLONOSCOPY, SINGLE OR MULTIPLE BIOPSY/POLYPECTOMY BY BIOPSY;;  Surgeon: Tramaine Zuniga MD;  Location:  GI     COMBINED CYSTOSCOPY, INSERT CATHETER URETER  04/11/2013    Procedure: COMBINED CYSTOSCOPY, INSERT CATHETER URETER;;  Surgeon: Kashif Parks MD;  Location:  OR      LARYNGOSCOPY DIRECT W VOCAL CORD INJECTION      vocal cord polyps     IRRIGATION AND DEBRIDEMENT Left 08/2022    ankle     LAPAROSCOPIC ASSISTED COLECTOMY  04/11/2013    Procedure: LAPAROSCOPIC ASSISTED COLECTOMY;  LOW ANTERIOR RESECTION ;  Surgeon: Tramaine Zuniga MD;  Location:  OR     OPEN REDUCTION INTERNAL FIXATION ANKLE Left 06/09/2022    Procedure: .  Open reduction internal fixation of left ankle syndesmotic injury 2.  Non-operative treatment of left proximal fibula fracture;  Surgeon: Mike Jackson MD;  Location:  OR     OPEN REDUCTION INTERNAL FIXATION ANKLE Left 8/24/2022    Procedure: Open reduction internal fixation ankle;  Surgeon: Mike Jackson MD;  Location:  OR     REMOVE HARDWARE ANKLE Left 8/24/2022    Procedure: 1.  Removal hardware left ankle 2.  Excisional debridement of left lateral ankle wound deepest level of debridement bone 3.  Revision open reduction internal fixation of left ankle syndesmotic injury 4.  Deltoid ligament repair left ankle;  Surgeon: Mike Jackson MD;  Location:  OR     [facility]  COLONOSCOPY THRU STOMA W BIOPSY/CAUTERY TUMOR/POLYP/LESION      michael, tubular adenoma, next colonoscopy      [facility] COLONOSCOPY THRU STOMA, DIAGNOSTIC  2001    negative, ligate two internal hemmorhoids  Dr rodriguez-repeat      Current Outpatient Medications      Refill                            1       1       1              1     Current Outpatient Medications   Medication Sig Dispense Refill     carbidopa-levodopa (SINEMET)  MG tablet Take 2 tablets by mouth 3 times daily (Takes at 0400, 1000, and 1600)       ciprofloxacin (CIPRO) 500 MG tablet Take 1 tablet by mouth 2 times daily       lisinopril (ZESTRIL) 40 MG tablet Take 1 tablet (40 mg) by mouth daily 90 tablet 1     metoprolol succinate ER (TOPROL XL) 50 MG 24 hr tablet Take 1 tablet (50 mg) by mouth daily 90 tablet 1     rosuvastatin (CRESTOR) 40 MG tablet Take 1 tablet (40 mg) by mouth daily 90 tablet 1     traMADol (ULTRAM) 50 MG tablet Take 50 mg by mouth every 12 hours as needed       umeclidinium (INCRUSE ELLIPTA) 62.5 MCG/INH inhaler Inhale 1 puff into the lungs daily 30 each 1     acetaminophen (TYLENOL) 500 MG tablet Take 500-1,000 mg by mouth every 6 hours as needed for mild pain       aspirin (ASA) 325 MG EC tablet Take 325 mg by mouth daily       Tolvaptan 15 MG TBPK Take 15 mg by mouth daily 14 each 0      Allergies   Allergen Reactions     Spiriva Handihaler Blisters     Hctz [Hydrochlorothiazide] Other (See Comments)     Low sodium        Social History     Tobacco Use     Smoking status: Former     Packs/day: 1.50     Years: 40.00     Pack years: 60.00     Types: Cigarettes     Quit date: 2013     Years since quittin.7     Smokeless tobacco: Never   Substance Use Topics     Alcohol use: Yes     Alcohol/week: 14.0 standard drinks     Types: 14 Cans of beer per week     Comment: 2 beers daily     Family History   Problem Relation Age of Onset     Hypertension Mother      Hypertension Father          MI      "Heart Disease Father         MI  at age 55     Coronary Artery Disease Father      Hyperlipidemia Brother      Hypertension Brother      Heart Surgery Brother         defibrillator     Hypertension Sister      Prostate Cancer No family hx of      History   Drug Use No     Comment: in 1970's used marijauna and cocaine         Objective     /70 (BP Location: Right arm, Patient Position: Chair, Cuff Size: Adult Regular)   Pulse 68   Temp 97.2  F (36.2  C) (Temporal)   Resp 20   Ht 1.778 m (5' 10\")   Wt 90.3 kg (199 lb)   BMI 28.55 kg/m      Physical Exam  GENERAL APPEARANCE: healthy, alert and no distress  HENT: ear canals and TM's normal and nose and mouth without ulcers or lesions  RESP: lungs clear to auscultation - no rales, rhonchi or wheezes  CV: regular rate and rhythm, normal S1 S2, no S3 or S4 and no murmur, click or rub   ABDOMEN: soft, nontender, no HSM or masses and bowel sounds normal  NEURO: Normal strength and tone, sensory exam grossly normal, mentation intact and speech normal    Recent Labs   Lab Test 10/19/22  1140 10/12/22  1140 22  1245 22  0724 22  1434 22  0000 22  2151 22  1657   HGB 11.9* 10.9*   < > 9.9*   < >  --    < > 11.1*    208   < > 139*   < >  --    < > 122*   INR  --   --   --   --   --   --   --  1.11   NA  --   --   --  131*  --  130.9*   < > 120*   POTASSIUM  --   --   --  4.1  --  4.61   < > 4.6   CR 0.78 0.80   < > 0.98   < > 1.01   < > 0.75    < > = values in this interval not displayed.        Diagnostics:  Recent Results (from the past 24 hour(s))   Basic Metabolic Panel (BFP)    Collection Time: 23 12:00 AM   Result Value Ref Range    Carbon Dioxide 26.9 20 - 32 mmol/L    Creatinine 0.97 0.60 - 1.30 mg/dL    Glucose 96 60 - 99 mg/dL    Sodium 120.1 (A) 135 - 146 mmol/L    Potassium 4.72 3.5 - 5.3 mmol/L    Chloride 82.3 (A) 98 - 110 mmol/L    Urea Nitrogen 12 7 - 25 mg/dL    Calcium 9.3 8.6 - 10.3 mg/dL    " BUN/Creatinine Ratio 12.4 6 - 22   HEMOGRAM PLATELET DIFF (BFP)    Collection Time: 01/18/23 12:00 AM   Result Value Ref Range    WBC 9.1 4.0 - 11 10*9/L    RBC Count 3.65 (A) 4.4 - 5.9 10*12/L    Hemoglobin 11.6 (A) 13.3 - 17.7 g/dL    Hematocrit 35.8 (A) 40.0 - 53.0 %    MCV 98.1 78 - 100 fL    MCH 31.8 26 - 33 pg    MCHC 32.4 31 - 36 g/dL    Platelet Count 224 150 - 375 10^9/L    % Granulocytes 71.7 %    % Lymphocytes 16.6 %    % Monocytes 11.7 %      EKG: appears normal, NSR, normal axis, normal intervals, no acute ST/T changes c/w ischemia, no LVH by voltage criteria, unchanged from previous tracings    Revised Cardiac Risk Index (RCRI):  The patient has the following serious cardiovascular risks for perioperative complications:   - No serious cardiac risks = 0 points     RCRI Interpretation: 0 points: Class I (very low risk - 0.4% complication rate)           Signed Electronically by: Yemi Nava MD  Copy of this evaluation report is provided to requesting physician.

## 2023-01-18 NOTE — PROGRESS NOTES
[facility] PHYSICIANS  [address] [place] [address]  [address]  [facility] [address]  Phone: [phone]  Fax: [phone]  Primary Provider: Rodrigo aNva  Pre-op Performing Provider: RODRIGO NAVA      PREOPERATIVE EVALUATION:  Today's date: 1/18/2023    Rashaun Molina is a 62 year old male who presents for a preoperative evaluation.    Surgical Information:  Surgery/Procedure: left fibula hardware removal  Surgery Location: [facility]  Surgeon: Dr Mike Jackson  Surgery Date: 1/24/23  Time of Surgery: am  Where patient plans to recover: Other: hospital stay  Fax number for surgical facility:     Type of Anesthesia Anticipated: to be determined    Assessment & Plan     The proposed surgical procedure is considered INTERMEDIATE risk.    Preoperative examination      Infection and inflammatory reaction due to other internal orthopedic prosthetic devices, implants and grafts, sequela      Essential hypertension, benign  Well controlled    Parkinson disease (H)  Stable    Chronic bronchitis, unspecified chronic bronchitis type (H)  Control stable    HYPONATREMIA-pt found to have critical hyponatremia during preop-send to hospital where he was admitted for 4 days-ultimately diagnosed with SIADH- has difficulty tolerating fluid restrictions and salt tab relatively contraindicated due to HTN-was seen by nephrology and started on Tolvaptan-recommend close monitoring of sodium and fluids            Risks and Recommendations:  The patient has the following additional risks and recommendations for perioperative complications:   - No identified additional risk factors other than previously addressed    Medication Instructions:  Patient is to take all scheduled medications on the day of surgery    RECOMMENDATION:  APPROVAL GIVEN to proceed with proposed procedure-please note hyponatremia issue as above-was inpatient 1/18-1/21    Review of external notes as documented elsewhere in note  Review of the  result(s) of each unique test - labs and EKG  Ordering of each unique test        Subjective     1. No - Have you ever had a heart attack or stroke?  2. No - Have you ever had surgery on your heart or blood vessels, such as a stent, coronary (heart) bypass, or surgery on an artery in the head, neck, heart, or legs?  3. No - Do you have chest pain when you are physically active?  4. No - Do you have a history of heart failure?  5. No - Do you currently have a cold, bronchitis, or symptoms of other respiratory (head and chest) infections?  6. No - Do you have a cough, shortness of breath, or wheezing?  7. No - Do you or anyone in your family have a history of blood clots?  8. No - Do you or anyone in your family have a serious bleeding problem, such as long-lasting bleeding after surgeries or cuts?  9. No - Have you ever had anemia or been told to take iron pills?  10. No - Have you had any abnormal blood loss such as black, tarry or bloody stools, or abnormal vaginal bleeding?  11. No - Have you ever had a blood transfusion?  12. Yes - Are you willing to have a blood transfusion if it is medically needed before, during, or after your surgery?  13. No - Have you or anyone in your family ever had problems with anesthesia (sedation for surgery)?  14. No - Do you have sleep apnea, excessive snoring, or daytime drowsiness?   15. No - Do you have any artifical heart valves or other implanted medical devices, such as a pacemaker, defibrillator, or continuous glucose monitor?  16. Yes - Do you have any artifical joints? Right hip  17. No - Are you allergic to latex?  18. No - Is there any chance that you may be pregnant?      Health Care Directive:  Patient does not have a Health Care Directive or Living Will: Discussed advance care planning with patient; information given to patient to review.    Preoperative Review of :   reviewed - controlled substances reflected in medication list.          Review of  Systems  CONSTITUTIONAL: NEGATIVE for fever, chills, change in weight  ENT/MOUTH: NEGATIVE for ear, mouth and throat problems  RESP: NEGATIVE for significant cough or SOB  CV: NEGATIVE for chest pain, palpitations or peripheral edema    Patient Active Problem List    Diagnosis Date Noted     Infected hardware in left lower extremity (H) 08/24/2022     Priority: Medium     Closed left ankle fracture 06/08/2022     Priority: Medium     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     Priority: Medium     Colonic polyp 11/03/2021     Priority: Medium     Parkinson disease (H) 07/30/2020     Priority: Medium     Palpitations 12/31/2018     Priority: Medium     PVD (peripheral vascular disease) (H) 04/30/2018     Priority: Medium     Chronic bronchitis, unspecified chronic bronchitis type (H) 10/30/2017     Priority: Medium     Obesity due to excess calories, unspecified obesity severity 07/06/2016     Priority: Medium     S/P total hip arthroplasty 10/07/2015     Priority: Medium     Colovesical fistula 04/08/2013     Priority: Medium     BCC (basal cell carcinoma) 04/13/2011     Priority: Medium     Mohs 2011       History of colonic polyps 06/28/2005     Priority: Medium     Problem list name updated by automated process. Provider to review       Pure hypercholesterolemia      Priority: Medium     Essential hypertension, benign      Priority: Medium     Health Care Home 03/18/2013     Priority: Low     State Tier Level:  Tier 2  Status:  na  Care Coordinator:      See Letters for [facility] Care Plan            Past Medical History:   Diagnosis Date     Arthritis      Chronic airway obstruction, not elsewhere classified 03/08/2004    pt says that he does not have COPD     Cough syncope 11/21/2012     Essential hypertension, benign      Fistula     colon/bladder     Fracture of right proximal fibula 07/30/2014     Orthostatic hypotension 11/24/2014     Other chronic pain     right hip pain     PAD (peripheral artery disease) (H) 08/2018    PTA  right SFA Dr. Lee     Palpitations 12/2018 01/2019 Event monitor- SR/ST     Parkinsons disease (H)      Personal history of colonic polyps 06/28/2005     Pure hypercholesterolemia      Tobacco use disorder 03/08/2004     Vasovagal syncopes 10/25/2014     Viral warts, unspecified     genital     Past Surgical History:   Procedure Laterality Date     ARTHROPLASTY HIP Right 10/07/2015    Procedure: ARTHROPLASTY HIP;  Surgeon: Neil Davis MD;  Location:  OR     COLONOSCOPY  09/05/2004    Per Hospital encounter dated 4/18/13     COLONOSCOPY N/A 04/09/2018    Procedure: COMBINED COLONOSCOPY, SINGLE OR MULTIPLE BIOPSY/POLYPECTOMY BY BIOPSY;;  Surgeon: Tramaine Zuniga MD;  Location:  GI     COMBINED CYSTOSCOPY, INSERT CATHETER URETER  04/11/2013    Procedure: COMBINED CYSTOSCOPY, INSERT CATHETER URETER;;  Surgeon: Kashif Parks MD;  Location:  OR      LARYNGOSCOPY DIRECT W VOCAL CORD INJECTION      vocal cord polyps     IRRIGATION AND DEBRIDEMENT Left 08/2022    ankle     LAPAROSCOPIC ASSISTED COLECTOMY  04/11/2013    Procedure: LAPAROSCOPIC ASSISTED COLECTOMY;  LOW ANTERIOR RESECTION ;  Surgeon: Tramaine Zuniga MD;  Location:  OR     OPEN REDUCTION INTERNAL FIXATION ANKLE Left 06/09/2022    Procedure: .  Open reduction internal fixation of left ankle syndesmotic injury 2.  Non-operative treatment of left proximal fibula fracture;  Surgeon: Mike Jackson MD;  Location:  OR     OPEN REDUCTION INTERNAL FIXATION ANKLE Left 8/24/2022    Procedure: Open reduction internal fixation ankle;  Surgeon: Mike Jackson MD;  Location:  OR     REMOVE HARDWARE ANKLE Left 8/24/2022    Procedure: 1.  Removal hardware left ankle 2.  Excisional debridement of left lateral ankle wound deepest level of debridement bone 3.  Revision open reduction internal fixation of left ankle syndesmotic injury 4.  Deltoid ligament repair left ankle;  Surgeon: Mike Jackson MD;  Location:  OR     [facility]  COLONOSCOPY THRU STOMA W BIOPSY/CAUTERY TUMOR/POLYP/LESION      michael, tubular adenoma, next colonoscopy      [facility] COLONOSCOPY THRU STOMA, DIAGNOSTIC  2001    negative, ligate two internal hemmorhoids  Dr rodriguez-repeat      Current Outpatient Medications      Refill                            1       1       1              1     Current Outpatient Medications   Medication Sig Dispense Refill     carbidopa-levodopa (SINEMET)  MG tablet Take 2 tablets by mouth 3 times daily (Takes at 0400, 1000, and 1600)       ciprofloxacin (CIPRO) 500 MG tablet Take 1 tablet by mouth 2 times daily       lisinopril (ZESTRIL) 40 MG tablet Take 1 tablet (40 mg) by mouth daily 90 tablet 1     metoprolol succinate ER (TOPROL XL) 50 MG 24 hr tablet Take 1 tablet (50 mg) by mouth daily 90 tablet 1     rosuvastatin (CRESTOR) 40 MG tablet Take 1 tablet (40 mg) by mouth daily 90 tablet 1     traMADol (ULTRAM) 50 MG tablet Take 50 mg by mouth every 12 hours as needed       umeclidinium (INCRUSE ELLIPTA) 62.5 MCG/INH inhaler Inhale 1 puff into the lungs daily 30 each 1     acetaminophen (TYLENOL) 500 MG tablet Take 500-1,000 mg by mouth every 6 hours as needed for mild pain       aspirin (ASA) 325 MG EC tablet Take 325 mg by mouth daily       Tolvaptan 15 MG TBPK Take 15 mg by mouth daily 14 each 0      Allergies   Allergen Reactions     Spiriva Handihaler Blisters     Hctz [Hydrochlorothiazide] Other (See Comments)     Low sodium        Social History     Tobacco Use     Smoking status: Former     Packs/day: 1.50     Years: 40.00     Pack years: 60.00     Types: Cigarettes     Quit date: 2013     Years since quittin.7     Smokeless tobacco: Never   Substance Use Topics     Alcohol use: Yes     Alcohol/week: 14.0 standard drinks     Types: 14 Cans of beer per week     Comment: 2 beers daily     Family History   Problem Relation Age of Onset     Hypertension Mother      Hypertension Father          MI      "Heart Disease Father         MI  at age 55     Coronary Artery Disease Father      Hyperlipidemia Brother      Hypertension Brother      Heart Surgery Brother         defibrillator     Hypertension Sister      Prostate Cancer No family hx of      History   Drug Use No     Comment: in 1970's used marijauna and cocaine         Objective     /70 (BP Location: Right arm, Patient Position: Chair, Cuff Size: Adult Regular)   Pulse 68   Temp 97.2  F (36.2  C) (Temporal)   Resp 20   Ht 1.778 m (5' 10\")   Wt 90.3 kg (199 lb)   BMI 28.55 kg/m      Physical Exam  GENERAL APPEARANCE: healthy, alert and no distress  HENT: ear canals and TM's normal and nose and mouth without ulcers or lesions  RESP: lungs clear to auscultation - no rales, rhonchi or wheezes  CV: regular rate and rhythm, normal S1 S2, no S3 or S4 and no murmur, click or rub   ABDOMEN: soft, nontender, no HSM or masses and bowel sounds normal  NEURO: Normal strength and tone, sensory exam grossly normal, mentation intact and speech normal    Recent Labs   Lab Test 10/19/22  1140 10/12/22  1140 22  1245 22  0724 22  1434 22  0000 22  2151 22  1657   HGB 11.9* 10.9*   < > 9.9*   < >  --    < > 11.1*    208   < > 139*   < >  --    < > 122*   INR  --   --   --   --   --   --   --  1.11   NA  --   --   --  131*  --  130.9*   < > 120*   POTASSIUM  --   --   --  4.1  --  4.61   < > 4.6   CR 0.78 0.80   < > 0.98   < > 1.01   < > 0.75    < > = values in this interval not displayed.        Diagnostics:  Recent Results (from the past 24 hour(s))   Basic Metabolic Panel (BFP)    Collection Time: 23 12:00 AM   Result Value Ref Range    Carbon Dioxide 26.9 20 - 32 mmol/L    Creatinine 0.97 0.60 - 1.30 mg/dL    Glucose 96 60 - 99 mg/dL    Sodium 120.1 (A) 135 - 146 mmol/L    Potassium 4.72 3.5 - 5.3 mmol/L    Chloride 82.3 (A) 98 - 110 mmol/L    Urea Nitrogen 12 7 - 25 mg/dL    Calcium 9.3 8.6 - 10.3 mg/dL    " BUN/Creatinine Ratio 12.4 6 - 22   HEMOGRAM PLATELET DIFF (BFP)    Collection Time: 01/18/23 12:00 AM   Result Value Ref Range    WBC 9.1 4.0 - 11 10*9/L    RBC Count 3.65 (A) 4.4 - 5.9 10*12/L    Hemoglobin 11.6 (A) 13.3 - 17.7 g/dL    Hematocrit 35.8 (A) 40.0 - 53.0 %    MCV 98.1 78 - 100 fL    MCH 31.8 26 - 33 pg    MCHC 32.4 31 - 36 g/dL    Platelet Count 224 150 - 375 10^9/L    % Granulocytes 71.7 %    % Lymphocytes 16.6 %    % Monocytes 11.7 %      EKG: appears normal, NSR, normal axis, normal intervals, no acute ST/T changes c/w ischemia, no LVH by voltage criteria, unchanged from previous tracings    Revised Cardiac Risk Index (RCRI):  The patient has the following serious cardiovascular risks for perioperative complications:   - No serious cardiac risks = 0 points     RCRI Interpretation: 0 points: Class I (very low risk - 0.4% complication rate)           Signed Electronically by: Yemi Nava MD  Copy of this evaluation report is provided to requesting physician.

## 2023-01-18 NOTE — NURSING NOTE
Rashanu Molina is here for a pre-op exam.    Questioned patient about current smoking habits.  Pt. quit smoking some time ago.  PULSE regular  My Chart: declines  CLASSIFICATION OF OVERWEIGHT AND OBESITY BY BMI                        Obesity Class           BMI(kg/m2)  Underweight                                    < 18.5  Normal                                         18.5-24.9  Overweight                                     25.0-29.9  OBESITY                     I                  30.0-34.9                             II                 35.0-39.9  EXTREME OBESITY             III                >40                            Patient's  BMI Body mass index is 28.55 kg/m .  http://hin.nhlbi.nih.gov/menuplanner/menu.cgi  Pre-visit planning  Immunizations - up to date  Colonoscopy -   Mammogram -   Asthma -   PHQ9 -    CHICHO-7 -

## 2023-01-18 NOTE — Clinical Note
Can you guys please try to call surgeon at [facility] in preop and let them know [facility]suspect he will be released over weekend but we will not be able to OK preop until Monday-just an FYI

## 2023-01-19 NOTE — PLAN OF CARE
Received a critical lab of Sodium 117. Hospitalist bebad. Will continue to monitor and assess pt.

## 2023-01-19 NOTE — ED NOTES
[facility]  ED Nurse Handoff Report    Rashaun Molina is a 62 year old male   ED Chief complaint: Abnormal Labs  . ED Diagnosis:   Final diagnoses:   Hyponatremia     Allergies:   Allergies   Allergen Reactions     Spiriva Handihaler Blisters     Hctz [Hydrochlorothiazide] Other (See Comments)     Low sodium       Code Status: Full Code  Activity level - Baseline/Home:  Independent. Activity Level - Current:   Assist X 1. Lift room needed: No. Bariatric: No   Needed: No   Isolation: No. Infection: Not Applicable.     Vital Signs:   Vitals:    01/18/23 1723 01/18/23 1726   BP:  129/70   Pulse: 73    Resp: 16    Temp: 98.4  F (36.9  C)    TempSrc: Temporal    SpO2: 99%        Cardiac Rhythm:  ,      Pain level:    Patient confused: No. Patient Falls Risk: Yes.   Elimination Status: has not yet voided   Patient Report - Initial Complaint: low sodium. Focused Assessment: Rashaun Molina is a 62 year old male who presents with low sodium values. Today, the patient reports that he presented to his pre-operation appointment at his primary care provider. After receiving his laboratory values, the patient was recommended to present to the ED due to low sodium value. The patient is scheduled to have hardware removed in his left leg due a fracture that occurred Memorial weekend. The patient reports a history of low sodium values and denies use of diuretics. He also states that has not been feeling more thirsty than and has not been drinking more water than usual. The patient denies a feeling of heart flutters.    Tests Performed: labs. Abnormal Results:   No orders to display     Labs Ordered and Resulted from Time of ED Arrival to Time of ED Departure   BASIC METABOLIC PANEL - Abnormal       Result Value    Sodium 116 (*)     Potassium 4.7      Chloride 78 (*)     Carbon Dioxide (CO2) 25      Anion Gap 13      Urea Nitrogen 10.2      Creatinine 0.78      Calcium 9.3      Glucose 98      GFR Estimate >90      CBC WITH PLATELETS AND DIFFERENTIAL - Abnormal    WBC Count 10.9      RBC Count 3.44 (*)     Hemoglobin 10.8 (*)     Hematocrit 32.0 (*)     MCV 93      MCH 31.4      MCHC 33.8      RDW 15.0      Platelet Count 185      % Neutrophils 69      % Lymphocytes 16      % Monocytes 11      % Eosinophils 2      % Basophils 1      % Immature Granulocytes 1      NRBCs per 100 WBC 0      Absolute Neutrophils 7.7      Absolute Lymphocytes 1.8      Absolute Monocytes 1.2      Absolute Eosinophils 0.2      Absolute Basophils 0.1      Absolute Immature Granulocytes 0.1      Absolute NRBCs 0.0     ROUTINE UA WITH MICROSCOPIC REFLEX TO CULTURE   OSMOLALITY, RANDOM URINE   FRACTIONAL EXCRETION OF SODIUM   .   Treatments provided: 0.9% NaCl  Family Comments: Brother left for night  OBS brochure/video discussed/provided to patient:  N/A  ED Medications:   Medications   sodium chloride 0.9% infusion (0 mLs Intravenous Stopped 1/18/23 1856)   sodium chloride 0.9% infusion ( Intravenous New Bag 1/18/23 1856)     Drips infusing:  Yes  For the majority of the shift, the patient's behavior Green. Interventions performed were NA.    Sepsis treatment initiated: No     Patient tested for COVID 19 prior to admission: NO    ED Nurse Name/Phone Number: Sandra Fatima RN,   7:00 PM    RECEIVING UNIT ED HANDOFF REVIEW    Above ED Nurse Handoff Report was reviewed: Yes  Reviewed by: Patsy Powell RN on January 19, 2023 at 3:55 PM

## 2023-01-19 NOTE — CONSULTS
[facility]    Nephrology Consultation     Date of Admission:  1/18/2023    Assessment & Plan     Rashaun Molina is a 62 year old male who was admitted on 1/18/2023.     Assessment:  1) Acute on Chronic hyponatremia:    Variable baseline values but mostly in hyponatremic range. Overall avg 130 with range 125-134.     Initial sodium 116 in ED.  Studies 1/18: urine sodium 23, urine osm 201, FeNa 0.4%    Historical studies:  6/8/22: serum sodium 120, urine osm 360, urine sodium 36, TSH 1.74    Clearly with the beer intake, beer Potomania is of the differential for the hyponatremia.  Still though appears to have very good solute intake and the total fluid daily consumption does not appear excessive suggesting he may have an underlying SIADH state.  Told patient that decreasing the beer intake will clearly be helpful but alone may not normalize his serum sodium.  Appears euvolemic persistent inappropriately concentrated urine which goes along with an SIADH state.  Discussed with patient mainstay of fluid restriction and overnight we will least try ure-na which could be considered for home use as well.    2) chronic alcohol/beer intake    3) hypertension, appears fairly well controlled on PTA regimen of lisinopril and metoprolol.  Notable reported allergy to hydrochlorothiazide with hyponatremia of occlude is not improved by refraining from this.  Notable case reports of association of lisinopril with SIADH.  Substitution in the future could be done if having recurrent severe hyponatremia    Plan/Recs:  1) start fluid restriction 1200 cc/day  2) starting Ure- na, 30 g twice daily  3) okay to resume antihypertensives.  4) discontinue IVF's  5) continue serial sodium values    DO [facility] - Nephrology  [phone]  --------------------------------------------------------------------------------------------  Reason for Consult     I was asked to see the patient for  hyponatremia    History is obtained from the patient and chart review.      History of Present Illness     Rashaun Molina is a 62 year old male who presented to ED with hyponatremia. Had PCP appt for pre-op visit. Labs done showing hyponatremia and he was directed to go to the ED. Normotensive at PCP visit, hypertensive on arrival to ED. patient states he has been his routine state of health.  Aware of chronic hyponatremia, told that is low but never been told to do any altered behavior except trying to cut down on his beer intake.  He reports he drinks 5-6 beers a day.  Quantify indefinitely he drinks about 2 cases a week which may be closer to 7 beers a day.  Drinks just a little water and very little other fluids a day thus some wear in the 70 to 80 ounces a day total liquids  Not including food.  Does state though he has a very good intake of protein, sodium.  Looks well-nourished denies any changes of weight.  Recently denies any headache, gait instability although has been wearing a boot on his left lower extremity for his ankle fracture.  Some edema in the left ankle but no other edema noted.  Review of symptoms otherwise negative except the ankle fracture and surgeries, has been his routine state health over the last 6 months.    Past Medical History   I have reviewed this patient's medical history and updated it with pertinent information if needed.   Past Medical History:   Diagnosis Date     Arthritis      Chronic airway obstruction, not elsewhere classified 03/08/2004    pt says that he does not have COPD     Cough syncope 11/21/2012     Essential hypertension, benign      Fistula     colon/bladder     Fracture of right proximal fibula 07/30/2014     Orthostatic hypotension 11/24/2014     Other chronic pain     right hip pain     PAD (peripheral artery disease) (H) 08/2018    PTA right SFA Dr. Lee     Palpitations 12/2018 01/2019 Event monitor- SR/ST     Parkinsons disease (H)      Personal history of  colonic polyps 06/28/2005     Pure hypercholesterolemia      Tobacco use disorder 03/08/2004     Vasovagal syncopes 10/25/2014     Viral warts, unspecified     genital       Past Surgical History   I have reviewed this patient's surgical history and updated it with pertinent information if needed.  Past Surgical History:   Procedure Laterality Date     ARTHROPLASTY HIP Right 10/07/2015    Procedure: ARTHROPLASTY HIP;  Surgeon: Neil Davis MD;  Location:  OR     COLONOSCOPY  09/05/2004    Per Hospital encounter dated 4/18/13     COLONOSCOPY N/A 04/09/2018    Procedure: COMBINED COLONOSCOPY, SINGLE OR MULTIPLE BIOPSY/POLYPECTOMY BY BIOPSY;;  Surgeon: Tramaine Zuniga MD;  Location:  GI     COMBINED CYSTOSCOPY, INSERT CATHETER URETER  04/11/2013    Procedure: COMBINED CYSTOSCOPY, INSERT CATHETER URETER;;  Surgeon: Kashif Parks MD;  Location:  OR     HC LARYNGOSCOPY DIRECT W VOCAL CORD INJECTION      vocal cord polyps     IRRIGATION AND DEBRIDEMENT Left 08/2022    ankle     LAPAROSCOPIC ASSISTED COLECTOMY  04/11/2013    Procedure: LAPAROSCOPIC ASSISTED COLECTOMY;  LOW ANTERIOR RESECTION ;  Surgeon: Tramaine Zuniga MD;  Location:  OR     OPEN REDUCTION INTERNAL FIXATION ANKLE Left 06/09/2022    Procedure: .  Open reduction internal fixation of left ankle syndesmotic injury 2.  Non-operative treatment of left proximal fibula fracture;  Surgeon: Mike Jackson MD;  Location:  OR     OPEN REDUCTION INTERNAL FIXATION ANKLE Left 8/24/2022    Procedure: Open reduction internal fixation ankle;  Surgeon: Mike Jackson MD;  Location:  OR     REMOVE HARDWARE ANKLE Left 8/24/2022    Procedure: 1.  Removal hardware left ankle 2.  Excisional debridement of left lateral ankle wound deepest level of debridement bone 3.  Revision open reduction internal fixation of left ankle syndesmotic injury 4.  Deltoid ligament repair left ankle;  Surgeon: Mike Jackson MD;  Location:  OR     [facility]  COLONOSCOPY THRU STOMA W BIOPSY/CAUTERY TUMOR/POLYP/LESION  1998    michael, tubular adenoma, next colonoscopy 2001     [facility] COLONOSCOPY THRU STOMA, DIAGNOSTIC  04/16/2001    negative, ligate two internal hemmorhoids  Dr rodriguez-repeat 2008       Prior to Admission Medications   Prior to Admission Medications   Prescriptions Last Dose Informant Patient Reported? Taking?   acetaminophen (TYLENOL) 500 MG tablet 1/17/2023  Yes Yes   Sig: Take 500-1,000 mg by mouth every 6 hours as needed for mild pain   aspirin (ASA) 325 MG EC tablet 1/18/2023  Yes Yes   Sig: Take 325 mg by mouth daily   carbidopa-levodopa (SINEMET)  MG tablet 1/18/2023 at 1000  No Yes   Sig: Take 2 tablets by mouth 3 times daily (Takes at 0400, 1000, and 1600)   ciprofloxacin (CIPRO) 500 MG tablet 1/18/2023 at AM  Yes Yes   Sig: Take 1 tablet by mouth 2 times daily   lisinopril (ZESTRIL) 40 MG tablet 1/18/2023 at AM  No Yes   Sig: Take 1 tablet (40 mg) by mouth daily   metoprolol succinate ER (TOPROL XL) 50 MG 24 hr tablet 1/18/2023 at AM  No Yes   Sig: Take 1 tablet (50 mg) by mouth daily   rosuvastatin (CRESTOR) 40 MG tablet 1/18/2023 at AM  No Yes   Sig: Take 1 tablet (40 mg) by mouth daily   traMADol (ULTRAM) 50 MG tablet 1/18/2023 at AM  Yes Yes   Sig: Take 50 mg by mouth every 12 hours as needed   umeclidinium (INCRUSE ELLIPTA) 62.5 MCG/INH inhaler   No No   Sig: Inhale 1 puff into the lungs daily      Facility-Administered Medications: None     Allergies   Allergies   Allergen Reactions     Spiriva Handihaler Blisters     Hctz [Hydrochlorothiazide] Other (See Comments)     Low sodium       Social History   I have reviewed this patient's social history and updated it with pertinent information if needed. Rashaun VÁSQUEZ Jesse  reports that he quit smoking about 9 years ago. His smoking use included cigarettes. He has a 60.00 pack-year smoking history. He has never used smokeless tobacco. He reports current alcohol use of about 14.0 standard drinks per  week. He reports that he does not use drugs.    Family History   I have reviewed this patient's family history and updated it with pertinent information if needed.   Family History   Problem Relation Age of Onset     Hypertension Mother      Hypertension Father          MI     Heart Disease Father         MI  at age 55     Coronary Artery Disease Father      Hyperlipidemia Brother      Hypertension Brother      Heart Surgery Brother         defibrillator     Hypertension Sister      Prostate Cancer No family hx of        Review of Systems   The 10 point Review of Systems is negative other than noted in the HPI.     Physical Exam   Temp: 97.6  F (36.4  C) Temp src: Oral BP: (!) 150/74 Pulse: 85   Resp: 21 SpO2: 97 % O2 Device: None (Room air)    Vital Signs with Ranges  Temp:  [97.6  F (36.4  C)-98.5  F (36.9  C)] 97.6  F (36.4  C)  Pulse:  [66-96] 85  Resp:  [16-22] 21  BP: (129-166)/(58-87) 150/74  SpO2:  [94 %-99 %] 97 %  0 lbs 0 oz    GENERAL: healthy, alert, NAD  HEENT:  Normocephalic. No gross abnormalities.   CV: RRR, no murmurs, no clicks, gallops, or rubs, no edema  RESP: Clear bilaterally with good efforts  GI: Abdomen soft/nt/nd, BS normal. No masses, organomegaly  MUSCULOSKELETAL: Left distal ankle wrapped, not examined  SKIN: no suspicious lesions or rashes, dry to touch  NEURO:  Strength normal and symmetric.   PSYCH: mood good, affect appropriate    Data   BMP  Recent Labs   Lab 23  1402 23  1001 23  0759 23  0228 23  2146 23  1727 23  0000   * 124* 123* 121*   < > 116* 120.1*   POTASSIUM  --   --  4.4  --   --  4.7 4.72   CHLORIDE  --   --  86*  --   --  78* 82.3*   FRANNY  --   --  9.0  --   --  9.3 9.3   CO2  --   --  25  --   --  25 26.9   BUN  --   --  10.6  --   --  10.2 12  12.4   CR  --   --  0.76  --   --  0.78 0.97   GLC  --   --  94  --   --  98 96    < > = values in this interval not displayed.     Phos@[facility](phos:4)  CBC)  Recent  Labs   Lab 01/19/23  0759 01/18/23  1727 01/18/23  0000   WBC 6.7 10.9 9.1   HGB 10.4* 10.8* 11.6*   HCT 31.4* 32.0* 35.8*   MCV 95 93 98.1    185 224     No lab results found in last 7 days.    Invalid input(s): BILIRUBININDIRECT  No lab results found in last 7 days.  No results found for: D2VIT, D3VIT, DTOT  Recent Labs   Lab 01/19/23  0759   HGB 10.4*   HCT 31.4*   MCV 95     No results for input(s): PTHI in the last 168 hours.

## 2023-01-19 NOTE — ED PROVIDER NOTES
History     Chief Complaint:  Abnormal Labs       The history is provided by the patient.      Rashaun Molina is a 62 year old male who presents with low sodium values. Today, the patient reports that he presented to his pre-operation appointment at his primary care provider. After receiving his laboratory values, the patient was recommended to present to the ED due to low sodium value. The patient is scheduled to have hardware removed in his left leg due a fracture that occurred Memorial weekend. The patient reports a history of low sodium values and denies use of diuretics. He also states that has not been feeling more thirsty than and has not been drinking more water than usual. The patient denies a feeling of heart flutters.     Independent Historian: Brother supplemented history.    Review of External Notes: PCP Pre-op visit (intermediate surgical risk) and PCP note from 10/11/2022 - concern for inflammatory reaction due to ortho hardware placed after a fall in may 2022. DC Summary 8/27/2022 (Left ankle wound infection, failed fixation)    ROS:  Review of Systems   Cardiovascular:        (-)heart flutter       Allergies:  Spiriva Handihaler  Hctz [Hydrochlorothiazide]     Medications:    Sinemet   Cipro  Zestril   Toprol   Crestor  Ultram   umeclidinium  Plavix - the patient reports that he is not taking this    Past Medical History:    Arthritis   COPD   Hypertension   Fistula   PAD   Palpitations   Parkinson's disease   Hypercholesterolemia   Vasovagal syncope   Iliac artery stenosis     Past Surgical History:    Arthroplasty   Colonoscopy x4  cystoscopy   laryngoscopy   I&D   Colectomy   Open reduction internal fixation x2   Remove ankle hardware    Family History:    CAD   Heart disease   Hyperlipidemia   Hypertension     Social History:  Patient presents to the ED with his brother.   He arrived via private vehicle.   PCP: Yemi Nava     Physical Exam     Patient Vitals for the past 24 hrs:    BP Temp Temp src Pulse Resp SpO2   01/18/23 2008 137/58 98.5  F (36.9  C) Oral 69 16 94 %   01/18/23 1900 (!) 166/87 -- -- 66 22 98 %   01/18/23 1726 129/70 -- -- -- -- --   01/18/23 1723 -- 98.4  F (36.9  C) Temporal 73 16 99 %        Physical Exam  Constitutional: Vital signs reviewed as above.   Head: No external signs of trauma noted.  Eyes: Pupils are equal, round, and reactive to light.   Neck: No JVD noted  Cardiovascular: normal rate, Regular rhythm and normal heart sounds.  No murmur heard. Equal B/L peripheral pulses.  Pulmonary/Chest: Effort normal and breath sounds normal. No respiratory distress. Patient has no wheezes. Patient has no rales.   Gastrointestinal: Soft. There is no tenderness.   Musculoskeletal/Extremities: No pitting edema noted (on the RLE). There is a splint present on the LLE.  Neurological: Patient is alert and oriented to person, place, and time.   Skin: Skin is warm and dry. There is no diaphoresis noted.   Psychiatric: The patient appears calm.    Emergency Department Course   ECG:  ECG results from 01/18/23   EKG 12-lead, tracing only     Value    Systolic Blood Pressure     Diastolic Blood Pressure     Ventricular Rate 68    Atrial Rate 68    MO Interval 188    QRS Duration 84        QTc 427    P Axis 41    R AXIS 40    T Axis 48    Interpretation ECG      Sinus rhythm  Normal ECG  When compared with ECG of 08-JUN-2022 17:09,  Premature supraventricular complexes are no longer Present  Interpreted by Dr. Garduno @ 1800     Laboratory:  Labs Ordered and Resulted from Time of ED Arrival to Time of ED Departure   BASIC METABOLIC PANEL - Abnormal       Result Value    Sodium 116 (*)     Potassium 4.7      Chloride 78 (*)     Carbon Dioxide (CO2) 25      Anion Gap 13      Urea Nitrogen 10.2      Creatinine 0.78      Calcium 9.3      Glucose 98      GFR Estimate >90     CBC WITH PLATELETS AND DIFFERENTIAL - Abnormal    WBC Count 10.9      RBC Count 3.44 (*)     Hemoglobin 10.8 (*)      Hematocrit 32.0 (*)     MCV 93      MCH 31.4      MCHC 33.8      RDW 15.0      Platelet Count 185      % Neutrophils 69      % Lymphocytes 16      % Monocytes 11      % Eosinophils 2      % Basophils 1      % Immature Granulocytes 1      NRBCs per 100 WBC 0      Absolute Neutrophils 7.7      Absolute Lymphocytes 1.8      Absolute Monocytes 1.2      Absolute Eosinophils 0.2      Absolute Basophils 0.1      Absolute Immature Granulocytes 0.1      Absolute NRBCs 0.0     MAGNESIUM - Abnormal    Magnesium 1.6 (*)    SODIUM - Abnormal    Sodium 117 (*)    ROUTINE UA WITH MICROSCOPIC REFLEX TO CULTURE - Normal    Color Urine Light Yellow      Appearance Urine Clear      Glucose Urine Negative      Bilirubin Urine Negative      Ketones Urine Negative      Specific Gravity Urine 1.007      Blood Urine Negative      pH Urine 6.0      Protein Albumin Urine Negative      Urobilinogen Urine Normal      Nitrite Urine Negative      Leukocyte Esterase Urine Negative      RBC Urine 1      WBC Urine <1     COVID-19 VIRUS (CORONAVIRUS) BY PCR - Normal    SARS CoV2 PCR Negative     FRACTIONAL EXCRETION OF SODIUM    Creatinine Urine mg/dL 38.9      Sodium Urine mmol/L 23      %FENA 0.4     OSMOLALITY, RANDOM URINE   SODIUM   FRACTIONAL EXCRETION OF SODIUM      Emergency Department Course & Assessments:         Interventions:  Medications   lidocaine 1 % 0.1-1 mL (has no administration in time range)   lidocaine (LMX4) cream (has no administration in time range)   sodium chloride (PF) 0.9% PF flush 3 mL (3 mLs Intracatheter Not Given 1/18/23 2007)   sodium chloride (PF) 0.9% PF flush 3 mL (has no administration in time range)   melatonin tablet 1 mg (has no administration in time range)   sodium chloride 0.9% infusion ( Intravenous New Bag 1/18/23 7842)   acetaminophen (TYLENOL) tablet 650 mg (650 mg Oral Given 1/18/23 2006)     Or   acetaminophen (TYLENOL) Suppository 650 mg ( Rectal See Alternative 1/18/23 2006)   thiamine (B-1)  tablet 100 mg (100 mg Oral Given 1/18/23 2006)   sodium chloride 0.9% infusion (0 mLs Intravenous Stopped 1/18/23 1856)   sodium chloride 0.9% infusion ( Intravenous New Bag 1/18/23 1856)      Independent Interpretation (X-rays, CTs, rhythm strip):  No imaging obtained during this visit.      Consultations/Discussion of Management or Tests:  ED Course as of 01/19/23 0126 Wed Jan 18, 2023   1806 I obtained history and examined the patient as noted above.     1842 D/W Dr. Degroot. Agrees with currently ordered labs. Asks for NS @ 75 mL/hr and recheck of sodium @ 2145 1855 D/W Dr. Degroot post evaluation. Would like fluids decreased to 50 mL/hr     Social Determinants of Health affecting care:  None     Disposition:  The patient was admitted to the hospital under the care of Dr. Degroot.     Impression & Plan    CMS Diagnoses: None      Medical Decision Making:  This 62-year-old male patient presents to the ED due to low sodium.  Please see the HPI and exam for specifics.  Patient remained well.  We discussed the findings and the plan for care.  Additional labs to be collected will include FENa, urinalysis, urine sodium, and urine creatinine.    After discussion with the hospitalist, we will plan on gently hydrating the patient at 50 mL/h.  He will be admitted for ongoing monitoring, care, and slow repletion of his sodium.    Critical Care time:  was 0 minutes for this patient excluding procedures.    Diagnosis:    ICD-10-CM    1. Hyponatremia  E87.1            Scribe Disclosure:  RAMÍREZ, Bee Powers, am serving as a scribe at 6:06 PM on 1/18/2023 to document services personally performed by Efrain Graduno DO based on my observations and the provider's statements to me.    1/18/2023   Efrain Garduno DO Burns, Bradley Joseph, DO  01/19/23 0129

## 2023-01-19 NOTE — PROGRESS NOTES
Patient Transfer Information  Patient connected to monitoring equipment on arrival: N/A     Patient connected to wall oxygen on arrival: N/A    Belongings: Transferred with patient    Safety check completed: Yes       VS recorded in the flowsheets.    Patsy Powell RN on 1/19/2023 at 4:37 PM

## 2023-01-19 NOTE — PHARMACY-ADMISSION MEDICATION HISTORY
Admission medication history interview status for this patient is complete. See [facility] admission navigator for allergy information, prior to admission medications and immunization status.     Medication history interview done, indicate source(s): Patient  Medication history resources (including written lists, pill bottles, clinic record):[employer]  Pharmacy: [facility]    Changes made to PTA medication list:  Added: Aspirin, APAP  Changed: None  Reported as Not Taking: None  Removed: Plavix,     Actions taken by pharmacist (provider contacted, etc):None     Additional medication history information:Patient reports he does not feel the tramadol does much and that his tylenol works better.   Patient states he uses the incruse elipta as needed. It has not fill history in 12 months on [facility] but patient says he has a full one at home.     Medication reconciliation/reorder completed by provider prior to medication history?  N   (Y/N)        Prior to Admission medications    Medication Sig Last Dose Taking? Auth Provider Long Term End Date   carbidopa-levodopa (SINEMET)  MG tablet Take 2 tablets by mouth 3 times daily (Takes at 0400, 1000, and 1600) 1/18/2023 at 1000 Yes Chiquita Schwartz PA-C Yes    ciprofloxacin (CIPRO) 500 MG tablet Take 1 tablet by mouth 2 times daily 1/18/2023 at AM Yes Reported, Patient     lisinopril (ZESTRIL) 40 MG tablet Take 1 tablet (40 mg) by mouth daily 1/18/2023 at AM Yes Yemi Nava MD Yes    metoprolol succinate ER (TOPROL XL) 50 MG 24 hr tablet Take 1 tablet (50 mg) by mouth daily 1/18/2023 at AM Yes Yemi Nava MD Yes    rosuvastatin (CRESTOR) 40 MG tablet Take 1 tablet (40 mg) by mouth daily 1/18/2023 at AM Yes Yemi Nava MD Yes    traMADol (ULTRAM) 50 MG tablet Take 50 mg by mouth every 12 hours as needed 1/18/2023 at AM Yes Reported, Patient     umeclidinium (INCRUSE ELLIPTA) 62.5 MCG/INH inhaler Inhale 1 puff into the lungs  daily   Yemi Nava MD

## 2023-01-19 NOTE — H&P
Admitted: 01/18/2023    CHIEF COMPLAINT:  Low sodium.    HISTORY OF PRESENT ILLNESS:  History was obtained from the patient.  This is a 62-year-old white male with a history of COPD, hypertension, Parkinson's disease, who presents to the hospital after he was incidentally found to have a low sodium for a preop physical with his doctor.  The patient states that he has had no nausea or vomiting.  No confusion, no headache.  He states that he does not drink as much water.  In the ER over here, he was seen by Dr. Wilmer Garduno.  I discussed care with him.  I am asked to admit him for further evaluation.    PAST MEDICAL HISTORY:  Hypertension, peripheral arterial disease, infected hardware in the left lower extremity.    PAST SURGICAL HISTORY:  Significant for right total hip arthroplasty.    FAMILY HISTORY:  Significant for heart disease in his father.    SOCIAL HISTORY:  The patient used to smoke, but no longer smokes.  He does admit to drinking alcohol.  He drinks 6-7 beers a day.    HOME MEDICATIONS:  List includes:  1.  Plavix.  2.  Crestor.  3.  Metoprolol.  4.  Lisinopril.  5.  Sinemet.    ALLERGIES:  SPIRIVA AND HCTZ.    REVIEW OF SYSTEMS:  As mentioned in the HPI.  He denies any fevers, chills.  He denies any nausea or vomiting.  He denies any confusion.  He denies any chest pain, shortness of breath, lightheadedness or dizziness.  All other systems are reviewed and deemed unremarkable and negative.    PHYSICAL EXAMINATION:    VITAL SIGNS:  His temperature is 98.4, pulse 66, blood pressure is 166/87, respiratory rate 22, O2 sat is 98% on room air.  GENERAL:  He is alert, awake, oriented, coherent, nontoxic, accompanied by his brother in no acute distress.  HEENT:  Pupils equal, round, reactive to light.  Pharynx moist mucous membranes.  LUNGS:  Clear to auscultation with some mild wheezing.  HEART:  Regular rate.  S1, S2 normal.  No murmurs or gallops.  ABDOMEN:  Soft, nontender, nondistended.  Hypoactive bowel  sounds.  EXTREMITIES:  The left lower extremity is in a Cam boot, which I did not remove.  SKIN:  There is no rash.    NEUROLOGICAL:  He moves all his extremities.    LABORATORY DATA:  Obtained on him, shows a sodium of 116 and a chloride of 78.  The rest of his BMP is within normal limits other than an anion gap of 13.  On his CBC his white cell count is 10.9, hemoglobin 10.8, hematocrit 32, platelet count of 185,000.  An EKG obtained on him, shows normal sinus rhythm at 68 beats per minute.    IMPRESSION AND PLAN:    1.  Acute on chronic hyponatremia.  His baseline sodium runs in the 134 range, which is pretty consistent.  However, today he was noted to have a sodium of 116.  There is no altered mental status changes.  No seizure activity.  I suspect this is due to beer potomania.  We will hydrate him with normal saline at 50 mL per hour and recheck his sodium every 4 hours.  The goal of sodium correction should be no more than 122 over a 24-hour time.  2.  Alcohol abuse.  We will place him on thiamine.  We will monitor him for withdrawals.  3.  Hypertension.  He will need resumption of his home medications once reconciled.  4.  Parkinson's.  He will need a resumption of his home medications once reconciled.  5.  Infected ankle with hardware and is due for hardware removal at [facility].    CODE STATUS:  FULL CODE.    He will be admitted as an inpatient.    Albert Degroot MD        D: 2023   T: 2023   MT: JANUSZ    Name:     GERALDO VALDEZ  MRN:      [id number]        Account:     [id number]   :      1960           Admitted:    2023       Document: [id number]

## 2023-01-19 NOTE — PROGRESS NOTES
[facility]  Hospitalist Progress Note  Ryan Wan M.D., M.B.A.   01/19/2023    Reason for Stay/active problem list      Hyponatremia         Assessment and Plan:        Summary of Stay: Rashaun Molina is a 62 year old male who was seen at his primary care physician clinic for preoperative appointment when he had blood draw and was found to have low sodium level.  Patient does send emergency department for evaluation.    Problem List with Assessment and Plan:      1. Hyponatremia: Suspect acute on chronic hyponatremia with baseline sodium level in 130s.    Initial sodium level was 116.  Patient was asymptomatic.  Suspected to be acute on chronic with beer potomania    Patient was treated with IV saline with gradual improvement of his serum sodium.    Currently at 124.  Continue saline at 75 cc an hour, monitor sodium every 4 hours      2. Alcohol use disorder: Patient drinks beer every day but denies any withdrawal symptoms.      Monitor for withdrawal    3.  Hypertension: Continue home medication-lisinopril    4.  Parkinson's disease: Continue Sinemet    5.  Recent preoperative visit to his primary care physician -patient was scheduled to have hardware removal from his left leg     Plan for today:    Continue current saline, monitor sodium symptoms.    Addendum   --  Serum sodium not improving at 123 , will hold saline , repeat urine sodium and urine osmolality , request nephrology input     VTE Prophylaxis: Pneumatic Compression Devices  Code Status: Full Code  Diet: Combination Diet Regular Diet Adult    Escalante Catheter: Not present    Family updated today: No     Disposition: May discharge tomorrow if his serum sodium continues to trend up and he remains asymptomatic.          Interval History (Subjective):        Patient is seen and examined by me today and medical record reviewed.Overnight events noted and care discussed with nursing staff.  Patient care assumed today.  He denies any  symptoms.  No confusion, dizziness, chest pain or shortness of breath.  He admits to drinking few beers every day but denies any withdrawal symptom                  Physical Exam:        Last Vital Signs:  BP (!) 146/73 (BP Location: Right arm, Patient Position: Semi-Trejo's)   Pulse 96   Temp 97.6  F (36.4  C) (Oral)   Resp 21   SpO2 97%     I/O last 3 completed shifts:  In: -   Out: 400 [Urine:400]    Wt Readings from Last 5 Encounters:   01/18/23 90.3 kg (199 lb)   08/24/22 90.5 kg (199 lb 8.3 oz)   07/27/22 91.6 kg (202 lb)   06/14/22 89.6 kg (197 lb 8 oz)   05/31/22 99.8 kg (220 lb)        Constitutional: Awake, alert, cooperative, no apparent distress     Respiratory: Clear to auscultation bilaterally, no crackles or wheezing   Cardiovascular: Regular rate and rhythm, normal S1 and S2, and no murmur noted   Abdomen: Normal bowel sounds, soft, non-distended, non-tender   Skin: No new rashes, no cyanosis, dry to touch   Neuro: Alert with  no new focal weakness   Extremities: No edema   Other(s):        All other systems: Negative          Medications:        All current medications were reviewed with changes reflected in problem list.         Data:      All new lab and imaging data was reviewed.      Data reviewed today: I reviewed all new labs and imaging results over the last 24 hours. I personally reviewed       Recent Labs   Lab 01/19/23  0759 01/18/23  1727 01/18/23  0000   WBC 6.7 10.9 9.1   HGB 10.4* 10.8* 11.6*   HCT 31.4* 32.0* 35.8*   MCV 95 93 98.1    185 224     No results for input(s): CULT in the last 168 hours.  Recent Labs   Lab 01/19/23  1001 01/19/23  0759 01/19/23  0228 01/18/23  2146 01/18/23  1727 01/18/23  0000   * 123* 121*   < > 116* 120.1*   POTASSIUM  --  4.4  --   --  4.7 4.72   CHLORIDE  --  86*  --   --  78* 82.3*   CO2  --  25  --   --  25 26.9   ANIONGAP  --  12  --   --  13  --    GLC  --  94  --   --  98 96   BUN  --  10.6  --   --  10.2 12  12.4   CR  --  0.76   --   --  0.78 0.97   GFRESTIMATED  --  >90  --   --  >90  --    FRANNY  --  9.0  --   --  9.3 9.3   MAG  --  1.8  --   --  1.6*  --     < > = values in this interval not displayed.       Recent Labs   Lab 01/19/23  0759 01/18/23  1727 01/18/23  0000   GLC 94 98 96       No results for input(s): INR in the last 168 hours.      No results for input(s): TROPONIN, TROPI, TROPR in the last 168 hours.    Invalid input(s): TROP, TROPONINIES    No results found for this or any previous visit (from the past 48 hour(s)).    COVID Status:  COVID-19 PCR Results    COVID-19 PCR Results 6/8/22 8/24/22 1/18/23   SARS CoV2 PCR Negative Negative Negative      Comments are available for some flowsheets but are not being displayed.         COVID-19 Antibody Results, Testing for Immunity    COVID-19 Antibody Results, Testing for Immunity   No data to display.              Disclaimer: This note consists of symbols derived from keyboarding, dictation and/or voice recognition software. As a result, there may be errors in the script that have gone undetected. Please consider this when interpreting information found in this chart.

## 2023-01-19 NOTE — PROGRESS NOTES
[facility]  Patient is currently open to home care services with [facility]. The patient is currently receiving SN services.  [facility]  and team have been notified of patient admission.  [facility] liaison will continue to follow patient during stay.  If appropriate provide orders to resume home care at time of discharge.

## 2023-01-20 NOTE — PLAN OF CARE
End of Shift Summary  For vital signs and complete assessments, please see documentation flowsheets.     Pertinent assessments: Patient Aox4. Calls appropriately. Shaky baseline per Parkinson's history. Neuropathy to bilateral feet baseline. On 1200 FR, monitoring I&O. Tele=SR. Will continue to monitor and follow plan of care.    Major Shift Events: none    Treatment Plan: fluid restriction, monitor sodium, I&O.    Bedside Nurse: Patsy Powell RN

## 2023-01-20 NOTE — PROGRESS NOTES
[facility]  Hospitalist Progress Note  Ryan Wan M.D., M.B.A.   01/20/2023    Reason for Stay/active problem list      Hyponatremia likely due to SIADH          Assessment and Plan:        Summary of Stay: Rashaun Molina is a 62 year old male who was seen at his primary care physician clinic for preoperative appointment when he had blood draw and was found to have low sodium level.  Patient does send emergency department for evaluation.    Problem List with Assessment and Plan:      1. Hyponatremia: Suspect acute on chronic hyponatremia with baseline sodium level in 130s.    Initial sodium level was 116.  Patient was asymptomatic.  Suspected to be acute on chronic with beer potomania     Patient was treated with IV saline with gradual improvement of his serum sodium but die not continue to improve after it got to 124.Urine studies obtained and care discussed with Dr Castillo who recommended 1200 cc/day fluid restriction and trial of Urena 30 g twice daily.  IV fluid was discontinued and patient does closely monitored.  Patient remained asymptomatic but is sodium level remained low.    Currently serum sodium is 125.  Urine is stopped.  A single dose of tolvaptan 15 mg was given this morning.  Plan to continue to monitor serial sodium values.  Patient can be discharged tomorrow if his serum sodium remains stable or improves.  Nephrology input appreciated      2. Alcohol use disorder: Patient drinks few bottles of beer every day but denies any withdrawal symptoms.      Monitor for withdrawal    3.  Hypertension: Continue home medication-lisinopril    4.  Parkinson's disease: Continue Sinemet    5.  Recent preoperative visit to his primary care physician -patient was scheduled to have hardware removal from his left leg.  He has unhealed wound from his previous surgery getting daily wound care.  Wound care team consulted to assist with further evaluation and recommendations.    Plan for  today:    Continue 1200 cc fluid restriction, monitor serum sodium every 6 hours, BMP in the morning        VTE Prophylaxis: Pneumatic Compression Devices  Code Status: Full Code  Diet: Combination Diet Regular Diet Adult  Fluid restriction 1200 ML FLUID    Escalante Catheter: Not present    Family updated today: No     Disposition: May discharge tomorrow morning if he remains asymptomatic and his sodium level remains stable or improved.          Interval History (Subjective):        Patient is seen and examined by me today and medical record reviewed.Overnight events noted and care discussed with nursing staff.  Patient denies any symptoms.  He has no confusion or dizziness.  His sodium level remains low but has no other complaints.  Care plan was discussed with nephrologist and recommendations obtained.  Patient had tolvaptan this morning.  Patient would like to stay another day rather than going home later today.             Physical Exam:        Last Vital Signs:  BP (P) 125/64 (BP Location: Left arm)   Pulse (P) 87   Temp (P) 99  F (37.2  C) (Oral)   Resp (P) 24   Wt 87.1 kg (192 lb)   SpO2 (P) 94%   BMI 27.55 kg/m      I/O last 3 completed shifts:  In: 830 [P.O.:830]  Out: 2000 [Urine:2000]    Wt Readings from Last 5 Encounters:   01/19/23 87.1 kg (192 lb)   01/18/23 90.3 kg (199 lb)   08/24/22 90.5 kg (199 lb 8.3 oz)   07/27/22 91.6 kg (202 lb)   06/14/22 89.6 kg (197 lb 8 oz)        Constitutional: Awake, alert, cooperative, no apparent distress     Respiratory: Clear to auscultation bilaterally, no crackles or wheezing   Cardiovascular: Regular rate and rhythm, normal S1 and S2, and no murmur noted   Abdomen: Normal bowel sounds, soft, non-distended, non-tender   Skin: No new rashes, no cyanosis, dry to touch   Neuro: Alert with  no new focal weakness   Extremities:  Left foot dressed   Other(s):        All other systems: Negative          Medications:        All current medications were reviewed with  changes reflected in problem list.         Data:      All new lab and imaging data was reviewed.      Data reviewed today: I reviewed all new labs and imaging results over the last 24 hours. I personally reviewed       Recent Labs   Lab 01/19/23  0759 01/18/23 1727 01/18/23  0000   WBC 6.7 10.9 9.1   HGB 10.4* 10.8* 11.6*   HCT 31.4* 32.0* 35.8*   MCV 95 93 98.1    185 224     No results for input(s): CULT in the last 168 hours.  Recent Labs   Lab 01/20/23  1203 01/20/23  0607 01/20/23  0207 01/19/23  1001 01/19/23  0759 01/18/23 2146 01/18/23 1727   * 124*  124* 124*   < > 123*   < > 116*   POTASSIUM  --  4.3  --   --  4.4  --  4.7   CHLORIDE  --  88*  --   --  86*  --  78*   CO2  --  25  --   --  25  --  25   ANIONGAP  --  11  --   --  12  --  13   GLC  --  109*  --   --  94  --  98   BUN  --  45.8*  --   --  10.6  --  10.2   CR  --  0.85  --   --  0.76  --  0.78   GFRESTIMATED  --  >90  --   --  >90  --  >90   FRANNY  --  9.5  --   --  9.0  --  9.3   MAG  --  1.8  --   --  1.8  --  1.6*    < > = values in this interval not displayed.       Recent Labs   Lab 01/20/23  0607 01/19/23  0759 01/18/23  1727 01/18/23  0000   * 94 98 96       No results for input(s): INR in the last 168 hours.      No results for input(s): TROPONIN, TROPI, TROPR in the last 168 hours.    Invalid input(s): TROP, TROPONINIES    No results found for this or any previous visit (from the past 48 hour(s)).    COVID Status:  COVID-19 PCR Results    COVID-19 PCR Results 6/8/22 8/24/22 1/18/23   SARS CoV2 PCR Negative Negative Negative      Comments are available for some flowsheets but are not being displayed.         COVID-19 Antibody Results, Testing for Immunity    COVID-19 Antibody Results, Testing for Immunity   No data to display.              Disclaimer: This note consists of symbols derived from keyboarding, dictation and/or voice recognition software. As a result, there may be errors in the script that have gone  undetected. Please consider this when interpreting information found in this chart.

## 2023-01-20 NOTE — CONSULTS
[facility]  WOC Nurse Inpatient Assessment     Consulted for: Left lateral ankle    Patient History (according to provider note(s):      History was obtained from the patient.  This is a 62-year-old white male with a history of COPD, hypertension, Parkinson's disease, who presents to the hospital after he was incidentally found to have a low sodium for a preop physical with his doctor.  The patient states that he has had no nausea or vomiting.  No confusion, no headache.  He states that he does not drink as much water.  In the ER over here, he was seen by Dr. Wilmer Garduno.  I discussed care with him.  I am asked to admit him for further evaluation.    Areas Assessed:      Areas visualized during today's visit: Focused:    Wound location: Left lateral ankle  Last photo: 1/20/23    Wound due to: Surgical Wound  Wound history/plan of care: Surgical wound, patient reports he is due to have hardware removed next week.  Wound base: 100 % dry drainage     Palpation of the wound bed: normal      Drainage: none     Description of drainage: none     Measurements (length x width x depth, in cm): 1.5  x 0.6 cm and 0.5 x 0.4 cm     Tunneling: N/A     Undermining: N/A  Periwound skin: Scar tissue      Color: normal and consistent with surrounding tissue      Temperature: normal   Odor: none  Pain: denies , none  Pain interventions prior to dressing change: N/A  Treatment goal: Heal   STATUS: initial assessment  Supplies ordered: supplies stored on unit and discussed with RN       Treatment Plan:     Left lateral ankle wound(s): Daily    1. Cleanse with wound cleanser and dry  2. Apply Sween cream to wounds and surrounding skin  3. Apply adaptic to wounds  4. Cover with dry gauze and wrap with kerlix     Orders: Written    RECOMMEND PRIMARY TEAM ORDER: None, at this time  Education provided: plan of care  Discussed plan of care with: Patient and Nurse  WOC nurse follow-up plan: weekly  Notify WOC if wound(s)  deteriorate.  Nursing to notify the Provider(s) and re-consult the [facility] Nurse if new skin concern.    DATA:     Current support surface: Standard  Standard gel/foam mattress (IsoFlex, Atmos air, etc)  BMI: Body mass index is 27.55 kg/m .   Active diet order: Orders Placed This Encounter      Combination Diet Regular Diet Adult     Output: I/O last 3 completed shifts:  In: 1246.67 [P.O.:590; I.V.:656.67]  Out: 1600 [Urine:1600]     Labs: Recent Labs   Lab 01/19/23  0759   HGB 10.4*   WBC 6.7     Pressure injury risk assessment:   Sensory Perception: 3-->slightly limited  Moisture: 4-->rarely moist  Activity: 3-->walks occasionally  Mobility: 3-->slightly limited  Nutrition: 3-->adequate  Friction and Shear: 3-->no apparent problem  Sigifredo Score: 19    Nilesh Sanderson RN CWOCN  Contact Via Vocera: [facility] Nurse ([place])  Dept. Office Number: [phone]

## 2023-01-20 NOTE — PLAN OF CARE
Goal Outcome Evaluation:    Pertinent assessments: [facility] cares [id number]. Pt A&Ox4. C/o pain in L ankle, saying from his surgery. Prn Tylenol given with improvement. Denies SOB, but noted to have some inspiratory wheezes. Afebrile, BP 150s/70s. On room air. Sodium checks 122 (crosscover notified), and 124. Patient on 1200ml fluid restrictions, and educated on. L ankle wrapped with ACE wrap. Assist 1 with walker to the BR.

## 2023-01-20 NOTE — PROGRESS NOTES
Renal Medicine Progress Note            Assessment/Plan:     Rashaun Molina is a 62 year old male who was admitted on 1/18/2023.      Assessment:  1) Acute on Chronic hyponatremia:    Variable baseline values but mostly in hyponatremic range. Overall avg 130 with range 125-134.     Initial sodium 116 in ED.  Studies 1/18: urine sodium 23, urine osm 201, FeNa 0.4%     Historical studies:  6/8/22: serum sodium 120, urine osm 360, urine sodium 36, TSH 1.74     1/19:  More clearer evidence of SIADH state with urine sodium 74, inappropriately concenrrtrated  Urine. Will give dose tolvaptan today. Longer term will be difficult if having SIADH chronically with his amount of beer intake. May benefit still longer term with daily ure-na at home.      2) chronic alcohol/beer intake     3) hypertension, appears fairly well controlled on PTA regimen of lisinopril and metoprolol.  Notable reported allergy to hydrochlorothiazide with hyponatremia of occlude is not improved by refraining from this.  Notable case reports of association of lisinopril with SIADH.  Substitution in the future could be done if having recurrent severe hyponatremia      Plan/Recs:  1) stopping urena  2) single dose tolvaptan 15mg this am  3) continue serial sodium values,l later today based on response can discuss further plans    Nilton Castillo DO  [facility]  Office: [phone]  Cell: [phone]        Interval History:     Pt reports is bored, wondering about plan. Wanting to go home but also wants sodium stable. States his surgery was rescheduled from next week.           Medications and Allergies:       carbidopa-levodopa  2 tablet Oral TID     sodium chloride (PF)  3 mL Intracatheter Q8H     thiamine  100 mg Oral Daily     tolvaptan  15 mg Oral Daily        Allergies   Allergen Reactions     Spiriva Handihaler Blisters     Hctz [Hydrochlorothiazide] Other (See Comments)     Low sodium            Physical Exam:   Vitals were reviewed  BP  115/57 (BP Location: Right arm)   Pulse 86   Temp 98.2  F (36.8  C) (Oral)   Resp 24   Wt 87.1 kg (192 lb)   SpO2 94%   BMI 27.55 kg/m      Wt Readings from Last 3 Encounters:   01/19/23 87.1 kg (192 lb)   01/18/23 90.3 kg (199 lb)   08/24/22 90.5 kg (199 lb 8.3 oz)       Intake/Output Summary (Last 24 hours) at 1/20/2023 0928  Last data filed at 1/20/2023 0809  Gross per 24 hour   Intake 710 ml   Output 1600 ml   Net -890 ml     GENERAL: healthy, alert, NAD  HEENT:  Normocephalic. No gross abnormalities.   CV: RRR, no murmurs, no clicks, gallops, or rubs, no edema  RESP: Clear bilaterally with good efforts  GI: Abdomen soft/nt/nd, BS normal. No masses, organomegaly  SKIN: no suspicious lesions or rashes, dry to touch  NEURO:  Strength normal and symmetric.   PSYCH: mood good, affect appropriate           Data:     BMP  Recent Labs   Lab 01/20/23  0607 01/20/23  0207 01/19/23  2209 01/19/23  1815 01/19/23  1001 01/19/23  0759 01/18/23  2146 01/18/23  1727 01/18/23  0000   *  124* 124* 122* 123*   < > 123*   < > 116* 120.1*   POTASSIUM 4.3  --   --   --   --  4.4  --  4.7 4.72   CHLORIDE 88*  --   --   --   --  86*  --  78* 82.3*   FRANNY 9.5  --   --   --   --  9.0  --  9.3 9.3   CO2 25  --   --   --   --  25  --  25 26.9   BUN 45.8*  --   --   --   --  10.6  --  10.2 12  12.4   CR 0.85  --   --   --   --  0.76  --  0.78 0.97   *  --   --   --   --  94  --  98 96    < > = values in this interval not displayed.     CBC  Recent Labs   Lab 01/19/23  0759 01/18/23  1727 01/18/23  0000   WBC 6.7 10.9 9.1   HGB 10.4* 10.8* 11.6*   HCT 31.4* 32.0* 35.8*   MCV 95 93 98.1    185 224     Lab Results   Component Value Date    AST 75 (H) 10/19/2022    ALT 12 06/09/2022    ALKPHOS 78 06/09/2022    BILITOTAL 0.9 06/09/2022    BILICONJ 0.0 03/02/2013    BILIDIRECT 0.1 02/13/2006     Lab Results   Component Value Date    INR 1.11 06/08/2022       Attestation:  I have reviewed today's vital signs, notes,  medications, labs and imaging.    DO [facility]  Office: [phone]  Cell: [phone]

## 2023-01-20 NOTE — PLAN OF CARE
Pertinent assessments: A&Ox4. VSS on RA. Up SB assist with walker to BR. No BM this shift. C/o pain in L ankle, L ankle wound care complete, WOC now following. Assistx1 with walker to the BR.     Major Shift Events: uneventful    Treatment Plan: Q6 sodium level checks, assist with ambulation. Possible discharge tomorrow.    Bedside Nurse: Bisi Jordan RN

## 2023-01-20 NOTE — PROVIDER NOTIFICATION
Md paged in regards to patient having loose stools x3 this evening. Wondering about immodium vs stool panel.     Patsy Powell RN on 1/19/2023 at 6:59 PM    Stool panel ordered. Starting on enteric precautions for rule out cdiff.    Patsy Powell RN on 1/19/2023 at 7:12 PM

## 2023-01-20 NOTE — PROGRESS NOTES
Patient is seen and examined by me today and medical record reviewed.Overnight events noted and care discussed with nursing staff.    Rashaun is doing well without any symptoms.  His sodium level remains low but stable.  Care plan was discussed with Dr. Castillo of nephrology team.  Plan is to monitor sodium, administer a dose of tolvaptan 15 mg this morning and follow the response in addition to fluid restriction.  He has left ankle wound from previous surgery that needs daily wound care.  We will get wound care nurse to assess and recommended further care.    Care discussed with bedside nurse Bisi.  We will continue to monitor patient today and may discharge tomorrow if he remains stable.    My detailed progress note to follow

## 2023-01-21 PROBLEM — J42 CHRONIC BRONCHITIS, UNSPECIFIED CHRONIC BRONCHITIS TYPE (H): Status: ACTIVE | Noted: 2017-10-30

## 2023-01-21 PROBLEM — F10.10 NONDEPENDENT ALCOHOL ABUSE, CONTINUOUS DRINKING BEHAVIOR: Status: ACTIVE | Noted: 2023-01-01

## 2023-01-21 PROBLEM — E22.2 SIADH (SYNDROME OF INAPPROPRIATE ADH PRODUCTION) (H): Status: ACTIVE | Noted: 2023-01-01

## 2023-01-21 PROBLEM — G20.A1 PARKINSON DISEASE (H): Status: ACTIVE | Noted: 2020-07-30

## 2023-01-21 PROBLEM — T84.7XXA: Status: ACTIVE | Noted: 2022-08-24

## 2023-01-21 NOTE — PLAN OF CARE
Goal Outcome Evaluation:  End of Shift Summary  For vital signs and complete assessments, please see documentation flowsheets.     Pertinent assessments: Pt A&OX4. VSS and on RA. Denied pain this shift. LS with inspiratory wheezing no sob noted. Tolerated diet and no c/o nausea. On 1200 L FR. Na came back at 128. L ankle ace wrapped and it is  CDI. No WD noted. Tele reading SR.    Major Shift Events Serial Na check    Treatment Plan: Fluid restriction, monitor NA, monitor symptom, possible discharge tomorrow.

## 2023-01-21 NOTE — PLAN OF CARE
Patient hospitalized for hyponatremia. Cleared for discharge to home today per MD. Discharge instructions, medications, and follow-ups reviewed with patient in detail. Discharge medications were filled at [facility] pharmacy. Patient  verbalized understanding of discharge instructions. Belongings were returned to patient at time of discharge. Brother providing transport home.

## 2023-01-21 NOTE — PROGRESS NOTES
Care Management Discharge Note    Discharge Date: 01/21/2023       Discharge Disposition:  Home, with resumption of home care    Discharge Services:  RN weekly      Discharge Transportation: family or friend will provide    Private pay costs discussed: Not applicable           Persons Notified of Discharge Plans: Yes  Patient/Family in Agreement with the Plan: yes    Handoff Referral Completed: No    Additional Information:  Patient is discharging today, and brother is providing a ride home. [facility] today is aware of the discharge. nikita Medrano, was notified today.  Patient has weekly SN visits for wound care.     .Carol Guerrero RN  Inpatient RN Care Coordinator  [facility]  [phone]      Carol Guerrero, RN

## 2023-01-21 NOTE — DISCHARGE SUMMARY
[facility]  Hospitalist Discharge Summary      Date of Admission:  1/18/2023  Date of Discharge:  1/21/2023  2:16 PM  Discharging Provider: Jordan Cunningham MD  Discharge Service: Hospitalist Service    Discharge Diagnoses   Principal Problem:    SIADH (syndrome of inappropriate ADH production) (H)  Active Problems:    Essential hypertension, benign    Chronic bronchitis, unspecified chronic bronchitis type (H)    Parkinson disease (H)    Infected hardware in left lower extremity (H)    Nondependent continuous alcohol use disorder  Resolved Problems:    * No resolved hospital problems. *          Follow-ups Needed After Discharge   Follow-up Appointments     Follow-up and recommended labs and tests       Follow up with primary care provider, Yemi Nava, within 5   days, to evaluate medication change and to follow up on results. The   following labs/tests are recommended: Serum sodium.               Discharge Disposition   Admited to home care:   Agency: [facility] [place]  Discharged to home  Condition at discharge: Stable      Hospital Course   The patient was admitted after presenting for preop examination having been found to be significantly hyponatremic.  This is in the setting of recent infection and chronic daily beer intake.  However, the degree of the hyponatremia and the urine studies suggested that is primarily SIADH responsible.  The patient had a hard time complying with the fluid restriction, did not tolerate the Ure-NA and it was felt that salt tablets were relatively contraindicated due to his hypertension.  He was therefore seen by nephrology who recommended starting tolvaptan.  His hyponatremia corrected well without significant complication.  He is discharged home with a prescription for tolvaptan and instructions to follow-up with his primary care provider next week for further evaluation.    Consultations This Hospital Stay   NEPHROLOGY IP CONSULT  WOUND OSTOMY  CONTINENCE NURSE  IP CONSULT  PHARMACY LIAISON FOR MEDICATION COVERAGE CONSULT  CARE MANAGEMENT / SOCIAL WORK IP CONSULT    Code Status   Full Code    Time Spent on this Encounter   I, Jordan Cunningham MD, personally saw the patient today and spent greater than 30 minutes discharging this patient.       Jordan Cunningham MD  [place] [address] [facility]  [address]  [facility] [address]  Phone: [phone]  Fax: [phone]  ______________________________________________________________________    Physical Exam   Vital Signs: Temp: 97.4  F (36.3  C) Temp src: Oral BP: 111/52 Pulse: 80   Resp: 24 SpO2: 93 % O2 Device: None (Room air)    Weight: 192 lbs 0 oz  Physical Exam  Vitals and nursing note reviewed.   Constitutional:       Appearance: He is not toxic-appearing.   Neurological:      Motor: Tremor present.   Psychiatric:         Speech: Speech normal.         Behavior: Behavior normal. Behavior is cooperative.         Cognition and Memory: Cognition normal.         Judgment: Judgment normal.              Primary Care Physician   Yemi Nava    Discharge Orders      Reason for your hospital stay    Low sodium     Follow-up and recommended labs and tests     Follow up with primary care provider, Yemi Nava, within 5 days, to evaluate medication change and to follow up on results. The following labs/tests are recommended: Serum sodium.     Activity    Your activity upon discharge: activity as tolerated     Resume Home Care Services     Diet    Follow this diet upon discharge: Orders Placed This Encounter      Fluid restriction 1200 ML FLUID      Combination Diet Regular Diet Adult       Significant Results and Procedures   Most Recent 3 BMP's:Recent Labs   Lab Test 01/21/23  0813 01/20/23  2341 01/20/23  1809 01/20/23  1203 01/20/23  0607 01/19/23  1001 01/19/23  0759   *  130* 130* 128*   < > 124*  124*   < > 123*   POTASSIUM 3.8  --   --   --   4.3  --  4.4   CHLORIDE 93*  --   --   --  88*  --  86*   CO2 25  --   --   --  25  --  25   BUN 33.4*  --   --   --  45.8*  --  10.6   CR 0.97  --   --   --  0.85  --  0.76   ANIONGAP 12  --   --   --  11  --  12   FRANNY 9.4  --   --   --  9.5  --  9.0   *  --   --   --  109*  --  94    < > = values in this interval not displayed.   Studies 1/18: urine sodium 23, urine osm 201, FeNa 0.4%    Discharge Medications   Discharge Medication List as of 1/21/2023  1:48 PM      START taking these medications    Details   Tolvaptan 15 MG TBPK Take 15 mg by mouth daily, Disp-14 each, R-0, E-Prescribe         CONTINUE these medications which have NOT CHANGED    Details   acetaminophen (TYLENOL) 500 MG tablet Take 500-1,000 mg by mouth every 6 hours as needed for mild pain, Historical      aspirin (ASA) 325 MG EC tablet Take 325 mg by mouth daily, Historical      carbidopa-levodopa (SINEMET)  MG tablet Take 2 tablets by mouth 3 times daily (Takes at 0400, 1000, and 1600), Transitional      ciprofloxacin (CIPRO) 500 MG tablet Take 1 tablet by mouth 2 times daily, Historical      lisinopril (ZESTRIL) 40 MG tablet Take 1 tablet (40 mg) by mouth daily, Disp-90 tablet, R-1, E-Prescribe      metoprolol succinate ER (TOPROL XL) 50 MG 24 hr tablet Take 1 tablet (50 mg) by mouth daily, Disp-90 tablet, R-1, E-Prescribe      rosuvastatin (CRESTOR) 40 MG tablet Take 1 tablet (40 mg) by mouth daily, Disp-90 tablet, R-1, E-Prescribe      traMADol (ULTRAM) 50 MG tablet Take 50 mg by mouth every 12 hours as needed, Historical      umeclidinium (INCRUSE ELLIPTA) 62.5 MCG/INH inhaler Inhale 1 puff into the lungs daily, Disp-30 each, R-1, E-Prescribe           Allergies   Allergies   Allergen Reactions     Spiriva Handihaler Blisters     Hctz [Hydrochlorothiazide] Other (See Comments)     Low sodium

## 2023-01-21 NOTE — PROGRESS NOTES
" Renal Medicine Progress Note            Assessment/Plan:     Assessment:  1) Hyponatremia:    I feel now most likely patient has underlying SIADH, many reports of carbidopa-levodopa being associated with this. Potential with good solute intake and fluid restriction that he could control this in a stable mild hyponatremia range. This may though require a significant fluid restriction that he clearly states he will not be able to comply with.   With this in mind, options include high dose salt tabs (risks with worsening HTN, compliance), ure-na (he did not like the taste and would not \"feed that to dogs\") or with good response to tolvaptan, use this chronically. I feel he may benefit most from chronic tolvaptan if no cost or coverage issues.     2) chronic beer/alcohol intake    3) HTN: appears fairly well controlled on PTA regimen of lisinopril and metoprolol.    Plan/Recs:  1) consult to pharmacy liason regarding discharging on tolvaptan daily therapy  2) If no barriers, would discharge today on tolvaptan 15mg daily with sodium check next week  3) If unable, then still okay with discharge on 1.5L/day fluid restriction and follow up next week.    Nilton Castillo DO  [facility]  Office: [phone]  Cell: [phone]        Interval History:     Pt feeling the same, reiterates that he was never not feeling his usual self and asymptomatic.   Received single dose 15mg tolvaptan yesterday, admits this urine did get lighter in color. Sodium improved to 130 last night and stable this morning.           Medications and Allergies:       carbidopa-levodopa  2 tablet Oral TID     sodium chloride (PF)  3 mL Intracatheter Q8H     thiamine  100 mg Oral Daily        Allergies   Allergen Reactions     Spiriva Handihaler Blisters     Hctz [Hydrochlorothiazide] Other (See Comments)     Low sodium            Physical Exam:   Vitals were reviewed  /52 (BP Location: Left arm)   Pulse 80   Temp 97.4  F (36.3  C) " (Oral)   Resp 24   Wt 87.1 kg (192 lb)   SpO2 93%   BMI 27.55 kg/m      Wt Readings from Last 3 Encounters:   01/19/23 87.1 kg (192 lb)   01/18/23 90.3 kg (199 lb)   08/24/22 90.5 kg (199 lb 8.3 oz)       Intake/Output Summary (Last 24 hours) at 1/21/2023 0932  Last data filed at 1/21/2023 0902  Gross per 24 hour   Intake 1060 ml   Output 2100 ml   Net -1040 ml       GENERAL APPEARANCE: alert, in no distress             Data:     BMP  Recent Labs   Lab 01/21/23  0813 01/20/23  2341 01/20/23  1809 01/20/23  1203 01/20/23  0607 01/19/23  1001 01/19/23  0759 01/18/23  2146 01/18/23  1727   *  130* 130* 128* 125* 124*  124*   < > 123*   < > 116*   POTASSIUM 3.8  --   --   --  4.3  --  4.4  --  4.7   CHLORIDE 93*  --   --   --  88*  --  86*  --  78*   FRANNY 9.4  --   --   --  9.5  --  9.0  --  9.3   CO2 25  --   --   --  25  --  25  --  25   BUN 33.4*  --   --   --  45.8*  --  10.6  --  10.2   CR 0.97  --   --   --  0.85  --  0.76  --  0.78   *  --   --   --  109*  --  94  --  98    < > = values in this interval not displayed.     CBC  Recent Labs   Lab 01/19/23  0759 01/18/23  1727 01/18/23  0000   WBC 6.7 10.9 9.1   HGB 10.4* 10.8* 11.6*   HCT 31.4* 32.0* 35.8*   MCV 95 93 98.1    185 224     Lab Results   Component Value Date    AST 75 (H) 10/19/2022    ALT 12 06/09/2022    ALKPHOS 78 06/09/2022    BILITOTAL 0.9 06/09/2022    BILICONJ 0.0 03/02/2013    BILIDIRECT 0.1 02/13/2006     Lab Results   Component Value Date    INR 1.11 06/08/2022       Attestation:  I have reviewed today's vital signs, notes, medications, labs and imaging.    DO [facility]  Office: [phone]  Cell: [phone]

## 2023-01-21 NOTE — DISCHARGE INSTRUCTIONS
Your home care referral was sent to [facility]  If you haven't heard from them within the next 24-48 hours,  Please call them at [phone]

## 2023-01-21 NOTE — CONSULTS
Care Management Initial Consult    General Information  Assessment completed with: patient       Primary Care Provider verified and updated as needed:   Yes  Readmission within the last 30 days:                   Communication Assessment  Patient's communication style: spoken language (English or Bilingual)    Hearing Difficulty or Deaf: no   Wear Glasses or Blind: yes    Cognitive  Cognitive/Neuro/Behavioral: WDL                      Living Environment:   People in home:       Current living Arrangements:    Living with brother     Able to return to prior arrangements:                    Current Resources:   Patient receiving home care services:  Yes, with [facility](RN weekly)     Community Resources:    Equipment currently used at home: cane, straight, grab bar, toilet, grab bar, tub/shower  Supplies currently used at home:                Lifestyle & Psychosocial Needs:  Social Determinants of Health     Tobacco Use: Medium Risk     Smoking Tobacco Use: Former     Smokeless Tobacco Use: Never     Passive Exposure: Not on file   Alcohol Use: Not on file   Financial Resource Strain: Not on file   Food Insecurity: Not on file   Transportation Needs: Not on file   Physical Activity: Not on file   Stress: Not on file   Social Connections: Not on file   Intimate Partner Violence: Not on file   Depression: Not at risk     PHQ-2 Score: 0   Housing Stability: Not on file                       Additional Information:  CM met with patient for introductions and verifying information. Patient states he receives weekly SN visits though [facility], for his wound(left foot). He's currently living with a brother, in a one level home. He has a boot, and uses a cane for ambulation. CM will continue to follow as far as discharge planning, and coordinate with [facility].     .Carol Guerrero RN  Inpatient RN Care Coordinator  [facility]  [phone]  Carol Guerrero, RN

## 2023-01-21 NOTE — PLAN OF CARE
Pertinent assessments: Pt A&OX4. VSS and on RA. Up Ax1. Denied pain this shift.  L ankle ace wrapped and it is  CDI. No WD noted. C/o left ankle pain, tylenol given. Slept well.   Major Shift Events   Treatment Plan: Fluid restriction, monitor NA, monitor symptom. Possible discharge today if NA+ is stable.  Bedside Nurse: Yolette Javier RN            patient with symptomatic anemia Hgb 6.5 and will be admitted for transfusion

## 2023-01-23 NOTE — PROGRESS NOTES
Care Coordination Initial Assessment    The patient was admitted into [facility] on 1/18/23 for hyponatremia. He was discharged on 1/21/23 with instructions to follow up with PCP.     PCP: Yemi Nava    Referral Source:  ED/IP List    Utilization:   Last PCP Appt.: 1/18/23 with Dr. Nava     Health Maintenance Reviewed: Yes    Current Medical Health Concerns:   Please review patients current medical problem list     Patient/Caregiver Understanding: yes    Medication Management:   Patient has understanding of regimen and is adherent:  Yes    Functional Status:   Transportation: Family provides transportation-brother provides transportation     Current Behavioral Health Concerns:   No concerns at this time     Patient/Caregiver Understanding:  Yes    Psychosocial:    Support System Patient/Caregivers: has brother who helps    Gaps:    Health Maintenance: Will review more at follow up appt     Resources Given:    No resources were needed at this time     Plan:   I called the patient to see how he is doing and he stated that he is doing alright. He was supposed to have surgery tomorrow but it was rescheduled for another week. He did state that his brother was able to bring him to his hospital follow up with visit and he was able to get this scheduled for 1/31/23 at 11:45 am with Dr. Nava. He had no further questions or concerns at this time.

## 2023-01-31 NOTE — NURSING NOTE
Rashaun Molina is here for a hospital F/U.  Questioned patient about current smoking habits.  Pt. quit smoking some time ago.  PULSE regular  My Chart:   CLASSIFICATION OF OVERWEIGHT AND OBESITY BY BMI                        Obesity Class           BMI(kg/m2)  Underweight                                    < 18.5  Normal                                         18.5-24.9  Overweight                                     25.0-29.9  OBESITY                     I                  30.0-34.9                             II                 35.0-39.9  EXTREME OBESITY             III                >40                            Patient's  BMI Body mass index is 27.55 kg/m .  http://hin.nhlbi.nih.gov/menuplanner/menu.cgi  Pre-visit planning  Immunizations - up to date  Colonoscopy - UTD  Mammogram -   Asthma -   PHQ9 -    CHICHO-7 -

## 2023-01-31 NOTE — PROGRESS NOTES
Assessment & Plan     SIADH (syndrome of inappropriate ADH production) (H)  Pt sodium has dropped again despite Tolvaptan-will recommend fluid restriction and will need nephrology follow up-will run this by ortho surgeon to see if they want to go forward with surgery-called Dr Jackson- they have opted to cancel surgery -I did also speak with nephrology and they will see pt again next week to make plan going forward  - Basic Metabolic Panel (BFP)  - VENOUS COLLECTION    Essential hypertension, benign  Well controlled,continue current medications at current doses     Hyperlipidemia LDL goal <70  Stable, continue current medications at current doses     Parkinson disease (H)  Stable, continue current medications at current doses     Chronic bronchitis, unspecified chronic bronchitis type (H)  Stable, continue current medications at current doses       Review of external notes as documented elsewhere in note  Review of the result(s) of each unique test - labs and studies  Ordering of each unique test  35 minutes spent on the date of the encounter doing chart review, review of outside records, review of test results, interpretation of tests, patient visit, documentation and discussion with other provider(s)      MED REC REQUIRED  Post Medication Reconciliation Status: discharge medications reconciled, continue medications without change  FUTURE APPOINTMENTS:       - Follow-up visit in 3 mo  Regular exercise    No follow-ups on file.    Yemi Nava MD  [facility]    Gloria Rodney is a 62 year old, presenting for the following health issues:  Hospital F/U      [facility]       Hospital Follow-up Visit:    Hospital/Nursing Home/IP Rehab Facility: [facility]  Date of Admission: 1/18/23  Date of Discharge: 1/21/23  Reason(s) for Admission: hyponatremia    Was your hospitalization related to COVID-19? No   Problems taking medications regularly:  None  Medication changes since  "discharge: None  Problems adhering to non-medication therapy:  None    Summary of hospitalization:  [facility] discharge summary reviewed  Diagnostic Tests/Treatments reviewed.  Follow up needed: BMP  Other Healthcare Providers Involved in Patient s Care:         None  Update since discharge: stable.   Plan of care communicated with patient     Hyperlipidemia Follow-Up      Are you regularly taking any medication or supplement to lower your cholesterol?   Yes- rosuvastatin    Are you having muscle aches or other side effects that you think could be caused by your cholesterol lowering medication?  No    Hypertension Follow-up      Do you check your blood pressure regularly outside of the clinic? Yes     Are you following a low salt diet? No    Are your blood pressures ever more than 140 on the top number (systolic) OR more   than 90 on the bottom number (diastolic), for example 140/90? No    PD- stable symptoms     How many servings of fruits and vegetables do you eat daily?  2-3    On average, how many sweetened beverages do you drink each day (Examples: soda, juice, sweet tea, etc.  Do NOT count diet or artificially sweetened beverages)?   1    How many days per week do you exercise enough to make your heart beat faster? 3 or less    How many minutes a day do you exercise enough to make your heart beat faster? 10 - 19    How many days per week do you miss taking your medication? 0        Review of Systems   Constitutional, HEENT, cardiovascular, pulmonary, gi and gu systems are negative, except as otherwise noted.      Objective    /60 (BP Location: Right arm, Cuff Size: Adult Regular)   Pulse 68   Temp 97.6  F (36.4  C) (Temporal)   Resp 20   Ht 1.778 m (5' 10\")   Wt 87.1 kg (192 lb)   BMI 27.55 kg/m    Body mass index is 27.55 kg/m .  Physical Exam   GENERAL: healthy, alert and no distress  NECK: no adenopathy, no asymmetry, masses, or scars and thyroid normal to palpation  RESP: lungs " clear to auscultation - no rales, rhonchi or wheezes  CV: regular rate and rhythm, normal S1 S2, no S3 or S4, no murmur, click or rub, no peripheral edema and peripheral pulses strong  ABDOMEN: soft, nontender, no hepatosplenomegaly, no masses and bowel sounds normal  MS: no gross musculoskeletal defects noted, no edema    Results for orders placed or performed in visit on 01/31/23   Basic Metabolic Panel (BFP)     Status: Abnormal   Result Value Ref Range    Carbon Dioxide 29.0 20 - 32 mmol/L    Creatinine 0.86 0.60 - 1.30 mg/dL    Glucose 83 60 - 99 mg/dL    Sodium 125.4 (A) 135 - 146 mmol/L    Potassium 4.62 3.5 - 5.3 mmol/L    Chloride 88.2 (A) 98 - 110 mmol/L    Urea Nitrogen 13 7 - 25 mg/dL    Calcium 9.0 8.6 - 10.3 mg/dL    BUN/Creatinine Ratio 15.1 6 - 22

## 2023-04-06 NOTE — TELEPHONE ENCOUNTER
Natalia nurse from [facility] called stating they were advised pt's oxycodone has now gone up to 5MG 3x daily. She believes this was done by his pain clinic and is wondering where he goes to a pain clinic. I left her detailed message stating we do not have oxycodone on patients current med list and we do not have it in his chart that he sees a pain clinic. We have not seen patient since January, it looks like he had another surgery in February. He may be getting oxycodone from orthopedist or they may have set him up with a pain clinic. Pt due for OV here.    Natalia # [phone]

## 2023-04-20 NOTE — TELEPHONE ENCOUNTER
Rashaun Molina is requesting a refill of:    Refused Prescriptions:                       Disp   Refills    lisinopril (ZESTRIL) 40 MG tablet [Pharmac*90 tab*0        Sig: Take 1 tablet (40 mg) by mouth daily.  Refused By: JUAN PABLO DURAN  Reason for Refusal: Patient needs appointment    rosuvastatin (CRESTOR) 40 MG tablet [Pharm*90 tab*0        Sig: Take 1 tablet (40 mg) by mouth daily.  Refused By: JUAN PABLO DURAN  Reason for Refusal: Patient needs appointment    metoprolol succinate ER (TOPROL XL) 50 MG *90 tab*0        Sig: Take 1 tablet (50 mg) by mouth daily.  Refused By: JUAN PABLO DURAN  Reason for Refusal: Patient needs appointment    Needs fasting OV for refills

## 2023-05-24 NOTE — PROGRESS NOTES
[facility]YS[facility]ANS  [address] [place] [address]  [address]  [facility] [address]  Phone: [phone]  Fax: [phone]  Primary Provider: Rodrigo Nava  Pre-op Performing Provider: RODRIGO NAVA      PREOPERATIVE EVALUATION:  Today's date: 5/24/2023    Rashaun Molina is a 63 year old male who presents for a preoperative evaluation.    Surgical Information:  Surgery/Procedure: left below the knee  Surgery Location: [facility]  Surgeon: Dr Romero  Surgery Date: 5/31/23  Time of Surgery: am  Where patient plans to recover: Other: [facility]  Fax number for surgical facility: Note does not need to be faxed, will be available electronically in Epic.    Assessment & Plan     The proposed surgical procedure is considered INTERMEDIATE risk.    Preoperative examination    - Basic Metabolic Panel (BFP)  - HEMOGRAM PLATELET DIFF (BFP)  - VENOUS COLLECTION  - EKG 12-lead complete w/read - Clinics    Osteomyelitis of ankle, left, acute (H)  nonhealing  - Basic Metabolic Panel (BFP)  - HEMOGRAM PLATELET DIFF (BFP)  - VENOUS COLLECTION  - EKG 12-lead complete w/read - Clinics    Essential hypertension, benign  Well controlled  - lisinopril (ZESTRIL) 40 MG tablet  Dispense: 90 tablet; Refill: 1  - metoprolol succinate ER (TOPROL XL) 50 MG 24 hr tablet  Dispense: 90 tablet; Refill: 1    SIADH (syndrome of inappropriate ADH production) (H)  Sodium is stable since last check- nephrology felt him fine for surgery at that time so assume no issues now,continue to monitor and f/u nephrology    PVD (peripheral vascular disease) (H)  stable  - EKG 12-lead complete w/read - Clinics    Hyperlipidemia LDL goal <70  controlled  - rosuvastatin (CRESTOR) 40 MG tablet  Dispense: 90 tablet; Refill: 1    Parkinsons-stable             - No identified additional risk factors other than previously addressed    Antiplatelet or Anticoagulation Medication Instructions:   - aspirin: Discontinue aspirin 7-10 days prior to procedure to  reduce bleeding risk. It should be resumed postoperatively.     Additional Medication Instructions:  Patient is to take all scheduled medications on the day of surgery    RECOMMENDATION:  APPROVAL GIVEN to proceed with proposed procedure, without further diagnostic evaluation.    Review of external notes as documented elsewhere in note  Review of the result(s) of each unique test - labs and ekg  Ordering of each unique test  Prescription drug management        Subjective       HPI related to upcoming procedure: left ankle     1. No - Have you ever had a heart attack or stroke?  2. No - Have you ever had surgery on your heart or blood vessels, such as a stent, coronary (heart) bypass, or surgery on an artery in the head, neck, heart, or legs?  3. No - Do you have chest pain when you are physically active?  4. No - Do you have a history of heart failure?  5. No - Do you currently have a cold, bronchitis, or symptoms of other respiratory (head and chest) infections?  6. No - Do you have a cough, shortness of breath, or wheezing?  7. No - Do you or anyone in your family have a history of blood clots?  8. No - Do you or anyone in your family have a serious bleeding problem, such as long-lasting bleeding after surgeries or cuts?  9. No - Have you ever had anemia or been told to take iron pills?  10. No - Have you had any abnormal blood loss such as black, tarry or bloody stools, or abnormal vaginal bleeding?  11. No - Have you ever had a blood transfusion?  12. Yes - Are you willing to have a blood transfusion if it is medically needed before, during, or after your surgery?  13. No - Have you or anyone in your family ever had problems with anesthesia (sedation for surgery)?  14. No - Do you have sleep apnea, excessive snoring, or daytime drowsiness?   15. No - Do you have any artifical heart valves or other implanted medical devices, such as a pacemaker, defibrillator, or continuous glucose monitor?  16. Yes - Do you have  any artifical joints? Right hip  17. No - Are you allergic to latex?  18. No - Is there any chance that you may be pregnant?      Health Care Directive:  Patient does not have a Health Care Directive or Living Will: Discussed advance care planning with patient; information given to patient to review.    Preoperative Review of :   reviewed - controlled substances reflected in medication list.      Status of Chronic Conditions:  See problem list for active medical problems.  Problems all longstanding and stable, except as noted/documented.  See ROS for pertinent symptoms related to these conditions.      Review of Systems  CONSTITUTIONAL: NEGATIVE for fever, chills, change in weight  ENT/MOUTH: NEGATIVE for ear, mouth and throat problems  RESP: NEGATIVE for significant cough or SOB  CV: NEGATIVE for chest pain, palpitations or peripheral edema    Patient Active Problem List    Diagnosis Date Noted     Nondependent continuous alcohol use disorder 01/21/2023     Priority: Medium     SIADH (syndrome of inappropriate ADH production) (H) 01/18/2023     Priority: Medium     Infected hardware in left lower extremity (H) 08/24/2022     Priority: Medium     Closed left ankle fracture 06/08/2022     Priority: Medium     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     Priority: Medium     Colonic polyp 11/03/2021     Priority: Medium     Parkinson disease (H) 07/30/2020     Priority: Medium     Palpitations 12/31/2018     Priority: Medium     PVD (peripheral vascular disease) (H) 04/30/2018     Priority: Medium     Chronic bronchitis, unspecified chronic bronchitis type (H) 10/30/2017     Priority: Medium     Obesity due to excess calories, unspecified obesity severity 07/06/2016     Priority: Medium     S/P total hip arthroplasty 10/07/2015     Priority: Medium     Colovesical fistula 04/08/2013     Priority: Medium     BCC (basal cell carcinoma) 04/13/2011     Priority: Medium     Mohs 2011       History of colonic polyps 06/28/2005      Priority: Medium     Problem list name updated by automated process. Provider to review       Pure hypercholesterolemia      Priority: Medium     Essential hypertension, benign      Priority: Medium     Health Care Home 03/18/2013     Priority: Low     State Tier Level:  Tier 2  Status:  na  Care Coordinator:      See Letters for [facility]            Past Medical History:   Diagnosis Date     Arthritis      Chronic airway obstruction, not elsewhere classified 03/08/2004    pt says that he does not have COPD     Cough syncope 11/21/2012     Essential hypertension, benign      Fistula     colon/bladder     Fracture of right proximal fibula 07/30/2014     Orthostatic hypotension 11/24/2014     Other chronic pain     right hip pain     PAD (peripheral artery disease) (H) 08/2018    PTA right SFA Dr. Lee     Palpitations 12/2018 01/2019 Event monitor- SR/ST     Parkinsons disease (H)      Personal history of colonic polyps 06/28/2005     Pure hypercholesterolemia      Tobacco use disorder 03/08/2004     Vasovagal syncopes 10/25/2014     Viral warts, unspecified     genital     Past Surgical History:   Procedure Laterality Date     ARTHROPLASTY HIP Right 10/07/2015    Procedure: ARTHROPLASTY HIP;  Surgeon: Neil Davis MD;  Location: [facility] OR     COLONOSCOPY  09/05/2004    Per Hospital encounter dated 4/18/13     COLONOSCOPY N/A 04/09/2018    Procedure: COMBINED COLONOSCOPY, SINGLE OR MULTIPLE BIOPSY/POLYPECTOMY BY BIOPSY;;  Surgeon: Tramaine Zuniga MD;  Location:  GI     COMBINED CYSTOSCOPY, INSERT CATHETER URETER  04/11/2013    Procedure: COMBINED CYSTOSCOPY, INSERT CATHETER URETER;;  Surgeon: Kashif Parks MD;  Location:  OR      LARYNGOSCOPY DIRECT W VOCAL CORD INJECTION      vocal cord polyps     IRRIGATION AND DEBRIDEMENT Left 08/2022    ankle     LAPAROSCOPIC ASSISTED COLECTOMY  04/11/2013    Procedure: LAPAROSCOPIC ASSISTED COLECTOMY;  LOW ANTERIOR RESECTION ;  Surgeon: Tramaine Zuniga  MD PATRICK;  Location: [facility] OR     OPEN REDUCTION INTERNAL FIXATION ANKLE Left 06/09/2022    Procedure: .  Open reduction internal fixation of left ankle syndesmotic injury 2.  Non-operative treatment of left proximal fibula fracture;  Surgeon: Mike Jackson MD;  Location: [facility] OR     OPEN REDUCTION INTERNAL FIXATION ANKLE Left 8/24/2022    Procedure: Open reduction internal fixation ankle;  Surgeon: Mike Jackson MD;  Location: [facility] OR     REMOVE HARDWARE ANKLE Left 8/24/2022    Procedure: 1.  Removal hardware left ankle 2.  Excisional debridement of left lateral ankle wound deepest level of debridement bone 3.  Revision open reduction internal fixation of left ankle syndesmotic injury 4.  Deltoid ligament repair left ankle;  Surgeon: Mike Jackson MD;  Location:  OR     [facility] COLONOSCOPY THRU STOMA W BIOPSY/CAUTERY TUMOR/POLYP/LESION  1998    michael, tubular adenoma, next colonoscopy 2001     [facility]ONOS[facility] TH[facility], DIAGNOSTIC  04/16/2001    negative, ligate two internal hemmorhoids  Dr rodriguez-repeat 2008     Current Outpatient Medications   Medication Sig Dispense Refill     acetaminophen (TYLENOL) 500 MG tablet Take 500-1,000 mg by mouth every 6 hours as needed for mild pain       carbidopa-levodopa (SINEMET)  MG tablet Take 2 tablets by mouth 3 times daily (Takes at 0400, 1000, and 1600)       lisinopril (ZESTRIL) 40 MG tablet Take 1 tablet (40 mg) by mouth daily 90 tablet 1     metoprolol succinate ER (TOPROL XL) 50 MG 24 hr tablet Take 1 tablet (50 mg) by mouth daily 90 tablet 1     oxyCODONE (ROXICODONE) 5 MG tablet Take 1 tablet (5 mg) by mouth every 6 hours as needed for severe pain       rosuvastatin (CRESTOR) 40 MG tablet Take 1 tablet (40 mg) by mouth daily 90 tablet 1     Tolvaptan 15 MG TBPK Take 15 mg by mouth daily 14 each 0     umeclidinium (INCRUSE ELLIPTA) 62.5 MCG/INH inhaler Inhale 1 puff into the lungs daily 30 each 1     aspirin (ASA) 325 MG EC tablet Take 325 mg by mouth daily  "(Patient not taking: Reported on 2023)         Allergies   Allergen Reactions     Tiotropium Bromide Monohydrate Blisters     Hctz [Hydrochlorothiazide] Other (See Comments)     Low sodium        Social History     Tobacco Use     Smoking status: Former     Packs/day: 1.50     Years: 40.00     Pack years: 60.00     Types: Cigarettes     Quit date: 2013     Years since quitting: 10.1     Passive exposure: Past     Smokeless tobacco: Never   Vaping Use     Vaping status: Not on file   Substance Use Topics     Alcohol use: Yes     Alcohol/week: 14.0 standard drinks of alcohol     Types: 14 Cans of beer per week     Comment: 2 beers daily     Family History   Problem Relation Age of Onset     Hypertension Mother      Hypertension Father          MI     Heart Disease Father         MI  at age 55     Coronary Artery Disease Father      Hyperlipidemia Brother      Hypertension Brother      Heart Surgery Brother         defibrillator     Hypertension Sister      Prostate Cancer No family hx of      History   Drug Use No     Comment: in 's used marijauna and cocaine         Objective     /82 (BP Location: Right arm, Patient Position: Chair, Cuff Size: Adult Regular)   Pulse 68   Temp 97.9  F (36.6  C) (Temporal)   Resp 20   Ht 1.778 m (5' 10\")   Wt 87.1 kg (192 lb)   BMI 27.55 kg/m      Physical Exam  GENERAL APPEARANCE: healthy, alert and no distress  HENT: ear canals and TM's normal and nose and mouth without ulcers or lesions  RESP: lungs clear to auscultation - no rales, rhonchi or wheezes  CV: regular rate and rhythm, normal S1 S2, no S3 or S4 and no murmur, click or rub   ABDOMEN: soft, nontender, no HSM or masses and bowel sounds normal    Recent Labs   Lab Test 23  0000 23  0813 23  1001 23  0759 23  2146 23  1727 22  2151 22  1657   HGB  --   --   --  10.4*  --  10.8*   < > 11.1*   PLT  --   --   --  176  --  185   < > 122*   INR  --   " --   --   --   --   --   --  1.11   .4* 130*  130*   < > 123*   < > 116*   < > 120*   POTASSIUM 4.62 3.8   < > 4.4  --  4.7   < > 4.6   CR 0.86 0.97   < > 0.76  --  0.78   < > 0.75    < > = values in this interval not displayed.        Diagnostics:  Recent Results (from the past 24 hour(s))   HEMOGRAM PLATELET DIFF (BFP)    Collection Time: 05/24/23 12:00 AM   Result Value Ref Range    WBC 8.3 4.0 - 11 10*9/L    RBC Count 3.51 (A) 4.4 - 5.9 10*12/L    Hemoglobin 10.5 (A) 13.3 - 17.7 g/dL    Hematocrit 33.8 (A) 40.0 - 53.0 %    MCV 96.4 78 - 100 fL    MCH 29.9 26 - 33 pg    MCHC 31.1 31 - 36 g/dL    Platelet Count 239 150 - 375 10^9/L    % Granulocytes 76.1 %    % Lymphocytes 16.8 %    % Monocytes 7.1 %   Basic Metabolic Panel (BFP)    Collection Time: 05/24/23  3:57 PM   Result Value Ref Range    Carbon Dioxide 30.9 20 - 32 mmol/L    Creatinine 0.83 0.60 - 1.30 mg/dL    Glucose 111 (A) 60 - 99 mg/dL    Sodium 126.1 (A) 135 - 146 mmol/L    Potassium 4.72 3.5 - 5.3 mmol/L    Chloride 89.8 (A) 98 - 110 mmol/L    Urea Nitrogen 11 7 - 25 mg/dL    Calcium 9.2 8.6 - 10.3 mg/dL    BUN/Creatinine Ratio 13.3 6 - 32      EKG: appears normal, NSR, normal axis, normal intervals, no acute ST/T changes c/w ischemia, no LVH by voltage criteria, unchanged from previous tracings    Revised Cardiac Risk Index (RCRI):  The patient has the following serious cardiovascular risks for perioperative complications:   - No serious cardiac risks = 0 points     RCRI Interpretation: 0 points: Class I (very low risk - 0.4% complication rate)           Signed Electronically by: Yemi Nava MD  Copy of this evaluation report is provided to requesting physician.

## 2023-05-24 NOTE — NURSING NOTE
Rashaun Molina is here for a pre-op exam    Questioned patient about current smoking habits.  Pt. quit smoking some time ago.  PULSE regular  My Chart:   CLASSIFICATION OF OVERWEIGHT AND OBESITY BY BMI                        Obesity Class           BMI(kg/m2)  Underweight                                    < 18.5  Normal                                         18.5-24.9  Overweight                                     25.0-29.9  OBESITY                     I                  30.0-34.9                             II                 35.0-39.9  EXTREME OBESITY             III                >40                            Patient's  BMI Body mass index is 27.55 kg/m .      The patient has verbalized that it is ok to leave a detailed voice message on the patient's cell phone with results/recommendations from this visit.

## 2023-05-30 NOTE — PHARMACY-ADMISSION MEDICATION HISTORY
Pharmacist Admission Medication History    Admission medication history is complete. The information provided in this note is only as accurate as the sources available at the time of the update.    Medication reconciliation/reorder completed by provider prior to medication history? No    Information Source(s): Patient via phone    Pertinent Information: Called patient to clarify pre-op medication history. Patient confirmed taking aspirin 325 mg daily. Patient confirmed NOT taking tolvaptan or sodium chloride tablets as his sodium has been fine.    Changes made to PTA medication list:    Added: None    Deleted: tolvaptan    Changed: None    Medication Affordability: Not including over the counter (OTC) medications, was there a time in the past 3 months when you did not take your medications as prescribed because of cost? No    Allergies reviewed with patient and updates made in EHR: yes    Medication History Completed By: Skip Bright [facility] 5/30/2023 4:56 PM    Prior to Admission medications    Medication Sig Last Dose Taking? Auth Provider Long Term End Date   acetaminophen (TYLENOL) 500 MG tablet Take 500-1,000 mg by mouth every 6 hours as needed for mild pain  Yes Unknown, Entered By History     aspirin (ASA) 325 MG EC tablet Take 325 mg by mouth daily  Yes Unknown, Entered By History     carbidopa-levodopa (SINEMET)  MG tablet Take 2 tablets by mouth 3 times daily (Takes at 0400, 1000, and 1600)  Yes Chiquita Schwartz PA-C Yes    lisinopril (ZESTRIL) 40 MG tablet Take 1 tablet (40 mg) by mouth daily  Yes Yemi Nava MD Yes    metoprolol succinate ER (TOPROL XL) 50 MG 24 hr tablet Take 1 tablet (50 mg) by mouth daily  Yes Yemi Nava MD Yes    oxyCODONE (ROXICODONE) 5 MG tablet Take 1 tablet (5 mg) by mouth every 6 hours as needed for severe pain  Yes Yemi Nava MD     rosuvastatin (CRESTOR) 40 MG tablet Take 1 tablet (40 mg) by mouth daily  Yes Yemi Nava  MD Ashok Yes    umeclidinium (INCRUSE ELLIPTA) 62.5 MCG/INH inhaler Inhale 1 puff into the lungs daily  Patient taking differently: Inhale 1 puff into the lungs daily as needed  Yes Yemi Nava MD

## 2023-05-31 PROBLEM — Z89.512: Status: ACTIVE | Noted: 2023-01-01

## 2023-05-31 NOTE — ANESTHESIA CARE TRANSFER NOTE
Patient: Rashaun Molina    Procedure: Procedure(s):  Left below the knee amputation       Diagnosis: History of failed arthroplasty of ankle [Z98.890]  Degenerative arthritis of ankle [M19.079]  Acquired subluxation of left ankle [S93.02XA]  Diagnosis Additional Information: No value filed.    Anesthesia Type:   General     Note:    Oropharynx: oropharynx clear of all foreign objects  Level of Consciousness: drowsy  Oxygen Supplementation: face mask  Level of Supplemental Oxygen (L/min / FiO2): 6  Independent Airway: airway patency satisfactory and stable  Dentition: dentition unchanged  Vital Signs Stable: post-procedure vital signs reviewed and stable  Report to RN Given: handoff report given  Patient transferred to: PACU    Handoff Report: Identifed the Patient, Identified the Reponsible Provider, Reviewed the pertinent medical history, Discussed the surgical course, Reviewed Intra-OP anesthesia mangement and issues during anesthesia, Set expectations for post-procedure period and Allowed opportunity for questions and acknowledgement of understanding      Vitals:  Vitals Value Taken Time   /76 05/31/23 1415   Temp     Pulse 71 05/31/23 1418   Resp 28 05/31/23 1418   SpO2 99 % 05/31/23 1418   Vitals shown include unvalidated device data.    Electronically Signed By: LEIGHA Alcazar CRNA  May 31, 2023  2:19 PM

## 2023-05-31 NOTE — ANESTHESIA PREPROCEDURE EVALUATION
Anesthesia Pre-Procedure Evaluation    Patient: Rashaun Molina   MRN: [id number] : 1960        Procedure : Procedure(s):  Left below the knee amputation          Past Medical History:   Diagnosis Date     Arthritis      Chronic airway obstruction, not elsewhere classified 2004    pt says that he does not have COPD     Cough syncope 2012     Essential hypertension, benign      Fistula     colon/bladder     Fracture of right proximal fibula 2014     Orthostatic hypotension 2014     Other chronic pain     right hip pain     PAD (peripheral artery disease) (H) 2018    PTA right SFA Dr. Lee     Palpitations 2018 Event monitor- SR/ST     Parkinsons disease (H)      Personal history of colonic polyps 2005     Pure hypercholesterolemia      Tobacco use disorder 2004     Vasovagal syncopes 10/25/2014     Viral warts, unspecified     genital      Past Surgical History:   Procedure Laterality Date     ARTHROPLASTY HIP Right 10/07/2015    Procedure: ARTHROPLASTY HIP;  Surgeon: Neil Davis MD;  Location:  OR     COLONOSCOPY  2004    Per Hospital encounter dated 13     COLONOSCOPY N/A 2018    Procedure: COMBINED COLONOSCOPY, SINGLE OR MULTIPLE BIOPSY/POLYPECTOMY BY BIOPSY;;  Surgeon: Tramaine Zuniga MD;  Location:  GI     COMBINED CYSTOSCOPY, INSERT CATHETER URETER  2013    Procedure: COMBINED CYSTOSCOPY, INSERT CATHETER URETER;;  Surgeon: Kashif Parks MD;  Location:  OR      LARYNGOSCOPY DIRECT W VOCAL CORD INJECTION      vocal cord polyps     IRRIGATION AND DEBRIDEMENT Left 2022    ankle     LAPAROSCOPIC ASSISTED COLECTOMY  2013    Procedure: LAPAROSCOPIC ASSISTED COLECTOMY;  LOW ANTERIOR RESECTION ;  Surgeon: Tramaine Zuniga MD;  Location:  OR     OPEN REDUCTION INTERNAL FIXATION ANKLE Left 2022    Procedure: .  Open reduction internal fixation of left ankle syndesmotic injury 2.  Non-operative  treatment of left proximal fibula fracture;  Surgeon: Mike Jackson MD;  Location: [facility] OR     OPEN REDUCTION INTERNAL FIXATION ANKLE Left 8/24/2022    Procedure: Open reduction internal fixation ankle;  Surgeon: Mike Jackson MD;  Location: [facility] OR     REMOVE HARDWARE ANKLE Left 8/24/2022    Procedure: 1.  Removal hardware left ankle 2.  Excisional debridement of left lateral ankle wound deepest level of debridement bone 3.  Revision open reduction internal fixation of left ankle syndesmotic injury 4.  Deltoid ligament repair left ankle;  Surgeon: Mike Jackson MD;  [facility]  [facility]     [facility]CAUTERY TUMOR/POLYP/LESION  1998    michael, tubular adenoma, next colonoscopy 2001     [facility] COLONOSCOPY THRU STOMA, DIAGNOSTIC  04/16/2001    negative, ligate two internal hemmorhoids  Dr rodriguez-repeat 2008      Allergies   Allergen Reactions     Tiotropium Bromide Monohydrate Blisters     Hctz [Hydrochlorothiazide] Other (See Comments)     Low sodium      Social History     Tobacco Use     Smoking status: Former     Packs/day: 1.50     Years: 40.00     Pack years: 60.00     Types: Cigarettes     Quit date: 4/19/2013     Years since quitting: 10.1     Passive exposure: Past     Smokeless tobacco: Never   Vaping Use     Vaping status: Not on file   Substance Use Topics     Alcohol use: Yes     Alcohol/week: 14.0 standard drinks of alcohol     Types: 14 Cans of beer per week     Comment: 2 beers daily      Wt Readings from Last 1 Encounters:   05/31/23 85.6 kg (188 lb 12.8 oz)        Anesthesia Evaluation   Pt has had prior anesthetic.     No history of anesthetic complications       ROS/MED HX  ENT/Pulmonary:     (+) tobacco use, Past use, moderate,  COPD,     Neurologic:     (+) Parkinson's disease, features: left sided tremor,     Cardiovascular:     (+) Dyslipidemia hypertension-Peripheral Vascular Disease-- Non Symptomatic. ---    METS/Exercise Tolerance:     Hematologic:     (+) anemia,      Musculoskeletal:   (+) arthritis,     GI/Hepatic:  - neg GI/hepatic ROS     Renal/Genitourinary:  - neg Renal ROS     Endo: Comment: SIADH, chronic hyponatremia     (+) Obesity,     Psychiatric/Substance Use:     (+) alcohol abuse     Infectious Disease: Comment: osteomyleitis      Malignancy:       Other:      (+) , H/O Chronic Pain,        Physical Exam    Airway        Mallampati: II   TM distance: > 3 FB   Neck ROM: full   Mouth opening: > 3 cm    Respiratory Devices and Support         Dental       (+) Edentulous and Removable bridges or other hardware      Cardiovascular          Rhythm and rate: regular and normal     Pulmonary           breath sounds clear to auscultation           OUTSIDE LABS:  CBC:   Lab Results   Component Value Date    WBC 8.3 05/24/2023    WBC 6.7 01/19/2023    HGB 10.5 (A) 05/24/2023    HGB 10.4 (L) 01/19/2023    HCT 33.8 (A) 05/24/2023    HCT 31.4 (L) 01/19/2023     05/24/2023     01/19/2023     BMP:   Lab Results   Component Value Date    .1 (A) 05/24/2023    .4 (A) 01/31/2023    POTASSIUM 4.72 05/24/2023    POTASSIUM 4.62 01/31/2023    CHLORIDE 89.8 (A) 05/24/2023    CHLORIDE 88.2 (A) 01/31/2023    CO2 30.9 05/24/2023    CO2 29.0 01/31/2023    BUN 11 05/24/2023    BUN 13.3 05/24/2023    CR 0.83 05/24/2023    CR 0.86 01/31/2023     (A) 05/24/2023    GLC 83 01/31/2023     COAGS:   Lab Results   Component Value Date    PTT 31 08/06/2018    INR 1.11 06/08/2022     POC:   Lab Results   Component Value Date     (H) 04/13/2013     HEPATIC:   Lab Results   Component Value Date    ALBUMIN 3.4 06/09/2022    PROTTOTAL 8.0 06/09/2022    ALT 12 06/09/2022    AST 75 (H) 10/19/2022    ALKPHOS 78 06/09/2022    BILITOTAL 0.9 06/09/2022    BILIDIRECT 0.1 02/13/2006     OTHER:   Lab Results   Component Value Date    PH 7.0 07/10/2000    A1C 5.3 11/25/2019    FRANNY 9.2 05/24/2023    PHOS 3.4 06/08/2022    MAG 1.8 01/21/2023    LIPASE 28 04/15/2013    TSH 1.74  06/08/2022    SED 26 (H) 10/19/2022       Anesthesia Plan    ASA Status:  3   NPO Status:  NPO Appropriate    Anesthesia Type: General.     - Airway: LMA   Induction: Intravenous.   Maintenance: Balanced.   Techniques and Equipment:       - Drips/Meds: Dexmed. infusion     Consents    Anesthesia Plan(s) and associated risks, benefits, and realistic alternatives discussed. Questions answered and patient/representative(s) expressed understanding.    - Discussed:     - Discussed with:  Patient      - Extended Intubation/Ventilatory Support Discussed: No.      - Patient is DNR/DNI Status: No    Use of blood products discussed: No .     Postoperative Care    Pain management: IV analgesics, Oral pain medications, Multi-modal analgesia, Peripheral nerve block (Single Shot).   PONV prophylaxis: Ondansetron (or other 5HT-3), Dexamethasone or Solumedrol     Comments:    Other Comments: The surgeon has given a verbal order transferring care of this patient to me for the performance of a regional analgesia block for post-op pain control. It is requested of me because I am uniquely trained and qualified to perform this block and the surgeon is neither trained nor qualified to perform this procedure.    Ultrasound guided peripheral nerve block(s) discussed. The patient was informed that undergoing the block is not required for surgery to proceed and is optional, but they can be beneficial in reducing post operative pain medication requirements for a period of time (several hours to a few days). Block rationale, procedure, expected results, and block specific side effects reviewed with the patient. Risks, including but not limited to bleeding, infection, nerve damage/damage to surrounding structures, medication adverse/allergic reactions, and block failure discussed.  Questions encouraged and answered.  The patient wishes to proceed.               Mark Schwartz MD

## 2023-05-31 NOTE — OR NURSING
Pt hx of frequent daily alcohol consumption.  Pt states more, recently since increase pain but usually having 14 beers per week.  Pt denies having hx of DT's and states no seizure in past

## 2023-05-31 NOTE — ANESTHESIA POSTPROCEDURE EVALUATION
Patient: Rashaun Molina    Procedure: Procedure(s):  Left below the knee amputation       Anesthesia Type:  General    Note:  Disposition: Admission   Postop Pain Control: Challenging            Challenges/Interventions: Rescue Block; Block failure; Acute Pain; Multimodal therapy (Repeat sciatic nerve block)            Sign Out: ONGOING pain issues   PONV: No   Neuro/Psych: Uneventful            Sign Out: Acceptable/Baseline neuro status (Will get home sinemet dose at 1600)   Airway/Respiratory: Uneventful            Sign Out: Acceptable/Baseline resp. status   CV/Hemodynamics: Uneventful            Sign Out: Acceptable CV status   Other NRE: NONE   DID A NON-ROUTINE EVENT OCCUR? No           Last vitals:  Vitals Value Taken Time   /96 05/31/23 1420   Temp 98.5  F (36.9  C) 05/31/23 1413   Pulse 70 05/31/23 1423   Resp 18 05/31/23 1423   SpO2 99 % 05/31/23 1423   Vitals shown include unvalidated device data.    Electronically Signed By: Mark Schwartz MD  May 31, 2023  2:24 PM

## 2023-05-31 NOTE — ADDENDUM NOTE
Addendum  created 05/31/23 3219 by Mark Schwartz MD    Child order released for a procedure order, Clinical Note Signed, Intraprocedure Blocks edited, Intraprocedure Event edited, Intraprocedure Meds edited, SmartForm saved

## 2023-05-31 NOTE — OP NOTE
[facility]   Operative Note    Pre-operative diagnosis: 1. Infected left ankle s/p multiple debridements and hardware removal  2. Left ankle fracture infected nonunion   Post-operative diagnosis  same   Procedure: Procedure(s):  Left below the knee amputation   Surgeon(s): Surgeon(s) and Role:     * Neil Romero MD - Primary     * Susu Nuñez PA-C - Assisting   Estimated blood loss:  200 mL    Specimens: ID Type Source Tests Collected by Time Destination   1 : LEFT LEG BELOW THE KNEE AMPUTATION Amputation, Non-Traumatic Leg, Below Knee, Left SURGICAL PATHOLOGY EXAM Neil Romero MD 5/31/2023  1:04 PM       Findings:  Gross purulence to the left ankle with substantial valgus deformity     Indications: This is a 63-year-old male who previously had an ankle fracture and underwent an open reduction internal fixation.  Unfortunately he went on to develop a nonunion and an infection and underwent a number of operations by a few of my partners at [facility].  He eventually transferred his care down to my service as it was closer to his home.  I had a long discussion with Rashaun and his brother regarding our options at this point.  Our 2 options would be limb salvage or right below the knee amputation.  Based on the patient's medical history and inability to follow postoperative restrictions I think his best option is a below the knee amputation.  We did give him the option of both limb salvage or amputation.  I did have him meet with our prosthetics team prior to making any decisions.  He also discussed this with a number of family members and friends.  After having long discussions regarding this operation he elected to undergo the above-mentioned procedure.  We did discuss the risks of surgery at length.  His most substantial risks include infection, wound healing problems, need for further surgery, phantom limb pain, blood clots, blood loss, neurovascular injury,  and anesthesia related complications.    Description of procedure:   Patient was met in the preoperative area and operative site, the left leg, signed by myself.  Preoperative antibiotics were given.  The patient was then brought back to the operating room and was placed in the supine position on the operating table.  All bony prominences were padded at this time.  He then underwent general anesthesia.  He was then prepped and draped in normal sterile fashion.  A timeout was then called ensuring we are operating on the correct site and the correct patient.  All staff concerns were addressed this time.  Following the timeout the left lower extremity was elevated and an Esmarch was used to exsanguinate the limb.  A thigh tourniquet was placed.    An incision was then drawn out approximately 10 cm distal to the tibial tubercle.  We then carried our incision down through the skin and subcutaneous tissue.  Hemostasis was achieved.  We identified the saphenous vein and this was ligated using an 0 Vicryl suture.  We then carried our dissection through the anterior compartment and a Blankenship was used to isolate that compartment.  We used a Bovie to divide the anterior compartment musculature.  We identified the neurovascular bundle.  The nerve was sharply divided and the vessels were tied off using an 0 Vicryl suture x2.  We then used a saw to put a beveled cut in the fibula.  We also made a beveled cut in the tibia.  We then used the amputation knife to complete the amputation carried our dissection subperiosteally along the tibia and the fibula.  Once we completed our amputation we sent the leg off for pathology specimen.  We then identified the tibial nerve.  This was sharply divided on tension and retracted proximally in the leg.  We then identified the neurovascular bundle and the vessels were all isolated and sutured using an 0 Vicryl tie.  Once we had all of the vessels isolated and tied we took down the tourniquet.  We  obtained hemostasis.  We thoroughly irrigated the wound.  We also placed the sural nerve on tension and divided this with a sharp scalpel.  We then drilled 4 holes within the distal tibia.  We then performed our myodesis using a #5 Ethibond suture.  We had excellent security of the gastrocsoleus complex over the distal tibia.  Once this was done we trimmed remove any remaining tissue.  We then closed the subcutaneous layer using 2-0 Vicryl after the deep layer was closed using 0 Vicryl.  Skin was closed using 2-0 nylon.  We did have some slight dogears and these were slightly trimmed but will likely shrink over time.  We then play sterile bandages on the patient and placed him onto the hospital bed.  He was then brought back to postanesthesia care unit where he woke without any difficulty.    All counts correct at the end the case.    Postoperative plan: The patient be nonweightbearing on the left lower extremity.  We will admit him to the hospital and he will be seen by our physical therapy team tomorrow.  He will be fit for a flow tech brace while in the hospital and will eventually receive a prosthesis as an outpatient.  This will be done at [facility].  He is already seeing them.  Will be on aspirin 325 daily for DVT prophylaxis.

## 2023-05-31 NOTE — ANESTHESIA PROCEDURE NOTES
"Sciatic Procedure Note    Pre-Procedure   Staff -        Anesthesiologist:  Mark Schwartz MD       Performed By: anesthesiologist       Referred By: Nick       Location: post-op       Pre-Anesthestic Checklist: patient identified, IV checked, site marked, risks and benefits discussed, informed consent, monitors and equipment checked, pre-op evaluation, at physician/surgeon's request and post-op pain management  Timeout:       Correct Patient: Yes        Correct Procedure: Yes        Correct Site: Yes        Correct Position: Yes        Correct Laterality: Yes        Site Marked: Yes  Procedure Documentation  Procedure: Sciatic       Laterality: left       Patient Position: supine       Patient Prep/Sterile Barriers: sterile gloves, mask       Skin prep: Chloraprep       Local skin infiltrated with 3 mL of 2% lidocaine.  (popliteal approach).       Needle Type: insulated and short bevel       Needle Gauge: 21.        Needle Length (Inches): 4        Ultrasound guided       1. Ultrasound was used to identify targeted nerve, plexus, vascular marker, or fascial plane and place a needle adjacent to it in real-time.       2. Ultrasound was used to visualize the spread of anesthetic in close proximity to the above referenced structure.       4. The visualized anatomic structures appeared normal.       5. There were no apparent abnormal pathologic findings.    Assessment/Narrative         The placement was negative for: blood aspirated, painful injection and site bleeding       Paresthesias: No.       Bolus given via needle. no blood aspirated via catheter.        Secured via.        Insertion/Infusion Method: Single Shot       Complications: none       Injection made incrementally with aspirations every 5 mL.     Comments:  Rescue block for first sciatic block failure      FOR [facility] ([facility]:   Pain Team Contact information: please page the Pain Team Via [employer]. Search \"Pain\". During daytime hours, " please page the attending first. At night please page the resident first.

## 2023-05-31 NOTE — ANESTHESIA PROCEDURE NOTES
Sciatic Procedure Note    Pre-Procedure   Staff -        Anesthesiologist:  Mark Schwartz MD       Performed By: anesthesiologist       Referred By: Nick       Location: pre-op       Pre-Anesthestic Checklist: patient identified, IV checked, site marked, risks and benefits discussed, informed consent, monitors and equipment checked, pre-op evaluation, at physician/surgeon's request and post-op pain management  Timeout:       Correct Patient: Yes        Correct Procedure: Yes        Correct Site: Yes        Correct Position: Yes        Correct Laterality: Yes        Site Marked: Yes  Procedure Documentation  Procedure: Sciatic       Diagnosis: ARTHRITIS       Laterality: left       Patient Position: RLD       Patient Prep/Sterile Barriers: sterile gloves, mask       Skin prep: Chloraprep       Local skin infiltrated with 2 mL of 2% lidocaine.  (popliteal approach).       Needle Type: insulated and short bevel       Needle Gauge: 21.        Needle Length (Inches): 4        Ultrasound guided       1. Ultrasound was used to identify targeted nerve, plexus, vascular marker, or fascial plane and place a needle adjacent to it in real-time.       2. Ultrasound was used to visualize the spread of anesthetic in close proximity to the above referenced structure.       4. The visualized anatomic structures appeared normal.       5. There were no apparent abnormal pathologic findings.    Assessment/Narrative         The placement was negative for: blood aspirated, painful injection and site bleeding       Paresthesias: No.       Bolus given via needle. no blood aspirated via catheter.        Secured via.        Insertion/Infusion Method: Single Shot       Complications: none       Injection made incrementally with aspirations every 5 mL.     Comments:  Good LA spread around sciatic nerve just proximal to the bifurcation       FOR [facility] ([facility]) ONLY:   Pain Team Contact information: please page the Pain Team  "Via [employer]. Search \"Pain\". During daytime hours, please page the attending first. At night please page the resident first.      "

## 2023-05-31 NOTE — ANESTHESIA PROCEDURE NOTES
"Femoral Procedure Note    Pre-Procedure   Staff -        Anesthesiologist:  Mark Schwartz MD       Performed By: anesthesiologist       Referred By: Nick       Location: pre-op       Pre-Anesthestic Checklist: patient identified, IV checked, site marked, risks and benefits discussed, informed consent, monitors and equipment checked, pre-op evaluation, at physician/surgeon's request and post-op pain management  Timeout:       Correct Patient: Yes        Correct Procedure: Yes        Correct Site: Yes        Correct Position: Yes        Correct Laterality: Yes        Site Marked: Yes  Procedure Documentation  Procedure: Femoral       Diagnosis: ARTHRITIS       Laterality: left       Patient Position: supine       Patient Prep/Sterile Barriers: sterile gloves, mask       Skin prep: Chloraprep       Local skin infiltrated with 3 mL of 2% lidocaine.        Needle Type: insulated and short bevel       Needle Gauge: 21.        Needle Length (Inches): 4        Ultrasound guided       1. Ultrasound was used to identify targeted nerve, plexus, vascular marker, or fascial plane and place a needle adjacent to it in real-time.       2. Ultrasound was used to visualize the spread of anesthetic in close proximity to the above referenced structure.       4. The visualized anatomic structures appeared normal.       5. There were no apparent abnormal pathologic findings.    Assessment/Narrative         The placement was negative for: blood aspirated, painful injection and site bleeding       Paresthesias: No.       Bolus given via needle. no blood aspirated via catheter.        Secured via.        Insertion/Infusion Method: Single Shot       Complications: none       Injection made incrementally with aspirations every 5 mL.     Comments:  20 ml.  Good LA spread just lateral to femoral artery      FOR [facility] ([facility]) ONLY:   Pain Team Contact information: please page the Pain Team Via [employer]. Search \"Pain\". During " daytime hours, please page the attending first. At night please page the resident first.

## 2023-06-01 NOTE — PROGRESS NOTES
Orthopedic Surgery  Rashaun Molina  2023  Admit Date:  2023  POD # 1  S/P left below the knee amputation    Patient resting comfortably in bed.    Pain controlled, block in place.  Tolerating oral intake.    Denies nausea or vomiting.  Denies chest pain or shortness of breath.  No events overnight.     Alert and orient to person, place, and time.  Vital Sign Ranges  Temperature Temp  Av.6  F (36.4  C)  Min: 96.8  F (36  C)  Max: 98.5  F (36.9  C)   Blood pressure Systolic (24hrs), Av , Min:89 , Max:179        Diastolic (24hrs), Av, Min:47, Max:123      Pulse Pulse  Av.1  Min: 62  Max: 74   Respirations Resp  Av.3  Min: 10  Max: 52   Pulse oximetry SpO2  Av.1 %  Min: 84 %  Max: 100 %       Dressing is clean, dry, and intact.   Minimal erythema of the surrounding skin.     Labs:  Recent Labs   Lab Test 23  1557 23  0000 23  0813   POTASSIUM 4.72 4.62 3.8     Recent Labs   Lab Test 23  0000 23  0759 23  1727   HGB 10.5* 10.4* 10.8*     Recent Labs   Lab Test 22  1657 18  0718 18  0713   INR 1.11 1.04 1.07     Recent Labs   Lab Test 23  0000 23  0759 23  1727    176 185       A/P  1. PLAN   Continue ASA for DVT prophylaxis.     Mobilize with PT/OT.    NWB left LE.     Continue current pain regiment.   Dressing: leave intact.   Prosthetics team to see and fit for [employer] prior to discharge.    2. Disposition   Anticipate d/c to home today once seen by prosthetics for [employer].    Susu Nuñez PA-C

## 2023-06-01 NOTE — PROGRESS NOTES
"   06/01/23 0817   Appointment Info   Signing Clinician's Name / Credentials (PT) Sofia Lai, PT   Rehab Comments (PT) L BKA; alyson tech brace   Living Environment   People in Home other relative(s)   Current Living Arrangements house  ([facility])   Transportation Anticipated health plan transportation   Living [place] [facility]   Self-Care   Usual Activity Tolerance moderate   Current Activity Tolerance fair   Equipment Currently Used at Home wheelchair, manual;cane, straight;walker, rolling   Activity/Exercise/Self-Care Comment patient reports primarily using a borrowed wheelchair at home   General Information   Onset of Illness/Injury or Date of Surgery 05/31/23   Referring Physician Susu Nuñez PA-C/Dr. Neil Romero   Patient/Family Therapy Goals Statement (PT) go to brother's home; \"I won't go to another TCU\"   Pertinent History of Current Problem (include personal factors and/or comorbidities that impact the POC) Patient with \"Infected left ankle s/p multiple debridements and hardware removal, left ankle fracture infected nonunion\"; Seen POD #1 s/p  left below the knee amputation; PMHx: significant for COPD and Parkinson's; see medical record for further information   Existing Precautions/Restrictions fall;weight bearing;other (see comments)  (plans for Alyson tech brace for L residual limb)   Weight-Bearing Status - LLE nonweight-bearing   General Observations Patient with whole body tremoring from Parkinson's; difficulty keeping L knee straight   Cognition   Orientation Status (Cognition) oriented x 4   Pain Assessment   Patient Currently in Pain Yes, see Vital Sign flowsheet  (pain with movement; doesn't give a number for pain)   Integumentary/Edema   Integumentary/Edema Comments L BKA incision; covered   Range of Motion (ROM)   ROM Comment UE's WFL; difficulty with extension of L knee; R LE WFL   Strength (Manual Muscle Testing) "   Strength Comments functional weakness; further limited by recent L BKA surgery   Bed Mobility   Comment, (Bed Mobility) SBA to mod I for supine to sit; HOB elevated; use of bedrail   Transfers   Comment, (Transfers) CGA for mod pivot to wheelchair   Gait/Stairs (Locomotion)   Comment, (Gait/Stairs) currently wanting to focus on transfers to allow return home   Balance   Balance Comments current need for UE support and assist   Sensory Examination   Sensory Perception Comments reports neuropathy in LE's   Clinical Impression   Criteria for Skilled Therapeutic Intervention Yes, treatment indicated   PT Diagnosis (PT) impaired functional mobility   Influenced by the following impairments L BKA; pain; impaired balance;; functional weakness; decreased activity tolerance   Functional limitations due to impairments impaired independence with functional mobility and cares   Clinical Presentation (PT Evaluation Complexity) Stable/Uncomplicated   Clinical Presentation Rationale clinical judgement; level of assist   Clinical Decision Making (Complexity) low complexity   Planned Therapy Interventions (PT) bed mobility training;gait training;strengthening;ROM (range of motion);stretching;home program guidelines;wheelchair management/propulsion training   Anticipated Equipment Needs at Discharge (PT) wheelchair;gait belt;other (see comments)  (bedrail)   Risk & Benefits of therapy have been explained evaluation/treatment results reviewed;care plan/treatment goals reviewed;risks/benefits reviewed;current/potential barriers reviewed;participants voiced agreement with care plan;participants included;patient   PT Total Evaluation Time   PT Eval, Low Complexity Minutes (71834) 15   Plan of Care Review   Plan of Care Reviewed With patient;other (see comments)  (Nurse; CC)   Physical Therapy Goals   PT Frequency Daily   PT Predicted Duration/Target Date for Goal Attainment 06/03/23   PT Goals Bed Mobility;Transfers;Gait;Wheelchair  Mobility   PT: Bed Mobility Independent;Supine to/from sit;Rolling   PT: Transfers Supervision/stand-by assist;Sit to/from stand;Bed to/from chair;Assistive device   PT: Gait Minimal assist;Rolling walker;10 feet   PT: Wheelchair Mobility Caregiver SBA;150 feet;manual wheelchair   PT Discharge Planning   PT Discharge Recommendation (DC Rec) home with assist;home with home care physical therapy   PT Rationale for DC Rec Anticipate patient will be able to discharge to home with assist from brother for transfers in and out of wheelchair; no stairs; has a poor wheelchair but plans to get a different one; would benefit from Home PT/OT; declined going to any sort of rehab; reports brother does work part time   PT Brief overview of current status CGA for transfers in and out of wheelchair   Total Session Time   Total Session Time (sum of timed and untimed services) 15

## 2023-06-01 NOTE — PROVIDER NOTIFICATION
0430 provider notified that Pt requesting his carbidopa-levodopa for is Parkinsons. Pt hakes 2 tab at 0400, 1000, and 1600 at home and would like that schedule here.

## 2023-06-01 NOTE — PROGRESS NOTES
Occupational Therapy Discharge Summary    Reason for therapy discharge:    All goals and outcomes met, no further needs identified.    Progress towards therapy goal(s). See goals on Care Plan in [facility] electronic health record for goal details.  Goals met    Therapy recommendation(s):    Continued therapy is recommended.  Rationale/Recommendations:  met goals for ADL. recommend supervision for transfer, assist for bathing and all IADL. pt reports brother can provide. HH OT to increase independence for IADL and safety.    Has all DME/AE needed at brother's home for discharge

## 2023-06-01 NOTE — PROGRESS NOTES
"   06/01/23 1000   Appointment Info   Signing Clinician's Name / Credentials (OT) Kaylee Voss, OTR/L   Rehab Comments (OT) Initial OT Eval   Living Environment   Living Environment Comments pt will be discharging to brother's house. main level sleeping/ bed. walk in shower with seat and wheelchair fits in bathroom   Self-Care   Usual Activity Tolerance moderate   Current Activity Tolerance fair   Equipment Currently Used at Home wheelchair, manual;cane, straight;walker, rolling;grab bar, tub/shower;grab bar, toilet;shower chair   Activity/Exercise/Self-Care Comment using a barrowed WC for home. transfers from surfaces indp. no not use walker much. shower chair for bathing. indp dressing, toileting, transfers   Instrumental Activities of Daily Living (IADL)   IADL Comments brother will be able to help with IADL at discharge but pt does assist with laundry. reprots brother helps to set up medications   General Information   Onset of Illness/Injury or Date of Surgery 05/31/23   Referring Physician Susu Nuñez PA-C   Patient/Family Therapy Goal Statement (OT) to go home tomorrow with brother   Additional Occupational Profile Info/Pertinent History of Current Problem Patient with \"Infected left ankle s/p multiple debridements and hardware removal, left ankle fracture infected nonunion\"; Seen POD #1 s/p  left below the knee amputation; PMHx: significant for COPD and Parkinson's; see medical record for further information   Left Lower Extremity (Weight-bearing Status) non weight-bearing (NWB)   General Observations and Info get flow krystal brace from orthotics. pt hoping to get a new wheelchair soon. New BKA   Cognitive Status Examination   Orientation Status orientation to person, place and time   Cognitive Status Comments pt somewhat distractable during session but pleasant and agreeable.   Visual Perception   Visual Impairment/Limitations WFL   Sensory   Sensory Comments has baseline neuropathy in B feet and " hands   Pain Assessment   Patient Currently in Pain Yes, see Vital Sign flowsheet   Posture   Posture forward head position;protracted shoulders   Range of Motion Comprehensive   Comment, General Range of Motion WFL For ADL   Strength Comprehensive (MMT)   Comment, General Manual Muscle Testing (MMT) Assessment WFL for ADL   Muscle Tone Assessment   Muscle Tone Comments full body tremor noted which is baseline   Coordination   Coordination Comments impaired at baseline from baseline tremor   Bed Mobility   Bed Mobility No deficits identified   Transfers   Transfers wheelchair transfer   Wheelchair Transfer   Type (Wheelchair Transfer) sit-stand   [place] Level (Wheelchair Transfer) contact guard   Balance   Balance Comments defer to PT   Activities of Daily Living   BADL Assessment/Intervention bathing;lower body dressing;toileting   Bathing Assessment/Intervention   [place] Level (Bathing) contact guard assist   Lower Body Dressing Assessment/Training   [place] Level (Lower Body Dressing) contact guard assist   Toileting   [place] Level (Toileting) contact guard assist   Clinical Impression   Criteria for Skilled Therapeutic Interventions Met (OT) Yes, treatment indicated   OT Diagnosis decreased function in ADL   OT Problem List-Impairments impacting ADL problems related to;activity tolerance impaired;coordination;mobility;motor control;post-surgical precautions   Assessment of Occupational Performance 3-5 Performance Deficits   Identified Performance Deficits bathing, dressing, toileting, transfers   Planned Therapy Interventions (OT) ADL retraining;transfer training   Clinical Decision Making Complexity (OT) low complexity   Anticipated Equipment Needs Upon Discharge (OT)   (pt has all)   Risk & Benefits of therapy have been explained evaluation/treatment results reviewed;care plan/treatment goals reviewed;risks/benefits reviewed;current/potential barriers reviewed;participants voiced  agreement with care plan;participants included;patient   Clinical Impression Comments decreased function in ADL warrants skilled IP OT tx   OT Total Evaluation Time   OT Eval, Low Complexity Minutes (89476) 15   OT Goals   Therapy Frequency (OT) One time eval and treatment   OT Predicted Duration/Target Date for Goal Attainment 06/01/23   OT Goals Lower Body Dressing;Transfers;Toilet Transfer/Toileting;OT Goal 1   OT: Lower Body Dressing Modified independent   OT: Transfer Modified independent  (showr chair walk in shower)   OT: Toilet Transfer/Toileting Modified independent   OT: Goal 1 Pt will verbalize 3 safety strategies for home wheelchair use   Interventions   Interventions Quick Adds Self-Care/Home Management   Self-Care/Home Management   Self-Care/Home Mgmt/ADL, Compensatory, Meal Prep Minutes (77485) 27   Symptoms Noted During/After Treatment (Meal Preparation/Planning Training) increased pain   Treatment Detail/Skilled Intervention pt seen for OT tx. full body tremor noted baseline, minimal pain. pt supine in bed and agreeable to therapy intervention. extended time educating pt on safety and awareness of residual limb for transfer and ADL activity. donned pants in seated EOB with weight shifting and VC for limb mgmt, safety. pt reports brother will help him protec tlimb with plastic during shower. educated on safe transfer techniques,pt locked brakes,  CGA to complete, VC for NWB through limb. self propelled with safe use of brakes. set up at toilet. completed toilet tx with grab bars as simulated from home with CGa, educated on WC placement and safety. indp caroline cares. ed on setup for shower chair, pt able to sim demo with CGA and VC for hand placement. back in bed transfered <>WC with CGA. ed on pillow and ice, extended knee on resiual limb. all needs in reach end of session, alarm on   OT Discharge Planning   OT Plan intervention and discharge   OT Discharge Recommendation (DC Rec) home with assist;home  with home care occupational therapy   OT Rationale for DC Rec pt slightly below baseline function in transfer and ADL. will benefit from IP OT tx and education. recommend home with supervision for transfer, assist with bathing and all IADL at discharge. HH OT to increase function and safety for iADL independence. pt has all DME/AE needed for home. will benefit from new wheelchair as planned   Total Session Time   Timed Code Treatment Minutes 27   Total Session Time (sum of timed and untimed services) 42

## 2023-06-01 NOTE — PLAN OF CARE
Goal Outcome Evaluation:      Plan of Care Reviewed With: patient    Overall Patient Progress: improvingOverall Patient Progress: improving     Patient vital signs are at baseline: Yes  Patient able to ambulate as they were prior to admission or with assist devices provided by therapies during their stay:  No,  Reason:  Assist x2 with lift, not OOB over night.  Patient MUST void prior to discharge:  Yes  Patient able to tolerate oral intake:  Yes  Pain has adequate pain control using Oral analgesics:  Yes  Does patient have an identified :  Yes  Has goal D/C date and time been discussed with patient:  No,  Reason: PT in AM    Pt A/O x4. VVS, refused capno. PIV SL. Denies pain over night, on scheduled tylenol. Assist x2 with lift, not OOB over night. Voiding via urinal. Tolerating a regular diet well. Dressing CDI. Plan is to work with PT in AM.

## 2023-06-01 NOTE — PLAN OF CARE
Goal Outcome Evaluation:      Plan of Care Reviewed With: patient    Overall Patient Progress: improvingOverall Patient Progress: improving     Afeb, BPs high until Dr called twice to get his home meds restarted, given at 1100. PT  saw pt and was able to transfer with sba to to w/c. Tolerated sitting up for 30-45min. OT orders were released per PT's instruction, their consult pending. Voiding adequately, good po intake. Pain 3-5 range, did take oxycodone with improvement. Waiting for prothstetics to come and fit him for him stump protector. It's bothering him he can't get the knee to straighten on his own. Possible discharge tomorrow.

## 2023-06-01 NOTE — DISCHARGE SUMMARY
Discharge Summary    Rashaun Molina MRN# [id number]   YOB: 1960 Age: 63 year old     Date of Admission:  5/31/2023  Date of Discharge:  6/1/2023  Admitting Physician:  Neil Romero MD  Discharge Physician:  Neil Romero MD     Primary Provider: Yemi Nava          Admission Diagnoses:   Infected left ankle s/p multiple debridements and hardware removal, left ankle fracture infected nonunion          Discharge Diagnosis:   Status post left below the knee amputation           Surgical Procedure:   Left below the knee amputation           Secondary Diagnosis:     Patient Active Problem List   Diagnosis     Essential hypertension, benign     Pure hypercholesterolemia     History of colonic polyps     BCC (basal cell carcinoma)     Health Care Home     Colovesical fistula     S/P total hip arthroplasty     Obesity due to excess calories, unspecified obesity severity     Chronic bronchitis, unspecified chronic bronchitis type (H)     PVD (peripheral vascular disease) (H)     Palpitations     Parkinson disease (H)     Hip arthrosis     Colonic polyp     Closed left ankle fracture     Infected hardware in left lower extremity (H)     SIADH (syndrome of inappropriate ADH production) (H)     Nondependent continuous alcohol use disorder     Status post below knee amputation, left (H)              Discharge Disposition:   Discharged to home           Medications Prior to Admission:     Medications Prior to Admission   Medication Sig Dispense Refill Last Dose     acetaminophen (TYLENOL) 500 MG tablet Take 500-1,000 mg by mouth every 6 hours as needed for mild pain   5/30/2023 at 1800     aspirin (ASA) 325 MG EC tablet Take 325 mg by mouth daily   Past Month     carbidopa-levodopa (SINEMET)  MG tablet Take 2 tablets by mouth 3 times daily (Takes at 0400, 1000, and 1600)   5/31/2023 at 0900     lisinopril (ZESTRIL) 40 MG tablet Take 1 tablet (40 mg) by mouth daily 90 tablet 1 5/31/2023  at 0900     metoprolol succinate ER (TOPROL XL) 50 MG 24 hr tablet Take 1 tablet (50 mg) by mouth daily 90 tablet 1 5/31/2023 at 0900     oxyCODONE (ROXICODONE) 5 MG tablet Take 1 tablet (5 mg) by mouth every 6 hours as needed for severe pain   5/30/2023 at 1800     rosuvastatin (CRESTOR) 40 MG tablet Take 1 tablet (40 mg) by mouth daily 90 tablet 1 5/30/2023 at 2100     umeclidinium (INCRUSE ELLIPTA) 62.5 MCG/INH inhaler Inhale 1 puff into the lungs daily (Patient taking differently: Inhale 1 puff into the lungs daily as needed) 30 each 1 5/30/2023 at 0800             Discharge Medications:     Current Discharge Medication List      START taking these medications    Details   !! acetaminophen (TYLENOL) 325 MG tablet Take 2 tablets (650 mg) by mouth every 4 hours as needed for other (mild pain)  Qty: 100 tablet, Refills: 0    Associated Diagnoses: Status post below knee amputation, left (H)      !! aspirin (ASA) 325 MG EC tablet Take 1 tablet (325 mg) by mouth daily  Qty: 30 tablet, Refills: 0    Associated Diagnoses: Status post below knee amputation, left (H)      hydrOXYzine (ATARAX) 25 MG tablet Take 1 tablet (25 mg) by mouth every 6 hours as needed for itching or anxiety (with pain, moderate pain)  Qty: 30 tablet, Refills: 0    Associated Diagnoses: Status post below knee amputation, left (H)      ibuprofen (ADVIL/MOTRIN) 600 MG tablet Take 1 tablet (600 mg) by mouth every 6 hours as needed for mild pain  Qty: 30 tablet, Refills: 0    Associated Diagnoses: Status post below knee amputation, left (H)      !! oxyCODONE (ROXICODONE) 5 MG tablet Take 1-2 tablets (5-10 mg) by mouth every 4 hours as needed for moderate to severe pain  Qty: 20 tablet, Refills: 0    Associated Diagnoses: Status post below knee amputation, left (H)       !! - Potential duplicate medications found. Please discuss with provider.      CONTINUE these medications which have NOT CHANGED    Details   !! acetaminophen (TYLENOL) 500 MG tablet  Take 500-1,000 mg by mouth every 6 hours as needed for mild pain      !! aspirin (ASA) 325 MG EC tablet Take 325 mg by mouth daily      carbidopa-levodopa (SINEMET)  MG tablet Take 2 tablets by mouth 3 times daily (Takes at 0400, 1000, and 1600)    Associated Diagnoses: Closed fracture of left ankle, initial encounter      lisinopril (ZESTRIL) 40 MG tablet Take 1 tablet (40 mg) by mouth daily  Qty: 90 tablet, Refills: 1    Associated Diagnoses: Essential hypertension, benign      metoprolol succinate ER (TOPROL XL) 50 MG 24 hr tablet Take 1 tablet (50 mg) by mouth daily  Qty: 90 tablet, Refills: 1    Associated Diagnoses: Essential hypertension, benign      !! oxyCODONE (ROXICODONE) 5 MG tablet Take 1 tablet (5 mg) by mouth every 6 hours as needed for severe pain    Comments: Per Ortho      rosuvastatin (CRESTOR) 40 MG tablet Take 1 tablet (40 mg) by mouth daily  Qty: 90 tablet, Refills: 1    Associated Diagnoses: Hyperlipidemia LDL goal <70      umeclidinium (INCRUSE ELLIPTA) 62.5 MCG/INH inhaler Inhale 1 puff into the lungs daily  Qty: 30 each, Refills: 1    Associated Diagnoses: Chronic bronchitis, unspecified chronic bronchitis type (H)       !! - Potential duplicate medications found. Please discuss with provider.                Consultations:   No consultations were requested during this admission           Hospital Course:   The patient was admitted after the surgical procedure. The patient underwent an uneventful left below the knee amputation. The patient will be discharged to home once seen by prosthetics team for [employer] fitting. Home medications have been reconciled. Ibuprofen, hydroxyzine, and oxycodone 5 mg. were prescribed for pain. Aspirin 325 mg daily  will be prescribed for DVT prophylaxis.             Pending Results:   None           Discharge Instructions and Follow-Up:        Discharge activity: Activity as tolerated  No weight on left lower extremity   Discharge follow-up: Follow up with  me in 3 weeks   Outpatient therapy: None    Home Care agency: None    Supplies and equipment: FloTec        Wound care: leave dressing in place   Other instructions: None

## 2023-06-01 NOTE — CONSULTS
Care Management Initial Consult    General Information  Assessment completed with: Patient,    Type of CM/SW Visit: Initial Assessment    Primary Care Provider verified and updated as needed: Yes   Readmission within the last 30 days: no previous admission in last 30 days      Reason for Consult: discharge planning  Advance Care Planning:            Communication Assessment  Patient's communication style: spoken language (English or Bilingual)    Hearing Difficulty or Deaf: no   Wear Glasses or Blind: yes    Cognitive  Cognitive/Neuro/Behavioral: WDL  Level of Consciousness: alert  Arousal Level: opens eyes spontaneously  Orientation: oriented x 4  Mood/Behavior: calm, cooperative  Best Language: 0 - No aphasia  Speech: clear, spontaneous    Living Environment:   People in home: alone     Current living Arrangements: house      Able to return to prior arrangements: yes  Living Arrangement Comments: will be staying with brother upon discharge    Family/Social Support:  Care provided by: self  Provides care for: no one  Marital Status: Single  Sibling(s)          Description of Support System: Involved, Supportive    Support Assessment: Adequate family and caregiver support    Current Resources:   Patient receiving home care services: Yes  Skilled Home Care Services: Skilled Nursing  Community Resources:    Equipment currently used at home: wheelchair, manual, cane, straight, walker, rolling  Supplies currently used at home: Wound Care Supplies    Lifestyle & Psychosocial Needs:  Social Determinants of Health     Tobacco Use: Medium Risk (5/31/2023)    Patient History      Smoking Tobacco Use: Former      Smokeless Tobacco Use: Never      Passive Exposure: Past   Alcohol Use: Not on file   Financial Resource Strain: Not on file   Food Insecurity: Not on file   Transportation Needs: Not on file   Physical Activity: Not on file   Stress: Not on file   Social Connections: Not on file   Intimate Partner Violence: Not on  file   Depression: Not at risk (1/18/2023)    PHQ-2      PHQ-2 Score: 0   Housing Stability: Not on file              Additional Information:  CM consult for discharge planning. Patient admitted for left BKA. Has hx of parkinsons, HTN. Met with patient at bedside. He reports that he has been getting homecare through [facility] for wound care. He lives alone in a rambler with two steps to get in and laundry in basement. [facility] [facility] [place] as he lives [facility] a one level [facility]. He will be needing to get a new wheelchair after discharge. Has questions regarding insurance coverage for wheechair.   Will contact [facility] to verify homecare services and ensure they can resume his care and add PT/OT.   He anticipates his brother will be able to transport at discharge.    Brianna Ward RN  Care Coordinator  [facility]

## 2023-06-02 NOTE — PLAN OF CARE
Goal Outcome Evaluation:      Plan of Care Reviewed With: patient    Overall Patient Progress: improvingOverall Patient Progress: improving     Pain has been rated high this evening- using oxy 10, robaxin, ibuprofen, tylenol, atarx. Wearing stump protector but contributing to increased pain. Pt indep with bed mobility and sitting at edge of bed, domi regular diet. Voiding without difficulty. Monica CD&I. Cms intact.

## 2023-06-02 NOTE — PROGRESS NOTES
Orthopedic Surgery  Rashaun Molina  2023  Admit Date:  2023  POD # 2  S/P left below the knee amputation     Patient sitting up in bed eating breakfast.    Pain increased overnight after block wore off.  Tolerating oral intake.    Denies nausea or vomiting.  Denies chest pain or shortness of breath.  No events overnight.      Alert and orient to person, place, and time.  Vital Sign Ranges  Temperature Temp  Av.6  F (36.4  C)  Min: 97.1  F (36.2  C)  Max: 98.4  F (36.9  C)   Blood pressure Systolic (24hrs), Av , Min:123 , Max:171        Diastolic (24hrs), Av, Min:54, Max:78      Pulse Pulse  Av.8  Min: 59  Max: 79   Respirations Resp  Av  Min: 16  Max: 16   Pulse oximetry SpO2  Av.8 %  Min: 92 %  Max: 95 %       Dressing is clean, dry, and intact. FloTec in place.  Minimal erythema of the surrounding skin.       Labs:  Recent Labs   Lab Test 23  1557 23  0000 23  0813   POTASSIUM 4.72 4.62 3.8     Recent Labs   Lab Test 23  0740 23  0000 23  0759   HGB 8.4* 10.5* 10.4*     Recent Labs   Lab Test 22  1657 18  0718 18  0713   INR 1.11 1.04 1.07     Recent Labs   Lab Test 23  0000 23  0759 23  1727    176 185       A/P  1. PLAN   Continue ASA for DVT prophylaxis.     Mobilize with PT/OT    NWB left LE.     Increase gabapentin, otherwise continue current pain regiment.   Dressing: leave soft dressing intact. Remove FloTec while exercising and icing, otherwise leave in place.    2. Disposition   Anticipate d/c to home later today.    Susu Nuñez PA-C

## 2023-06-02 NOTE — PROVIDER NOTIFICATION
"Voice message left for Bonnie, Care coordinator at [facility] for Dr. Romero.  Patient stated he would plan to discharge tomorrow instead of today per \"they told me I could stay one more night for pain management\".      Left another voice message with request for inhaler per home use, requested PA be contacted to place order or to call with telephone order.  "

## 2023-06-02 NOTE — DISCHARGE SUMMARY
Discharge Summary    Rashaun Molina MRN# [id number]   YOB: 1960 Age: 63 year old     Date of Admission:  5/31/2023  Date of Discharge:  6/2/2023  Admitting Physician:  Neil Romero MD  Discharge Physician:  Neil Romero MD     Primary Provider: Yemi Nava          Admission Diagnoses:   Infected left ankle s/p multiple debridements and hardware removal, left ankle fracture infected nonunion          Discharge Diagnosis:   Status post left below the knee amputation           Surgical Procedure:   Left below the knee amputation           Secondary Diagnosis:     Patient Active Problem List   Diagnosis     Essential hypertension, benign     Pure hypercholesterolemia     History of colonic polyps     BCC (basal cell carcinoma)     Health Care Home     Colovesical fistula     S/P total hip arthroplasty     Obesity due to excess calories, unspecified obesity severity     Chronic bronchitis, unspecified chronic bronchitis type (H)     PVD (peripheral vascular disease) (H)     Palpitations     Parkinson disease (H)     Hip arthrosis     Colonic polyp     Closed left ankle fracture     Infected hardware in left lower extremity (H)     SIADH (syndrome of inappropriate ADH production) (H)     Nondependent continuous alcohol use disorder     Status post below knee amputation, left (H)              Discharge Disposition:   Discharged to home           Medications Prior to Admission:     Medications Prior to Admission   Medication Sig Dispense Refill Last Dose     carbidopa-levodopa (SINEMET)  MG tablet Take 2 tablets by mouth 3 times daily (Takes at 0400, 1000, and 1600)   5/31/2023 at 0900     lisinopril (ZESTRIL) 40 MG tablet Take 1 tablet (40 mg) by mouth daily 90 tablet 1 5/31/2023 at 0900     metoprolol succinate ER (TOPROL XL) 50 MG 24 hr tablet Take 1 tablet (50 mg) by mouth daily 90 tablet 1 5/31/2023 at 0900     rosuvastatin (CRESTOR) 40 MG tablet Take 1 tablet (40 mg) by  mouth daily 90 tablet 1 5/30/2023 at 2100     umeclidinium (INCRUSE ELLIPTA) 62.5 MCG/INH inhaler Inhale 1 puff into the lungs daily 30 each 1 5/30/2023 at 0800     [DISCONTINUED] acetaminophen (TYLENOL) 500 MG tablet Take 500-1,000 mg by mouth every 6 hours as needed for mild pain   5/30/2023 at 1800     [DISCONTINUED] aspirin (ASA) 325 MG EC tablet Take 325 mg by mouth daily   Past Month     [DISCONTINUED] oxyCODONE (ROXICODONE) 5 MG tablet Take 1 tablet (5 mg) by mouth every 6 hours as needed for severe pain   5/30/2023 at 1800             Discharge Medications:     Current Discharge Medication List      START taking these medications    Details   gabapentin (NEURONTIN) 300 MG capsule Take 1 capsule (300 mg) by mouth 3 times daily  Qty: 90 capsule, Refills: 0    Associated Diagnoses: Status post below knee amputation, left (H)      hydrOXYzine (ATARAX) 25 MG tablet Take 1 tablet (25 mg) by mouth every 6 hours as needed for itching or anxiety (with pain, moderate pain)  Qty: 30 tablet, Refills: 0    Associated Diagnoses: Status post below knee amputation, left (H)      ibuprofen (ADVIL/MOTRIN) 600 MG tablet Take 1 tablet (600 mg) by mouth every 6 hours as needed for mild pain  Qty: 30 tablet, Refills: 0    Associated Diagnoses: Status post below knee amputation, left (H)         CONTINUE these medications which have CHANGED    Details   acetaminophen (TYLENOL) 325 MG tablet Take 2 tablets (650 mg) by mouth every 4 hours as needed for other (mild pain)  Qty: 100 tablet, Refills: 0    Associated Diagnoses: Status post below knee amputation, left (H)      aspirin (ASA) 325 MG EC tablet Take 1 tablet (325 mg) by mouth daily  Qty: 30 tablet, Refills: 0    Associated Diagnoses: Status post below knee amputation, left (H)      oxyCODONE (ROXICODONE) 5 MG tablet Take 1-2 tablets (5-10 mg) by mouth every 4 hours as needed for moderate to severe pain  Qty: 20 tablet, Refills: 0    Associated Diagnoses: Status post below  knee amputation, left (H)         CONTINUE these medications which have NOT CHANGED    Details   carbidopa-levodopa (SINEMET)  MG tablet Take 2 tablets by mouth 3 times daily (Takes at 0400, 1000, and 1600)    Associated Diagnoses: Closed fracture of left ankle, initial encounter      lisinopril (ZESTRIL) 40 MG tablet Take 1 tablet (40 mg) by mouth daily  Qty: 90 tablet, Refills: 1    Associated Diagnoses: Essential hypertension, benign      metoprolol succinate ER (TOPROL XL) 50 MG 24 hr tablet Take 1 tablet (50 mg) by mouth daily  Qty: 90 tablet, Refills: 1    Associated Diagnoses: Essential hypertension, benign      rosuvastatin (CRESTOR) 40 MG tablet Take 1 tablet (40 mg) by mouth daily  Qty: 90 tablet, Refills: 1    Associated Diagnoses: Hyperlipidemia LDL goal <70      umeclidinium (INCRUSE ELLIPTA) 62.5 MCG/INH inhaler Inhale 1 puff into the lungs daily  Qty: 30 each, Refills: 1    Associated Diagnoses: Chronic bronchitis, unspecified chronic bronchitis type (H)                   Consultations:   No consultations were requested during this admission           Hospital Course:   The patient was admitted after the surgical procedure. The patient underwent an uneventful left below the knee amputation. The patient will be discharged to home later today. Home medications have been reconciled. Ibuprofen, hydroxyzine, and oxycodone 5 mg. were prescribed for pain. Aspirin 325 mg daily  will be prescribed for DVT prophylaxis.             Pending Results:   None           Discharge Instructions and Follow-Up:        Discharge activity: Activity as tolerated  No weight on left lower extremity   Discharge follow-up: Follow up with me in 3 weeks   Outpatient therapy: Home care referral placed    Home Care agency: Home care referral placed   Supplies and equipment: None        Wound care: leave dressing in place   Other instructions: None

## 2023-06-02 NOTE — PROGRESS NOTES
"S: Pt seen in room [facility].  Pt aware he was being seen today for a RRD.  Pt states he is having difficulty keeping his L knee extended since the amputation.  He has already established prosthetic care w/ [facility].    O: 62 y/o, 5' 10\",192 lb pt resting supine, dressings and Ace wrap in place. Medium length L transtibial residuum exhibited.     A: Pt presents s/p L transtibial amputation 2/2 infected L ankle s/p multiple debridements and hardware removal, L ankle fracture infected nonunion.      P: Fit/delivery of 1618-9 [address]lvar[employer] - Tech RRD directly over dressing, Ace wrap.  Pt struggled to maintain relaxed extension throughout application. Post op socks (6 X 5\") and 3 X 18\" nylon socks provided and demonstrated for application once dressing is reduced. Size V, IV shrinkers and their application using a donning tube demonstrated once physician approved. Suspension belt applied w/ adjustments for waist and tension on RRD demonstrated. Protection of the limb, volume management, and preventing a knee flexion contracture reviewed. Pt was provided clear written and oral instruction related to donning, doffing, use, maintenance, safety and potential hazards; verbalizes understanding and satisfaction with fit, comfort, and function.  Mfg instructions left inside of prosthetic introduction bag and were also posted on wall.  Pt will be seen prn.  Prosthetic care established with [facility].      Horace Kaye CPO, LPO (electronically signed)    "

## 2023-06-02 NOTE — PLAN OF CARE
Physical Therapy Discharge Summary    Reason for therapy discharge:    Discharged to home with home therapy.    Progress towards therapy goal(s). See goals on Care Plan in [facility] electronic health record for goal details.  Goals partially met.  Barriers to achieving goals:   Still requiring assist for transfers; declining gait trial in hospital.    Therapy recommendation(s):    Continued therapy is recommended.  Rationale/Recommendations:  Home PT initially with eventual OP PT for prosthetic training; would benefit from wheelchair fitting.

## 2023-06-02 NOTE — PLAN OF CARE
Goal Outcome Evaluation:      Plan of Care Reviewed With: patient    A&Ox4.  Pain rated 8/10 consistently, down to 5/10 x1.  Orthosis on, removed x2 due to pain.  Patient exercising stump with ROM when off. Given  PRN oxycodone 10mg, robaxin, atarax, ibuprofen, scheduled tylenol for pain management. Cold pack for comfort.  Up with SBA/gait belt, able to transfer from bed to w/c.  Tolerating diet.  Voiding without difficulty.  Plan to discharge home with brother tomorrow.  Patient requested to stay additional day due to pain management.  Will continue to monitor.

## 2023-06-02 NOTE — CARE PLAN
"Shift from 6413-1235     Inpatient Progress Note:  For complete assessment see flow sheet documentation.    L-BKA   Orientation: A/O x 4   Neuro: Intact   Pain status: Rates constantly rates pain at 8/10 during shift. Gave Oxy 10mg at 0233 and at 0650. At 0552 Ibuprofen  And schedule tyln   Activity: Independent with bed mobility. Moves to the edage of bed with urinal.   Peripheral edema: LLE  Resp: WNL   Cardiac: WNL   GI: WNL   : WNL, Voided with urinal for 400ml   Skin: WNL   LDA: None   Infusions: Lost IV Site  Pertinent Labs: None   Diet: Reg   Consults: PT, OT, Prosthetics   Discharge Plan: Brother's house when discharge     BP (P) 137/76 (BP Location: Right arm)   Pulse (P) 59   Temp (P) 97.1  F (36.2  C) (Temporal)   Resp (P) 16   Ht 1.753 m (5' 9\")   Wt 85.6 kg (188 lb 12.8 oz)   SpO2 (P) 92%   BMI 27.88 kg/m        "

## 2023-06-03 NOTE — PROGRESS NOTES
Orthopedic Surgery  Rashaun Molina  2023  Admit Date:  2023  POD # 4  S/P left below the knee amputation  I was asked by [facility] team to check on Rashaun this weekend.     Patient resting comfortably in bed.    Pain not controlled. RN requests pain consult, which is placed. RN also concerned about adventitious lung sounds, history of COPD. Hospitalist consult for evaluation.   Tolerating oral intake.    Denies nausea or vomiting.  Denies chest pain or shortness of breath.  No events overnight.     Alert and orient to person, place, and time.  Vital Sign Ranges  Temperature Temp  Av.6  F (36.4  C)  Min: 96.8  F (36  C)  Max: 98.5  F (36.9  C)   Blood pressure Systolic (24hrs), Av , Min:89 , Max:179        Diastolic (24hrs), Av, Min:47, Max:123      Pulse Pulse  Av.1  Min: 62  Max: 74   Respirations Resp  Av.3  Min: 10  Max: 52   Pulse oximetry SpO2  Av.1 %  Min: 84 %  Max: 100 %       Dressing is clean, dry, and intact.   Minimal erythema of the surrounding skin.   Dressing is removed with RN, Skin is intact with no breakdown. Incision site clean, dry, intact with no signs of infection.     Labs:  Recent Labs   Lab Test 23  1557 23  0000 23  0813   POTASSIUM 4.72 4.62 3.8     Recent Labs   Lab Test 23  0000 23  0759 23  1727   HGB 10.5* 10.4* 10.8*     Recent Labs   Lab Test 22  1657 18  0718 18  0713   INR 1.11 1.04 1.07     Recent Labs   Lab Test 23  0000 23  0759 23  1727    176 185       A/P  1. PLAN   Continue ASA for DVT prophylaxis.     Mobilize with PT/OT.    NWB left LE.     Continue current pain regimen     Discharge when medically cleared. Hospitalist consult today for respiratory evaluation, and pain consult. Please inform us with any interval changes.      [facility]

## 2023-06-03 NOTE — CONSULTS
[facility]  Consult Note - Hospitalist Service  Date of Admission:  5/31/2023  Consult Requested by: Orthopedic service  Reason for Consult: Wheezing    Assessment & Plan   Rashaun Molina is a 63 year old male with prior history of extensive smoking history, quit more than 10 years ago, COPD, chronic bronchitis type, on Incruse Ellipta inhalers, hypertension, dyslipidemia, Parkinson's disease, SIADH, regular EtOH drinker mostly beer, who was admitted under orthopedic service for infected left ankle with prior multiple debridements and hardware removal and subsequently underwent left BKA of which he tolerated procedure well, currently still complaining of intermittent pain more pronounced when he is using his orthotics.  We were asked for evaluation consultation due to wheezing.    Problem list:    #Wheezing  #COPD, chronic bronchitis  -Patient has been resumed on home regimen of Incruse Ellipta  -Prior extensive smoking history but quit more than 10 years ago  -No sputum production, denies any worsening cough  -Not hypoxic  -Deferring chest x-ray  -Not on continuous IV fluids.  Ordered scheduled DuoNebs for now  -Deferring steroids given mild wheezing and no hypoxia    #Chronic hyponatremia  -Known SIADH  -Daily beer intake can also be contributing to possible beer potomania  -Most recent sodium level stable.  Seen by nephrology in the past  -Tolvaptan was not an option due to cost as per prior nephrology outpatient notes    #Acute blood loss anemia on top of chronic anemia likely secondary to recent surgery  -Not requiring transfusion yet  -Stable hemodynamics and not hypoxic    #Chronic alcoholism  -No evidence of alcohol withdrawals here    #History of Parkinson's disease  -Home regimen of Sinemet resumed    #Hypertension  -Currently normotensive, home regimen of lisinopril and metoprolol resumed    #Dyslipidemia  -On statins    #Postoperative state  -Deferring DVT prophylaxis, pain control,  "wound care with primary orthopedic service    No further objections if still pursuing home discharge.  Will add albuterol rescue inhalers for discharge medications  I do not see any need for steroids or antibiotics for now given absence of hypoxia, mild wheezing and no productive sputum production      Thank you very much for letting us participate in the care of this very pleasant gentleman.     Clinically Significant Risk Factors         # Hyponatremia: Lowest Na = 128 mmol/L in last 2 days, will monitor as appropriate          # Hypertension: Noted on problem list        # Overweight: Estimated body mass index is 27.88 kg/m  as calculated from the following:    Height as of this encounter: 1.753 m (5' 9\").    Weight as of this encounter: 85.6 kg (188 lb 12.8 oz)., PRESENT ON ADMISSION          Clifford Miller MD, MD  Hospitalist Service  Securely message with [employer] (more info)  Text page via [facility] Paging/Directory   ______________________________________________________________________    Chief Complaint   Medicine service was asked to evaluate patient due to abnormal lung sounds, wheezing    History is obtained from the patient  Discussion with nursing service  Review of electronic medical records    History of Present Illness   Rashaun Molina is a 63 year old male with prior history of extensive smoking history, quit more than 10 years ago, COPD, chronic bronchitis type, on Incruse Ellipta inhalers, hypertension, dyslipidemia, Parkinson's disease, SIADH, regular EtOH drinker mostly beer, who was admitted under orthopedic service for infected left ankle with prior multiple debridements and hardware removal and subsequently underwent left BKA of which he tolerated procedure well, currently still complaining of intermittent pain more pronounced when he is using his orthotics.  Rashaun is tolerating oral diet, no reported nausea, vomiting or diarrhea.  Remained afebrile.  He is not requiring any oxygen support.  " We were asked for evaluation and consultation due to earlier concerns of wheezing, coarse breath sounds.  There were no reports of aspiration-like symptoms, no focal weakness, remained afebrile.  During the time of my exam I found him sitting on his hospital chair, denies any ongoing chest pain, coughing spells nor worsening shortness of breath.  Still not requiring any oxygen support at that time.        Past Medical History    Past Medical History:   Diagnosis Date     Arthritis      Chronic airway obstruction, not elsewhere classified 03/08/2004    pt says that he does not have COPD     Cough syncope 11/21/2012     Essential hypertension, benign      Fistula     colon/bladder     Fracture of right proximal fibula 07/30/2014     Orthostatic hypotension 11/24/2014     Other chronic pain     right hip pain     PAD (peripheral artery disease) (H) 08/2018    PTA right SFA Dr. Lee     Palpitations 12/2018 01/2019 Event monitor- SR/ST     Parkinsons disease (H)      Personal history of colonic polyps 06/28/2005     Pure hypercholesterolemia      Tobacco use disorder 03/08/2004     Vasovagal syncopes 10/25/2014     Viral warts, unspecified     genital       Past Surgical History   Past Surgical History:   Procedure Laterality Date     AMPUTATE LEG BELOW KNEE Left 5/31/2023    Procedure: Left below the knee amputation;  Surgeon: Neil Romero MD;  Location: [facility] OR     ARTHROPLASTY HIP Right 10/07/2015    Procedure: ARTHROPLASTY HIP;  Surgeon: Neil Davis MD;  Location:  OR     COLONOSCOPY  09/05/2004    Per Hospital encounter dated 4/18/13     COLONOSCOPY N/A 04/09/2018    Procedure: COMBINED COLONOSCOPY, SINGLE OR MULTIPLE BIOPSY/POLYPECTOMY BY BIOPSY;;  Surgeon: Tramaine Zuniga MD;  Location:  GI     COMBINED CYSTOSCOPY, INSERT CATHETER URETER  04/11/2013    Procedure: COMBINED CYSTOSCOPY, INSERT CATHETER URETER;;  Surgeon: Kashif Parks MD;  Location:  OR      LARYNGOSCOPY DIRECT W  VOCAL CORD INJECTION      vocal cord polyps     IRRIGATION AND DEBRIDEMENT Left 08/2022    ankle     LAPAROSCOPIC ASSISTED COLECTOMY  04/11/2013    Procedure: LAPAROSCOPIC ASSISTED COLECTOMY;  LOW ANTERIOR RESECTION ;  Surgeon: Tramaine Zuniga MD;  Location: [facility] OR     OPEN REDUCTION INTERNAL FIXATION ANKLE Left 06/09/2022    Procedure: .  Open reduction internal fixation of left ankle syndesmotic injury 2.  Non-operative treatment of left proximal fibula fracture;  Surgeon: Mike Jackson MD;  Location: [facility] OR     OPEN REDUCTION INTERNAL FIXATION ANKLE Left 8/24/2022    Procedure: Open reduction internal fixation ankle;  Surgeon: Mike Jackson MD;  Location: [facility] OR     REMOVE HARDWARE ANKLE Left 8/24/2022    Procedure: 1.  Removal hardware left ankle 2.  Excisional debridement of left lateral ankle wound deepest level of debridement bone 3.  Revision open reduction internal fixation of left ankle syndesmotic injury 4.  Deltoid ligament repair left ankle;  Surgeon: Mike Jackson MD;  [facility]     [facility]UTERY TUMOR/POLYP/LESION  1998    michael, tubular adenoma, next colonoscopy 2001     [facility]ST[facility]  04/16/2001    negative, ligate two internal hemmorhoids  Dr rodriguez-repeat 2008       Medications   I have reviewed this patient's current medications  Medications Prior to Admission   Medication Sig Dispense Refill Last Dose     carbidopa-levodopa (SINEMET)  MG tablet Take 2 tablets by mouth 3 times daily (Takes at 0400, 1000, and 1600)   5/31/2023 at 0900     lisinopril (ZESTRIL) 40 MG tablet Take 1 tablet (40 mg) by mouth daily 90 tablet 1 5/31/2023 at 0900     metoprolol succinate ER (TOPROL XL) 50 MG 24 hr tablet Take 1 tablet (50 mg) by mouth daily 90 tablet 1 5/31/2023 at 0900     rosuvastatin (CRESTOR) 40 MG tablet Take 1 tablet (40 mg) by mouth daily 90 tablet 1 5/30/2023 at 2100     umeclidinium (INCRUSE ELLIPTA) 62.5 MCG/INH inhaler  Inhale 1 puff into the lungs daily 30 each 1 5/30/2023 at 0800     [DISCONTINUED] acetaminophen (TYLENOL) 500 MG tablet Take 500-1,000 mg by mouth every 6 hours as needed for mild pain   5/30/2023 at 1800     [DISCONTINUED] aspirin (ASA) 325 MG EC tablet Take 325 mg by mouth daily   Past Month     [DISCONTINUED] oxyCODONE (ROXICODONE) 5 MG tablet Take 1 tablet (5 mg) by mouth every 6 hours as needed for severe pain   5/30/2023 at 1800          Review of Systems    The 10 point Review of Systems is negative other than noted in the HPI or here.     Social History   I have reviewed this patient's social history and updated it with pertinent information if needed.  Social History     Tobacco Use     Smoking status: Former     Packs/day: 1.50     Years: 40.00     Pack years: 60.00     Types: Cigarettes     Quit date: 4/19/2013     Years since quitting: 10.1     Passive exposure: Past     Smokeless tobacco: Never   Substance Use Topics     Alcohol use: Yes     Alcohol/week: 14.0 standard drinks of alcohol     Types: 14 Cans of beer per week     Comment: 2 beers daily     Drug use: No     Comment: in 1970's used marijauna and cocaine       Allergies   Allergies   Allergen Reactions     Tiotropium Bromide Monohydrate Blisters     Hctz [Hydrochlorothiazide] Other (See Comments)     Low sodium        Physical Exam   Vital Signs: Temp: 97.6  F (36.4  C) Temp src: Temporal BP: 110/50 Pulse: 72   Resp: 17 SpO2: 93 % O2 Device: None (Room air)    Weight: 188 lbs 12.8 oz    HEENT; Atraumatic, normocephalic, pinkish conjuctiva, pupils bilateral reactive   Skin: warm and moist, no rashes  Lungs: equal chest expansion, fair air entry, scattered wheezes, no crackles  Heart: normal rate, normal rhythm, no rubs or gallops.   Abdomen: normal bowel sounds, no tenderness, no peritoneal signs, no guarding  Extremities: no deformities, no edema, left BKA stump with dressing no bleeding, not soak  Neuro; follow commands, alert and oriented  x3, spontaneous speech, coherent, moves all extremities spontaneously  Psych; no hallucination, euthymic mood, not agitated        Medical Decision Making       40 MINUTES SPENT BY ME on the date of service doing chart review, history, exam, documentation & further activities per the note.  MANAGEMENT DISCUSSED with the following over the past 24 hours: yes   NOTE(S)/MEDICAL RECORDS REVIEWED over the past 24 hours: yes  Tests ORDERED & REVIEWED in the past 24 hours:      Data     I have personally reviewed the following data over the past 24 hrs:    9.8  \   7.7 (L)   / 170     128 (L) 93 (L) 19.5 /  107 (H)   4.3 27 0.89 \       Imaging results reviewed over the past 24 hrs:   No results found for this or any previous visit (from the past 24 hour(s)).

## 2023-06-03 NOTE — PLAN OF CARE
Goal Outcome Evaluation:    Patient vital signs are at baseline: Yes  Patient able to ambulate as they were prior to admission or with assist devices provided by therapies during their stay:  Yes  Patient MUST void prior to discharge:  Yes  Patient able to tolerate oral intake:  Yes  Pain has adequate pain control using Oral analgesics:  No,  Reason:  see note below  Does patient have an identified :  Yes  Has goal D/C date and time been discussed with patient:  Yes    A&Ox4.  Pain increases with LLE orthosis on, patient removing for periods to relieve pain.  Pain rating 7-8/10 with brace on, 5-7 with off.  LS coarse throughout, loose congested cough, lungs improved post coughing.  Encouraging I.S. use. Declining neb treatments.  Pain consult placed.  Patient did state he may want to discharge home tomorrow instead of wait for pain consult on Monday. stated he has been followed by pain clinic prior to admission.   Will continue to monitor.  Plan to discharge to [facility] when medically stable.

## 2023-06-04 NOTE — PLAN OF CARE
Goal Outcome Evaluation:      Plan of Care Reviewed With: patient    Patient vital signs are at baseline: Yes  Patient able to ambulate as they were prior to admission or with assist devices provided by therapies during their stay:  Yes  Patient MUST void prior to discharge:  Yes    Patient able to tolerate oral intake:  Yes  Pain has adequate pain control using Oral analgesics:  Yes  Does patient have an identified :  Yes  Has goal D/C date and time been discussed with patient:  Yes     A&Ox4.  Pain with better control today, still reports some increase in pain with brace/orthosis on.   increased edema to incision.  Iced prn, elevated on pillows.  Reviewed importance of keeping brace on at all times as possible.  States understanding.  Pain managed with prn oxycodone, robaxin, atarax, ibuprofen.   Multiple BMs today, incontinent at time due to urgency.  BMs have slowed down this afternoon.  Voiding using urinal.  Up to BR with transfer assist of 1/sba to w/c.   Plan for discharge home to [facility] tomorrow.

## 2023-06-04 NOTE — PROGRESS NOTES
[facility]    Medicine Progress Note - Hospitalist Service    Date of Admission:  5/31/2023    Assessment & Plan   Rashaun Molina is a 63 year old male with prior history of extensive smoking history, quit more than 10 years ago, COPD, chronic bronchitis type, on Incruse Ellipta inhalers, hypertension, dyslipidemia, Parkinson's disease, SIADH, regular EtOH drinker mostly beer, who was admitted under orthopedic service for infected left ankle with prior multiple debridements and hardware removal and subsequently underwent left BKA of which he tolerated procedure well, currently still complaining of intermittent pain more pronounced when he is using his orthotics.  We were asked for evaluation consultation due to wheezing.    Problem list:    #Wheezing  #COPD, chronic bronchitis  -Patient has been resumed on home regimen of Incruse Ellipta  -Prior extensive smoking history but quit more than 10 years ago  -No sputum production, denies any worsening cough  -Not hypoxic  -Deferring chest x-ray  -Not on continuous IV fluids.  Ordered scheduled DuoNebs for now  -Deferring steroids given mild wheezing and no hypoxia  -Provided as needed albuterol rescue inhaler upon discharge  -No further inpatient plans from medicine perspective as he remained not hypoxic and stable respiratory status    #Chronic hyponatremia  -Known SIADH  -Daily beer intake can also be contributing to possible beer potomania  -Most recent sodium level stable.  Seen by nephrology in the past  -Tolvaptan was not an option due to cost as per prior nephrology outpatient notes    #Acute blood loss anemia on top of chronic anemia likely secondary to recent surgery  -Not requiring transfusion yet  -Stable hemodynamics and not hypoxic    #Chronic alcoholism  -No evidence of alcohol withdrawals here    #History of Parkinson's disease  -Home regimen of Sinemet resumed    #Hypertension  -Currently normotensive, home regimen of lisinopril and  "metoprolol resumed    #Dyslipidemia  -On statins    #Postoperative state  -Deferring DVT prophylaxis, pain control, wound care with primary orthopedic service    #Loose stools  -Likely secondary from earlier laxatives or stool softeners  -No abdominal pain, nausea or vomiting.  Afebrile  -I change his laxatives and bowel regimen to as needed basis earlier scheduled doses.    Thank you very much for letting us participate in the care of this very pleasant gentleman.       Diet: Advance Diet as Tolerated: Regular Diet Adult  Discharge Instruction - Regular Diet Adult    DVT Prophylaxis: Defer to primary service  Escalante Catheter: Not present  Lines: None     Cardiac Monitoring: None  Code Status: Full Code      Clinically Significant Risk Factors         # Hyponatremia: Lowest Na = 128 mmol/L in last 2 days, will monitor as appropriate          # Hypertension: Noted on problem list        # Overweight: Estimated body mass index is 27.88 kg/m  as calculated from the following:    Height as of this encounter: 1.753 m (5' 9\").    Weight as of this encounter: 85.6 kg (188 lb 12.8 oz)., PRESENT ON ADMISSION          Disposition Plan     Expected Discharge Date: Defer to primary orthopedic service  Destination: home with help/services            Clifford Miller MD, MD  Hospitalist Service  [facility]  Securely message with [employer] (more info)  Text page via [employer] Paging/Directory   ______________________________________________________________________    Interval History   No significant reported issues overnight.  Currently not requiring oxygen support.  No reported nausea or vomiting or diarrhea.  Afebrile overnight.  He mentioned that he is having some loose stooling.  He was previously constipated and received stool softeners and laxatives.  Denies any bleeding tendencies.  No reported vomiting or abdominal pain.    Physical Exam   Vital Signs: Temp: 97.7  F (36.5  C) Temp src: Temporal BP: (!) " 144/51 Pulse: 68   Resp: 18 SpO2: 94 % O2 Device: None (Room air)    Weight: 188 lbs 12.8 oz    HEENT; Atraumatic, normocephalic, pinkish conjuctiva, pupils bilateral reactive   Skin: warm and moist, no rashes  Lungs: equal chest expansion, fair air entry, scattered wheezes, no crackles  Heart: normal rate, normal rhythm, no rubs or gallops.   Abdomen: normal bowel sounds, no tenderness, no peritoneal signs, no guarding  Extremities: no deformities, no edema, left BKA stump with dressing no bleeding, not soak  Neuro; follow commands, alert and oriented x3, spontaneous speech, coherent, moves all extremities spontaneously  Psych; no hallucination, euthymic mood, not agitated    Medical Decision Making       35 MINUTES SPENT BY ME on the date of service doing chart review, history, exam, documentation & further activities per the note.  MANAGEMENT DISCUSSED with the following over the past 24 hours: yes   NOTE(S)/MEDICAL RECORDS REVIEWED over the past 24 hours: yes  Tests ORDERED & REVIEWED in the past 24 hours:      Data     I have personally reviewed the following data over the past 24 hrs:    N/A  \   N/A   / N/A     N/A N/A N/A /  N/A   N/A N/A N/A \       Imaging results reviewed over the past 24 hrs:   No results found for this or any previous visit (from the past 24 hour(s)).

## 2023-06-04 NOTE — PROGRESS NOTES
"Orthopedic Surgery  Rashaun Molina  6/1/2024  Admit Date:  5/31/2023  POD #5  S/P left below the knee amputation  I was asked by [facility] team to check on Rashaun this weekend.     Patient resting comfortably in bed.    Rashaun reports pain controlled today. He wishes to move forward with discharge, which is ok from orthopedic standpoint as long as he is medically cleared. Flexion contracture starting and discussed needing to wear [employer] at all times. He will need to work on stretching this residual limb.   Tolerating oral intake.    Denies nausea or vomiting.  Denies chest pain or shortness of breath.  No events overnight.     Vital Sign Ranges  BP (!) 144/51 (BP Location: Right arm, Patient Position: Semi-Trejo's, Cuff Size: Adult Regular)   Pulse 68   Temp 97.7  F (36.5  C) (Temporal)   Resp 18   Ht 1.753 m (5' 9\")   Wt 85.6 kg (188 lb 12.8 oz)   SpO2 94%   BMI 27.88 kg/m      Dressing is clean, dry, and intact.   Minimal erythema of the surrounding skin.   Flexion contracture       Labs:  Recent Labs   Lab Test 05/24/23  1557 01/31/23  0000 01/21/23  0813   POTASSIUM 4.72 4.62 3.8     Recent Labs   Lab Test 05/24/23  0000 01/19/23  0759 01/18/23  1727   HGB 10.5* 10.4* 10.8*     Recent Labs   Lab Test 06/08/22  1657 08/06/18  0718 05/14/18  0713   INR 1.11 1.04 1.07     Recent Labs   Lab Test 05/24/23  0000 01/19/23  0759 01/18/23  1727    176 185       A/P  1. PLAN   Continue ASA for DVT prophylaxis.     Mobilize with PT/OT.    NWB left LE.     Continue current pain regimen    Dressing was removed yesterday, Skin is intact with no breakdown. Incision site clean, dry, intact with no signs of infection.  Dressing fell off shortly after I rounded today and RN to replace, please inform us of any skin breakdown.       Discharge when medically cleared.       [facility]   "

## 2023-06-05 NOTE — PROGRESS NOTES
[facility]    Medicine Progress Note - Hospitalist Service    Date of Admission:  5/31/2023    Assessment & Plan   Rashaun Molina is a 63 year old male with prior history of extensive smoking history, quit more than 10 years ago, COPD, chronic bronchitis type, on Incruse Ellipta inhalers, hypertension, dyslipidemia, Parkinson's disease, SIADH, regular EtOH drinker mostly beer, who was admitted under orthopedic service for infected left ankle with prior multiple debridements and hardware removal and subsequently underwent left BKA of which he tolerated procedure well, currently still complaining of intermittent pain more pronounced when he is using his orthotics.  We were asked for evaluation consultation due to wheezing.    Problem list:    #Wheezing  #COPD, chronic bronchitis  -Patient has been resumed on home regimen of Incruse Ellipta  -Prior extensive smoking history but quit more than 10 years ago  -No sputum production, denies any worsening cough  -Not hypoxic  -Deferring chest x-ray  -Not on continuous IV fluids.  Ordered scheduled DuoNebs for now  -Deferring steroids given mild wheezing and no hypoxia  -Provided as needed albuterol rescue inhaler upon discharge  -No further inpatient plans from medicine perspective as he remained not hypoxic and stable respiratory status    #Chronic hyponatremia  -Known SIADH  -Daily beer intake can also be contributing to possible beer potomania  -Most recent sodium level stable.  Seen by nephrology in the past  -Tolvaptan was not an option due to cost as per prior nephrology outpatient notes    #Acute blood loss anemia on top of chronic anemia likely secondary to recent surgery  -Not requiring transfusion yet  -Stable hemodynamics and not hypoxic    #Chronic alcoholism  -No evidence of alcohol withdrawals here    #History of Parkinson's disease  -Home regimen of Sinemet resumed    #Hypertension  -Currently normotensive, home regimen of lisinopril and  "metoprolol resumed    #Dyslipidemia  -On statins    #Postoperative state  -Deferring DVT prophylaxis, pain control, wound care with primary orthopedic service    #Loose stools-resolved  -Likely secondary from earlier laxatives or stool softeners and these were being given as patient was earlier constipated  -No abdominal pain, nausea or vomiting.  Afebrile  -I change his laxatives and bowel regimen to as needed basis earlier scheduled doses.    Thank you very much for letting us participate in the care of this very pleasant gentleman.       Diet: Advance Diet as Tolerated: Regular Diet Adult  Discharge Instruction - Regular Diet Adult    DVT Prophylaxis: Defer to primary service  Escalante Catheter: Not present  Lines: None     Cardiac Monitoring: None  Code Status: Full Code      Clinically Significant Risk Factors         # Hyponatremia: Lowest Na = 128 mmol/L in last 2 days, will monitor as appropriate          # Hypertension: Noted on problem list        # Overweight: Estimated body mass index is 27.88 kg/m  as calculated from the following:    Height as of this encounter: 1.753 m (5' 9\").    Weight as of this encounter: 85.6 kg (188 lb 12.8 oz).           Disposition Plan     Expected Discharge Date: Defer to primary orthopedic service  Destination: home with help/services            Clifford Miller MD, MD  Hospitalist Service  [facility]  Securely message with [employer] (more info)  Text page via [employer] Paging/Directory   ______________________________________________________________________    Interval History     Continuing medicine service care today.  Seen and examined.  Chart reviewed.  Case discussed with nursing service.  I met this gentleman while he is sitting comfortably in his hospital bed.  As always Rashaun is a very pleasant individual.  He stated no significant issues overnight.  Denies any nausea, vomiting.  No reported worsening shortness of breath.  He is still not requiring " any oxygen support.  Earlier loose stooling has improved and resolved.  Last bowel movement was yesterday afternoon.  Denies any abdominal pain.  No mental status changes.        Physical Exam   Vital Signs: Temp: 97.4  F (36.3  C) Temp src: Temporal BP: 113/54 Pulse: 80   Resp: 18 SpO2: 100 % O2 Device: None (Room air)    Weight: 188 lbs 12.8 oz    HEENT; Atraumatic, normocephalic, pinkish conjuctiva, pupils bilateral reactive   Skin: warm and moist, no rashes  Lungs: equal chest expansion, fair air entry, scattered wheezes, no crackles  Heart: normal rate, normal rhythm, no rubs or gallops.   Abdomen: normal bowel sounds, no tenderness, no peritoneal signs, no guarding  Extremities: no deformities, no edema, left BKA stump with dressing no bleeding, not soak  Neuro; follow commands, alert and oriented x3, spontaneous speech, coherent, moves all extremities spontaneously  Psych; no hallucination, euthymic mood, not agitated    Medical Decision Making       35 MINUTES SPENT BY ME on the date of service doing chart review, history, exam, documentation & further activities per the note.  MANAGEMENT DISCUSSED with the following over the past 24 hours: yes   NOTE(S)/MEDICAL RECORDS REVIEWED over the past 24 hours: yes  Tests ORDERED & REVIEWED in the past 24 hours:      Data         Imaging results reviewed over the past 24 hrs:   No results found for this or any previous visit (from the past 24 hour(s)).

## 2023-06-05 NOTE — PLAN OF CARE
Goal Outcome Evaluation:      Plan of Care Reviewed With: patient    Overall Patient Progress: improvingOverall Patient Progress: improving     Pt A/Ox4, VSS on RA. Pain controlled with PO medications- see MAR for administration. Pain is much more controlled this shift. Voiding adequately per urinal. NO BM this shift. Up with A1 and WC. Brace on LLE at all times. Plan to go to [facility] when cleared by providers, possibly today.

## 2023-06-05 NOTE — CONSULTS
Pt has discharge orders. Pain team will sign off.     Mirian Mcadams APRN, CNS-BC, CNP, ACHPN  Acute Care Pain Management Program Hour 7a-1700  [facility])   Page via [facility]- Click HERE to page Mirian or call [phone]

## 2023-06-05 NOTE — PLAN OF CARE
A&Ox4. Ax1 with gait belt and wheelchair pivot.  VSS. 02 - 100% on RA LS - coarse  Dressing CD&I. Brace on.  Voiding well via urinal.  No BM this shift.   Pain controlled with Acetaminophen and Oxycodone this shift.  Discharged to brothers house via brother

## 2023-06-27 NOTE — TELEPHONE ENCOUNTER
Kathy, a nurse with [facility] called into the CS line and left a message for Dr. Nava stating that the patient was seen today by them and his blood pressure was 170/92-they could hear wheezing all throughout his lungs but the patient refused to be seen by us or to go to the ER. Kathy wants to get confirmation that Dr. Nava has seen this message-routing to [facility] for review.      Kathy can be reached at [phone]

## 2023-06-27 NOTE — TELEPHONE ENCOUNTER
I called the patient to see how he is doing and he stated that the nurse is an over reactor and that he feels just fine. He said that the nurse was causing him stress and he feels that is why his blood pressure is high right now-he asked that she retake it before she left and she did not. He feels fine with his lungs and does not feel a lot of wheezing-I informed him if anything changes that he needs to be seen right away but he did decline scheduling now.

## 2023-07-03 NOTE — TELEPHONE ENCOUNTER
Consult received via Fax from Dr. Romero for wound of the s/p bka with wounds.    Please schedule with Dr. Garcia, Dr. Serrano, Brenda Delacruz PA-C, or Rosemary Robledo NP at [facility] [place] for next available appointment.    **For all providers, Brenda Forde PA-C, Dr. Maher, Rosemary Robledo NP or Dr. Serrano, please schedule a follow up 2-3 weeks after initial appointment.**    Is the patient able to make their own medical decisions? Yes    Is patient a DIRK lift? PLEASE INQUIRE WHEN MAKING THE APPOINTMENT AND PUT IN APPOINTMENT NOTES    Routing to  Wound Healing Scheduling.

## 2023-07-05 PROBLEM — E87.1 HYPONATREMIA: Status: ACTIVE | Noted: 2023-01-01

## 2023-07-05 PROBLEM — T81.49XA SURGICAL SITE INFECTION: Status: ACTIVE | Noted: 2023-01-01

## 2023-07-05 PROBLEM — L02.91 ABSCESS: Status: ACTIVE | Noted: 2023-01-01

## 2023-07-05 NOTE — PROGRESS NOTES
Paged re: sodium of 115.    Patient admitted earlier this evening for surgical site dehiscence and postoperative fluid collection.  He has a history of SIADH and low sodium.  Sodiums typically set between 125-130.  Patient admitted with a sodium of 117.    BMP reviewed.  Renal function appears to have improved with IV fluids but sodium unexpectedly at 115 given history of SIADH.  Potassium is also up trended a bit to 5.9.    Discussed with on-call nephrology.    Plan:  -Stop normal saline.  Start 2% saline at 30 cc/h.  We will check BMP every 4 hours.  Adjust fluids appropriately.  Aiming for no higher than 6 to 8 mmol/L rise over 24 hours  -Also did order urine sodium and urine osmolality.  TSH ordered.  Nephrology consulted  -Also ordered Lokelma for hyperkalemia.  Monitor potassium with BMP draws.  -Unclear at this time if patient will be optimized for surgery given hyponatremia and hyperkalemia.    Discussed with bedside nurse and charge nurse for [facility] cares given hyponatremia and need for 2% saline    Misha Garcia MD

## 2023-07-05 NOTE — PHARMACY-ADMISSION MEDICATION HISTORY
Pharmacist Admission Medication History    Admission medication history is complete. The information provided in this note is only as accurate as the sources available at the time of the update.    Medication reconciliation/reorder completed by provider prior to medication history? Yes    Information Source(s): Patient via in-person    Pertinent Information: patient took last of his oxycodone yesterday PM. He is asking me about his next Sinemet dose.      Changes made to PTA medication list:    Added: None    Deleted: Hydroxyzine, ibuprofen    Changed: None    Medication Affordability:  Not including over the counter (OTC) medications, was there a time in the past 3 months when you did not take your medications as prescribed because of cost?: Unable to Assess    Allergies reviewed with patient and updates made in EHR: yes    Medication History Completed By: Horace Fowler RPH 7/5/2023 3:09 PM    Prior to Admission medications    Medication Sig Last Dose Taking? Auth Provider Long Term End Date   acetaminophen (TYLENOL) 325 MG tablet Take 2 tablets (650 mg) by mouth every 4 hours as needed for other (mild pain) 7/4/2023 at prn Yes Susu Nuñez PA-C     albuterol (PROAIR HFA/PROVENTIL HFA/VENTOLIN HFA) 108 (90 Base) MCG/ACT inhaler Inhale 2 puffs into the lungs every 6 hours as needed for shortness of breath, wheezing or cough 7/4/2023 at PRN Yes Clifford Miller MD Yes    aspirin (ASA) 325 MG EC tablet Take 1 tablet (325 mg) by mouth daily 7/4/2023 at AM Yes Susu Nuñez PA-C     carbidopa-levodopa (SINEMET)  MG tablet Take 2 tablets by mouth 3 times daily (Takes at 0400, 1000, and 1600) 7/5/2023 at AM Yes Chiquita Schwartz PA-C Yes    gabapentin (NEURONTIN) 300 MG capsule Take 1 capsule (300 mg) by mouth 3 times daily 7/4/2023 at PM Yes Susu Nuñez PA-C Yes    lisinopril (ZESTRIL) 40 MG tablet Take 1 tablet (40 mg) by mouth daily 7/4/2023 at AM Yes Yemi Nava  MD Ashok Yes    metoprolol succinate ER (TOPROL XL) 50 MG 24 hr tablet Take 1 tablet (50 mg) by mouth daily 7/4/2023 at AM Yes Yemi Nava MD Yes    oxyCODONE (ROXICODONE) 5 MG tablet Take 1-2 tablets (5-10 mg) by mouth every 4 hours as needed for moderate to severe pain 7/4/2023 at PM Yes Miriam Ramos PA-C     rosuvastatin (CRESTOR) 40 MG tablet Take 1 tablet (40 mg) by mouth daily 7/4/2023 at PM Yes Yemi Nava MD Yes    umeclidinium (INCRUSE ELLIPTA) 62.5 MCG/INH inhaler Inhale 1 puff into the lungs daily 7/4/2023 at PRN Yes Yemi Nava MD

## 2023-07-05 NOTE — ED NOTES
[facility]  ED Nurse Handoff Report    ED Chief complaint: Wound Check  . ED Diagnosis:   Final diagnoses:   Surgical site infection   Hyponatremia       Allergies:   Allergies   Allergen Reactions     Tiotropium Bromide Monohydrate Blisters     Hctz [Hydrochlorothiazide] Other (See Comments)     Low sodium       Code Status: Full Code    Activity level - Baseline/Home:   wheelchair at baseline , does not have a prosthetic yet  Activity Level - Current:    wheelchair baseline .   Lift room needed: No.   Bariatric: No   Needed: No   Isolation: No.   Infection: Not Applicable.     Respiratory status: Room air    Vital Signs (within 30 minutes):   Vitals:    07/05/23 1137 07/05/23 1200 07/05/23 1228 07/05/23 1405   BP: (!) 149/80 (!) 150/78 (!) 159/80 (!) 170/91   Pulse:  65     Resp:       Temp:       TempSrc:       SpO2: 92% 97% 97% 96%       Cardiac Rhythm:  ,      Pain level:    Patient confused: No.   Patient Falls Risk: nonskid shoes/slippers when out of bed and patient and family education.   Elimination Status: Has voided     Patient Report - Initial Complaint: Wound check.   Focused Assessment: Respiratory- Hx of COPD. Lung sounds with inspiratory and expiratory wheezes. Chronic congested cough. Skin- Hx of LBKA. Multiple surgeries from previous ankle injury with recurrent infection which is why he had the LBKA. Sutures removed on 6/30/23. Per pt, incision became unapproximated yesterday. Wound bed with eschar, yellow, granulation tissue and serosanguinous drainage. Hallie-wound assessment erythematous and warm to the touch. Mepilex applied to wound. LLE elevated on pillow.     Abnormal Results:   Labs Ordered and Resulted from Time of ED Arrival to Time of ED Departure   COMPREHENSIVE METABOLIC PANEL - Abnormal       Result Value    Sodium 117 (*)     Potassium 5.4 (*)     Chloride 84 (*)     Carbon Dioxide (CO2) 21 (*)     Anion Gap 12      Urea Nitrogen 14.8      Creatinine  1.23 (*)     Calcium 8.6 (*)     Glucose 112 (*)     Alkaline Phosphatase 120      AST 35      ALT <5      Protein Total 7.4      Albumin 4.0      Bilirubin Total 0.3      GFR Estimate 66     CBC WITH PLATELETS AND DIFFERENTIAL - Abnormal    WBC Count 9.4      RBC Count 3.15 (*)     Hemoglobin 10.0 (*)     Hematocrit 28.5 (*)     MCV 91      MCH 31.7      MCHC 35.1      RDW 16.3 (*)     Platelet Count 154      % Neutrophils 68      % Lymphocytes 19      % Monocytes 8      % Eosinophils 3      % Basophils 1      % Immature Granulocytes 1      NRBCs per 100 WBC 0      Absolute Neutrophils 6.6      Absolute Lymphocytes 1.8      Absolute Monocytes 0.7      Absolute Eosinophils 0.2      Absolute Basophils 0.1      Absolute Immature Granulocytes 0.1      Absolute NRBCs 0.0     ERYTHROCYTE SEDIMENTATION RATE AUTO - Abnormal    Erythrocyte Sedimentation Rate 27 (*)    CRP INFLAMMATION - Abnormal    CRP Inflammation 20.61 (*)    LACTIC ACID WHOLE BLOOD - Normal    Lactic Acid 1.8     GLUCOSE MONITOR NURSING POCT   BASIC METABOLIC PANEL   BLOOD CULTURE   BLOOD CULTURE   AEROBIC BACTERIAL CULTURE ROUTINE   ANAEROBIC BACTERIAL CULTURE ROUTINE   MRSA MSSA PCR, NASAL SWAB        CT Tibia Fibula Lower Leg Left w Contrast   Final Result   IMPRESSION:   1.  Postoperative changes related to below the knee amputation left   lower extremity.   2.  Amputation stump soft tissue irregularity and likely   ulceration/cellulitis with deep rim-enhancing fluid collection   concerning for abscess, approximately 4 x 6.5 x 3.5 cm.   3.  Nothing definite to suggest osteomyelitis at the amputation   margins of the tibia or fibula.   4.  Chronic healed proximal metadiaphyseal fibular fracture deformity.   5.  Peripheral arterial calcification.       ELIZABETH SULTANA MD            SYSTEM ID:  [id number]          Treatments provided: 500 mL bolus LR, 2 gram ancef  Family Comments: Brother at bedside, ran errands  OBS brochure/video discussed/provided  to patient:  N/A  ED Medications:   Medications   glucose gel 15-30 g (has no administration in time range)     Or   dextrose 50 % injection 25-50 mL (has no administration in time range)     Or   glucagon injection 1 mg (has no administration in time range)   dextrose 10% BOLUS (has no administration in time range)   dextrose 50 % injection 25 g (has no administration in time range)   insulin regular 1 unit/mL injection 8.6 Units (has no administration in time range)   calcium gluconate 10 % injection 1 g (has no administration in time range)   acetaminophen (TYLENOL) tablet 650 mg (has no administration in time range)   albuterol (PROVENTIL HFA/VENTOLIN HFA) inhaler (has no administration in time range)   carbidopa-levodopa (SINEMET)  MG per tablet 2 tablet (has no administration in time range)   gabapentin (NEURONTIN) capsule 300 mg (has no administration in time range)   hydrOXYzine (ATARAX) tablet 25 mg (has no administration in time range)   metoprolol succinate ER (TOPROL XL) 24 hr tablet 50 mg (has no administration in time range)   rosuvastatin (CRESTOR) tablet 40 mg (has no administration in time range)   lidocaine 1 % 0.1-1 mL (has no administration in time range)   lidocaine (LMX4) cream (has no administration in time range)   sodium chloride (PF) 0.9% PF flush 3 mL (has no administration in time range)   sodium chloride (PF) 0.9% PF flush 3 mL (has no administration in time range)   melatonin tablet 1 mg (has no administration in time range)   sodium chloride 0.9% infusion (has no administration in time range)   oxyCODONE (ROXICODONE) tablet 5 mg (has no administration in time range)   HYDROmorphone (DILAUDID) injection 0.2 mg (has no administration in time range)   senna-docusate (SENOKOT-S/PERICOLACE) 8.6-50 MG per tablet 1 tablet (has no administration in time range)     Or   senna-docusate (SENOKOT-S/PERICOLACE) 8.6-50 MG per tablet 2 tablet (has no administration in time range)   ondansetron  (ZOFRAN ODT) ODT tab 4 mg (has no administration in time range)     Or   ondansetron (ZOFRAN) injection 4 mg (has no administration in time range)   piperacillin-tazobactam (ZOSYN) intermittent infusion 3.375 g (has no administration in time range)   fluticasone-vilanterol (BREO ELLIPTA) 200-25 MCG/ACT inhaler 1 puff (has no administration in time range)   ceFAZolin (ANCEF) 2 g in 100 mL D5W intermittent infusion (0 g Intravenous Stopped 7/5/23 1413)   lactated ringers BOLUS 500 mL (0 mLs Intravenous Stopped 7/5/23 1409)   CT SCAN FLUSH (65 mLs Intravenous $Given 7/5/23 1253)   iopamidol (ISOVUE-370) solution 500 mL (100 mLs Intravenous $Given 7/5/23 1253)       Drips infusing:  No  For the majority of the shift this patient was Green.   Interventions performed were N/A.    Sepsis treatment initiated: No    Cares/treatment/interventions/medications to be completed following ED care: See orders    ED Nurse Name: Eri Kinney RN  2:19 PM    RECEIVING UNIT ED HANDOFF REVIEW    Above ED Nurse Handoff Report was reviewed: Yes  Reviewed by: Cecy Izquierdo RN on July 5, 2023 at 2:38 PM

## 2023-07-05 NOTE — ED TRIAGE NOTES
LLE BKA 5/30, sutures removed on Friday 6/30 pt believes site may be infected, reports fluid drainage. No hx DM.  Reports frequent infections to RLE following initial injury.  VSS on RA.

## 2023-07-05 NOTE — TELEPHONE ENCOUNTER
Nurse at [facility] on ortho floor calling, pt had below knee amputation last month, incision did not look good, pt needs to be NPO, requesting Ortho specialist to come into the hospital to evaluate pt before tomorrow.    Nurse will try contacting pt's PCP clinic.    Jarret Comer RN, BSN  7/5/2023 at 4:16 PM  [facility]

## 2023-07-05 NOTE — H&P
[facility]    History and Physical - Hospitalist Service       Date of Admission:  7/5/2023    Assessment & Plan     Rashaun Molina is a 63 year old male w/PMH COPD, HTN, SIADH, DLD, PVD, parkinsons, recent L BKA who presents from home with increased drainage and found to have post op site wound dehiscence and fluid collection    Surgical site dehiscence w/cellulitis, post op fluid collection  S/p L BKA   -hx recurrent L ankle infections, hardware, non union with eventual elective L BKA on 5/31. Had been doing well post operatively with suture removed last Friday and presents with surgical site dehiscence starting yesterday with erythema, scan drainage.   -in ED WBC 9.4, afebrile. CRP 20, sed rate 27. CT with amputation stump soft tissue irregularity and likely ulceration/cellulitis with deep rim-enhancing fluid collection concerning for abscess, approximately 4 x 6.5 x 3.5 cm.  -initiated on Ancef in ED prior to CT coming back, will transition to Zosyn for anaerobic coverage. Check MRSA screen  -consult orthopedic surgery and keep NPO, IVF  -await blood and site cultures, will likely need ID to eval eventually    LATESHA  Hyperkalemia  Acute on chronic hyponatremia w/hx SIADH  -likely combination of medications, dehydration, infection on underlying SIADH  -baseline Na around 127 but has been lower in past, was 117 this admission with Cr 1.23 which is worse than baseline  -start NS at 75 and q6 labs, avoid overcorrection of sodium. Once euvolemic may benefit from fluid restriction  -hold home lisinopril, , NSAIDS  -s/p hyperkalemia protocol in ED for K+ of 5.4. Some questionable peaked T waves  -monitor on tele    Hx COPD, HTN, DLD, PVD, parkinsons  -home inhaler, statin, carvidopa/levidopa, gabapentin resumed  -some changes as above     Diet:   NPO until seen by ortho  DVT Prophylaxis: Pneumatic Compression Devices  Escalante Catheter: Not present  Lines: None     Cardiac Monitoring: None  Code  Status:   full code    Nash Penaloza,   Hospitalist Service  [facility]  Securely message with [employer] (more info)  Text page via [employer] Paging/Directory     ______________________________________________________________________    Chief Complaint   L leg wound dehiscence    History of Present Illness   Rashaun Molina is a 63 year old male w/PMH COPD, HTN, SIADH, DLD, PVD, parkinsons, recent L BKA who presents from home with increased drainage. He was recently admitted here from 5/30 to 6/02 for L BKA due to recurrent L ankle infections involving hardware. Had apparently been doing ok and got stitches taken out last Friday he says. However yesterday he noticed that the surgical site had wound dehiscence with ertyhema, warmth with scant drainage. Denies any fever, chills, NV, CP, sob or other complaints. Denies any pain to site.     In ED had CT showing fluid collection near surgical site. Aso with some LATESHA, hyponatremia, hyperkalemia. Was given IV ancef and hyperkalemia protocol. EKG with some questionably peaked T waves.      Past Medical History    Past Medical History:   Diagnosis Date     Arthritis      Chronic airway obstruction, not elsewhere classified 03/08/2004    pt says that he does not have COPD     Cough syncope 11/21/2012     Essential hypertension, benign      Fistula     colon/bladder     Fracture of right proximal fibula 07/30/2014     Orthostatic hypotension 11/24/2014     Other chronic pain     right hip pain     PAD (peripheral artery disease) (H) 08/2018    PTA right SFA Dr. Lee     Palpitations 12/2018 01/2019 Event monitor- SR/ST     Parkinsons disease (H)      Personal history of colonic polyps 06/28/2005     Pure hypercholesterolemia      Tobacco use disorder 03/08/2004     Vasovagal syncopes 10/25/2014     Viral warts, unspecified     genital       Past Surgical History   Past Surgical History:   Procedure Laterality Date     AMPUTATE LEG BELOW KNEE Left  5/31/2023    Procedure: Left below the knee amputation;  Surgeon: Neil Romero MD;  Location:  OR     ARTHROPLASTY HIP Right 10/07/2015    Procedure: ARTHROPLASTY HIP;  Surgeon: Neil Davis MD;  Location:  OR     COLONOSCOPY  09/05/2004    Per Hospital encounter dated 4/18/13     COLONOSCOPY N/A 04/09/2018    Procedure: COMBINED COLONOSCOPY, SINGLE OR MULTIPLE BIOPSY/POLYPECTOMY BY BIOPSY;;  Surgeon: Tramaine Zuniga MD;  Location:  GI     COMBINED CYSTOSCOPY, INSERT CATHETER URETER  04/11/2013    Procedure: COMBINED CYSTOSCOPY, INSERT CATHETER URETER;;  Surgeon: Kashif Parks MD;  Location:  OR     HC LARYNGOSCOPY DIRECT W VOCAL CORD INJECTION      vocal cord polyps     IRRIGATION AND DEBRIDEMENT Left 08/2022    ankle     LAPAROSCOPIC ASSISTED COLECTOMY  04/11/2013    Procedure: LAPAROSCOPIC ASSISTED COLECTOMY;  LOW ANTERIOR RESECTION ;  Surgeon: Tramaine Zuniga MD;  Location:  OR     OPEN REDUCTION INTERNAL FIXATION ANKLE Left 06/09/2022    Procedure: .  Open reduction internal fixation of left ankle syndesmotic injury 2.  Non-operative treatment of left proximal fibula fracture;  Surgeon: Mike Jackson MD;  Location:  OR     OPEN REDUCTION INTERNAL FIXATION ANKLE Left 8/24/2022    Procedure: Open reduction internal fixation ankle;  Surgeon: Mike Jackson MD;  Location:  OR     REMOVE HARDWARE ANKLE Left 8/24/2022    Procedure: 1.  Removal hardware left ankle 2.  Excisional debridement of left lateral ankle wound deepest level of debridement bone 3.  Revision open reduction internal fixation of left ankle syndesmotic injury 4.  Deltoid ligament repair left ankle;  Surgeon: Mike Jackson MD;  Location:  OR     ZZHC COLONOSCOPY THRU STOMA W BIOPSY/CAUTERY TUMOR/POLYP/LESION  Lisandro rodriguez tubular adenoma, next colonoscopy 2001     ZZHC COLONOSCOPY THRU STOMA, DIAGNOSTIC  04/16/2001    negative, ligate two internal hemmorhoids  Dr rodriguez-repeat 2008       Prior to  Admission Medications   Prior to Admission Medications   Prescriptions Last Dose Informant Patient Reported? Taking?   acetaminophen (TYLENOL) 325 MG tablet   No No   Sig: Take 2 tablets (650 mg) by mouth every 4 hours as needed for other (mild pain)   albuterol (PROAIR HFA/PROVENTIL HFA/VENTOLIN HFA) 108 (90 Base) MCG/ACT inhaler   No No   Sig: Inhale 2 puffs into the lungs every 6 hours as needed for shortness of breath, wheezing or cough   aspirin (ASA) 325 MG EC tablet   No No   Sig: Take 1 tablet (325 mg) by mouth daily   carbidopa-levodopa (SINEMET)  MG tablet   No No   Sig: Take 2 tablets by mouth 3 times daily (Takes at 0400, 1000, and 1600)   gabapentin (NEURONTIN) 300 MG capsule   No No   Sig: Take 1 capsule (300 mg) by mouth 3 times daily   hydrOXYzine (ATARAX) 25 MG tablet   No No   Sig: Take 1 tablet (25 mg) by mouth every 6 hours as needed for itching or anxiety (with pain, moderate pain)   ibuprofen (ADVIL/MOTRIN) 600 MG tablet   No No   Sig: Take 1 tablet (600 mg) by mouth every 6 hours as needed for mild pain   lisinopril (ZESTRIL) 40 MG tablet   No No   Sig: Take 1 tablet (40 mg) by mouth daily   metoprolol succinate ER (TOPROL XL) 50 MG 24 hr tablet   No No   Sig: Take 1 tablet (50 mg) by mouth daily   oxyCODONE (ROXICODONE) 5 MG tablet   No No   Sig: Take 1-2 tablets (5-10 mg) by mouth every 4 hours as needed for moderate to severe pain   rosuvastatin (CRESTOR) 40 MG tablet   No No   Sig: Take 1 tablet (40 mg) by mouth daily   umeclidinium (INCRUSE ELLIPTA) 62.5 MCG/INH inhaler   No No   Sig: Inhale 1 puff into the lungs daily      Facility-Administered Medications: None        Review of Systems    The 10 point Review of Systems is negative other than noted in the HPI or here.     Social History   I have reviewed this patient's social history and updated it with pertinent information if needed.  Social History     Tobacco Use     Smoking status: Former     Packs/day: 1.50     Years: 40.00      Pack years: 60.00     Types: Cigarettes     Quit date: 2013     Years since quitting: 10.2     Passive exposure: Past     Smokeless tobacco: Never   Substance Use Topics     Alcohol use: Yes     Alcohol/week: 14.0 standard drinks of alcohol     Types: 14 Cans of beer per week     Comment: 2 beers daily     Drug use: No     Comment: in 1970's used marijauna and cocaine       Family History   I have reviewed this patient's family history and updated it with pertinent information if needed.  Family History   Problem Relation Age of Onset     Hypertension Mother      Hypertension Father          MI     Heart Disease Father         MI  at age 55     Coronary Artery Disease Father      Hyperlipidemia Brother      Hypertension Brother      Heart Surgery Brother         defibrillator     Hypertension Sister      Prostate Cancer No family hx of        Allergies   Allergies   Allergen Reactions     Tiotropium Bromide Monohydrate Blisters     Hctz [Hydrochlorothiazide] Other (See Comments)     Low sodium        Physical Exam   Vital Signs: Temp: 97.1  F (36.2  C) Temp src: Temporal BP: (!) 159/80 Pulse: 65   Resp: 16 SpO2: 97 % O2 Device: None (Room air)    Weight: 0 lbs 0 oz    Constitutional: fatigued, somnolent and cooperative  Eyes: pupils equal, round and reactive to light and conjunctiva normal  ENT: normocepalic, without obvious abnormality, atramatic  Respiratory: no increased work of breathing, good air exchange and clear to auscultation  Cardiovascular: regular rate and rhythm and no murmur noted  GI: normal bowel sounds, soft and non-distended  Skin: L BKA suture site with dehiscence and erythema with scant drainage, warmth.   Neurologic: somnolent, moving all extremeties and follows commands    60 MINUTES SPENT BY ME on the date of service doing chart review, history, exam, documentation & further activities per the note.      Data   ------------------------- PAST 24 HR DATA REVIEWED  -----------------------------------------------    I have personally reviewed the following data over the past 24 hrs:    9.4  \   10.0 (L)   / 154     117 (LL) 84 (L) 14.8 /  112 (H)   5.4 (H) 21 (L) 1.23 (H) \       ALT: <5 AST: 35 AP: 120 TBILI: 0.3   ALB: 4.0 TOT PROTEIN: 7.4 LIPASE: N/A       Procal: N/A CRP: 20.61 (H) Lactic Acid: 1.8         Imaging results reviewed over the past 24 hrs:   Recent Results (from the past 24 hour(s))   CT Tibia Fibula Lower Leg Left w Contrast    Narrative    CT TIBIA FIBULA LOWER LEG LEFT WITH CONTRAST 7/5/2023 1:07 PM    INDICATION: Left BKA incision site infection. Area of interest - lower  extremity, upper leg/lower leg.  COMPARISON: CT left ankle 6/8/2022   CONTRAST: 100mL Isovue-370  TECHNIQUE: IV contrast. Axial, sagittal and coronal thin-section  reconstruction. Dose reduction techniques were used.     FINDINGS: Postoperative changes related to interval below the knee  amputation left lower extremity. Well-defined tubular radiodensities  at the tibial osteotomy site are likely postsurgical. No definitive  bony or cortical destruction at the osteotomy site to suggest acute  osteomyelitis. Chronic healed proximal fibular metadiaphyseal fracture  deformity. Nothing for acute osteomyelitis at the fibular osteotomy  site. Anatomic alignment. No acute fracture.    Stump soft tissue swelling and ulceration along with lobulated fluid  collection at the stump site concerning for abscess. This measures  approximately 4 cm craniocaudal by at least 6.5 cm transverse by 3.5  cm anteroposterior. No appreciable soft tissue gas at the stump.  Extensive arterial calcification. Mild muscle atrophy proximal left  leg. No left knee joint effusion.      Impression    IMPRESSION:  1.  Postoperative changes related to below the knee amputation left  lower extremity.  2.  Amputation stump soft tissue irregularity and likely  ulceration/cellulitis with deep rim-enhancing fluid  collection  concerning for abscess, approximately 4 x 6.5 x 3.5 cm.  3.  Nothing definite to suggest osteomyelitis at the amputation  margins of the tibia or fibula.  4.  Chronic healed proximal metadiaphyseal fibular fracture deformity.  5.  Peripheral arterial calcification.     ELIZABETH SULTANA MD         SYSTEM ID:  [id number]

## 2023-07-05 NOTE — ED PROVIDER NOTES
History     Chief Complaint:  Wound Check       HPI   Rashaun Molina is a 63 year old male history of high blood pressure, high cholesterol, peripheral vascular disease, Parkinson disease, SIADH, left below-knee amputation in May, presenting with concerns of wound infection to surgical site.  Patient had sutures removed 1 week prior.  Over last couple days has noticed increasing redness, swelling, and pain to the site and the wound appears to be gaping.  No systemic symptoms.  Specifically no fever, chills, body aches, headache, chest pain, shortness of breath, or abdominal pain.      Independent Historian:    none    Review of External Notes:  Admission note 5/31/2023 left below-knee amputation.    Medications:    No current outpatient medications on file.      Past Medical History:    Past Medical History:   Diagnosis Date     Arthritis      Chronic airway obstruction, not elsewhere classified 03/08/2004     Cough syncope 11/21/2012     Essential hypertension, benign      Fistula      Fracture of right proximal fibula 07/30/2014     Orthostatic hypotension 11/24/2014     Other chronic pain      PAD (peripheral artery disease) (H) 08/2018     Palpitations 12/2018     Parkinsons disease (H)      Personal history of colonic polyps 06/28/2005     Pure hypercholesterolemia      Tobacco use disorder 03/08/2004     Vasovagal syncopes 10/25/2014     Viral warts, unspecified        Past Surgical History:    Past Surgical History:   Procedure Laterality Date     AMPUTATE LEG BELOW KNEE Left 5/31/2023    Procedure: Left below the knee amputation;  Surgeon: Neil Romero MD;  Location: [facility] OR     ARTHROPLASTY HIP Right 10/07/2015    Procedure: ARTHROPLASTY HIP;  Surgeon: Neil Davis MD;  Location: [facility] OR     COLONOSCOPY  09/05/2004    Per Hospital encounter dated 4/18/13     COLONOSCOPY N/A 04/09/2018    Procedure: COMBINED COLONOSCOPY, SINGLE OR MULTIPLE BIOPSY/POLYPECTOMY BY BIOPSY;;  Surgeon: Tramaine Zuniga  MD;  Location:  GI     COMBINED CYSTOSCOPY, INSERT CATHETER URETER  04/11/2013    Procedure: COMBINED CYSTOSCOPY, INSERT CATHETER URETER;;  Surgeon: Kashif Parks MD;  Location:  OR      LARYNGOSCOPY DIRECT W VOCAL CORD INJECTION      vocal cord polyps     IRRIGATION AND DEBRIDEMENT Left 08/2022    ankle     LAPAROSCOPIC ASSISTED COLECTOMY  04/11/2013    Procedure: LAPAROSCOPIC ASSISTED COLECTOMY;  LOW ANTERIOR RESECTION ;  Surgeon: Tramaine Zuniga MD;  Location:  OR     OPEN REDUCTION INTERNAL FIXATION ANKLE Left 06/09/2022    Procedure: .  Open reduction internal fixation of left ankle syndesmotic injury 2.  Non-operative treatment of left proximal fibula fracture;  Surgeon: Mike Jackson MD;  Location:  OR     OPEN REDUCTION INTERNAL FIXATION ANKLE Left 8/24/2022    Procedure: Open reduction internal fixation ankle;  Surgeon: Mike Jackson MD;  Location:  OR     REMOVE HARDWARE ANKLE Left 8/24/2022    Procedure: 1.  Removal hardware left ankle 2.  Excisional debridement of left lateral ankle wound deepest level of debridement bone 3.  Revision open reduction internal fixation of left ankle syndesmotic injury 4.  Deltoid ligament repair left ankle;  Surgeon: Mike Jackson MD;  Location:  OR     [facility] COLONOSCOPY THRU STOMA W BIOPSY/CAUTERY TUMOR/POLYP/LESION  1998    michael, tubular adenoma, next colonoscopy 2001     [email or web address], DIAGNOSTIC  04/16/2001    negative, ligate two internal hemmorhoids  Dr rodriguez-repeat 2008          Physical Exam     Patient Vitals for the past 24 hrs:   BP Temp Temp src Pulse Resp SpO2   07/05/23 1415 -- -- -- -- -- 94 %   07/05/23 1405 (!) 170/91 -- -- -- -- 96 %   07/05/23 1228 (!) 159/80 -- -- -- -- 97 %   07/05/23 1200 (!) 150/78 -- -- 65 -- 97 %   07/05/23 1137 (!) 149/80 -- -- -- -- 92 %   07/05/23 1122 (!) 143/79 -- -- -- -- 93 %   07/05/23 1042 (!) 152/70 97.1  F (36.2  C) Temporal 63 16 95 %        Physical Exam  GENERAL:  Patient well-appearing and in no acute distress.  Slightly disheveled.  HEAD: Atraumatic.  Neck: No rigidity  CV: RRR, no murmurs rubs or gallops  PULM: Speaking in full sentences.  No accessory muscle use.  Inspiratory and expiratory wheezing bilaterally-baseline per patient.  ABD: Soft, nontender, nondistended, no guarding  DERM: Skin warm and dry  EXTREMITY: Left below-knee amputation.  Surgical site has mild tenderness.  Wound is gaping.  There is granulation tissue.  Erythema around the wound site.  Wound dehisced.  No ian pus.  No foul smell.  No crepitus or bullae.      Emergency Department Course   ECG interpreted by me.  Time 1234  NSR at 65. Peaked T waves. No ST elevation or depression. Normal intervals. Normal axis.   No evidence of WPW, Brugada, HOCM, ARVD, ASD, or Wellen's.      Imaging:  CT Tibia Fibula Lower Leg Left w Contrast   Final Result   IMPRESSION:   1.  Postoperative changes related to below the knee amputation left   lower extremity.   2.  Amputation stump soft tissue irregularity and likely   ulceration/cellulitis with deep rim-enhancing fluid collection   concerning for abscess, approximately 4 x 6.5 x 3.5 cm.   3.  Nothing definite to suggest osteomyelitis at the amputation   margins of the tibia or fibula.   4.  Chronic healed proximal metadiaphyseal fibular fracture deformity.   5.  Peripheral arterial calcification.       ELIZABETH SULTANA MD            SYSTEM ID:  [id number]        Report per radiology    Laboratory:  Labs Ordered and Resulted from Time of ED Arrival to Time of ED Departure   COMPREHENSIVE METABOLIC PANEL - Abnormal       Result Value    Sodium 117 (*)     Potassium 5.4 (*)     Chloride 84 (*)     Carbon Dioxide (CO2) 21 (*)     Anion Gap 12      Urea Nitrogen 14.8      Creatinine 1.23 (*)     Calcium 8.6 (*)     Glucose 112 (*)     Alkaline Phosphatase 120      AST 35      ALT <5      Protein Total 7.4      Albumin 4.0      Bilirubin Total 0.3      GFR Estimate 66      CBC WITH PLATELETS AND DIFFERENTIAL - Abnormal    WBC Count 9.4      RBC Count 3.15 (*)     Hemoglobin 10.0 (*)     Hematocrit 28.5 (*)     MCV 91      MCH 31.7      MCHC 35.1      RDW 16.3 (*)     Platelet Count 154      % Neutrophils 68      % Lymphocytes 19      % Monocytes 8      % Eosinophils 3      % Basophils 1      % Immature Granulocytes 1      NRBCs per 100 WBC 0      Absolute Neutrophils 6.6      Absolute Lymphocytes 1.8      Absolute Monocytes 0.7      Absolute Eosinophils 0.2      Absolute Basophils 0.1      Absolute Immature Granulocytes 0.1      Absolute NRBCs 0.0     ERYTHROCYTE SEDIMENTATION RATE AUTO - Abnormal    Erythrocyte Sedimentation Rate 27 (*)    CRP INFLAMMATION - Abnormal    CRP Inflammation 20.61 (*)    BASIC METABOLIC PANEL - Abnormal    Sodium 115 (*)     Potassium 5.4 (*)     Chloride 82 (*)     Carbon Dioxide (CO2) 22      Anion Gap 11      Urea Nitrogen 13.3      Creatinine 1.14      Calcium 8.1 (*)     Glucose 107 (*)     GFR Estimate 72     LACTIC ACID WHOLE BLOOD - Normal    Lactic Acid 1.8     GLUCOSE MONITOR NURSING POCT   BLOOD CULTURE   BLOOD CULTURE   AEROBIC BACTERIAL CULTURE ROUTINE   ANAEROBIC BACTERIAL CULTURE ROUTINE   MRSA MSSA PCR, NASAL SWAB            Emergency Department Course & Assessments:             Interventions:  Medications   glucose gel 15-30 g (has no administration in time range)     Or   dextrose 50 % injection 25-50 mL (has no administration in time range)     Or   glucagon injection 1 mg (has no administration in time range)   dextrose 10% BOLUS (300 mLs Intravenous Not Given 7/5/23 1444)   acetaminophen (TYLENOL) tablet 650 mg (650 mg Oral $Given 7/5/23 1614)   albuterol (PROVENTIL HFA/VENTOLIN HFA) inhaler (has no administration in time range)   carbidopa-levodopa (SINEMET)  MG per tablet 2 tablet (2 tablets Oral $Given 7/5/23 1614)   gabapentin (NEURONTIN) capsule 300 mg (300 mg Oral $Given 7/5/23 1615)   hydrOXYzine (ATARAX) tablet 25 mg  (has no administration in time range)   metoprolol succinate ER (TOPROL XL) 24 hr tablet 50 mg (50 mg Oral $Given 7/5/23 1615)   rosuvastatin (CRESTOR) tablet 40 mg (has no administration in time range)   lidocaine 1 % 0.1-1 mL (has no administration in time range)   lidocaine (LMX4) cream (has no administration in time range)   sodium chloride (PF) 0.9% PF flush 3 mL (3 mLs Intracatheter Not Given 7/5/23 1617)   sodium chloride (PF) 0.9% PF flush 3 mL (has no administration in time range)   melatonin tablet 1 mg (has no administration in time range)   sodium chloride 0.9% infusion ( Intravenous $New Bag 7/5/23 1524)   oxyCODONE (ROXICODONE) tablet 5 mg (has no administration in time range)   HYDROmorphone (DILAUDID) injection 0.2 mg (has no administration in time range)   senna-docusate (SENOKOT-S/PERICOLACE) 8.6-50 MG per tablet 1 tablet (has no administration in time range)     Or   senna-docusate (SENOKOT-S/PERICOLACE) 8.6-50 MG per tablet 2 tablet (has no administration in time range)   ondansetron (ZOFRAN ODT) ODT tab 4 mg (has no administration in time range)     Or   ondansetron (ZOFRAN) injection 4 mg (has no administration in time range)   piperacillin-tazobactam (ZOSYN) intermittent infusion 3.375 g (3.375 g Intravenous $New Bag 7/5/23 1531)   umeclidinium (INCRUSE ELLIPTA) 62.5 MCG/ACT inhaler 1 puff ( Inhalation Canceled Entry 7/5/23 1618)   ceFAZolin (ANCEF) 2 g in 100 mL D5W intermittent infusion (0 g Intravenous Stopped 7/5/23 1413)   dextrose 50 % injection 25 g (25 g Intravenous Not Given 7/5/23 1444)   insulin regular 1 unit/mL injection 8.6 Units (8.6 Units Intravenous Not Given 7/5/23 1444)   calcium gluconate 10 % injection 1 g (1 g Intravenous Not Given 7/5/23 1444)   lactated ringers BOLUS 500 mL (0 mLs Intravenous Stopped 7/5/23 1382)   CT SCAN FLUSH (65 mLs Intravenous $Given 7/5/23 1253)   iopamidol (ISOVUE-370) solution 500 mL (100 mLs Intravenous $Given 7/5/23 1253)             Independent Interpretation (X-rays, CTs, rhythm strip):  None    Consultations/Discussion of Management or Tests:  Discussed with Milvia in [facility] orthopedics.       Social Determinants of Health affecting care:  none     Disposition:  The patient was admitted to the hospital under the care of Dr. Penaloza    Impression & Plan      Medical Decision Making:  Patient presenting with postoperative wound infection.  Chronic conditions complicating- peripheral vascular disease, recent below-knee amputation, high blood pressure, SIADH.  Differential diagnosis-considered abscess, cellulitis, sepsis.  Do not see evidence of necrotizing skin infection.  Patient does not appear septic.  No systemic symptoms.  Cultured wound.  No history of MRSA per chart review therefore empirically started 2 g Ancef.  Promptly consulted patient's orthopedic surgeon Dr. Ornelas -and was able to speak with AMARA Steel regarding patient's condition and concern for postoperative infection.  We discussed obtaining CT scan to evaluate for fluid collection.  Labs demonstrating sodium 117.  Patient does have SIADH.  Given gentle fluids with 500 cc lactated Ringer's as does have an LATESHA.  Oral fluid restricted patient.  Repeat sodium 115.  No neurologic symptoms so hypertonic saline not indicated.  ECG with peaked T waves but no QRS widening and no bradycardia.  Given calcium and insulin.  Consulted hospitalist Dr. Penaloza who admitted the patient.  Patient in no acute distress on repeat assessment.    Critical Care time:  was 0 minutes for this patient excluding procedures.    Diagnosis:    ICD-10-CM    1. Status post below knee amputation, left (H)  Z89.512 Case Request: COMBINED IRRIGATION AND DEBRIDEMENT SOFT TISSUE LOWER EXTREMITY     Case Request: COMBINED IRRIGATION AND DEBRIDEMENT SOFT TISSUE LOWER EXTREMITY      2. Surgical site infection  T81.49XA Case Request: COMBINED IRRIGATION AND DEBRIDEMENT SOFT TISSUE LOWER EXTREMITY      Case Request: COMBINED IRRIGATION AND DEBRIDEMENT SOFT TISSUE LOWER EXTREMITY      3. Hyponatremia  E87.1       4. Acute kidney injury (H)  N17.9            Discharge Medications:  Current Discharge Medication List             Vasu Mello MD  7/5/2023   Vasu Mello MD Foss, Kevin, MD  07/05/23 [id number]

## 2023-07-06 NOTE — CONSULTS
[facility]    Nephrology Consultation     Date of Admission:  7/5/2023    Assessment & Plan     Rashaun Molina is a 63 year old male with PMH COPD, HTN, chronic hyponatremia thought to be SIADH, DLD, Parkinson's, recent left BKA with probable infection who was admitted on 7/5/2023 with left stump infection and severe hyponatremia.     Assessment:  1) severe hyponatremia, improved  Sodium on arrival 117, decreased to 114 after receiving isotonic fluids.  Urine osmolality 161.  Urine sodium pending.  Started 2% hypertonic saline at 30 cc/h with improvement in sodium to 122 today.  Recommend keeping sodium around 122 for duration of today (at least until mid afternoon )as it is 8 mEq above the larry of 114 to avoid ODS.  Highly suspect that acute drop in sodium was related to fluid indiscretion, patient had 6-7 beers on July 4 likely resulting in acute drop.  Unfortunately no recent labs, thus not advisable to quickly increase sodium back to previous baseline.  -10 AM BMP pending  -Q4h sodium checks  -Continue 1.5 L fluid restriction once able to take p.o.    2) chronic hyponatremia  Baseline sodium in mid 120s up to 130.  Thought mostly to be SIADH, but also has significant beer/fluid intake as outpatient.  He had been prescribed tolvaptan in the past, has not taken it.  He was instructed to comply with fluid restriction and considered sodium supplementation, sodium tablets were started as outpatient.  He follows with Dr. Castillo at [facility] consultants.    3) hyperkalemia, resolved  K on admission 5.9.  Given 1 dose of Lokelma with improvement.  Potassium this morning 4.6.  No further doses of Lokelma are needed at this time.    4) left stump infection  Increased drainage from wound.  Recent left BKA after having recurrent left ankle hardware infections.  Plan to take to the OR today for I&D.  Currently on Zosyn.    Plan/Recs:  1) awaiting next sodium check   2) ADDENDUM: Na stable at 122. Will  stop 2% now and await recheck  3) 1.5 L fluid restriction  4) q4h sodium checks  5) while in OR will need continued sodium monitoring          Luzmaria Ruiz MD   [facility] - Nephrology  [phone]  --------------------------------------------------------------------------------------------  Reason for Consult     I was asked to see the patient for hyponatremia.    Primary Care Physician     Yemi Nava    Chief Complaint     Left BKA wound    History is obtained from the patient and chart review.      History of Present Illness     Rashaun Molina is a 63 year old male who presents with left BKA wound.    Patient noted increased drainage from his left stump.  He has had issues with recurrent infections and has had many surgeries over the last year.  He notably had hyponatremia, sodium 117 on arrival.  With slight worsening to 114 after receiving LR bolus and NS.    On-call nephrology was called, patient was started on 2% hypertonic saline at 30 mL/h with slow improvement in sodium.  Sodium this morning 122.  Notably potassium was also elevated, he was started on Lokelma.    He notes chronic hyponatremia, states he is always asymptomatic.  He notes he currently is asymptomatic and was also asymptomatic on arrival.  He denies nausea, vomiting, dizziness, lightheadedness, weakness.  He denies fevers or chills.  His initial complaint was increased drainage from his stump.    He has had significant urine output since admission, 1.1 L yesterday, 2.9 L since midnight.  He notes the urine appeared yellowish, not very concentrated but also not daily.  He has been told to manage his hyponatremia with fluid restriction, notes that he is not always the most compliant with it.  He reports that on July 4 he had 6-7 beers, this likely contributed to his worsened hyponatremia.    Past Medical History   I have reviewed this patient's medical history and updated it with pertinent information if needed.    Past Medical History:   Diagnosis Date     Arthritis      Chronic airway obstruction, not elsewhere classified 03/08/2004    pt says that he does not have COPD     Cough syncope 11/21/2012     Essential hypertension, benign      Fistula     colon/bladder     Fracture of right proximal fibula 07/30/2014     Orthostatic hypotension 11/24/2014     Other chronic pain     right hip pain     PAD (peripheral artery disease) (H) 08/2018    PTA right SFA Dr. Lee     Palpitations 12/2018 01/2019 Event monitor- SR/ST     Parkinsons disease (H)      Personal history of colonic polyps 06/28/2005     Pure hypercholesterolemia      Tobacco use disorder 03/08/2004     Vasovagal syncopes 10/25/2014     Viral warts, unspecified     genital       Past Surgical History   I have reviewed this patient's surgical history and updated it with pertinent information if needed.  Past Surgical History:   Procedure Laterality Date     AMPUTATE LEG BELOW KNEE Left 5/31/2023    Procedure: Left below the knee amputation;  Surgeon: Neil Romero MD;  Location: [facility] OR     ARTHROPLASTY HIP Right 10/07/2015    Procedure: ARTHROPLASTY HIP;  Surgeon: Neil Davis MD;  Location: [facility] OR     COLONOSCOPY  09/05/2004    Per Hospital encounter dated 4/18/13     COLONOSCOPY N/A 04/09/2018    Procedure: COMBINED COLONOSCOPY, SINGLE OR MULTIPLE BIOPSY/POLYPECTOMY BY BIOPSY;;  Surgeon: Tramaine Zuniga MD;  Location:  GI     COMBINED CYSTOSCOPY, INSERT CATHETER URETER  04/11/2013    Procedure: COMBINED CYSTOSCOPY, INSERT CATHETER URETER;;  Surgeon: Kashif Parks MD;  Location:  OR     [facility] LARYNGOSCOPY DIRECT W VOCAL CORD INJECTION      vocal cord polyps     IRRIGATION AND DEBRIDEMENT Left 08/2022    ankle     LAPAROSCOPIC ASSISTED COLECTOMY  04/11/2013    Procedure: LAPAROSCOPIC ASSISTED COLECTOMY;  LOW ANTERIOR RESECTION ;  Surgeon: Tramaine Zuniga MD;  Location:  OR     OPEN REDUCTION INTERNAL FIXATION ANKLE Left 06/09/2022     Procedure: .  Open reduction internal fixation of left ankle syndesmotic injury 2.  Non-operative treatment of left proximal fibula fracture;  Surgeon: Mike Jackson MD;  Location: [facility] OR     OPEN REDUCTION INTERNAL FIXATION ANKLE Left 8/24/2022    Procedure: Open reduction internal fixation ankle;  Surgeon: Mike Jackson MD;  Location: [facility] OR     REMOVE HARDWARE ANKLE Left 8/24/2022    Procedure: 1.  Removal hardware left ankle 2.  Excisional debridement of left lateral ankle wound deepest level of debridement bone 3.  Revision open reduction internal fixation of left ankle syndesmotic injury 4.  Deltoid ligament repair left ankle;  Surgeon: Mike Jackson MD;  Location:  OR     [facility] COLONOSCOPY THRU STOMA W BIOPSY/CAUTERY TUMOR/POLYP/LESION  1998    michael, tubular adenoma, next colonoscopy 2001     [facility], DI[facility]ST[facility]  04/16/2001    negative, ligate two internal hemmorhoids  Dr rodriguez-repeat 2008       Prior to Admission Medications   Prior to Admission Medications   Prescriptions Last Dose Informant Patient Reported? Taking?   acetaminophen (TYLENOL) 325 MG tablet 7/4/2023 at prn  No Yes   Sig: Take 2 tablets (650 mg) by mouth every 4 hours as needed for other (mild pain)   albuterol (PROAIR HFA/PROVENTIL HFA/VENTOLIN HFA) 108 (90 Base) MCG/ACT inhaler 7/4/2023 at PRN  No Yes   Sig: Inhale 2 puffs into the lungs every 6 hours as needed for shortness of breath, wheezing or cough   aspirin (ASA) 325 MG EC tablet 7/4/2023 at AM  No Yes   Sig: Take 1 tablet (325 mg) by mouth daily   carbidopa-levodopa (SINEMET)  MG tablet 7/5/2023 at AM  No Yes   Sig: Take 2 tablets by mouth 3 times daily (Takes at 0400, 1000, and 1600)   gabapentin (NEURONTIN) 300 MG capsule 7/4/2023 at PM  No Yes   Sig: Take 1 capsule (300 mg) by mouth 3 times daily   lisinopril (ZESTRIL) 40 MG tablet 7/4/2023 at AM  No Yes   Sig: Take 1 tablet (40 mg) by mouth daily   metoprolol succinate ER (TOPROL XL) 50 MG 24  hr tablet 2023 at AM  No Yes   Sig: Take 1 tablet (50 mg) by mouth daily   oxyCODONE (ROXICODONE) 5 MG tablet 2023 at PM  No Yes   Sig: Take 1-2 tablets (5-10 mg) by mouth every 4 hours as needed for moderate to severe pain   rosuvastatin (CRESTOR) 40 MG tablet 2023 at PM  No Yes   Sig: Take 1 tablet (40 mg) by mouth daily   umeclidinium (INCRUSE ELLIPTA) 62.5 MCG/INH inhaler 2023 at PRN  No Yes   Sig: Inhale 1 puff into the lungs daily      Facility-Administered Medications: None     Allergies   Allergies   Allergen Reactions     Tiotropium Bromide Monohydrate Blisters     Hctz [Hydrochlorothiazide] Other (See Comments)     Low sodium       Social History   I have reviewed this patient's social history and updated it with pertinent information if needed. Rashaun Molina  reports that he quit smoking about 10 years ago. His smoking use included cigarettes. He has a 60.00 pack-year smoking history. He has been exposed to tobacco smoke. He has never used smokeless tobacco. He reports current alcohol use of about 14.0 standard drinks of alcohol per week. He reports that he does not use drugs.    Family History   I have reviewed this patient's family history and updated it with pertinent information if needed.   Family History   Problem Relation Age of Onset     Hypertension Mother      Hypertension Father          MI     Heart Disease Father         MI  at age 55     Coronary Artery Disease Father      Hyperlipidemia Brother      Hypertension Brother      Heart Surgery Brother         defibrillator     Hypertension Sister      Prostate Cancer No family hx of        Review of Systems   The 10 point Review of Systems is negative other than noted in the HPI.     Physical Exam   Temp: 97.9  F (36.6  C) Temp src: Oral BP: (!) 161/69 Pulse: 67   Resp: 18 SpO2: 95 % O2 Device: None (Room air)    Vital Signs with Ranges  Temp:  [97.1  F (36.2  C)-98.5  F (36.9  C)] 97.9  F (36.6  C)  Pulse:  [56-77]  67  Resp:  [16-18] 18  BP: (132-170)/(54-91) 161/69  SpO2:  [92 %-97 %] 95 %  174 lbs 9.67 oz    GENERAL: NAD  HEENT: MMM  CV: RRR, no murmurs  RESP: Coarse wheeze bilaterally  MUSCULOSKELETAL: Left BKA, stump wrapped in bandage.  Right lower extremity without edema  SKIN: no suspicious lesions or rashes, dry to touch  NEURO: Alert, oriented, normal speech.  Some uncontrolled movements/tremor consistent with Parkinson's.  PSYCH: mood good, affect appropriate      Data   BMP  Recent Labs   Lab 07/06/23  0728 07/06/23  0148 07/05/23  2334 07/05/23  1920   * 118* 116* 114*   POTASSIUM 4.6 4.6 4.8 5.0   CHLORIDE 88* 83* 82* 80*   FRANNY 9.0 8.7* 8.2* 8.3*   CO2 24 23 24 21*   BUN 12.4 13.2 13.7 12.9   CR 1.01 1.05 1.02 1.06   * 105* 101* 166*     Phos@LABRCNTIPR(phos:4)  CBC)  Recent Labs   Lab 07/06/23  0728 07/05/23  1125   WBC 7.3 9.4   HGB 10.1* 10.0*   HCT 29.2* 28.5*   MCV 91 91   * 154     Recent Labs   Lab 07/05/23  1125   AST 35   ALT <5   ALKPHOS 120   BILITOTAL 0.3     No lab results found in last 7 days.  No results found for: D2VIT, D3VIT, DTOT  Recent Labs   Lab 07/06/23 0728   HGB 10.1*   HCT 29.2*   MCV 91     No results for input(s): PTHI in the last 168 hours.  Color Urine (no units)   Date Value   01/18/2023 Light Yellow   03/27/2013 Yellow     Appearance Urine (no units)   Date Value   01/18/2023 Clear   03/27/2013 Cloudy (A)     Glucose Urine (mg/dL)   Date Value   01/18/2023 Negative   03/27/2013 Neg     Bilirubin Urine (no units)   Date Value   01/18/2023 Negative   03/27/2013 Neg     Ketones Urine (mg/dL)   Date Value   01/18/2023 Negative   03/27/2013 Neg     Specific Gravity Urine (no units)   Date Value   01/18/2023 1.007     pH Urine   Date Value   01/18/2023 6.0   03/27/2013 7.5 pH     Protein Albumin Urine (mg/dL)   Date Value   01/18/2023 Negative     Urobilinogen Urine (EU/dL)   Date Value   03/27/2013 0.2     Nitrite Urine (no units)   Date Value   01/18/2023 Negative    03/27/2013 Neg     Leukocyte Esterase Urine (no units)   Date Value   01/18/2023 Negative       Urine osmolality 161    Luzmaria Ruiz MD   [facility] - Nephrology  [phone]

## 2023-07-06 NOTE — PLAN OF CARE
A&Ox4, VSS on RA, pivot transfer with GB to bedside commode. NPO at midnight, plan for I&D tomorrow of LLE. PIV on LUE SL. Critical Lab Na 115 reported to hospitalist, orders obtained. Patient condition requires move to C.

## 2023-07-06 NOTE — ANESTHESIA POSTPROCEDURE EVALUATION
Patient: Rashanu Molina    Procedure: Procedure(s):  COMBINED IRRIGATION AND DEBRIDEMENT SOFT TISSUE LOWER EXTREMITY, APPLICATION OF INCISIONAL WOUND VAC       Anesthesia Type:  General    Note:  Disposition: Inpatient   Postop Pain Control:    PONV: No   Neuro/Psych: Uneventful            Sign Out: Acceptable/Baseline neuro status   Airway/Respiratory: Uneventful            Sign Out: Acceptable/Baseline resp. status   CV/Hemodynamics: Uneventful            Sign Out: Acceptable CV status; No obvious hypovolemia; No obvious fluid overload   Other NRE:    DID A NON-ROUTINE EVENT OCCUR? No           Last vitals:  Vitals Value Taken Time   /92 07/06/23 1732   Temp 97.6  F (36.4  C) 07/06/23 1539   Pulse 69 07/06/23 1735   Resp 20 07/06/23 1735   SpO2 90 % 07/06/23 1735   Vitals shown include unvalidated device data.    Electronically Signed By: Iva Marie MD  July 6, 2023  5:36 PM

## 2023-07-06 NOTE — PROGRESS NOTES
[facility]    Medicine Progress Note - Hospitalist Service    Date of Admission:  7/5/2023    Assessment & Plan     Rashaun Molina is a 63 year old male w/PMH COPD, HTN, SIADH, DLD, PVD, parkinsons, recent L BKA who presents from home with increased drainage and found to have post op site wound dehiscence and fluid collection    Surgical site dehiscence w/cellulitis and likely abscess  + pseudomonas wound culture  S/p L BKA   -hx recurrent L ankle infections, hardware, non union with eventual elective L BKA on 5/31. Had been doing well post operatively with suture removed last Friday and presents with surgical site dehiscence starting yesterday with erythema, scan drainage.   -in ED WBC 9.4, afebrile. CRP 20, sed rate 27. CT with amputation stump soft tissue irregularity and likely ulceration/cellulitis with deep rim-enhancing fluid collection concerning for abscess, approximately 4 x 6.5 x 3.5 cm.  -initiated on Ancef in ED prior to CT coming back, but transitioned to Zosyn for anaerobic coverage. MRSA screen ordered  -culture now growing pseudomonas which Zosyn should cover and infectious markers reassuring  -consult ID and await further culture data  -ortho following, I&D planned for later today    Acute on chronic hyponatremia w/hx SIADH  -baseline Na around 127 but has been lower in past, was 117 on admission and believed to be related to alcohol use on July 4th  -initially started on NS but had worsening Na so nephrology was consulted and changed to 2% NS and [place] [facility]  -Na now up to 122, cont serial labs and nephrology to adjust as needed  -fluid restriction in place 1.5L but currently NPO    LATESHA  Hyperkalemia  -likely combination of medications, dehydration, infection   -hold home lisinopril, , NSAIDS  -s/p hyperkalemia protocol in ED and [facility] with eventual improvement  -monitor on tele and cont serial labs    Hx COPD, HTN, DLD, PVD, parkinsons  -home inhaler, statin,  carvidopa/levidopa, gabapentin resumed  -some changes as above     Diet: NPO per Anesthesia Guidelines for Procedure/Surgery Except for: Meds  Fluid restriction 1500 ML FLUID    DVT Prophylaxis: Pneumatic Compression Devices  Escalante Catheter: Not present  Lines: None     Cardiac Monitoring: ACTIVE order. Indication: Electrolyte Imbalance (24 hours)- Magnesium <1.3 mg/ml; Potassium < =2.8 or > 5.5 mg/ml  Code Status: Full Code      Disposition Plan   Unclear timetable, await culture data and surgical report       Nash Penaloza DO  Hospitalist Service  [facility] [place]  Securely message with [employer] (more info)  Text page via [employer] Paging/Directory   ______________________________________________________________________    Interval History   Noted to have worsening hyponatremia overnight. On call doc talked to nephrology and placed on 2% NS with some improvement. Culture now showing pseudomonas but remains afebrile, no leukocytosis. Wound more painful today    Physical Exam   Vital Signs: Temp: 98  F (36.7  C) Temp src: Temporal BP: (!) 149/74 Pulse: 66   Resp: 16 SpO2: 97 % O2 Device: None (Room air)    Weight: 174 lbs 9.67 oz  Constitutional: fatigued, somnolent and cooperative  Eyes: pupils equal, round and reactive to light and conjunctiva normal  ENT: normocepalic, without obvious abnormality, atramatic  Respiratory: no increased work of breathing, good air exchange and clear to auscultation  Cardiovascular: regular rate and rhythm and no murmur noted  GI: normal bowel sounds, soft and non-distended  Skin: L BKA suture site with dehiscence and foul smelling discharge  Neurologic: somnolent, moving all extremeties and follows commands    60 MINUTES SPENT BY ME on the date of service doing chart review, history, exam, documentation & further activities per the note.      Data   ------------------------- PAST 24 HR DATA REVIEWED -----------------------------------------------    I have personally  reviewed the following data over the past 24 hrs:    7.3  \   10.1 (L)   / 145 (L)     125 (L) 89 (L) 10.6 /  110 (H)   4.7 25 1.10 \       TSH: 1.00 T4: N/A A1C: N/A       Imaging results reviewed over the past 24 hrs:   No results found for this or any previous visit (from the past 24 hour(s)).

## 2023-07-06 NOTE — PROGRESS NOTES
Patient Transfer Information  Patient connected to monitoring equipment on arrival: yes Telemetry     Patient connected to wall oxygen on arrival: N/A    Belongings: Transferred with patient    Safety check completed: Yes

## 2023-07-06 NOTE — PROGRESS NOTES
Vitals: BP (!) 141/62 (BP Location: Left arm)   Pulse 56   Temp 98.4  F (36.9  C) (Oral)   Resp 18   Wt 79.2 kg (174 lb 9.7 oz)   SpO2 97%   BMI 25.78 kg/m      Neuro: Check Q4H. A&Ox 4, denies dizziness, neuro status intact except strength in R leg +4  Resp: Clear, RA  Cardiac: WDL  GI: WDL  : WDL  Skin: BKA with incisional ulcer L leg  M/S: WDL  Lines/ Drains: PIV SL Lower L arm, PIV L AC infusing 2% NS 30 ml/hr. IV site intact no signs of irritation or infiltration.  Activity: A2 pivot to commode  Nutrition: NPO since 0000  Pain: Denies  Labs: Potassium 4.6. Critical Sodium 114, 116, 118. Q4H BMP lab draw, MD paged with results.    Expected Discharge: TBD, Wound debridement expected today 7/6

## 2023-07-06 NOTE — PLAN OF CARE
Alert and oriented x4  Pain rated at 4/10, PRN Oxycodone and Dilaudid given.   Q2 VS  Q4 Neuros    Tele: Discontinued    LR infusing @ 75ml/hr  Sodium % Held     Bladder scan @ 2200 was 346, pt declined to be cathed at this time.     Some bleeding present in new dressing post debridement    One emesis event after eating dinner. Pt stated he was not nauseous and felt fine.       /45 (BP Location: Left arm, Patient Position: Semi-Trejo's)   Pulse 66   Temp 97.9  F (36.6  C) (Oral)   Resp 16   Wt 79.2 kg (174 lb 9.7 oz)   SpO2 94%   BMI 25.78 kg/m

## 2023-07-06 NOTE — OP NOTE
PREOPERATIVE DIAGNOSIS: Left below-knee amputation wound dehiscence/infection.    POSTOPERATIVE DIAGNOSIS: Left below-knee amputation wound dehiscence/infection.    PROCEDURE(S):   1.  Left below-knee amputation wound irrigation and debridement with sharp excisional debridement of nonviable skin, subcutaneous tissue, fascia, and muscle; with primary wound closure.  2.  Application of left below-knee amputation wound incisional wound VAC, 20 cm Heather dressing.    ATTENDING SURGEON: Dylan Navarro MD.    ASSISTANT SURGEON: Brenda Enriquez PA-C.    ANESTHESIA: General.    EBL: Minimal.  None.    TOURNIQUET TIME: None.    COMPLICATIONS: None apparent.    A skilled surgical assistant, Brenda Enriquez PA-C, was necessary and utilized during the case to assist with patient positioning, prepping and draping, completing the soft tissue/bone work, wound closure, and dressing application.  The assistant was utilized throughout the entire case.    INDICATIONS: Rashaun is a pleasant 63 year-old gentleman who underwent a left below-knee amputation with my partner Dr. Romero for treatment of a chronic ankle infected nonunion.  Unfortunately, the patient developed complete dehiscence of his below-knee amputation wound over the past week, with concern for wound infection.  The patient presented to the hospital and has been started on IV antibiotics.  Due to the significant wound dehiscence and soft tissue infection, the above operative intervention was recommended.  After full discussion of the benefits and risks of surgery, the patient provided informed consent to proceed.    The patient was identified in the pre-operative holding area on the date of surgery.  The operative site was marked with indelible marker and the patient was brought back to the operating room and transferred to the operating table in a supine position.  All bony prominences were well-padded.  Anesthesia was administered without complication.  The left lower  extremity was prepped and draped in standard sterile fashion.  A pre-operative timeout was performed identifying the correct patient, procedure, operative site, antibiotic administration, and equipment necessary for the procedure.    Complete dehiscence of the patient's left low knee amputation incision was noted, with some fibrinous debris and dysvascular tissue noted throughout the wound.  There was no evidence of abscess or significant pocket of fluid, with a small amount of dead space noted around the distal fibula.  Sharp excisional debridement of all nonviable skin, subcutaneous tissue, fascia, and muscle was completed with combination of scalpel blade, rongeur, and curette.  While completing adequate debridement of the entire wound, the wound was copiously irrigated with 6 L of normal saline.  After completing the wound debridement, primary closure of the wound was completed.  Deep tissue was reapproximated with 2-0 PDS suture.  Subcutaneous tissues and skin were reapproximated with interrupted 3-0 Monocryl and 3-0 nylon sutures respectively.    To help enhance healing and limit recurrent subcutaneous fluid collection, an incisional wound VAC was placed.  A 20cm Heather wound VAC dressing was applied over the entire incision.  Excellent suction and seal were noted after securing the dressing.    The patient was extubated and brought to the PACU in stable condition for further postoperative care.    Postoperatively, the patient will remain nonweightbearing with the left lower extremity.  The patient may continue his daily aspirin which should be adequate to cover for DVT prophylaxis.  The patient will return to clinic with Dr. Romero in 3 weeks for repeat evaluation including wound check and possible suture removal.    Of note, all counts were correct at the conclusion of the case.

## 2023-07-06 NOTE — ANESTHESIA PROCEDURE NOTES
Airway       Patient location during procedure: OR  Staff -        Anesthesiologist:  Edvin Lux MD       CRNA: Severson, Dean Dennis, APRN CRNA       Performed By: CRNA  Consent for Airway        Urgency: elective  Indications and Patient Condition       Indications for airway management: caroline-procedural and airway protection       Induction type:intravenous       Mask difficulty assessment: 1 - vent by mask    Final Airway Details       Final airway type: supraglottic airway    Supraglottic Airway Details        Type: LMA       Brand: I-Gel       LMA size: 5    Post intubation assessment        Placement verified by: capnometry, equal breath sounds and chest rise        Number of attempts at approach: 1       Number of other approaches attempted: 0       Secured with: commercial tube acevedo       Ease of procedure: easy       Dentition: Intact and Unchanged

## 2023-07-06 NOTE — PLAN OF CARE
Cared for [id number]    [facility] - 2% Sodium gtt @ 30 mL/hr.     Pt A/O x 4, VSS, pt denies headache, dizziness, N/V & SOB. Pt reports pain at BKA wound site - administered PO Tylenol. Pt Asst 1-2 (stand and pivot to commode)/lift. Lung sounds diminished/coarse, infrequent cough, RA. Tele: SR w/tall T-waves. Recent BKA left leg - surgical site infected; dressing changed - large amount of purulent drainage. IV Zosyn. Ortho Surgery & Nephrology following. Surgery planned for today. Will continue with plan of care.

## 2023-07-06 NOTE — CONSULTS
[facility]    Orthopedic Consultation    Rashaun Molina MRN# [id number]   Age: 63 year old YOB: 1960     Date of Admission: 7/5/2023    Reason for consult: Drainage from left BKA site       Requesting physician: Nash Penaloza DO       Level of consult: Consult, follow and place orders           Assessment and Plan:   Assessment:   1. Left below knee amputation stump dehiscence and drainage  2. S/p left BKA (DOS: 5/31/23) with Dr. Neil Romero      Plan:   The patient's history and clinical/diagnostic findings were reviewed with the on-call orthopedic trauma surgeon, Dr. Dylan Navarro. The patient has a history of an infected left ankle s/p hardware removal and multiple debridements that ultimately required a left BKA on 5/31/23. Unfortunately, around 3 days ago, the patient noticed dehiscence and drainage from the left BKA surgical site. Left lower leg CT concerning for fluid collection that could represent abscess. Denies systemic symptoms. Afebrile and VSS. WBC WNL. Patient has been on Zosyn. Discussed that surgical intervention is recommended due to wound dehiscence and purulence on exam with concern for abscess. Patient is understanding of these recommendations and wishes to proceed with a left revision BKA and I&D later today (7/6/23) as scheduled pending medical optimization. It is noted that the patient was quite hyponatremic on admission and is now having improvement with replacement. Dr. Navarro will further discuss the risks, benefits, and outcomes of surgery while obtaining consent.     -Remain NPO until postop.  -NWB/bedrest until postop.  -Continue pain regimen.  -Continue IV antibiotics per primary. Patient will need ID consult once intraop cultures are obtained to help guide antibiotic recommendations/outpatient plan.  -Hold any PTA anticoagulation.   -Type and screen ordered.  -Order placed for RN to change the patient's dressing this morning (Adaptic and  Mepilex) due to drainage.  -Discussed with patient the importance of keeping the knee extended at rest to avoid development of a contracture.    Please contact orthopedic trauma team if any questions or concerns arise.           Chief Complaint:   Left BKA stump wound and drainage         History of Present Illness:   Medical history obtained via chart review and discussion with the patient. Rashaun Molina is a 63 year old male with PMH of COPD, HTN, SIADH, HLD, PVD, and Parkinson's disease who is recently s/p left BKA (DOS: 5/31/23) who was admitted on 7/5/23 for left BKA stump wound dehiscence. Patient reports that he did well postoperatively until earlier this week. He states that he was compliant with his FloTech brace for 1 month and had his sutures removed on 6/23/23. He did well for the following week, but does note that he was advised to follow with a wound RN but was unable to arrange visits. On 7/3/23, the patient noticed that his left BKA surgical site opened and was draining thick, greenish/whitish fluid. He denies noticing any increases in pain or additional discoloration around the site. Denies fever, chills, or lethargy. He has remained NWB LLE. PTA  mg daily. No recent illnesses. NPO since midnight.          Past Medical History:     Past Medical History:   Diagnosis Date     Arthritis      Chronic airway obstruction, not elsewhere classified 03/08/2004    pt says that he does not have COPD     Cough syncope 11/21/2012     Essential hypertension, benign      Fistula     colon/bladder     Fracture of right proximal fibula 07/30/2014     Orthostatic hypotension 11/24/2014     Other chronic pain     right hip pain     PAD (peripheral artery disease) (H) 08/2018    PTA right SFA Dr. Lee     Palpitations 12/2018 01/2019 Event monitor- SR/ST     Parkinsons disease (H)      Personal history of colonic polyps 06/28/2005     Pure hypercholesterolemia      Tobacco use disorder 03/08/2004     Vasovagal  syncopes 10/25/2014     Viral warts, unspecified     genital             Past Surgical History:     Past Surgical History:   Procedure Laterality Date     AMPUTATE LEG BELOW KNEE Left 5/31/2023    Procedure: Left below the knee amputation;  Surgeon: Neil Romero MD;  Location:  OR     ARTHROPLASTY HIP Right 10/07/2015    Procedure: ARTHROPLASTY HIP;  Surgeon: Neil Davis MD;  Location:  OR     COLONOSCOPY  09/05/2004    Per Hospital encounter dated 4/18/13     COLONOSCOPY N/A 04/09/2018    Procedure: COMBINED COLONOSCOPY, SINGLE OR MULTIPLE BIOPSY/POLYPECTOMY BY BIOPSY;;  Surgeon: Tramaine Zuniga MD;  Location:  GI     COMBINED CYSTOSCOPY, INSERT CATHETER URETER  04/11/2013    Procedure: COMBINED CYSTOSCOPY, INSERT CATHETER URETER;;  Surgeon: Kashif Parks MD;  Location:  OR      LARYNGOSCOPY DIRECT W VOCAL CORD INJECTION      vocal cord polyps     IRRIGATION AND DEBRIDEMENT Left 08/2022    ankle     LAPAROSCOPIC ASSISTED COLECTOMY  04/11/2013    Procedure: LAPAROSCOPIC ASSISTED COLECTOMY;  LOW ANTERIOR RESECTION ;  Surgeon: Tramaine Zuniga MD;  Location:  OR     OPEN REDUCTION INTERNAL FIXATION ANKLE Left 06/09/2022    Procedure: .  Open reduction internal fixation of left ankle syndesmotic injury 2.  Non-operative treatment of left proximal fibula fracture;  Surgeon: Mike Jackson MD;  Location:  OR     OPEN REDUCTION INTERNAL FIXATION ANKLE Left 8/24/2022    Procedure: Open reduction internal fixation ankle;  Surgeon: Mike Jackson MD;  Location:  OR     REMOVE HARDWARE ANKLE Left 8/24/2022    Procedure: 1.  Removal hardware left ankle 2.  Excisional debridement of left lateral ankle wound deepest level of debridement bone 3.  Revision open reduction internal fixation of left ankle syndesmotic injury 4.  Deltoid ligament repair left ankle;  Surgeon: Mike Jackson MD;  Location:  OR     [facility] COLONOSCOPY THRU STOMA W BIOPSY/CAUTERY TUMOR/POLYP/LESION  1998     michael, tubular adenoma, next colonoscopy      [facility] COLONOSCOPY THRU STOMA, DIAGNOSTIC  2001    negative, ligate two internal hemmorhoids  Dr rodriguez-repeat              Social History:     Social History     Tobacco Use     Smoking status: Former     Packs/day: 1.50     Years: 40.00     Pack years: 60.00     Types: Cigarettes     Quit date: 2013     Years since quitting: 10.2     Passive exposure: Past     Smokeless tobacco: Never   Substance Use Topics     Alcohol use: Yes     Alcohol/week: 14.0 standard drinks of alcohol     Types: 14 Cans of beer per week     Comment: 2 beers daily             Family History:     Family History   Problem Relation Age of Onset     Hypertension Mother      Hypertension Father          MI     Heart Disease Father         MI  at age 55     Coronary Artery Disease Father      Hyperlipidemia Brother      Hypertension Brother      Heart Surgery Brother         defibrillator     Hypertension Sister      Prostate Cancer No family hx of              Immunizations:     VACCINE/DOSE   Diptheria   DPT   DTAP   HBIG   Hepatitis A   Hepatitis B   HIB   Influenza   Measles   Meningococcal   MMR   Mumps   Pneumococcal   Polio   Rubella   Small Pox   TDAP   Varicella   Zoster             Allergies:     Allergies   Allergen Reactions     Tiotropium Bromide Monohydrate Blisters     Hctz [Hydrochlorothiazide] Other (See Comments)     Low sodium             Medications:     Current Facility-Administered Medications   Medication     acetaminophen (TYLENOL) tablet 650 mg     albuterol (PROVENTIL HFA/VENTOLIN HFA) inhaler     calcium carbonate (TUMS) chewable tablet 500 mg     carbidopa-levodopa (SINEMET)  MG per tablet 2 tablet     glucose gel 15-30 g    Or     dextrose 50 % injection 25-50 mL    Or     glucagon injection 1 mg     gabapentin (NEURONTIN) capsule 300 mg     HYDROmorphone (DILAUDID) injection 0.2 mg     hydrOXYzine (ATARAX) tablet 25 mg     lidocaine (LMX4)  cream     lidocaine (LMX4) cream     lidocaine 1 % 0.1-1 mL     lidocaine 1 % 0.1-1 mL     melatonin tablet 1 mg     metoprolol succinate ER (TOPROL XL) 24 hr tablet 50 mg     ondansetron (ZOFRAN ODT) ODT tab 4 mg    Or     ondansetron (ZOFRAN) injection 4 mg     oxyCODONE (ROXICODONE) tablet 5 mg     Patient RECEIVING antibiotic to treat a different condition and it provides ADEQUATE COVERAGE for this surgical procedure.     piperacillin-tazobactam (ZOSYN) intermittent infusion 3.375 g     rosuvastatin (CRESTOR) tablet 40 mg     senna-docusate (SENOKOT-S/PERICOLACE) 8.6-50 MG per tablet 1 tablet    Or     senna-docusate (SENOKOT-S/PERICOLACE) 8.6-50 MG per tablet 2 tablet     sodium chloride (PF) 0.9% PF flush 3 mL     sodium chloride (PF) 0.9% PF flush 3 mL     sodium chloride (PF) 0.9% PF flush 3 mL     sodium chloride (PF) 0.9% PF flush 3 mL     sodium chloride 2% infusion     sodium zirconium cyclosilicate (LOKELMA) packet 10 g     umeclidinium (INCRUSE ELLIPTA) 62.5 MCG/ACT inhaler 1 puff             Review of Systems:   CV: NEGATIVE for chest pain, palpitations or peripheral edema  C: NEGATIVE for fever, chills, change in weight  E/M: NEGATIVE for ear, mouth and throat problems  R: NEGATIVE for significant cough or SOB          Physical Exam:   All vitals have been reviewed  Patient Vitals for the past 24 hrs:   BP Temp Temp src Pulse Resp SpO2 Weight   07/06/23 1215 (!) 149/74 98.4  F (36.9  C) Oral 62 16 96 % --   07/06/23 1026 133/48 98  F (36.7  C) Oral 65 -- 94 % --   07/06/23 0816 (!) 161/69 97.9  F (36.6  C) Oral 67 18 95 % --   07/06/23 0558 (!) 141/62 98.4  F (36.9  C) Oral 56 18 97 % 79.2 kg (174 lb 9.7 oz)   07/06/23 0354 (!) 152/66 97.9  F (36.6  C) Oral 68 18 92 % --   07/06/23 0208 (!) 147/64 97.9  F (36.6  C) Oral 70 18 92 % --   07/05/23 2338 (!) 155/65 98  F (36.7  C) Oral 65 18 92 % --   07/05/23 2222 132/55 98.5  F (36.9  C) Oral 62 18 92 % --   07/05/23 2019 139/54 97.9  F (36.6  C) Oral  77 18 93 % --   07/05/23 1415 -- -- -- -- -- 94 % --   07/05/23 1405 (!) 170/91 -- -- -- -- 96 % --   07/05/23 1228 (!) 159/80 -- -- -- -- 97 % --       Intake/Output Summary (Last 24 hours) at 7/6/2023 1224  Last data filed at 7/6/2023 0700  Gross per 24 hour   Intake --   Output 4000 ml   Net -4000 ml       Constitutional: Pleasant, alert, appropriate, following commands. Non-toxic appearing. NAD.  HEENT: Head atraumatic normocephalic. Pupils equal round and reactive.  Respiratory: Unlabored breathing no audible wheeze  Cardiovascular: Regular rate and rhythm per pulses.  GI: Abdomen is non-distended.  Lymph/Hematologic: No lymphadenopathy in areas examined.  Genitourinary: No gonzalez  Skin: No rashes, no cyanosis, no edema.  Musculoskeletal: Left lower extremity: S/p BKA. Saturation to overlying Mepilex at the surgical site of the BKA. Dressing removed and reveals nearly the entire surgical incision to be dehisced with purulent drainage. No active bleeding. Slight surrounding erythema. Diffuse palpable fluctuance to area. No apparent induration. Nontender over the proximal calf posteriorly, knee medial and lateral joint lines, and anterior thigh. Patient able to actively range the knee from 0-95 degrees. SILT.  Neurologic: GCS 15          Data:   All laboratory data reviewed  Results for orders placed or performed during the hospital encounter of 07/05/23   CT Tibia Fibula Lower Leg Left w Contrast     Status: None    Narrative    CT TIBIA FIBULA LOWER LEG LEFT WITH CONTRAST 7/5/2023 1:07 PM    INDICATION: Left BKA incision site infection. Area of interest - lower  extremity, upper leg/lower leg.  COMPARISON: CT left ankle 6/8/2022   CONTRAST: 100mL Isovue-370  TECHNIQUE: IV contrast. Axial, sagittal and coronal thin-section  reconstruction. Dose reduction techniques were used.     FINDINGS: Postoperative changes related to interval below the knee  amputation left lower extremity. Well-defined tubular  radiodensities  at the tibial osteotomy site are likely postsurgical. No definitive  bony or cortical destruction at the osteotomy site to suggest acute  osteomyelitis. Chronic healed proximal fibular metadiaphyseal fracture  deformity. Nothing for acute osteomyelitis at the fibular osteotomy  site. Anatomic alignment. No acute fracture.    Stump soft tissue swelling and ulceration along with lobulated fluid  collection at the stump site concerning for abscess. This measures  approximately 4 cm craniocaudal by at least 6.5 cm transverse by 3.5  cm anteroposterior. No appreciable soft tissue gas at the stump.  Extensive arterial calcification. Mild muscle atrophy proximal left  leg. No left knee joint effusion.      Impression    IMPRESSION:  1.  Postoperative changes related to below the knee amputation left  lower extremity.  2.  Amputation stump soft tissue irregularity and likely  ulceration/cellulitis with deep rim-enhancing fluid collection  concerning for abscess, approximately 4 x 6.5 x 3.5 cm.  3.  Nothing definite to suggest osteomyelitis at the amputation  margins of the tibia or fibula.  4.  Chronic healed proximal metadiaphyseal fibular fracture deformity.  5.  Peripheral arterial calcification.     ELIZABETH SULTANA MD         SYSTEM ID:  [id number]   Comprehensive metabolic panel     Status: Abnormal   Result Value Ref Range    Sodium 117 (LL) 136 - 145 mmol/L    Potassium 5.4 (H) 3.4 - 5.3 mmol/L    Chloride 84 (L) 98 - 107 mmol/L    Carbon Dioxide (CO2) 21 (L) 22 - 29 mmol/L    Anion Gap 12 7 - 15 mmol/L    Urea Nitrogen 14.8 8.0 - 23.0 mg/dL    Creatinine 1.23 (H) 0.67 - 1.17 mg/dL    Calcium 8.6 (L) 8.8 - 10.2 mg/dL    Glucose 112 (H) 70 - 99 mg/dL    Alkaline Phosphatase 120 40 - 129 U/L    AST 35 0 - 45 U/L    ALT <5 0 - 70 U/L    Protein Total 7.4 6.4 - 8.3 g/dL    Albumin 4.0 3.5 - 5.2 g/dL    Bilirubin Total 0.3 <=1.2 mg/dL    GFR Estimate 66 >60 mL/min/1.73m2   Lactic Acid     Status: Normal    Result Value Ref Range    Lactic Acid 1.8 0.7 - 2.0 mmol/L   [facility] Draw     Status: None    Narrative    The following orders were created for panel order [facility] Draw.  Procedure                               Abnormality         Status                     ---------                               -----------         ------                     Extra Blue Top Tube[[id number]]                              Final result               Extra Red Top Tube[[id number]]                               Final result                 Please view results for these tests on the individual orders.   CBC with platelets and differential     Status: Abnormal   Result Value Ref Range    WBC Count 9.4 4.0 - 11.0 10e3/uL    RBC Count 3.15 (L) 4.40 - 5.90 10e6/uL    Hemoglobin 10.0 (L) 13.3 - 17.7 g/dL    Hematocrit 28.5 (L) 40.0 - 53.0 %    MCV 91 78 - 100 fL    MCH 31.7 26.5 - 33.0 pg    MCHC 35.1 31.5 - 36.5 g/dL    RDW 16.3 (H) 10.0 - 15.0 %    Platelet Count 154 150 - 450 10e3/uL    % Neutrophils 68 %    % Lymphocytes 19 %    % Monocytes 8 %    % Eosinophils 3 %    % Basophils 1 %    % Immature Granulocytes 1 %    NRBCs per 100 WBC 0 <1 /100    Absolute Neutrophils 6.6 1.6 - 8.3 10e3/uL    Absolute Lymphocytes 1.8 0.8 - 5.3 10e3/uL    Absolute Monocytes 0.7 0.0 - 1.3 10e3/uL    Absolute Eosinophils 0.2 0.0 - 0.7 10e3/uL    Absolute Basophils 0.1 0.0 - 0.2 10e3/uL    Absolute Immature Granulocytes 0.1 <=0.4 10e3/uL    Absolute NRBCs 0.0 10e3/uL   Extra Blue Top Tube     Status: None   Result Value Ref Range    Hold Specimen JIC    Extra Red Top Tube     Status: None   Result Value Ref Range    Hold Specimen JIC    Erythrocyte sedimentation rate auto     Status: Abnormal   Result Value Ref Range    Erythrocyte Sedimentation Rate 27 (H) 0 - 20 mm/hr   CRP inflammation     Status: Abnormal   Result Value Ref Range    CRP Inflammation 20.61 (H) <5.00 mg/L   Basic metabolic panel (BMP)     Status: Abnormal   Result Value Ref Range    Sodium  115 (LL) 136 - 145 mmol/L    Potassium 5.4 (H) 3.4 - 5.3 mmol/L    Chloride 82 (L) 98 - 107 mmol/L    Carbon Dioxide (CO2) 22 22 - 29 mmol/L    Anion Gap 11 7 - 15 mmol/L    Urea Nitrogen 13.3 8.0 - 23.0 mg/dL    Creatinine 1.14 0.67 - 1.17 mg/dL    Calcium 8.1 (L) 8.8 - 10.2 mg/dL    Glucose 107 (H) 70 - 99 mg/dL    GFR Estimate 72 >60 mL/min/1.73m2   Basic metabolic panel     Status: Abnormal   Result Value Ref Range    Sodium 115 (LL) 136 - 145 mmol/L    Potassium 5.9 (H) 3.4 - 5.3 mmol/L    Chloride 82 (L) 98 - 107 mmol/L    Carbon Dioxide (CO2) 22 22 - 29 mmol/L    Anion Gap 11 7 - 15 mmol/L    Urea Nitrogen 13.6 8.0 - 23.0 mg/dL    Creatinine 1.05 0.67 - 1.17 mg/dL    Calcium 8.1 (L) 8.8 - 10.2 mg/dL    Glucose 103 (H) 70 - 99 mg/dL    GFR Estimate 79 >60 mL/min/1.73m2   Osmolality urine     Status: Normal   Result Value Ref Range    Osmolality Urine 161 100 - 1,200 mmol/kg    Narrative    Reference Ranges depend on patient's hydration status and renal function.   Neonates:  mmol/kg   2 years and older, random specimens: 100-1200 mmol/kg; Greater than 850 mmol/kg after 12 hour fluid restriction  Urine/serum osmolality ratio: 2 years and older: 1.0-3.0; 3.0-4.7 after 12 hour fluid restriction   TSH with free T4 reflex     Status: Normal   Result Value Ref Range    TSH 1.00 0.30 - 4.20 uIU/mL   Basic metabolic panel     Status: Abnormal   Result Value Ref Range    Sodium 114 (LL) 136 - 145 mmol/L    Potassium 5.0 3.4 - 5.3 mmol/L    Chloride 80 (L) 98 - 107 mmol/L    Carbon Dioxide (CO2) 21 (L) 22 - 29 mmol/L    Anion Gap 13 7 - 15 mmol/L    Urea Nitrogen 12.9 8.0 - 23.0 mg/dL    Creatinine 1.06 0.67 - 1.17 mg/dL    Calcium 8.3 (L) 8.8 - 10.2 mg/dL    Glucose 166 (H) 70 - 99 mg/dL    GFR Estimate 78 >60 mL/min/1.73m2   Basic metabolic panel     Status: Abnormal   Result Value Ref Range    Sodium 118 (LL) 136 - 145 mmol/L    Potassium 4.6 3.4 - 5.3 mmol/L    Chloride 83 (L) 98 - 107 mmol/L    Carbon Dioxide  (CO2) 23 22 - 29 mmol/L    Anion Gap 12 7 - 15 mmol/L    Urea Nitrogen 13.2 8.0 - 23.0 mg/dL    Creatinine 1.05 0.67 - 1.17 mg/dL    Calcium 8.7 (L) 8.8 - 10.2 mg/dL    Glucose 105 (H) 70 - 99 mg/dL    GFR Estimate 79 >60 mL/min/1.73m2   Basic metabolic panel     Status: Abnormal   Result Value Ref Range    Sodium 122 (L) 136 - 145 mmol/L    Potassium 4.6 3.4 - 5.3 mmol/L    Chloride 88 (L) 98 - 107 mmol/L    Carbon Dioxide (CO2) 24 22 - 29 mmol/L    Anion Gap 10 7 - 15 mmol/L    Urea Nitrogen 12.4 8.0 - 23.0 mg/dL    Creatinine 1.01 0.67 - 1.17 mg/dL    Calcium 9.0 8.8 - 10.2 mg/dL    Glucose 111 (H) 70 - 99 mg/dL    GFR Estimate 83 >60 mL/min/1.73m2   Basic metabolic panel     Status: Abnormal   Result Value Ref Range    Sodium 116 (LL) 136 - 145 mmol/L    Potassium 4.8 3.4 - 5.3 mmol/L    Chloride 82 (L) 98 - 107 mmol/L    Carbon Dioxide (CO2) 24 22 - 29 mmol/L    Anion Gap 10 7 - 15 mmol/L    Urea Nitrogen 13.7 8.0 - 23.0 mg/dL    Creatinine 1.02 0.67 - 1.17 mg/dL    Calcium 8.2 (L) 8.8 - 10.2 mg/dL    Glucose 101 (H) 70 - 99 mg/dL    GFR Estimate 82 >60 mL/min/1.73m2   Cortisol     Status: None   Result Value Ref Range    Cortisol 20.9   ug/dL   Basic metabolic panel     Status: Abnormal   Result Value Ref Range    Sodium 122 (L) 136 - 145 mmol/L    Potassium 4.7 3.4 - 5.3 mmol/L    Chloride 89 (L) 98 - 107 mmol/L    Carbon Dioxide (CO2) 24 22 - 29 mmol/L    Anion Gap 9 7 - 15 mmol/L    Urea Nitrogen 11.6 8.0 - 23.0 mg/dL    Creatinine 0.91 0.67 - 1.17 mg/dL    Calcium 8.8 8.8 - 10.2 mg/dL    Glucose 113 (H) 70 - 99 mg/dL    GFR Estimate >90 >60 mL/min/1.73m2   CBC with platelets     Status: Abnormal   Result Value Ref Range    WBC Count 7.3 4.0 - 11.0 10e3/uL    RBC Count 3.20 (L) 4.40 - 5.90 10e6/uL    Hemoglobin 10.1 (L) 13.3 - 17.7 g/dL    Hematocrit 29.2 (L) 40.0 - 53.0 %    MCV 91 78 - 100 fL    MCH 31.6 26.5 - 33.0 pg    MCHC 34.6 31.5 - 36.5 g/dL    RDW 16.2 (H) 10.0 - 15.0 %    Platelet Count 145 (L)  150 - 450 10e3/uL   Sodium random urine     Status: None   Result Value Ref Range    Sodium Urine mmol/L 37 mmol/L   EKG 12 lead     Status: None   Result Value Ref Range    Systolic Blood Pressure  mmHg    Diastolic Blood Pressure  mmHg    Ventricular Rate 65 BPM    Atrial Rate 65 BPM    FL Interval 172 ms    QRS Duration 82 ms     ms    QTc 422 ms    P Axis 21 degrees    R AXIS 46 degrees    T Axis 54 degrees    Interpretation ECG       Sinus rhythm  Normal ECG  When compared with ECG of 18-JAN-2023 17:37,  No significant change was found  Confirmed by - EMERGENCY ROOM, PHYSICIAN ([id number]),  JAVI SILVA (Zachery) on 7/5/2023 1:49:51 PM     Adult Type and Screen     Status: None   Result Value Ref Range    ABO/RH(D) O POS     Antibody Screen Negative Negative    SPECIMEN EXPIRATION DATE [id number]    Blood Culture Peripheral Blood     Status: Normal (Preliminary result)    Specimen: Peripheral Blood   Result Value Ref Range    Culture No growth after 12 hours    Blood Culture Arm, Left     Status: Normal (Preliminary result)    Specimen: Arm, Left; Blood   Result Value Ref Range    Culture No growth after 12 hours    Wound Aerobic Bacterial Culture Routine     Status: Abnormal (Preliminary result)    Specimen: Leg, Below Knee, Left; Wound   Result Value Ref Range    Culture Culture in progress     Culture 4+ Gram negative bacilli (A)    CBC with Platelets & Differential     Status: Abnormal    Narrative    The following orders were created for panel order CBC with Platelets & Differential.  Procedure                               Abnormality         Status                     ---------                               -----------         ------                     CBC with platelets and d...[[id number]]  Abnormal            Final result                 Please view results for these tests on the individual orders.   ABO/Rh type and screen     Status: None    Narrative    The following orders were created  for panel order ABO/Rh type and screen.  Procedure                               Abnormality         Status                     ---------                               -----------         ------                     Adult Type and Screen[[id number]]                            Final result                 Please view results for these tests on the individual orders.          Attestation:  I have reviewed today's vital signs, notes, medications, labs and imaging with Dr. Dylan Navarro.  Amount of time performed on this consult: 50 minutes.    Taylor Thomas PA-C  [facility]

## 2023-07-06 NOTE — PLAN OF CARE
Orthopedic Treatment Plan    I have seen and evaluated the patient at bedside. Formal consult to follow. Plan for surgery today with Dr. Dylan Navarro for a left revision BKA and I&D. Discussed possibility of wound vac placement and repeat surgery if not amenable to closure. Continue IV antibiotics. Remain NPO until postop.    Anisha Thomas PA-C  [facility]

## 2023-07-06 NOTE — ANESTHESIA CARE TRANSFER NOTE
Patient: Rashaun Molina    Procedure: Procedure(s):  COMBINED IRRIGATION AND DEBRIDEMENT SOFT TISSUE LOWER EXTREMITY, APPLICATION OF INCISIONAL WOUND VAC       Diagnosis: Surgical site infection [T81.49XA]  Status post below knee amputation, left (H) [Z89.512]  Diagnosis Additional Information: No value filed.    Anesthesia Type:   General     Note:    Oropharynx: oropharynx clear of all foreign objects  Level of Consciousness: awake  Oxygen Supplementation: face mask  Level of Supplemental Oxygen (L/min / FiO2): 5  Independent Airway: airway patency satisfactory and stable  Dentition: dentition unchanged  Vital Signs Stable: post-procedure vital signs reviewed and stable  Report to RN Given: handoff report given  Patient transferred to: PACU    Handoff Report: Identifed the Patient, Identified the Reponsible Provider, Reviewed the pertinent medical history, Discussed the surgical course, Reviewed Intra-OP anesthesia mangement and issues during anesthesia, Set expectations for post-procedure period and Allowed opportunity for questions and acknowledgement of understanding      Vitals:  Vitals Value Taken Time   BP     Temp     Pulse 65 07/06/23 1542   Resp 23 07/06/23 1542   SpO2 100 % 07/06/23 1542   Vitals shown include unvalidated device data.    Electronically Signed By: Dean Dennis Severson, APRN CRNA  July 6, 2023  3:43 PM

## 2023-07-06 NOTE — ANESTHESIA PREPROCEDURE EVALUATION
Anesthesia Pre-Procedure Evaluation    Patient: Rashaun Molina   MRN: [id number] : 1960        Procedure : Procedure(s):  COMBINED IRRIGATION AND DEBRIDEMENT SOFT TISSUE LOWER EXTREMITY          Past Medical History:   Diagnosis Date     Arthritis      Chronic airway obstruction, not elsewhere classified 2004    pt says that he does not have COPD     Cough syncope 2012     Essential hypertension, benign      Fistula     colon/bladder     Fracture of right proximal fibula 2014     Orthostatic hypotension 2014     Other chronic pain     right hip pain     PAD (peripheral artery disease) (H) 2018    PTA right SFA Dr. Lee     Palpitations 2018 Event monitor- SR/ST     Parkinsons disease (H)      Personal history of colonic polyps 2005     Pure hypercholesterolemia      Tobacco use disorder 2004     Vasovagal syncopes 10/25/2014     Viral warts, unspecified     genital      Past Surgical History:   Procedure Laterality Date     AMPUTATE LEG BELOW KNEE Left 2023    Procedure: Left below the knee amputation;  Surgeon: Neil Romero MD;  Location: [facility] OR     ARTHROPLASTY HIP Right 10/07/2015    Procedure: ARTHROPLASTY HIP;  Surgeon: Neil Davis MD;  Location:  OR     COLONOSCOPY  2004    Per Hospital encounter dated 13     COLONOSCOPY N/A 2018    Procedure: COMBINED COLONOSCOPY, SINGLE OR MULTIPLE BIOPSY/POLYPECTOMY BY BIOPSY;;  Surgeon: Tramaine Zuniga MD;  Location:  GI     COMBINED CYSTOSCOPY, INSERT CATHETER URETER  2013    Procedure: COMBINED CYSTOSCOPY, INSERT CATHETER URETER;;  Surgeon: Kashif Parks MD;  Location:  OR      LARYNGOSCOPY DIRECT W VOCAL CORD INJECTION      vocal cord polyps     IRRIGATION AND DEBRIDEMENT Left 2022    ankle     LAPAROSCOPIC ASSISTED COLECTOMY  2013    Procedure: LAPAROSCOPIC ASSISTED COLECTOMY;  LOW ANTERIOR RESECTION ;  Surgeon: Tramaine Zuniga MD;  Location:  [facility] OR     OPEN REDUCTION INTERNAL FIXATION ANKLE Left 06/09/2022    Procedure: .  Open reduction internal fixation of left ankle syndesmotic injury 2.  Non-operative treatment of left proximal fibula fracture;  Surgeon: Mike Jackson MD;  Location: [facility] OR     OPEN REDUCTION INTERNAL FIXATION ANKLE Left 8/24/2022    Procedure: Open reduction internal fixation ankle;  Surgeon: Mike Jackson MD;  Location:  OR     REMOVE HARDWARE ANKLE Left 8/24/2022    Procedure: 1.  Removal hardware left ankle 2.  Excisional debridement of left lateral ankle wound deepest level of debridement bone 3.  Revision open reduction internal fixation of left ankle syndesmotic injury 4.  Deltoid ligament repair left ankle;  Surgeon: Mike Jackson MD;  Location:  OR     [facility] COLONOSCOPY THRU STOMA W BIOPSY/CAUTERY TUMOR/POLYP/LESION  1998    michael, tubular adenoma, next colonoscopy 2001     ZZ COLONOSCOPY THRU STOMA, DIAGNOSTIC  04/16/2001    negative, ligate two internal hemmorhoids  Dr ordriguez-repeat 2008      Allergies   Allergen Reactions     Tiotropium Bromide Monohydrate Blisters     Hctz [Hydrochlorothiazide] Other (See Comments)     Low sodium      Social History     Tobacco Use     Smoking status: Former     Packs/day: 1.50     Years: 40.00     Pack years: 60.00     Types: Cigarettes     Quit date: 4/19/2013     Years since quitting: 10.2     Passive exposure: Past     Smokeless tobacco: Never   Substance Use Topics     Alcohol use: Yes     Alcohol/week: 14.0 standard drinks of alcohol     Types: 14 Cans of beer per week     Comment: 2 beers daily      Wt Readings from Last 1 Encounters:   07/06/23 79.2 kg (174 lb 9.7 oz)        Anesthesia Evaluation   Pt has had prior anesthetic. Type: General.    No history of anesthetic complications       ROS/MED HX  ENT/Pulmonary:     (+) moderate,  COPD,     Neurologic:     (+) Parkinson's disease,     Cardiovascular:     (+) Dyslipidemia hypertension-Peripheral Vascular Disease--  Other and Symptomatic. --- (-) wheezes   METS/Exercise Tolerance:     Hematologic:  - neg hematologic  ROS     Musculoskeletal:   (+) arthritis,     GI/Hepatic:  - neg GI/hepatic ROS     Renal/Genitourinary:  - neg Renal ROS     Endo: Comment: SIADH related to chronic illness and infection    (+) Not using insulin, - not using insulin pump. Obesity,     Psychiatric/Substance Use:  - neg psychiatric ROS     Infectious Disease:  - neg infectious disease ROS     Malignancy:  - neg malignancy ROS     Other:  - neg other ROS    (+) , H/O Chronic Pain,        Physical Exam    Airway        Mallampati: II   TM distance: > 3 FB   Neck ROM: full   Mouth opening: > 3 cm    Respiratory Devices and Support         Dental       (+) Modest Abnormalities - crowns, retainers, 1 or 2 missing teeth      Cardiovascular   cardiovascular exam normal       Rhythm and rate: regular and normal     Pulmonary   pulmonary exam normal        breath sounds clear to auscultation   (-) no rhonchi and no wheezes    Other findings: Lab Test        07/06/23     07/05/23     06/03/23     06/10/22     06/08/22     12/27/18     08/06/18     07/30/18     05/14/18                       0728          1125          1013          0654          1657          0705          0718          0824          0713          WBC          7.3          9.4          9.8            < >        6.9            < >        6.0           --           --           HGB          10.1*        10.0*        7.7*           < >        11.1*          < >        13.2*          < >         --           MCV          91           91           98             < >        97             < >        96            --           --           PLT          145*         154          170            < >        122*           < >        187           --           --           INR           --           --           --           --          1.11          --          1.04          --          1.07           <  > = values in this interval not displayed.                  Lab Test        07/06/23 07/06/23 07/06/23                       1007          0728          0148          NA           122*         122*         118*          POTASSIUM    4.7          4.6          4.6           CHLORIDE     89*          88*          83*           CO2          24           24           23            BUN          11.6         12.4         13.2          CR           0.91         1.01         1.05          ANIONGAP     9            10           12            FRANNY          8.8          9.0          8.7*          GLC          113*         111*         105*                   ECG  Sinus rhythm   Normal ECG   When compared with ECG of 18-JAN-2023 17:37,   No significant change was found     OUTSIDE LABS:  CBC:   Lab Results   Component Value Date    WBC 7.3 07/06/2023    WBC 9.4 07/05/2023    HGB 10.1 (L) 07/06/2023    HGB 10.0 (L) 07/05/2023    HCT 29.2 (L) 07/06/2023    HCT 28.5 (L) 07/05/2023     (L) 07/06/2023     07/05/2023     BMP:   Lab Results   Component Value Date     (L) 07/06/2023     (L) 07/06/2023    POTASSIUM 4.7 07/06/2023    POTASSIUM 4.6 07/06/2023    CHLORIDE 89 (L) 07/06/2023    CHLORIDE 88 (L) 07/06/2023    CO2 24 07/06/2023    CO2 24 07/06/2023    BUN 11.6 07/06/2023    BUN 12.4 07/06/2023    CR 0.91 07/06/2023    CR 1.01 07/06/2023     (H) 07/06/2023     (H) 07/06/2023     COAGS:   Lab Results   Component Value Date    PTT 31 08/06/2018    INR 1.11 06/08/2022     POC:   Lab Results   Component Value Date     (H) 04/13/2013     HEPATIC:   Lab Results   Component Value Date    ALBUMIN 4.0 07/05/2023    PROTTOTAL 7.4 07/05/2023    ALT <5 07/05/2023    AST 35 07/05/2023    ALKPHOS 120 07/05/2023    BILITOTAL 0.3 07/05/2023    BILIDIRECT 0.1 02/13/2006     OTHER:   Lab Results   Component Value Date    PH 7.0 07/10/2000    LACT 1.8 07/05/2023    A1C 5.3 11/25/2019    FRANNY 8.8  07/06/2023    PHOS 3.4 06/08/2022    MAG 1.8 01/21/2023    LIPASE 28 04/15/2013    TSH 1.00 07/05/2023    SED 27 (H) 07/05/2023       Anesthesia Plan    ASA Status:  3   NPO Status:  NPO Appropriate    Anesthesia Type: General.     - Airway: LMA   Induction: Intravenous.   Maintenance: Balanced.        Consents    Anesthesia Plan(s) and associated risks, benefits, and realistic alternatives discussed. Questions answered and patient/representative(s) expressed understanding.     - Discussed: Risks, Benefits and Alternatives for BOTH SEDATION and the PROCEDURE were discussed     - Discussed with:  Patient      - Extended Intubation/Ventilatory Support Discussed: No.      - Patient is DNR/DNI Status: No    Use of blood products discussed: No .     Postoperative Care    Pain management: IV analgesics, Oral pain medications, Multi-modal analgesia.   PONV prophylaxis: Ondansetron (or other 5HT-3), Dexamethasone or Solumedrol, Background Propofol Infusion     Comments:                Edvin Lux MD

## 2023-07-06 NOTE — CONSULTS
[facility]  WOC Nurse Inpatient Assessment     Consulted for: L leg    Patient History (according to provider note(s):      Rashaun Molina is a 63 year old male w/PMH COPD, HTN, SIADH, DLD, PVD, parkinsons, recent L BKA who presents from home with increased drainage and found to have post op site wound dehiscence and fluid collection    Assessment:      Spoke with bedside RN, plan is for debridement of left BKA site today.  No other concerns.  Ortho managing BKA site.  WOC will sign off, re-consult as needed.    RECOMMEND PRIMARY TEAM ORDER: None, at this time  Education provided: plan of care  Discussed plan of care with: Nurse  WOC nurse follow-up plan: signing off  Notify WOC if wound(s) deteriorate.  Nursing to notify the Provider(s) and re-consult the WOC Nurse if new skin concern.    DATA:     BMI: Body mass index is 25.78 kg/m .   Active diet order: Orders Placed This Encounter      NPO per Anesthesia Guidelines for Procedure/Surgery Except for: Meds     Output: I/O last 3 completed shifts:  In: -   Out: 3500 [Urine:3500]     Labs: Recent Labs   Lab 07/06/23  0728 07/05/23  1125   ALBUMIN  --  4.0   HGB 10.1* 10.0*   WBC 7.3 9.4     Pressure injury risk assessment:   Sensory Perception: 4-->no impairment  Moisture: 3-->occasionally moist  Activity: 2-->chairfast  Mobility: 3-->slightly limited  Nutrition: 3-->adequate  Friction and Shear: 3-->no apparent problem  Sigifredo Score: 18    Nilesh Sanderson RN [facility]  [facility]  Dept. Office Number: [phone]

## 2023-07-06 NOTE — PROGRESS NOTES
[facility]  Patient is currently open to home care services with [facility]. The patient is currently receiving RN PT OT and HHA    services.  [facility]  and team have been notified of patient admission.  [facility] liaison will continue to follow patient during stay.  If appropriate provide orders to resume home care at time of discharge.

## 2023-07-07 NOTE — PROGRESS NOTES
Vitals: BP (!) 164/82 (BP Location: Left arm)   Pulse 76   Temp 98  F (36.7  C) (Oral)   Resp 20   Wt 79 kg (174 lb 2.6 oz)   SpO2 94%   BMI 25.72 kg/m      Neuro: A&Ox 4, denies dizziness  Resp: Coarse, expiratory wheeze, RA. Denies SOB. Declined offer for nebulizer and inhaler treatments.  Cardiac: HTN up to systolic 190 gave PRN hydralazine x 1.  GI: WDL  : Hesitancy, having difficulty voiding since anesthesia. Bladder scanned for 547 ml. Straight-cathed for 400 ml. Was able to urinate 300 ml by urinal this AM.  Skin: L BKA with incisional wound, wound vac in place dressing moderately saturated with sanguinous drainage.  M/S: WDL  Lines/ Drains: PIV SL R forearm, PIV infusing LR 75/hr upper R arm.   Patient wound vac to Left leg incision alarming to indicate bandage is saturated. WOC nurse not available overnight. Contacted the surgeon and left a message.  Activity: A2 belt pivot to commode.  Nutrition: Regular, denies nausea  Pain: Denies  Labs: BMP ordered for the morning, continue monitoring sodium level, drip currently held.     Expected Discharge: TBD

## 2023-07-07 NOTE — PROGRESS NOTES
[facility]    Medicine Progress Note - Hospitalist Service    Date of Admission:  7/5/2023    Assessment & Plan    Rashaun Molina is a 63 year old male w/PMH COPD, HTN, SIADH, DLD, PVD, parkinsons, recent L BKA who presents from home with increased drainage and found to have post op site wound dehiscence and fluid collection    Surgical site dehiscence w/cellulitis and likely abscess  + pseudomonas, enterococcus wound culture  S/p L BKA   -hx recurrent L ankle infections, hardware, non union with eventual elective L BKA on 5/31. Had been doing well post operatively with suture removed last Friday and presents with surgical site dehiscence starting yesterday with erythema, scan drainage.   -in ED WBC 9.4, afebrile. CRP 20, sed rate 27. CT with amputation stump soft tissue irregularity and likely ulceration/cellulitis with deep rim-enhancing fluid collection concerning for abscess, approximately 4 x 6.5 x 3.5 cm.  -initiated on Ancef in ED prior to CT coming back, but transitioned to Zosyn for anaerobic coverage. MRSA screen ordered  -cultures initially growing pseudomonas but 7/7 noted enterococcus as well  -s/p I&D w/ortho on 7/6 with extensive debridement including fascia and muscle but no bone.   -currently has wound vac in place  -ID following, so far blood cx negative. Cont Zosyn and await further cx data    Acute on chronic hyponatremia w/hx SIADH  -baseline Na around 127 but has been lower in past, was 117 on admission and believed to be related to alcohol use on July 4th  -initially started on NS but had worsening Na so nephrology was consulted and changed to 2% [place] and [place] [facility] [place]nephrology following, fluids stopped and started on urea today  -cont 1.5L fluid restriction and serial labs  -cont to hold home lisinopril    LATESHA  Hyperkalemia  -likely combination of medications, dehydration, infection   -cont to hold home lisinopril, , NSAIDS  -s/p hyperkalemia protocol in ED and  alana with eventual improvement  -Cr and potassium now normalized    Hx COPD, HTN, DLD, PVD, parkinsons  -home inhaler, statin, carvidopa/levidopa, gabapentin resumed  -some changes as above     Diet: Fluid restriction 1500 ML FLUID  Advance Diet as Tolerated: Regular Diet Adult    DVT Prophylaxis: Pneumatic Compression Devices  Escalante Catheter: Not present  Lines: None     Cardiac Monitoring: None  Code Status: Full Code      Disposition Plan   Await further culture results, guidance on wound vac. Likely a couple more days       Nash Penaloza DO  Hospitalist Service  [facility]  Securely message with [employer] (more info)  Text page via [employer] Paging/Directory   ______________________________________________________________________    Interval History   Had surgery yesterday with extensive debridement done. Afebrile and tolerating antibiotics well    Physical Exam   Vital Signs: Temp: 98  F (36.7  C) Temp src: Oral BP: 100/49 Pulse: 83   Resp: 20 SpO2: 96 % O2 Device: None (Room air) Oxygen Delivery: 3 LPM  Weight: 174 lbs 2.61 oz  Constitutional: fatigued, somnolent and cooperative  Eyes: pupils equal, round and reactive to light and conjunctiva normal  ENT: normocepalic, without obvious abnormality, atramatic  Respiratory: no increased work of breathing, good air exchange and clear to auscultation  Cardiovascular: regular rate and rhythm and no murmur noted  GI: normal bowel sounds, soft and non-distended  Skin: L BKA surgical site covered with wound vac  Neurologic: somnolent, moving all extremeties and follows commands    60 MINUTES SPENT BY ME on the date of service doing chart review, history, exam, documentation & further activities per the note.      Data   ------------------------- PAST 24 HR DATA REVIEWED -----------------------------------------------    I have personally reviewed the following data over the past 24 hrs:    7.3  \   8.9 (L)   / 126 (L)     123 (L) 91 (L) 10.2 /   220 (H)   4.2 23 1.00 \       Imaging results reviewed over the past 24 hrs:   No results found for this or any previous visit (from the past 24 hour(s)).

## 2023-07-07 NOTE — PROVIDER NOTIFICATION
-Patient BP is 179/80, rechecked twice. No PRN available.    - NS 2% held. Should a sodium lab be ordered for AM? Is continuing IMC status recommended?    Verbal order for the patient to remain IMC until morning labs can be reviewed. BMP ordered    -/91, rechecked other arm 191/92.     PRN hydralazine given x 1

## 2023-07-07 NOTE — CONSULTS
[facility] Nurse Inpatient Assessment     Consulted for: Left BKA    Patient History (according to provider note(s):      Rashaun Molina is a 63 year old male w/PMH COPD, HTN, SIADH, DLD, PVD, parkinsons, recent L BKA who presents from home with increased drainage and found to have post op site wound dehiscence and fluid collection    Assessment:      Areas visualized during today's visit: Focused:    Negative pressure wound therapy applied to: Left BKA   Last photo: 7/7/23     Wound due to: Surgical Wound   Wound history/plan of care:  Patient with recent BKA who developed post op infection and dehiscence.  Now s/p debridement on 7/6/23 with SORIN negative pressure wound therapy placement.  SORIN vac no longer functioning on 7/7/23, asked to start incisional wound VAC.    Surgical date: 7/7/23     Service following: Ortho    Date Negative Pressure Wound Therapy initiated: 7/7/23     Interventions in place: elevation    Is patient s nutritional status compromised? no   a. If yes, what interventions are in place? N/A    Reason for initiating vac therapy? High risk of infections, Need for accelerated granulation tissue and Prior history of delayed wound healing    Which?of?the?following?co-morbidities?apply? PVD  a. If diabetic is patient on a diabetic management program? N/A     Is osteomyelitis present in wound? no  a.  If yes what treatments are in place? N/A    Wound base: 100 %red non-granular tissue along incision with sutures intact     Palpation of the wound bed: normal       Drainage: copious-3 areas noted to be draining      Volume in cannister: NA     Last cannister change date: 7/7/23     Description of drainage: bloody      Measurements (length x width x depth, in cm) 0.2  x 20  x  0.1 cm       Tunneling N/A      Undermining N/A   Periwound skin: Intact       Color: normal and consistent with surrounding tissue       Temperature: normal    Odor: none   Pain: mild  Pain intervention  "prior to dressing change: slow and gentle cares   Treatment goal: Heal  and Drainage control  STATUS: initial assessment   Supplies ordered: ordered VAC pump, canister, dressing    Number of foam pieces removed from a wound (excluding foam for bridge) : SORIN dressing   Verified this matched the number of foam pieces applied last dressing change: Yes   Number of foam pieces packed into wound (excluding foam for bridge) : 1 Artem Black, picture framed along incision before foam            Treatment Plan:     Negative pressure wound therapy plan:  Wound location: Left BKA   Change Days: weekly by WOC RN    Supplies (including all accessories) used: medium Black foam   Cleanse with MicroKlenz prior to replacing VAC    Suction setting: -125   Methods used: Window paned all periwound skin with vac drape prior to applying sponge    Staff RN to assess integrity of dressing and ensure suction is set at appropriate level every shift.   Date canister. Chart canister output every shift. Change cannister weekly and PRN if full/occluded     Remove foam dressing and replace with BID normal saline moist gauze dressing if:   -a dressing failure which cannot be repaired within 2 hours   -patient is discharging to home without a home pump   -patient is discharging to a facility outside the local area   -if a dressing is a \"Silver Foam\", remove before Radiation Therapy or MRI     The hospital VAC pump is not to be discharged with the patient.?Ensure to disconnect patient from machine prior to discharge. Then,    - If a home KCI VAC pump has been delivered, connect home cannister to dressing tubing then connect cannister to home pump and turn on machine    - If transferring to a nearby facility with a KCI vac, can disconnect and clamp tubing then cover end with glove so can be reconnected within 2 hours          Orders: Written    RECOMMEND PRIMARY TEAM ORDER: None, at this time  Education provided: plan of care  Discussed plan of " care with: Patient, Nurse and Physicians Assistant  [facility] nurse follow-up plan: weekly  Notify [facility] if wound(s) deteriorate.  Nursing to notify the Provider(s) and re-consult the [facility] Nurse if new skin concern.    DATA:     Current support surface: Standard  Standard gel/foam mattress (IsoFlex, Atmos air, etc)  BMI: Body mass index is 25.72 kg/m .   Active diet order: Orders Placed This Encounter      Advance Diet as Tolerated: Regular Diet Adult     Output: I/O last 3 completed shifts:  In: 1220 [P.O.:920; I.V.:300]  Out: 1200 [Urine:1200]     Labs: Recent Labs   Lab 07/07/23  0807 07/06/23  0728 07/05/23  1125   ALBUMIN  --   --  4.0   HGB 8.9*   < > 10.0*   WBC 7.3   < > 9.4    < > = values in this interval not displayed.     Pressure injury risk assessment:   Sensory Perception: 4-->no impairment  Moisture: 3-->occasionally moist  Activity: 2-->chairfast  Mobility: 2-->very limited  Nutrition: 3-->adequate  Friction and Shear: 2-->potential problem  Sigifredo Score: 16    [facility]HI[facility] [employer] [facility]  Dept. Office Number: [phone]

## 2023-07-07 NOTE — PROVIDER NOTIFICATION
Patient wound vac alarming to indicate bandage is saturated and in need of attention. WOC nurse not available overnight. Ortho nurses were consulted, it was recommended to contact the surgical team. RN contacted the surgeon Dr. Navarro and left a message describing the situation.

## 2023-07-07 NOTE — CONSULTS
[facility]    Infectious Disease Consultation     Date of Admission:  7/5/2023  Date of Consult (When I saw the patient): 07/07/23    Assessment & Plan   Rashaun Molina is a 63 year old who was admitted on 7/5/2023.     Impression: 1 63-year-old male with acute early left BKA stump infection, abscess but not clear bone involvement, status post I&D wound has been closed, no major clinical sepsis, cultures growing Pseudomonas fluorescens  2 prior, chronic left ankle infection, nonunion, and Pseudomonas aeruginosa in that culture extensive antibiotics but failed and required a BKA  3 Parkinson's disease  4 COPD    REC 1 continue Zosyn for now, await sensitivity of the Pseudomonas to see what options we have.  No bone resection done no obvious osteo, [place] drained abscess and closed so presumably oral antibiotics acceptable if we have an option        Reji Vang MD    Reason for Consult   Reason for consult: I was asked to evaluate this patient for left BKA stump infection.    Primary Care Physician   Yemi Nava    Chief Complaint   Left BKA stump wound    History is obtained from the patient and medical records    History of Present Illness   Rashaun Molina is a 63 year old male who presents with left BKA stump infection.  Patient well-known to me previous left ankle infection with nonunion fracture with Pseudomonas deep infection.  Extensive antibiotics surgical intervention but eventual failure required BKA.  This was done late May.  Did okay postop initially until onset in the last week or so of open wound, drainage but without any systemic symptoms.  Now operative intervention no obvious bone involvement cultures growing Pseudomonas, no systemic symptoms    Past Medical History   I have reviewed this patient's medical history and updated it with pertinent information if needed.   Past Medical History:   Diagnosis Date     Arthritis      Chronic airway obstruction, not elsewhere  classified 03/08/2004    pt says that he does not have COPD     Cough syncope 11/21/2012     Essential hypertension, benign      Fistula     colon/bladder     Fracture of right proximal fibula 07/30/2014     Orthostatic hypotension 11/24/2014     Other chronic pain     right hip pain     PAD (peripheral artery disease) (H) 08/2018    PTA right SFA Dr. Lee     Palpitations 12/2018 01/2019 Event monitor- SR/ST     Parkinsons disease (H)      Personal history of colonic polyps 06/28/2005     Pure hypercholesterolemia      Tobacco use disorder 03/08/2004     Vasovagal syncopes 10/25/2014     Viral warts, unspecified     genital       Past Surgical History   I have reviewed this patient's surgical history and updated it with pertinent information if needed.  Past Surgical History:   Procedure Laterality Date     AMPUTATE LEG BELOW KNEE Left 5/31/2023    Procedure: Left below the knee amputation;  Surgeon: Neil Romero MD;  Location: [facility] OR     ARTHROPLASTY HIP Right 10/07/2015    Procedure: ARTHROPLASTY HIP;  Surgeon: Neil Davis MD;  Location: [facility] OR     COLONOSCOPY  09/05/2004    Per Hospital encounter dated 4/18/13     COLONOSCOPY N/A 04/09/2018    Procedure: COMBINED COLONOSCOPY, SINGLE OR MULTIPLE BIOPSY/POLYPECTOMY BY BIOPSY;;  Surgeon: Tramaine Zuniga MD;  Location:  GI     COMBINED CYSTOSCOPY, INSERT CATHETER URETER  04/11/2013    Procedure: COMBINED CYSTOSCOPY, INSERT CATHETER URETER;;  Surgeon: Kashif Parks MD;  Location:  OR      LARYNGOSCOPY DIRECT W VOCAL CORD INJECTION      vocal cord polyps     IRRIGATION AND DEBRIDEMENT Left 08/2022    ankle     IRRIGATION AND DEBRIDEMENT SOFT TISSUE LOWER EXTREMITY, COMBINED Left 7/6/2023    Procedure: 1.  Left below-knee amputation wound irrigation and debridement with sharp excisional debridement of nonviable skin, subcutaneous tissue, fascia, and muscle; with primary wound closure. 2.  Application of left below-knee amputation wound  incisional wound VAC, 20 cm Heather dressing. ;  Surgeon: Dylan Navarro MD;  Location:  OR     LAPAROSCOPIC ASSISTED COLECTOMY  04/11/2013    Procedure: LAPAROSCOPIC ASSISTED COLECTOMY;  LOW ANTERIOR RESECTION ;  Surgeon: Tramaine Zuniga MD;  Location: [facility] OR     OPEN REDUCTION INTERNAL FIXATION ANKLE Left 06/09/2022    Procedure: .  Open reduction internal fixation of left ankle syndesmotic injury 2.  Non-operative treatment of left proximal fibula fracture;  Surgeon: Mike Jackson MD;  Location: [facility] OR     OPEN REDUCTION INTERNAL FIXATION ANKLE Left 8/24/2022    Procedure: Open reduction internal fixation ankle;  Surgeon: Mike Jackson MD;  Location: [facility] OR     REMOVE HARDWARE ANKLE Left 8/24/2022    Procedure: 1.  Removal hardware left ankle 2.  Excisional debridement of left lateral ankle wound deepest level of debridement bone 3.  Revision open reduction internal fixation of left ankle syndesmotic injury 4.  Deltoid ligament repair left ankle;  Surgeon: Mike Jackson MD;  Location:  OR     [facility] COLONOSCOPY THRU STOMA W BIOPSY/CAUTERY TUMOR/POLYP/LESION  1998    michael, tubular adenoma, next colonoscopy 2001     [facility] COLONOSCOPY THRU STOMA, DIAGNOSTIC  04/16/2001    negative, ligate two internal hemmorhoids  Dr rodriguez-repeat 2008       Prior to Admission Medications   Prior to Admission Medications   Prescriptions Last Dose Informant Patient Reported? Taking?   acetaminophen (TYLENOL) 325 MG tablet 7/4/2023 at prn  No Yes   Sig: Take 2 tablets (650 mg) by mouth every 4 hours as needed for other (mild pain)   albuterol (PROAIR HFA/PROVENTIL HFA/VENTOLIN HFA) 108 (90 Base) MCG/ACT inhaler 7/4/2023 at PRN  No Yes   Sig: Inhale 2 puffs into the lungs every 6 hours as needed for shortness of breath, wheezing or cough   aspirin (ASA) 325 MG EC tablet 7/4/2023 at AM  No Yes   Sig: Take 1 tablet (325 mg) by mouth daily   carbidopa-levodopa (SINEMET)  MG tablet 7/5/2023 at AM  No Yes   Sig: Take  2 tablets by mouth 3 times daily (Takes at 0400, 1000, and 1600)   gabapentin (NEURONTIN) 300 MG capsule 7/4/2023 at PM  No Yes   Sig: Take 1 capsule (300 mg) by mouth 3 times daily   lisinopril (ZESTRIL) 40 MG tablet 7/4/2023 at AM  No Yes   Sig: Take 1 tablet (40 mg) by mouth daily   metoprolol succinate ER (TOPROL XL) 50 MG 24 hr tablet 7/4/2023 at AM  No Yes   Sig: Take 1 tablet (50 mg) by mouth daily   oxyCODONE (ROXICODONE) 5 MG tablet 7/4/2023 at PM  No Yes   Sig: Take 1-2 tablets (5-10 mg) by mouth every 4 hours as needed for moderate to severe pain   rosuvastatin (CRESTOR) 40 MG tablet 7/4/2023 at PM  No Yes   Sig: Take 1 tablet (40 mg) by mouth daily   umeclidinium (INCRUSE ELLIPTA) 62.5 MCG/INH inhaler 7/4/2023 at PRN  No Yes   Sig: Inhale 1 puff into the lungs daily      Facility-Administered Medications: None     Allergies   Allergies   Allergen Reactions     Tiotropium Bromide Monohydrate Blisters     Hctz [Hydrochlorothiazide] Other (See Comments)     Low sodium       Immunization History   Immunization History   Administered Date(s) Administered     COVID-19 MONOVALENT 12+ (Pfizer) 04/28/2021, 05/19/2021     Influenza Vaccine 50-64 or 18-64 w/egg allergy (Flublok) 09/20/2022     Influenza Vaccine >6 months (Alfuria,Fluzone) 12/26/2013, 11/07/2016, 10/30/2017, 10/22/2018, 11/11/2019, 12/21/2020, 11/04/2021     Pneumococcal 23 valent 07/27/2011     TD,PF 7+ (Tenivac) 07/10/2000, 07/30/2020     TDAP Vaccine (Boostrix) 03/24/2010       Social History   I have reviewed this patient's social history and updated it with pertinent information if needed. Rashaun Molina  reports that he quit smoking about 10 years ago. His smoking use included cigarettes. He has a 60.00 pack-year smoking history. He has been exposed to tobacco smoke. He has never used smokeless tobacco. He reports current alcohol use of about 14.0 standard drinks of alcohol per week. He reports that he does not use drugs.    Family History   I  have reviewed this patient's family history and updated it with pertinent information if needed.   Family History   Problem Relation Age of Onset     Hypertension Mother      Hypertension Father          MI     Heart Disease Father         MI  at age 55     Coronary Artery Disease Father      Hyperlipidemia Brother      Hypertension Brother      Heart Surgery Brother         defibrillator     Hypertension Sister      Prostate Cancer No family hx of        Review of Systems   The 10 point Review of Systems is negative no systemic symptoms postop the pain is improved but some pain present still    Physical Exam   Temp: 97.7  F (36.5  C) Temp src: Oral BP: 133/50 Pulse: 88   Resp: 20 SpO2: 94 % O2 Device: None (Room air) Oxygen Delivery: 3 LPM  Vital Signs with Ranges  Temp:  [97.6  F (36.4  C)-98.7  F (37.1  C)] 97.7  F (36.5  C)  Pulse:  [62-88] 88  Resp:  [9-42] 20  BP: (108-189)/() 133/50  SpO2:  [91 %-100 %] 94 %  174 lbs 2.61 oz  Body mass index is 25.72 kg/m .    GENERAL APPEARANCE:  awake  EYES: Eyes grossly normal to inspection  NECK: no adenopathy  RESP: lungs clear   CV: regular rates and rhythm  LYMPHATICS: normal ant/post cervical and supraclavicular nodes  ABDOMEN: soft, nontender  MS: extremities normal  SKIN: no suspicious lesions or rashes stump covered        Data   All laboratory and imaging data in the past 24 hours reviewed  No results for input(s): CULT in the last 168 hours.  No lab results found.    Invalid input(s):        All cultures:  Recent Labs   Lab 23  1154 23  1137 23  1125   CULTURE No anaerobic organisms isolated after 1 day  Culture in progress  4+ Pseudomonas fluorescens/putida group* No growth after 1 day No growth after 1 day      Blood culture:  Results for orders placed or performed during the hospital encounter of 23   Blood Culture Arm, Left    Specimen: Arm, Left; Blood   Result Value Ref Range    Culture No growth after 1 day     Blood Culture Peripheral Blood    Specimen: Peripheral Blood   Result Value Ref Range    Culture No growth after 1 day       Urine culture:  Results for orders placed or performed in visit on 03/27/13   Urine culture ([facility])    Specimen: Urine   Result Value Ref Range    Urine Voided Culture     Results for orders placed or performed in visit on 03/18/13   Urine culture ([facility])    Specimen: Urine   Result Value Ref Range    Urine Voided Culture (A)

## 2023-07-07 NOTE — PROGRESS NOTES
Orthopedic Surgery  Rashaun Molina  07/07/2023     Admit Date:  7/5/2023    POD: 1 Day Post-Op   Procedure(s):  1.  Left below-knee amputation wound irrigation and debridement with sharp excisional debridement of nonviable skin, subcutaneous tissue, fascia, and muscle; with primary wound closure. 2.  Application of left below-knee amputation wound incisional wound VAC, 20 cm Heather dressing.     Patient resting comfortably in bed. Patient experienced drainage onto his left BKA stump Heather dressing overnight which was very concerning to him. Dressing remains intact but continues to have flashing light indicating high saturation. He reports moderate pain affecting his left lower extremity, slightly improved compared to preoperatively. Chronic neuropathy and denies changes in baseline sensation. No fever or chills. Denies nausea or vomiting. Tolerating oral intake.     Temp:  [97.6  F (36.4  C)-98.7  F (37.1  C)] 98  F (36.7  C)  Pulse:  [62-88] 83  Resp:  [9-42] 20  BP: (100-189)/() 100/49  SpO2:  [91 %-100 %] 96 %    Alert and oriented.   Left BKA stump Heather dressing with significant dried, bloody saturation. Error message on display.  No saturation or peeling of overlying tegaderm and dressing is otherwise intact.  No significant erythema.   Mild to moderate swelling of residual limb.  Thigh compartments are soft and nontender.  Proximal calf is soft and nontender.  Active flexion and extension of knee is intact.  Sensation grossly intact to light touch.    Labs/Imaging:  Recent Labs   Lab Test 07/07/23  0807 07/07/23  0628 07/06/23  0728 07/05/23  1125   WBC 7.3  --  7.3 9.4   HGB 8.9* 9.3* 10.1* 10.0*   *  --  145* 154     Recent Labs   Lab Test 06/08/22  1657 08/06/18  0718 05/14/18  0713   INR 1.11 1.04 1.07     Recent Labs   Lab Test 07/05/23  1125 10/19/22  1140 10/12/22  1140   CRPI 20.61* 8.78* 12.53*       A/P    1. S/p left BKA stump I&D and placement of an incisional wound vac  -Continue   mg daily for DVT prophylaxis.   -Continue IV antibiotics per primary.    -Mobilize with PT/OT   -NWB LLE with walker.  -Patient instructed to keep left knee extended with prolonged rest to avoid flexion contracture. Due to previous issues with healing, will hold off on any further compression wrap or a FloTech at this time.  -Continue current pain regimen.  -Encourage elevation.   -Dressings: I have discussed the patient's saturated Heather dressing with his surgeon, Dr. Navarro. At this time, we recommend a [facility] consult for placement of an incisional wound vac with changes every few days. Ideally, the patient would be able to qualify for a home wound vac and have some form of incisional wound vac in place for 3 weeks postoperatively. However, if this is unable to be completed, we will plan for a Heather dressing to be placed at discharge and this will remain in place until follow up. I was able to talk to the WOC RN today and they will meet with the patient. Per their report, an incisional wound vac can stay on up to 7 days. This is an okay interval from an orthopedic standpoint, though if there is drainage in the interim he may need more frequent changes. I will consult  regarding if the patient is a candidate for a home wound vac and home RN assist.   -Follow-up: 3 weeks post-op with Dr. Neil Romero    2. Disposition  -Anticipate d/c to TCU vs home with home cares when medically cleared and progressing in PT.    Anisha Thomas PA-C  [facility]

## 2023-07-07 NOTE — PROGRESS NOTES
Nephrology Progress Note  07/07/2023         Assessment & Recommendations:   1 chronic hyponatremia-combination of SIADH with clearly excessive fluid intake.  He insist that he will not be able to comply with 1.5 L p.o. fluid restriction.  His urine osmolality is not that high at 161 mmol/kg (although as high as 400s in the past), and normally should be able to control with fluid restriction alone.    -Serum sodium 123.  Corrects to 125-126 , accounted  for hyperglycemia  -Discussed risks and need for fluid restriction  -He was on salt tablet before and did not like it.  He wants to try urea  -Start urea 15 g twice daily    He is low risk for rapid overcorrection.  Can decrease frequency of sodium checks.  Check once in p.m.  Lisinopril can rarely be associated with SIADH.  His blood pressure is well controlled off it at the moment.  Just keep off lisinopril.  Can use amlodipine if blood pressure starts trending up.  Isotonic fluid like LR can potentially worsen his sodium .  Will discontinue.    2 left stump infection-on Zosyn.  Status post I&D.      Robb Umanzor MD  [facility] - Nephrology   [phone]      Interval History :   Seen / examined.   No acute issues overnight.  On LR 75 cc an hour.  Denies headache, nausea, visual changes.  Insist that he will not be able to comply with 1.5 L fluid restriction.     07/06/23 10:07 07/06/23 14:14 07/06/23 18:01 07/07/23 06:28 07/07/23 08:07   Sodium 122 (L) 125 (L) 124 (L) 120 (L) 123 (L)     Physical Exam:   I/O last 3 completed shifts:  In: 1220 [P.O.:920; I.V.:300]  Out: 1200 [Urine:1200]  /49 (BP Location: Left arm)   Pulse 83   Temp 98  F (36.7  C) (Oral)   Resp 20   Wt 79 kg (174 lb 2.6 oz)   SpO2 96%   BMI 25.72 kg/m      GENERAL APPEARANCE: alert and no distress  EYES:  no scleral icterus, pupils equal  PULM: b/l wheezing  CV: regular rhythm, normal rate, no rub     -JVP -     -edema - . Lt BKA   GI: soft, non tender,   NEURO: mentation  intact and speech normal, no asterixis       Labs:   All labs reviewed by me  Electrolytes/Renal - Recent Labs   Lab Test 07/07/23  0807 07/07/23  0628 07/07/23  0610 07/06/23  1801 01/31/23  0000 01/21/23  0813 01/20/23  1203 01/20/23  0607 01/19/23  1001 01/19/23  0759 06/08/22  2151 06/08/22  1657   * 120*  --  124*   < > 130*  130*   < > 124*  124*   < > 123*   < > 120*   POTASSIUM 4.2 5.1  --  5.2   < > 3.8  --  4.3  --  4.4   < > 4.6   CHLORIDE 91* 90*  --  91*   < > 93*  --  88*  --  86*   < > 85*   CO2 23 24  --  23   < > 25  --  25  --  25   < > 30   BUN 10.2 10.5  --  10.3   < > 33.4*  --  45.8*  --  10.6   < > 12   CR 1.00 0.93  --  1.05   < > 0.97  --  0.85  --  0.76   < > 0.75   * 127* 119* 137*   < > 236*  --  109*  --  94   < > 103*   FRANNY 8.4* 8.9  --  8.5*   < > 9.4  --  9.5  --  9.0   < > 9.2   MAG  --   --   --   --   --  1.8  --  1.8  --  1.8   < > 1.9   PHOS  --   --   --   --   --   --   --   --   --   --   --  3.4    < > = values in this interval not displayed.       CBC -   Recent Labs   Lab Test 07/07/23  0807 07/07/23  0628 07/06/23  0728 07/05/23  1125   WBC 7.3  --  7.3 9.4   HGB 8.9* 9.3* 10.1* 10.0*   *  --  145* 154       LFTs -   Recent Labs   Lab Test 07/05/23  1125 10/19/22  1140 10/12/22  1140 09/01/22  1245 06/09/22  0645 06/08/22  1657   ALKPHOS 120  --   --   --  78 83   BILITOTAL 0.3  --   --   --  0.9 1.0   ALT <5  --   --   --  12 10   AST 35 75* 68*   < > 28 30   PROTTOTAL 7.4  --   --   --  8.0 8.2   ALBUMIN 4.0  --   --   --  3.4 3.6    < > = values in this interval not displayed.           Current Medications:    acetaminophen  975 mg Oral Q8H     aspirin  325 mg Oral Daily     carbidopa-levodopa  2 tablet Oral TID     gabapentin  300 mg Oral TID     metoprolol succinate ER  50 mg Oral Daily     piperacillin-tazobactam  3.375 g Intravenous Q6H     polyethylene glycol  17 g Oral Daily     rosuvastatin  40 mg Oral QPM     senna-docusate  1 tablet Oral  BID    Or     senna-docusate  2 tablet Oral BID     sodium chloride (PF)  3 mL Intracatheter Q8H     sodium chloride (PF)  3 mL Intracatheter Q8H     sodium chloride (PF)  3 mL Intracatheter Q8H     sodium chloride  2 g Oral TID w/meals     sodium zirconium cyclosilicate  10 g Oral Once     umeclidinium  1 puff Inhalation Daily       lactated ringers 75 mL/hr at 07/07/23 0200     [Held by provider] sodium chloride 2% infusion 30 mL/hr at 07/06/23 1230     Robb Umanzor MD

## 2023-07-07 NOTE — PLAN OF CARE
OT order received and chart reviewed.  Per discussion with patient, no skilled OT needs identified.  Patient has DME as needed, and brother can assist at home.  Patient is familiar with pivot transfers to shower/toilet on RLE.  He politely declines OT services. Will defer discharge recommendations to PT/medical team. Will complete orders.  Please reorder if needs change.

## 2023-07-07 NOTE — PLAN OF CARE
Goal Outcome Evaluation:      Plan of Care Reviewed With: patient    Overall Patient Progress: improvingOverall Patient Progress: improving     Vitals:/49 (BP Location: Left arm)   Pulse 83   Temp 98  F (36.7  C) (Oral)   Resp 20   Wt 79 kg (174 lb 2.6 oz)   SpO2 96%   BMI 25.72 kg/m      Pain: reports incisional pain in left leg, gave oxy 1x.   Neuro: A&O, neuros intact.   Respiratory: LS coarse  GI/: urinal at bedside.   LDAs: Left BKA, WOC placed drain this shift, see notes.   Labs: Na improved.   Diet: 1500 ml FR  Activity: left leg NWB, pt refused to mobilize with PT.   Plan: continue to monitor per POC.

## 2023-07-08 NOTE — PROGRESS NOTES
Chart reviewed.   Sodium improved to 129   Cr 1   UOP 4 L       Okay to discharge from renal standpoint.   Discharge on 2 gm BID salt tab  PO fluid restriction of 1.5 L   Will sign off.     F/u labs with PCP in a week .       Robb Umanzor MD  [facility] - Nephrology   [phone]

## 2023-07-08 NOTE — PROGRESS NOTES
[facility]    Medicine Progress Note - Hospitalist Service  Date of Admission:  7/5/2023    Assessment & Plan    Rashaun Molina is a 63 year old male w/PMH COPD, HTN, SIADH, DLD, PVD, parkinsons, recent L BKA who presents from home with increased drainage and found to have post op site wound dehiscence and fluid collection    Surgical site dehiscence w/cellulitis and likely abscess  + pseudomonas, enterococcus wound culture  S/p L BKA   -Hx recurrent L ankle infections, hardware, non union with eventual elective L BKA on 5/31. Had been doing well post operatively with suture removed last Friday and presents with surgical site dehiscence starting yesterday with erythema, scan drainage.   -In ED WBC 9.4, afebrile. CRP 20, sed rate 27. CT with amputation stump soft tissue irregularity and likely ulceration/cellulitis with deep rim-enhancing fluid collection concerning for abscess, approximately 4 x 6.5 x 3.5 cm.  -Initiated on Ancef in ED prior to CT coming back, but transitioned to Zosyn for anaerobic coverage. MRSA screen ordered  -Cultures initially growing pseudomonas but 7/7 noted enterococcus as well  -S/p I&D w/ortho on 7/6 with extensive debridement including fascia and muscle but no bone.   -Currently has wound vac in place  -ID following, so far blood cx negative. Cont Zosyn for now, discharge antibiotic per ID pending culture data.    Acute on chronic hyponatremia w/hx SIADH  -Baseline Na around 127 but has been lower in past, was 117 on admission and believed to be related to alcohol use on July 4th  -Initially started on NS but had worsening Na so nephrology was consulted and changed to 2% NS and moved to [facility] [place]  -Nephrology following, fluids stopped,  -Patient declined to take urea and stopped.  -Cont 1.5L fluid restriction and serial labs  -Cont to hold home lisinopril,  -Discussed with nephrologist.    LATESHA  Hyperkalemia  -Likely combination of medications, dehydration, infection    -Continue to hold lisinopril, , NSAIDS  -s/p hyperkalemia protocol in ED and [facility] with eventual improvement  -Cr and potassium now normalized    Hx COPD, HTN, DLD, PVD, parkinsons  -home inhaler, statin, carvidopa/levidopa, gabapentin resumed  -some changes as above.    Clinically Significant Risk Factors         # Hyponatremia: Lowest Na = 120 mmol/L in last 2 days, will monitor as appropriate        # Thrombocytopenia: Lowest platelets = 126 in last 2 days, will monitor for bleeding   # Hypertension: Noted on problem list                    Diet: Fluid restriction 1500 ML FLUID  Advance Diet as Tolerated: Regular Diet Adult    DVT Prophylaxis: Pneumatic Compression Devices  Escalante Catheter: Not present  Lines: None     Cardiac Monitoring: None  Code Status: Full Code      Disposition Plan   Await further culture results, guidance on wound vac. Likely a couple more days       Kana Guaman MD  Hospitalist Service  [facility]  Securely message with [employer] (more info)  Text page via [employer] Paging/Directory   ______________________________________________________________________    Interval History   Patient seen and examined, assumed care today, no new issues, feels better, denied any pain, no nausea or vomiting, tolerating oral intake. Afebrile and tolerating antibiotics well    Physical Exam   Vital Signs: Temp: 97.7  F (36.5  C) Temp src: Oral BP: 122/50 Pulse: 71   Resp: 16 SpO2: 95 % O2 Device: None (Room air)    Weight: 174 lbs 6.14 oz  Constitutional: fatigued, somnolent and cooperative  Eyes: pupils equal, round and reactive to light and conjunctiva normal  ENT: normocepalic, without obvious abnormality, atramatic  Respiratory: no increased work of breathing, good air exchange and clear to auscultation  Cardiovascular: regular rate and rhythm and no murmur noted  GI: normal bowel sounds, soft and non-distended  Skin: L BKA surgical site covered with wound  vac  Neurologic: somnolent, moving all extremeties and follows commands      Recent Labs   Lab 07/08/23  0720 07/07/23  0807 07/07/23  0628 07/06/23 0728   WBC 8.1 7.3  --  7.3   HGB 9.9* 8.9* 9.3* 10.1*   HCT 29.7* 26.4*  --  29.2*   MCV 96 95  --  91   * 126*  --  145*     Recent Labs   Lab 07/08/23  0720 07/08/23 0619 07/07/23  1743 07/07/23 0807 07/07/23 0628   *  --  121* 123* 120*   POTASSIUM 4.5  --   --  4.2 5.1   CHLORIDE 94*  --   --  91* 90*   CO2 24  --   --  23 24   ANIONGAP 11  --   --  9 6*   GLC 88 94  --  220* 127*   BUN 9.8  --   --  10.2 10.5   CR 1.00  --   --  1.00 0.93   GFRESTIMATED 84  --   --  84 >90   FRANNY 9.1  --   --  8.4* 8.9     Recent Labs   Lab 07/08/23  0720 07/08/23  0619 07/07/23  0807 07/07/23  0628 07/07/23  0610   GLC 88 94 220* 127* 119*     Imaging results reviewed over the past 24 hrs:   No results found for this or any previous visit (from the past 24 hour(s)).

## 2023-07-08 NOTE — PLAN OF CARE
Goal Outcome Evaluation:      Plan of Care Reviewed With: patient    Overall Patient Progress: improvingOverall Patient Progress: improving     /50, HR 71, RR 16, T 97.7, pain between a 5-6 consistently (see MAR for meds), A&Ox4, 95% on RA. Has BKA, abcess in stump, has wound vac running. 1500 ml fluid restriction maintained. Complying with his sodium replacement. IV zosyn for infection, still awaiting culture results for which bacteria pt is infected with. Pt is A1 w/pivot to commode, pt remained in bed for shift. Uses urinal in bed, no incontinence on shift. Has tylenol and oxycodone for pain. Woc, ID, and nephrology following. Pt will discharge after bacteria identified and is under control.  Problem: Plan of Care - These are the overarching goals to be used throughout the patient stay.    Goal: Plan of Care Review  Description: The Plan of Care Review/Shift note should be completed every shift.  The Outcome Evaluation is a brief statement about your assessment that the patient is improving, declining, or no change.  This information will be displayed automatically on your shift note.  Outcome: Progressing  Flowsheets (Taken 7/8/2023 1245)  Plan of Care Reviewed With: patient  Overall Patient Progress: improving  Goal: Absence of Hospital-Acquired Illness or Injury  Intervention: Identify and Manage Fall Risk  Recent Flowsheet Documentation  Taken 7/8/2023 0959 by Daniel Enrique RN  Safety Promotion/Fall Prevention: safety round/check completed  Intervention: Prevent Skin Injury  Recent Flowsheet Documentation  Taken 7/8/2023 0959 by Daniel Enrique RN  Body Position: position changed independently  Intervention: Prevent and Manage VTE (Venous Thromboembolism) Risk  Recent Flowsheet Documentation  Taken 7/8/2023 0959 by Daniel Enrique RN  VTE Prevention/Management: SCDs (sequential compression devices) off  Goal: Optimal Comfort and Wellbeing  Intervention: Monitor Pain and Promote Comfort  Recent  Flowsheet Documentation  Taken 7/8/2023 0959 by Daniel Enrique, RN  Pain Management Interventions: medication (see MAR)

## 2023-07-08 NOTE — PLAN OF CARE
END OF SHIFT SUMMARY    1900 - 0700    POD #1 Pt alert and oriented x 4. Reports pain on LLE (BKA), PRN PO 5 mg Oxy given. Denies SOB, on RA. PIV saline locked. BG 94, wound vac draining adequately. Uses urinal. Refused UREA med. Fluid restriction maintained.

## 2023-07-08 NOTE — PROGRESS NOTES
Orthopedic Surgery  Rashaun Molina  07/08/2023     Admit Date:  7/5/2023    POD: 2 Days Post-Op   Procedure(s):  1.  Left below-knee amputation wound irrigation and debridement with sharp excisional debridement of nonviable skin, subcutaneous tissue, fascia, and muscle; with primary wound closure. 2.  Application of left below-knee amputation wound incisional wound VAC, 20 cm Heather dressing.     Patient resting comfortably in bed. Chronic neuropathy and denies changes in baseline sensation. No fever or chills. Denies nausea or vomiting. Tolerating oral intake. Incisional vac in place      Temp:  [97.7  F (36.5  C)-98.6  F (37  C)] 97.7  F (36.5  C)  Pulse:  [60-71] 71  Resp:  [16-18] 16  BP: (122-152)/(50-80) 122/50  SpO2:  [95 %-98 %] 95 %    Alert and oriented.   Left BKA stump. Incisional vac intact  No saturation or peeling of overlying tegaderm and dressing is otherwise intact.  No significant erythema.   Mild to moderate swelling of residual limb.  Thigh compartments are soft and nontender.  Proximal calf is soft and nontender.  Active flexion and extension of knee is intact.  Sensation grossly intact to light touch.    Labs/Imaging:  Recent Labs   Lab Test 07/08/23  0720 07/07/23  0807 07/07/23  0628 07/06/23  0728   WBC 8.1 7.3  --  7.3   HGB 9.9* 8.9* 9.3* 10.1*   * 126*  --  145*     Recent Labs   Lab Test 06/08/22  1657 08/06/18  0718 05/14/18  0713   INR 1.11 1.04 1.07     Recent Labs   Lab Test 07/05/23  1125 10/19/22  1140 10/12/22  1140   CRPI 20.61* 8.78* 12.53*       A/P    1. S/p left BKA stump I&D and placement of an incisional wound vac  -Continue  mg daily for DVT prophylaxis.   -Continue IV antibiotics per primary.    -Mobilize with PT/OT   -NWB LLE with walker.  -Continue current pain regimen.  -Encourage elevation.    -Follow-up: 3 weeks post-op with Dr. Neil Romero    2. Disposition  -Anticipate d/c to TCU vs home with home cares when medically cleared and progressing in  PT.      [facility]

## 2023-07-09 NOTE — PLAN OF CARE
PT: Received orders for physical therapy evaluation and treatment.  Patient w/PMH COPD, HTN, SIADH, DLD, PVD, parkinsons, recent L BK now admitted with surgical site wound dehiscence with cellulitis now POD #3 I&D with wound closure, application of wound vac.  Patient followed by physical therapy during previous admission for initial BKA surgery.  [facility]  Patient has wheelchair, grab bars, walk in shower with seat, main floor living.  Patient reports no concerns with mobility following previous discharge.  Patient anticipates no concerns with mobility at current discharge, declines inpatient physical therapy services.  Will complete order.

## 2023-07-09 NOTE — PLAN OF CARE
Goal Outcome Evaluation:      Plan of Care Reviewed With: patient    Overall Patient Progress: improvingOverall Patient Progress: improving     /43, HR 66, RR 18, T 98, A&Ox4, 96% on RA. Lung sounds expiratory wheezing. Has BKA with wound vac on stump. Oral sodium given for hyponatremia. A2 w pivot to commode, up numerous times on shift. Uses urinal at edge of bed.Has pain in residual limb and has scheduled tylenol and PRN oxycodone. No oxy needed on shift. 1500 ml fluid restriction in place. Waiting for further culture result before discharge planning.

## 2023-07-09 NOTE — PROGRESS NOTES
[facility]    Medicine Progress Note - Hospitalist Service  Date of Admission:  7/5/2023    Assessment & Plan    Rashaun Molina is a 63 year old male w/PMH COPD, HTN, SIADH, DLD, PVD, parkinsons, recent L BKA who presents from home with increased drainage and found to have post op site wound dehiscence and fluid collection    Surgical site dehiscence w/cellulitis and likely abscess  + pseudomonas, enterococcus wound culture positive  S/p L BKA   -Hx recurrent L ankle infections, hardware, non union with eventual elective L BKA on 5/31. Had been doing well post operatively with suture removed last Friday and presents with surgical site dehiscence starting yesterday with erythema, scan drainage.   -In ED WBC 9.4, afebrile. CRP 20, sed rate 27. CT with amputation stump soft tissue irregularity and likely ulceration/cellulitis with deep rim-enhancing fluid collection concerning for abscess, approximately 4 x 6.5 x 3.5 cm.  -Initiated on Ancef in ED prior to CT coming back, but transitioned to Zosyn for anaerobic coverage. MRSA screen ordered  -Cultures initially growing pseudomonas but 7/7 noted enterococcus as well  -S/p I&D w/ortho on 7/6 with extensive debridement including fascia and muscle but no bone.   -Currently has wound vac in place  -ID following, blood cultures remain negative.  -Wound culture came back positive for Pseudomonas (pansensitive except for Bactrim)  and Enterococcus faecium resistant to ampicillin.   Cont Zosyn for now, discharge antibiotic per ID recommendations as culture data is available at this time as above.    Acute on chronic hyponatremia w/hx SIADH  -Baseline Na around 127 but has been lower in past, was 117 on admission and believed to be related to alcohol use on July 4th  -Initially started on NS but had worsening Na so nephrology was consulted and changed to 2% NS and [place] [facility] [place]  -Nephrology has been following, no sign off, fluids stopped,  -Patient declined  to take urea and it was stopped.  -Cont 1.5L fluid restriction and serial labs  -Cont to hold home lisinopril,  -Discussed with nephrologist.    LATESHA  Hyperkalemia, resolved  -Likely combination of medications, dehydration, infection   -Continue to hold lisinopril, , NSAIDS  -s/p hyperkalemia protocol in ED and [facility] with eventual improvement  -Cr and potassium now normalized    Hx COPD, HTN, DLD, PVD, parkinsons  -home inhaler, statin, carvidopa/levidopa, gabapentin resumed  -some changes as above.    Clinically Significant Risk Factors         # Hyponatremia: Lowest Na = 121 mmol/L in last 2 days, will monitor as appropriate        # Thrombocytopenia: Lowest platelets = 119 in last 2 days, will monitor for bleeding   # Hypertension: Noted on problem list                    Diet: Fluid restriction 1500 ML FLUID  Advance Diet as Tolerated: Regular Diet Adult    DVT Prophylaxis: Pneumatic Compression Devices  Escalante Catheter: Not present  Lines: None     Cardiac Monitoring: None  Code Status: Full Code      Disposition Plan   Await further culture results, guidance on wound vac. Likely a couple more days       Kana Guaman MD  Hospitalist Service  [facility]  Securely message with [employer] (more info)  Text page via [employer] Paging/Directory   ______________________________________________________________________    Interval History   Patient seen and examined, no new overnight events, feels about the same, pain is fairly controlled.  Tolerating oral intake, no nausea or vomiting. Afebrile and tolerating antibiotics well.     Physical Exam   Vital Signs: Temp: 98  F (36.7  C) Temp src: Oral BP: 121/43 Pulse: 66   Resp: 18 SpO2: 96 % O2 Device: None (Room air)    Weight: 174 lbs 6.14 oz  Constitutional: fatigued, somnolent and cooperative  Eyes: pupils equal, round and reactive to light and conjunctiva normal  ENT: normocepalic, without obvious abnormality, atramatic  Respiratory: no  increased work of breathing, good air exchange and clear to auscultation  Cardiovascular: regular rate and rhythm and no murmur noted  GI: normal bowel sounds, soft and non-distended  Skin: L BKA surgical site covered with wound vac  Neurologic: somnolent, moving all extremeties and follows commands      Recent Labs   Lab 07/09/23  0814 07/08/23  0720 07/07/23  0807   WBC 8.4 8.1 7.3   HGB 9.0* 9.9* 8.9*   HCT 28.4* 29.7* 26.4*   MCV 99 96 95   * 140* 126*     Recent Labs   Lab 07/09/23  0814 07/08/23  0720 07/08/23  0619 07/07/23  1743 07/07/23  0807   * 129*  --  121* 123*   POTASSIUM 3.9 4.5  --   --  4.2   CHLORIDE 98 94*  --   --  91*   CO2 24 24  --   --  23   ANIONGAP 10 11  --   --  9   * 88 94  --  220*   BUN 10.2 9.8  --   --  10.2   CR 0.97 1.00  --   --  1.00   GFRESTIMATED 87 84  --   --  84   FRANNY 8.4* 9.1  --   --  8.4*     Recent Labs   Lab 07/09/23  0814 07/08/23  0720 07/08/23  0619 07/07/23  0807 07/07/23  0628   * 88 94 220* 127*     Imaging results reviewed over the past 24 hrs:   No results found for this or any previous visit (from the past 24 hour(s)).

## 2023-07-09 NOTE — PLAN OF CARE
VSS, afeb., LS are dim and coarse, 95% on RA. Pt has incisional vac to L BKA wound, wound is CDI.  Pt cont on IV Zosyn. A and O x4. Pt denies pain. Good appetite, ate 100%for dinner. Plan to discharge to TCU or home when mobility improves per PT.

## 2023-07-09 NOTE — PROGRESS NOTES
Orthopedic Surgery  Rashaun Molina  07/09/2023     Admit Date:  7/5/2023    POD: 3 Days Post-Op   Procedure(s):  1.  Left below-knee amputation wound irrigation and debridement with sharp excisional debridement of nonviable skin, subcutaneous tissue, fascia, and muscle; with primary wound closure. 2.  Application of left below-knee amputation wound incisional wound VAC, 20 cm Heather dressing.     Patient resting comfortably in bed. Chronic neuropathy and denies changes in baseline sensation. No fever or chills. Denies nausea or vomiting. Tolerating oral intake. Incisional vac in place and functioning. No new complaints. Overall, states he is feeling well today.       Temp:  [97.4  F (36.3  C)-98.9  F (37.2  C)] 97.4  F (36.3  C)  Pulse:  [61-66] 66  Resp:  [16-20] 20  BP: (138-164)/(54-79) 146/54  SpO2:  [94 %-100 %] 100 %    Alert and oriented.   Left BKA stump. Incisional vac intact  No saturation or peeling of overlying tegaderm and dressing is otherwise intact.  No significant erythema.   Mild to moderate swelling of residual limb.  Thigh compartments are soft and nontender.  Proximal calf is soft and nontender.  Active flexion and extension of knee is intact.  Sensation grossly intact to light touch.    Labs/Imaging:  Recent Labs   Lab Test 07/09/23  0814 07/08/23  0720 07/07/23  0807   WBC 8.4 8.1 7.3   HGB 9.0* 9.9* 8.9*   * 140* 126*     Recent Labs   Lab Test 06/08/22  1657 08/06/18  0718 05/14/18  0713   INR 1.11 1.04 1.07     Recent Labs   Lab Test 07/05/23  1125 10/19/22  1140 10/12/22  1140   CRPI 20.61* 8.78* 12.53*       A/P    1. S/p left BKA stump I&D and placement of an incisional wound vac  -Continue  mg daily for DVT prophylaxis.   -Continue IV antibiotics per primary.    -Mobilize with PT/OT   -NWB LLE with walker.  -Continue current pain regimen.  -Encourage elevation.    -Follow-up: 3 weeks post-op with Dr. Neil Romero    2. Disposition  -Anticipate d/c to TCU vs home with home  cares when medically cleared and progressing in PT.      [facility]

## 2023-07-09 NOTE — CONSULTS
Care Management Initial Consult    General Information  Assessment completed with: Patient, patient  Type of CM/SW Visit: Initial Assessment    Primary Care Provider verified and updated as needed: Yes   Readmission within the last 30 days:        Reason for Consult: discharge planning  Advance Care Planning: Advance Care Planning Reviewed: present on chart          Communication Assessment  Patient's communication style: spoken language (English or Bilingual)    Hearing Difficulty or Deaf: no   Wear Glasses or Blind: yes    Cognitive  Cognitive/Neuro/Behavioral: WDL  Level of Consciousness: alert                   Living Environment:   People in home: sibling(s)     Current living Arrangements: house      Able to return to prior arrangements: yes  Living Arrangement Comments: Has been living with brother and will continue with this arrangement    Family/Social Support:  Care provided by: self, other (see comments) (sibling)  Provides care for: no one  Marital Status: Single  Sibling(s)          Description of Support System: Supportive, Involved         Current Resources:   Patient receiving home care services: Yes     Community Resources: None  Equipment currently used at home: wheelchair, manual  Supplies currently used at home: Wound Care Supplies    Employment/Financial:  Employment Status: retired        Financial Concerns:             Does the patient's insurance plan have a 3 day qualifying hospital stay waiver?  No    Lifestyle & Psychosocial Needs:  Social Determinants of Health     Tobacco Use: Medium Risk (7/7/2023)    Patient History      Smoking Tobacco Use: Former      Smokeless Tobacco Use: Never      Passive Exposure: Past   Alcohol Use: Not on file   Financial Resource Strain: Not on file   Food Insecurity: Not on file   Transportation Needs: Not on file   Physical Activity: Not on file   Stress: Not on file   Social Connections: Not on file   Intimate Partner Violence: Not on file   Depression:  Not at risk (1/18/2023)    PHQ-2      PHQ-2 Score: 0   Housing Stability: Not on file       Functional Status:  Prior to admission patient needed assistance:   Dependent ADLs:: Wheelchair-independent  Dependent IADLs:: Independent       Mental Health Status:  Mental Health Status: No Current Concerns       Chemical Dependency Status:  Chemical Dependency Status: No Current Concerns             Values/Beliefs:  Spiritual, Cultural Beliefs, Alevism Practices, Values that affect care:                 Additional Information:  Sw following for discharge planning.    Sw met with pt to discuss discharge planning. Pt explained that he had communicated with PT and did not want to discharge to TCU and feels he can discharge home with the support he currently receives from his brother. Pt has been living with brother who helps with dressing and transportation and will return to this arrangement. He is able to bath himself and performs most ADLS/IADLS independently or with minimal support/assistance from his brother. Pt has used [facility] for nursing and anticipates that he will need support for his wound vac additionally upon discharge. Pt shared that his brother will be able to provide transportation at time of discharge.    Social work will continue to follow and assist with discharge planning as needed.    ANGELA Werner, [facility]  Inpatient Care Coordination  [facility]  [phone]      ANGELA Werner

## 2023-07-09 NOTE — PLAN OF CARE
"Goal Outcome Evaluation:      Plan of Care Reviewed With: patient    Overall Patient Progress: improvingOverall Patient Progress: improving    Outcome Evaluation: .      Orientation: x4  VS: Temp: 98.7  F (37.1  C) Temp src: Oral BP: (!) 164/79 Pulse: 61   Resp: 16 SpO2: 95 % O2 Device: None (Room air)     Resp: RA  Pain: Foot Pain, Given PRN Tylenol @ 0630.  Tele: NA  Activity: A2 GB/Walker  Diet: Regular, 1500 mL fluid  : Urgency, bedside urinal  GI: Incontinent x1, didn't quite make it to commode.  Skin: Left BKA, see wound care orders if leaking.   LDAs: Dual Right PIV, Patent/Saline Locked.  Labs/Protocols: Pending Cultures.    Plan: Awaiting further culture results, likely a few days until discharge.        Problem: Plan of Care - These are the overarching goals to be used throughout the patient stay.    Goal: Plan of Care Review  Description: The Plan of Care Review/Shift note should be completed every shift.  The Outcome Evaluation is a brief statement about your assessment that the patient is improving, declining, or no change.  This information will be displayed automatically on your shift note.  Outcome: Progressing  Flowsheets (Taken 7/9/2023 0509)  Outcome Evaluation: .  Plan of Care Reviewed With: patient  Overall Patient Progress: improving  Goal: Patient-Specific Goal (Individualized)  Description: You can add care plan individualizations to a care plan. Examples of Individualization might be:  \"Parent requests to be called daily at 9am for status\", \"I have a hard time hearing out of my right ear\", or \"Do not touch me to wake me up as it startles me\".  Outcome: Progressing  Goal: Absence of Hospital-Acquired Illness or Injury  Outcome: Progressing  Intervention: Identify and Manage Fall Risk  Recent Flowsheet Documentation  Taken 7/9/2023 0034 by Nilton Ramos RN  Safety Promotion/Fall Prevention:    safety round/check completed    room near nurse's station  Intervention: Prevent Skin " Injury  Recent Flowsheet Documentation  Taken 7/9/2023 0034 by Nilton Ramos, RN  Body Position: position changed independently  Goal: Optimal Comfort and Wellbeing  Outcome: Progressing  Goal: Readiness for Transition of Care  Outcome: Progressing     Problem: Skin or Soft Tissue Infection  Goal: Absence of Infection Signs and Symptoms  Outcome: Progressing     Problem: Electrolyte Imbalance  Goal: Electrolyte Imbalance: Plan of Care  Outcome: Progressing

## 2023-07-10 NOTE — PROGRESS NOTES
Orthopedic Surgery  Rashaun Molina  07/10/2023     Admit Date:  7/5/2023    POD: 4 Days Post-Op   Procedure(s):  1.  Left below-knee amputation wound irrigation and debridement with sharp excisional debridement of nonviable skin, subcutaneous tissue, fascia, and muscle; with primary wound closure. 2.  Application of left below-knee amputation wound incisional wound VAC, 20 cm Heather dressing.     Patient resting comfortably in bed. Chronic neuropathy and denies changes in baseline sensation. No fever or chills. Denies nausea or vomiting. Tolerating oral intake.  Overall, states he is feeling well today.       Temp:  [97.7  F (36.5  C)-98.1  F (36.7  C)] 97.7  F (36.5  C)  Pulse:  [60-78] 78  Resp:  [16-18] 16  BP: (121-184)/(43-82) 166/66  SpO2:  [96 %-98 %] 97 %    Alert and oriented.   Left BKA stump. Incisional vac intact and functioning with output.   No saturation or peeling of overlying tegaderm and dressing is otherwise intact.  No significant erythema.   Mild to moderate swelling of residual limb.  Able to fully extend left knee.  No effusion.   Thigh compartments are soft and nontender.  Proximal calf is soft and nontender.  Active flexion and extension of knee is intact.  Sensation grossly intact to light touch.    Labs/Imaging:  Recent Labs   Lab Test 07/10/23  0716 07/09/23  0814 07/08/23  0720   WBC 9.4 8.4 8.1   HGB 9.4* 9.0* 9.9*   * 119* 140*     Recent Labs   Lab Test 06/08/22  1657 08/06/18  0718 05/14/18  0713   INR 1.11 1.04 1.07     Recent Labs   Lab Test 07/05/23  1125 10/19/22  1140 10/12/22  1140   CRPI 20.61* 8.78* 12.53*       A/P    1. S/p left BKA stump I&D and placement of an incisional wound vac  -Continue  mg daily for DVT prophylaxis.   -Continue IV antibiotics per primary.    -Mobilize with PT/OT   -NWB LLE with walker.  -Continue current pain regimen.  -Encourage elevation.    -Vac changes q 7 days. (sooner if saturated)   -Follow-up: 3 weeks post-op with Dr. Trejo  Nick    2. Disposition  -Anticipate d/c home with home cares when medically cleared and progressing in PT.      [facility]

## 2023-07-10 NOTE — PLAN OF CARE
Goal Outcome Evaluation:      Plan of Care Reviewed With: patient    Overall Patient Progress: improving     Temp: 98  F (36.7  C) Temp src: Oral BP: (!) 184/82 Pulse: 60   Resp: 16 SpO2: 96 % O2 Device: None (Room air)       Pt A/O x4, VSS stable except elevated BP. Denies pain, c/p, dizziness, nausea. LS coarse w/ expiratory wheezes, on RA. 2 Right PIVs saline locked. Pt has Left BKA w/ Wound Vac, on scheduled Tylenol for residual pain. On IV Zosyn Q6H. Pt takes PO Sodium for hyponatremia. Pt up 2A pivot to bedside commode, uses urinal independently. Pt refuses bed alarm, but calls appropriately. 1500 FR maintained. Will continue POC.

## 2023-07-10 NOTE — PROGRESS NOTES
[facility]    Medicine Progress Note - Hospitalist Service  Date of Admission:  7/5/2023    Assessment & Plan    Rashaun Molina is a 63 year old male w/PMH COPD, HTN, SIADH, DLD, PVD, parkinsons, recent L BKA who presents from home with increased drainage and found to have post op site wound dehiscence and fluid collection    Surgical site dehiscence w/cellulitis and likely abscess  + pseudomonas, enterococcus wound culture positive  S/p L BKA   -Hx recurrent L ankle infections, hardware, non union with eventual elective L BKA on 5/31. Had been doing well post operatively with suture removed last Friday and presents with surgical site dehiscence starting yesterday with erythema, scan drainage.   -In ED WBC 9.4, afebrile. CRP 20, sed rate 27. CT with amputation stump soft tissue irregularity and likely ulceration/cellulitis with deep rim-enhancing fluid collection concerning for abscess, approximately 4 x 6.5 x 3.5 cm.  -Initiated on Ancef in ED prior to CT coming back, but transitioned to Zosyn for anaerobic coverage. MRSA screen ordered  -Cultures initially growing pseudomonas but 7/7 noted enterococcus as well  -S/p I&D w/ortho on 7/6 with extensive debridement including fascia and muscle but no bone.   -Currently has wound vac in place  -ID following, blood cultures remain negative.  -Wound culture came back positive for Pseudomonas (pansensitive except for Bactrim)  and Enterococcus faecium resistant to ampicillin.  - Cont Zosyn for now, discharge antibiotic per ID recommendations as culture data is available at this time as above.  -    Acute on chronic hyponatremia w/hx SIADH  -Baseline Na around 127 but has been lower in past, was 117 on admission and believed to be related to alcohol use on July 4th  -Initially started on NS but had worsening Na so nephrology was consulted and changed to 2% NS and moved to [facility]  -Nephrology has been following, no sign off, fluids stopped,  -Patient  declined to take urea and it was stopped.  -Increased fluid restriction to 2 L/day 7/10  -Cont to hold home lisinopril,  -Patient is asking to increase his fluid intake.    LATESHA  Hyperkalemia, resolved  -Likely combination of medications, dehydration, infection   -Continue to hold lisinopril, , NSAIDS  -s/p hyperkalemia protocol in ED and [facility] with eventual improvement  -Cr and potassium now normalized    Hx COPD, HTN, DLD, PVD, parkinsons  -home inhaler, statin, carvidopa/levidopa, gabapentin resumed  -some changes as above.    Clinically Significant Risk Factors                # Thrombocytopenia: Lowest platelets = 119 in last 2 days, will monitor for bleeding   # Hypertension: Noted on problem list                    Diet: Advance Diet as Tolerated: Regular Diet Adult  Fluid restriction 2000 ML FLUID    DVT Prophylaxis: Pneumatic Compression Devices  Escalante Catheter: Not present  Lines: None     Cardiac Monitoring: None  Code Status: Full Code      Disposition Plan   Tomorrow likely to home with home services on antibiotic, pending Ortho and ID recommendation.  Patient states that he wants to be discharged home and does not want to go to TCU.  Patient has wheelchair and walker at home.       Kana Guaman MD  Hospitalist Service  [facility]  Securely message with [employer] (more info)  Text page via [employer] Paging/Directory   ______________________________________________________________________    Interval History   Patient seen and examined, somewhat anxious, asking for more fluid intake, increased ease fluid restriction to 2000 mL/day, no new overnight events, pain is fairly controlled.  Tolerating oral intake, no nausea or vomiting. Afebrile and tolerating antibiotics well.     Physical Exam   Vital Signs: Temp: 97.7  F (36.5  C) Temp src: Oral BP: (!) 166/66 Pulse: 78   Resp: 16 SpO2: 97 % O2 Device: None (Room air)    Weight: 175 lbs 14.83  oz  Constitutional: fatigued, somnolent and cooperative  Eyes: pupils equal, round and reactive to light and conjunctiva normal  ENT: normocepalic, without obvious abnormality, atramatic  Respiratory: no increased work of breathing, good air exchange and clear to auscultation  Cardiovascular: regular rate and rhythm and no murmur noted  GI: normal bowel sounds, soft and non-distended  Skin: L BKA surgical site covered with wound vac  Neurologic: somnolent, moving all extremeties and follows commands      Recent Labs   Lab 07/10/23  0716 07/09/23  0814 07/08/23  0720   WBC 9.4 8.4 8.1   HGB 9.4* 9.0* 9.9*   HCT 29.4* 28.4* 29.7*   MCV 99 99 96   * 119* 140*     Recent Labs   Lab 07/10/23  0716 07/09/23  0814 07/08/23  0720   * 132* 129*   POTASSIUM 4.3 3.9 4.5   CHLORIDE 98 98 94*   CO2 26 24 24   ANIONGAP 7 10 11   GLC 94 140* 88   BUN 7.7* 10.2 9.8   CR 0.84 0.97 1.00   GFRESTIMATED >90 87 84   FRANNY 8.8 8.4* 9.1     Recent Labs   Lab 07/10/23  0716 07/09/23  0814 07/08/23  0720 07/08/23  0619 07/07/23  0807   GLC 94 140* 88 94 220*     Imaging results reviewed over the past 24 hrs:   No results found for this or any previous visit (from the past 24 hour(s)).

## 2023-07-10 NOTE — PROGRESS NOTES
"SPIRITUAL HEALTH SERVICES Progress Note  MS 3    Visited pt. Rashaun Molina per length of stay. His brother, Pastor, was also present.  Rashaun said he is \"all good\" and declined a visit.    Plan: No needs at this time. Rashaun was notified that [facility] remains available upon request.    ISIAH Mathew.   Intern    [facility] routine referrals *[phone]   [facility] available 24/7 for emergent requests/referrals, either by paging the on-call  or by entering an ASAP/STAT consult in Epic (this will also page the on-call ).    "

## 2023-07-10 NOTE — PROGRESS NOTES
[facility]  Infectious Disease Progress Note          Assessment and Plan:   Assessment & Plan  Rashaun Molina is a 63 year old who was admitted on 7/5/2023.      Impression: 1 63-year-old male with acute early left BKA stump infection, abscess but not clear bone involvement, status post I&D wound has been closed, no major clinical sepsis, cultures growing Pseudomonas fluorescens  2 prior, chronic left ankle infection, nonunion, and Pseudomonas aeruginosa in that culture extensive antibiotics but failed and required a BKA  3 Parkinson's disease  4 COPD     REC 1 continue Zosyn while here, in addition  Pseudomonas, has Enterococcus which for now not going to treat unlikely to be a true pathogen here  2 OK to p.o. Cipro 500 twice daily for 2 weeks and disposition anytime, appears to have a wound VAC on will need that arranged as well if going with that        Interval History:   no new complaints and doing well; no cp, sob, n/v/d, or abd pain.  Culture Pseudomonas and Enterococcus VCM, doubt Enterococcus significant here, doing well wound okay although has a wound VAC on              Medications:       aspirin  325 mg Oral Daily     carbidopa-levodopa  2 tablet Oral TID     gabapentin  300 mg Oral TID     metoprolol succinate ER  50 mg Oral Daily     piperacillin-tazobactam  3.375 g Intravenous Q6H     polyethylene glycol  17 g Oral Daily     rosuvastatin  40 mg Oral QPM     senna-docusate  1 tablet Oral BID    Or     senna-docusate  2 tablet Oral BID     sodium chloride (PF)  3 mL Intracatheter Q8H     sodium chloride (PF)  3 mL Intracatheter Q8H     sodium chloride  2 g Oral TID w/meals     sodium zirconium cyclosilicate  10 g Oral Once     umeclidinium  1 puff Inhalation Daily                  Physical Exam:   Blood pressure (!) 166/66, pulse 78, temperature 97.7  F (36.5  C), temperature source Oral, resp. rate 16, weight 79.8 kg (175 lb 14.8 oz), SpO2 97 %.  Wt Readings from Last 2  Encounters:   07/10/23 79.8 kg (175 lb 14.8 oz)   05/31/23 85.6 kg (188 lb 12.8 oz)     Vital Signs with Ranges  Temp:  [97.7  F (36.5  C)-98.1  F (36.7  C)] 97.7  F (36.5  C)  Pulse:  [60-78] 78  Resp:  [16-18] 16  BP: (153-184)/(62-82) 166/66  SpO2:  [96 %-98 %] 97 %    Constitutional: Awake, alert, cooperative, no apparent distress   Lungs: Clear to auscultation bilaterally, no crackles or wheezing   Cardiovascular: Regular rate and rhythm, normal S1 and S2, and no murmur noted   Abdomen: Normal bowel sounds, soft, non-distended, non-tender   Skin: No rashes, no cyanosis, no edema   Other: Wd looks OK          Data:   All microbiology laboratory data reviewed.  Recent Labs   Lab Test 07/10/23  0716 07/09/23  0814 07/08/23  0720   WBC 9.4 8.4 8.1   HGB 9.4* 9.0* 9.9*   HCT 29.4* 28.4* 29.7*   MCV 99 99 96   * 119* 140*     Recent Labs   Lab Test 07/10/23  0716 07/09/23  0814 07/08/23  0720   CR 0.84 0.97 1.00     Recent Labs   Lab Test 07/05/23  1219   SED 27*     No lab results found.    Invalid input(s): MELIA

## 2023-07-10 NOTE — PLAN OF CARE
Goal Outcome Evaluation:    Plan of Care Reviewed With: patient    Overall Patient Progress: improving    A&Ox4. Pain in residual limb, gave Tylenol. A2 pivot to commode. LS: exp wheezes. No tele. L BKA dressing CDI, wound vac in place. ID following. Possible discharge tomorrow.

## 2023-07-10 NOTE — PLAN OF CARE
END OF SHIFT SUMMARY    2300 - 0700    BP (!) 158/68 (BP Location: Left arm, Patient Position: Sitting, Cuff Size: Adult Regular)   Pulse 60   Temp 98  F (36.7  C) (Oral)   Resp 16   Wt 79.1 kg (174 lb 6.1 oz)   SpO2 96%   BMI 25.75 kg/m       Pt alert and oriented x 4. Denies pain. On RA. BP elevated. PIV x 2 saline locked. Voiding adequately, uses urinal. Had a BM this shift. Up with 2 assist, and gait belt. Wound vac draining adequately. Fluid restriction maintained. Refused bed alarm.

## 2023-07-11 NOTE — PROGRESS NOTES
Care Management Discharge Note    Discharge Date: 07/11/2023       Discharge Disposition: Home Care    Discharge Services:      Discharge DME: Wound Vac    Discharge Transportation: family or friend will provide    Private pay costs discussed: Not applicable    Does the patient's insurance plan have a 3 day qualifying hospital stay waiver?  No    PAS Confirmation Code:    Patient/family educated on Medicare website which has current facility and service quality ratings: no    Education Provided on the Discharge Plan: Yes  Persons Notified of Discharge Plans: patent  Patient/Family in Agreement with the Plan: yes    Handoff Referral Completed: Yes    Additional Information:  Contacted by SW to confirm wound vac discharge planning. Initial wound vac that was placed was a SORIN vac. This was changed to a regular incisional wound vac on 7/7 by [facility].  Met with patient at the bedside to confirm the type of wound vac that is in place and informed him that we would need to get approval for home vac. He understands that this may not occur today but CM will attempt to expedite.  [facility] to let her know we are working on a vac authorization and patient has discharge orders in place. Prescription completed by ortho PA. Verified documentation needed and faxed to [employer] representative at [phone]. She has received and has called her team to try to expedite the process.   Wound vac authorization process started   Order # [id number] with [facility] [employer]    Awaiting authorization.    Avril Mcdonough RN BSN OCN  Care Coordinator  [facility]  [phone]

## 2023-07-11 NOTE — PROGRESS NOTES
[facility]    Medicine Progress Note - Hospitalist Service  Date of Admission:  7/5/2023    Assessment & Plan    Rashaun Molina is a 63 year old male w/PMH COPD, HTN, SIADH, DLD, PVD, parkinsons, recent L BKA who presents from home with increased drainage and found to have post op site wound dehiscence and fluid collection    Surgical site dehiscence w/cellulitis and likely abscess  + pseudomonas, enterococcus wound culture positive  S/p L BKA   -Hx recurrent L ankle infections, hardware, non union with eventual elective L BKA on 5/31. Had been doing well post operatively with suture removed last Friday and presents with surgical site dehiscence starting yesterday with erythema, scan drainage.   -In ED WBC 9.4, afebrile. CRP 20, sed rate 27. CT with amputation stump soft tissue irregularity and likely ulceration/cellulitis with deep rim-enhancing fluid collection concerning for abscess, approximately 4 x 6.5 x 3.5 cm.  -Initiated on Ancef in ED prior to CT coming back, but transitioned to Zosyn for anaerobic coverage. MRSA screen ordered  -Cultures initially growing pseudomonas but 7/7 noted enterococcus as well  -S/p I&D w/ortho on 7/6 with extensive debridement including fascia and muscle but no bone.   -Currently has wound vac in place  -ID following, blood cultures remain negative.  -Wound culture came back positive for Pseudomonas (pansensitive except for Bactrim)  and Enterococcus faecium resistant to ampicillin.  - Cont Zosyn for now, discharge antibiotic per ID recommendations as culture data is available at this time as above.  -    Acute on chronic hyponatremia w/hx SIADH  -Baseline Na around 127 but has been lower in past, was 117 on admission and believed to be related to alcohol use on July 4th  -Initially started on NS but had worsening Na so nephrology was consulted and changed to 2% NS and moved to [facility]  -Nephrology has been following, no sign off, fluids stopped,  -Patient  declined to take urea and it was stopped.  -Increased fluid restriction to 2 L/day 7/10  -Cont to hold home lisinopril,  -Patient is asking to increase his fluid intake.    LATESHA  Hyperkalemia, resolved  -Likely combination of medications, dehydration, infection   -Continue to hold lisinopril, , NSAIDS  -s/p hyperkalemia protocol in ED and [facility] with eventual improvement  -Cr and potassium now normalized    Hx COPD, HTN, DLD, PVD, parkinsons  -home inhaler, statin, carvidopa/levidopa, gabapentin resumed  -some changes as above.    Clinically Significant Risk Factors                # Thrombocytopenia: Lowest platelets = 125 in last 2 days, will monitor for bleeding   # Hypertension: Noted on problem list                    Diet: Advance Diet as Tolerated: Regular Diet Adult  Fluid restriction 2000 ML FLUID  Diet    DVT Prophylaxis: Pneumatic Compression Devices  Escalante Catheter: Not present  Lines: None     Cardiac Monitoring: None  Code Status: Full Code      Disposition Plan   Psych instructions done, patient can be discharged when wound VAC is arranged for him at home.  Medications reviewed and reconciled for discharge.    Patient is being discharged home as he does not want to go to the TCU and okay with home services upon discharge.  Patient has wheelchair and walker at home.       Kana Guaman MD  Hospitalist Service  [facility]  Securely message with [employer] (more info)  Text page via [employer] Paging/Directory   ______________________________________________________________________    Interval History   Patient seen and examined, no new overnight issues, feels better, discussed reasons to discharge plan and is in agreement, sodium remained stable on increased fluid restriction to 2000 mL/day, pain is fairly controlled.  Tolerating oral intake, no nausea or vomiting. Afebrile and tolerating antibiotics well.     Physical Exam   Vital Signs: Temp: 98  F (36.7  C) Temp src: Oral  BP: 122/51 Pulse: 76   Resp: 20 SpO2: 96 % O2 Device: None (Room air)    Weight: 175 lbs 14.83 oz  Constitutional: Alert and oriented, more cooperative and comfortable.  Eyes: pupils equal, round and reactive to light and conjunctiva normal  ENT: normocepalic, without obvious abnormality, atramatic  Respiratory: Air entry bilaterally, no wheezing crackles or rales.  Good air exchange and clear to auscultation  Cardiovascular: regular rate and rhythm and no murmur noted  GI: normal bowel sounds, soft and non-distended  Skin: L BKA surgical site covered with wound vac  Neurologic: somnolent, moving all extremeties and follows commands      Recent Labs   Lab 07/11/23  0721 07/10/23  0716 07/09/23  0814   WBC 9.9 9.4 8.4   HGB 9.6* 9.4* 9.0*   HCT 29.9* 29.4* 28.4*   MCV 99 99 99   * 125* 119*     Recent Labs   Lab 07/11/23  0721 07/10/23  0716 07/09/23  0814   * 131* 132*   POTASSIUM 4.2 4.3 3.9   CHLORIDE 96* 98 98   CO2 24 26 24   ANIONGAP 11 7 10   GLC 97 94 140*   BUN 7.3* 7.7* 10.2   CR 0.95 0.84 0.97   GFRESTIMATED 89 >90 87   FRANNY 8.9 8.8 8.4*     Recent Labs   Lab 07/11/23  0721 07/10/23  0716 07/09/23  0814 07/08/23  0720 07/08/23  0619   GLC 97 94 140* 88 94     Imaging results reviewed over the past 24 hrs:   No results found for this or any previous visit (from the past 24 hour(s)).

## 2023-07-11 NOTE — PROGRESS NOTES
Orthopedic Surgery  Rashaun Molina  07/11/2023     Admit Date:  7/5/2023    POD: 5 Days Post-Op   Procedure(s):  1.  Left below-knee amputation wound irrigation and debridement with sharp excisional debridement of nonviable skin, subcutaneous tissue, fascia, and muscle; with primary wound closure. 2.  Application of left below-knee amputation wound incisional wound VAC, 20 cm Heather dressing.     Patient resting comfortably in bed. Chronic neuropathy and denies changes in baseline sensation.  Tolerating oral intake.  Overall, states he is feeling well today and is ready to discharge.       Temp:  [98  F (36.7  C)-98.5  F (36.9  C)] 98  F (36.7  C)  Pulse:  [57-79] 76  Resp:  [16-20] 16  BP: (122-177)/(51-80) 122/51  SpO2:  [94 %-98 %] 96 %    Alert and oriented.   Left BKA stump. Incisional vac intact and functioning with output.   No saturation or peeling of overlying tegaderm and dressing is otherwise intact.  No significant erythema.   Mild to moderate swelling of residual limb.  Able to fully extend left knee.  No effusion.   Thigh compartments are soft and nontender.  Active flexion and extension of knee is intact.  Sensation grossly intact to light touch.    Labs/Imaging:  Recent Labs   Lab Test 07/11/23  0721 07/10/23  0716 07/09/23  0814   WBC 9.9 9.4 8.4   HGB 9.6* 9.4* 9.0*   * 125* 119*     Recent Labs   Lab Test 06/08/22  1657 08/06/18  0718 05/14/18  0713   INR 1.11 1.04 1.07     Recent Labs   Lab Test 07/05/23  1125 10/19/22  1140 10/12/22  1140   CRPI 20.61* 8.78* 12.53*       A/P    1. S/p left BKA stump I&D and placement of an incisional wound vac  -Continue  mg daily for DVT prophylaxis.   -Continue IV antibiotics per primary.    -Mobilize with PT/OT   -NWB LLE with walker.  -Continue current pain regimen.  -Encourage elevation.    -Vac changes q 7 days by home care nurse. (sooner if saturated)    -Follow-up: 3 weeks post-op with Dr. Neil Romero    2. Disposition  Okay to d/c home  with home cares today (awaiting vac plan).       [facility]

## 2023-07-11 NOTE — PLAN OF CARE
Goal Outcome Evaluation:      Plan of Care Reviewed With: patient & his Brother        The patient is ready for discharge however, CM is awaiting authorization for the home wound vac. Unable to obtain authorization so the pt is staying overnoc. Scant output from VAC. Exp wheezes, prn inhaler/scheduled meds. Room air. . Fluid restriction 2000ml - at 1800 the pt has had ~ 1440ml intake. Oxycodone x1 for L Leg pain. BM x1. Voids w/o difficulty. IV zosyn until discharge.

## 2023-07-11 NOTE — PLAN OF CARE
Shift Summary Note  1900-0730    VSS: denies SOB, O2 sats stable on RA.  Pain: Interventions effective per patient.  Orientation: AO4   Mobility: Assist 1 GBW  /GI:Urinal within reach , voids simultaneously  Skin: scattered, facial bruising  IV: 2 PIV SL  Diet:2000 fluid restriction    Tele:no tele  Labs/Protocols:  Potassium   Date Value Ref Range Status   07/10/2023 4.3 3.4 - 5.3 mmol/L Final   05/24/2023 4.72 3.5 - 5.3 mmol/L Final     Creatinine   Date Value Ref Range Status   07/10/2023 0.84 0.67 - 1.17 mg/dL Final   05/24/2023 0.83 0.60 - 1.30 mg/dL Final     Magnesium   Date Value Ref Range Status   01/21/2023 1.8 1.7 - 2.3 mg/dL Final   04/16/2013 2.2 1.6 - 2.3 mg/dL Final       Plan of Care:ID consult.  Discharge plan: Home with brother w/home healthcare.      Goal Outcome Evaluation:      Plan of Care Reviewed With: patient    Overall Patient Progress: improvingOverall Patient Progress: improving     Iman Gonzalez RN  07/11/23  4:55 AM

## 2023-07-11 NOTE — PROGRESS NOTES
[facility]  Infectious Disease Progress Note          Assessment and Plan:   Assessment & Plan  Rashaun Molina is a 63 year old who was admitted on 7/5/2023.      Impression: 1 63-year-old male with acute early left BKA stump infection, abscess but not clear bone involvement, status post I&D wound has been closed, no major clinical sepsis, cultures growing Pseudomonas fluorescens  2 prior, chronic left ankle infection, nonunion, and Pseudomonas aeruginosa in that culture extensive antibiotics but failed and required a BKA  3 Parkinson's disease  4 COPD     REC 1 continue Zosyn while here, in addition  Pseudomonas, has Enterococcus which for now not going to treat unlikely to be a true pathogen here  2 OK to p.o. Cipro 500 twice daily for 2 weeks and disposition anytime, appears to have a wound VAC on will need that arranged as well if going with that  Looks well, wound okay        Interval History:   no new complaints and doing well; no cp, sob, n/v/d, or abd pain.  Culture Pseudomonas and Enterococcus VCM, doubt Enterococcus significant here, doing well wound okay although has a wound VAC on              Medications:       aspirin  325 mg Oral Daily     carbidopa-levodopa  2 tablet Oral TID     gabapentin  300 mg Oral TID     metoprolol succinate ER  50 mg Oral Daily     piperacillin-tazobactam  3.375 g Intravenous Q6H     polyethylene glycol  17 g Oral Daily     rosuvastatin  40 mg Oral QPM     senna-docusate  1 tablet Oral BID    Or     senna-docusate  2 tablet Oral BID     sodium chloride (PF)  3 mL Intracatheter Q8H     sodium chloride (PF)  3 mL Intracatheter Q8H     sodium chloride  2 g Oral TID w/meals     sodium zirconium cyclosilicate  10 g Oral Once     umeclidinium  1 puff Inhalation Daily                  Physical Exam:   Blood pressure 122/51, pulse 76, temperature 98  F (36.7  C), temperature source Oral, resp. rate 20, weight 79.8 kg (175 lb 14.8 oz), SpO2 96 %.  Wt Readings  from Last 2 Encounters:   07/10/23 79.8 kg (175 lb 14.8 oz)   05/31/23 85.6 kg (188 lb 12.8 oz)     Vital Signs with Ranges  Temp:  [98  F (36.7  C)-98.5  F (36.9  C)] 98  F (36.7  C)  Pulse:  [57-79] 76  Resp:  [16-20] 20  BP: (122-177)/(51-80) 122/51  SpO2:  [94 %-98 %] 96 %    Constitutional: Awake, alert, cooperative, no apparent distress   Lungs: Clear to auscultation bilaterally, no crackles or wheezing   Cardiovascular: Regular rate and rhythm, normal S1 and S2, and no murmur noted   Abdomen: Normal bowel sounds, soft, non-distended, non-tender   Skin: No rashes, no cyanosis, no edema   Other: Wd looks OK          Data:   All microbiology laboratory data reviewed.  Recent Labs   Lab Test 07/11/23  0721 07/10/23  0716 07/09/23  0814   WBC 9.9 9.4 8.4   HGB 9.6* 9.4* 9.0*   HCT 29.9* 29.4* 28.4*   MCV 99 99 99   * 125* 119*     Recent Labs   Lab Test 07/11/23  0721 07/10/23  0716 07/09/23  0814   CR 0.95 0.84 0.97     Recent Labs   Lab Test 07/05/23  1219   SED 27*     No lab results found.    Invalid input(s): MELIA

## 2023-07-11 NOTE — DISCHARGE SUMMARY
[facility]  Discharge Summary  Name: Rashaun Molina    MRN: [id number]  YOB: 1960    Age: 63 year old  Date of Discharge:  7/11/2023  Date of Admission: 7/5/2023  Primary Care Provider: Yemi Nava  Discharge Physician:  Kana Guaman M.D  Discharging Service:  Hospitalist      Discharge Diagnosis:  Rashaun Molina is a 63 year old male w/PMH COPD, HTN, SIADH, DLD, PVD, parkinsons, recent L BKA who presents from home with increased drainage and found to have post op site wound dehiscence and fluid collection     Surgical site dehiscence w/cellulitis and likely abscess  + pseudomonas, enterococcus wound culture positive  S/p L BKA   -Hx recurrent L ankle infections, hardware, non union with eventual elective L BKA on 5/31. Had been doing well post operatively with suture removed last Friday and presents with surgical site dehiscence starting yesterday with erythema, scan drainage.   -In ED WBC 9.4, afebrile. CRP 20, sed rate 27. CT with amputation stump soft tissue irregularity and likely ulceration/cellulitis with deep rim-enhancing fluid collection concerning for abscess, approximately 4 x 6.5 x 3.5 cm.  -Initiated on Ancef in ED prior to CT coming back, but transitioned to Zosyn for anaerobic coverage. MRSA screen ordered  -Cultures initially growing pseudomonas but 7/7 noted enterococcus as well  -S/p I&D w/ortho on 7/6 with extensive debridement including fascia and muscle but no bone.   -Currently has wound vac in place  -ID consulted, blood cultures remain negative.  -Wound culture came back positive for Pseudomonas (pansensitive except for Bactrim)  and Enterococcus faecium resistant to ampicillin.  - He was treated with while her and discharged on Ciprofloxacin for 2 weeks per ID recommendation.     Acute on chronic hyponatremia w/hx SIADH  -Baseline Na around 127 but has been lower in past, was 117 on admission and believed to be related to alcohol use on July  4th  -Initially started on NS but had worsening Na so nephrology was consulted and changed to 2% NS and [place] [facility]  -Nephrology consulted, evaluated the patient.  -Patient declined to take urea and it was stopped.  -Increased fluid restriction to 2 L/day 7/10  -He will be on NaCl tablets and will have BMP checked as an outpatient.  -I doubt that he will be complaint with his fluid restrictions up on discharge.  -He is at risk of worsening hyponatremia.  -Patient is asking to increase his fluid intake.     LATESHA  Hyperkalemia, resolved  -Likely combination of medications, dehydration, infection   -Continue to hold lisinopril, , NSAIDS  -s/p hyperkalemia protocol in ED and [facility] with eventual improvement  -Cr and potassium now normalized     Hx COPD, HTN, DLD, PVD, parkinsons  -home inhaler, statin, carvidopa/levidopa, gabapentin resumed  -some changes as above.     Clinically Significant Risk Factors                # Thrombocytopenia: Lowest platelets = 119 in last        Other Diagnosis:  none     Discharge Disposition:  Discharged to home     Allergies:  Allergies   Allergen Reactions     Tiotropium Bromide Monohydrate Blisters     Hctz [Hydrochlorothiazide] Other (See Comments)     Low sodium        Discharge Medications:   Current Discharge Medication List      START taking these medications    Details   ciprofloxacin (CIPRO) 500 MG tablet Take 1 tablet (500 mg) by mouth 2 times daily  Qty: 28 tablet, Refills: 0    Associated Diagnoses: Surgical site infection; Status post below knee amputation, left (H); Abscess      senna-docusate (SENOKOT-S/PERICOLACE) 8.6-50 MG tablet Take 1 tablet by mouth 2 times daily as needed for constipation  Qty: 30 tablet, Refills: 0    Associated Diagnoses: Status post below knee amputation, left (H)      sodium chloride 1 GM tablet Take 2 tablets (2 g) by mouth 2 times daily (with meals)  Qty: 60 tablet, Refills: 0    Associated Diagnoses: Status post below knee amputation,  left (H); SIADH (syndrome of inappropriate ADH production) (H)         CONTINUE these medications which have CHANGED    Details   acetaminophen (TYLENOL) 325 MG tablet Take 2 tablets (650 mg) by mouth every 4 hours as needed for other (For optimal non-opioid multimodal pain management to improve pain control.)  Qty: 100 tablet, Refills: 0    Associated Diagnoses: Status post below knee amputation, left (H)      aspirin (ASA) 325 MG EC tablet Take 1 tablet (325 mg) by mouth daily  Qty: 42 tablet, Refills: 0    Associated Diagnoses: Status post below knee amputation, left (H)      oxyCODONE (ROXICODONE) 5 MG tablet Take 1 tablet (5 mg) by mouth every 4 hours as needed for moderate pain  Qty: 18 tablet, Refills: 0    Associated Diagnoses: Status post below knee amputation, left (H)         CONTINUE these medications which have NOT CHANGED    Details   albuterol (PROAIR HFA/PROVENTIL HFA/VENTOLIN HFA) 108 (90 Base) MCG/ACT inhaler Inhale 2 puffs into the lungs every 6 hours as needed for shortness of breath, wheezing or cough  Qty: 18 g, Refills: 0    Comments: Pharmacy may dispense brand covered by insurance (Proair, or proventil or ventolin or generic albuterol inhaler)  Associated Diagnoses: Chronic bronchitis, unspecified chronic bronchitis type (H)      carbidopa-levodopa (SINEMET)  MG tablet Take 2 tablets by mouth 3 times daily (Takes at 0400, 1000, and 1600)    Associated Diagnoses: Closed fracture of left ankle, initial encounter      gabapentin (NEURONTIN) 300 MG capsule Take 1 capsule (300 mg) by mouth 3 times daily  Qty: 90 capsule, Refills: 0    Associated Diagnoses: Status post below knee amputation, left (H)      lisinopril (ZESTRIL) 40 MG tablet Take 1 tablet (40 mg) by mouth daily  Qty: 90 tablet, Refills: 1    Associated Diagnoses: Essential hypertension, benign      metoprolol succinate ER (TOPROL XL) 50 MG 24 hr tablet Take 1 tablet (50 mg) by mouth daily  Qty: 90 tablet, Refills: 1     Associated Diagnoses: Essential hypertension, benign      rosuvastatin (CRESTOR) 40 MG tablet Take 1 tablet (40 mg) by mouth daily  Qty: 90 tablet, Refills: 1    Associated Diagnoses: Hyperlipidemia LDL goal <70      umeclidinium (INCRUSE ELLIPTA) 62.5 MCG/INH inhaler Inhale 1 puff into the lungs daily  Qty: 30 each, Refills: 1    Associated Diagnoses: Chronic bronchitis, unspecified chronic bronchitis type (H)         STOP taking these medications       hydrOXYzine (ATARAX) 25 MG tablet Comments:   Reason for Stopping:                Condition on Discharge:  Discharge condition: Stable   Discharge vitals: Blood pressure 122/51, pulse 76, temperature 98  F (36.7  C), temperature source Oral, resp. rate 16, weight 79.8 kg (175 lb 14.8 oz), SpO2 96 %.   Code status on discharge: Full Code     History of Illness:  See detailed admission note for full details.    Significant Physical Exam Findings:  Constitutional: fatigued, somnolent and cooperative  Eyes: pupils equal, round and reactive to light and conjunctiva normal  ENT: normocepalic, without obvious abnormality, atramatic  Respiratory: no increased work of breathing, good air exchange and clear to auscultation  Cardiovascular: regular rate and rhythm and no murmur noted  GI: normal bowel sounds, soft and non-distended  Skin: L BKA surgical site covered with wound vac  Neurologic: somnolent, moving all extremeties and follows commands       Procedures other than Imaging:  Left below-knee amputation wound irrigation and debridement with sharp excisional debridement of nonviable skin, subcutaneous tissue, fascia, and muscle; with primary wound closure. Application of left below-knee amputation wound incisional wound VAC, 20 cm Heather dressing     Imaging:  Results for orders placed or performed during the hospital encounter of 07/05/23   CT Tibia Fibula Lower Leg Left w Contrast    Narrative    CT TIBIA FIBULA LOWER LEG LEFT WITH CONTRAST 7/5/2023 1:07  PM    INDICATION: Left BKA incision site infection. Area of interest - lower  extremity, upper leg/lower leg.  COMPARISON: CT left ankle 6/8/2022   CONTRAST: 100mL Isovue-370  TECHNIQUE: IV contrast. Axial, sagittal and coronal thin-section  reconstruction. Dose reduction techniques were used.     FINDINGS: Postoperative changes related to interval below the knee  amputation left lower extremity. Well-defined tubular radiodensities  at the tibial osteotomy site are likely postsurgical. No definitive  bony or cortical destruction at the osteotomy site to suggest acute  osteomyelitis. Chronic healed proximal fibular metadiaphyseal fracture  deformity. Nothing for acute osteomyelitis at the fibular osteotomy  site. Anatomic alignment. No acute fracture.    Stump soft tissue swelling and ulceration along with lobulated fluid  collection at the stump site concerning for abscess. This measures  approximately 4 cm craniocaudal by at least 6.5 cm transverse by 3.5  cm anteroposterior. No appreciable soft tissue gas at the stump.  Extensive arterial calcification. Mild muscle atrophy proximal left  leg. No left knee joint effusion.      Impression    IMPRESSION:  1.  Postoperative changes related to below the knee amputation left  lower extremity.  2.  Amputation stump soft tissue irregularity and likely  ulceration/cellulitis with deep rim-enhancing fluid collection  concerning for abscess, approximately 4 x 6.5 x 3.5 cm.  3.  Nothing definite to suggest osteomyelitis at the amputation  margins of the tibia or fibula.  4.  Chronic healed proximal metadiaphyseal fibular fracture deformity.  5.  Peripheral arterial calcification.     ELIZABETH SULTANA MD         SYSTEM ID:  [id number]        Consultations:  Consultation during this admission received from infectious disease and orthopedics.     Recent Lab Results:  Recent Labs   Lab 07/11/23  0721 07/10/23  0716 07/09/23  0814   WBC 9.9 9.4 8.4   HGB 9.6* 9.4* 9.0*   HCT 29.9*  29.4* 28.4*   MCV 99 99 99   * 125* 119*     Recent Labs   Lab 07/11/23  0721 07/10/23  0716 07/09/23  0814   * 131* 132*   POTASSIUM 4.2 4.3 3.9   CHLORIDE 96* 98 98   CO2 24 26 24   ANIONGAP 11 7 10   GLC 97 94 140*   BUN 7.3* 7.7* 10.2   CR 0.95 0.84 0.97   GFRESTIMATED 89 >90 87   FRANNY 8.9 8.8 8.4*          Pending Results:    Unresulted Labs Ordered in the Past 30 Days of this Admission     No orders found from 6/5/2023 to 7/6/2023.              Home Care Referral      Reason for your hospital stay    Left BKA wound dehiscence     Follow-up and recommended labs and tests     Follow-up with Dr. Romero/Susu Nuñez PA-C ([facility]) at 2 weeks postop for recheck and wound evaluation (suture, staple removal). This appointment may be done by staff members at your TCU. No need for follow up labs or testing prior to this appointment.     Please contact Dr. Romero's care coordinator, Bonnie, at [phone] to schedule or for any questions related to your orthopedic injury/surgery.     Activity    Your activity upon discharge: non weight bearing left lower extremity     Reason for your hospital stay    Surgical site dehiscence w/cellulitis and likely abscess  + pseudomonas, enterococcus wound culture positive  S/p L BKA     Follow-up and recommended labs and tests     Follow up with primary care provider, Yemi Nava, within 7 days for hospital follow- up.  The following labs/tests are recommended: CBC, BMP 2 weeks, result to PCP.  Follow up with Ortho as instructed..     Activity    Your activity upon discharge: activity as tolerated     Crutches DME    DME Documentation: Describe the reason for need to support medical necessity: Impaired gait status post knee surgery. I, the undersigned, certify that the above prescribed supplies are medically necessary for this patient and is both reasonable and necessary in reference to accepted standards of medical practice in the  treatment of this patient's condition and is not prescribed as a convenience.     Cane DME    DME Documentation: Describe the reason for need to support medical necessity: Impaired gait status post knee surgery. I, the undersigned, certify that the above prescribed supplies are medically necessary for this patient and is both reasonable and necessary in reference to accepted standards of medical practice in the treatment of this patient's condition and is not prescribed as a convenience.     Walker DME    DME Documentation: Describe the reason for need to support medical necessity: Impaired gait status post knee surgery. I, the undersigned, certify that the above prescribed supplies are medically necessary for this patient and is both reasonable and necessary in reference to accepted standards of medical practice in the treatment of this patient's condition and is not prescribed as a convenience.     Diet    Follow this diet upon discharge: Orders Placed This Encounter      Fluid restriction 2000 ML FLUID      Advance Diet as Tolerated: Regular Diet Adult         Total time spent in face to face contact with the patient and coordinating discharge was:  >30 Minutes.

## 2023-07-12 NOTE — PROGRESS NOTES
Orthopedic Surgery  Rashaun Molina  07/12/2023     Admit Date:  7/5/2023    POD: 6 Days Post-Op   Procedure(s):  1.  Left below-knee amputation wound irrigation and debridement with sharp excisional debridement of nonviable skin, subcutaneous tissue, fascia, and muscle; with primary wound closure. 2.  Application of left below-knee amputation wound incisional wound VAC, 20 cm Heather dressing.    Transitioned to incisional vac, after surgery.     Patient resting comfortably in bed. Chronic neuropathy and denies changes in baseline sensation.  Tolerating oral intake.  Overall, states he is feeling well today and is ready to discharge. Discharge was delayed from yesterday due to incisional vac for home.       Temp:  [98  F (36.7  C)-98.9  F (37.2  C)] 98.2  F (36.8  C)  Pulse:  [60-79] 79  Resp:  [18-20] 18  BP: (140-170)/(61-69) 140/62  SpO2:  [93 %-96 %] 96 %    Alert and oriented.   Left BKA stump. Incisional vac intact and functioning with output.   No saturation or peeling of overlying tegaderm and dressing is otherwise intact.  No significant erythema.   Mild to moderate swelling of residual limb.  Able to fully extend left knee.  No effusion.   Thigh compartments are soft and nontender.  Active flexion and extension of knee is intact.  Sensation grossly intact to light touch.    Labs/Imaging:  Recent Labs   Lab Test 07/12/23  0724 07/11/23  0721 07/10/23  0716   WBC 9.4 9.9 9.4   HGB 9.6* 9.6* 9.4*   * 139* 125*     Recent Labs   Lab Test 06/08/22  1657 08/06/18  0718 05/14/18  0713   INR 1.11 1.04 1.07     Recent Labs   Lab Test 07/05/23  1125 10/19/22  1140 10/12/22  1140   CRPI 20.61* 8.78* 12.53*       A/P    1. S/p left BKA stump I&D and placement of an incisional wound vac  -Continue  mg daily for DVT prophylaxis.   -Continue IV antibiotics per primary.    -Mobilize with PT/OT   -NWB LLE with walker.  -Continue current pain regimen.  -Encourage elevation.    -Vac changes q 7 days by home care  nurse. (sooner if saturated)    -Follow-up: 3 weeks post-op with Dr. Neil Romero    2. Disposition  Okay to d/c home with home cares today (awaiting vac plan).       [facility]

## 2023-07-12 NOTE — CONSULTS
[facility]  WO Nurse Inpatient Assessment     Consulted for: Left BKA  Patient is requesting to return to using the SORIN. Educated him on the reason why the incisional vac with activac is a better option in his situation. Educated patient on VAC education sheet and returned it to the CC. Patient reports understanding of teaching. VAC Ulta placed in soiled utility room.     Patient History (according to provider note(s):      Rashaun Molina is a 63 year old male w/PMH COPD, HTN, SIADH, DLD, PVD, parkinsons, recent L BKA who presents from home with increased drainage and found to have post op site wound dehiscence and fluid collection    Assessment:      Areas visualized during today's visit: Focused:    Negative pressure wound therapy applied to: Left BKA   Last photo: 7/7/23     Wound due to: Surgical Wound   Wound history/plan of care:  Patient with recent BKA who developed post op infection and dehiscence.  Now s/p debridement on 7/6/23 with SORIN negative pressure wound therapy placement.  SORIN vac no longer functioning on 7/7/23, asked to start incisional wound VAC.    Surgical date: 7/7/23     Service following: Ortho    Date Negative Pressure Wound Therapy initiated: 7/7/23     Interventions in place: elevation    Is patient s nutritional status compromised? no   a. If yes, what interventions are in place? N/A    Reason for initiating vac therapy? High risk of infections, Need for accelerated granulation tissue and Prior history of delayed wound healing    Which?of?the?following?co-morbidities?apply? PVD  a. If diabetic is patient on a diabetic management program? N/A     Is osteomyelitis present in wound? no  a.  If yes what treatments are in place? N/A    Wound base: 100 %red non-granular tissue along incision with sutures intact     Palpation of the wound bed: normal       Drainage: copious-3 areas noted to be draining      Volume in cannister: NA     Last cannister change date:  "7/7/23     Description of drainage: bloody      Measurements (length x width x depth, in cm) 0.2  x 20  x  0.1 cm       Tunneling N/A      Undermining N/A   Periwound skin: Intact       Color: normal and consistent with surrounding tissue       Temperature: normal    Odor: none   Pain: mild  Pain intervention prior to dressing change: slow and gentle cares   Treatment goal: Heal  and Drainage control  STATUS: initial assessment   Supplies ordered: ordered VAC pump, canister, dressing    Number of foam pieces removed from a wound (excluding foam for bridge) : SORIN dressing   Verified this matched the number of foam pieces applied last dressing change: Yes   Number of foam pieces packed into wound (excluding foam for bridge) : 1 Artem Black, picture framed along incision before foam            Treatment Plan:     Negative pressure wound therapy plan:  Wound location: Left BKA   Change Days: weekly by WOC RN    Supplies (including all accessories) used: medium Black foam   Cleanse with VASHE prior to replacing VAC    Suction setting: -125   Methods used: Window paned all periwound skin with vac drape prior to applying sponge    Staff RN to assess integrity of dressing and ensure suction is set at appropriate level every shift.   Date canister. Chart canister output every shift. Change cannister weekly and PRN if full/occluded     Remove foam dressing and replace with BID normal saline moist gauze dressing if:   -a dressing failure which cannot be repaired within 2 hours   -patient is discharging to home without a home pump   -patient is discharging to a facility outside the local area   -if a dressing is a \"Silver Foam\", remove before Radiation Therapy or MRI     The hospital VAC pump is not to be discharged with the patient.?Ensure to disconnect patient from machine prior to discharge. Then,    - If a home KCI VAC pump has been delivered, connect home cannister to dressing tubing then connect cannister to home " pump and turn on machine    - If transferring to a nearby facility with a KCI vac, can disconnect and clamp tubing then cover end with glove so can be reconnected within 2 hours          Orders: Updated and discharge instructions modified for home care     RECOMMEND PRIMARY TEAM ORDER: None, at this time  Education provided: plan of care and VAC education covered with patient. Checklist returned to CC  Discussed plan of care with: Patient, Nurse and Physicians Assistant  [facility] nurse follow-up plan: weekly  Notify WOC if wound(s) deteriorate.  Nursing to notify the Provider(s) and re-consult the [facility] Nurse if new skin concern.    DATA:     Current support surface: Standard  Standard gel/foam mattress (IsoFlex, Atmos air, etc)  BMI: Body mass index is 25.98 kg/m .   Active diet order: Orders Placed This Encounter      Advance Diet as Tolerated: Regular Diet Adult      Diet     Output: I/O last 3 completed shifts:  In: 1440 [P.O.:1440]  Out: 1000 [Urine:1000]     Labs:   Recent Labs   Lab 07/11/23  0721 07/06/23  0728 07/05/23  1125   ALBUMIN  --   --  4.0   HGB 9.6*   < > 10.0*   WBC 9.9   < > 9.4    < > = values in this interval not displayed.     Pressure injury risk assessment:   Sensory Perception: 3-->slightly limited  Moisture: 4-->rarely moist  Activity: 3-->walks occasionally  Mobility: 3-->slightly limited  Nutrition: 3-->adequate  Friction and Shear: 2-->potential problem  Sigifredo Score: 18    KAUSHIK [facility] [employer]  Dept. Office Number: [phone]

## 2023-07-12 NOTE — PROGRESS NOTES
CLINICAL NUTRITION SERVICES - BRIEF NOTE     Chart reviewed for nutrition risk factors due to LOS. Pt is tolerating diet, eating well per nursing documentation and review of orders in nutrition services software. No nutrition issues identified at this time. RD will re-screen in 7-10 days, but remains available by consult if nutrition concerns arise prior to that time.     Ceci Soto RD, LD, CNSC  Pager - 3rd floor/ICU: [phone]  Pager - All other floors: [phone]  Pager - Weekend/holiday: [phone]  Office: [phone]

## 2023-07-12 NOTE — DISCHARGE INSTRUCTIONS
"Negative pressure wound therapy plan:  Wound location: Left BKA   Change Days: weekly by WOC RN    Supplies (including all accessories) used: medium Black foam   Cleanse with VASHE prior to replacing VAC    Suction setting: -125   Methods used: Window paned all periwound skin with vac drape prior to applying sponge    HC RN to assess integrity of dressing and ensure suction is set at appropriate level every visit  Date canister. Chart canister output every visit Change cannister weekly and PRN if full/occluded     Remove foam dressing and replace with BID normal saline moist gauze dressing if:   -a dressing failure which cannot be repaired within 2 hours   -patient is discharging to home without a home pump   -patient is discharging to a facility outside the local area   -if a dressing is a \"Silver Foam\", remove before Radiation Therapy or MRI      "

## 2023-07-12 NOTE — PLAN OF CARE
Shift Summary Note  1900-0730    BP (!) 150/61 (BP Location: Left arm, Patient Position: Semi-Trejo's, Cuff Size: Adult Regular)   Pulse 60   Temp 98.5  F (36.9  C) (Oral)   Resp 18   Wt 79.8 kg (175 lb 14.8 oz)   SpO2 93%   BMI 25.98 kg/m        VSS: denies SOB, O2 sats stable on RA.  Pain: Interventions effective per patient.  Orientation: AO4   Mobility: Assist 1 GBW  /GI:Urinal within reach , voids simultaneously  Skin: scattered, facial bruising  IV: 2 PIV SL  Diet:2000 fluid restriction    Tele:no tele  Labs/Protocols:  Magnesium   Date Value Ref Range Status   01/21/2023 1.8 1.7 - 2.3 mg/dL Final   04/16/2013 2.2 1.6 - 2.3 mg/dL Final     Potassium   Date Value Ref Range Status   07/11/2023 4.2 3.4 - 5.3 mmol/L Final   05/24/2023 4.72 3.5 - 5.3 mmol/L Final     Creatinine   Date Value Ref Range Status   07/11/2023 0.95 0.67 - 1.17 mg/dL Final   05/24/2023 0.83 0.60 - 1.30 mg/dL Final       Plan of Care:ID consult.  Discharge plan: Home with brother w/home healthcare.      Goal Outcome Evaluation:      Plan of Care Reviewed With: patient    Overall Patient Progress: improvingOverall Patient Progress: improving     Iamn Gonzalez RN  07/12/23  5:45 AM

## 2023-07-12 NOTE — PROGRESS NOTES
Care Management Discharge Note    Discharge Date: 07/11/2023       Discharge Disposition: Home Care    Discharge Services:      Discharge DME: Wound Vac    Discharge Transportation: family or friend will provide    Private pay costs discussed: Not applicable    Does the patient's insurance plan have a 3 day qualifying hospital stay waiver?  Yes   Will the waiver be used for post-acute placement? No    PAS Confirmation Code:    Patient/family educated on Medicare website which has current facility and service quality ratings: no    Education Provided on the Discharge Plan: Yes  Persons Notified of Discharge Plans: Home Care, bedside nurse, pt  Patient/Family in Agreement with the Plan: yes    Handoff Referral Completed: No    Additional Information:  Wound Vac has been approved and delivered to pt's room. [facility] nurse has applied the home VAC. Proof of delivery signed by pt, and faxed to [facility] along with the Educational Support document.  Serial number for VAC is [id number].  Home Care orders have been faxed to [facility] and informed home care that pt needs a VAC change 7/14. Pt has had a VAC in the past and has no questions at this time.  Please call if additional needs arise.    Zoila Singh RN   Inpatient Care Coordination  [facility]   Phone: [phone]

## 2023-07-12 NOTE — PROGRESS NOTES
Pt discharged to home via transportation from his brother. RX and VAC supplies sent w/the patient. RN reviewed all discharge instructions and pt stated understanding of all, including new RX and med changes. Discharge via w/c staff escort.

## 2023-07-27 NOTE — TELEPHONE ENCOUNTER
Returned call to Bonnie and informed her no appointment is available at [facility] 7/28/23 at this time. If it is an emergency that the patient be seen then the patient should go to the emergency room. Bonnie stated she will inform Dr. Romero.

## 2023-07-27 NOTE — TELEPHONE ENCOUNTER
[facility]    Who is the name of the provider?:  Maggie    What is the location you see this provider at/preferred location?: [place]  Person calling / Facility: Bonnie Romero   Phone number:  [phone]  Nurse call back needed:  ???     Reason for call:  Dr. Romero patient needs to be seen tomorrow 7/28/23, instead of 8/1.  Today wound has reopened and showing possible sign of infection.  Patient does not have transportation for the 8/1 appointment.  Please call patient to reschedule.  Call Bonnie with questions.        Pharmacy location:  NA  Outside Imaging: NA   Can we leave a detailed message on this number?  YES

## 2023-08-01 PROBLEM — T81.89XA NONHEALING SURGICAL WOUND: Status: ACTIVE | Noted: 2023-01-01

## 2023-08-01 PROBLEM — S81.802A OPEN WOUND OF LEFT LOWER EXTREMITY: Status: ACTIVE | Noted: 2023-01-01

## 2023-08-01 NOTE — PROGRESS NOTES
Patient arrived for wound care visit. Certified Wound Care Nurse time spent evaluating patient record, completed a full evaluation and documented wound(s) & caroline-wound skin; provided recommendation based on treatment plan. Applied dressing, reviewed discharge instructions, patient education, and discussed plan of care with appropriate medical team staff members and patient and/or family members.

## 2023-08-01 NOTE — DISCHARGE INSTRUCTIONS
"Rashaun Molina      1960    A DME order for supplies has been placed to [facility]. If there are any issues with your order including not receiving the order please call [facility] at [phone] option 1. They can also provide a tracking number for you if you had supplies shipped to you.    [facility] Phone: [phone]; Fax: [phone]     Ask Dr. Romero if you can get a stump .  No prosthesis until completely healed.  Ok to get fit for prosthesis near end of healing so it's ready once it's healed.    Wound Dressing Change: left BKA  - Wash your hands with soap and water before you begin your dressing change and prepare a clean surface for dressings.  - Cleanse with mild unscented soap (such as Cetaphil, Cerave or Dove) and water  - Apply small amount of VASHE on gauze, lay into wound bed, let sit for 10 minutes, remove gauze (do not rinse)   - Apply ~1/6 plain Melgisorb alginate 4x4, cut-to-fit size of wound  - Cover with 1/2 ABD pad 5x9\"  - Apply Spandage size 7/MTSpandage size 4 up to mid thigh, twisting or folding the distal aspect.  - Apply Spandigrip size F up to mid thigh. Fold over the distal aspect and secure with medipore tape (or a small piece of Spandage)  Change 3 times a week    Elevation:  It is recommended that you elevate your legs above the level of your heart for 30 minutes: approximately 2-3 times each day   Ways to do this:   - Lay on the couch or your bed and prop your legs up on pillows   - Recline back as far as you can go in your recliner and prop your legs on pillows.    Protein:  A diet high in protein is important for wound healing, we recommend getting 90 grams of protein per day. Taking protein shakes or bars are a good way to get extra protein in your diet.   Good sources of protein:  Pork 26g per 3 oz  Whey protein powder - 24g per scoop (on average)  Greek yogurt - 23g per 8oz   Chicken or Turkey - 23g per 3oz  Fish - 20-25g per " 3oz  Beef - 18-23g per 3oz  Navy beans - 20g per cup  Cottage cheese - 14g per 1/2 cup   Lentils - 13g per 1/4 cup  Beef jerky 13g per 1oz  2% milk - 8g per cup  Peanut butter - 8g per 2 tablespoons  Eggs - 6g per egg  Mixed nuts - 6g per 2oz        Dressing changes outside of clinic are being performed by Home Care     Taras Serrano M.D. August 1, 2023    ROUTINE FOOT CARE (NAIL TRIMMING / CALLUSES)    Go to afcna.org ([employer]) and search for providers near you.  Otherwise, this is a list we have gathered of  recommended locations/providers in [place].    [facility]   [phone]   [facility]  [phone]  [email or web address]   [facility]  [phone]  [place] + [place] mile [place] [facility]  [phone]  [email or web address]   [facility]  [address] [place] [address] [phone] Kashif Escobar DPM  [address] [place] [address] [phone]   [facility]  [phone] [address] [place] [address]  [facility]  Dr. Adam Villa  [phone]  [facility]  [phone]  [place] & [place] Locations  (does not take [employer]) FYI:  *Some providers accept insurance while others are out of pocket. Please contact them for details*        Call us at [phone] if you have any questions about your wounds, have redness or swelling around your wound, have a fever of 101 degrees Fahrenheit or greater or if you have any other problems or concerns. We answer the phone Monday through Friday 8 am to 4 pm, please leave a message as we check the voicemail frequently throughout the day.     If you had a positive experience please indicate that on your patient satisfaction survey form that [facility] will be sending you.    It was a pleasure meeting with you today.  Thank you for allowing me and my team the privilege of caring for you today.  YOU are the reason we are here, and I  truly hope we provided you with the excellent service you deserve.  Please let us know if there is anything else we can do for you so that we can be sure you are leaving completely satisfied with your care experience.      If you have any billing related questions please call the [facility] Business office at [phone]. The clinic staff does not handle billing related matters.    If you are scheduled to have a follow up appointment, you will receive a reminder call the day before your visit. On the appointment day please arrive 15 minutes prior to your appointment time. If you are unable to keep that appointment, please call the clinic to cancel or reschedule. If you are more than 10 minutes late or greater for your scheduled appointment time, the clinic policy is that you may be asked to reschedule.

## 2023-08-01 NOTE — PROGRESS NOTES
Wound Clinic Note          Visit date: 08/01/2023       Cheif Complaint:     Rashaun Molina is a 63 year old  male had concerns including WOUND CARE.  He has a left BKA wound.      HISTORY OF PRESENT ILLNESS:    Rashaun Molina reports the ulcer has been present since June 2023.  The wound began after a recent surgery and the incision did not heal normally.   He had a left below the knee amputation performed on May 31, 2023.  Postoperatively unfortunately the wound dehisced and became infected.  Requiring operative debridement on July 6, 2023.  Postoperatively initially the wound was managed with a wound VAC.  Most recently the wound has been managed with an ABD pad followed by an Ace wrap changed twice a week by the home health nurses.  There is been moderate serous drainage from the wound.  He reports that the bandages often fall off and his brother has to replace them.        The pateint denies fevers or chills.  They report the pain from the wound has been 0/10 and has remained about the same recently.      Today the patient reports maintaining a regular diet without special attention to protein.        The patient denies a history of diabetes, smoking or chronic steroid use.         The patient has not had any symptoms of infection relating to the wound recently and is not currently on antibiotics.       Problem List:   Past Medical History:   Diagnosis Date    Arthritis     Chronic airway obstruction, not elsewhere classified 03/08/2004    pt says that he does not have COPD    Cough syncope 11/21/2012    Essential hypertension, benign     Fistula     colon/bladder    Fracture of right proximal fibula 07/30/2014    Orthostatic hypotension 11/24/2014    Other chronic pain     right hip pain    PAD (peripheral artery disease) (H) 08/2018    PTA right [facility]FA Dr. Lee    Palpitations 12/2018 01/2019 Event monitor- SR/ST    Parkinsons disease (H)     Personal history of colonic polyps 06/28/2005    Pure  hypercholesterolemia     Tobacco use disorder 03/08/2004    Vasovagal syncopes 10/25/2014    Viral warts, unspecified     genital              Family Hx: family history includes Coronary Artery Disease in his father; Heart Disease in his father; Heart Surgery in his brother; Hyperlipidemia in his brother; Hypertension in his brother, father, mother, and sister.       Surgical Hx:   Past Surgical History:   Procedure Laterality Date    AMPUTATE LEG BELOW KNEE Left 5/31/2023    Procedure: Left below the knee amputation;  Surgeon: Neil Romero MD;  Location:  OR    ARTHROPLASTY HIP Right 10/07/2015    Procedure: ARTHROPLASTY HIP;  Surgeon: Neil Davis MD;  Location:  OR    COLONOSCOPY  09/05/2004    Per Hospital encounter dated 4/18/13    COLONOSCOPY N/A 04/09/2018    Procedure: COMBINED COLONOSCOPY, SINGLE OR MULTIPLE BIOPSY/POLYPECTOMY BY BIOPSY;;  Surgeon: Tramaine Zuniga MD;  Location:  GI    COMBINED CYSTOSCOPY, INSERT CATHETER URETER  04/11/2013    Procedure: COMBINED CYSTOSCOPY, INSERT CATHETER URETER;;  Surgeon: Kashif Parks MD;  Location:  OR    HC LARYNGOSCOPY DIRECT W VOCAL CORD INJECTION      vocal cord polyps    IRRIGATION AND DEBRIDEMENT Left 08/2022    ankle    IRRIGATION AND DEBRIDEMENT SOFT TISSUE LOWER EXTREMITY, COMBINED Left 7/6/2023    Procedure: 1.  Left below-knee amputation wound irrigation and debridement with sharp excisional debridement of nonviable skin, subcutaneous tissue, fascia, and muscle; with primary wound closure. 2.  Application of left below-knee amputation wound incisional wound VAC, 20 cm Heather dressing. ;  Surgeon: Dylan Navarro MD;  Location:  OR    LAPAROSCOPIC ASSISTED COLECTOMY  04/11/2013    Procedure: LAPAROSCOPIC ASSISTED COLECTOMY;  LOW ANTERIOR RESECTION ;  Surgeon: Tramaine Zuniga MD;  Location:  OR    OPEN REDUCTION INTERNAL FIXATION ANKLE Left 06/09/2022    Procedure: .  Open reduction internal fixation of left ankle  syndesmotic injury 2.  Non-operative treatment of left proximal fibula fracture;  Surgeon: Mike Jackson MD;  Location: [facility] OR    OPEN REDUCTION INTERNAL FIXATION ANKLE Left 8/24/2022    Procedure: Open reduction internal fixation ankle;  Surgeon: Mike Jackson MD;  Location: [facility] OR    REMOVE HARDWARE ANKLE Left 8/24/2022    Procedure: 1.  Removal hardware left ankle 2.  Excisional debridement of left lateral ankle wound deepest level of debridement bone 3.  Revision open reduction internal fixation of left ankle syndesmotic injury 4.  Deltoid ligament repair left ankle;  Surgeon: Mike Jackson MD;  Location:  OR    [facility] COLONOSCOPY THRU STOMA W BIOPSY/CAUTERY TUMOR/POLYP/LESION  1998    michael, tubular adenoma, next colonoscopy 2001    [facility] COLONOSCOPY THRU STOMA, DIAGNOSTIC  04/16/2001    negative, ligate two internal hemmorhoids  Dr rodriguez-repeat 2008          Allergies:    Allergies   Allergen Reactions    Tiotropium Bromide Monohydrate Blisters    Hctz [Hydrochlorothiazide] Other (See Comments)     Low sodium              Medication History:    Current Outpatient Medications   Medication Sig    acetaminophen (TYLENOL) 325 MG tablet Take 2 tablets (650 mg) by mouth every 4 hours as needed for other (For optimal non-opioid multimodal pain management to improve pain control.)    albuterol (PROAIR HFA/PROVENTIL HFA/VENTOLIN HFA) 108 (90 Base) MCG/ACT inhaler Inhale 2 puffs into the lungs every 6 hours as needed for shortness of breath, wheezing or cough    aspirin (ASA) 325 MG EC tablet Take 1 tablet (325 mg) by mouth daily    carbidopa-levodopa (SINEMET)  MG tablet Take 2 tablets by mouth 3 times daily (Takes at 0400, 1000, and 1600)    ciprofloxacin (CIPRO) 500 MG tablet Take 1 tablet (500 mg) by mouth 2 times daily    gabapentin (NEURONTIN) 300 MG capsule Take 1 capsule (300 mg) by mouth 3 times daily    lisinopril (ZESTRIL) 40 MG tablet Take 1 tablet (40 mg) by mouth daily    metoprolol  "succinate ER (TOPROL XL) 50 MG 24 hr tablet Take 1 tablet (50 mg) by mouth daily    oxyCODONE (ROXICODONE) 5 MG tablet Take 1 tablet (5 mg) by mouth every 4 hours as needed for moderate pain    rosuvastatin (CRESTOR) 40 MG tablet Take 1 tablet (40 mg) by mouth daily    senna-docusate (SENOKOT-S/PERICOLACE) 8.6-50 MG tablet Take 1 tablet by mouth 2 times daily as needed for constipation    sodium chloride 1 GM tablet Take 2 tablets (2 g) by mouth 2 times daily (with meals)    umeclidinium (INCRUSE ELLIPTA) 62.5 MCG/INH inhaler Inhale 1 puff into the lungs daily     No current facility-administered medications for this encounter.         Tobacco History:  reports that he quit smoking about 10 years ago. His smoking use included cigarettes. He has a 60.00 pack-year smoking history. He has been exposed to tobacco smoke. He has never used smokeless tobacco.       REVIEW OF SYMPTOMS:   The review of systems was negative except as noted in the HPI.           PHYSICAL EXAMINATION:     BP (!) 140/84 (BP Location: Right arm)   Pulse 71   Temp 97.9  F (36.6  C) (Temporal)   Ht 1.778 m (5' 10\")   Wt 79.4 kg (175 lb)   BMI 25.11 kg/m             GENERAL: The patient overall appears well and is no acute distress.   HEAD: normocephalic   EYES: Sclera and conjunctiva clear   NECK: no obvious masses   LUNGS: breathing is unlabored.   EXTREMITIES: No clubbing, cyanosis or edema   SKIN: No rashes or other abnormalities except as noted under the Wound section below.   NEUROLOGICAL: normal motor and sensory function       WOUND/ulcer: The wound appears healthy with no sign of infection.   Wound bed: necrotic material  Periwound: healthy intact skin  For the most part the skin edges along the incision are well approximated there are just a few open areas and 1 area at the center which has a bit more depth.      Also see below for wound details:     Circumferential volume measures:             No data to display          "       Ulceration(s)/Wound(s):   Please see the media tab under the chart review for pictures of the wounds.  Nursing staff removed dressings and cleansed wound.    Wound (used by OP [facility] only) 08/01/23 0800 Left BKA surgical (Active)   Thickness/Stage full thickness 08/01/23 0800   Base slough 08/01/23 0800   Periwound pink 08/01/23 0800   Periwound Temperature warm 08/01/23 0800   Periwound Skin Turgor soft 08/01/23 0800   Edges open 08/01/23 0800   Length (cm) 0.9 08/01/23 0800   Width (cm) 14.3 08/01/23 0800   Depth (cm) 0.8 08/01/23 0800   Wound (cm^2) 12.87 cm^2 08/01/23 0800   Wound Volume (cm^3) 10.3 cm^3 08/01/23 0800   Drainage Characteristics/Odor serosanguineous 08/01/23 0800   Drainage Amount large 08/01/23 0800   Care, Wound debrided 08/01/23 0800           Recent Labs   Lab Test 11/25/19  0920 08/06/18  0718 05/14/18  0713   A1C 5.3 5.4 5.6          Recent Labs   Lab Test 07/05/23  1125 06/09/22  0645 06/08/22  1657   ALBUMIN 4.0 3.4 3.6              Procedure note: , Informed Consent:  Patient acknowledges that I have explained the patient's general medical condition to him/her.  Patient has been informed and acknowledges that I have explained the risks or complications of wound debridement including, but not limited to, scarring, damage to blood vessels or surrounding areas such as nerves and organs, allergic reactions to topical and injected anaesthetic and/or skin prep solutions.  Other risks include excessive bleeding, removal of healthy tissue, infection, pain and inflammation, and prolonged or failure to heal.  Patient acknowledges that bleeding after debridement and pain may worsen after debridement and that dead/necrotic tissue may cause bacteria and toxins to be released into the bloodstream and cause sepsis or shock.  Patient acknowledges that I have explained that the wound may be larger after debridement.  Patient acknowledges that they may need serial debridements while under care in the  wound department.  Patient acknowledges that they were given an opportunity to ask questions about treatment and I have answered patient's questions.   , Anesthetized as needed with lidocaine. , Using a curette and/or a scalpel I performed an excisional debridement removing all necrotic material at the left BKA wound down to the level of viable subcutaneous tissue.  I obtained hemostasis with direct pressure.  The patient tolerated the procedure well. , EBL: <5 ml , and Total debridement surface area: Less than 20 cm                  ASSESSMENT:   This is a 63 year old  male with a left BKA surgical wound          PLAN:   We will bandage the area with alginate, an ABD pad and a spandagrip stocking change 3 times a week by the home health nurses.  We discussed future use of a prosthesis once the wound is healed.  I have explained to the patient the importance of protein intake to wound healing.  I have explained that increasing protein intake will speed wound healing.  We discussed several types of food that are high in protein and the wound care nurse gave the patient a handout that summarizes this information.  In addition to further speed wound healing I have encouraged the patient to take a protein supplement.   The patient will return to the wound clinic in 3 weeks to see me again.        45 minutes spent on the date of the encounter doing chart review, history and exam, documentation and further activities per the note      Taras Serrano MD  08/01/2023   8:49 AM   [facility]  [phone]    This note was electronically signed by Taras Serrano MD      Further instructions from your care team         Rashaun THALIA Jesse      1960    A DME order for supplies has been placed to [facility]. If there are any issues with your order including not receiving the order please call [facility] at [phone] option 1. They can also provide a tracking number for you  "if you had supplies shipped to you.    [facility] Phone: [phone]; Fax: [phone]     Ask Dr. Romero if you can get a stump .  No prosthesis until completely healed.  Ok to get fit for prosthesis near end of healing so it's ready once it's healed.    Wound Dressing Change: left BKA  - Wash your hands with soap and water before you begin your dressing change and prepare a clean surface for dressings.  - Cleanse with mild unscented soap (such as Cetaphil, Cerave or Dove) and water  - Apply small amount of VASHE on gauze, lay into wound bed, let sit for 10 minutes, remove gauze (do not rinse)   - Apply ~1/6 plain Melgisorb alginate 4x4, cut-to-fit size of wound  - Cover with 1/2 ABD pad 5x9\"  - Apply Spandage size 7/MTSpandage size 4 up to mid thigh, twisting or folding the distal aspect.  - Apply Spandigrip size F up to mid thigh. Fold over the distal aspect and secure with medipore tape (or a small piece of Spandage)  Change 3 times a week    Elevation:  It is recommended that you elevate your legs above the level of your heart for 30 minutes: approximately 2-3 times each day   Ways to do this:   - Lay on the couch or your bed and prop your legs up on pillows   - Recline back as far as you can go in your recliner and prop your legs on pillows.    Protein:  A diet high in protein is important for wound healing, we recommend getting 90 grams of protein per day. Taking protein shakes or bars are a good way to get extra protein in your diet.   Good sources of protein:  Pork 26g per 3 oz  Whey protein powder - 24g per scoop (on average)  Greek yogurt - 23g per 8oz   Chicken or Turkey - 23g per 3oz  Fish - 20-25g per 3oz  Beef - 18-23g per 3oz  Navy beans - 20g per cup  Cottage cheese - 14g per 1/2 cup   Lentils - 13g per 1/4 cup  Beef jerky 13g per 1oz  2% milk - 8g per cup  Peanut butter - 8g per 2 tablespoons  Eggs - 6g per egg  Mixed nuts - 6g per 2oz        Dressing changes outside of clinic " are being performed by Home Care     Taras Serrano M.D. August 1, 2023    ROUTINE FOOT CARE (NAIL TRIMMING / CALLUSES)    Go to afcna.org ([employer]) and search for providers near you.  Otherwise, this is a list we have gathered of  recommended locations/providers in [place].    [facility]   [phone]   [facility]  [phone]  [email or web address]   [facility]  [phone]  [place] [address] [place] [facility]  [phone]  [email or web address]   [facility]  [address] [place] [address] [phone] Kashif Escobar DPM  [address] [address] [address] [phone]   [facility]  [phone] [address] [place] [address]  [facility]  Dr. Adam Villa  [phone]  [place], [place]   [facility]  [phone]  [place] [facility] [place] Locations  (does not take BCBS) FYI:  *Some providers accept insurance while others are out of pocket. Please contact them for details*        Call us at [phone] if you have any questions about your wounds, have redness or swelling around your wound, have a fever of 101 degrees Fahrenheit or greater or if you have any other problems or concerns. We answer the phone Monday through Friday 8 am to 4 pm, please leave a message as we check the voicemail frequently throughout the day.     If you had a positive experience please indicate that on your patient satisfaction survey form that [facility] will be sending you.    It was a pleasure meeting with you today.  Thank you for allowing me and my team the privilege of caring for you today.  YOU are the reason we are here, and I truly hope we provided you with the excellent service you deserve.  Please let us know if there is anything else we can do for you so that we can be sure you are leaving completely satisfied with your care experience.      If you have any billing related questions please call the   [facility] Business office at [phone]. The clinic staff does not handle billing related matters.    If you are scheduled to have a follow up appointment, you will receive a reminder call the day before your visit. On the appointment day please arrive 15 minutes prior to your appointment time. If you are unable to keep that appointment, please call the clinic to cancel or reschedule. If you are more than 10 minutes late or greater for your scheduled appointment time, the clinic policy is that you may be asked to reschedule.        ,

## 2023-08-11 NOTE — TELEPHONE ENCOUNTER
"Home care nurse called, patient's wound is now \"100% yellow slough.\"  Says no other signs or symptoms of infection, wondering is wound care orders should be updated.  Natalia Mcdaniel   "

## 2023-08-11 NOTE — TELEPHONE ENCOUNTER
Returned call to Natalia. Discussed that the wound was slough covered at the last visit and confirmed with clinic nurse at last visit the wound was not debrided much in clinic. Discussed with Natalia to continue plan of care but call back if any of symptoms develop.

## 2023-08-22 NOTE — PROGRESS NOTES
Wound Clinic Note          Visit date: 08/22/2023       Cheif Complaint:     Rashaun Molina is a 63 year old  male had concerns including WOUND CARE.  He has a left BKA wound.      HISTORY OF PRESENT ILLNESS:    Rashaun Molina reports the ulcer has been present since June 2023.  The wound began after a recent surgery and the incision did not heal normally.   He had a left below the knee amputation performed on May 31, 2023.  Postoperatively the wound dehisced and became infected.  Requiring operative debridement on July 6, 2023.      Since his last clinic visit with me the home health nurses have been changing the bandages 3 times a week using alginate, an ABD pad and a spandagrip stocking.  The bandages have been staying in place better.  There is been light to moderate serous drainage from the wound.      The pateint denies fevers or chills.  They report the pain from the wound has been 0/10 and has remained about the same recently.      Today the patient reports maintaining a high protein diet, but has not been taking protein supplements lately.        The patient denies a history of diabetes, smoking or chronic steroid use.         The patient has not had any symptoms of infection relating to the wound recently and is not currently on antibiotics.       Problem List:   Past Medical History:   Diagnosis Date    Arthritis     Chronic airway obstruction, not elsewhere classified 03/08/2004    pt says that he does not have COPD    Cough syncope 11/21/2012    Essential hypertension, benign     Fistula     colon/bladder    Fracture of right proximal fibula 07/30/2014    Orthostatic hypotension 11/24/2014    Other chronic pain     right hip pain    PAD (peripheral artery disease) (H) 08/2018    PTA right SFA Dr. Lee    Palpitations 12/2018 01/2019 Event monitor- SR/ST    Parkinsons disease (H)     Personal history of colonic polyps 06/28/2005    Pure hypercholesterolemia     Tobacco use disorder 03/08/2004     Vasovagal syncopes 10/25/2014    Viral warts, unspecified     genital              Family Hx: family history includes Coronary Artery Disease in his father; Heart Disease in his father; Heart Surgery in his brother; Hyperlipidemia in his brother; Hypertension in his brother, father, mother, and sister.       Surgical Hx:   Past Surgical History:   Procedure Laterality Date    AMPUTATE LEG BELOW KNEE Left 5/31/2023    Procedure: Left below the knee amputation;  Surgeon: Neil Romero MD;  Location:  OR    ARTHROPLASTY HIP Right 10/07/2015    Procedure: ARTHROPLASTY HIP;  Surgeon: Neil Davis MD;  Location:  OR    COLONOSCOPY  09/05/2004    Per Hospital encounter dated 4/18/13    COLONOSCOPY N/A 04/09/2018    Procedure: COMBINED COLONOSCOPY, SINGLE OR MULTIPLE BIOPSY/POLYPECTOMY BY BIOPSY;;  Surgeon: Tramaine Zuniga MD;  Location:  GI    COMBINED CYSTOSCOPY, INSERT CATHETER URETER  04/11/2013    Procedure: COMBINED CYSTOSCOPY, INSERT CATHETER URETER;;  Surgeon: Kashif Parks MD;  Location:  OR     LARYNGOSCOPY DIRECT W VOCAL CORD INJECTION      vocal cord polyps    IRRIGATION AND DEBRIDEMENT Left 08/2022    ankle    IRRIGATION AND DEBRIDEMENT SOFT TISSUE LOWER EXTREMITY, COMBINED Left 7/6/2023    Procedure: 1.  Left below-knee amputation wound irrigation and debridement with sharp excisional debridement of nonviable skin, subcutaneous tissue, fascia, and muscle; with primary wound closure. 2.  Application of left below-knee amputation wound incisional wound VAC, 20 cm Heather dressing. ;  Surgeon: Dylan Navarro MD;  Location:  OR    LAPAROSCOPIC ASSISTED COLECTOMY  04/11/2013    Procedure: LAPAROSCOPIC ASSISTED COLECTOMY;  LOW ANTERIOR RESECTION ;  Surgeon: Tramaine Zuniga MD;  Location:  OR    OPEN REDUCTION INTERNAL FIXATION ANKLE Left 06/09/2022    Procedure: .  Open reduction internal fixation of left ankle syndesmotic injury 2.  Non-operative treatment of left  proximal fibula fracture;  Surgeon: Mike Jackson MD;  Location: [facility] OR    OPEN REDUCTION INTERNAL FIXATION ANKLE Left 8/24/2022    Procedure: Open reduction internal fixation ankle;  Surgeon: Mike Jackson MD;  Location: [facility] OR    REMOVE HARDWARE ANKLE Left 8/24/2022    Procedure: 1.  Removal hardware left ankle 2.  Excisional debridement of left lateral ankle wound deepest level of debridement bone 3.  Revision open reduction internal fixation of left ankle syndesmotic injury 4.  Deltoid ligament repair left ankle;  Surgeon: Mike Jackson MD;  Location: [facility] OR    ZZ COLONOSCOPY THRU STOMA W BIOPSY/CAUTERY TUMOR/POLYP/LESION  1998    michael, tubular adenoma, next colonoscopy 2001    ZZHC COLONOSCOPY THRU STOMA, DIAGNOSTIC  04/16/2001    negative, ligate two internal hemmorhoids  Dr rodriguez-repeat 2008          Allergies:    Allergies   Allergen Reactions    Tiotropium Bromide Monohydrate Blisters    Hctz [Hydrochlorothiazide] Other (See Comments)     Low sodium              Medication History:    Current Outpatient Medications   Medication Sig    acetaminophen (TYLENOL) 325 MG tablet Take 2 tablets (650 mg) by mouth every 4 hours as needed for other (For optimal non-opioid multimodal pain management to improve pain control.)    albuterol (PROAIR HFA/PROVENTIL HFA/VENTOLIN HFA) 108 (90 Base) MCG/ACT inhaler Inhale 2 puffs into the lungs every 6 hours as needed for shortness of breath, wheezing or cough    aspirin (ASA) 325 MG EC tablet Take 1 tablet (325 mg) by mouth daily    carbidopa-levodopa (SINEMET)  MG tablet Take 2 tablets by mouth 3 times daily (Takes at 0400, 1000, and 1600)    ciprofloxacin (CIPRO) 500 MG tablet Take 1 tablet (500 mg) by mouth 2 times daily    gabapentin (NEURONTIN) 300 MG capsule Take 1 capsule (300 mg) by mouth 3 times daily    lisinopril (ZESTRIL) 40 MG tablet Take 1 tablet (40 mg) by mouth daily    metoprolol succinate ER (TOPROL XL) 50 MG 24 hr tablet Take 1 tablet (50  mg) by mouth daily    oxyCODONE (ROXICODONE) 5 MG tablet Take 1 tablet (5 mg) by mouth every 4 hours as needed for moderate pain    rosuvastatin (CRESTOR) 40 MG tablet Take 1 tablet (40 mg) by mouth daily    senna-docusate (SENOKOT-S/PERICOLACE) 8.6-50 MG tablet Take 1 tablet by mouth 2 times daily as needed for constipation    sodium chloride 1 GM tablet Take 2 tablets (2 g) by mouth 2 times daily (with meals)    umeclidinium (INCRUSE ELLIPTA) 62.5 MCG/INH inhaler Inhale 1 puff into the lungs daily     No current facility-administered medications for this encounter.         Tobacco History:  reports that he quit smoking about 10 years ago. His smoking use included cigarettes. He has a 60.00 pack-year smoking history. He has been exposed to tobacco smoke. He has never used smokeless tobacco.       REVIEW OF SYMPTOMS:   The review of systems was negative except as noted in the HPI.           PHYSICAL EXAMINATION:     BP (!) 146/79 (BP Location: Left arm, Patient Position: Sitting, Cuff Size: Adult Regular)   Pulse 68   Temp 97.7  F (36.5  C) (Temporal)            GENERAL: The patient overall appears well and is no acute distress.   HEAD: normocephalic   EYES: Sclera and conjunctiva clear   NECK: no obvious masses   LUNGS: breathing is unlabored.   EXTREMITIES: No clubbing, cyanosis or edema   SKIN: No rashes or other abnormalities except as noted under the Wound section below.   NEUROLOGICAL: normal motor and sensory function       WOUND/ulcer: The wound appears healthy with no sign of infection.   Wound bed: necrotic material  Periwound: healthy intact skin  Today there is just 1 open area remaining which is the one area in the center which had more depth, this area is still smaller compared to the last clinic visit also.      Also see below for wound details:     Circumferential volume measures:             No data to display                Ulceration(s)/Wound(s):   Please see the media tab under the chart review  for pictures of the wounds.  Nursing staff removed dressings and cleansed wound.    Wound (used by OP [facility] only) 08/01/23 0800 Left BKA surgical (Active)   Thickness/Stage full thickness 08/22/23 0730   Base slough 08/22/23 0730   Periwound pink 08/22/23 0730   Periwound Temperature warm 08/22/23 0730   Periwound Skin Turgor soft 08/22/23 0730   Edges open 08/22/23 0730   Length (cm) 0.7 08/22/23 0730   Width (cm) 2 08/22/23 0730   Depth (cm) 0.6 08/22/23 0730   Wound (cm^2) 1.4 cm^2 08/22/23 0730   Wound Volume (cm^3) 0.84 cm^3 08/22/23 0730   Wound healing % 89.12 08/22/23 0730   Drainage Characteristics/Odor serosanguineous 08/22/23 0730   Drainage Amount moderate 08/22/23 0730   Care, Wound debrided 08/22/23 0730             Recent Labs   Lab Test 11/25/19  0920 08/06/18  0718 05/14/18  0713   A1C 5.3 5.4 5.6          Recent Labs   Lab Test 07/05/23  1125 06/09/22  0645 06/08/22  1657   ALBUMIN 4.0 3.4 3.6              Procedure note: , I had previous obtain informed consent from the patient to perform serial debridements, I confirmed this again with the patient today verbally. , Anesthetized as needed with lidocaine. , Using a curette and/or a scalpel I performed an excisional debridement removing all necrotic material at the left BKA wound down to the level of viable subcutaneous tissue.  I obtained hemostasis with direct pressure.  The patient tolerated the procedure well. , EBL: <5 ml , and Total debridement surface area: Less than 20 cm                  ASSESSMENT:   This is a 63 year old  male with a left BKA surgical wound          PLAN:   We will bandage the area with alginate, an ABD pad and a spandagrip stocking change 3 times a week by the home health nurses.  We discussed future use of a prosthesis once the wound is healed.  I have encouraged the patient to continue on their high protein diet to aid in wound healing.   The patient will return to the wound clinic in 2 weeks to see me again.        30  "minutes spent on the date of the encounter doing chart review, history and exam, documentation and further activities per the note      Taras Serrano MD  08/22/2023   8:00 AM   [facility]/Wound  [phone]    This note was electronically signed by Taras Serrano MD        Further instructions from your care team         Rashaun Molina      1960    A DME order was not completed because the supplies are ordered by home care or at a care facility  [facility] Phone: [phone]; Fax: [phone]      Plan:  Dr. Romero apt next week. He plans to get patient stump  once healed more.  No prosthesis until completely healed.  Ok to get fit for prosthesis near end of healing so it's ready once it's healed.     Wound Dressing Change: left BKA  - Wash your hands with soap and water before you begin your dressing change and prepare a clean surface for dressings.  - Cleanse with mild unscented soap (such as Cetaphil, Cerave or Dove) and water or wound cleanser  - Apply small amount of VASHE on gauze, lay into wound bed, let sit for 10 minutes, remove gauze (do not rinse)   - Apply ~1/6 plain Melgisorb alginate 4x4, cut-to-fit size of wound  - Cover with 1/2 ABD pad 5x9\"  - Apply Spandage size 7/MTSpandage size 4 up to mid thigh, twisting or folding the distal aspect.  - Apply Spandigrip size F up to mid thigh. Fold over the distal aspect and secure with medipore tape (or a small piece of Spandage)  Change 3 times a week and as needed for drainage     Elevation:  It is recommended that you elevate your legs above the level of your heart for 30 minutes: approximately 2-3 times each day   Ways to do this:   - Lay on the couch or your bed and prop your legs up on pillows   - Recline back as far as you can go in your recliner and prop your legs on pillows.     Protein:  A diet high in protein is important for wound healing, we recommend getting 90 grams of protein per day. " Taking protein shakes or bars are a good way to get extra protein in your diet.   Good sources of protein:  Pork 26g per 3 oz  Whey protein powder - 24g per scoop (on average)  Greek yogurt - 23g per 8oz   Chicken or Turkey - 23g per 3oz  Fish - 20-25g per 3oz  Beef - 18-23g per 3oz  Navy beans - 20g per cup  Cottage cheese - 14g per 1/2 cup   Lentils - 13g per 1/4 cup  Beef jerky 13g per 1oz  2% milk - 8g per cup  Peanut butter - 8g per 2 tablespoons  Eggs - 6g per egg  Mixed nuts - 6g per 2oz      ROUTINE FOOT CARE (NAIL TRIMMING / CALLUSES)  Go to [email or web address].org (American Foot Care Nurses Association) and search for providers near you.  Otherwise, this is a list we have gathered of  recommended locations/providers in [place].     [facility]   [phone]    [facility]  [phone]  [email or web address]   [facility]  [phone]  [place] + [place] [facility]  [phone]  [email or web address]   [facility]  [address] [place] [address] [phone] Kashif Escobar DPM  [address] [place] [address] [phone]   [facility]  [phone] [address] [place] [address]  [facility]  Dr. Adam Villa  [phone]  [facility]  [phone]  [facility]  (does not take BCBS) FYI:  *Some providers accept insurance while others are out of pocket. Please contact them for details*      Taras Serrano M.D. August 22, 2023    Call us at [phone] if you have any questions about your wounds, have redness or swelling around your wound, have a fever of 101 degrees Fahrenheit or greater or if you have any other problems or concerns. We answer the phone Monday through Friday 8 am to 4 pm, please leave a message as we check the voicemail frequently throughout the day.     If you had a positive experience please indicate that on your patient satisfaction survey form that [facility]  will be sending you.    It was a pleasure meeting with you today.  Thank you for allowing me and my team the privilege of caring for you today.  YOU are the reason we are here, and I truly hope we provided you with the excellent service you deserve.  Please let us know if there is anything else we can do for you so that we can be sure you are leaving completely satisfied with your care experience.      If you have any billing related questions please call the [facility] Business office at [phone]. The clinic staff does not handle billing related matters.    If you are scheduled to have a follow up appointment, you will receive a reminder call the day before your visit. On the appointment day please arrive 15 minutes prior to your appointment time. If you are unable to keep that appointment, please call the clinic to cancel or reschedule. If you are more than 10 minutes late or greater for your scheduled appointment time, the clinic policy is that you may be asked to reschedule.        ,

## 2023-08-22 NOTE — DISCHARGE INSTRUCTIONS
"Rashaun VÁSQUEZ Jesse      1960    A DME order was not completed because the supplies are ordered by home care or at a care facility  [facility] Phone: [phone]; Fax: [phone]      Plan:  Dr. Romero apt next week. He plans to get patient stump  once healed more.  No prosthesis until completely healed.  Ok to get fit for prosthesis near end of healing so it's ready once it's healed.     Wound Dressing Change: left BKA  - Wash your hands with soap and water before you begin your dressing change and prepare a clean surface for dressings.  - Cleanse with mild unscented soap (such as Cetaphil, Cerave or Dove) and water or wound cleanser  - Apply small amount of VASHE on gauze, lay into wound bed, let sit for 10 minutes, remove gauze (do not rinse)   - Apply ~1/6 plain Melgisorb alginate 4x4, cut-to-fit size of wound  - Cover with 1/2 ABD pad 5x9\"  - Apply Spandage size 7/MTSpandage size 4 up to mid thigh, twisting or folding the distal aspect.  - Apply Spandigrip size F up to mid thigh. Fold over the distal aspect and secure with medipore tape (or a small piece of Spandage)  Change 3 times a week and as needed for drainage     Elevation:  It is recommended that you elevate your legs above the level of your heart for 30 minutes: approximately 2-3 times each day   Ways to do this:   - Lay on the couch or your bed and prop your legs up on pillows   - Recline back as far as you can go in your recliner and prop your legs on pillows.     Protein:  A diet high in protein is important for wound healing, we recommend getting 90 grams of protein per day. Taking protein shakes or bars are a good way to get extra protein in your diet.   Good sources of protein:  Pork 26g per 3 oz  Whey protein powder - 24g per scoop (on average)  Greek yogurt - 23g per 8oz   Chicken or Turkey - 23g per 3oz  Fish - 20-25g per 3oz  Beef - 18-23g per 3oz  Navy beans - 20g per cup  Cottage cheese - 14g per 1/2 cup   Lentils - 13g " per 1/4 cup  Beef jerky 13g per 1oz  2% milk - 8g per cup  Peanut butter - 8g per 2 tablespoons  Eggs - 6g per egg  Mixed nuts - 6g per 2oz      ROUTINE FOOT CARE (NAIL TRIMMING / CALLUSES)  Go to afcna.org (American Foot Care Nurses Association) and search for providers near you.  Otherwise, this is a list we have gathered of  recommended locations/providers in [place].     [facility]   [phone]    [facility]  [phone]  [email or web address]   [facility]  [phone]  [place] + [place] radius [facility]  [phone]  [email or web address]   [facility]  [address], [place] [address] [phone] Kashif Escobar DPM  [address], [place] [address] [phone]   [facility]  [phone] [address] [place] [address]  [facility]  Dr. Adam Villa  [phone]  [facility]  [phone]  [place] & [place] Locations  (does not take BCBS) FYI:  *Some providers accept insurance while others are out of pocket. Please contact them for details*      Taras Serrano M.D. August 22, 2023    Call us at [phone] if you have any questions about your wounds, have redness or swelling around your wound, have a fever of 101 degrees Fahrenheit or greater or if you have any other problems or concerns. We answer the phone Monday through Friday 8 am to 4 pm, please leave a message as we check the voicemail frequently throughout the day.     If you had a positive experience please indicate that on your patient satisfaction survey form that [facility] will be sending you.    It was a pleasure meeting with you today.  Thank you for allowing me and my team the privilege of caring for you today.  YOU are the reason we are here, and I truly hope we provided you with the excellent service you deserve.  Please let us know if there is anything else we can do for you so that we can be sure you are leaving  completely satisfied with your care experience.      If you have any billing related questions please call the [facility] Business office at [phone]. The clinic staff does not handle billing related matters.    If you are scheduled to have a follow up appointment, you will receive a reminder call the day before your visit. On the appointment day please arrive 15 minutes prior to your appointment time. If you are unable to keep that appointment, please call the clinic to cancel or reschedule. If you are more than 10 minutes late or greater for your scheduled appointment time, the clinic policy is that you may be asked to reschedule.

## 2023-09-05 NOTE — PROGRESS NOTES
Wound Clinic Note          Visit date: 09/05/2023       Cheif Complaint:     Rashaun Molina is a 63 year old  male had concerns including WOUND CARE.  He has a left BKA wound.      HISTORY OF PRESENT ILLNESS:    Rashaun Molina reports the ulcer has been present since June 2023.  The wound began after a recent surgery and the incision did not heal normally.   He had a left below the knee amputation performed on May 31, 2023.  Postoperatively the wound dehisced and became infected.  Requiring operative debridement on July 6, 2023.      Since his last clinic visit with me the home health nurses have been changing the bandages 3 times a week using alginate, an ABD pad and a spandagrip stocking.  The bandages have been staying in place better.  There is been light to moderate serous drainage from the wound.      He will be going to get his stump  and get fitted for his prosthesis in the next week or so.    The pateint denies fevers or chills.  They report the pain from the wound has been 0/10 and has remained about the same recently.      Today the patient reports maintaining a high protein diet, but has not been taking protein supplements lately.        The patient denies a history of diabetes, smoking or chronic steroid use.         The patient has not had any symptoms of infection relating to the wound recently and is not currently on antibiotics.       Problem List:   Past Medical History:   Diagnosis Date    Arthritis     Chronic airway obstruction, not elsewhere classified 03/08/2004    pt says that he does not have COPD    Cough syncope 11/21/2012    Essential hypertension, benign     Fistula     colon/bladder    Fracture of right proximal fibula 07/30/2014    Orthostatic hypotension 11/24/2014    Other chronic pain     right hip pain    PAD (peripheral artery disease) (H) 08/2018    PTA right SFA Dr. Lee    Palpitations 12/2018 01/2019 Event monitor- SR/ST    Parkinsons disease (H)     Personal  history of colonic polyps 06/28/2005    Pure hypercholesterolemia     Tobacco use disorder 03/08/2004    Vasovagal syncopes 10/25/2014    Viral warts, unspecified     genital              Family Hx: family history includes Coronary Artery Disease in his father; Heart Disease in his father; Heart Surgery in his brother; Hyperlipidemia in his brother; Hypertension in his brother, father, mother, and sister.       Surgical Hx:   Past Surgical History:   Procedure Laterality Date    AMPUTATE LEG BELOW KNEE Left 5/31/2023    Procedure: Left below the knee amputation;  Surgeon: Neil Romero MD;  Location:  OR    ARTHROPLASTY HIP Right 10/07/2015    Procedure: ARTHROPLASTY HIP;  Surgeon: Neil Davis MD;  Location:  OR    COLONOSCOPY  09/05/2004    Per Hospital encounter dated 4/18/13    COLONOSCOPY N/A 04/09/2018    Procedure: COMBINED COLONOSCOPY, SINGLE OR MULTIPLE BIOPSY/POLYPECTOMY BY BIOPSY;;  Surgeon: Tramaine Zuniga MD;  Location:  GI    COMBINED CYSTOSCOPY, INSERT CATHETER URETER  04/11/2013    Procedure: COMBINED CYSTOSCOPY, INSERT CATHETER URETER;;  Surgeon: Kashif Parks MD;  Location:  OR    HC LARYNGOSCOPY DIRECT W VOCAL CORD INJECTION      vocal cord polyps    IRRIGATION AND DEBRIDEMENT Left 08/2022    ankle    IRRIGATION AND DEBRIDEMENT SOFT TISSUE LOWER EXTREMITY, COMBINED Left 7/6/2023    Procedure: 1.  Left below-knee amputation wound irrigation and debridement with sharp excisional debridement of nonviable skin, subcutaneous tissue, fascia, and muscle; with primary wound closure. 2.  Application of left below-knee amputation wound incisional wound VAC, 20 cm Heather dressing. ;  Surgeon: Dylan Navarro MD;  Location:  OR    LAPAROSCOPIC ASSISTED COLECTOMY  04/11/2013    Procedure: LAPAROSCOPIC ASSISTED COLECTOMY;  LOW ANTERIOR RESECTION ;  Surgeon: Tramaine Zuniga MD;  Location:  OR    OPEN REDUCTION INTERNAL FIXATION ANKLE Left 06/09/2022    Procedure: .  Open  reduction internal fixation of left ankle syndesmotic injury 2.  Non-operative treatment of left proximal fibula fracture;  Surgeon: Mike Jackson MD;  Location: [facility] OR    OPEN REDUCTION INTERNAL FIXATION ANKLE Left 8/24/2022    Procedure: Open reduction internal fixation ankle;  Surgeon: Mike Jackson MD;  Location: [facility] OR    REMOVE HARDWARE ANKLE Left 8/24/2022    Procedure: 1.  Removal hardware left ankle 2.  Excisional debridement of left lateral ankle wound deepest level of debridement bone 3.  Revision open reduction internal fixation of left ankle syndesmotic injury 4.  Deltoid ligament repair left ankle;  Surgeon: Mike Jackson MD;  Location:  OR    [facility] COLONOSCOPY THRU STOMA W BIOPSY/CAUTERY TUMOR/POLYP/LESION  1998    michael, tubular adenoma, next colonoscopy 2001    [facility] COLONOSCOPY THRU STOMA, DIAGNOSTIC  04/16/2001    negative, ligate two internal hemmorhoids  Dr rodriguez-repeat 2008          Allergies:    Allergies   Allergen Reactions    Tiotropium Bromide Monohydrate Blisters    Hctz [Hydrochlorothiazide] Other (See Comments)     Low sodium              Medication History:    Current Outpatient Medications   Medication Sig    acetaminophen (TYLENOL) 325 MG tablet Take 2 tablets (650 mg) by mouth every 4 hours as needed for other (For optimal non-opioid multimodal pain management to improve pain control.)    albuterol (PROAIR HFA/PROVENTIL HFA/VENTOLIN HFA) 108 (90 Base) MCG/ACT inhaler Inhale 2 puffs into the lungs every 6 hours as needed for shortness of breath, wheezing or cough    aspirin (ASA) 325 MG EC tablet Take 1 tablet (325 mg) by mouth daily    carbidopa-levodopa (SINEMET)  MG tablet Take 2 tablets by mouth 3 times daily (Takes at 0400, 1000, and 1600)    ciprofloxacin (CIPRO) 500 MG tablet Take 1 tablet (500 mg) by mouth 2 times daily    gabapentin (NEURONTIN) 300 MG capsule Take 1 capsule (300 mg) by mouth 3 times daily    lisinopril (ZESTRIL) 40 MG tablet Take 1 tablet (40  mg) by mouth daily    metoprolol succinate ER (TOPROL XL) 50 MG 24 hr tablet Take 1 tablet (50 mg) by mouth daily    oxyCODONE (ROXICODONE) 5 MG tablet Take 1 tablet (5 mg) by mouth every 4 hours as needed for moderate pain    rosuvastatin (CRESTOR) 40 MG tablet Take 1 tablet (40 mg) by mouth daily    senna-docusate (SENOKOT-S/PERICOLACE) 8.6-50 MG tablet Take 1 tablet by mouth 2 times daily as needed for constipation    sodium chloride 1 GM tablet Take 2 tablets (2 g) by mouth 2 times daily (with meals)    umeclidinium (INCRUSE ELLIPTA) 62.5 MCG/INH inhaler Inhale 1 puff into the lungs daily     No current facility-administered medications for this encounter.         Tobacco History:  reports that he quit smoking about 10 years ago. His smoking use included cigarettes. He has a 60.00 pack-year smoking history. He has been exposed to tobacco smoke. He has never used smokeless tobacco.       REVIEW OF SYMPTOMS:   The review of systems was negative except as noted in the HPI.           PHYSICAL EXAMINATION:     /78 (BP Location: Left arm, Patient Position: Sitting)   Pulse 68   Temp 98.3  F (36.8  C) (Temporal)            GENERAL: The patient overall appears well and is no acute distress.   HEAD: normocephalic   EYES: Sclera and conjunctiva clear   NECK: no obvious masses   LUNGS: breathing is unlabored.   EXTREMITIES: No clubbing, cyanosis or edema   SKIN: No rashes or other abnormalities except as noted under the Wound section below.   NEUROLOGICAL: normal motor and sensory function       WOUND/ulcer: The wound appears healthy with no sign of infection.   Wound bed: granulation tissue  Periwound: healthy intact skin  The remaining open area is much smaller compared with his last clinic visit and is healing well.      Also see below for wound details:     Circumferential volume measures:             No data to display                Ulceration(s)/Wound(s):   Please see the media tab under the chart review for  pictures of the wounds.  Nursing staff removed dressings and cleansed wound.    Wound (used by OP [facility] only) 08/01/23 0800 Left BKA surgical (Active)   Thickness/Stage full thickness 09/05/23 0829   Base red;slough 09/05/23 0829   Periwound pink 09/05/23 0829   Periwound Temperature warm 09/05/23 0829   Periwound Skin Turgor soft 09/05/23 0829   Edges open 09/05/23 0829   Length (cm) 0.3 09/05/23 0829   Width (cm) 1.2 09/05/23 0829   Depth (cm) 0.9 09/05/23 0829   Wound (cm^2) 0.36 cm^2 09/05/23 0829   Wound Volume (cm^3) 0.32 cm^3 09/05/23 0829   Wound healing % 97.2 09/05/23 0829   Drainage Characteristics/Odor serosanguineous 09/05/23 0829   Drainage Amount moderate 09/05/23 0829   Care, Wound non-select wound debridement performed 09/05/23 0829               Recent Labs   Lab Test 11/25/19  0920 08/06/18  0718 05/14/18  0713   A1C 5.3 5.4 5.6          Recent Labs   Lab Test 07/05/23  1125 06/09/22  0645 06/08/22  1657   ALBUMIN 4.0 3.4 3.6              No debridement performed today.                  ASSESSMENT:   This is a 63 year old  male with a left BKA surgical wound          PLAN:   We will bandage the area with alginate, an ABD pad and a spandagrip stocking change 3 times a week by the home health nurses.  We discussed future use of a prosthesis once the wound is healed.  I have encouraged the patient to continue on their high protein diet to aid in wound healing.   The patient will return to the wound clinic in 3-4 weeks to see me again.        30 minutes spent on the date of the encounter doing chart review, history and exam, documentation and further activities per the note      Taras Serrano MD  09/05/2023   8:55 AM   [facility] Vascular/Wound  [phone]    This note was electronically signed by Taras Serrano MD        Further instructions from your care team         Rashaun Molina      1960    A DME order was not completed because the supplies are ordered by home care or  at a care facility    Dressing changes outside of clinic are being performed by Home Care    [facility] Phone: [phone]; Fax: [phone]    Wound Dressing Change: Left BKA  - Wash your hands with soap and water before you begin your dressing change and  prepare a clean surface for dressings.  - Cleanse with mild unscented soap (such as Cetaphil, Cerave or Dove) and water or  wound cleanser  - Apply small amount of VASHE on gauze, lay into wound bed, let sit for 10 minutes,  remove gauze (do not rinse)  - Apply ~1/20th piece of plain Melgisorb alginate (or similar), cut-to-fit size of wound  - Cover with 1 cover dressing (medipore plus pad, cutimed sorbion border)  - Apply Spandage size 7/MTSpandage size 4 up to mid thigh, twisting or folding the  distal aspect.  - Apply Spandigrip size F up to mid thigh. Fold over the distal aspect and secure with  medipore tape (or a small piece of Spandage) - ok to use stump  in place of Spandigrip once you have the stump   Change 3 times a week and as needed for drainage      Elevation:  It is recommended that you elevate your legs above the level of your heart for 30  minutes: approximately 2-3 times each day  Ways to do this:  - Lay on the couch or your bed and prop your legs up on pillows  - Recline back as far as you can go in your recliner and prop your legs on pillows.      Protein:  A diet high in protein is important for wound healing, we recommend getting 90  grams of protein per day. Taking protein shakes or bars are a good way to get extra  protein in your diet.  Good sources of protein:  Pork 26g per 3 oz  Whey protein powder - 24g per scoop (on average)  Greek yogurt - 23g per 8oz  Chicken or Turkey - 23g per 3oz  Fish - 20-25g per 3oz  Beef - 18-23g per 3oz  Navy beans - 20g per cup  Cottage cheese - 14g per 1/2 cup  Lentils - 13g per 1/4 cup  Beef jerky 13g per 1oz  2% milk - 8g per cup  Peanut butter - 8g per 2 tablespoons  Eggs - 6g  per egg  Mixed nuts - 6g per 2oz    ROUTINE FOOT CARE (NAIL TRIMMING / CALLUSES)    Go to afcna.org (American Foot Care Nurses Association) and search for providers near you.  Otherwise, this is a list we have gathered of  recommended locations/providers in [place].    [facility]   [phone]   [facility]  [phone]  [email or web address] [employer] Car   [facility]  [phone]  [place] + [place] mile radius [facility]  [phone]  [email or web address]   [facility]  [address] [place] [address]  [phone] Kashif Escobar DPM  [address] [place] [address] [phone]   [facility]   [address] [place] [address] & [facility] [address]  [phone] [facility]  Dr. Adam Villa  [phone]  [place], [place]   [facility]  [phone]  [place] & [place] Locations  (does not take BCBS) FYI:  *Some providers accept insurance while others are out of pocket. Please contact them for details*          Taras Serrano M.D. September 5, 2023    Call us at [phone] if you have any questions about your wounds, have redness or swelling around your wound, have a fever of 101 degrees Fahrenheit or greater or if you have any other problems or concerns. We answer the phone Monday through Friday 8 am to 4 pm, please leave a message as we check the voicemail frequently throughout the day.     If you had a positive experience please indicate that on your patient satisfaction survey form that [facility] will be sending you.    It was a pleasure meeting with you today.  Thank you for allowing me and my team the privilege of caring for you today.  YOU are the reason we are here, and I truly hope we provided you with the excellent service you deserve.  Please let us know if there is anything else we can do for you so that we can be sure you are leaving completely satisfied with your care experience.      If you have any billing  related questions please call the [facility] Business office at [phone]. The clinic staff does not handle billing related matters.    If you are scheduled to have a follow up appointment, you will receive a reminder call the day before your visit. On the appointment day please arrive 15 minutes prior to your appointment time. If you are unable to keep that appointment, please call the clinic to cancel or reschedule. If you are more than 10 minutes late or greater for your scheduled appointment time, the clinic policy is that you may be asked to reschedule.         ,

## 2023-09-05 NOTE — DISCHARGE INSTRUCTIONS
Rashaun Molina      1960    A DME order was not completed because the supplies are ordered by home care or at a care facility    Dressing changes outside of clinic are being performed by Home Care    [facility] Phone: [phone]; Fax: [phone]    Wound Dressing Change: Left BKA  - Wash your hands with soap and water before you begin your dressing change and  prepare a clean surface for dressings.  - Cleanse with mild unscented soap (such as Cetaphil, Cerave or Dove) and water or  wound cleanser  - Apply small amount of VASHE on gauze, lay into wound bed, let sit for 10 minutes,  remove gauze (do not rinse)  - Apply ~1/20th piece of plain Melgisorb alginate (or similar), cut-to-fit size of wound  - Cover with 1 cover dressing (medipore plus pad, cutimed sorbion border)  - Apply Spandage size 7/MTSpandage size 4 up to mid thigh, twisting or folding the  distal aspect.  - Apply Spandigrip size F up to mid thigh. Fold over the distal aspect and secure with  medipore tape (or a small piece of Spandage) - ok to use stump  in place of Spandigrip once you have the stump   Change 3 times a week and as needed for drainage      Elevation:  It is recommended that you elevate your legs above the level of your heart for 30  minutes: approximately 2-3 times each day  Ways to do this:  - Lay on the couch or your bed and prop your legs up on pillows  - Recline back as far as you can go in your recliner and prop your legs on pillows.      Protein:  A diet high in protein is important for wound healing, we recommend getting 90  grams of protein per day. Taking protein shakes or bars are a good way to get extra  protein in your diet.  Good sources of protein:  Pork 26g per 3 oz  Whey protein powder - 24g per scoop (on average)  Greek yogurt - 23g per 8oz  Chicken or Turkey - 23g per 3oz  Fish - 20-25g per 3oz  Beef - 18-23g per 3oz  Navy beans - 20g per cup  Cottage cheese - 14g per 1/2 cup  Lentils -  13g per 1/4 cup  Beef jerky 13g per 1oz  2% milk - 8g per cup  Peanut butter - 8g per 2 tablespoons  Eggs - 6g per egg  Mixed nuts - 6g per 2oz    ROUTINE FOOT CARE (NAIL TRIMMING / CALLUSES)    Go to afcna.org (American Foot Care Nurses Association) and search for providers near you.  Otherwise, this is a list we have gathered of  recommended locations/providers in [place].    [facility]   [phone]   [facility]  [phone]  [email or web address]   [facility]  [phone]  [place] + [place] mile radius [facility]  [phone]  [email or web address]   [facility]  [address] [place] [address]  [phone] Kashif Escobar DPM  [address] [place] [address] [phone]   [facility]   [address] [place] [address] & [place] [address]  [phone] [facility]  Dr. Adam Villa  [phone]  [place], [place]   [facility]  [phone]  [place] & [place] Locations  (does not take BCBS) FYI:  *Some providers accept insurance while others are out of pocket. Please contact them for details*          Taras Serrano M.D. September 5, 2023    Call us at [phone] if you have any questions about your wounds, have redness or swelling around your wound, have a fever of 101 degrees Fahrenheit or greater or if you have any other problems or concerns. We answer the phone Monday through Friday 8 am to 4 pm, please leave a message as we check the voicemail frequently throughout the day.     If you had a positive experience please indicate that on your patient satisfaction survey form that [facility] will be sending you.    It was a pleasure meeting with you today.  Thank you for allowing me and my team the privilege of caring for you today.  YOU are the reason we are here, and I truly hope we provided you with the excellent service you deserve.  Please let us know if there is anything else we can do for you so that we  can be sure you are leaving completely satisfied with your care experience.      If you have any billing related questions please call the [facility] Business office at [phone]. The clinic staff does not handle billing related matters.    If you are scheduled to have a follow up appointment, you will receive a reminder call the day before your visit. On the appointment day please arrive 15 minutes prior to your appointment time. If you are unable to keep that appointment, please call the clinic to cancel or reschedule. If you are more than 10 minutes late or greater for your scheduled appointment time, the clinic policy is that you may be asked to reschedule.

## 2023-10-03 NOTE — DISCHARGE INSTRUCTIONS
Rashaun Molina      1960    A DME order was not completed because the supplies are ordered by home care or at a care facility  Dressing changes outside of clinic are being performed by Home Care  [facility] Phone: [phone]; Fax: [phone]     Plan:  Wear Dressings for one more week then okay to go without a dressing  Slowly start using prosthesis in about two weeks  Monitor site daily; use a mirror to best see the location  No Need to follow up at wound center; you are discharged. If any concerns or questions, please call us at [phone]    Wound Dressing Change: Left BKA  - Wash your hands with soap and water before you begin your dressing change and prepare a clean surface for dressings.  - Cleanse with mild unscented soap (such as Cetaphil, Cerave or Dove) and water or wound cleanser  - Apply small amount of VASHE on gauze, lay into wound bed, let sit for 10 minutes, remove gauze (do not rinse)  - Apply Medipore+pad or Foam Bordered dressing  - Apply Stump    Change 1-2 times a week and as needed for drainage     Elevation:  It is recommended that you elevate your legs above the level of your heart for 30  minutes: approximately 2-3 times each day  Ways to do this:  - Lay on the couch or your bed and prop your legs up on pillows  - Recline back as far as you can go in your recliner and prop your legs on pillows.     Protein:  A diet high in protein is important for wound healing, we recommend getting 90  grams of protein per day. Taking protein shakes or bars are a good way to get extra  protein in your diet.  Good sources of protein:  Pork 26g per 3 oz  Whey protein powder - 24g per scoop (on average)  Greek yogurt - 23g per 8oz  Chicken or Turkey - 23g per 3oz  Fish - 20-25g per 3oz  Beef - 18-23g per 3oz  Navy beans - 20g per cup  Cottage cheese - 14g per 1/2 cup  Lentils - 13g per 1/4 cup  Beef jerky 13g per 1oz  2% milk - 8g per cup  Peanut butter - 8g per 2 tablespoons  Eggs -  6g per egg  Mixed nuts - 6g per 2oz      Taras Serrano M.D. October 3, 2023    Call us at [phone] if you have any questions about your wounds, have redness or swelling around your wound, have a fever of 101 degrees Fahrenheit or greater or if you have any other problems or concerns. We answer the phone Monday through Friday 8 am to 4 pm, please leave a message as we check the voicemail frequently throughout the day.     If you had a positive experience please indicate that on your patient satisfaction survey form that [facility] will be sending you.    It was a pleasure meeting with you today.  Thank you for allowing me and my team the privilege of caring for you today.  YOU are the reason we are here, and I truly hope we provided you with the excellent service you deserve.  Please let us know if there is anything else we can do for you so that we can be sure you are leaving completely satisfied with your care experience.      If you have any billing related questions please call the [facility] Business office at [phone]. The clinic staff does not handle billing related matters.    If you are scheduled to have a follow up appointment, you will receive a reminder call the day before your visit. On the appointment day please arrive 15 minutes prior to your appointment time. If you are unable to keep that appointment, please call the clinic to cancel or reschedule. If you are more than 10 minutes late or greater for your scheduled appointment time, the clinic policy is that you may be asked to reschedule.

## 2023-10-03 NOTE — PROGRESS NOTES
Patient arrived for wound care visit. Wound Care Nurse time spent evaluating patient record, and completed a full evaluation; provided recommendation based on treatment plan. Reviewed discharge instructions, patient education, and discussed plan of care with appropriate medical team staff members and patient and/or family members.    Brii Vasquez RN

## 2023-10-03 NOTE — PROGRESS NOTES
Wound Clinic Note          Visit date: 10/03/2023       Cheif Complaint:     Rashaun Molina is a 63 year old  male had concerns including WOUND CARE.  He has a left BKA wound.      HISTORY OF PRESENT ILLNESS:    Rashaun Molina reports the ulcer has been present since June 2023.  The wound began after a recent surgery and the incision did not heal normally.   He had a left below the knee amputation performed on May 31, 2023.  Postoperatively the wound dehisced and became infected.  Requiring operative debridement on July 6, 2023.      Since his last clinic visit with me the home health nurses have been changing the bandages 3 times a week using alginate, an ABD pad and his stump .  There is been little to no drainage from the area.      He has his prosthesis now but has not started using it yet.  He is also been wearing his stump .    The pateint denies fevers or chills.  They report the pain from the wound has been 0/10 and has remained about the same recently.      Today the patient reports maintaining a high protein diet, but has not been taking protein supplements lately.        The patient denies a history of diabetes, smoking or chronic steroid use.         The patient has not had any symptoms of infection relating to the wound recently and is not currently on antibiotics.       Problem List:   Past Medical History:   Diagnosis Date    Arthritis     Chronic airway obstruction, not elsewhere classified 03/08/2004    pt says that he does not have COPD    Cough syncope 11/21/2012    Essential hypertension, benign     Fistula     colon/bladder    Fracture of right proximal fibula 07/30/2014    Orthostatic hypotension 11/24/2014    Other chronic pain     right hip pain    PAD (peripheral artery disease) (H) 08/2018    PTA right SFA Dr. Lee    Palpitations 12/2018 01/2019 Event monitor- SR/ST    Parkinsons disease (H)     Personal history of colonic polyps 06/28/2005    Pure  hypercholesterolemia     Tobacco use disorder 03/08/2004    Vasovagal syncopes 10/25/2014    Viral warts, unspecified     genital              Family Hx: family history includes Coronary Artery Disease in his father; Heart Disease in his father; Heart Surgery in his brother; Hyperlipidemia in his brother; Hypertension in his brother, father, mother, and sister.       Surgical Hx:   Past Surgical History:   Procedure Laterality Date    AMPUTATE LEG BELOW KNEE Left 5/31/2023    Procedure: Left below the knee amputation;  Surgeon: Neil Romero MD;  Location:  OR    ARTHROPLASTY HIP Right 10/07/2015    Procedure: ARTHROPLASTY HIP;  Surgeon: Neil Davis MD;  Location:  OR    COLONOSCOPY  09/05/2004    Per Hospital encounter dated 4/18/13    COLONOSCOPY N/A 04/09/2018    Procedure: COMBINED COLONOSCOPY, SINGLE OR MULTIPLE BIOPSY/POLYPECTOMY BY BIOPSY;;  Surgeon: Tramaine Zuniga MD;  Location:  GI    COMBINED CYSTOSCOPY, INSERT CATHETER URETER  04/11/2013    Procedure: COMBINED CYSTOSCOPY, INSERT CATHETER URETER;;  Surgeon: Kashif Parks MD;  Location:  OR    HC LARYNGOSCOPY DIRECT W VOCAL CORD INJECTION      vocal cord polyps    IRRIGATION AND DEBRIDEMENT Left 08/2022    ankle    IRRIGATION AND DEBRIDEMENT SOFT TISSUE LOWER EXTREMITY, COMBINED Left 7/6/2023    Procedure: 1.  Left below-knee amputation wound irrigation and debridement with sharp excisional debridement of nonviable skin, subcutaneous tissue, fascia, and muscle; with primary wound closure. 2.  Application of left below-knee amputation wound incisional wound VAC, 20 cm Heather dressing. ;  Surgeon: Dylan Navarro MD;  Location:  OR    LAPAROSCOPIC ASSISTED COLECTOMY  04/11/2013    Procedure: LAPAROSCOPIC ASSISTED COLECTOMY;  LOW ANTERIOR RESECTION ;  Surgeon: Tramaine Zuniga MD;  Location:  OR    OPEN REDUCTION INTERNAL FIXATION ANKLE Left 06/09/2022    Procedure: .  Open reduction internal fixation of left ankle  syndesmotic injury 2.  Non-operative treatment of left proximal fibula fracture;  Surgeon: Mike Jackson MD;  Location: [facility] OR    OPEN REDUCTION INTERNAL FIXATION ANKLE Left 8/24/2022    Procedure: Open reduction internal fixation ankle;  Surgeon: Mike Jackson MD;  Location: [facility] OR    REMOVE HARDWARE ANKLE Left 8/24/2022    Procedure: 1.  Removal hardware left ankle 2.  Excisional debridement of left lateral ankle wound deepest level of debridement bone 3.  Revision open reduction internal fixation of left ankle syndesmotic injury 4.  Deltoid ligament repair left ankle;  Surgeon: Mike Jackson MD;  Location:  OR    RUST COLONOSCOPY THRU STOMA W BIOPSY/CAUTERY TUMOR/POLYP/LESION  1998    michael, tubular adenoma, next colonoscopy 2001    [facility]  04/16/2001    negative, ligate two internal hemmorhoids  Dr rodriguez-repeat 2008          Allergies:    Allergies   Allergen Reactions    Tiotropium Bromide Monohydrate Blisters    Hctz [Hydrochlorothiazide] Other (See Comments)     Low sodium              Medication History:    Current Outpatient Medications   Medication Sig    acetaminophen (TYLENOL) 325 MG tablet Take 2 tablets (650 mg) by mouth every 4 hours as needed for other (For optimal non-opioid multimodal pain management to improve pain control.)    albuterol (PROAIR HFA/PROVENTIL HFA/VENTOLIN HFA) 108 (90 Base) MCG/ACT inhaler Inhale 2 puffs into the lungs every 6 hours as needed for shortness of breath, wheezing or cough    aspirin (ASA) 325 MG EC tablet Take 1 tablet (325 mg) by mouth daily    carbidopa-levodopa (SINEMET)  MG tablet Take 2 tablets by mouth 3 times daily (Takes at 0400, 1000, and 1600)    ciprofloxacin (CIPRO) 500 MG tablet Take 1 tablet (500 mg) by mouth 2 times daily    gabapentin (NEURONTIN) 300 MG capsule Take 1 capsule (300 mg) by mouth 3 times daily    lisinopril (ZESTRIL) 40 MG tablet Take 1 tablet (40 mg) by mouth daily    metoprolol  succinate ER (TOPROL XL) 50 MG 24 hr tablet Take 1 tablet (50 mg) by mouth daily    oxyCODONE (ROXICODONE) 5 MG tablet Take 1 tablet (5 mg) by mouth every 4 hours as needed for moderate pain    rosuvastatin (CRESTOR) 40 MG tablet Take 1 tablet (40 mg) by mouth daily    senna-docusate (SENOKOT-S/PERICOLACE) 8.6-50 MG tablet Take 1 tablet by mouth 2 times daily as needed for constipation    sodium chloride 1 GM tablet Take 2 tablets (2 g) by mouth 2 times daily (with meals)    umeclidinium (INCRUSE ELLIPTA) 62.5 MCG/INH inhaler Inhale 1 puff into the lungs daily     No current facility-administered medications for this encounter.         Tobacco History:  reports that he quit smoking about 10 years ago. His smoking use included cigarettes. He has a 60.00 pack-year smoking history. He has been exposed to tobacco smoke. He has never used smokeless tobacco.       REVIEW OF SYMPTOMS:   The review of systems was negative except as noted in the HPI.           PHYSICAL EXAMINATION:     BP (!) 162/89 (BP Location: Right arm, Patient Position: Sitting, Cuff Size: Adult Regular)   Pulse 65   Temp 97.8  F (36.6  C) (Temporal)            GENERAL: The patient overall appears well and is no acute distress.   HEAD: normocephalic   EYES: Sclera and conjunctiva clear   NECK: no obvious masses   LUNGS: breathing is unlabored.   EXTREMITIES: No clubbing, cyanosis or edema   SKIN: No rashes or other abnormalities except as noted under the Wound section below.   NEUROLOGICAL: normal motor and sensory function       WOUND/ulcer: The wound appears healthy with no sign of infection.   Wound bed: granulation tissue  Periwound: healthy intact skin  There is just a very tiny sliver opening remaining, the wound is nearly healed.      Also see below for wound details:     Circumferential volume measures:             No data to display                Ulceration(s)/Wound(s):   Please see the media tab under the chart review for pictures of the  wounds.  Nursing staff removed dressings and cleansed wound.    Wound (used by OP [facility] only) 08/01/23 0800 Left BKA surgical (Active)   Thickness/Stage full thickness 10/03/23 0852   Base red;slough 10/03/23 0852   Periwound pink 10/03/23 0852   Periwound Temperature warm 10/03/23 0852   Periwound Skin Turgor soft 10/03/23 0852   Edges open 10/03/23 0852   Length (cm) 0.1 10/03/23 0852   Width (cm) 0.1 10/03/23 0852   Depth (cm) 0.1 10/03/23 0852   Wound (cm^2) 0.01 cm^2 10/03/23 0852   Wound Volume (cm^3) 0 cm^3 10/03/23 0852   Wound healing % 99.92 10/03/23 0852   Drainage Characteristics/Odor serosanguineous 10/03/23 0852   Drainage Amount small 10/03/23 0852   Care, Wound non-select wound debridement performed 10/03/23 0852               Recent Labs   Lab Test 11/25/19  0920 08/06/18  0718 05/14/18  0713   A1C 5.3 5.4 5.6          Recent Labs   Lab Test 07/05/23  1125 06/09/22  0645 06/08/22  1657   ALBUMIN 4.0 3.4 3.6              No debridement performed today.                  ASSESSMENT:   This is a 63 year old  male with a left BKA surgical wound          PLAN:   I recommend he continue to keep the area covered with a simple bandage along with his stump  changed 3 times a week for another week and a half and then no further bandages are required beyond that.  In 2 weeks from now he can begin using his prosthesis on a limited basis.  He will be working with physical therapy to learn to walk with a prosthesis so I do not expect that he will be using the prosthesis very much to start with.    I have encouraged the patient to continue on their high protein diet to aid in wound healing.   The patient will return to the wound clinic on an as-needed basis if further wounds develop.        30 minutes spent on the date of the encounter doing chart review, history and exam, documentation and further activities per the note      Taras Serrano MD  10/03/2023   9:16 AM   [facility]  Vascular/Wound  [phone]    This note was electronically signed by Taras Serrano MD        Further instructions from your care team           Rashaun Molina      1960    A DME order was not completed because the supplies are ordered by home care or at a care facility  Dressing changes outside of clinic are being performed by Home Care  [facility] Phone: [phone]; Fax: [phone]     Plan:  Wear Dressings for one more week then okay to go without a dressing  Slowly start using prosthesis in about two weeks  Monitor site daily; use a mirror to best see the location  No Need to follow up at wound center; you are discharged. If any concerns or questions, please call us at [phone]    Wound Dressing Change: Left BKA  - Wash your hands with soap and water before you begin your dressing change and prepare a clean surface for dressings.  - Cleanse with mild unscented soap (such as Cetaphil, Cerave or Dove) and water or wound cleanser  - Apply small amount of VASHE on gauze, lay into wound bed, let sit for 10 minutes, remove gauze (do not rinse)  - Apply Medipore+pad or Foam Bordered dressing  - Apply Stump    Change 1-2 times a week and as needed for drainage     Elevation:  It is recommended that you elevate your legs above the level of your heart for 30  minutes: approximately 2-3 times each day  Ways to do this:  - Lay on the couch or your bed and prop your legs up on pillows  - Recline back as far as you can go in your recliner and prop your legs on pillows.     Protein:  A diet high in protein is important for wound healing, we recommend getting 90  grams of protein per day. Taking protein shakes or bars are a good way to get extra  protein in your diet.  Good sources of protein:  Pork 26g per 3 oz  Whey protein powder - 24g per scoop (on average)  Greek yogurt - 23g per 8oz  Chicken or Turkey - 23g per 3oz  Fish - 20-25g per 3oz  Beef - 18-23g per 3oz  Navy beans - 20g per  cup  Cottage cheese - 14g per 1/2 cup  Lentils - 13g per 1/4 cup  Beef jerky 13g per 1oz  2% milk - 8g per cup  Peanut butter - 8g per 2 tablespoons  Eggs - 6g per egg  Mixed nuts - 6g per 2oz      Taras Serrano M.D. October 3, 2023    Call us at [phone] if you have any questions about your wounds, have redness or swelling around your wound, have a fever of 101 degrees Fahrenheit or greater or if you have any other problems or concerns. We answer the phone Monday through Friday 8 am to 4 pm, please leave a message as we check the voicemail frequently throughout the day.     If you had a positive experience please indicate that on your patient satisfaction survey form that [facility] will be sending you.    It was a pleasure meeting with you today.  Thank you for allowing me and my team the privilege of caring for you today.  YOU are the reason we are here, and I truly hope we provided you with the excellent service you deserve.  Please let us know if there is anything else we can do for you so that we can be sure you are leaving completely satisfied with your care experience.      If you have any billing related questions please call the [facility] Business office at [phone]. The clinic staff does not handle billing related matters.    If you are scheduled to have a follow up appointment, you will receive a reminder call the day before your visit. On the appointment day please arrive 15 minutes prior to your appointment time. If you are unable to keep that appointment, please call the clinic to cancel or reschedule. If you are more than 10 minutes late or greater for your scheduled appointment time, the clinic policy is that you may be asked to reschedule.          ,

## 2023-11-17 NOTE — TELEPHONE ENCOUNTER
Rashaun Molina is requesting a refill of:    Refused Prescriptions:                       Disp   Refills    rosuvastatin (CRESTOR) 40 MG tablet [Pharm*                Sig: TAKE 1 TABLET BY MOUTH EVERY DAY  Refused By: JUAN PABLO DURAN  Reason for Refusal: Patient needs appointment    metoprolol succinate ER (TOPROL XL) 50 MG *                Sig: TAKE 1 TABLET BY MOUTH EVERY DAY  Refused By: JUAN PABLO DURAN  Reason for Refusal: Patient needs appointment    lisinopril (ZESTRIL) 40 MG tablet [Pharmac*                Sig: TAKE 1 TABLET BY MOUTH EVERY DAY  Refused By: JUAN PABLO DURAN  Reason for Refusal: Patient needs appointment    Needs fasting OV for refills

## 2023-12-18 NOTE — LETTER
December 18, 2023      Rashaun Molina  [address] [facility] [address]            Dear Rashaun,    We hope this letter finds you doing well.     Your health is important to us at [facility]. Our records indicate that you could be due, or possibly overdue, for a screening or surveillance colonoscopy if you have not had a colonoscopy since 4/9/18.     An order has been placed for you to have this completed. Our scheduling team will be reaching out to you to assist in getting this scheduled. If you do not hear from them within 7 days or you would like to schedule sooner, please call [phone], option 2 for endoscopy scheduling.     If you believe this is in error and have already had a colonoscopy done, please have your results and recommendations faxed to [phone].    If you have questions about this order or have further concerns, please reach out to your primary care provider.     Rocael     Colorectal Cancer RN Screening Team  [facility]

## 2023-12-18 NOTE — PROGRESS NOTES
"CRC Screening Colonoscopy Referral Review    Patient meets the inclusion criteria for screening colonoscopy standing order.    Ordering/Referring Provider:  Dr. Yemi Nava    BMI: Estimated body mass index is 25.11 kg/m  as calculated from the following:    Height as of 8/1/23: 1.778 m (5' 10\").    Weight as of 8/1/23: 79.4 kg (175 lb).     Sedation:  Does patient have any of the following conditions affecting sedation?  Chronic or scheduled pain/narcotic medication use: MAC sedation recommended    Previous Scopes:  Any previous recommendations or follow up needs based on previous scope?  na / No recommendations.    Medical Concerns to Postpone Order:  Does patient have any of the following medical concerns that should postpone/delay colonoscopy referral?  No medical conditions affecting colonoscopy referral.    Final Referral Details:  Based on patient's medical history patient is appropriate for referral order with MAC/deep sedation.   BMI<= 45 45 < BMI <= 48 48 < BMI < = 50  BMI > 50   No Restrictions No [facility]  No [facility]  [facility] with exceptions Hospital Only OR Only     "

## 2024-03-21 NOTE — NURSING NOTE
Problem: PAIN - ADULT  Goal: Verbalizes/displays adequate comfort level or baseline comfort level  Description: Interventions:  - Encourage patient to monitor pain and request assistance  - Assess pain using appropriate pain scale  - Administer analgesics based on type and severity of pain and evaluate response  - Implement non-pharmacological measures as appropriate and evaluate response  - Consider cultural and social influences on pain and pain management  - Notify physician/advanced practitioner if interventions unsuccessful or patient reports new pain  Outcome: Progressing     Problem: INFECTION - ADULT  Goal: Absence or prevention of progression during hospitalization  Description: INTERVENTIONS:  - Assess and monitor for signs and symptoms of infection  - Monitor lab/diagnostic results  - Monitor all insertion sites, i.e. indwelling lines, tubes, and drains  - Monitor endotracheal if appropriate and nasal secretions for changes in amount and color  - [place] appropriate cooling/warming therapies per order  - Administer medications as ordered  - Instruct and encourage patient and family to use good hand hygiene technique  - Identify and instruct in appropriate isolation precautions for identified infection/condition  Outcome: Progressing  Goal: Absence of fever/infection during neutropenic period  Description: INTERVENTIONS:  - Monitor WBC    Outcome: Progressing     Problem: SAFETY ADULT  Goal: Patient will remain free of falls  Description: INTERVENTIONS:  - Educate patient/family on patient safety including physical limitations  - Instruct patient to call for assistance with activity   - Consult OT/PT to assist with strengthening/mobility   - Keep Call bell within reach  - Keep bed low and locked with side rails adjusted as appropriate  - Keep care items and personal belongings within reach  - Initiate and maintain comfort rounds  - Make Fall Risk Sign visible to staff  - Offer Toileting every 3 Hours,  Chief Complaint   Patient presents with     Physical     fasting, no concerns     Pre-visit Screening:  Immunizations:  not up to date - due for flu  Colonoscopy:  is up to date  Mammogram: is up to date  Asthma Action Test/Plan:  UTD  PHQ9:  No concerns   GAD7:  No concerns  Questioned patient about current smoking habits Pt. quit smoking some time ago.  Ok to leave detailed message on voice mail for today's visit only YES, phone # [phone]         in advance of need  - Initiate/Maintain bed alarm  - Obtain necessary fall risk management equipment: nonslip socks  - Apply yellow socks and bracelet for high fall risk patients  - Consider moving patient to room near nurses station  Outcome: Progressing  Goal: Maintain or return to baseline ADL function  Description: INTERVENTIONS:  -  Assess patient's ability to carry out ADLs; assess patient's baseline for ADL function and identify physical deficits which impact ability to perform ADLs (bathing, care of mouth/teeth, toileting, grooming, dressing, etc.)  - Assess/evaluate cause of self-care deficits   - Assess range of motion  - Assess patient's mobility; develop plan if impaired  - Assess patient's need for assistive devices and provide as appropriate  - Encourage maximum independence but intervene and supervise when necessary  - Involve family in performance of ADLs  - Assess for home care needs following discharge   - Consider OT consult to assist with ADL evaluation and planning for discharge  - Provide patient education as appropriate  Outcome: Progressing  Goal: Maintains/Returns to pre admission functional level  Description: INTERVENTIONS:  - Perform AM-PAC 6 Click Basic Mobility/ Daily Activity assessment daily.  - Set and communicate daily mobility goal to care team and patient/family/caregiver.   - Collaborate with rehabilitation services on mobility goals if consulted  - Perform Range of Motion 3 times a day.  - Reposition patient every 2 hours.  - Dangle patient 3 times a day  - Stand patient 3 times a day  - Ambulate patient 3 times a day  - Out of bed to chair 3 times a day   - Out of bed for meals 3 times a day  - Out of bed for toileting  - Record patient progress and toleration of activity level   Outcome: Progressing     Problem: DISCHARGE PLANNING  Goal: Discharge to home or other facility with appropriate resources  Description: INTERVENTIONS:  - Identify barriers to discharge w/patient and  caregiver  - Arrange for needed discharge resources and transportation as appropriate  - Identify discharge learning needs (meds, wound care, etc.)  - Arrange for interpretive services to assist at discharge as needed  - Refer to Case Management Department for coordinating discharge planning if the patient needs post-hospital services based on physician/advanced practitioner order or complex needs related to functional status, cognitive ability, or social support system  Outcome: Progressing     Problem: Knowledge Deficit  Goal: Patient/family/caregiver demonstrates understanding of disease process, treatment plan, medications, and discharge instructions  Description: Complete learning assessment and assess knowledge base.  Interventions:  - Provide teaching at level of understanding  - Provide teaching via preferred learning methods  Outcome: Progressing     Problem: Prexisting or High Potential for Compromised Skin Integrity  Goal: Skin integrity is maintained or improved  Description: INTERVENTIONS:  - Identify patients at risk for skin breakdown  - Assess and monitor skin integrity  - Assess and monitor nutrition and hydration status  - Monitor labs   - Assess for incontinence   - Turn and reposition patient  - Assist with mobility/ambulation  - Relieve pressure over bony prominences  - Avoid friction and shearing  - Provide appropriate hygiene as needed including keeping skin clean and dry  - Evaluate need for skin moisturizer/barrier cream  - Collaborate with interdisciplinary team   - Patient/family teaching  - Consider wound care consult   Outcome: Progressing

## 2025-02-18 NOTE — PROGRESS NOTES
Rashaun Molina is a 58-year-old gentleman with hypertension, hyperlipidemia, and prior tobacco abuse who presented to me recently with bilateral lower extremity short distance claudication.  He underwent an abdominal aortogram with bilateral lower extremity runoff on 5/14/18.  He presents today to discuss those results.  Please see my prior consult from 4/5/18.    I did not perform a repeat examination today.    I reviewed the images of the above-noted angiogram with Rashaun.  He had no significant inflow disease.  In the left leg there was diffuse severe disease involving the mid and distal left superficial femoral artery.  He had a good quality left popliteal artery with three-vessel runoff to the foot.  In the right leg he had moderate disease of the distal right SFA with a good quality popliteal and three-vessel runoff to the foot.    I explained to Rashaun that I felt the length of the left SFA disease with the eccentric calcification made it poorly suited to percutaneous intervention.  He has a very good quality left greater saphenous vein.  I felt his left leg would be better treated by a femoropopliteal bypass.  In the right leg his disease is more focal.  This would be amenable to percutaneous intervention.  It would make more sense to intervene on the right leg first prior to considering a left leg bypass.  Rashaun verbalizes full understanding to the above.  He remains employed as a  for [employer].  He plans to consider his options and his work schedule.  He may contact us at some time in the near future to schedule a repeat right leg angiogram with probable percutaneous intervention.    Total face-to-face time was 15 minutes, greater than 50% spent providing counseling and education.    Sanket Lee M.D.     1 or 2

## (undated) DEVICE — SUCTION CANISTER MEDIVAC LINER 3000ML W/LID 65651-530

## (undated) DEVICE — PREP POVIDONE IODINE SOLUTION 10% 4OZ BOTTLE 29906-004

## (undated) DEVICE — PREP SKIN SCRUB TRAY 4461A

## (undated) DEVICE — CAST BUCKET

## (undated) DEVICE — BAG CLEAR TRASH 1.3M 39X33" P4040C

## (undated) DEVICE — DRSG XEROFORM 5X9" 8884431605

## (undated) DEVICE — GLOVE BIOGEL PI MICRO SZ 8.5 48585

## (undated) DEVICE — SU ETHIBOND 5 V-37 4X30" MB66G

## (undated) DEVICE — LINEN FULL SHEET 5511

## (undated) DEVICE — MANIFOLD NEPTUNE 4 PORT 700-20

## (undated) DEVICE — BNDG ELASTIC 4" DBL LENGTH UNSTERILE 6611-14

## (undated) DEVICE — SU VICRYL 2-0 CT-1 27" UND J259H

## (undated) DEVICE — GLOVE PROTEXIS W/NEU-THERA 6.5  2D73TE65

## (undated) DEVICE — BLADE SAW SAGITTAL STRK 25X90X1.27MM HD SYS 6 6125-127-090

## (undated) DEVICE — SOL NACL 0.9% IRRIG 3000ML BAG 2B7477

## (undated) DEVICE — ESU GROUND PAD ADULT W/CORD E7507

## (undated) DEVICE — SU ETHILON 3-0 FS-1 18" 669H

## (undated) DEVICE — LINEN ORTHO ACL PACK 5447

## (undated) DEVICE — DRILL BIT QUICK COUPLING 2.5X110MM GOLD 310.25

## (undated) DEVICE — DRAIN JACKSON PRATT 07MM FLAT 3/4 PERF

## (undated) DEVICE — BLADE KNIFE SURG 10 371110

## (undated) DEVICE — CAST PLASTER SPLINT XTRA FAST 5X30" 7392

## (undated) DEVICE — DRSG GAUZE 4X4" TRAY

## (undated) DEVICE — GOWN IMPERVIOUS SPECIALTY XLG/XLONG 32474

## (undated) DEVICE — SU MONOCRYL 3-0 PS-1 27" Y936H

## (undated) DEVICE — BNDG ELASTIC 6" DBL LENGTH UNSTERILE 6611-16

## (undated) DEVICE — IMP SCR SYN CORTEX 3.5X14MM SELF TAP SS 204.814: Type: IMPLANTABLE DEVICE | Site: ANKLE | Status: NON-FUNCTIONAL

## (undated) DEVICE — DRSG ABDOMINAL 07 1/2X8" 7197D

## (undated) DEVICE — PREP POVIDONE-IODINE 7.5% SCRUB 4OZ BOTTLE MDS093945

## (undated) DEVICE — GLOVE PROTEXIS BLUE W/NEU-THERA 8.0  2D73EB80

## (undated) DEVICE — Device

## (undated) DEVICE — SU MONOCRYL 2-0 CT-1 36" UND Y945H

## (undated) DEVICE — PACK LOWER EXTREMITY RIDGES

## (undated) DEVICE — DRAPE IOBAN INCISE 23X17" 6650EZ

## (undated) DEVICE — DRAPE C-ARMOR 5 SIDED 5523

## (undated) DEVICE — GLOVE BIOGEL PI MICRO SZ 6.5 48565

## (undated) DEVICE — TOURNIQUET SGL  BLADDER 30"X4" BLUE 5921030135

## (undated) DEVICE — SOL NACL 0.9% IRRIG 1000ML BOTTLE 2F7124

## (undated) DEVICE — CAST PADDING 4" STERILE 9044S

## (undated) DEVICE — DRAPE X-RAY TUBE 00-901169-01-OEC

## (undated) DEVICE — SU ETHILON 2-0 PS 18" 585H

## (undated) DEVICE — GLOVE PROTEXIS BLUE W/NEU-THERA 7.5  2D73EB75

## (undated) DEVICE — LINEN HALF SHEET 5512

## (undated) DEVICE — SU ETHILON 3-0 PS-2 18" 1669H

## (undated) DEVICE — GLOVE BIOGEL PI SZ 6.5 40865

## (undated) DEVICE — SPONGE RAY-TEC 4X8" 7318

## (undated) DEVICE — PREP CHLORAPREP 26ML TINTED HI-LITE ORANGE 930815

## (undated) DEVICE — GLOVE PROTEXIS BLUE W/NEU-THERA 6.5  2D73EB65

## (undated) DEVICE — DRAPE STOCKINETTE IMPERVIOUS 12" 1587

## (undated) DEVICE — DRILL BIT SYN QUICK COUPLING 2.8X165MM 310.288

## (undated) DEVICE — SUCTION TIP YANKAUER W/O VENT K86

## (undated) DEVICE — DRSG XEROFORM 1X8"

## (undated) DEVICE — CAST PADDING 4" UNSTERILE 9044

## (undated) DEVICE — GLOVE PROTEXIS POWDER FREE 8.0 ORTHOPEDIC 2D73ET80

## (undated) DEVICE — BNDG ELASTIC 4"X5YDS UNSTERILE 6611-40

## (undated) DEVICE — SPONGE LAP 18X18" X8435

## (undated) DEVICE — TRAY PREP DRY SKIN SCRUB 067

## (undated) DEVICE — SU VICRYL 0 CT-1 27" J340H

## (undated) DEVICE — SUCTION MANIFOLD NEPTUNE 2 SYS 4 PORT 0702-020-000

## (undated) DEVICE — SU PDS II 2-0 CT-2 27"  Z333H

## (undated) DEVICE — LIGHT HANDLE X2

## (undated) DEVICE — BAG RED BIOHAZARD 37X50" 40GAL A7450PR

## (undated) DEVICE — GLOVE PROTEXIS W/NEU-THERA 7.5  2D73TE75

## (undated) RX ORDER — DOBUTAMINE HYDROCHLORIDE 200 MG/100ML
INJECTION INTRAVENOUS
Status: DISPENSED
Start: 2019-01-02

## (undated) RX ORDER — CLOPIDOGREL BISULFATE 75 MG/1
TABLET ORAL
Status: DISPENSED
Start: 2018-08-06

## (undated) RX ORDER — TRANEXAMIC ACID 10 MG/ML
INJECTION, SOLUTION INTRAVENOUS
Status: DISPENSED
Start: 2022-06-09

## (undated) RX ORDER — PROPOFOL 10 MG/ML
INJECTION, EMULSION INTRAVENOUS
Status: DISPENSED
Start: 2022-08-24

## (undated) RX ORDER — DEXAMETHASONE SODIUM PHOSPHATE 4 MG/ML
INJECTION, SOLUTION INTRA-ARTICULAR; INTRALESIONAL; INTRAMUSCULAR; INTRAVENOUS; SOFT TISSUE
Status: DISPENSED
Start: 2022-06-09

## (undated) RX ORDER — ACETAMINOPHEN 325 MG/1
TABLET ORAL
Status: DISPENSED
Start: 2022-08-24

## (undated) RX ORDER — LIDOCAINE HYDROCHLORIDE 10 MG/ML
INJECTION, SOLUTION EPIDURAL; INFILTRATION; INTRACAUDAL; PERINEURAL
Status: DISPENSED
Start: 2023-01-01

## (undated) RX ORDER — BUPIVACAINE HYDROCHLORIDE 2.5 MG/ML
INJECTION, SOLUTION EPIDURAL; INFILTRATION; INTRACAUDAL
Status: DISPENSED
Start: 2022-08-24

## (undated) RX ORDER — DEXAMETHASONE SODIUM PHOSPHATE 4 MG/ML
INJECTION, SOLUTION INTRA-ARTICULAR; INTRALESIONAL; INTRAMUSCULAR; INTRAVENOUS; SOFT TISSUE
Status: DISPENSED
Start: 2022-08-24

## (undated) RX ORDER — KETOROLAC TROMETHAMINE 30 MG/ML
INJECTION, SOLUTION INTRAMUSCULAR; INTRAVENOUS
Status: DISPENSED
Start: 2022-08-24

## (undated) RX ORDER — DEXMEDETOMIDINE HYDROCHLORIDE 4 UG/ML
INJECTION, SOLUTION INTRAVENOUS
Status: DISPENSED
Start: 2023-01-01

## (undated) RX ORDER — HYDROMORPHONE HCL IN WATER/PF 6 MG/30 ML
PATIENT CONTROLLED ANALGESIA SYRINGE INTRAVENOUS
Status: DISPENSED
Start: 2023-01-01

## (undated) RX ORDER — GLYCOPYRROLATE 0.2 MG/ML
INJECTION INTRAMUSCULAR; INTRAVENOUS
Status: DISPENSED
Start: 2022-06-09

## (undated) RX ORDER — ACETAMINOPHEN 325 MG/1
TABLET ORAL
Status: DISPENSED
Start: 2023-01-01

## (undated) RX ORDER — CEFAZOLIN SODIUM/WATER 2 G/20 ML
SYRINGE (ML) INTRAVENOUS
Status: DISPENSED
Start: 2022-08-24

## (undated) RX ORDER — FENTANYL CITRATE 50 UG/ML
INJECTION, SOLUTION INTRAMUSCULAR; INTRAVENOUS
Status: DISPENSED
Start: 2023-01-01

## (undated) RX ORDER — FENTANYL CITRATE 50 UG/ML
INJECTION, SOLUTION INTRAMUSCULAR; INTRAVENOUS
Status: DISPENSED
Start: 2018-04-09

## (undated) RX ORDER — PROPOFOL 10 MG/ML
INJECTION, EMULSION INTRAVENOUS
Status: DISPENSED
Start: 2023-01-01

## (undated) RX ORDER — EPHEDRINE SULFATE 50 MG/ML
INJECTION, SOLUTION INTRAMUSCULAR; INTRAVENOUS; SUBCUTANEOUS
Status: DISPENSED
Start: 2022-08-24

## (undated) RX ORDER — PROPOFOL 10 MG/ML
INJECTION, EMULSION INTRAVENOUS
Status: DISPENSED
Start: 2022-06-09

## (undated) RX ORDER — TRANEXAMIC ACID 10 MG/ML
INJECTION, SOLUTION INTRAVENOUS
Status: DISPENSED
Start: 2023-01-01

## (undated) RX ORDER — LIDOCAINE HYDROCHLORIDE 10 MG/ML
INJECTION, SOLUTION EPIDURAL; INFILTRATION; INTRACAUDAL; PERINEURAL
Status: DISPENSED
Start: 2022-06-09

## (undated) RX ORDER — HYDRALAZINE HYDROCHLORIDE 20 MG/ML
INJECTION INTRAMUSCULAR; INTRAVENOUS
Status: DISPENSED
Start: 2022-06-09

## (undated) RX ORDER — FENTANYL CITRATE 50 UG/ML
INJECTION, SOLUTION INTRAMUSCULAR; INTRAVENOUS
Status: DISPENSED
Start: 2022-06-09

## (undated) RX ORDER — LABETALOL HYDROCHLORIDE 5 MG/ML
INJECTION, SOLUTION INTRAVENOUS
Status: DISPENSED
Start: 2023-01-01

## (undated) RX ORDER — ACETAMINOPHEN 325 MG/1
TABLET ORAL
Status: DISPENSED
Start: 2022-06-09

## (undated) RX ORDER — ONDANSETRON 2 MG/ML
INJECTION INTRAMUSCULAR; INTRAVENOUS
Status: DISPENSED
Start: 2022-08-24

## (undated) RX ORDER — CEFAZOLIN SODIUM/WATER 2 G/20 ML
SYRINGE (ML) INTRAVENOUS
Status: DISPENSED
Start: 2023-01-01

## (undated) RX ORDER — FENTANYL CITRATE-0.9 % NACL/PF 10 MCG/ML
PLASTIC BAG, INJECTION (ML) INTRAVENOUS
Status: DISPENSED
Start: 2022-08-24

## (undated) RX ORDER — GLYCOPYRROLATE 0.2 MG/ML
INJECTION INTRAMUSCULAR; INTRAVENOUS
Status: DISPENSED
Start: 2023-01-01

## (undated) RX ORDER — ONDANSETRON 2 MG/ML
INJECTION INTRAMUSCULAR; INTRAVENOUS
Status: DISPENSED
Start: 2023-01-01

## (undated) RX ORDER — FENTANYL CITRATE-0.9 % NACL/PF 10 MCG/ML
PLASTIC BAG, INJECTION (ML) INTRAVENOUS
Status: DISPENSED
Start: 2023-01-01

## (undated) RX ORDER — DEXAMETHASONE SODIUM PHOSPHATE 4 MG/ML
INJECTION, SOLUTION INTRA-ARTICULAR; INTRALESIONAL; INTRAMUSCULAR; INTRAVENOUS; SOFT TISSUE
Status: DISPENSED
Start: 2023-01-01

## (undated) RX ORDER — CEFAZOLIN SODIUM/WATER 2 G/20 ML
SYRINGE (ML) INTRAVENOUS
Status: DISPENSED
Start: 2022-06-09

## (undated) RX ORDER — LIDOCAINE HYDROCHLORIDE 10 MG/ML
INJECTION, SOLUTION EPIDURAL; INFILTRATION; INTRACAUDAL; PERINEURAL
Status: DISPENSED
Start: 2022-08-24

## (undated) RX ORDER — OXYCODONE HYDROCHLORIDE 5 MG/1
TABLET ORAL
Status: DISPENSED
Start: 2023-01-01

## (undated) RX ORDER — EPHEDRINE SULFATE 50 MG/ML
INJECTION, SOLUTION INTRAMUSCULAR; INTRAVENOUS; SUBCUTANEOUS
Status: DISPENSED
Start: 2022-06-09

## (undated) RX ORDER — GLYCOPYRROLATE 0.2 MG/ML
INJECTION INTRAMUSCULAR; INTRAVENOUS
Status: DISPENSED
Start: 2022-08-24

## (undated) RX ORDER — FENTANYL CITRATE-0.9 % NACL/PF 10 MCG/ML
PLASTIC BAG, INJECTION (ML) INTRAVENOUS
Status: DISPENSED
Start: 2022-06-09

## (undated) RX ORDER — FENTANYL CITRATE 50 UG/ML
INJECTION, SOLUTION INTRAMUSCULAR; INTRAVENOUS
Status: DISPENSED
Start: 2022-08-24